# Patient Record
Sex: MALE | Race: WHITE | NOT HISPANIC OR LATINO | Employment: OTHER | URBAN - METROPOLITAN AREA
[De-identification: names, ages, dates, MRNs, and addresses within clinical notes are randomized per-mention and may not be internally consistent; named-entity substitution may affect disease eponyms.]

---

## 2017-01-06 ENCOUNTER — TELEPHONE (OUTPATIENT)
Dept: FAMILY MEDICINE CLINIC | Facility: CLINIC | Age: 57
End: 2017-01-06

## 2017-01-06 RX ORDER — ATORVASTATIN CALCIUM 80 MG/1
TABLET, FILM COATED ORAL
Qty: 90 TABLET | Refills: 0 | Status: SHIPPED | OUTPATIENT
Start: 2017-01-06 | End: 2017-02-03 | Stop reason: SDUPTHER

## 2017-01-16 DIAGNOSIS — I10 BENIGN ESSENTIAL HYPERTENSION: ICD-10-CM

## 2017-01-17 RX ORDER — SERTRALINE HYDROCHLORIDE 100 MG/1
TABLET, FILM COATED ORAL
Qty: 30 TABLET | Refills: 0 | Status: SHIPPED | OUTPATIENT
Start: 2017-01-17 | End: 2017-01-20 | Stop reason: SDUPTHER

## 2017-01-17 RX ORDER — LISINOPRIL 20 MG/1
TABLET ORAL
Qty: 90 TABLET | Refills: 0 | Status: SHIPPED | OUTPATIENT
Start: 2017-01-17 | End: 2017-01-20 | Stop reason: SDUPTHER

## 2017-01-20 ENCOUNTER — OFFICE VISIT (OUTPATIENT)
Dept: FAMILY MEDICINE CLINIC | Facility: CLINIC | Age: 57
End: 2017-01-20

## 2017-01-20 ENCOUNTER — TELEPHONE (OUTPATIENT)
Dept: FAMILY MEDICINE CLINIC | Facility: CLINIC | Age: 57
End: 2017-01-20

## 2017-01-20 VITALS
SYSTOLIC BLOOD PRESSURE: 114 MMHG | DIASTOLIC BLOOD PRESSURE: 64 MMHG | HEART RATE: 79 BPM | RESPIRATION RATE: 18 BRPM | TEMPERATURE: 98.1 F | WEIGHT: 187 LBS | BODY MASS INDEX: 30.05 KG/M2 | HEIGHT: 66 IN | OXYGEN SATURATION: 98 %

## 2017-01-20 DIAGNOSIS — I25.10 MULTIPLE VESSEL CORONARY ARTERY DISEASE: ICD-10-CM

## 2017-01-20 DIAGNOSIS — I10 BENIGN ESSENTIAL HYPERTENSION: Primary | ICD-10-CM

## 2017-01-20 DIAGNOSIS — E78.00 PURE HYPERCHOLESTEROLEMIA: ICD-10-CM

## 2017-01-20 DIAGNOSIS — Z12.5 PROSTATE CANCER SCREENING: ICD-10-CM

## 2017-01-20 PROCEDURE — 99214 OFFICE O/P EST MOD 30 MIN: CPT | Performed by: FAMILY MEDICINE

## 2017-01-20 NOTE — MR AVS SNAPSHOT
Justice Kiser   1/20/2017 3:30 PM   Office Visit    Dept Phone:  [phone]   Encounter #:  [id number]    Provider:  Anshul Martinez MD   Department:  [facility]                Your Full Care Plan              Today's Medication Changes          These changes are accurate as of: 1/20/17  4:18 PM.  If you have any questions, ask your nurse or doctor.               Medication(s)that have changed:     amLODIPine 5 MG tablet   Commonly known as:  NORVASC   TAKE 1 TABLET BY MOUTH DAILY   What changed:  Another medication with the same name was removed. Continue taking this medication, and follow the directions you see here.   Changed by:  Anshul Martinez MD       lisinopril 20 MG tablet   Commonly known as:  PRINIVIL,ZESTRIL   TAKE 1 TABLET EVERY DAY   What changed:  Another medication with the same name was removed. Continue taking this medication, and follow the directions you see here.   Changed by:  Anshul Martinez MD       sertraline 100 MG tablet   Commonly known as:  ZOLOFT   TAKE 1 TABLET BY MOUTH EVERY DAY   What changed:  Another medication with the same name was removed. Continue taking this medication, and follow the directions you see here.   Changed by:  Anshul Martinez MD                  Your Updated Medication List          This list is accurate as of: 1/20/17  4:18 PM.  Always use your most recent med list.                amLODIPine 5 MG tablet   Commonly known as:  NORVASC   TAKE 1 TABLET BY MOUTH DAILY       ASPIRIN LOW DOSE 81 MG tablet   Generic drug:  aspirin       atorvastatin 80 MG tablet   Commonly known as:  LIPITOR   TAKE 1 TABLET BY MOUTH DAILY       CENTRUM SILVER tablet       lisinopril 20 MG tablet   Commonly known as:  PRINIVIL,ZESTRIL   TAKE 1 TABLET EVERY DAY       metoprolol tartrate 50 MG tablet   Commonly known as:  LOPRESSOR       sertraline 100 MG tablet   Commonly known as:  ZOLOFT   TAKE 1 TABLET BY MOUTH EVERY DAY          "        You Were Diagnosed With        Codes Comments    Benign essential hypertension    -  Primary ICD-10-CM: I10  ICD-9-CM: 401.1     Pure hypercholesterolemia     ICD-10-CM: E78.00  ICD-9-CM: 272.0     Multiple vessel coronary artery disease     ICD-10-CM: I25.10  ICD-9-CM: 414.00     Prostate cancer screening     ICD-10-CM: Z12.5  ICD-9-CM: V76.44       Instructions     None    Patient Instructions History      Upcoming Appointments     Visit Type Date Time Department    OFFICE VISIT 1/20/2017  3:30 PM [facility]      Log[employer] Signup     Our records indicate that you have declined Software 2000t signup. If you would like to sign up for [employer], please email [email or web address] or call [phone] to obtain an activation code.             Other Info from Your Visit           Allergies     No Known Allergies      Reason for Visit     FOLLOW-UP 4 month     Med Refill     Hypertension           Vital Signs     Blood Pressure Pulse Temperature Respirations Height Weight    114/64 79 98.1 °F (36.7 °C) (Temporal Artery ) 18 66\" (167.6 cm) 187 lb (84.8 kg)    Oxygen Saturation Body Mass Index Smoking Status             98% 30.18 kg/m2 Former Smoker         Problems and Diagnoses Noted     Benign essential hypertension    High cholesterol or triglycerides    Multiple vessel coronary artery disease    Screening for prostate cancer            "

## 2017-01-20 NOTE — ASSESSMENT & PLAN NOTE
Coronary artery disease is improving with treatment.  Continue current treatment regimen.  Continue current medications.  Cardiac status will be reassessed in 6 months.

## 2017-01-20 NOTE — ASSESSMENT & PLAN NOTE
Hypertension is improving with treatment.  Continue current treatment regimen.  Continue current medications.  Blood pressure will be reassessed at the next regular appointment.

## 2017-01-20 NOTE — PROGRESS NOTES
Chief Complaint   Patient presents with   • FOLLOW-UP 4 month   • Med Refill   • Hypertension       Subjective     Hypertension   This is a chronic problem. The current episode started more than 1 year ago. The problem is unchanged. The problem is controlled. Pertinent negatives include no chest pain, headaches, malaise/fatigue, palpitations, peripheral edema or shortness of breath. (BP at home 130/70-80) There are no associated agents to hypertension. Risk factors for coronary artery disease include dyslipidemia and male gender. Past treatments include ACE inhibitors and beta blockers. Hypertensive end-organ damage includes CAD/MI (had stent). There is no history of angina, kidney disease or CVA. There is no history of chronic renal disease.   Coronary Artery Disease   Presents for follow-up visit. Pertinent negatives include no chest pain, chest pressure, chest tightness, dizziness, palpitations or shortness of breath. Risk factors include hyperlipidemia. The symptoms have been resolved.   Hyperlipidemia   This is a chronic problem. The current episode started more than 1 year ago. The problem is controlled. Recent lipid tests were reviewed and are normal. He has no history of chronic renal disease. Pertinent negatives include no chest pain, myalgias or shortness of breath. Current antihyperlipidemic treatment includes statins. The current treatment provides moderate improvement of lipids. There are no compliance problems.  Risk factors for coronary artery disease include dyslipidemia, hypertension and male sex.       HAd a sinus infection a few weeks ago and getting better now.     Past Medical History,Medications, Allergies, and social history was reviewed.    Review of Systems   Constitutional: Negative for malaise/fatigue.   Respiratory: Negative for chest tightness and shortness of breath.    Cardiovascular: Negative for chest pain and palpitations.   Musculoskeletal: Negative for myalgias.   Neurological:  Negative for dizziness and headaches.       Objective     Physical Exam   Constitutional: He is oriented to person, place, and time. Vital signs are normal. He appears well-developed and well-nourished.   HENT:   Head: Normocephalic and atraumatic.   Right Ear: Hearing, tympanic membrane, external ear and ear canal normal.   Left Ear: Hearing, tympanic membrane, external ear and ear canal normal.   Nose: Nose normal.   Mouth/Throat: Oropharynx is clear and moist.   Eyes: Conjunctivae, EOM and lids are normal. Pupils are equal, round, and reactive to light.   Neck: Normal range of motion. Neck supple. No thyromegaly present.   Cardiovascular: Normal rate, regular rhythm and normal heart sounds.  Exam reveals no gallop and no friction rub.    No murmur heard.  Pulmonary/Chest: Effort normal and breath sounds normal. No respiratory distress. He has no wheezes. He has no rales.   Abdominal: Soft. Normal appearance and bowel sounds are normal. He exhibits no distension and no mass. There is no tenderness. There is no rebound and no guarding.   Musculoskeletal: Normal range of motion.   Neurological: He is alert and oriented to person, place, and time. He has normal strength and normal reflexes. No cranial nerve deficit. Coordination normal.   Skin: Skin is warm and dry.   Psychiatric: He has a normal mood and affect. His speech is normal and behavior is normal. Judgment and thought content normal. Cognition and memory are normal.   Nursing note and vitals reviewed.        Assessment/Plan     Problem List Items Addressed This Visit        Cardiovascular and Mediastinum    Benign essential hypertension - Primary     Hypertension is improving with treatment.  Continue current treatment regimen.  Continue current medications.  Blood pressure will be reassessed at the next regular appointment.         Relevant Orders    Comprehensive Metabolic Panel    Lipid Panel    Multiple vessel coronary artery disease     Coronary artery  disease is improving with treatment.  Continue current treatment regimen.  Continue current medications.  Cardiac status will be reassessed in 6 months.         Relevant Orders    Comprehensive Metabolic Panel    Lipid Panel    Hyperlipidemia     He will follow-up and have blood work done fasting.  Including CMP and lipid panel         Relevant Orders    Comprehensive Metabolic Panel    Lipid Panel      Other Visit Diagnoses     Prostate cancer screening        Relevant Orders    PSA            DISCUSSION  Overall stable.  Continue current medications and check labs as noted.    Return in about 6 months (around 7/20/2017).     MEDICATIONS PRESCRIBED  Requested Prescriptions      No prescriptions requested or ordered in this encounter          Anshul Martinez MD

## 2017-01-21 ENCOUNTER — RESULTS ENCOUNTER (OUTPATIENT)
Dept: FAMILY MEDICINE CLINIC | Facility: CLINIC | Age: 57
End: 2017-01-21

## 2017-01-21 DIAGNOSIS — Z12.5 PROSTATE CANCER SCREENING: ICD-10-CM

## 2017-01-21 DIAGNOSIS — I10 BENIGN ESSENTIAL HYPERTENSION: ICD-10-CM

## 2017-01-21 DIAGNOSIS — I25.10 MULTIPLE VESSEL CORONARY ARTERY DISEASE: ICD-10-CM

## 2017-01-21 DIAGNOSIS — E78.00 PURE HYPERCHOLESTEROLEMIA: ICD-10-CM

## 2017-01-23 RX ORDER — METOPROLOL TARTRATE 50 MG/1
TABLET, FILM COATED ORAL
Qty: 30 TABLET | Refills: 5 | Status: SHIPPED | OUTPATIENT
Start: 2017-01-23 | End: 2017-07-02 | Stop reason: SDUPTHER

## 2017-01-24 LAB
ALBUMIN SERPL-MCNC: 4.6 G/DL (ref 3.2–4.8)
ALBUMIN/GLOB SERPL: 1.8 G/DL (ref 1.5–2.5)
ALP SERPL-CCNC: 138 U/L (ref 25–100)
ALT SERPL-CCNC: 72 U/L (ref 7–40)
AST SERPL-CCNC: 94 U/L (ref 0–33)
BILIRUB SERPL-MCNC: 0.4 MG/DL (ref 0.3–1.2)
BUN SERPL-MCNC: 6 MG/DL (ref 9–23)
BUN/CREAT SERPL: 10 (ref 7–25)
CALCIUM SERPL-MCNC: 10 MG/DL (ref 8.7–10.4)
CHLORIDE SERPL-SCNC: 100 MMOL/L (ref 99–109)
CHOLEST SERPL-MCNC: 154 MG/DL (ref 0–200)
CO2 SERPL-SCNC: 31 MMOL/L (ref 20–31)
CREAT SERPL-MCNC: 0.6 MG/DL (ref 0.6–1.3)
GLOBULIN SER CALC-MCNC: 2.6 GM/DL
GLUCOSE SERPL-MCNC: 119 MG/DL (ref 70–100)
HDLC SERPL-MCNC: 66 MG/DL (ref 40–60)
LDLC SERPL CALC-MCNC: 65 MG/DL (ref 0–100)
POTASSIUM SERPL-SCNC: 4.8 MMOL/L (ref 3.5–5.5)
PROT SERPL-MCNC: 7.2 G/DL (ref 5.7–8.2)
PSA SERPL-MCNC: 0.4 NG/ML (ref 0–4)
SODIUM SERPL-SCNC: 141 MMOL/L (ref 132–146)
TRIGL SERPL-MCNC: 114 MG/DL (ref 0–150)
VLDLC SERPL CALC-MCNC: 22.8 MG/DL

## 2017-01-27 DIAGNOSIS — R79.89 ELEVATED LIVER FUNCTION TESTS: Primary | ICD-10-CM

## 2017-02-03 RX ORDER — ATORVASTATIN CALCIUM 80 MG/1
TABLET, FILM COATED ORAL
Qty: 90 TABLET | Refills: 0 | Status: SHIPPED | OUTPATIENT
Start: 2017-02-03 | End: 2017-04-06 | Stop reason: SDUPTHER

## 2017-02-13 RX ORDER — SERTRALINE HYDROCHLORIDE 100 MG/1
TABLET, FILM COATED ORAL
Qty: 30 TABLET | Refills: 5 | Status: SHIPPED | OUTPATIENT
Start: 2017-02-13 | End: 2017-09-12 | Stop reason: SDUPTHER

## 2017-02-14 DIAGNOSIS — I10 BENIGN ESSENTIAL HYPERTENSION: ICD-10-CM

## 2017-02-14 RX ORDER — LISINOPRIL 20 MG/1
TABLET ORAL
Qty: 90 TABLET | Refills: 0 | Status: SHIPPED | OUTPATIENT
Start: 2017-02-14 | End: 2017-03-13 | Stop reason: SDUPTHER

## 2017-02-19 DIAGNOSIS — I10 BENIGN ESSENTIAL HYPERTENSION: ICD-10-CM

## 2017-02-20 RX ORDER — AMLODIPINE BESYLATE 5 MG/1
TABLET ORAL
Qty: 90 TABLET | Refills: 1 | Status: SHIPPED | OUTPATIENT
Start: 2017-02-20 | End: 2017-04-18 | Stop reason: SDUPTHER

## 2017-03-13 DIAGNOSIS — I10 BENIGN ESSENTIAL HYPERTENSION: ICD-10-CM

## 2017-03-13 RX ORDER — LISINOPRIL 20 MG/1
TABLET ORAL
Qty: 90 TABLET | Refills: 1 | Status: SHIPPED | OUTPATIENT
Start: 2017-03-13 | End: 2017-09-12 | Stop reason: SDUPTHER

## 2017-04-06 RX ORDER — ATORVASTATIN CALCIUM 80 MG/1
TABLET, FILM COATED ORAL
Qty: 90 TABLET | Refills: 1 | Status: SHIPPED | OUTPATIENT
Start: 2017-04-06 | End: 2017-09-12 | Stop reason: SDUPTHER

## 2017-04-18 DIAGNOSIS — I10 BENIGN ESSENTIAL HYPERTENSION: ICD-10-CM

## 2017-04-18 RX ORDER — AMLODIPINE BESYLATE 5 MG/1
TABLET ORAL
Qty: 90 TABLET | Refills: 0 | Status: SHIPPED | OUTPATIENT
Start: 2017-04-18 | End: 2017-06-10 | Stop reason: SDUPTHER

## 2017-06-10 DIAGNOSIS — I10 BENIGN ESSENTIAL HYPERTENSION: ICD-10-CM

## 2017-06-12 RX ORDER — AMLODIPINE BESYLATE 5 MG/1
TABLET ORAL
Qty: 90 TABLET | Refills: 0 | Status: SHIPPED | OUTPATIENT
Start: 2017-06-12 | End: 2017-09-12 | Stop reason: SDUPTHER

## 2017-07-03 RX ORDER — METOPROLOL TARTRATE 50 MG/1
TABLET, FILM COATED ORAL
Qty: 30 TABLET | Refills: 0 | Status: SHIPPED | OUTPATIENT
Start: 2017-07-03 | End: 2017-09-12 | Stop reason: SDUPTHER

## 2017-09-12 ENCOUNTER — OFFICE VISIT (OUTPATIENT)
Dept: FAMILY MEDICINE CLINIC | Facility: CLINIC | Age: 57
End: 2017-09-12

## 2017-09-12 VITALS
TEMPERATURE: 97 F | HEART RATE: 91 BPM | WEIGHT: 174 LBS | BODY MASS INDEX: 27.97 KG/M2 | SYSTOLIC BLOOD PRESSURE: 170 MMHG | HEIGHT: 66 IN | DIASTOLIC BLOOD PRESSURE: 100 MMHG | RESPIRATION RATE: 16 BRPM | OXYGEN SATURATION: 98 %

## 2017-09-12 DIAGNOSIS — E78.00 PURE HYPERCHOLESTEROLEMIA: ICD-10-CM

## 2017-09-12 DIAGNOSIS — R53.83 OTHER FATIGUE: ICD-10-CM

## 2017-09-12 DIAGNOSIS — R05.9 COUGH: ICD-10-CM

## 2017-09-12 DIAGNOSIS — R06.02 SHORTNESS OF BREATH: ICD-10-CM

## 2017-09-12 DIAGNOSIS — K21.9 GASTROESOPHAGEAL REFLUX DISEASE, ESOPHAGITIS PRESENCE NOT SPECIFIED: ICD-10-CM

## 2017-09-12 DIAGNOSIS — R10.13 EPIGASTRIC ABDOMINAL PAIN: ICD-10-CM

## 2017-09-12 DIAGNOSIS — I25.10 MULTIPLE VESSEL CORONARY ARTERY DISEASE: ICD-10-CM

## 2017-09-12 DIAGNOSIS — R63.4 WEIGHT LOSS: ICD-10-CM

## 2017-09-12 DIAGNOSIS — I10 BENIGN ESSENTIAL HYPERTENSION: Primary | ICD-10-CM

## 2017-09-12 DIAGNOSIS — R61 NIGHT SWEATS: ICD-10-CM

## 2017-09-12 DIAGNOSIS — F33.1 MODERATE EPISODE OF RECURRENT MAJOR DEPRESSIVE DISORDER (HCC): ICD-10-CM

## 2017-09-12 DIAGNOSIS — R20.2 PARESTHESIA OF RIGHT FOOT: ICD-10-CM

## 2017-09-12 LAB
ALBUMIN SERPL-MCNC: 4.6 G/DL (ref 3.2–4.8)
ALBUMIN/GLOB SERPL: 1.7 G/DL (ref 1.5–2.5)
ALP SERPL-CCNC: 124 U/L (ref 25–100)
ALT SERPL-CCNC: 91 U/L (ref 7–40)
AST SERPL-CCNC: 126 U/L (ref 0–33)
BASOPHILS # BLD AUTO: 0.07 10*3/MM3 (ref 0–0.2)
BASOPHILS NFR BLD AUTO: 1 % (ref 0–1)
BILIRUB SERPL-MCNC: 0.9 MG/DL (ref 0.3–1.2)
BUN SERPL-MCNC: 5 MG/DL (ref 9–23)
BUN/CREAT SERPL: 7.1 (ref 7–25)
CALCIUM SERPL-MCNC: 9.5 MG/DL (ref 8.7–10.4)
CHLORIDE SERPL-SCNC: 99 MMOL/L (ref 99–109)
CHOLEST SERPL-MCNC: 310 MG/DL (ref 0–200)
CHOLEST/HDLC SERPL: 5.54 {RATIO}
CO2 SERPL-SCNC: 28 MMOL/L (ref 20–31)
CREAT SERPL-MCNC: 0.7 MG/DL (ref 0.6–1.3)
EOSINOPHIL # BLD AUTO: 0.14 10*3/MM3 (ref 0–0.3)
EOSINOPHIL NFR BLD AUTO: 1.9 % (ref 0–3)
ERYTHROCYTE [DISTWIDTH] IN BLOOD BY AUTOMATED COUNT: 13.3 % (ref 11.3–14.5)
GLOBULIN SER CALC-MCNC: 2.7 GM/DL
GLUCOSE SERPL-MCNC: 121 MG/DL (ref 70–100)
HCT VFR BLD AUTO: 48.2 % (ref 38.9–50.9)
HDLC SERPL-MCNC: 56 MG/DL (ref 40–60)
HGB BLD-MCNC: 16.1 G/DL (ref 13.1–17.5)
IMM GRANULOCYTES # BLD: 0.02 10*3/MM3 (ref 0–0.03)
IMM GRANULOCYTES NFR BLD: 0.3 % (ref 0–0.6)
LDLC SERPL CALC-MCNC: 198 MG/DL (ref 0–100)
LIPASE SERPL-CCNC: 34 U/L (ref 6–51)
LYMPHOCYTES # BLD AUTO: 1.84 10*3/MM3 (ref 0.6–4.8)
LYMPHOCYTES NFR BLD AUTO: 25.4 % (ref 24–44)
MCH RBC QN AUTO: 33.3 PG (ref 27–31)
MCHC RBC AUTO-ENTMCNC: 33.4 G/DL (ref 32–36)
MCV RBC AUTO: 99.6 FL (ref 80–99)
MONOCYTES # BLD AUTO: 0.75 10*3/MM3 (ref 0–1)
MONOCYTES NFR BLD AUTO: 10.4 % (ref 0–12)
NEUTROPHILS # BLD AUTO: 4.42 10*3/MM3 (ref 1.5–8.3)
NEUTROPHILS NFR BLD AUTO: 61 % (ref 41–71)
PLATELET # BLD AUTO: 336 10*3/MM3 (ref 150–450)
POTASSIUM SERPL-SCNC: 4.4 MMOL/L (ref 3.5–5.5)
PROT SERPL-MCNC: 7.3 G/DL (ref 5.7–8.2)
RBC # BLD AUTO: 4.84 10*6/MM3 (ref 4.2–5.76)
SODIUM SERPL-SCNC: 140 MMOL/L (ref 132–146)
TRIGL SERPL-MCNC: 278 MG/DL (ref 0–150)
TSH SERPL DL<=0.005 MIU/L-ACNC: 1.88 MIU/ML (ref 0.35–5.35)
VIT B12 SERPL-MCNC: 480 PG/ML (ref 211–911)
VLDLC SERPL CALC-MCNC: 55.6 MG/DL
WBC # BLD AUTO: 7.24 10*3/MM3 (ref 3.5–10.8)

## 2017-09-12 PROCEDURE — 99214 OFFICE O/P EST MOD 30 MIN: CPT | Performed by: FAMILY MEDICINE

## 2017-09-12 RX ORDER — LISINOPRIL 20 MG/1
20 TABLET ORAL DAILY
Qty: 30 TABLET | Refills: 2 | Status: SHIPPED | OUTPATIENT
Start: 2017-09-12 | End: 2017-12-06 | Stop reason: SDUPTHER

## 2017-09-12 RX ORDER — OMEPRAZOLE 40 MG/1
40 CAPSULE, DELAYED RELEASE ORAL DAILY
Qty: 30 CAPSULE | Refills: 2 | Status: SHIPPED | OUTPATIENT
Start: 2017-09-12 | End: 2017-12-06 | Stop reason: SDUPTHER

## 2017-09-12 RX ORDER — SERTRALINE HYDROCHLORIDE 100 MG/1
TABLET, FILM COATED ORAL
Qty: 30 TABLET | Refills: 2 | Status: SHIPPED | OUTPATIENT
Start: 2017-09-12 | End: 2017-11-03 | Stop reason: SDUPTHER

## 2017-09-12 RX ORDER — AMLODIPINE BESYLATE 5 MG/1
5 TABLET ORAL DAILY
Qty: 30 TABLET | Refills: 2 | Status: SHIPPED | OUTPATIENT
Start: 2017-09-12 | End: 2017-12-06 | Stop reason: SDUPTHER

## 2017-09-12 RX ORDER — ATORVASTATIN CALCIUM 80 MG/1
80 TABLET, FILM COATED ORAL NIGHTLY
Qty: 30 TABLET | Refills: 2 | Status: SHIPPED | OUTPATIENT
Start: 2017-09-12 | End: 2017-12-06 | Stop reason: SDUPTHER

## 2017-09-12 RX ORDER — METOPROLOL TARTRATE 50 MG/1
TABLET, FILM COATED ORAL
Qty: 30 TABLET | Refills: 2 | Status: SHIPPED | OUTPATIENT
Start: 2017-09-12 | End: 2018-01-08 | Stop reason: SDUPTHER

## 2017-09-12 NOTE — PROGRESS NOTES
Chief Complaint   Patient presents with   • Hypertension     6 month follow up   • Hyperlipidemia   • Anxiety   • Depression   • Med Refill   • Labs Only     pt is fasting       Subjective     Hypertension   This is a chronic problem. The current episode started more than 1 year ago. The problem is unchanged. The problem is uncontrolled. Associated symptoms include anxiety and shortness of breath. Pertinent negatives include no chest pain or peripheral edema. (Ran out of meds in July) There are no associated agents to hypertension. Risk factors for coronary artery disease include dyslipidemia and male gender. Past treatments include ACE inhibitors and beta blockers. Hypertensive end-organ damage includes CAD/MI (had stent). There is no history of angina, kidney disease or CVA. There is no history of chronic renal disease.   Hyperlipidemia   This is a chronic problem. The current episode started more than 1 year ago. The problem is controlled. Recent lipid tests were reviewed and are normal. He has no history of chronic renal disease. Associated symptoms include shortness of breath. Pertinent negatives include no chest pain or myalgias. He is currently on no antihyperlipidemic treatment. The current treatment provides moderate improvement of lipids. Compliance problems include psychosocial issues and medication cost (lost insurance).  Risk factors for coronary artery disease include dyslipidemia, hypertension and male sex.   Depression   Visit Type: follow-up  Patient presents with the following symptoms: anhedonia, depressed mood, fatigue, insomnia, nervousness/anxiety and shortness of breath.  Patient is not experiencing: suicidal ideas and weight gain.  Frequency of symptoms: constantly   Severity: moderate   Sleep quality: poor      Coronary Artery Disease   Presents for follow-up visit. Symptoms include dizziness (at times) and shortness of breath. Pertinent negatives include no chest pain, chest pressure,  "chest tightness or weight gain. Risk factors include hyperlipidemia. The symptoms have been resolved. Compliance with diet is poor. Compliance with exercise is poor. Compliance with medications is poor.       HAS NO MED SINCE JULY   Lost job and lost insurance. Has insurance now      Past Medical History,Medications, Allergies, and social history was reviewed.    Review of Systems   Constitutional: Positive for fatigue and unexpected weight change (not eating). Negative for weight gain.   HENT: Negative.    Eyes: Negative.  Negative for visual disturbance.   Respiratory: Positive for cough (some discoloration. Spots of blood and yellow, thick) and shortness of breath. Negative for chest tightness and wheezing.    Cardiovascular: Negative for chest pain.   Gastrointestinal: Positive for abdominal pain (upper and lower at times, feels like something stuck. ? if food gets stuck, bloated and gassy), diarrhea and nausea. Negative for blood in stool.        + GERD.    Genitourinary: Negative.         More freq now   Musculoskeletal: Negative.  Negative for myalgias.        Toes: feels like something shoots through it, shock feeling, worse at night. tingling feet. right foot, had back surg in the past but no increased pain    Neurological: Positive for dizziness (at times). Negative for syncope.   Psychiatric/Behavioral: Positive for dysphoric mood and sleep disturbance. Negative for suicidal ideas. The patient is nervous/anxious and has insomnia.        Objective     Vitals:    09/12/17 0921 09/12/17 0947   BP: (!) 174/102 170/100   Pulse: 91    Resp: 16    Temp: 97 °F (36.1 °C)    TempSrc: Temporal Artery     SpO2: 98%    Weight: 174 lb (78.9 kg)    Height: 66\" (167.6 cm)         Physical Exam   Constitutional: He is oriented to person, place, and time. Vital signs are normal. He appears well-developed and well-nourished.   HENT:   Head: Normocephalic and atraumatic.   Right Ear: Hearing, tympanic membrane, external ear " and ear canal normal.   Left Ear: Hearing, tympanic membrane, external ear and ear canal normal.   Nose: Nose normal.   Mouth/Throat: Oropharynx is clear and moist.   Eyes: Conjunctivae, EOM and lids are normal. Pupils are equal, round, and reactive to light.   Neck: Normal range of motion. Neck supple. No thyromegaly present.   Cardiovascular: Normal rate, regular rhythm and normal heart sounds.  Exam reveals no friction rub.    No murmur heard.  Pulmonary/Chest: Effort normal and breath sounds normal. No respiratory distress. He has no wheezes. He has no rales.   Abdominal: Soft. Normal appearance and bowel sounds are normal. He exhibits no distension and no mass. There is tenderness (mild epigastric tenderness). There is no rebound and no guarding.   Large ventral hernia.   Musculoskeletal: He exhibits no edema.   Neurological: He is alert and oriented to person, place, and time. He has normal strength.   Skin: Skin is warm and dry.   Psychiatric: His speech is normal and behavior is normal. Cognition and memory are normal. He exhibits a depressed mood.   Nursing note and vitals reviewed.        Assessment/Plan     Problem List Items Addressed This Visit        Cardiovascular and Mediastinum    Benign essential hypertension - Primary    Relevant Medications    lisinopril (PRINIVIL,ZESTRIL) 20 MG tablet    amLODIPine (NORVASC) 5 MG tablet    metoprolol tartrate (LOPRESSOR) 50 MG tablet    Other Relevant Orders    CBC & Differential    Comprehensive Metabolic Panel    Lipid Panel With / Chol / HDL Ratio    Multiple vessel coronary artery disease    Relevant Medications    amLODIPine (NORVASC) 5 MG tablet    metoprolol tartrate (LOPRESSOR) 50 MG tablet    Other Relevant Orders    CBC & Differential    Comprehensive Metabolic Panel    Lipid Panel With / Chol / HDL Ratio    Pure hypercholesterolemia    Relevant Medications    atorvastatin (LIPITOR) 80 MG tablet    Other Relevant Orders    Comprehensive Metabolic Panel     Lipid Panel With / Chol / HDL Ratio       Digestive    GERD (gastroesophageal reflux disease)    Relevant Medications    omeprazole (priLOSEC) 40 MG capsule    Other Relevant Orders    CBC & Differential       Nervous and Auditory    Paresthesia of right foot    Relevant Orders    Vitamin B12       Other    Depression    Relevant Medications    sertraline (ZOLOFT) 100 MG tablet      Other Visit Diagnoses     Weight loss        Relevant Orders    CBC & Differential    TSH    Vitamin B12    Other fatigue        Relevant Orders    CBC & Differential    TSH    Vitamin B12    Night sweats        Relevant Orders    CBC & Differential    Epigastric abdominal pain        Relevant Medications    omeprazole (priLOSEC) 40 MG capsule    Other Relevant Orders    CBC & Differential    Comprehensive Metabolic Panel    Lipase    Cough        Relevant Orders    XR Chest PA & Lateral    Shortness of breath        Relevant Orders    XR Chest PA & Lateral           Follow up: Return for follow up depends on review of labs and testing.         DISCUSSION  He has multiple medical problems as noted.    Most concerning at this point, is the uncontrolled blood pressure.  Restart his blood pressure medication.  Check labs as noted.    He also complains of shortness of breath with cough.  Recommend check chest x-ray especially given weight loss and night sweats.  He is a former smoker.    Depression.  Recommend restart Zoloft.  One half daily for 6 days then 1 a day.  He agrees to call if worsens.  Otherwise follow-up will depend on results of testing.    Persistent upper abdominal discomfort.  Gastroesophageal reflux disease.  Start omeprazole.  Check lipase.  May need further testing if not improving with medication.    Coronary artery disease with history of hyperlipidemia.  Check labs as noted.  Restart Lipitor.      MEDICATIONS PRESCRIBED  Requested Prescriptions     Signed Prescriptions Disp Refills   • lisinopril (PRINIVIL,ZESTRIL)  20 MG tablet 30 tablet 2     Sig: Take 1 tablet by mouth Daily.   • amLODIPine (NORVASC) 5 MG tablet 30 tablet 2     Sig: Take 1 tablet by mouth Daily.   • atorvastatin (LIPITOR) 80 MG tablet 30 tablet 2     Sig: Take 1 tablet by mouth Every Night.   • metoprolol tartrate (LOPRESSOR) 50 MG tablet 30 tablet 2     Si/2 pp BID   • sertraline (ZOLOFT) 100 MG tablet 30 tablet 2     Si/2 po daily for 4 days then one po daily   • omeprazole (priLOSEC) 40 MG capsule 30 capsule 2     Sig: Take 1 capsule by mouth Daily.          Anshul Martinez MD

## 2017-09-13 ENCOUNTER — HOSPITAL ENCOUNTER (OUTPATIENT)
Dept: GENERAL RADIOLOGY | Facility: HOSPITAL | Age: 57
Discharge: HOME | End: 2017-09-13
Admitting: FAMILY MEDICINE

## 2017-09-13 PROCEDURE — 71020 HC CHEST PA AND LATERAL: CPT

## 2017-09-19 DIAGNOSIS — R79.89 ELEVATED LFTS: Primary | ICD-10-CM

## 2017-11-03 ENCOUNTER — OFFICE VISIT (OUTPATIENT)
Dept: FAMILY MEDICINE CLINIC | Facility: CLINIC | Age: 57
End: 2017-11-03

## 2017-11-03 VITALS
SYSTOLIC BLOOD PRESSURE: 118 MMHG | TEMPERATURE: 98.6 F | OXYGEN SATURATION: 95 % | DIASTOLIC BLOOD PRESSURE: 68 MMHG | HEIGHT: 66 IN | HEART RATE: 70 BPM | WEIGHT: 171.5 LBS | BODY MASS INDEX: 27.56 KG/M2 | RESPIRATION RATE: 16 BRPM

## 2017-11-03 DIAGNOSIS — F33.1 MODERATE EPISODE OF RECURRENT MAJOR DEPRESSIVE DISORDER (HCC): ICD-10-CM

## 2017-11-03 DIAGNOSIS — R73.9 HYPERGLYCEMIA: ICD-10-CM

## 2017-11-03 DIAGNOSIS — E78.00 PURE HYPERCHOLESTEROLEMIA: ICD-10-CM

## 2017-11-03 DIAGNOSIS — I10 BENIGN ESSENTIAL HYPERTENSION: Primary | ICD-10-CM

## 2017-11-03 DIAGNOSIS — R79.89 ELEVATED LFTS: ICD-10-CM

## 2017-11-03 PROCEDURE — 99214 OFFICE O/P EST MOD 30 MIN: CPT | Performed by: FAMILY MEDICINE

## 2017-11-03 RX ORDER — SERTRALINE HYDROCHLORIDE 100 MG/1
150 TABLET, FILM COATED ORAL DAILY
Qty: 45 TABLET | Refills: 2 | Status: SHIPPED | OUTPATIENT
Start: 2017-11-03 | End: 2018-01-31 | Stop reason: SDUPTHER

## 2017-11-03 RX ORDER — INFLUENZA VIRUS VACCINE 15; 15; 15; 15 UG/.5ML; UG/.5ML; UG/.5ML; UG/.5ML
SUSPENSION INTRAMUSCULAR
Refills: 0 | COMMUNITY
Start: 2017-09-12 | End: 2018-04-05

## 2017-11-03 NOTE — PROGRESS NOTES
Assessment/Plan     Problem List Items Addressed This Visit        Cardiovascular and Mediastinum    Benign essential hypertension - Primary    Relevant Orders    Comprehensive Metabolic Panel    Lipid Panel With / Chol / HDL Ratio    Pure hypercholesterolemia    Relevant Orders    Comprehensive Metabolic Panel    Lipid Panel With / Chol / HDL Ratio       Other    Depression    Relevant Medications    sertraline (ZOLOFT) 100 MG tablet    Hyperglycemia    Relevant Orders    Hemoglobin A1c      Other Visit Diagnoses     Elevated LFTs        Relevant Orders    Comprehensive Metabolic Panel    Lipid Panel With / Chol / HDL Ratio           Follow up: Return in about 2 months (around 1/3/2018), or if symptoms worsen or fail to improve.     DISCUSSION  Due to persistent depression, recommend increasing Zoloft to 150 mg daily.  He agrees.  If not helping over the next several weeks, he is to call.  If improving, follow-up in 2 months.    Hypertension is much better with medication.  Continue medication.    Hyperglycemia.  Blood sugar was mildly elevated.  Recheck A1c.  He will do next week.    Hyperlipidemia.  Recheck CMP and lipid panel next week when fasting.    Elevated liver function tests.  Recheck levels next week.    Continue follow-up with gastroenterology and upper endoscopy as scheduled in late November.      MEDICATIONS PRESCRIBED  Requested Prescriptions     Signed Prescriptions Disp Refills   • sertraline (ZOLOFT) 100 MG tablet 45 tablet 2     Sig: Take 1.5 tablets by mouth Daily.              -------------------------------------------    Subjective     Chief Complaint   Patient presents with   • Hypertension     one month follow up   • Med Refill       Hypertension   This is a chronic problem. The current episode started more than 1 year ago. The problem is unchanged. The problem is controlled (Much better since restarting medication). Associated symptoms include anxiety and malaise/fatigue. Pertinent  negatives include no chest pain or peripheral edema. Risk factors for coronary artery disease include dyslipidemia and male gender. The current treatment provides significant improvement. There are no compliance problems.  There is no history of angina, kidney disease or CAD/MI. There is no history of chronic renal disease.   Hyperlipidemia   This is a chronic problem. The current episode started more than 1 year ago. The problem is uncontrolled. Recent lipid tests were reviewed and are high. He has no history of chronic renal disease. Pertinent negatives include no chest pain. Current antihyperlipidemic treatment includes statins. Improvement on treatment: Has not had recheck since restarted medication.   Depression   Visit Type: follow-up  Patient presents with the following symptoms: anhedonia, depressed mood, fatigue and nervousness/anxiety.  Patient is not experiencing: insomnia, suicidal ideas and suicidal planning.  Frequency of symptoms: most days   Severity: moderate (Start on sertraline 100 mg but not much better yet.)   Sleep quality: fair          Started meds again and BP is much better now    Depressed feeling still     Decreased appetite  + nausea in am  EGD scheduled 11/27, Dr Paul      Past Medical History,Medications, Allergies, and social history was reviewed.    Review of Systems   Constitutional: Positive for fatigue and malaise/fatigue.   HENT: Negative.    Respiratory: Negative.    Cardiovascular: Negative.  Negative for chest pain.   Gastrointestinal: Positive for abdominal pain. Negative for blood in stool.   Musculoskeletal: Negative.    Neurological: Negative.    Psychiatric/Behavioral: Positive for dysphoric mood and sleep disturbance. Negative for suicidal ideas. The patient is nervous/anxious. The patient does not have insomnia.        Objective     Vitals:    11/03/17 1544   BP: 118/68   Pulse: 70   Resp: 16   Temp: 98.6 °F (37 °C)   TempSrc: Temporal Artery    SpO2: 95%   Weight: 171  "lb 8 oz (77.8 kg)   Height: 66\" (167.6 cm)        Physical Exam   Constitutional: He is oriented to person, place, and time. Vital signs are normal. He appears well-developed and well-nourished.   HENT:   Head: Normocephalic and atraumatic.   Right Ear: Hearing, tympanic membrane, external ear and ear canal normal.   Left Ear: Hearing, tympanic membrane, external ear and ear canal normal.   Mouth/Throat: Oropharynx is clear and moist.   Eyes: Conjunctivae, EOM and lids are normal. Pupils are equal, round, and reactive to light.   Neck: Normal range of motion. Neck supple. No thyromegaly present.   Cardiovascular: Normal rate, regular rhythm and normal heart sounds.  Exam reveals no friction rub.    No murmur heard.  Pulmonary/Chest: Effort normal and breath sounds normal. No respiratory distress. He has no wheezes. He has no rales.   Abdominal: Soft. Normal appearance and bowel sounds are normal. He exhibits no distension and no mass. There is tenderness (mild diffuse). There is no rebound and no guarding.   Musculoskeletal: He exhibits no edema.   Neurological: He is alert and oriented to person, place, and time. He has normal strength.   Skin: Skin is warm and dry.   Psychiatric: His speech is normal and behavior is normal. Cognition and memory are normal.   Flat affect   Nursing note and vitals reviewed.              Anshul Martinez MD    "

## 2017-11-06 ENCOUNTER — RESULTS ENCOUNTER (OUTPATIENT)
Dept: FAMILY MEDICINE CLINIC | Facility: CLINIC | Age: 57
End: 2017-11-06

## 2017-11-06 DIAGNOSIS — R79.89 ELEVATED LFTS: ICD-10-CM

## 2017-11-06 DIAGNOSIS — R73.9 HYPERGLYCEMIA: ICD-10-CM

## 2017-11-06 DIAGNOSIS — E78.00 PURE HYPERCHOLESTEROLEMIA: ICD-10-CM

## 2017-11-06 DIAGNOSIS — I10 BENIGN ESSENTIAL HYPERTENSION: ICD-10-CM

## 2017-11-06 LAB
ALBUMIN SERPL-MCNC: 4.6 G/DL (ref 3.2–4.8)
ALBUMIN/GLOB SERPL: 1.6 G/DL (ref 1.5–2.5)
ALP SERPL-CCNC: 271 U/L (ref 25–100)
ALT SERPL-CCNC: 87 U/L (ref 7–40)
AST SERPL-CCNC: 178 U/L (ref 0–33)
BILIRUB SERPL-MCNC: 1.2 MG/DL (ref 0.3–1.2)
BUN SERPL-MCNC: 7 MG/DL (ref 9–23)
BUN/CREAT SERPL: 8.8 (ref 7–25)
CALCIUM SERPL-MCNC: 9.4 MG/DL (ref 8.7–10.4)
CHLORIDE SERPL-SCNC: 102 MMOL/L (ref 99–109)
CHOLEST SERPL-MCNC: 146 MG/DL (ref 0–200)
CHOLEST/HDLC SERPL: 2.43 {RATIO}
CO2 SERPL-SCNC: 30 MMOL/L (ref 20–31)
CREAT SERPL-MCNC: 0.8 MG/DL (ref 0.6–1.3)
GFR SERPLBLD CREATININE-BSD FMLA CKD-EPI: 120 ML/MIN/1.73
GFR SERPLBLD CREATININE-BSD FMLA CKD-EPI: 99 ML/MIN/1.73
GLOBULIN SER CALC-MCNC: 2.8 GM/DL
GLUCOSE SERPL-MCNC: 130 MG/DL (ref 70–100)
HBA1C MFR BLD: 6.1 % (ref 4.8–5.6)
HDLC SERPL-MCNC: 60 MG/DL (ref 40–60)
LDLC SERPL CALC-MCNC: 62 MG/DL (ref 0–100)
POTASSIUM SERPL-SCNC: 4.5 MMOL/L (ref 3.5–5.5)
PROT SERPL-MCNC: 7.4 G/DL (ref 5.7–8.2)
SODIUM SERPL-SCNC: 138 MMOL/L (ref 132–146)
TRIGL SERPL-MCNC: 120 MG/DL (ref 0–150)
VLDLC SERPL CALC-MCNC: 24 MG/DL

## 2017-11-07 LAB
GGT SERPL-CCNC: 1942 U/L (ref 0–72)
Lab: NORMAL
WRITTEN AUTHORIZATION: NORMAL

## 2017-11-09 DIAGNOSIS — R79.89 ELEVATED LIVER FUNCTION TESTS: Primary | ICD-10-CM

## 2017-11-09 DIAGNOSIS — R74.8 ELEVATED SERUM GGT LEVEL: ICD-10-CM

## 2017-11-15 ENCOUNTER — HOSPITAL ENCOUNTER (OUTPATIENT)
Dept: ULTRASOUND IMAGING | Facility: HOSPITAL | Age: 57
Discharge: HOME | End: 2017-11-15
Admitting: FAMILY MEDICINE

## 2017-11-15 DIAGNOSIS — R79.89 ELEVATED LIVER FUNCTION TESTS: ICD-10-CM

## 2017-11-15 DIAGNOSIS — R74.8 ELEVATED SERUM GGT LEVEL: ICD-10-CM

## 2017-11-15 PROCEDURE — 76705 ECHO EXAM OF ABDOMEN: CPT

## 2017-11-20 DIAGNOSIS — K82.9 GALL BLADDER DISEASE: Primary | ICD-10-CM

## 2017-12-06 DIAGNOSIS — R10.13 EPIGASTRIC ABDOMINAL PAIN: ICD-10-CM

## 2017-12-06 DIAGNOSIS — K21.9 GASTROESOPHAGEAL REFLUX DISEASE, ESOPHAGITIS PRESENCE NOT SPECIFIED: ICD-10-CM

## 2017-12-06 DIAGNOSIS — E78.00 PURE HYPERCHOLESTEROLEMIA: ICD-10-CM

## 2017-12-06 DIAGNOSIS — I10 BENIGN ESSENTIAL HYPERTENSION: ICD-10-CM

## 2017-12-06 RX ORDER — ATORVASTATIN CALCIUM 80 MG/1
TABLET, FILM COATED ORAL
Qty: 30 TABLET | Refills: 2 | Status: SHIPPED | OUTPATIENT
Start: 2017-12-06 | End: 2018-02-27 | Stop reason: SDUPTHER

## 2017-12-06 RX ORDER — AMLODIPINE BESYLATE 5 MG/1
TABLET ORAL
Qty: 30 TABLET | Refills: 2 | Status: SHIPPED | OUTPATIENT
Start: 2017-12-06 | End: 2018-03-09 | Stop reason: SDUPTHER

## 2017-12-06 RX ORDER — LISINOPRIL 20 MG/1
TABLET ORAL
Qty: 30 TABLET | Refills: 2 | Status: SHIPPED | OUTPATIENT
Start: 2017-12-06 | End: 2018-02-27 | Stop reason: SDUPTHER

## 2017-12-06 RX ORDER — OMEPRAZOLE 40 MG/1
CAPSULE, DELAYED RELEASE ORAL
Qty: 30 CAPSULE | Refills: 2 | Status: SHIPPED | OUTPATIENT
Start: 2017-12-06 | End: 2018-02-27 | Stop reason: SDUPTHER

## 2017-12-08 PROBLEM — K22.70 BARRETT'S ESOPHAGUS WITHOUT DYSPLASIA: Status: ACTIVE | Noted: 2017-12-08

## 2018-01-04 ENCOUNTER — OFFICE VISIT (OUTPATIENT)
Dept: FAMILY MEDICINE CLINIC | Facility: CLINIC | Age: 58
End: 2018-01-04

## 2018-01-04 VITALS
HEART RATE: 74 BPM | HEIGHT: 66 IN | RESPIRATION RATE: 16 BRPM | OXYGEN SATURATION: 97 % | SYSTOLIC BLOOD PRESSURE: 118 MMHG | TEMPERATURE: 97.4 F | WEIGHT: 164 LBS | BODY MASS INDEX: 26.36 KG/M2 | DIASTOLIC BLOOD PRESSURE: 66 MMHG

## 2018-01-04 DIAGNOSIS — F51.01 PRIMARY INSOMNIA: ICD-10-CM

## 2018-01-04 DIAGNOSIS — I10 BENIGN ESSENTIAL HYPERTENSION: Primary | ICD-10-CM

## 2018-01-04 DIAGNOSIS — F33.1 MODERATE EPISODE OF RECURRENT MAJOR DEPRESSIVE DISORDER (HCC): ICD-10-CM

## 2018-01-04 DIAGNOSIS — K76.0 FATTY LIVER: ICD-10-CM

## 2018-01-04 LAB
ALBUMIN SERPL-MCNC: 4.2 G/DL (ref 3.2–4.8)
ALBUMIN/GLOB SERPL: 1.5 G/DL (ref 1.5–2.5)
ALP SERPL-CCNC: 264 U/L (ref 25–100)
ALT SERPL-CCNC: 62 U/L (ref 7–40)
AST SERPL-CCNC: 119 U/L (ref 0–33)
BILIRUB SERPL-MCNC: 0.3 MG/DL (ref 0.3–1.2)
BUN SERPL-MCNC: 6 MG/DL (ref 9–23)
BUN/CREAT SERPL: 10 (ref 7–25)
CALCIUM SERPL-MCNC: 9.1 MG/DL (ref 8.7–10.4)
CHLORIDE SERPL-SCNC: 96 MMOL/L (ref 99–109)
CO2 SERPL-SCNC: 27 MMOL/L (ref 20–31)
CREAT SERPL-MCNC: 0.6 MG/DL (ref 0.6–1.3)
GLOBULIN SER CALC-MCNC: 2.8 GM/DL
GLUCOSE SERPL-MCNC: 114 MG/DL (ref 70–100)
POTASSIUM SERPL-SCNC: 4.5 MMOL/L (ref 3.5–5.5)
PROT SERPL-MCNC: 7 G/DL (ref 5.7–8.2)
SODIUM SERPL-SCNC: 136 MMOL/L (ref 132–146)

## 2018-01-04 PROCEDURE — 99214 OFFICE O/P EST MOD 30 MIN: CPT | Performed by: FAMILY MEDICINE

## 2018-01-04 RX ORDER — ZOLPIDEM TARTRATE 10 MG/1
5-10 TABLET ORAL NIGHTLY PRN
Qty: 30 TABLET | Refills: 2 | Status: SHIPPED | OUTPATIENT
Start: 2018-01-04 | End: 2018-04-05

## 2018-01-04 NOTE — PROGRESS NOTES
Assessment/Plan     Problem List Items Addressed This Visit        Cardiovascular and Mediastinum    Benign essential hypertension - Primary    Relevant Orders    Comprehensive Metabolic Panel       Other    Depression    Relevant Medications    zolpidem (AMBIEN) 10 MG tablet      Other Visit Diagnoses     Primary insomnia        Relevant Medications    zolpidem (AMBIEN) 10 MG tablet    Fatty liver        Relevant Orders    Comprehensive Metabolic Panel           Follow up: Return in about 3 months (around 4/4/2018), or if symptoms worsen or fail to improve.     DISCUSSION  Hypertension.  Doing well.  Continue medication.    Insomnia and depression.  We will try something to help him sleep to see if that would help as needed.  Continue Zoloft 150 mg daily.  Trial of Ambien as noted.  Side effects explained including memory loss.    Fatty liver with recent gallbladder removal.  Check CMP to reassess liver function.  Follow-up with Dr. tom for evaluation of fatty liver.      MEDICATIONS PRESCRIBED  Requested Prescriptions     Signed Prescriptions Disp Refills   • zolpidem (AMBIEN) 10 MG tablet 30 tablet 2     Sig: Take 0.5-1 tablets by mouth At Night As Needed for Sleep.            Sukhjinder dated on 1/4/2018   was reviewed and appropriate.        -------------------------------------------    Subjective     Chief Complaint   Patient presents with   • Hypertension     2 month follow up   • Anxiety   • Depression   • Med Refill       Hypertension   This is a chronic problem. The current episode started more than 1 year ago. The problem is unchanged. The problem is controlled (Much better since restarting medication). Associated symptoms include anxiety. Pertinent negatives include no peripheral edema. Risk factors for coronary artery disease include dyslipidemia and male gender. The current treatment provides significant improvement. There are no compliance problems.  There is no history of angina, kidney disease or  "CAD/MI. There is no history of chronic renal disease.   Depression   Visit Type: follow-up  Patient presents with the following symptoms: anhedonia, depressed mood, insomnia (decreased sleep) and nervousness/anxiety.  Patient is not experiencing: suicidal ideas.  Frequency of symptoms: most days   Severity: moderate   Sleep quality: poor      Hyperlipidemia   This is a chronic problem. The current episode started more than 1 year ago. The problem is uncontrolled. Recent lipid tests were reviewed and are high. He has no history of chronic renal disease. Current antihyperlipidemic treatment includes statins. Improvement on treatment: Has not had recheck since restarted medication.     Elevated LFT's  Had cholecystectomy in Dec. A little diarrhea for a few days.   Had bx liver and + fatty liver  Sees Dr Paul in Feb        Past Medical History,Medications, Allergies, and social history was reviewed.    Review of Systems   Constitutional: Positive for fatigue. Negative for unexpected weight change (losing weight but not unexpected).   HENT: Negative.    Respiratory: Negative.    Cardiovascular: Negative.    Gastrointestinal: Negative.    Genitourinary: Negative.    Musculoskeletal: Negative.    Neurological: Negative.    Psychiatric/Behavioral: Negative for suicidal ideas. The patient is nervous/anxious and has insomnia (decreased sleep).        Objective     Vitals:    01/04/18 0921   BP: 118/66   Pulse: 74   Resp: 16   Temp: 97.4 °F (36.3 °C)   TempSrc: Temporal Artery    SpO2: 97%   Weight: 74.4 kg (164 lb)   Height: 167.6 cm (65.98\")        Physical Exam   Constitutional: He is oriented to person, place, and time. Vital signs are normal. He appears well-developed and well-nourished.   HENT:   Head: Normocephalic and atraumatic.   Right Ear: Hearing, tympanic membrane, external ear and ear canal normal.   Left Ear: Hearing, tympanic membrane, external ear and ear canal normal.   Mouth/Throat: Oropharynx is clear and " moist.   Eyes: Conjunctivae, EOM and lids are normal. Pupils are equal, round, and reactive to light.   Neck: Normal range of motion. Neck supple. No thyromegaly present.   Cardiovascular: Normal rate, regular rhythm and normal heart sounds.  Exam reveals no friction rub.    No murmur heard.  Pulmonary/Chest: Effort normal and breath sounds normal. No respiratory distress. He has no wheezes. He has no rales.   Abdominal: Soft. Normal appearance and bowel sounds are normal. He exhibits no distension and no mass. There is tenderness (very mild diffuse). There is no rebound and no guarding.   Incisions healing well   Musculoskeletal: He exhibits no edema.   Neurological: He is alert and oriented to person, place, and time. He has normal strength.   Skin: Skin is warm and dry.   Psychiatric: He has a normal mood and affect. His speech is normal and behavior is normal. Cognition and memory are normal.   Nursing note and vitals reviewed.              Anshul Martinez MD

## 2018-01-08 DIAGNOSIS — I10 BENIGN ESSENTIAL HYPERTENSION: ICD-10-CM

## 2018-01-08 DIAGNOSIS — I25.10 MULTIPLE VESSEL CORONARY ARTERY DISEASE: ICD-10-CM

## 2018-01-08 RX ORDER — METOPROLOL TARTRATE 50 MG/1
TABLET, FILM COATED ORAL
Qty: 30 TABLET | Refills: 2 | Status: SHIPPED | OUTPATIENT
Start: 2018-01-08 | End: 2018-04-07 | Stop reason: SDUPTHER

## 2018-01-31 DIAGNOSIS — F33.1 MODERATE EPISODE OF RECURRENT MAJOR DEPRESSIVE DISORDER (HCC): ICD-10-CM

## 2018-01-31 RX ORDER — SERTRALINE HYDROCHLORIDE 100 MG/1
TABLET, FILM COATED ORAL
Qty: 45 TABLET | Refills: 2 | Status: SHIPPED | OUTPATIENT
Start: 2018-01-31 | End: 2018-04-05 | Stop reason: SDUPTHER

## 2018-02-27 DIAGNOSIS — K21.9 GASTROESOPHAGEAL REFLUX DISEASE, ESOPHAGITIS PRESENCE NOT SPECIFIED: ICD-10-CM

## 2018-02-27 DIAGNOSIS — R10.13 EPIGASTRIC ABDOMINAL PAIN: ICD-10-CM

## 2018-02-27 DIAGNOSIS — I10 BENIGN ESSENTIAL HYPERTENSION: ICD-10-CM

## 2018-02-27 DIAGNOSIS — E78.00 PURE HYPERCHOLESTEROLEMIA: ICD-10-CM

## 2018-02-27 RX ORDER — OMEPRAZOLE 40 MG/1
CAPSULE, DELAYED RELEASE ORAL
Qty: 30 CAPSULE | Refills: 1 | Status: SHIPPED | OUTPATIENT
Start: 2018-02-27 | End: 2018-04-26 | Stop reason: SDUPTHER

## 2018-02-27 RX ORDER — LISINOPRIL 20 MG/1
TABLET ORAL
Qty: 30 TABLET | Refills: 1 | Status: SHIPPED | OUTPATIENT
Start: 2018-02-27 | End: 2018-04-27 | Stop reason: SDUPTHER

## 2018-02-27 RX ORDER — ATORVASTATIN CALCIUM 80 MG/1
TABLET, FILM COATED ORAL
Qty: 30 TABLET | Refills: 1 | Status: SHIPPED | OUTPATIENT
Start: 2018-02-27 | End: 2018-04-27 | Stop reason: SDUPTHER

## 2018-03-09 DIAGNOSIS — I10 BENIGN ESSENTIAL HYPERTENSION: ICD-10-CM

## 2018-03-09 RX ORDER — AMLODIPINE BESYLATE 5 MG/1
TABLET ORAL
Qty: 30 TABLET | Refills: 0 | Status: SHIPPED | OUTPATIENT
Start: 2018-03-09 | End: 2018-04-07 | Stop reason: SDUPTHER

## 2018-04-05 ENCOUNTER — TELEPHONE (OUTPATIENT)
Dept: FAMILY MEDICINE CLINIC | Facility: CLINIC | Age: 58
End: 2018-04-05

## 2018-04-05 ENCOUNTER — OFFICE VISIT (OUTPATIENT)
Dept: FAMILY MEDICINE CLINIC | Facility: CLINIC | Age: 58
End: 2018-04-05

## 2018-04-05 VITALS
SYSTOLIC BLOOD PRESSURE: 122 MMHG | HEART RATE: 76 BPM | BODY MASS INDEX: 26.92 KG/M2 | WEIGHT: 167.5 LBS | HEIGHT: 66 IN | DIASTOLIC BLOOD PRESSURE: 72 MMHG | RESPIRATION RATE: 18 BRPM | TEMPERATURE: 97.4 F

## 2018-04-05 DIAGNOSIS — I10 BENIGN ESSENTIAL HYPERTENSION: Primary | ICD-10-CM

## 2018-04-05 DIAGNOSIS — F33.1 MODERATE EPISODE OF RECURRENT MAJOR DEPRESSIVE DISORDER (HCC): ICD-10-CM

## 2018-04-05 DIAGNOSIS — K76.0 FATTY LIVER: ICD-10-CM

## 2018-04-05 DIAGNOSIS — R10.11 RIGHT UPPER QUADRANT ABDOMINAL PAIN: ICD-10-CM

## 2018-04-05 DIAGNOSIS — R73.9 HYPERGLYCEMIA: ICD-10-CM

## 2018-04-05 DIAGNOSIS — E78.00 PURE HYPERCHOLESTEROLEMIA: ICD-10-CM

## 2018-04-05 LAB
ALBUMIN SERPL-MCNC: 4.5 G/DL (ref 3.2–4.8)
ALBUMIN/GLOB SERPL: 1.5 G/DL (ref 1.5–2.5)
ALP SERPL-CCNC: 199 U/L (ref 25–100)
ALT SERPL-CCNC: 63 U/L (ref 7–40)
AST SERPL-CCNC: 103 U/L (ref 0–33)
BASOPHILS # BLD AUTO: 0.07 10*3/MM3 (ref 0–0.2)
BASOPHILS NFR BLD AUTO: 0.7 % (ref 0–1)
BILIRUB SERPL-MCNC: 0.8 MG/DL (ref 0.3–1.2)
BUN SERPL-MCNC: 8 MG/DL (ref 9–23)
BUN/CREAT SERPL: 11.4 (ref 7–25)
CALCIUM SERPL-MCNC: 9.2 MG/DL (ref 8.7–10.4)
CHLORIDE SERPL-SCNC: 104 MMOL/L (ref 99–109)
CHOLEST SERPL-MCNC: 142 MG/DL (ref 0–200)
CHOLEST/HDLC SERPL: 1.97 {RATIO}
CO2 SERPL-SCNC: 27 MMOL/L (ref 20–31)
CREAT SERPL-MCNC: 0.7 MG/DL (ref 0.6–1.3)
EOSINOPHIL # BLD AUTO: 0.31 10*3/MM3 (ref 0–0.3)
EOSINOPHIL NFR BLD AUTO: 3 % (ref 0–3)
ERYTHROCYTE [DISTWIDTH] IN BLOOD BY AUTOMATED COUNT: 14.1 % (ref 11.3–14.5)
GFR SERPLBLD CREATININE-BSD FMLA CKD-EPI: 115 ML/MIN/1.73
GFR SERPLBLD CREATININE-BSD FMLA CKD-EPI: 139 ML/MIN/1.73
GLOBULIN SER CALC-MCNC: 3.1 GM/DL
GLUCOSE SERPL-MCNC: 122 MG/DL (ref 70–100)
HBA1C MFR BLD: 6.1 % (ref 4.8–5.6)
HCT VFR BLD AUTO: 45.2 % (ref 38.9–50.9)
HDLC SERPL-MCNC: 72 MG/DL (ref 40–60)
HGB BLD-MCNC: 14.7 G/DL (ref 13.1–17.5)
IMM GRANULOCYTES # BLD: 0.05 10*3/MM3 (ref 0–0.03)
IMM GRANULOCYTES NFR BLD: 0.5 % (ref 0–0.6)
LDLC SERPL CALC-MCNC: 55 MG/DL (ref 0–100)
LYMPHOCYTES # BLD AUTO: 2.62 10*3/MM3 (ref 0.6–4.8)
LYMPHOCYTES NFR BLD AUTO: 25.6 % (ref 24–44)
MCH RBC QN AUTO: 31.3 PG (ref 27–31)
MCHC RBC AUTO-ENTMCNC: 32.5 G/DL (ref 32–36)
MCV RBC AUTO: 96.4 FL (ref 80–99)
MONOCYTES # BLD AUTO: 1.02 10*3/MM3 (ref 0–1)
MONOCYTES NFR BLD AUTO: 10 % (ref 0–12)
NEUTROPHILS # BLD AUTO: 6.15 10*3/MM3 (ref 1.5–8.3)
NEUTROPHILS NFR BLD AUTO: 60.2 % (ref 41–71)
PLATELET # BLD AUTO: 277 10*3/MM3 (ref 150–450)
POTASSIUM SERPL-SCNC: 4.4 MMOL/L (ref 3.5–5.5)
PROT SERPL-MCNC: 7.6 G/DL (ref 5.7–8.2)
RBC # BLD AUTO: 4.69 10*6/MM3 (ref 4.2–5.76)
SODIUM SERPL-SCNC: 141 MMOL/L (ref 132–146)
TRIGL SERPL-MCNC: 76 MG/DL (ref 0–150)
VLDLC SERPL CALC-MCNC: 15.2 MG/DL
WBC # BLD AUTO: 10.22 10*3/MM3 (ref 3.5–10.8)

## 2018-04-05 PROCEDURE — 99214 OFFICE O/P EST MOD 30 MIN: CPT | Performed by: FAMILY MEDICINE

## 2018-04-05 RX ORDER — SERTRALINE HYDROCHLORIDE 100 MG/1
200 TABLET, FILM COATED ORAL DAILY
Qty: 60 TABLET | Refills: 2 | Status: SHIPPED | OUTPATIENT
Start: 2018-04-05 | End: 2018-11-09 | Stop reason: SDUPTHER

## 2018-04-05 RX ORDER — SUCRALFATE 1 G/1
TABLET ORAL
Refills: 5 | COMMUNITY
Start: 2018-03-20 | End: 2018-07-10

## 2018-04-05 NOTE — PROGRESS NOTES
Assessment/Plan       Problems Addressed this Visit        Cardiovascular and Mediastinum    Benign essential hypertension - Primary    Relevant Orders    Comprehensive Metabolic Panel    Lipid Panel With / Chol / HDL Ratio    CBC & Differential    Pure hypercholesterolemia    Relevant Orders    Comprehensive Metabolic Panel    Lipid Panel With / Chol / HDL Ratio       Other    Depression    Relevant Medications    sertraline (ZOLOFT) 100 MG tablet    Hyperglycemia    Relevant Orders    Hemoglobin A1c      Other Visit Diagnoses     Fatty liver        Right upper quadrant abdominal pain        Relevant Orders    CBC & Differential            Follow up: Return for follow up depends on review of labs and testing.     DISCUSSION  Hypertension.  Stable.  Check labs as noted.    Hyperlipidemia.  Recheck CMP and lipid panel.    Hyperglycemia.  Check A1c.    Fatty liver with right upper quadrant abdominal discomfort and vague abnormality on exam.  Reviewed CT scan from [facility] which showed enlarged left lobe of the liver which is palpable.  Continue follow-up with Dr. Nawaf tom.     He has also had some worsening mood and depression.  Okay to increase Zoloft 200 mg daily.      MEDICATIONS PRESCRIBED  Requested Prescriptions     Signed Prescriptions Disp Refills   • sertraline (ZOLOFT) 100 MG tablet 60 tablet 2     Sig: Take 2 tablets by mouth Daily.            -------------------------------------------    Subjective     Chief Complaint   Patient presents with   • Hyperlipidemia     f/u         Hyperlipidemia   This is a chronic problem. The current episode started more than 1 year ago. He has no history of chronic renal disease. Associated symptoms include shortness of breath (occ). Pertinent negatives include no chest pain (sharp pain lower chest and upper abd and quick and then goes away).   Hypertension   This is a chronic problem. The current episode started more than 1 year ago. The problem is  "unchanged. The problem is controlled. Associated symptoms include peripheral edema (occ right swells at times) and shortness of breath (occ). Pertinent negatives include no chest pain (sharp pain lower chest and upper abd and quick and then goes away), headaches or palpitations. There are no associated agents to hypertension. Risk factors for coronary artery disease include dyslipidemia. The current treatment provides moderate improvement. There are no compliance problems.  Hypertensive end-organ damage includes CAD/MI. There is no history of chronic renal disease.       No tobacco    Elevated LFT  Saw Dr Case and sees again in 1-2 weeks  Had gallbladder removed  Dx with fatty liver    Depressed  Still at times  Decreased desire  No SI  On zoloft 100 mg 1.5 tabs per day  No side effects      Past Medical History,Medications, Allergies, and social history was reviewed.      Review of Systems   Constitutional: Negative.    HENT: Negative.    Respiratory: Positive for shortness of breath (occ).    Cardiovascular: Negative for chest pain (sharp pain lower chest and upper abd and quick and then goes away) and palpitations.   Gastrointestinal: Positive for abdominal pain (RUQ).   Musculoskeletal: Negative.    Neurological: Negative.  Negative for headaches.   Psychiatric/Behavioral: Positive for depressed mood. Negative for suicidal ideas.       Objective     Vitals:    04/05/18 0926   BP: 122/72   Pulse: 76   Resp: 18   Temp: 97.4 °F (36.3 °C)   Weight: 76 kg (167 lb 8 oz)   Height: 167.6 cm (66\")          Physical Exam   Constitutional: He is oriented to person, place, and time. Vital signs are normal. He appears well-developed and well-nourished.   HENT:   Head: Normocephalic and atraumatic.   Right Ear: Hearing, tympanic membrane, external ear and ear canal normal.   Left Ear: Hearing, tympanic membrane, external ear and ear canal normal.   Nose: Nose normal.   Mouth/Throat: Oropharynx is clear and moist.   Eyes: " Conjunctivae, EOM and lids are normal. Pupils are equal, round, and reactive to light.   Neck: Normal range of motion. Neck supple. No thyromegaly present.   Cardiovascular: Normal rate, regular rhythm and normal heart sounds.  Exam reveals no friction rub.    No murmur heard.  Pulmonary/Chest: Effort normal and breath sounds normal. No respiratory distress. He has no wheezes. He has no rales.   Abdominal: Soft. Normal appearance and bowel sounds are normal. He exhibits no distension and no mass. There is tenderness (RUQ and epigastric). There is no rebound and no guarding.   Musculoskeletal: He exhibits no edema.   Neurological: He is alert and oriented to person, place, and time. He has normal strength.   Skin: Skin is warm and dry.   Psychiatric: He has a normal mood and affect. His speech is normal and behavior is normal. Cognition and memory are normal.   Nursing note and vitals reviewed.              Anshul Martinez MD

## 2018-04-07 DIAGNOSIS — I10 BENIGN ESSENTIAL HYPERTENSION: ICD-10-CM

## 2018-04-07 DIAGNOSIS — I25.10 MULTIPLE VESSEL CORONARY ARTERY DISEASE: ICD-10-CM

## 2018-04-09 RX ORDER — AMLODIPINE BESYLATE 5 MG/1
TABLET ORAL
Qty: 30 TABLET | Refills: 2 | Status: SHIPPED | OUTPATIENT
Start: 2018-04-09 | End: 2018-06-29 | Stop reason: SDUPTHER

## 2018-04-09 RX ORDER — METOPROLOL TARTRATE 50 MG/1
TABLET, FILM COATED ORAL
Qty: 30 TABLET | Refills: 2 | Status: SHIPPED | OUTPATIENT
Start: 2018-04-09 | End: 2018-07-04 | Stop reason: SDUPTHER

## 2018-04-26 DIAGNOSIS — K21.9 GASTROESOPHAGEAL REFLUX DISEASE, ESOPHAGITIS PRESENCE NOT SPECIFIED: ICD-10-CM

## 2018-04-26 DIAGNOSIS — R10.13 EPIGASTRIC ABDOMINAL PAIN: ICD-10-CM

## 2018-04-27 DIAGNOSIS — E78.00 PURE HYPERCHOLESTEROLEMIA: ICD-10-CM

## 2018-04-27 DIAGNOSIS — F33.1 MODERATE EPISODE OF RECURRENT MAJOR DEPRESSIVE DISORDER (HCC): ICD-10-CM

## 2018-04-27 DIAGNOSIS — I10 BENIGN ESSENTIAL HYPERTENSION: ICD-10-CM

## 2018-04-27 RX ORDER — LISINOPRIL 20 MG/1
TABLET ORAL
Qty: 30 TABLET | Refills: 2 | Status: SHIPPED | OUTPATIENT
Start: 2018-04-27 | End: 2018-07-26 | Stop reason: SDUPTHER

## 2018-04-27 RX ORDER — SERTRALINE HYDROCHLORIDE 100 MG/1
200 TABLET, FILM COATED ORAL DAILY
Qty: 60 TABLET | Refills: 2 | Status: SHIPPED | OUTPATIENT
Start: 2018-04-27 | End: 2018-07-10 | Stop reason: SDUPTHER

## 2018-04-27 RX ORDER — ATORVASTATIN CALCIUM 80 MG/1
TABLET, FILM COATED ORAL
Qty: 30 TABLET | Refills: 2 | Status: SHIPPED | OUTPATIENT
Start: 2018-04-27 | End: 2018-07-26 | Stop reason: SDUPTHER

## 2018-04-27 RX ORDER — OMEPRAZOLE 40 MG/1
CAPSULE, DELAYED RELEASE ORAL
Qty: 30 CAPSULE | Refills: 1 | Status: SHIPPED | OUTPATIENT
Start: 2018-04-27 | End: 2020-03-10 | Stop reason: SDUPTHER

## 2018-04-28 DIAGNOSIS — F33.1 MODERATE EPISODE OF RECURRENT MAJOR DEPRESSIVE DISORDER (HCC): ICD-10-CM

## 2018-04-30 RX ORDER — SERTRALINE HYDROCHLORIDE 100 MG/1
TABLET, FILM COATED ORAL
Qty: 45 TABLET | Refills: 2 | Status: SHIPPED | OUTPATIENT
Start: 2018-04-30 | End: 2018-07-10 | Stop reason: SDUPTHER

## 2018-06-29 DIAGNOSIS — I10 BENIGN ESSENTIAL HYPERTENSION: ICD-10-CM

## 2018-06-29 RX ORDER — AMLODIPINE BESYLATE 5 MG/1
TABLET ORAL
Qty: 30 TABLET | Refills: 2 | Status: SHIPPED | OUTPATIENT
Start: 2018-06-29 | End: 2018-09-23 | Stop reason: SDUPTHER

## 2018-07-04 DIAGNOSIS — I10 BENIGN ESSENTIAL HYPERTENSION: ICD-10-CM

## 2018-07-04 DIAGNOSIS — I25.10 MULTIPLE VESSEL CORONARY ARTERY DISEASE: ICD-10-CM

## 2018-07-05 RX ORDER — METOPROLOL TARTRATE 50 MG/1
TABLET, FILM COATED ORAL
Qty: 30 TABLET | Refills: 2 | Status: SHIPPED | OUTPATIENT
Start: 2018-07-05 | End: 2018-09-29 | Stop reason: SDUPTHER

## 2018-07-10 ENCOUNTER — OFFICE VISIT (OUTPATIENT)
Dept: FAMILY MEDICINE CLINIC | Facility: CLINIC | Age: 58
End: 2018-07-10

## 2018-07-10 VITALS
WEIGHT: 167.5 LBS | SYSTOLIC BLOOD PRESSURE: 120 MMHG | TEMPERATURE: 98.1 F | RESPIRATION RATE: 16 BRPM | HEART RATE: 66 BPM | DIASTOLIC BLOOD PRESSURE: 64 MMHG | BODY MASS INDEX: 26.92 KG/M2 | HEIGHT: 66 IN | OXYGEN SATURATION: 98 %

## 2018-07-10 DIAGNOSIS — R44.1 VISUAL HALLUCINATIONS: ICD-10-CM

## 2018-07-10 DIAGNOSIS — M25.552 LEFT HIP PAIN: ICD-10-CM

## 2018-07-10 DIAGNOSIS — R41.3 MEMORY LOSS: Primary | ICD-10-CM

## 2018-07-10 DIAGNOSIS — R51.9 WORSENING HEADACHES: ICD-10-CM

## 2018-07-10 DIAGNOSIS — R39.11 URINARY HESITANCY: ICD-10-CM

## 2018-07-10 PROCEDURE — 99214 OFFICE O/P EST MOD 30 MIN: CPT | Performed by: FAMILY MEDICINE

## 2018-07-10 RX ORDER — NAPROXEN 500 MG/1
500 TABLET ORAL 2 TIMES DAILY WITH MEALS
Qty: 40 TABLET | Refills: 1 | Status: SHIPPED | OUTPATIENT
Start: 2018-07-10 | End: 2018-11-09 | Stop reason: SDUPTHER

## 2018-07-10 NOTE — PROGRESS NOTES
Assessment/Plan       Problems Addressed this Visit     None      Visit Diagnoses     Memory loss    -  Primary    Relevant Orders    MRI Brain Without Contrast    Visual hallucinations        Relevant Orders    MRI Brain Without Contrast    Worsening headaches        Relevant Orders    MRI Brain Without Contrast    Urinary hesitancy        Left hip pain        Relevant Medications    naproxen (NAPROSYN) 500 MG tablet            Follow up: Return if symptoms worsen or fail to improve, for follow up depends on review of labs and testing.     DISCUSSION  Due to memory loss, worsening headaches, and hallucinations, recommend check MRI of brain.  He agrees.  Further plan once we have that back.    Urinary hesitancy.  He was not able to give urine sample today.  He will follow-up and do that in the office in the next day or so.    Left hip pain.  Good range of motion.  Some mild tenderness laterally.  Try naproxen.  May be a bursitis.    We will need to address other issues in the near future once we get his memory issue worked up.      MEDICATIONS PRESCRIBED  Requested Prescriptions     Signed Prescriptions Disp Refills   • naproxen (NAPROSYN) 500 MG tablet 40 tablet 1     Sig: Take 1 tablet by mouth 2 (Two) Times a Day With Meals.          -------------------------------------------    Subjective     Chief Complaint   Patient presents with   • Pain     BACK AND RIGHT LEG PAIN AND SWELLING AND TINGLING, FEELS LIKE ELECTRICITY IS GOING THROUGH IT. IF PT BENDS DOWN PT ISN'T ABLE TO GET BACK UP DUE TO HIS BACK    • Fall     PT HAS BEEN FALLING LATELY   • MEMORY ISSUES     Memory Issues  For about 1 month and getting worse  + headaches frontal   Forget what he goes to do  Loses balance and falls. Fell this week  Was trying to get back and knee gave out and fell  No head injury  Goes outside and no recall of why he went out    Not driving    Nephew and daughter has noticed the memory issues    Vision gets blurred at  "times    Sees people and lights in room  Had gotten better and now worse again    + depressed and worse  No suicidal plan.     Still with stomach issues    Chest congestion  Yellow and thick   Sx x several months  No tob, quit 2011    Cramps in legs  INcreased urine as well    Urinary hesitancy  No dysuria  No fever.  No chills.  No reported discharge.      Pain   This is a new problem. The current episode started 1 to 4 weeks ago. The problem occurs intermittently. The problem has been unchanged. Associated symptoms include arthralgias, congestion, coughing, fatigue and myalgias. Pertinent negatives include no chest pain. Associated symptoms comments: Left hip pain. The symptoms are aggravated by bending, twisting and walking. He has tried nothing for the symptoms. The treatment provided no relief.               History   Smoking Status   • Former Smoker   • Packs/day: 1.50   • Years: 30.00   • Quit date: 2011   Smokeless Tobacco   • Never Used        Past Medical History,Medications, Allergies, and social history was reviewed.      Review of Systems   Constitutional: Positive for fatigue.   HENT: Positive for congestion.    Respiratory: Positive for cough and shortness of breath.    Cardiovascular: Negative.  Negative for chest pain.   Gastrointestinal: Negative.    Genitourinary: Positive for urgency. Negative for dysuria.   Musculoskeletal: Positive for arthralgias, back pain and myalgias.   Neurological: Positive for headache.   Psychiatric/Behavioral: Positive for sleep disturbance and depressed mood. The patient is nervous/anxious.        Objective     Vitals:    07/10/18 1624 07/10/18 1628   BP:  120/64   Pulse:  66   Resp:  16   Temp:  98.1 °F (36.7 °C)   TempSrc:  Temporal Artery    SpO2:  98%   Weight:  76 kg (167 lb 8 oz)   Height: 167.6 cm (65.98\") 167.6 cm (65.98\")          Physical Exam   Constitutional: He is oriented to person, place, and time. Vital signs are normal. He appears well-developed and " well-nourished.   HENT:   Head: Normocephalic and atraumatic.   Right Ear: Hearing, tympanic membrane, external ear and ear canal normal.   Left Ear: Hearing, tympanic membrane, external ear and ear canal normal.   Nose: Nose normal.   Mouth/Throat: Oropharynx is clear and moist.   Eyes: Conjunctivae, EOM and lids are normal. Pupils are equal, round, and reactive to light.   Neck: Normal range of motion. Neck supple. No thyromegaly present.   Cardiovascular: Normal rate, regular rhythm and normal heart sounds.  Exam reveals no friction rub.    No murmur heard.  Pulmonary/Chest: Effort normal and breath sounds normal. No respiratory distress. He has no wheezes. He has no rales.   Abdominal: Soft. Normal appearance and bowel sounds are normal. He exhibits no distension and no mass. There is no tenderness. There is no rebound and no guarding.   Musculoskeletal: He exhibits no edema.        Left hip: He exhibits tenderness (laterally). He exhibits normal range of motion, no swelling and no crepitus.   Neurological: He is alert and oriented to person, place, and time. He has normal strength.   Skin: Skin is warm and dry.   Psychiatric: His speech is normal and behavior is normal. His mood appears anxious. Cognition and memory are normal.   Alert and oriented to year, month, stated it was Wednesday, July 11 or 10th.  Knows the president name.   Nursing note and vitals reviewed.                Anshul Martinez MD

## 2018-07-23 ENCOUNTER — HOSPITAL ENCOUNTER (OUTPATIENT)
Dept: MRI IMAGING | Facility: HOSPITAL | Age: 58
Discharge: HOME | End: 2018-07-23
Admitting: FAMILY MEDICINE

## 2018-07-23 DIAGNOSIS — R44.1 VISUAL HALLUCINATIONS: ICD-10-CM

## 2018-07-23 DIAGNOSIS — R51.9 WORSENING HEADACHES: ICD-10-CM

## 2018-07-23 DIAGNOSIS — R41.3 MEMORY LOSS: ICD-10-CM

## 2018-07-23 PROCEDURE — 70551 MRI BRAIN STEM W/O DYE: CPT

## 2018-07-26 DIAGNOSIS — R44.1 VISUAL HALLUCINATIONS: ICD-10-CM

## 2018-07-26 DIAGNOSIS — R41.3 MEMORY LOSS: ICD-10-CM

## 2018-07-26 DIAGNOSIS — E78.00 PURE HYPERCHOLESTEROLEMIA: ICD-10-CM

## 2018-07-26 DIAGNOSIS — I10 BENIGN ESSENTIAL HYPERTENSION: ICD-10-CM

## 2018-07-26 DIAGNOSIS — I63.81 MULTIPLE LACUNAR INFARCTS (HCC): Primary | ICD-10-CM

## 2018-07-26 RX ORDER — LISINOPRIL 20 MG/1
TABLET ORAL
Qty: 30 TABLET | Refills: 2 | Status: SHIPPED | OUTPATIENT
Start: 2018-07-26 | End: 2018-10-24 | Stop reason: SDUPTHER

## 2018-07-26 RX ORDER — ATORVASTATIN CALCIUM 80 MG/1
TABLET, FILM COATED ORAL
Qty: 30 TABLET | Refills: 2 | Status: SHIPPED | OUTPATIENT
Start: 2018-07-26 | End: 2018-10-16 | Stop reason: SDUPTHER

## 2018-08-13 ENCOUNTER — OFFICE VISIT (OUTPATIENT)
Dept: NEUROLOGY | Facility: CLINIC | Age: 58
End: 2018-08-13

## 2018-08-13 ENCOUNTER — APPOINTMENT (OUTPATIENT)
Dept: LAB | Facility: HOSPITAL | Age: 58
End: 2018-08-13

## 2018-08-13 VITALS
WEIGHT: 167.5 LBS | SYSTOLIC BLOOD PRESSURE: 118 MMHG | HEIGHT: 66 IN | DIASTOLIC BLOOD PRESSURE: 66 MMHG | BODY MASS INDEX: 26.92 KG/M2

## 2018-08-13 DIAGNOSIS — R26.89 IMPAIRMENT OF BALANCE: ICD-10-CM

## 2018-08-13 DIAGNOSIS — R41.3 MEMORY LOSS: Primary | ICD-10-CM

## 2018-08-13 DIAGNOSIS — Z86.73 HISTORY OF STROKE: ICD-10-CM

## 2018-08-13 LAB
AMMONIA BLD-SCNC: 41 UMOL/L (ref 19–60)
FOLATE SERPL-MCNC: 17.21 NG/ML (ref 3.2–20)
TSH SERPL DL<=0.05 MIU/L-ACNC: 2.09 MIU/ML (ref 0.35–5.35)
VIT B12 BLD-MCNC: 706 PG/ML (ref 211–911)

## 2018-08-13 PROCEDURE — 99204 OFFICE O/P NEW MOD 45 MIN: CPT | Performed by: PHYSICIAN ASSISTANT

## 2018-08-13 PROCEDURE — 36415 COLL VENOUS BLD VENIPUNCTURE: CPT | Performed by: PHYSICIAN ASSISTANT

## 2018-08-13 PROCEDURE — 82607 VITAMIN B-12: CPT | Performed by: PHYSICIAN ASSISTANT

## 2018-08-13 PROCEDURE — 82746 ASSAY OF FOLIC ACID SERUM: CPT | Performed by: PHYSICIAN ASSISTANT

## 2018-08-13 PROCEDURE — 82140 ASSAY OF AMMONIA: CPT | Performed by: PHYSICIAN ASSISTANT

## 2018-08-13 PROCEDURE — 84443 ASSAY THYROID STIM HORMONE: CPT | Performed by: PHYSICIAN ASSISTANT

## 2018-08-13 RX ORDER — MEMANTINE HYDROCHLORIDE 10 MG/1
10 TABLET ORAL 2 TIMES DAILY
Qty: 60 TABLET | Refills: 11 | Status: SHIPPED | OUTPATIENT
Start: 2018-08-13 | End: 2019-08-03 | Stop reason: SDUPTHER

## 2018-08-13 NOTE — PROGRESS NOTES
Subjective     Chief Complaint: memory loss      History of Present Illness   Jutsice Kiser is a 58 y.o. male who comes to clinic today for evaluation of cognitive impairment. He has noted symptoms since at least 2017 marked initially by forgetfulness , repetitiveness  and word-finding difficulties. This has gradually worsened  over time. Additional symptoms have included impairments in orientation  and executive function. There have been associated  symptoms of depression and irritability. He has also noted visual hallucinations of people in his bedroom, primarily at night. He denies any impairments in ADL's. He manages his medications and finances. He is no longer driving . He is currently residing with his nephew in [place].     He has also noted balance impairment. He has also several recent falls, though fortunately without significant injury. He denies any associated shuffling gait or tremor. He also notes that he has several mixed drinks daily.     Prior evaluation has included an MRI of the brain which was notable for  an old infarct in the right parietal lobe as well as old lacunar infarcts in the right frontal and left parietal lobes and white matter changes (I personally reviewed). He and his family deny any history of stroke-like symptoms. Blood work (CBC and CMP) in 4/18 was notable for elevated liver enzymes (alk phos 199, , and AST 63).  He is currently taking ASA 81mg daily and Lipitor 80mg daily.        I have reviewed and confirmed the past family, social and medical history as accurate on 8/13/18.     Review of Systems   Constitutional: Negative.    HENT: Negative.    Eyes: Negative.    Respiratory: Negative.    Cardiovascular: Negative.    Gastrointestinal: Negative.    Endocrine: Negative.    Genitourinary: Negative.    Musculoskeletal: Negative.    Skin: Negative.    Allergic/Immunologic: Negative.    Neurological:        Memory loss    Hematological: Negative.   "  Psychiatric/Behavioral: Positive for dysphoric mood and hallucinations.       Objective     /66   Ht 167.6 cm (65.98\")   Wt 76 kg (167 lb 8 oz)   BMI 27.05 kg/m²     General appearance today is normal.   Peripheral pulses were present and symmetric.  The ophthalmoscopic exam today is unremarkable. The discs and posterior elements are unremarkable.      Physical Exam   Constitutional: He is oriented to person, place, and time.   Neurological: He is oriented to person, place, and time. He has normal strength. He has a normal Finger-Nose-Finger Test.   Reflex Scores:       Tricep reflexes are 1+ on the right side and 1+ on the left side.       Bicep reflexes are 1+ on the right side and 1+ on the left side.       Brachioradialis reflexes are 1+ on the right side and 1+ on the left side.       Patellar reflexes are 1+ on the right side and 1+ on the left side.  Psychiatric: His speech is normal.        Neurologic Exam     Mental Status   Oriented to person, place, and time.   Registration: recalls 3 of 3 objects. Recall of objects at 5 minutes: 0/3 recall. Follows 3 step commands.   Attention: normal.   Speech: speech is normal   Level of consciousness: alert  Able to name object. Able to read. Able to repeat. Able to write. Normal comprehension.     Cranial Nerves   Cranial nerves II through XII intact.     Motor Exam   Muscle bulk: normal  Overall muscle tone: normal    Strength   Strength 5/5 throughout.     Sensory Exam   Light touch normal.     Gait, Coordination, and Reflexes     Gait  Gait: (unsteady)    Coordination   Finger to nose coordination: normal    Tremor   Resting tremor: absent  Intention tremor: absent    Reflexes   Right brachioradialis: 1+  Left brachioradialis: 1+  Right biceps: 1+  Left biceps: 1+  Right triceps: 1+  Left triceps: 1+  Right patellar: 1+  Left patellar: 1+        Results  MMSE= 26      Assessment/Plan   Justice was seen today for memory loss.    Diagnoses and all orders " for this visit:    Memory loss  -     Folate  -     TSH  -     Vitamin B12  -     Ammonia    Impairment of balance  -     PT Consult: Eval & Treat    History of stroke  -     Adult Transthoracic Echo Complete W/ Cont if Necessary Per Protocol  -     Duplex Carotid Ultrasound CAR    Other orders  -     memantine (NAMENDA) 10 MG tablet; Take 1 tablet by mouth 2 (Two) Times a Day.          Discussion/Summary   Justice Kiser comes to clinic today for evaluation of cognitive impairment. While his history and examination is most consistent with Mild Cognitive Impairment (MCI), I am concerned about a potential underlying dementia (possibly related to a vascular dementia given his MRI). However, I am also concerned that his history of depression, liver disease and alcohol use could be negatively affecting his cognition. This was discussed in detail with the patient. Today, it was elected to obtain screening blood work . It was also elected to obtain an echo and carotids given his history of stroke. After discussing potential treatment options, it was elected to add memantine. I also recommended that he follow with behavioral health. Additionally, I made a referral to PT for his balance impairment. Next, we will consider adding thiamine. He will then follow up in 2-3 months, or sooner if needed.   I spent 45 minutes minutes face to face with the patient and family and discussing diagnosis, prognosis, diagnostic testing, evaluation, current status, driving, treatment options and management as discussed above.       As part of this visit I reviewed prior lab results, reviewed radiology results, reviewed radiology images and obtained additional history from the family which is incorporated in the HPI.      Daly Johnson PA-C

## 2018-09-19 ENCOUNTER — HOSPITAL ENCOUNTER (OUTPATIENT)
Dept: CARDIOLOGY | Facility: HOSPITAL | Age: 58
Discharge: HOME | End: 2018-09-19
Admitting: PHYSICIAN ASSISTANT

## 2018-09-19 ENCOUNTER — HOSPITAL ENCOUNTER (OUTPATIENT)
Dept: CARDIOLOGY | Facility: HOSPITAL | Age: 58
Discharge: HOME | End: 2018-09-19

## 2018-09-19 VITALS
BODY MASS INDEX: 27.82 KG/M2 | WEIGHT: 167 LBS | SYSTOLIC BLOOD PRESSURE: 118 MMHG | HEIGHT: 65 IN | DIASTOLIC BLOOD PRESSURE: 66 MMHG

## 2018-09-19 VITALS
HEIGHT: 65 IN | WEIGHT: 167 LBS | DIASTOLIC BLOOD PRESSURE: 66 MMHG | BODY MASS INDEX: 27.82 KG/M2 | SYSTOLIC BLOOD PRESSURE: 118 MMHG

## 2018-09-19 LAB
BH CV ECHO MEAS - AO MAX PG (FULL): 5.3 MMHG
BH CV ECHO MEAS - AO MAX PG: 8 MMHG
BH CV ECHO MEAS - AO MEAN PG (FULL): 2.7 MMHG
BH CV ECHO MEAS - AO MEAN PG: 4.3 MMHG
BH CV ECHO MEAS - AO V2 MAX: 137.2 CM/SEC
BH CV ECHO MEAS - AO V2 MEAN: 98.2 CM/SEC
BH CV ECHO MEAS - AO V2 VTI: 32.5 CM
BH CV ECHO MEAS - AVA(I,A): 2.2 CM^2
BH CV ECHO MEAS - AVA(I,D): 2.2 CM^2
BH CV ECHO MEAS - AVA(V,A): 2 CM^2
BH CV ECHO MEAS - AVA(V,D): 2 CM^2
BH CV ECHO MEAS - BSA(HAYCOCK): 1.9 M^2
BH CV ECHO MEAS - BSA(HAYCOCK): 1.9 M^2
BH CV ECHO MEAS - BSA: 1.8 M^2
BH CV ECHO MEAS - BSA: 1.8 M^2
BH CV ECHO MEAS - BZI_BMI: 27.8 KILOGRAMS/M^2
BH CV ECHO MEAS - BZI_BMI: 27.8 KILOGRAMS/M^2
BH CV ECHO MEAS - BZI_METRIC_HEIGHT: 165.1 CM
BH CV ECHO MEAS - BZI_METRIC_HEIGHT: 165.1 CM
BH CV ECHO MEAS - BZI_METRIC_WEIGHT: 75.8 KG
BH CV ECHO MEAS - BZI_METRIC_WEIGHT: 75.8 KG
BH CV ECHO MEAS - EDV(CUBED): 122.9 ML
BH CV ECHO MEAS - EDV(MOD-SP2): 123 ML
BH CV ECHO MEAS - EDV(MOD-SP4): 129 ML
BH CV ECHO MEAS - EDV(TEICH): 116.7 ML
BH CV ECHO MEAS - EF(CUBED): 65.7 %
BH CV ECHO MEAS - EF(MOD-BP): 57 %
BH CV ECHO MEAS - EF(MOD-SP2): 64.2 %
BH CV ECHO MEAS - EF(MOD-SP4): 58.9 %
BH CV ECHO MEAS - EF(TEICH): 57 %
BH CV ECHO MEAS - ESV(CUBED): 42.1 ML
BH CV ECHO MEAS - ESV(MOD-SP2): 44 ML
BH CV ECHO MEAS - ESV(MOD-SP4): 53 ML
BH CV ECHO MEAS - ESV(TEICH): 50.2 ML
BH CV ECHO MEAS - FS: 30 %
BH CV ECHO MEAS - IVS/LVPW: 1
BH CV ECHO MEAS - IVSD: 1.1 CM
BH CV ECHO MEAS - LA DIMENSION: 4 CM
BH CV ECHO MEAS - LAD MAJOR: 4.9 CM
BH CV ECHO MEAS - LAT PEAK E' VEL: 10 CM/SEC
BH CV ECHO MEAS - LATERAL E/E' RATIO: 10.5
BH CV ECHO MEAS - LV DIASTOLIC VOL/BSA (35-75): 70.4 ML/M^2
BH CV ECHO MEAS - LV MASS(C)D: 211 GRAMS
BH CV ECHO MEAS - LV MASS(C)DI: 115.2 GRAMS/M^2
BH CV ECHO MEAS - LV MAX PG: 2.7 MMHG
BH CV ECHO MEAS - LV MEAN PG: 1.6 MMHG
BH CV ECHO MEAS - LV SYSTOLIC VOL/BSA (12-30): 28.9 ML/M^2
BH CV ECHO MEAS - LV V1 MAX: 82.4 CM/SEC
BH CV ECHO MEAS - LV V1 MEAN: 58.6 CM/SEC
BH CV ECHO MEAS - LV V1 VTI: 21.1 CM
BH CV ECHO MEAS - LVIDD: 5 CM
BH CV ECHO MEAS - LVIDS: 3.5 CM
BH CV ECHO MEAS - LVLD AP2: 8.8 CM
BH CV ECHO MEAS - LVLD AP4: 8.8 CM
BH CV ECHO MEAS - LVLS AP2: 7.3 CM
BH CV ECHO MEAS - LVLS AP4: 7.5 CM
BH CV ECHO MEAS - LVOT AREA (M): 3.5 CM^2
BH CV ECHO MEAS - LVOT AREA: 3.3 CM^2
BH CV ECHO MEAS - LVOT DIAM: 2.1 CM
BH CV ECHO MEAS - LVPWD: 1.1 CM
BH CV ECHO MEAS - MED PEAK E' VEL: 6.3 CM/SEC
BH CV ECHO MEAS - MEDIAL E/E' RATIO: 16.5
BH CV ECHO MEAS - MV A MAX VEL: 34.1 CM/SEC
BH CV ECHO MEAS - MV E MAX VEL: 106.6 CM/SEC
BH CV ECHO MEAS - MV E/A: 3.1
BH CV ECHO MEAS - PA ACC SLOPE: 425.6 CM/SEC^2
BH CV ECHO MEAS - PA ACC TIME: 0.19 SEC
BH CV ECHO MEAS - PA MAX PG: 3.3 MMHG
BH CV ECHO MEAS - PA PR(ACCEL): -8.1 MMHG
BH CV ECHO MEAS - PA V2 MAX: 90.9 CM/SEC
BH CV ECHO MEAS - RAP SYSTOLE: 3 MMHG
BH CV ECHO MEAS - RVSP: 28 MMHG
BH CV ECHO MEAS - SI(CUBED): 44.1 ML/M^2
BH CV ECHO MEAS - SI(LVOT): 38.1 ML/M^2
BH CV ECHO MEAS - SI(MOD-SP2): 43.1 ML/M^2
BH CV ECHO MEAS - SI(MOD-SP4): 41.5 ML/M^2
BH CV ECHO MEAS - SI(TEICH): 36.3 ML/M^2
BH CV ECHO MEAS - SV(CUBED): 80.8 ML
BH CV ECHO MEAS - SV(LVOT): 69.9 ML
BH CV ECHO MEAS - SV(MOD-SP2): 79 ML
BH CV ECHO MEAS - SV(MOD-SP4): 76 ML
BH CV ECHO MEAS - SV(TEICH): 66.6 ML
BH CV ECHO MEAS - TAPSE (>1.6): 2.8 CM2
BH CV ECHO MEAS - TR MAX PG: 25 MMHG
BH CV ECHO MEAS - TR MAX VEL: 248.4 CM/SEC
BH CV ECHO MEASUREMENTS AVERAGE E/E' RATIO: 13.08
BH CV VAS BP RIGHT ARM: NORMAL MMHG
BH CV XLRA - RV BASE: 3.3 CM
BH CV XLRA - RV LENGTH: 7.3 CM
BH CV XLRA - RV MID: 2.8 CM
BH CV XLRA - TDI S': 14.2 CM/SEC
BH CV XLRA MEAS LEFT CCA RATIO VEL: 74.6 CM/SEC
BH CV XLRA MEAS LEFT DIST CCA EDV: 23.6 CM/SEC
BH CV XLRA MEAS LEFT DIST CCA PSV: 75.4 CM/SEC
BH CV XLRA MEAS LEFT DIST ICA EDV: 28.6 CM/SEC
BH CV XLRA MEAS LEFT DIST ICA PSV: 81 CM/SEC
BH CV XLRA MEAS LEFT ICA RATIO VEL: 88 CM/SEC
BH CV XLRA MEAS LEFT ICA/CCA RATIO: 1.2
BH CV XLRA MEAS LEFT MID CCA EDV: 23.6 CM/SEC
BH CV XLRA MEAS LEFT MID CCA PSV: 82.5 CM/SEC
BH CV XLRA MEAS LEFT MID ICA EDV: 28.6 CM/SEC
BH CV XLRA MEAS LEFT MID ICA PSV: 88.7 CM/SEC
BH CV XLRA MEAS LEFT PROX CCA EDV: 24.4 CM/SEC
BH CV XLRA MEAS LEFT PROX CCA PSV: 98.2 CM/SEC
BH CV XLRA MEAS LEFT PROX ECA PSV: 75.4 CM/SEC
BH CV XLRA MEAS LEFT PROX ICA EDV: 21.6 CM/SEC
BH CV XLRA MEAS LEFT PROX ICA PSV: 62.2 CM/SEC
BH CV XLRA MEAS LEFT PROX SCLA PSV: 140.6 CM/SEC
BH CV XLRA MEAS LEFT VERTEBRAL A EDV: 15.4 CM/SEC
BH CV XLRA MEAS LEFT VERTEBRAL A PSV: 57.3 CM/SEC
BH CV XLRA MEAS RIGHT CCA RATIO VEL: 77 CM/SEC
BH CV XLRA MEAS RIGHT DIST CCA EDV: 21.2 CM/SEC
BH CV XLRA MEAS RIGHT DIST CCA PSV: 77.8 CM/SEC
BH CV XLRA MEAS RIGHT DIST ICA EDV: 20.9 CM/SEC
BH CV XLRA MEAS RIGHT DIST ICA PSV: 71.5 CM/SEC
BH CV XLRA MEAS RIGHT ICA RATIO VEL: 80.7 CM/SEC
BH CV XLRA MEAS RIGHT ICA/CCA RATIO: 1
BH CV XLRA MEAS RIGHT MID CCA EDV: 18.1 CM/SEC
BH CV XLRA MEAS RIGHT MID CCA PSV: 77.8 CM/SEC
BH CV XLRA MEAS RIGHT MID ICA EDV: 24.5 CM/SEC
BH CV XLRA MEAS RIGHT MID ICA PSV: 81.2 CM/SEC
BH CV XLRA MEAS RIGHT PROX CCA EDV: 18.1 CM/SEC
BH CV XLRA MEAS RIGHT PROX CCA PSV: 95.9 CM/SEC
BH CV XLRA MEAS RIGHT PROX ECA PSV: 85.6 CM/SEC
BH CV XLRA MEAS RIGHT PROX ICA EDV: 22 CM/SEC
BH CV XLRA MEAS RIGHT PROX ICA PSV: 78.6 CM/SEC
BH CV XLRA MEAS RIGHT PROX SCLA PSV: 106.1 CM/SEC
BH CV XLRA MEAS RIGHT VERTEBRAL A EDV: 11.3 CM/SEC
BH CV XLRA MEAS RIGHT VERTEBRAL A PSV: 47.1 CM/SEC
LEFT ATRIUM VOLUME INDEX: 22.4 ML/M^2
RIGHT ARM BP: NORMAL MMHG

## 2018-09-19 PROCEDURE — 93880 EXTRACRANIAL BILAT STUDY: CPT

## 2018-09-19 PROCEDURE — 93306 TTE W/DOPPLER COMPLETE: CPT

## 2018-09-23 DIAGNOSIS — I10 BENIGN ESSENTIAL HYPERTENSION: ICD-10-CM

## 2018-09-24 RX ORDER — AMLODIPINE BESYLATE 5 MG/1
TABLET ORAL
Qty: 30 TABLET | Refills: 2 | Status: SHIPPED | OUTPATIENT
Start: 2018-09-24 | End: 2018-10-16

## 2018-09-25 ENCOUNTER — TELEPHONE (OUTPATIENT)
Dept: FAMILY MEDICINE CLINIC | Facility: CLINIC | Age: 58
End: 2018-09-25

## 2018-09-25 NOTE — TELEPHONE ENCOUNTER
Please call, the ultrasoujnd of the neck arteries was OK. There was mild blockage but 0-49% and no surgery or treatment needed other than aspirin needed.     The ultrasound of the heart showed that  one chamber of the heart is a little dilated. One heart valve is leaky. This is the mitral valve. Does he have any shortness of breath with activity?     I would rec follow up with me to assess this.

## 2018-09-25 NOTE — TELEPHONE ENCOUNTER
Please call and confirm if that was the tests that neurology ordered?  Carotid ultrasound and echocardiogram?

## 2018-09-25 NOTE — TELEPHONE ENCOUNTER
----- Message from Pamela Velásquez sent at 9/25/2018 11:53 AM EDT -----  Contact: ESQUIVEL  KNOW ONE HAS CALLED PATIENT WITH RESULTS OF HIS TEST HE HAD DONE IN EARLY AUGUST. HE IS ASKING IF YOU COULD PLEASE GIVE HIM A CALL WITH THESE RESULTS PLEASE.   HE IS VERY ANXIOUS TO RECEIVE THESE.   PLEASE CALL [phone]

## 2018-09-26 NOTE — TELEPHONE ENCOUNTER
Informed pt of results and he does have some SOA. Pt was transferred to the front to sched a fu appt.

## 2018-09-29 DIAGNOSIS — I25.10 MULTIPLE VESSEL CORONARY ARTERY DISEASE: ICD-10-CM

## 2018-09-29 DIAGNOSIS — I10 BENIGN ESSENTIAL HYPERTENSION: ICD-10-CM

## 2018-10-01 ENCOUNTER — HOSPITAL ENCOUNTER (OUTPATIENT)
Dept: GENERAL RADIOLOGY | Facility: HOSPITAL | Age: 58
Discharge: HOME | End: 2018-10-01
Admitting: FAMILY MEDICINE

## 2018-10-01 ENCOUNTER — OFFICE VISIT (OUTPATIENT)
Dept: FAMILY MEDICINE CLINIC | Facility: CLINIC | Age: 58
End: 2018-10-01

## 2018-10-01 VITALS
RESPIRATION RATE: 20 BRPM | WEIGHT: 165 LBS | TEMPERATURE: 97.5 F | SYSTOLIC BLOOD PRESSURE: 122 MMHG | OXYGEN SATURATION: 99 % | DIASTOLIC BLOOD PRESSURE: 70 MMHG | BODY MASS INDEX: 27.49 KG/M2 | HEART RATE: 65 BPM | HEIGHT: 65 IN

## 2018-10-01 DIAGNOSIS — G89.29 CHRONIC LEFT SHOULDER PAIN: ICD-10-CM

## 2018-10-01 DIAGNOSIS — I34.0 MITRAL VALVE INSUFFICIENCY, UNSPECIFIED ETIOLOGY: Primary | ICD-10-CM

## 2018-10-01 DIAGNOSIS — G31.84 MCI (MILD COGNITIVE IMPAIRMENT): ICD-10-CM

## 2018-10-01 DIAGNOSIS — I25.10 MULTIPLE VESSEL CORONARY ARTERY DISEASE: ICD-10-CM

## 2018-10-01 DIAGNOSIS — M25.512 CHRONIC LEFT SHOULDER PAIN: ICD-10-CM

## 2018-10-01 DIAGNOSIS — R44.1 VISUAL HALLUCINATIONS: ICD-10-CM

## 2018-10-01 PROCEDURE — 73030 X-RAY EXAM OF SHOULDER: CPT

## 2018-10-01 PROCEDURE — 99214 OFFICE O/P EST MOD 30 MIN: CPT | Performed by: FAMILY MEDICINE

## 2018-10-01 RX ORDER — METOPROLOL TARTRATE 50 MG/1
TABLET, FILM COATED ORAL
Qty: 30 TABLET | Refills: 2 | Status: SHIPPED | OUTPATIENT
Start: 2018-10-01 | End: 2018-12-17 | Stop reason: SDUPTHER

## 2018-10-01 NOTE — PROGRESS NOTES
Assessment/Plan       Problems Addressed this Visit        Cardiovascular and Mediastinum    Multiple vessel coronary artery disease    Relevant Orders    Ambulatory Referral to Cardiology    Mitral valve insufficiency - Primary    Relevant Orders    Ambulatory Referral to Cardiology       Nervous and Auditory    MCI (mild cognitive impairment)      Other Visit Diagnoses     Visual hallucinations        Chronic left shoulder pain        Relevant Orders    XR Shoulder 2+ View Left (Completed)            Follow up: Return if symptoms worsen or fail to improve, for follow up depends on review of labs and testing.     DISCUSSION  Echocardiogram with mitral valve regurgitation.  We will refer to cardiology given fatigue and shortness of breath.  Also has history of coronary artery disease.    Mild cognitive impairment with visual hallucinations.  Continue follow-up with neurology.  He is now on Namenda.    Chronic left shoulder pain.  Check x-ray.  Further plan once we have that back.      MEDICATIONS PRESCRIBED  Requested Prescriptions      No prescriptions requested or ordered in this encounter          -------------------------------------------    Subjective     Chief Complaint   Patient presents with   • Hypertension     follow up   • Med Refill         History of Present Illness    Mitral regurgitation  Saw neurology and had echo and carotid doppler  Echo showed moderate mitral regurgitation.  Left shoulder pain   Legs swell during the day  Tired  Decreased energy to do any exercise    + shortness of breath with steps  Hard to carry anything  Gets tired    Some chest discomfort and no chest Pain , + sore in chest    FH: mother + MI ( of MI), brother and sister + MI    Had prior Stent     MCI  Mild cognitive impairment  Started on namenda and has helped some  No side effects  Some tremor      Still sees lights and people at night      Elevated LFT  Sees dr Paul    Shoulder pain  ? 1 month  Increasing  No  "injury  Left shoulder.  Has not taken any medicine for.        History   Smoking Status   • Former Smoker   • Packs/day: 1.50   • Years: 30.00   • Quit date: 2011   Smokeless Tobacco   • Never Used      No tob    Less alcohol , 2-3 per day now. (Rum).   Was most of the day, 6-8 per day      Past Medical History,Medications, Allergies, and social history was reviewed.      Review of Systems   Constitutional: Positive for fatigue.   HENT: Negative.    Respiratory: Positive for shortness of breath.    Cardiovascular: Negative.    Gastrointestinal: Negative.    Musculoskeletal: Positive for arthralgias (Left shoulder).   Psychiatric/Behavioral: Positive for decreased concentration.       Objective     Vitals:    10/01/18 0958   BP: 122/70   Pulse: 65   Resp: 20   Temp: 97.5 °F (36.4 °C)   TempSrc: Temporal Artery    SpO2: 99%   Weight: 74.8 kg (165 lb)   Height: 165.1 cm (65\")          Physical Exam   Constitutional: Vital signs are normal. He appears well-developed and well-nourished.   HENT:   Head: Normocephalic and atraumatic.   Right Ear: Hearing, tympanic membrane, external ear and ear canal normal.   Left Ear: Hearing, tympanic membrane, external ear and ear canal normal.   Mouth/Throat: Oropharynx is clear and moist.   Eyes: Pupils are equal, round, and reactive to light. Conjunctivae, EOM and lids are normal.   Neck: Normal range of motion. Neck supple. Carotid bruit is not present. No thyromegaly present.   Cardiovascular: Normal rate and regular rhythm.  Exam reveals no friction rub.    Murmur heard.   Systolic ( Best over mitral area) murmur is present with a grade of 1/6   Pulmonary/Chest: Effort normal and breath sounds normal. No respiratory distress. He has no wheezes. He has no rales.   Abdominal: Soft. Normal appearance and bowel sounds are normal. He exhibits no distension. There is no tenderness.   Musculoskeletal: He exhibits no edema.        Left shoulder: He exhibits decreased range of motion and " tenderness (Posterior and top of left shoulder). He exhibits no crepitus.   Neurological: He is alert. He has normal strength.   Skin: Skin is warm and dry.   Psychiatric: He has a normal mood and affect. His speech is normal and behavior is normal. Cognition and memory are normal.   Nursing note and vitals reviewed.                Anshul Martinez MD

## 2018-10-09 NOTE — PROGRESS NOTES
Encounter Date:10/16/2018    Patient ID: Justice Kiser is a 58 y.o. male who used to be a  for [employer] and resides in [place], [place].     CC/Reason for visit:  Shortness of Breath (Consult ); Fatigue; Irregular Heart Beat; and Edema            Justice Kiser is referred to the office today by Dr. Anshul Martinez for shortness of breath, fatigue, and coronary artery disease with a history of stent placement in 2012 at  after MI. The patient states that over the last several months he has developed exertional fatigue symptoms. He states that he cannot walk on level ground for any particular distance before having to stop. He used to mow his yard without difficulties and now has to stop multiple times. He denies any chest pressure. He does have left shoulder discomfort which is positional in nature and not related to exertion. He does not have chest pressure like he had prior to his previous myocardial infarction in 2012. He denies TIA or stroke symptoms but has had some memory loss issues. He underwent an echocardiogram for evaluation of his memory issues and this showed preserved LV function and moderate mitral regurgitation. He complains of swelling of his lower extremities during the daytime as well as some shooting pain and tingling sensation in his feet at night.    Review of Systems   Constitution: Positive for weakness and malaise/fatigue.   Eyes: Negative for vision loss in left eye and vision loss in right eye.   Cardiovascular: Positive for chest pain. Negative for dyspnea on exertion, near-syncope, orthopnea, palpitations, paroxysmal nocturnal dyspnea and syncope.   Respiratory: Positive for shortness of breath.    Musculoskeletal: Positive for joint pain. Negative for myalgias.   Neurological: Negative for brief paralysis, excessive daytime sleepiness, focal weakness, numbness and paresthesias.   All other systems reviewed and are negative.      The patient's past medical, social,  "family history and ROS reviewed in the patient's electronic medical record.    Allergies  Patient has no known allergies.    Outpatient Prescriptions Marked as Taking for the 10/16/18 encounter (Consult) with Tacho Crouch IV, MD   Medication Sig Dispense Refill   • aspirin (ASPIRIN LOW DOSE) 81 MG tablet Take 1 tablet by mouth Daily for 360 days. 90 tablet 3   • atorvastatin (LIPITOR) 80 MG tablet Take 1 tablet by mouth Every Night for 180 days. 90 tablet 1   • lisinopril (PRINIVIL,ZESTRIL) 20 MG tablet TAKE 1 TABLET EVERY DAY 30 tablet 2   • memantine (NAMENDA) 10 MG tablet Take 1 tablet by mouth 2 (Two) Times a Day. 60 tablet 11   • metoprolol tartrate (LOPRESSOR) 50 MG tablet TAKE 1/2 TABLET BY MOUTH TWICE A DAY 30 tablet 2   • Multiple Vitamins-Minerals (CENTRUM SILVER) tablet Take  by mouth.     • naproxen (NAPROSYN) 500 MG tablet Take 1 tablet by mouth 2 (Two) Times a Day With Meals. 40 tablet 1   • omeprazole (priLOSEC) 40 MG capsule TAKE 1 CAPSULE EVERY DAY 30 capsule 1   • sertraline (ZOLOFT) 100 MG tablet Take 2 tablets by mouth Daily. 60 tablet 2   • [DISCONTINUED] amLODIPine (NORVASC) 5 MG tablet TAKE 1 TABLET BY MOUTH EVERY DAY 30 tablet 2   • [DISCONTINUED] aspirin (ASPIRIN LOW DOSE) 81 MG tablet Take  by mouth.     • [DISCONTINUED] atorvastatin (LIPITOR) 80 MG tablet TAKE 1 TABLET BY MOUTH EVERY NIGHT. 30 tablet 2           Blood pressure 122/60, pulse 58, height 165.1 cm (65\"), weight 75 kg (165 lb 6.4 oz).  Body mass index is 27.52 kg/m².  There were no vitals filed for this visit.    Physical Exam   Constitutional: He is oriented to person, place, and time. He appears well-developed and well-nourished.   HENT:   Head: Normocephalic and atraumatic.   Eyes: Pupils are equal, round, and reactive to light. No scleral icterus.   Neck: No JVD present. Carotid bruit is not present. No thyromegaly present.   Cardiovascular: Normal rate and regular rhythm.  Exam reveals no gallop.    No murmur " heard.  Pulmonary/Chest: Effort normal and breath sounds normal.   Abdominal: Soft. He exhibits no distension. There is no hepatosplenomegaly.   Musculoskeletal: He exhibits no edema.   Neurological: He is alert and oriented to person, place, and time.   Skin: Skin is warm and dry.   Psychiatric: He has a normal mood and affect. His behavior is normal.       Data Review:       ECG 12 Lead  Date/Time: 10/16/2018 9:23 AM  Performed by: FREDIS THOMAS IV  Authorized by: FREDIS THOMAS IV   BPM: 55  Q waves: I, II, III, aVF, V3, V4, V5 and V6  Clinical impression: abnormal ECG            Lab Results   Component Value Date    TRIG 76 04/05/2018    HDL 72 (H) 04/05/2018    LDL 55 04/05/2018     (H) 04/05/2018    ALT 63 (H) 04/05/2018     Lab Results   Component Value Date    BUN 8 (L) 04/05/2018    CREATININE 0.70 04/05/2018    EGFRIFNONA 115 04/05/2018    EGFRIFAFRI 139 04/05/2018    BCR 11.4 04/05/2018    K 4.4 04/05/2018    CO2 27.0 04/05/2018    CALCIUM 9.2 04/05/2018    PROTENTOTREF 7.6 04/05/2018    ALBUMIN 4.50 04/05/2018    LABIL2 1.5 04/05/2018     (H) 04/05/2018    ALT 63 (H) 04/05/2018     Lab Results   Component Value Date    WBC 10.22 04/05/2018    HGB 14.7 04/05/2018    HCT 45.2 04/05/2018    MCV 96.4 04/05/2018     04/05/2018     Lab Results   Component Value Date    TSH 2.090 08/13/2018     Lab Results   Component Value Date    HGBA1C 6.10 (H) 04/05/2018            Problem List Items Addressed This Visit        Cardiovascular and Mediastinum    Coronary artery disease involving native coronary artery of native heart with angina pectoris (CMS/HCC) - Primary    Overview     · Cardiac catheterization for NSTEMI (1/2012):  Severe 1-vessel CAD (left circumflex subtotal occlusion). Mild disease of the other coronary arteries. Successful PCI of the left circumflex using a Xience MUNA.     · Worsening fatigue symptoms, fall 2018         Current Assessment & Plan     Left  shoulder pain symptoms are atypical and nonexertional in nature. However, I am concerned by his worsening exercise fatigue and intolerance. His EKG today shows no ischemic changes. I recommend a exercise nuclear stress test for risk stratification.         Relevant Orders    ECG 12 Lead (Completed)    Essential hypertension    Current Assessment & Plan     · Presently well-controlled  · Patient's lower extremity swelling symptoms are exacerbated by amlodipine  · Discontinue amlodipine today and reassess blood pressure at next visit.  May add HCTZ         Hyperlipidemia LDL goal <70    Relevant Medications    atorvastatin (LIPITOR) 80 MG tablet    Mitral valve insufficiency    Overview     · Echo (9/2018): Normal LVEF.  Moderate MR.                · Exercise nuclear stress  · Discontinue amlodipine. Reassess blood pressure in 4 weeks at upcoming visit  · Obtaining catheterization report from [facility] in 2012  Return in about 4 weeks (around 11/13/2018) for Followup with CAROL Powers.    Tacho Crouch IV, MD  10/16/2018    Scribed for Tacho Crouch IV, MD by Pattie Palacios. 10/16/2018  11:06 AM

## 2018-10-16 ENCOUNTER — CONSULT (OUTPATIENT)
Dept: CARDIOLOGY | Facility: CLINIC | Age: 58
End: 2018-10-16

## 2018-10-16 VITALS
WEIGHT: 165.4 LBS | HEIGHT: 65 IN | HEART RATE: 58 BPM | BODY MASS INDEX: 27.56 KG/M2 | DIASTOLIC BLOOD PRESSURE: 60 MMHG | SYSTOLIC BLOOD PRESSURE: 122 MMHG

## 2018-10-16 DIAGNOSIS — I25.119 CORONARY ARTERY DISEASE INVOLVING NATIVE CORONARY ARTERY OF NATIVE HEART WITH ANGINA PECTORIS (HCC): Primary | ICD-10-CM

## 2018-10-16 DIAGNOSIS — I34.0 NON-RHEUMATIC MITRAL REGURGITATION: ICD-10-CM

## 2018-10-16 DIAGNOSIS — I10 ESSENTIAL HYPERTENSION: ICD-10-CM

## 2018-10-16 DIAGNOSIS — E78.5 HYPERLIPIDEMIA LDL GOAL <70: ICD-10-CM

## 2018-10-16 PROCEDURE — 93000 ELECTROCARDIOGRAM COMPLETE: CPT | Performed by: INTERNAL MEDICINE

## 2018-10-16 PROCEDURE — 99244 OFF/OP CNSLTJ NEW/EST MOD 40: CPT | Performed by: INTERNAL MEDICINE

## 2018-10-16 RX ORDER — ATORVASTATIN CALCIUM 80 MG/1
80 TABLET, FILM COATED ORAL NIGHTLY
Qty: 90 TABLET | Refills: 1 | Status: SHIPPED | OUTPATIENT
Start: 2018-10-16 | End: 2019-04-11 | Stop reason: SDUPTHER

## 2018-10-16 NOTE — ASSESSMENT & PLAN NOTE
Left shoulder pain symptoms are atypical and nonexertional in nature. However, I am concerned by his worsening exercise fatigue and intolerance. His EKG today shows no ischemic changes. I recommend a exercise nuclear stress test for risk stratification.

## 2018-10-16 NOTE — ASSESSMENT & PLAN NOTE
· Presently well-controlled  · Patient's lower extremity swelling symptoms are exacerbated by amlodipine  · Discontinue amlodipine today and reassess blood pressure at next visit.  May add HCTZ

## 2018-10-24 DIAGNOSIS — I10 BENIGN ESSENTIAL HYPERTENSION: ICD-10-CM

## 2018-10-24 DIAGNOSIS — F33.1 MODERATE EPISODE OF RECURRENT MAJOR DEPRESSIVE DISORDER (HCC): ICD-10-CM

## 2018-10-24 RX ORDER — SERTRALINE HYDROCHLORIDE 100 MG/1
TABLET, FILM COATED ORAL
Qty: 60 TABLET | Refills: 2 | Status: SHIPPED | OUTPATIENT
Start: 2018-10-24 | End: 2019-01-17 | Stop reason: SDUPTHER

## 2018-10-24 RX ORDER — LISINOPRIL 20 MG/1
TABLET ORAL
Qty: 30 TABLET | Refills: 2 | Status: SHIPPED | OUTPATIENT
Start: 2018-10-24 | End: 2019-01-17 | Stop reason: SDUPTHER

## 2018-10-26 ENCOUNTER — HOSPITAL ENCOUNTER (OUTPATIENT)
Dept: CARDIOLOGY | Facility: HOSPITAL | Age: 58
Discharge: HOME | End: 2018-10-26
Attending: INTERNAL MEDICINE

## 2018-10-26 VITALS
BODY MASS INDEX: 27.55 KG/M2 | WEIGHT: 165.34 LBS | HEIGHT: 65 IN | DIASTOLIC BLOOD PRESSURE: 80 MMHG | SYSTOLIC BLOOD PRESSURE: 160 MMHG | HEART RATE: 86 BPM

## 2018-10-26 PROCEDURE — 93017 CV STRESS TEST TRACING ONLY: CPT

## 2018-10-26 PROCEDURE — 0 TECHNETIUM SESTAMIBI: Performed by: INTERNAL MEDICINE

## 2018-10-26 PROCEDURE — 78452 HT MUSCLE IMAGE SPECT MULT: CPT

## 2018-10-26 PROCEDURE — 78452 HT MUSCLE IMAGE SPECT MULT: CPT | Performed by: INTERNAL MEDICINE

## 2018-10-26 PROCEDURE — 93018 CV STRESS TEST I&R ONLY: CPT | Performed by: INTERNAL MEDICINE

## 2018-10-26 PROCEDURE — A9500 TC99M SESTAMIBI: HCPCS | Performed by: INTERNAL MEDICINE

## 2018-10-26 RX ADMIN — TECHNETIUM TC 99M SESTAMIBI 1 DOSE: 1 INJECTION INTRAVENOUS at 13:25

## 2018-10-26 RX ADMIN — TECHNETIUM TC 99M SESTAMIBI 1 DOSE: 1 INJECTION INTRAVENOUS at 11:00

## 2018-10-29 LAB
BH CV STRESS BP STAGE 1: NORMAL
BH CV STRESS BP STAGE 2: NORMAL
BH CV STRESS DURATION MIN STAGE 1: 3
BH CV STRESS DURATION MIN STAGE 2: 3
BH CV STRESS DURATION SEC STAGE 1: 0
BH CV STRESS DURATION SEC STAGE 2: 15
BH CV STRESS GRADE STAGE 1: 10
BH CV STRESS GRADE STAGE 2: 12
BH CV STRESS HR STAGE 1: 117
BH CV STRESS HR STAGE 2: 139
BH CV STRESS METS STAGE 1: 5
BH CV STRESS METS STAGE 2: 7.5
BH CV STRESS O2 STAGE 1: 97
BH CV STRESS O2 STAGE 2: 96
BH CV STRESS PROTOCOL 1: NORMAL
BH CV STRESS RECOVERY BP: NORMAL MMHG
BH CV STRESS RECOVERY HR: 82 BPM
BH CV STRESS SPEED STAGE 1: 1.7
BH CV STRESS SPEED STAGE 2: 2.5
BH CV STRESS STAGE 1: 1
BH CV STRESS STAGE 2: 2
LV EF NUC BP: 57 %
MAXIMAL PREDICTED HEART RATE: 160 BPM
PERCENT MAX PREDICTED HR: 86.88 %
STRESS BASELINE BP: NORMAL MMHG
STRESS BASELINE HR: 62 BPM
STRESS O2 SAT REST: 97 %
STRESS PERCENT HR: 102 %
STRESS POST ESTIMATED WORKLOAD: 7 METS
STRESS POST EXERCISE DUR MIN: 6 MIN
STRESS POST EXERCISE DUR SEC: 15 SEC
STRESS POST O2 SAT PEAK: 96 %
STRESS POST PEAK BP: NORMAL MMHG
STRESS POST PEAK HR: 139 BPM
STRESS TARGET HR: 136 BPM

## 2018-11-09 ENCOUNTER — OFFICE VISIT (OUTPATIENT)
Dept: FAMILY MEDICINE CLINIC | Facility: CLINIC | Age: 58
End: 2018-11-09

## 2018-11-09 VITALS
BODY MASS INDEX: 27.54 KG/M2 | TEMPERATURE: 97.1 F | SYSTOLIC BLOOD PRESSURE: 152 MMHG | OXYGEN SATURATION: 98 % | HEART RATE: 54 BPM | DIASTOLIC BLOOD PRESSURE: 78 MMHG | WEIGHT: 165.5 LBS | RESPIRATION RATE: 20 BRPM

## 2018-11-09 DIAGNOSIS — F33.1 MODERATE EPISODE OF RECURRENT MAJOR DEPRESSIVE DISORDER (HCC): ICD-10-CM

## 2018-11-09 DIAGNOSIS — I10 BENIGN ESSENTIAL HYPERTENSION: Primary | ICD-10-CM

## 2018-11-09 DIAGNOSIS — M25.512 CHRONIC LEFT SHOULDER PAIN: ICD-10-CM

## 2018-11-09 DIAGNOSIS — R73.9 HYPERGLYCEMIA: ICD-10-CM

## 2018-11-09 DIAGNOSIS — E78.00 PURE HYPERCHOLESTEROLEMIA: ICD-10-CM

## 2018-11-09 DIAGNOSIS — G89.29 CHRONIC LEFT SHOULDER PAIN: ICD-10-CM

## 2018-11-09 LAB
ALBUMIN SERPL-MCNC: 4.71 G/DL (ref 3.2–4.8)
ALBUMIN/GLOB SERPL: 1.9 G/DL (ref 1.5–2.5)
ALP SERPL-CCNC: 221 U/L (ref 25–100)
ALT SERPL-CCNC: 70 U/L (ref 7–40)
AST SERPL-CCNC: 116 U/L (ref 0–33)
BILIRUB SERPL-MCNC: 0.7 MG/DL (ref 0.3–1.2)
BUN SERPL-MCNC: 10 MG/DL (ref 9–23)
BUN/CREAT SERPL: 19.6 (ref 7–25)
CALCIUM SERPL-MCNC: 9.5 MG/DL (ref 8.7–10.4)
CHLORIDE SERPL-SCNC: 101 MMOL/L (ref 99–109)
CHOLEST SERPL-MCNC: 176 MG/DL (ref 0–200)
CHOLEST/HDLC SERPL: 1.8 {RATIO}
CO2 SERPL-SCNC: 31 MMOL/L (ref 20–31)
CREAT SERPL-MCNC: 0.51 MG/DL (ref 0.6–1.3)
GLOBULIN SER CALC-MCNC: 2.5 GM/DL
GLUCOSE SERPL-MCNC: 113 MG/DL (ref 70–100)
HBA1C MFR BLD: 5.9 % (ref 4.8–5.6)
HDLC SERPL-MCNC: 98 MG/DL (ref 40–60)
LDLC SERPL CALC-MCNC: 64 MG/DL (ref 0–100)
POTASSIUM SERPL-SCNC: 4.2 MMOL/L (ref 3.5–5.5)
PROT SERPL-MCNC: 7.2 G/DL (ref 5.7–8.2)
SODIUM SERPL-SCNC: 138 MMOL/L (ref 132–146)
TRIGL SERPL-MCNC: 71 MG/DL (ref 0–150)
VLDLC SERPL CALC-MCNC: 14.2 MG/DL

## 2018-11-09 PROCEDURE — 99214 OFFICE O/P EST MOD 30 MIN: CPT | Performed by: FAMILY MEDICINE

## 2018-11-09 RX ORDER — AMLODIPINE BESYLATE 5 MG/1
5 TABLET ORAL DAILY
Refills: 2 | COMMUNITY
Start: 2018-10-25 | End: 2018-11-13

## 2018-11-09 NOTE — PROGRESS NOTES
Assessment/Plan       Problems Addressed this Visit        Other    Depression    Hyperglycemia    Relevant Orders    Hemoglobin A1c      Other Visit Diagnoses     Benign essential hypertension    -  Primary    Relevant Medications    amLODIPine (NORVASC) 5 MG tablet    Other Relevant Orders    Comprehensive Metabolic Panel    Lipid Panel With / Chol / HDL Ratio    Pure hypercholesterolemia        Relevant Orders    Comprehensive Metabolic Panel    Lipid Panel With / Chol / HDL Ratio    Chronic left shoulder pain        Relevant Orders    MRI Shoulder Left Without Contrast            Follow up: Return for follow up depends on review of labs and testing.     DISCUSSION    Hypertension.  Blood pressures a little bit elevated today.  Continue to monitor.  He states its good at home.    Hyperlipidemia.  Check labs.    Depression.  On Zoloft 200 mg daily.  Still having problems with this.  Continue current dose.  Call if worsening.    Hyperglycemia.  Check A1c.    Chronic left shoulder pain.  X-ray was negative.  Recommend check MRI.  May have rotator cuff issue.    MEDICATIONS PRESCRIBED  Requested Prescriptions      No prescriptions requested or ordered in this encounter            -------------------------------------------    Subjective     Chief Complaint   Patient presents with   • Hypertension     6 month follow up   • Labs Only   • Med Refill         Hypertension   This is a chronic problem. The current episode started more than 1 year ago. The problem is unchanged. The problem is controlled (up at times but ok at times). Associated symptoms include peripheral edema (legs and feet, no change) and shortness of breath. Pertinent negatives include no chest pain or headaches. There are no associated agents to hypertension. Risk factors for coronary artery disease include dyslipidemia. Current antihypertension treatment includes calcium channel blockers, ACE inhibitors and beta blockers. Hypertensive end-organ  damage includes CAD/MI.   Depression   Visit Type: follow-up  Patient presents with the following symptoms: depressed mood (not worse and not better), insomnia (never sleep well,), nervousness/anxiety (in crowds) and shortness of breath.  Patient is not experiencing: suicidal ideas and suicidal planning.  Severity: moderate   Sleep quality: poor          Left shoulder pain   Increased pian  Xray neg for OA  No recall of injury  Pain has been for x 2 months  Has not gone to [facility]       Dr Paul did EGD on Wed and biopsy was done  Saw neurology  Had stress test with cardiologist. Had MI in the past. No new ischemia  To see Dr Crouch again    + shortness of breath at times  No chest pain (occ discomfort)    Hyperlipidemia.  Due for blood work.  History of myocardial infarction.  On atorvastatin 80 mg daily.  No side effects reported from this medication.      History   Smoking Status   • Former Smoker   • Packs/day: 1.50   • Years: 35.00   • Types: Cigarettes   • Quit date: 2011   Smokeless Tobacco   • Never Used        Past Medical History,Medications, Allergies, and social history was reviewed.      Review of Systems   Constitutional: Negative.    HENT: Negative.    Respiratory: Positive for shortness of breath.    Cardiovascular: Negative.  Negative for chest pain.   Gastrointestinal: Negative.    Musculoskeletal: Negative.    Neurological: Negative.    Psychiatric/Behavioral: Positive for depressed mood (not worse and not better). Negative for suicidal ideas. The patient is nervous/anxious (in crowds) and has insomnia (never sleep well,).        Objective     Vitals:    11/09/18 0931   BP: 152/78   Pulse: 54   Resp: 20   Temp: 97.1 °F (36.2 °C)   TempSrc: Temporal Artery    SpO2: 98%   Weight: 75.1 kg (165 lb 8 oz)          Physical Exam   Constitutional: Vital signs are normal. He appears well-developed and well-nourished.   HENT:   Head: Normocephalic and atraumatic.   Right Ear: Hearing, tympanic membrane,  external ear and ear canal normal.   Left Ear: Hearing, tympanic membrane, external ear and ear canal normal.   Mouth/Throat: Oropharynx is clear and moist.   Eyes: Pupils are equal, round, and reactive to light. Conjunctivae, EOM and lids are normal.   Neck: Normal range of motion. Neck supple. No thyromegaly present.   Cardiovascular: Normal rate, regular rhythm and normal heart sounds.  Exam reveals no friction rub.    No murmur heard.  Pulmonary/Chest: Effort normal and breath sounds normal. No respiratory distress. He has no wheezes. He has no rales.   Abdominal: Soft. Normal appearance and bowel sounds are normal. He exhibits no distension and no mass. There is tenderness (mild diffuse). There is no rebound and no guarding.   Musculoskeletal: He exhibits no edema.        Left shoulder: He exhibits decreased range of motion ( impingement with abduction and external rotation) and tenderness (posterior and lateral).   Neurological: He is alert. He has normal strength.   Skin: Skin is warm and dry.   Psychiatric: He has a normal mood and affect. His speech is normal. Cognition and memory are normal.   Nursing note and vitals reviewed.                Anshul Martinez MD

## 2018-11-13 ENCOUNTER — APPOINTMENT (OUTPATIENT)
Dept: LAB | Facility: HOSPITAL | Age: 58
End: 2018-11-13

## 2018-11-13 ENCOUNTER — OFFICE VISIT (OUTPATIENT)
Dept: CARDIOLOGY | Facility: CLINIC | Age: 58
End: 2018-11-13

## 2018-11-13 ENCOUNTER — LAB (OUTPATIENT)
Dept: LAB | Facility: HOSPITAL | Age: 58
End: 2018-11-13

## 2018-11-13 ENCOUNTER — HOSPITAL ENCOUNTER (OUTPATIENT)
Dept: GENERAL RADIOLOGY | Facility: HOSPITAL | Age: 58
Discharge: HOME | End: 2018-11-13
Admitting: NURSE PRACTITIONER

## 2018-11-13 VITALS
OXYGEN SATURATION: 96 % | DIASTOLIC BLOOD PRESSURE: 60 MMHG | BODY MASS INDEX: 27.92 KG/M2 | WEIGHT: 167.6 LBS | SYSTOLIC BLOOD PRESSURE: 122 MMHG | HEIGHT: 65 IN | HEART RATE: 60 BPM

## 2018-11-13 DIAGNOSIS — I25.119 CORONARY ARTERY DISEASE INVOLVING NATIVE CORONARY ARTERY OF NATIVE HEART WITH ANGINA PECTORIS (HCC): Primary | ICD-10-CM

## 2018-11-13 DIAGNOSIS — I10 ESSENTIAL HYPERTENSION: ICD-10-CM

## 2018-11-13 DIAGNOSIS — R06.00 DYSPNEA, UNSPECIFIED TYPE: ICD-10-CM

## 2018-11-13 DIAGNOSIS — I34.0 NON-RHEUMATIC MITRAL REGURGITATION: ICD-10-CM

## 2018-11-13 DIAGNOSIS — E78.5 HYPERLIPIDEMIA LDL GOAL <70: ICD-10-CM

## 2018-11-13 DIAGNOSIS — R06.09 DYSPNEA ON EXERTION: ICD-10-CM

## 2018-11-13 DIAGNOSIS — R60.0 LOWER EXTREMITY EDEMA: ICD-10-CM

## 2018-11-13 LAB
ANION GAP SERPL CALCULATED.3IONS-SCNC: 10 MMOL/L (ref 3–11)
BNP SERPL-MCNC: 173 PG/ML (ref 0–100)
BUN BLD-MCNC: 7 MG/DL (ref 9–23)
BUN/CREAT SERPL: 11.5 (ref 7–25)
CALCIUM SPEC-SCNC: 9.4 MG/DL (ref 8.7–10.4)
CHLORIDE SERPL-SCNC: 98 MMOL/L (ref 99–109)
CO2 SERPL-SCNC: 25 MMOL/L (ref 20–31)
CREAT BLD-MCNC: 0.61 MG/DL (ref 0.6–1.3)
GFR SERPL CREATININE-BSD FRML MDRD: 135 ML/MIN/1.73
GLUCOSE BLD-MCNC: 76 MG/DL (ref 70–100)
POTASSIUM BLD-SCNC: 4 MMOL/L (ref 3.5–5.5)
SODIUM BLD-SCNC: 133 MMOL/L (ref 132–146)

## 2018-11-13 PROCEDURE — 71046 X-RAY EXAM CHEST 2 VIEWS: CPT

## 2018-11-13 PROCEDURE — 99214 OFFICE O/P EST MOD 30 MIN: CPT | Performed by: NURSE PRACTITIONER

## 2018-11-13 PROCEDURE — 36415 COLL VENOUS BLD VENIPUNCTURE: CPT

## 2018-11-13 PROCEDURE — 80048 BASIC METABOLIC PNL TOTAL CA: CPT

## 2018-11-13 PROCEDURE — 83880 ASSAY OF NATRIURETIC PEPTIDE: CPT

## 2018-11-13 RX ORDER — HYDROCHLOROTHIAZIDE 12.5 MG/1
12.5 CAPSULE, GELATIN COATED ORAL DAILY
Qty: 90 CAPSULE | Refills: 3 | Status: SHIPPED | OUTPATIENT
Start: 2018-11-13 | End: 2019-11-04 | Stop reason: SDUPTHER

## 2018-11-13 NOTE — PROGRESS NOTES
Encounter Date:11/13/2018    Patient ID: Justice Kiser is a 58 y.o. male who he used to be a  for [email or web address] and resides in [place], [place]    CC/Reason for visit:  Fatigue and Coronary Artery Disease           Problem List Items Addressed This Visit        Cardiovascular and Mediastinum    Essential hypertension    Current Assessment & Plan     · Hypertension is controlled  · Continue amlodipine 5 mg daily  · Continue lisinopril 20 mg daily         Relevant Medications    hydrochlorothiazide (MICROZIDE) 12.5 MG capsule    Other Relevant Orders    Basic Metabolic Panel    Coronary artery disease involving native coronary artery of native heart with angina pectoris (CMS/HCC) - Primary    Overview     · Cardiac catheterization for NSTEMI (1/2012):  Severe 1-vessel CAD (left circumflex subtotal occlusion). Mild disease of the other coronary arteries. Successful PCI of the left circumflex using a Xience MUNA.     · Exercise nuclear stress (10/26/18): Exercise for 6.5 minutes with replication chest pain. Moderate sized inferolateral infarct. LVEF 53%         Current Assessment & Plan     · Continue aspirin 81 mg daily  · Continue metoprolol tartrate 50 mg twice a day         Hyperlipidemia LDL goal <70    Current Assessment & Plan     · Continue Lipitor 80 mg daily         Mitral valve insufficiency    Overview     · Echo (9/2018): Normal LVEF.  Moderate MR.         Current Assessment & Plan     · NYHA class III symptoms            Respiratory    Dyspnea on exertion    Current Assessment & Plan     · NYHA class III symptoms.  Unclear whether this is an anginal equivalent or exacerbation of acute heart failure  · Obtain chest x-ray and BNP today  · Follow-up 4 weeks for reassessment of symptoms will contact patient once results of above studies are obtained            Other    Lower extremity edema    Current Assessment & Plan     · Discontinue amlodipine  · Start 12.5 mg hydrochlorothiazide daily  Lasix  · BMP in one week           Other Visit Diagnoses     Dyspnea, unspecified type        Relevant Orders    XR Chest PA & Lateral    BNP         Patient is lower extremity edema and NYHA class III symptoms despite a normal LVEF noted on echo and no significant valvular abnormality to account for his symptoms.  His stress test did not suggest any new ischemia.  It is unclear whether his symptoms are related to a pulmonary issue, coronary artery disease or an acute exacerbation of heart failure.  I will obtain a BMP and chest x-ray today.  I have instructed him to stop his amlodipine and start 12.5 mg hydrochlorothiazide daily.  He will need a BMP and one week for assessment of renal function.  If the results of his chest x-ray and BMP indicate acute heart failure I will contact him and we will place him on daily Lasix.  He should follow-up in 4 weeks for reassessment of his symptoms.  The patient may require repeat left cardiac catheterization and or possible pulmonary function testing       · Chest x-ray and B NP today  · Discontinue amlodipine and start hydrochlorothiazide 12.5 mg daily  · BMP in one week  · If chest x-ray and BNP indicates acute heart failure will contact in place on daily Lasix  · Return in 4 weeks for reassessment may need cardiac catheterization and/or pulmonary function testing depending on the results of the above studies        Justice Kiser returns today for follow-up of his coronary artery disease and cardiac risk factors.  Patient was seen in consultation by Dr. Crouch last month being referred by his PCP to establish cardiovascular care and for assessment of increased shortness of breath.  Since then he has underwent a myocardial perfusion study which showed moderate size inferior lateral infarct but no ischemia noted.  The patient had complaints of lower extremity edema and he was instructed to discontinue his amlodipine have her for reasons that remain unclear he did not do  this.  He still experiences shortness of breath with very mild activities.  He feels like his symptoms are getting worse.  He had a echocardiogram recently that showed a normal LVEF and only mild to moderate mitral regurgitation.  He takes daily aspirin as well as statin therapy.  He denies any chest pain but he is worried about his breathing.  He was a long-time smoker but quit after his heart attack in 2012.    Review of Systems   Constitution: Negative for weakness and malaise/fatigue.   Eyes: Negative for vision loss in left eye and vision loss in right eye.   Cardiovascular: Negative for chest pain, dyspnea on exertion, near-syncope, orthopnea, palpitations, paroxysmal nocturnal dyspnea and syncope.   Musculoskeletal: Negative for myalgias.   Neurological: Negative for brief paralysis, excessive daytime sleepiness, focal weakness, numbness and paresthesias.   All other systems reviewed and are negative.      The patient's past medical, social, family history and ROS reviewed in the patient's electronic medical record.    Allergies  Patient has no known allergies.    Outpatient Medications Marked as Taking for the 11/13/18 encounter (Office Visit) with Katelyn Domínguez APRN   Medication Sig Dispense Refill   • aspirin (ASPIRIN LOW DOSE) 81 MG tablet Take 1 tablet by mouth Daily for 360 days. 90 tablet 3   • atorvastatin (LIPITOR) 80 MG tablet Take 1 tablet by mouth Every Night for 180 days. 90 tablet 1   • lisinopril (PRINIVIL,ZESTRIL) 20 MG tablet TAKE 1 TABLET EVERY DAY 30 tablet 2   • memantine (NAMENDA) 10 MG tablet Take 1 tablet by mouth 2 (Two) Times a Day. 60 tablet 11   • metoprolol tartrate (LOPRESSOR) 50 MG tablet TAKE 1/2 TABLET BY MOUTH TWICE A DAY 30 tablet 2   • Multiple Vitamins-Minerals (CENTRUM SILVER) tablet Take 1 tablet by mouth Daily.     • omeprazole (priLOSEC) 40 MG capsule TAKE 1 CAPSULE EVERY DAY 30 capsule 1   • sertraline (ZOLOFT) 100 MG tablet TAKE 2 TABLETS EVERY DAY 60 tablet 2  "  • [DISCONTINUED] amLODIPine (NORVASC) 5 MG tablet Take 5 mg by mouth Daily.  2           Blood pressure 122/60, pulse 60, height 165.1 cm (65\"), weight 76 kg (167 lb 9.6 oz), SpO2 96 %.  Body mass index is 27.89 kg/m².  There were no vitals filed for this visit.    Physical Exam   Constitutional: He is oriented to person, place, and time. He appears well-developed and well-nourished.   HENT:   Head: Normocephalic and atraumatic.   Eyes: Pupils are equal, round, and reactive to light. No scleral icterus.   Neck: No JVD present. Carotid bruit is not present. No thyromegaly present.   Cardiovascular: Normal rate and regular rhythm. Exam reveals no gallop.   No murmur heard.  Pulmonary/Chest: Effort normal and breath sounds normal.   Abdominal: Soft. He exhibits no distension. There is no hepatosplenomegaly.   Musculoskeletal: He exhibits no edema.   Neurological: He is alert and oriented to person, place, and time.   Skin: Skin is warm and dry.   Psychiatric: He has a normal mood and affect. His behavior is normal.       Data Review:     Procedures    Lab Results   Component Value Date    TRIG 71 11/09/2018    HDL 98 (H) 11/09/2018    LDL 64 11/09/2018     (H) 11/09/2018    ALT 70 (H) 11/09/2018       Lab Results   Component Value Date    HGBA1C 5.90 (H) 11/09/2018       ·     CAROL Powers  11/13/2018  "

## 2018-11-13 NOTE — ASSESSMENT & PLAN NOTE
· NYHA class III symptoms.  Unclear whether this is an anginal equivalent or exacerbation of acute heart failure  · Obtain chest x-ray and BNP today  · Follow-up 4 weeks for reassessment of symptoms will contact patient once results of above studies are obtained

## 2018-11-14 ENCOUNTER — HOSPITAL ENCOUNTER (OUTPATIENT)
Dept: MRI IMAGING | Facility: HOSPITAL | Age: 58
Discharge: HOME | End: 2018-11-14
Admitting: FAMILY MEDICINE

## 2018-11-14 ENCOUNTER — TELEPHONE (OUTPATIENT)
Dept: CARDIOLOGY | Facility: CLINIC | Age: 58
End: 2018-11-14

## 2018-11-14 ENCOUNTER — OFFICE VISIT (OUTPATIENT)
Dept: NEUROLOGY | Facility: CLINIC | Age: 58
End: 2018-11-14

## 2018-11-14 VITALS
BODY MASS INDEX: 27.82 KG/M2 | WEIGHT: 167 LBS | SYSTOLIC BLOOD PRESSURE: 136 MMHG | HEIGHT: 65 IN | DIASTOLIC BLOOD PRESSURE: 70 MMHG

## 2018-11-14 DIAGNOSIS — M25.512 CHRONIC LEFT SHOULDER PAIN: ICD-10-CM

## 2018-11-14 DIAGNOSIS — G89.29 CHRONIC LEFT SHOULDER PAIN: ICD-10-CM

## 2018-11-14 DIAGNOSIS — G31.84 MCI (MILD COGNITIVE IMPAIRMENT): Primary | ICD-10-CM

## 2018-11-14 PROCEDURE — 73221 MRI JOINT UPR EXTREM W/O DYE: CPT

## 2018-11-14 PROCEDURE — 99214 OFFICE O/P EST MOD 30 MIN: CPT | Performed by: PSYCHIATRY & NEUROLOGY

## 2018-11-14 RX ORDER — DONEPEZIL HYDROCHLORIDE 5 MG/1
5 TABLET, FILM COATED ORAL DAILY
Qty: 30 TABLET | Refills: 11 | Status: SHIPPED | OUTPATIENT
Start: 2018-11-14 | End: 2019-02-22 | Stop reason: SDUPTHER

## 2018-11-14 RX ORDER — FUROSEMIDE 20 MG/1
20 TABLET ORAL DAILY
Qty: 30 TABLET | Refills: 1 | Status: SHIPPED | OUTPATIENT
Start: 2018-11-14 | End: 2019-01-08 | Stop reason: SDUPTHER

## 2018-11-14 NOTE — TELEPHONE ENCOUNTER
Called to review lab and CXR results with the patient. LM to start furosemide 20 mg daily and to stop amlodipine. Labs next week. Told to call with questions.

## 2018-11-14 NOTE — PROGRESS NOTES
"Justice Kiser    Subjective     CC: memory loss    History of Present Illness   Justice Kiser returns to clinic today with a history of suspected MCI. He has noted symptoms since at least 2017 marked initially by forgetfulness , repetitiveness  and word-finding difficulties. This has gradually worsened  over time. Additional symptoms have included impairments in orientation  and executive function. There have been associated  symptoms of depression and irritability. He has also noted visual hallucinations of people in his bedroom, primarily at night. He denies any impairments in ADL's. He manages his medications and finances. He is no longer driving . He is currently residing with his nephew in [place].     He has also noted balance impairment. He has also several recent falls, though fortunately without significant injury. He denies any associated shuffling gait or tremor. He also notes that he has several mixed drinks daily.     Prior evaluation has included an MRI of the brain showing chronic white matter changes, an unremarkable ECHO/Carotid Dopplers and screening bloodwork.    Since his last visit on 8/13/18, he has been referred to PT. His family notes ongoing widespread cognitive impairment with marked fluctuations. At times he is appears without deficit. He continues to have frequent visual hallucinations. He does endorse depression which is being addressed by his PCP. He drinks at least 1/2 pint of rum a day. He also endorses intermittent diplopia.    I have reviewed and confirmed the past family, social and medical history as accurate on 11/14/18.     Review of Systems   Constitutional: Negative.        Objective     /70   Ht 165.1 cm (65\")   Wt 75.8 kg (167 lb)   BMI 27.79 kg/m²      Neurologic Exam     Mental Status   Oriented to person, place, and time.   Registration: recalls 3 of 3 objects. Recall at 5 minutes: recalls 1 of 3 objects. Follows 3 step commands.   Attention: normal. "   Speech: speech is normal   Level of consciousness: alert  Able to name object. Able to read. Able to repeat. Able to write. Normal comprehension.     Cranial Nerves   Cranial nerves II through XII intact.     Motor Exam   Muscle bulk: normal  Overall muscle tone: normal    Strength   Strength 5/5 throughout.     Gait, Coordination, and Reflexes     Gait  Gait: wide-based    Coordination   Finger to nose coordination: abnormalMildly wide-based gait and slight dysmetria on finger-nose testing       MMSE=28      Assessment/Plan   Justice was seen today for memory loss.    Diagnoses and all orders for this visit:    MCI (mild cognitive impairment)          Justice Kiser returns to clinic today with a history of cognitive impairment. I suspect this is related to EtOH, and even possible Wernicke's encephalopathy. I have strongly recommended pursuing EtOH abstinence and starting thiamine. It was also elected to add donepezil for now.    I spent 25 minutes face to face with the patient and family. I   spent 15 minutes of this time counseling and discussing diagnosis, prognosis, diagnostic testing, current status, treatment options and management.    As part of this visit I obtained additional history from the family which is incorporated in the HPI.    Jacky Monson MD

## 2018-11-15 ENCOUNTER — TELEPHONE (OUTPATIENT)
Dept: CARDIOLOGY | Facility: CLINIC | Age: 58
End: 2018-11-15

## 2018-11-15 DIAGNOSIS — R93.89 ABNORMAL CHEST X-RAY: Primary | ICD-10-CM

## 2018-11-15 DIAGNOSIS — IMO0001 TEAR OF LEFT SUPRASPINATUS TENDON, SUBSEQUENT ENCOUNTER: Primary | ICD-10-CM

## 2018-11-15 DIAGNOSIS — S43.432S LABRAL TEAR OF SHOULDER, LEFT, SEQUELA: ICD-10-CM

## 2018-11-15 NOTE — TELEPHONE ENCOUNTER
Per Shaniqua Domínguez APRN, needs CT chest d/t abnormal chest xray. Patient verbalized understanding.

## 2018-11-19 ENCOUNTER — OFFICE VISIT (OUTPATIENT)
Dept: ORTHOPEDIC SURGERY | Facility: CLINIC | Age: 58
End: 2018-11-19

## 2018-11-19 VITALS — BODY MASS INDEX: 26.81 KG/M2 | OXYGEN SATURATION: 98 % | HEART RATE: 61 BPM | HEIGHT: 65 IN | WEIGHT: 160.94 LBS

## 2018-11-19 DIAGNOSIS — S46.012A RUPTURE OF LEFT SUPRASPINATUS TENDON, INITIAL ENCOUNTER: Primary | ICD-10-CM

## 2018-11-19 PROCEDURE — 99204 OFFICE O/P NEW MOD 45 MIN: CPT | Performed by: ORTHOPAEDIC SURGERY

## 2018-11-19 NOTE — PROGRESS NOTES
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 male.  He complains of a 3 month history of left shoulder pain.  Insidious onset.  He is currently not working.  This pain is 8 out of 10 aching stabbing shooting.  He like to try to get better without surgery.  He comes today after having an MRI.        Past Medical History:   Diagnosis Date   • Acute maxillary sinusitis    • Elevated LFTs    • History of colonic polyps    • History of methicillin resistant Staphylococcus aureus    • History of myocardial infarction    • Hyperlipidemia    • Hypertension    • Left hand pain    • Left shoulder pain    • Personal history of congestive heart failure    • Right hip pain       Past Surgical History:   Procedure Laterality Date   • CORONARY ANGIOPLASTY WITH STENT PLACEMENT  01/04/2012    Circumflex Xscience V 3.5 MM x 23 mm   • GALLBLADDER SURGERY  2017      Family History   Problem Relation Age of Onset   • Hypertension Mother    • Heart attack Mother    • Hyperlipidemia Mother    • Alcohol abuse Mother    • Alcohol abuse Father    • Dementia Sister    • Heart disease Sister    • Alcohol abuse Maternal Aunt    • Alcohol abuse Paternal Uncle    • Diabetes Maternal Grandmother    • Heart disease Brother    • Stroke Sister      Social History     Socioeconomic History   • Marital status: Single     Spouse name: Not on file   • Number of children: Not on file   • Years of education: Not on file   • Highest education level: Not on file   Social Needs   • Financial resource strain: Not on file   • Food insecurity - worry: Not on file   • Food insecurity - inability: Not on file   • Transportation needs - medical: Not on file   • Transportation needs - non-medical: Not on file   Occupational History   • Not on file   Tobacco Use   • Smoking status: Former Smoker     Packs/day: 1.50     Years: 35.00     Pack  years: 52.50     Types: Cigarettes     Last attempt to quit:      Years since quittin.8   • Smokeless tobacco: Never Used   Substance and Sexual Activity   • Alcohol use: Yes     Comment: 5 mixed drinks per day.   • Drug use: No   • Sexual activity: Defer   Other Topics Concern   • Not on file   Social History Narrative   • Not on file      Current Outpatient Medications on File Prior to Visit   Medication Sig Dispense Refill   • aspirin (ASPIRIN LOW DOSE) 81 MG tablet Take 1 tablet by mouth Daily for 360 days. 90 tablet 3   • atorvastatin (LIPITOR) 80 MG tablet Take 1 tablet by mouth Every Night for 180 days. 90 tablet 1   • donepezil (ARICEPT) 5 MG tablet Take 1 tablet by mouth Daily. 30 tablet 11   • furosemide (LASIX) 20 MG tablet Take 1 tablet by mouth Daily. 30 tablet 1   • hydrochlorothiazide (MICROZIDE) 12.5 MG capsule Take 1 capsule by mouth Daily. 90 capsule 3   • lisinopril (PRINIVIL,ZESTRIL) 20 MG tablet TAKE 1 TABLET EVERY DAY 30 tablet 2   • memantine (NAMENDA) 10 MG tablet Take 1 tablet by mouth 2 (Two) Times a Day. 60 tablet 11   • metoprolol tartrate (LOPRESSOR) 50 MG tablet TAKE 1/2 TABLET BY MOUTH TWICE A DAY 30 tablet 2   • Multiple Vitamins-Minerals (CENTRUM SILVER) tablet Take 1 tablet by mouth Daily.     • omeprazole (priLOSEC) 40 MG capsule TAKE 1 CAPSULE EVERY DAY 30 capsule 1   • sertraline (ZOLOFT) 100 MG tablet TAKE 2 TABLETS EVERY DAY 60 tablet 2     No current facility-administered medications on file prior to visit.       No Known Allergies     The following portions of the patient's history were reviewed and updated as appropriate: allergies, current medications, past family history, past medical history, past social history, past surgical history and problem list.    Review of Systems   Constitutional: Positive for activity change and fatigue.   HENT: Positive for dental problem, nosebleeds and sinus pressure.    Eyes: Negative.    Respiratory: Positive for chest tightness.   "  Cardiovascular: Positive for leg swelling.   Gastrointestinal: Negative.    Endocrine: Negative.    Genitourinary: Positive for frequency and urgency.   Musculoskeletal: Positive for arthralgias, back pain and joint swelling.   Skin: Positive for rash.   Allergic/Immunologic: Negative.    Neurological: Positive for dizziness, tremors, weakness, light-headedness, numbness and headaches.   Hematological: Bruises/bleeds easily.   Psychiatric/Behavioral: Positive for confusion, decreased concentration and hallucinations. The patient is nervous/anxious.         Objective      Physical Exam  Pulse 61   Ht 165.1 cm (65\")   Wt 73 kg (160 lb 15 oz)   SpO2 98%   BMI 26.78 kg/m²     Body mass index is 26.78 kg/m².        GENERAL APPEARANCE: awake, alert & oriented x 3, in no acute distress and well developed, well nourished  PSYCH: normal mood and affect  LUNGS:  breathing nonlabored, no wheezing  EYES: sclera anicteric, pupils equal  CARDIOVASCULAR: palpable pulses dorsalis pedis, palpable posterior tibial bilaterally. Capillary refill less than 2 seconds  INTEGUMENTARY: skin intact, no clubbing, cyanosis  NEUROLOGIC:  Normal Sensation and reflexes             Ortho Exam  Musculoskeletal   Upper Extremity   Left Shoulder     Inspection and Palpation:     Tenderness - none     Crepitus - none    Sensation is normal    Examination reveals no ecchymosis.      Strength and Tone:    Supraspinatus, Infraspinatus - 4/5    Subscapularis - 5/5    Deltoid - 5/5   Range of Motion   Right shoulder:    Internal Rotation: ROM - T7    External Rotation: AROM - 80 degrees    Elevation through flexion: AROM - 180 degrees     Abduction - 180   Left Shoulder:    Internal Rotation: ROM - T7    External Rotation: AROM - 80 degrees    Elevation through flexion: AROM - 180 degrees     Abduction - 180 degrees   Instability   Left shoulder    Sulcus sign negative    Apprehension test negative    Barak relocation test negative    Jerk test " negative   Impingement   Left shoulder    Pearson impingement test positive    Neer impingement test positive   Functional Testing   Left shoulder    AC crossover adduction test negative    Abdominal compression test negative    Lift-off sign negative    Speed's test negative    Terrell's test negative    Horriblower's sign negative     Imaging/Studies  Imaging Results (last 7 days)     ** No results found for the last 168 hours. **      I viewed his x-rays from October 1 which are negative.  I reviewed his MRI from November 9 which shows a supraspinatus full thickness tear with a few millimeters of retraction and an incidental finding of a labral tear    Assessment/Plan        ICD-10-CM ICD-9-CM   1. Rupture of left supraspinatus tendon, initial encounter S46.012A 840.6       Orders Placed This Encounter   Procedures   • Ambulatory Referral to Physical Therapy    He would like to try nonoperative options even though I educated him on the fact that full-thickness tears rarely heal or get better without surgical intervention.  He understands and verbalizes this may get worse.  That he would like to try physical therapy first.  He will follow-up in 3 weeks.    Medical Decision Making  Management Options : over-the-counter medicine and physical/occupational therapy  Data/Risk: radiology tests and independent visualization of imaging, lab tests, or EMG/NCV    Erick Hoyt MD  11/19/18  10:40 AM         EMR Dragon/Transcription disclaimer:  Much of this encounter note is an electronic transcription of spoken language to printed text. Electronic transcription of spoken language may permit erroneous, or at times, nonsensical words or phrases to be inadvertently transcribed. Although I have reviewed the note for such errors, some may still exist.

## 2018-11-26 DIAGNOSIS — Z79.899 HIGH RISK MEDICATION USE: Primary | ICD-10-CM

## 2018-11-27 ENCOUNTER — LAB (OUTPATIENT)
Dept: LAB | Facility: HOSPITAL | Age: 58
End: 2018-11-27

## 2018-11-27 DIAGNOSIS — Z79.899 HIGH RISK MEDICATION USE: ICD-10-CM

## 2018-11-27 LAB
ANION GAP SERPL CALCULATED.3IONS-SCNC: 7 MMOL/L (ref 3–11)
BUN BLD-MCNC: 11 MG/DL (ref 9–23)
BUN/CREAT SERPL: 15.3 (ref 7–25)
CALCIUM SPEC-SCNC: 9.7 MG/DL (ref 8.7–10.4)
CHLORIDE SERPL-SCNC: 97 MMOL/L (ref 99–109)
CO2 SERPL-SCNC: 31 MMOL/L (ref 20–31)
CREAT BLD-MCNC: 0.72 MG/DL (ref 0.6–1.3)
GFR SERPL CREATININE-BSD FRML MDRD: 111 ML/MIN/1.73
GLUCOSE BLD-MCNC: 110 MG/DL (ref 70–100)
POTASSIUM BLD-SCNC: 4.2 MMOL/L (ref 3.5–5.5)
SODIUM BLD-SCNC: 135 MMOL/L (ref 132–146)

## 2018-11-27 PROCEDURE — 80048 BASIC METABOLIC PNL TOTAL CA: CPT

## 2018-11-30 PROBLEM — K70.0 ALCOHOL INDUCED FATTY LIVER: Status: ACTIVE | Noted: 2018-11-30

## 2018-12-06 ENCOUNTER — HOSPITAL ENCOUNTER (OUTPATIENT)
Dept: CT IMAGING | Facility: HOSPITAL | Age: 58
Discharge: HOME | End: 2018-12-06
Admitting: NURSE PRACTITIONER

## 2018-12-06 DIAGNOSIS — R93.89 ABNORMAL CHEST X-RAY: ICD-10-CM

## 2018-12-06 PROCEDURE — 71270 CT THORAX DX C-/C+: CPT

## 2018-12-06 PROCEDURE — 25010000002 IOPAMIDOL 61 % SOLUTION: Performed by: NURSE PRACTITIONER

## 2018-12-06 RX ADMIN — IOPAMIDOL 95 ML: 612 INJECTION, SOLUTION INTRAVENOUS at 12:20

## 2018-12-07 ENCOUNTER — TELEPHONE (OUTPATIENT)
Dept: CARDIOLOGY | Facility: CLINIC | Age: 58
End: 2018-12-07

## 2018-12-10 ENCOUNTER — OFFICE VISIT (OUTPATIENT)
Dept: ORTHOPEDIC SURGERY | Facility: CLINIC | Age: 58
End: 2018-12-10

## 2018-12-10 VITALS — BODY MASS INDEX: 26.63 KG/M2 | HEART RATE: 62 BPM | WEIGHT: 159.83 LBS | HEIGHT: 65 IN | OXYGEN SATURATION: 98 %

## 2018-12-10 DIAGNOSIS — S46.012D RUPTURE OF LEFT SUPRASPINATUS TENDON, SUBSEQUENT ENCOUNTER: Primary | ICD-10-CM

## 2018-12-10 PROCEDURE — 99213 OFFICE O/P EST LOW 20 MIN: CPT | Performed by: ORTHOPAEDIC SURGERY

## 2018-12-10 NOTE — PROGRESS NOTES
[facility] Note    Subjective     Chief Complaint   Patient presents with   • Left Shoulder - Follow-up     Rupture of Left Supraspinatus Tendon  3 week f/u        HPI      Justice Kiser is a 58 y.o. male.  He is follow-up left shoulder rotator cuff tear.  He has changed his mind and would like to discuss surgery.  He has questions.  His pain is 8 out of 10.  It is dull aching stabbing.  He recently saw his cardiologist couple months ago.  He says he is not on any blood thinners.        Past Medical History:   Diagnosis Date   • Acute maxillary sinusitis    • Elevated LFTs    • History of colonic polyps    • History of methicillin resistant Staphylococcus aureus    • History of myocardial infarction    • Hyperlipidemia    • Hypertension    • Left hand pain    • Left shoulder pain    • Personal history of congestive heart failure    • Right hip pain       Past Surgical History:   Procedure Laterality Date   • CORONARY ANGIOPLASTY WITH STENT PLACEMENT  01/04/2012    Circumflex Xscience V 3.5 MM x 23 mm   • GALLBLADDER SURGERY  2017      Family History   Problem Relation Age of Onset   • Hypertension Mother    • Heart attack Mother    • Hyperlipidemia Mother    • Alcohol abuse Mother    • Alcohol abuse Father    • Dementia Sister    • Heart disease Sister    • Alcohol abuse Maternal Aunt    • Alcohol abuse Paternal Uncle    • Diabetes Maternal Grandmother    • Heart disease Brother    • Stroke Sister      Social History     Socioeconomic History   • Marital status: Single     Spouse name: Not on file   • Number of children: Not on file   • Years of education: Not on file   • Highest education level: Not on file   Social Needs   • Financial resource strain: Not on file   • Food insecurity - worry: Not on file   • Food insecurity - inability: Not on file   • Transportation needs - medical: Not on file   • Transportation needs - non-medical: Not on file   Occupational History   • Not on file    Tobacco Use   • Smoking status: Former Smoker     Packs/day: 1.50     Years: 35.00     Pack years: 52.50     Types: Cigarettes     Last attempt to quit: 2011     Years since quittin.9   • Smokeless tobacco: Never Used   Substance and Sexual Activity   • Alcohol use: Yes     Comment: 5 mixed drinks per day.   • Drug use: No   • Sexual activity: Defer   Other Topics Concern   • Not on file   Social History Narrative   • Not on file      Current Outpatient Medications on File Prior to Visit   Medication Sig Dispense Refill   • aspirin (ASPIRIN LOW DOSE) 81 MG tablet Take 1 tablet by mouth Daily for 360 days. 90 tablet 3   • atorvastatin (LIPITOR) 80 MG tablet Take 1 tablet by mouth Every Night for 180 days. 90 tablet 1   • donepezil (ARICEPT) 5 MG tablet Take 1 tablet by mouth Daily. 30 tablet 11   • furosemide (LASIX) 20 MG tablet Take 1 tablet by mouth Daily. 30 tablet 1   • hydrochlorothiazide (MICROZIDE) 12.5 MG capsule Take 1 capsule by mouth Daily. 90 capsule 3   • lisinopril (PRINIVIL,ZESTRIL) 20 MG tablet TAKE 1 TABLET EVERY DAY 30 tablet 2   • memantine (NAMENDA) 10 MG tablet Take 1 tablet by mouth 2 (Two) Times a Day. 60 tablet 11   • metoprolol tartrate (LOPRESSOR) 50 MG tablet TAKE 1/2 TABLET BY MOUTH TWICE A DAY 30 tablet 2   • Multiple Vitamins-Minerals (CENTRUM SILVER) tablet Take 1 tablet by mouth Daily.     • omeprazole (priLOSEC) 40 MG capsule TAKE 1 CAPSULE EVERY DAY 30 capsule 1   • sertraline (ZOLOFT) 100 MG tablet TAKE 2 TABLETS EVERY DAY 60 tablet 2     No current facility-administered medications on file prior to visit.       No Known Allergies     The following portions of the patient's history were reviewed and updated as appropriate: allergies, current medications, past family history, past medical history, past social history, past surgical history and problem list.    Review of Systems   Constitutional: Negative.    HENT: Negative.    Eyes: Negative.    Respiratory: Negative.   "  Cardiovascular: Negative.    Gastrointestinal: Negative.    Endocrine: Negative.    Genitourinary: Negative.    Musculoskeletal: Positive for arthralgias (Shoulder ) and joint swelling.   Skin: Negative.    Allergic/Immunologic: Negative.    Neurological: Negative.    Hematological: Negative.    Psychiatric/Behavioral: Negative.         Objective      Physical Exam  Pulse 62   Ht 165.1 cm (65\")   Wt 72.5 kg (159 lb 13.3 oz)   SpO2 98%   BMI 26.60 kg/m²     Body mass index is 26.6 kg/m².        GENERAL APPEARANCE: awake, alert & oriented x 3, in no acute distress and well developed, well nourished  PSYCH: normal mood and affect  LUNGS:  breathing nonlabored, no wheezing  EYES: sclera anicteric, pupils equal  CARDIOVASCULAR: palpable pulses dorsalis pedis, palpable posterior tibial bilaterally. Capillary refill less than 2 seconds  INTEGUMENTARY: skin intact, no clubbing, cyanosis  NEUROLOGIC:  Normal Sensation and reflexes             Ortho Exam  Musculoskeletal   Upper Extremity   Left Shoulder     Inspection and Palpation:     Tenderness - none     Crepitus - none    Sensation is normal    Examination reveals no ecchymosis.      Strength and Tone:    Supraspinatus, Infraspinatus - 4/5    Subscapularis - 5/5    Deltoid - 5/5   Range of Motion   Right shoulder:    Internal Rotation: ROM - T7    External Rotation: AROM - 80 degrees    Elevation through flexion: AROM - 180 degrees     Abduction - 180   Left Shoulder:    Internal Rotation: ROM - T7    External Rotation: AROM - 80 degrees    Elevation through flexion: AROM - 90 degrees     Abduction - 90 degrees   Instability   Left shoulder    Sulcus sign negative    Apprehension test negative    Barak relocation test negative    Jerk test negative   Impingement   Left shoulder    Pearson impingement test positive    Neer impingement test positive   Functional Testing   Left shoulder    AC crossover adduction test negative    Abdominal compression test " negative    Lift-off sign negative    Speed's test negative    Terrell's test negative    Horriblower's sign negative     Imaging/Studies  Imaging Results (last 7 days)     ** No results found for the last 168 hours. **      I viewed his MRI which shows a full thickness rotator cuff tear    Assessment/Plan        ICD-10-CM ICD-9-CM   1. Rupture of left supraspinatus tendon, subsequent encounter S46.012D V58.89     840.6     We discussed different treatment options.Treatment options and alternatives were discussed with patient.  Surgical versus non surgical treatment options were discussed as well.    We reviewed the nature of the planned procedure including the risks, benefits and potential complications.  Understanding was expressed and the decision was made to proceed with surgery.    The plan is for left shoulder arthroscopy with rotator cuff repair.  He will need cardiac clearance prior to surgery.  Medical Decision Making  Management Options : over-the-counter medicine and major surgery with risk factors  Data/Risk: independent visualization of imaging, lab tests, or EMG/NCV    Erick Hoyt MD  12/10/18  11:23 AM         EMR Dragon/Transcription disclaimer:  Much of this encounter note is an electronic transcription of spoken language to printed text. Electronic transcription of spoken language may permit erroneous, or at times, nonsensical words or phrases to be inadvertently transcribed. Although I have reviewed the note for such errors, some may still exist.

## 2018-12-17 DIAGNOSIS — I10 BENIGN ESSENTIAL HYPERTENSION: ICD-10-CM

## 2018-12-17 DIAGNOSIS — I25.10 MULTIPLE VESSEL CORONARY ARTERY DISEASE: ICD-10-CM

## 2018-12-17 RX ORDER — METOPROLOL TARTRATE 50 MG/1
TABLET, FILM COATED ORAL
Qty: 30 TABLET | Refills: 2 | Status: SHIPPED | OUTPATIENT
Start: 2018-12-17 | End: 2019-04-01 | Stop reason: SDUPTHER

## 2018-12-18 ENCOUNTER — OFFICE VISIT (OUTPATIENT)
Dept: CARDIOLOGY | Facility: CLINIC | Age: 58
End: 2018-12-18

## 2018-12-18 VITALS
HEIGHT: 65 IN | DIASTOLIC BLOOD PRESSURE: 70 MMHG | SYSTOLIC BLOOD PRESSURE: 130 MMHG | BODY MASS INDEX: 27.09 KG/M2 | HEART RATE: 60 BPM | WEIGHT: 162.6 LBS

## 2018-12-18 DIAGNOSIS — I10 ESSENTIAL HYPERTENSION: ICD-10-CM

## 2018-12-18 DIAGNOSIS — I34.0 NON-RHEUMATIC MITRAL REGURGITATION: ICD-10-CM

## 2018-12-18 DIAGNOSIS — E78.5 HYPERLIPIDEMIA LDL GOAL <70: ICD-10-CM

## 2018-12-18 DIAGNOSIS — I25.119 CORONARY ARTERY DISEASE INVOLVING NATIVE CORONARY ARTERY OF NATIVE HEART WITH ANGINA PECTORIS (HCC): Primary | ICD-10-CM

## 2018-12-18 PROCEDURE — 99214 OFFICE O/P EST MOD 30 MIN: CPT | Performed by: INTERNAL MEDICINE

## 2018-12-18 NOTE — PROGRESS NOTES
Encounter Date:12/18/2018    Patient ID: Justice Kiser is a 58 y.o. male who resides in [place], [place]. He is a retired  for [employer].     CC/Reason for visit:  Coronary Artery Disease and Surgical Clearance (For Shoulder surgery )             Problem List Items Addressed This Visit        Cardiology Problems    Coronary artery disease involving native coronary artery of native heart with angina pectoris (CMS/HCC) - Primary    Overview     · Cardiac catheterization for NSTEMI (1/2012):  Severe 1-vessel CAD (left circumflex subtotal occlusion). Mild disease of the other coronary arteries. Successful PCI of the left circumflex using a Xience MUNA.     · Exercise nuclear stress (10/26/18): Exercise for 6.5 minutes with replication chest pain. Moderate sized inferolateral infarct. LVEF 53%         Current Assessment & Plan     · The patient has stable anginal symptoms and recent nuclear stress testing consistent with known coronary anatomy  · Continue low-dose aspirin, beta blocker, and high intensity statin  · Patient is at acceptable cardiac risk proceed with left shoulder arthroscopy         Essential hypertension    Current Assessment & Plan     · Controlled  · Continue present medical therapy         Hyperlipidemia LDL goal <70    Overview     · High intensity statin therapy indicated given the presence of coronary disease         Current Assessment & Plan     · Continue atorvastatin         Mitral valve insufficiency    Overview     · Echo (9/2018): Normal LVEF.  Moderate MR.                    · Continue present medical therapy  · Proceed with left shoulder arthroscopy with acceptable cardiac risk  Return in about 6 months (around 6/18/2019).        Justice Kiser returns to the clinic for short-term follow-up of his recent shortness of breath. The patient underwent a nuclear stress test in October which was consistent with his known coronary artery disease and previous MI. The patient was  "switched from amlodipine to hydrochlorothiazide. The patient states his breathing has improved \"some\". He has occasional chest pain symptoms but they have not become frequent or required nitroglycerin. He is playing undergo left shoulder surgery with Dr. Hoyt this Thursday.      Review of Systems   Constitution: Positive for decreased appetite, weakness, malaise/fatigue and night sweats.   HENT: Positive for hearing loss, nosebleeds and tinnitus.    Eyes: Negative for vision loss in left eye and vision loss in right eye.   Cardiovascular: Positive for leg swelling. Negative for chest pain, dyspnea on exertion, near-syncope, orthopnea, palpitations, paroxysmal nocturnal dyspnea and syncope.   Respiratory: Positive for shortness of breath, sputum production and wheezing.    Endocrine: Positive for cold intolerance, heat intolerance and polyuria.   Hematologic/Lymphatic: Bruises/bleeds easily.   Skin: Positive for dry skin and poor wound healing.   Musculoskeletal: Positive for arthritis, back pain, falls, joint pain, joint swelling, muscle cramps, neck pain and stiffness. Negative for myalgias.   Gastrointestinal: Positive for bloating, abdominal pain and flatus.   Genitourinary: Positive for frequency.   Neurological: Positive for difficulty with concentration, dizziness, focal weakness, loss of balance and tremors. Negative for brief paralysis, excessive daytime sleepiness, numbness and paresthesias.   Psychiatric/Behavioral: Positive for altered mental status and hallucinations. The patient is nervous/anxious.    All other systems reviewed and are negative.      The patient's past medical, social, family history and ROS reviewed in the patient's electronic medical record.    Allergies  Patient has no known allergies.    Outpatient Medications Marked as Taking for the 12/18/18 encounter (Office Visit) with Tacho Crouch IV, MD   Medication Sig Dispense Refill   • aspirin (ASPIRIN LOW DOSE) 81 MG tablet " Take 1 tablet by mouth Daily for 360 days. 90 tablet 3   • atorvastatin (LIPITOR) 80 MG tablet Take 1 tablet by mouth Every Night for 180 days. 90 tablet 1   • donepezil (ARICEPT) 5 MG tablet Take 1 tablet by mouth Daily. 30 tablet 11   • furosemide (LASIX) 20 MG tablet Take 1 tablet by mouth Daily. 30 tablet 1   • hydrochlorothiazide (MICROZIDE) 12.5 MG capsule Take 1 capsule by mouth Daily. 90 capsule 3   • lisinopril (PRINIVIL,ZESTRIL) 20 MG tablet TAKE 1 TABLET EVERY DAY 30 tablet 2   • memantine (NAMENDA) 10 MG tablet Take 1 tablet by mouth 2 (Two) Times a Day. 60 tablet 11   • metoprolol tartrate (LOPRESSOR) 50 MG tablet TAKE 1/2 TABLET BY MOUTH TWICE A DAY 30 tablet 2   • Multiple Vitamins-Minerals (CENTRUM SILVER) tablet Take 1 tablet by mouth Daily.     • omeprazole (priLOSEC) 40 MG capsule TAKE 1 CAPSULE EVERY DAY 30 capsule 1   • sertraline (ZOLOFT) 100 MG tablet TAKE 2 TABLETS EVERY DAY 60 tablet 2           Vitals:    12/18/18 1357   BP: 130/70   Pulse: 60       Body mass index is 27.06 kg/m².      Physical Exam   Constitutional: He is oriented to person, place, and time. He appears well-developed and well-nourished.   HENT:   Head: Normocephalic and atraumatic.   Eyes: Pupils are equal, round, and reactive to light. No scleral icterus.   Neck: No JVD present. Carotid bruit is not present. No thyromegaly present.   Cardiovascular: Normal rate and regular rhythm. Exam reveals no gallop.   Murmur heard.  High-pitched blowing holosystolic murmur is present with a grade of 2/6 at the apex.  Pulmonary/Chest: Effort normal. He has decreased breath sounds.   Abdominal: Soft. He exhibits no distension. There is no hepatosplenomegaly.   Musculoskeletal: He exhibits no edema.   Neurological: He is alert and oriented to person, place, and time.   Skin: Skin is warm and dry.   Psychiatric: He has a normal mood and affect. His behavior is normal.       Data Review:     Procedures    Lab Results   Component Value  Date    CHLPL 176 11/09/2018    TRIG 71 11/09/2018    HDL 98 (H) 11/09/2018    LDL 64 11/09/2018     Lab Results   Component Value Date    GLUCOSE 110 (H) 11/27/2018    BUN 11 11/27/2018    CREATININE 0.72 11/27/2018    EGFRIFNONA 111 11/27/2018    EGFRIFAFRI >150 11/09/2018    BCR 15.3 11/27/2018    K 4.2 11/27/2018    CO2 31.0 11/27/2018    CALCIUM 9.7 11/27/2018    PROTENTOTREF 7.2 11/09/2018    ALBUMIN 4.71 11/09/2018    LABIL2 1.9 11/09/2018     (H) 11/09/2018    ALT 70 (H) 11/09/2018     Lab Results   Component Value Date    WBC 10.22 04/05/2018    HGB 14.7 04/05/2018    HCT 45.2 04/05/2018    MCV 96.4 04/05/2018     04/05/2018     Lab Results   Component Value Date    TSH 2.090 08/13/2018     Lab Results   Component Value Date    HGBA1C 5.90 (H) 11/09/2018         Scribed for Tacho Crouch IV, MD by Kendra Arciniega. 12/18/2018  2:44 PM    Tacho Crouch IV, MD  12/18/2018

## 2018-12-18 NOTE — ASSESSMENT & PLAN NOTE
· The patient has stable anginal symptoms and recent nuclear stress testing consistent with known coronary anatomy  · Continue low-dose aspirin, beta blocker, and high intensity statin  · Patient is at acceptable cardiac risk proceed with left shoulder arthroscopy

## 2018-12-20 ENCOUNTER — OUTSIDE FACILITY SERVICE (OUTPATIENT)
Dept: ORTHOPEDIC SURGERY | Facility: CLINIC | Age: 58
End: 2018-12-20

## 2018-12-20 ENCOUNTER — LAB REQUISITION (OUTPATIENT)
Dept: LAB | Facility: HOSPITAL | Age: 58
End: 2018-12-20

## 2018-12-20 DIAGNOSIS — S46.012D STRAIN OF MUSCLE(S) AND TENDON(S) OF THE ROTATOR CUFF OF LEFT SHOULDER, SUBSEQUENT ENCOUNTER: ICD-10-CM

## 2018-12-20 LAB — POTASSIUM BLDA-SCNC: 3.43 MMOL/L (ref 3.5–5.3)

## 2018-12-20 PROCEDURE — 29827 SHO ARTHRS SRG RT8TR CUF RPR: CPT | Performed by: ORTHOPAEDIC SURGERY

## 2018-12-20 PROCEDURE — 84132 ASSAY OF SERUM POTASSIUM: CPT | Performed by: ORTHOPAEDIC SURGERY

## 2018-12-21 DIAGNOSIS — I10 BENIGN ESSENTIAL HYPERTENSION: ICD-10-CM

## 2018-12-21 RX ORDER — AMLODIPINE BESYLATE 5 MG/1
TABLET ORAL
Qty: 30 TABLET | Refills: 2 | Status: SHIPPED | OUTPATIENT
Start: 2018-12-21 | End: 2019-03-14 | Stop reason: SDUPTHER

## 2018-12-31 ENCOUNTER — OFFICE VISIT (OUTPATIENT)
Dept: ORTHOPEDIC SURGERY | Facility: CLINIC | Age: 58
End: 2018-12-31

## 2018-12-31 DIAGNOSIS — Z98.890 STATUS POST LEFT ROTATOR CUFF REPAIR: Primary | ICD-10-CM

## 2018-12-31 PROCEDURE — 99024 POSTOP FOLLOW-UP VISIT: CPT | Performed by: ORTHOPAEDIC SURGERY

## 2018-12-31 NOTE — PROGRESS NOTES
Chief Complaint   Patient presents with   • Post-op     12/20/2018 Left shoulder athroscopy with rotator cuff repair            HPI  He is follow-up left shoulder cuff repair from December 20.  He is doing well without complaint.      There were no vitals filed for this visit.      Physical Exam:  Portals look good.  He is neurovascular intact.          ICD-10-CM ICD-9-CM   1. Status post left rotator cuff repair Z98.890 V45.89     He will continue the sling and follow-up in 3 weeks to start physical therapy.

## 2019-01-08 RX ORDER — FUROSEMIDE 20 MG/1
20 TABLET ORAL DAILY
Qty: 90 TABLET | Refills: 2 | Status: SHIPPED | OUTPATIENT
Start: 2019-01-08 | End: 2019-10-11 | Stop reason: SDUPTHER

## 2019-01-17 DIAGNOSIS — I10 BENIGN ESSENTIAL HYPERTENSION: ICD-10-CM

## 2019-01-17 DIAGNOSIS — F33.1 MODERATE EPISODE OF RECURRENT MAJOR DEPRESSIVE DISORDER (HCC): ICD-10-CM

## 2019-01-17 RX ORDER — SERTRALINE HYDROCHLORIDE 100 MG/1
TABLET, FILM COATED ORAL
Qty: 60 TABLET | Refills: 0 | Status: SHIPPED | OUTPATIENT
Start: 2019-01-17 | End: 2019-02-14 | Stop reason: SDUPTHER

## 2019-01-17 RX ORDER — LISINOPRIL 20 MG/1
TABLET ORAL
Qty: 30 TABLET | Refills: 0 | Status: SHIPPED | OUTPATIENT
Start: 2019-01-17 | End: 2019-02-14 | Stop reason: SDUPTHER

## 2019-01-21 ENCOUNTER — OFFICE VISIT (OUTPATIENT)
Dept: ORTHOPEDIC SURGERY | Facility: CLINIC | Age: 59
End: 2019-01-21

## 2019-01-21 DIAGNOSIS — Z98.890 STATUS POST LEFT ROTATOR CUFF REPAIR: Primary | ICD-10-CM

## 2019-01-21 PROCEDURE — 99024 POSTOP FOLLOW-UP VISIT: CPT | Performed by: ORTHOPAEDIC SURGERY

## 2019-01-21 NOTE — PROGRESS NOTES
Chief Complaint   Patient presents with   • Left Shoulder - Follow-up     4 week status post 12/20/2018 Left shoulder athroscopy with rotator cuff repair )           HPI  He is follow-up left shoulder cuff repair from December 20.  He is doing well.  Pain is 5 out of 10.      There were no vitals filed for this visit.      Physical Exam:  He has minimal motion but has been in a sling.  He is neurovascular intact.        ICD-10-CM ICD-9-CM   1. Status post left rotator cuff repair Z98.890 V45.89       Orders Placed This Encounter   Procedures   • Ambulatory Referral to Physical Therapy   He will start physical therapy and follow-up in one month.

## 2019-02-14 DIAGNOSIS — F33.1 MODERATE EPISODE OF RECURRENT MAJOR DEPRESSIVE DISORDER (HCC): ICD-10-CM

## 2019-02-14 DIAGNOSIS — I10 BENIGN ESSENTIAL HYPERTENSION: ICD-10-CM

## 2019-02-14 RX ORDER — LISINOPRIL 20 MG/1
TABLET ORAL
Qty: 30 TABLET | Refills: 0 | Status: SHIPPED | OUTPATIENT
Start: 2019-02-14 | End: 2019-03-15 | Stop reason: SDUPTHER

## 2019-02-14 RX ORDER — SERTRALINE HYDROCHLORIDE 100 MG/1
TABLET, FILM COATED ORAL
Qty: 60 TABLET | Refills: 0 | Status: SHIPPED | OUTPATIENT
Start: 2019-02-14 | End: 2019-03-15 | Stop reason: SDUPTHER

## 2019-02-18 ENCOUNTER — OFFICE VISIT (OUTPATIENT)
Dept: ORTHOPEDIC SURGERY | Facility: CLINIC | Age: 59
End: 2019-02-18

## 2019-02-18 DIAGNOSIS — Z98.890 STATUS POST LEFT ROTATOR CUFF REPAIR: Primary | ICD-10-CM

## 2019-02-18 PROCEDURE — 99024 POSTOP FOLLOW-UP VISIT: CPT | Performed by: ORTHOPAEDIC SURGERY

## 2019-02-18 NOTE — PROGRESS NOTES
Chief Complaint   Patient presents with   • Left Shoulder - Follow-up     1 month follow up. - 12/20/2018 Left shoulder athroscopy with rotator cuff repair           HPI  He is follow-up left shoulder rotator cuff repair from December 20.  He is doing well.  He goes to physical therapy in [place].      There were no vitals filed for this visit.      Physical Exam:  He has near full range of motion.  He is still weakness to be expected.          ICD-10-CM ICD-9-CM   1. Status post left rotator cuff repair Z98.890 V45.89       Orders Placed This Encounter   Procedures   • Ambulatory Referral to Physical Therapy   • Ambulatory Referral to Physical Therapy   He will continue physical therapy and follow-up in 1 month.

## 2019-02-22 ENCOUNTER — OFFICE VISIT (OUTPATIENT)
Dept: NEUROLOGY | Facility: CLINIC | Age: 59
End: 2019-02-22

## 2019-02-22 VITALS
BODY MASS INDEX: 26.99 KG/M2 | DIASTOLIC BLOOD PRESSURE: 74 MMHG | SYSTOLIC BLOOD PRESSURE: 126 MMHG | HEIGHT: 65 IN | WEIGHT: 162 LBS

## 2019-02-22 DIAGNOSIS — G31.84 MCI (MILD COGNITIVE IMPAIRMENT): Primary | ICD-10-CM

## 2019-02-22 PROCEDURE — 99214 OFFICE O/P EST MOD 30 MIN: CPT | Performed by: PSYCHIATRY & NEUROLOGY

## 2019-02-22 RX ORDER — LANOLIN ALCOHOL/MO/W.PET/CERES
100 CREAM (GRAM) TOPICAL DAILY
Qty: 30 TABLET | Refills: 11 | Status: SHIPPED | OUTPATIENT
Start: 2019-02-22 | End: 2021-02-03

## 2019-02-22 RX ORDER — POLYETHYLENE GLYCOL 3350 17 G/DOSE
POWDER (GRAM) ORAL
COMMUNITY
Start: 2019-02-21 | End: 2020-07-21

## 2019-02-22 RX ORDER — DONEPEZIL HYDROCHLORIDE 10 MG/1
10 TABLET, FILM COATED ORAL DAILY
Qty: 30 TABLET | Refills: 11 | Status: SHIPPED | OUTPATIENT
Start: 2019-02-22 | End: 2019-05-13 | Stop reason: DRUGHIGH

## 2019-02-22 NOTE — PROGRESS NOTES
"Justice Kiser    Subjective     CC: memory loss    History of Present Illness   Justice Kiser returns to clinic today with a history of suspected MCI. He has noted symptoms since at least 2017 marked initially by forgetfulness , repetitiveness  and word-finding difficulties. This has gradually worsened  over time. Additional symptoms have included impairments in orientation  and executive function. There have been associated  symptoms of depression and irritability. He has also noted visual hallucinations of people in his bedroom, primarily at night. He denies any impairments in ADL's. He manages his medications and finances. He is no longer driving . He is currently residing with his nephew in [place].     He has also noted balance impairment. He has also several recent falls, though fortunately without significant injury. He denies any associated shuffling gait or tremor. He also notes that he has several mixed drinks daily.     Prior evaluation has included an MRI of the brain showing chronic white matter changes, an unremarkable ECHO/Carotid Dopplers and screening bloodwork.    Since his last visit on 11/14/18, he has undergone shoulder surgery with Dr. Hoyt in 12/18 and is working with PT. He has cut his EtOH intake, but still drinks up to 4 drinks per day. He and his family feel that his cognition has worsened. There has not been any clear response to the addition of donepezil. He continues to hallucinate. He continues to have intermittent blurred vision and diplopia.      I have reviewed and confirmed the past family, social and medical history as accurate on 2/22/19.     Review of Systems   Constitutional: Negative.        Objective     /74   Ht 165.1 cm (65\")   Wt 73.5 kg (162 lb)   BMI 26.96 kg/m²      Neurologic Exam     Mental Status   Oriented to person, place, and time.   Registration: recalls 3 of 3 objects. Recall at 5 minutes: recalls 1 of 3 objects. Follows 3 step commands. "   Attention: normal.   Speech: speech is normal   Level of consciousness: alert  Able to name object. Able to read. Able to repeat. Able to write. Normal comprehension.     Cranial Nerves   Cranial nerves II through XII intact.     Motor Exam   Muscle bulk: normal  Overall muscle tone: normal    Strength   Strength 5/5 throughout.     Gait, Coordination, and Reflexes     Gait  Gait: wide-based    Coordination   Finger to nose coordination: normalMildly wide-based gait but steady today       MMSE=27      Assessment/Plan   Justice was seen today for memory loss.    Diagnoses and all orders for this visit:    MCI (mild cognitive impairment)    Other orders  -     donepezil (ARICEPT) 10 MG tablet; Take 1 tablet by mouth Daily.  -     thiamine (VITAMIN B1) 100 MG tablet; Take 1 tablet by mouth Daily.          Justice Kiser returns to clinic today with a history of cognitive impairment. I continue to suspect this is related to EtOH, and even possible Wernicke's encephalopathy. I have again strongly recommended pursuing EtOH abstinence and starting thiamine. It was also elected to increase his donepezil to 10 mg daily.    I spent 25 minutes face to face with the patient and family. I spent 15 minutes counseling and discussing diagnosis, current status, treatment options and management.    As part of this visit I obtained additional history from the family which is incorporated in the HPI.    Jacky Monson MD

## 2019-02-25 ENCOUNTER — OFFICE VISIT (OUTPATIENT)
Dept: FAMILY MEDICINE CLINIC | Facility: CLINIC | Age: 59
End: 2019-02-25

## 2019-02-25 VITALS
TEMPERATURE: 98.6 F | HEIGHT: 65 IN | HEART RATE: 64 BPM | WEIGHT: 161.5 LBS | BODY MASS INDEX: 26.91 KG/M2 | RESPIRATION RATE: 18 BRPM | SYSTOLIC BLOOD PRESSURE: 116 MMHG | DIASTOLIC BLOOD PRESSURE: 82 MMHG

## 2019-02-25 DIAGNOSIS — Z12.5 PROSTATE CANCER SCREENING: ICD-10-CM

## 2019-02-25 DIAGNOSIS — R73.9 HYPERGLYCEMIA: ICD-10-CM

## 2019-02-25 DIAGNOSIS — E78.00 PURE HYPERCHOLESTEROLEMIA: ICD-10-CM

## 2019-02-25 DIAGNOSIS — R79.89 ELEVATED LIVER FUNCTION TESTS: ICD-10-CM

## 2019-02-25 DIAGNOSIS — I10 BENIGN ESSENTIAL HYPERTENSION: Primary | ICD-10-CM

## 2019-02-25 DIAGNOSIS — F33.1 MODERATE EPISODE OF RECURRENT MAJOR DEPRESSIVE DISORDER (HCC): ICD-10-CM

## 2019-02-25 PROCEDURE — 99214 OFFICE O/P EST MOD 30 MIN: CPT | Performed by: FAMILY MEDICINE

## 2019-02-25 NOTE — PROGRESS NOTES
Assessment/Plan       Problems Addressed this Visit        Other    Depression    Hyperglycemia    Relevant Orders    Hemoglobin A1c      Other Visit Diagnoses     Benign essential hypertension    -  Primary    Relevant Orders    CBC & Differential    Pure hypercholesterolemia        Relevant Orders    Comprehensive Metabolic Panel    Lipid Panel With / Chol / HDL Ratio    Elevated liver function tests        Relevant Orders    Comprehensive Metabolic Panel    Prostate cancer screening        Relevant Orders    PSA Screen            Follow up: Return in about 3 months (around 5/25/2019).     DISCUSSION  Hypertension.  Blood pressure is stable.  Continue medication.    History of hyperglycemia.  Check A1c.    Hyperlipidemia.  Check CMP and lipid panel.    Elevated liver function test.  Seeing gastroenterology.  Check CMP.    Check PSA for prostate cancer screening.    Continue follow-up with neurology for cognitive decline.    Depression.  Continue medication.      MEDICATIONS PRESCRIBED  Requested Prescriptions      No prescriptions requested or ordered in this encounter          -------------------------------------------    Subjective     Chief Complaint   Patient presents with   • Hypertension     f/u, labs and fasting          Hypertension   This is a chronic problem. The current episode started more than 1 year ago. The problem is unchanged. The problem is controlled (up at times but ok at times). Associated symptoms include peripheral edema (legs and feet, no change). Pertinent negatives include no chest pain, headaches or shortness of breath. (Cramps in legs) There are no associated agents to hypertension. Risk factors for coronary artery disease include dyslipidemia. Current antihypertension treatment includes calcium channel blockers, ACE inhibitors and beta blockers. Hypertensive end-organ damage includes CAD/MI.   Depression   Visit Type: follow-up  Patient presents with the following symptoms:  "decreased concentration (decreased memory), depressed mood (not any better. hard to get motivated. ), insomnia (never sleep well,) and nervousness/anxiety (with groups).  Patient is not experiencing: shortness of breath, suicidal ideas and suicidal planning.  Severity: moderate   Sleep quality: fair          Had shoulder surg since last seen. Left shoulder    Hyperglycemia  No Meds  No formal dx of diabetes  FH + DM  No wt loss      Saw Dr Case  Increased LFT  Has issues swallowing  To have EGD and needs labs done        Social History     Tobacco Use   Smoking Status Former Smoker   • Packs/day: 1.50   • Years: 35.00   • Pack years: 52.50   • Types: Cigarettes   • Last attempt to quit:    • Years since quittin.1   Smokeless Tobacco Never Used    alcohol: cut back. 3 shots per day in mixed drink  Had been drinking 1/2 pint per day or more      Past Medical History,Medications, Allergies, and social history was reviewed.      Review of Systems   Constitutional: Negative.    HENT: Negative.    Respiratory: Negative for shortness of breath.    Cardiovascular: Negative for chest pain.   Gastrointestinal: Positive for abdominal pain (occ discomfort. Sees Dr Case).   Psychiatric/Behavioral: Positive for decreased concentration (decreased memory) and depressed mood (not any better. hard to get motivated. ). Negative for suicidal ideas. The patient is nervous/anxious (with groups) and has insomnia (never sleep well,).        Objective     Vitals:    19 1206   BP: 116/82   Pulse: 64   Resp: 18   Temp: 98.6 °F (37 °C)   Weight: 73.3 kg (161 lb 8 oz)   Height: 165.1 cm (65\")          Physical Exam   Constitutional: Vital signs are normal. He appears well-developed and well-nourished.   HENT:   Head: Normocephalic and atraumatic.   Right Ear: Hearing, tympanic membrane, external ear and ear canal normal.   Left Ear: Hearing, tympanic membrane, external ear and ear canal normal.   Mouth/Throat: Oropharynx is clear " and moist.   Eyes: Conjunctivae, EOM and lids are normal. Pupils are equal, round, and reactive to light.   Neck: Normal range of motion. Neck supple. No thyromegaly present.   Cardiovascular: Normal rate, regular rhythm and normal heart sounds. Exam reveals no friction rub.   No murmur heard.  Pulmonary/Chest: Effort normal and breath sounds normal. No respiratory distress. He has no wheezes. He has no rales.   Abdominal: Soft. Normal appearance and bowel sounds are normal. He exhibits no distension and no mass. There is no tenderness. There is no rebound and no guarding.   Musculoskeletal: He exhibits no edema.   Neurological: He is alert. He has normal strength.   Skin: Skin is warm and dry.   Psychiatric: His speech is normal. Cognition and memory are normal. He exhibits a depressed mood.   Nursing note and vitals reviewed.                Anshul Martinez MD

## 2019-02-26 LAB
ALBUMIN SERPL-MCNC: 4.9 G/DL (ref 3.2–4.8)
ALBUMIN/GLOB SERPL: 2.2 G/DL (ref 1.5–2.5)
ALP SERPL-CCNC: 144 U/L (ref 25–100)
ALT SERPL-CCNC: 62 U/L (ref 7–40)
AST SERPL-CCNC: 73 U/L (ref 0–33)
BASOPHILS # BLD AUTO: 0.05 10*3/MM3 (ref 0–0.2)
BASOPHILS NFR BLD AUTO: 0.5 % (ref 0–1)
BILIRUB SERPL-MCNC: 0.8 MG/DL (ref 0.3–1.2)
BUN SERPL-MCNC: 12 MG/DL (ref 9–23)
BUN/CREAT SERPL: 16.7 (ref 7–25)
CALCIUM SERPL-MCNC: 9.5 MG/DL (ref 8.7–10.4)
CHLORIDE SERPL-SCNC: 94 MMOL/L (ref 99–109)
CHOLEST SERPL-MCNC: 154 MG/DL (ref 0–200)
CHOLEST/HDLC SERPL: 2 {RATIO}
CO2 SERPL-SCNC: 24 MMOL/L (ref 20–31)
CREAT SERPL-MCNC: 0.72 MG/DL (ref 0.6–1.3)
EOSINOPHIL # BLD AUTO: 0.2 10*3/MM3 (ref 0–0.3)
EOSINOPHIL NFR BLD AUTO: 2.1 % (ref 0–3)
ERYTHROCYTE [DISTWIDTH] IN BLOOD BY AUTOMATED COUNT: 13.1 % (ref 11.3–14.5)
GLOBULIN SER CALC-MCNC: 2.2 GM/DL
GLUCOSE SERPL-MCNC: 105 MG/DL (ref 70–100)
HBA1C MFR BLD: 5.7 % (ref 4.8–5.6)
HCT VFR BLD AUTO: 41.2 % (ref 38.9–50.9)
HDLC SERPL-MCNC: 77 MG/DL (ref 40–60)
HGB BLD-MCNC: 14.1 G/DL (ref 13.1–17.5)
IMM GRANULOCYTES # BLD AUTO: 0.05 10*3/MM3 (ref 0–0.05)
IMM GRANULOCYTES NFR BLD AUTO: 0.5 % (ref 0–0.6)
LDLC SERPL CALC-MCNC: 57 MG/DL (ref 0–100)
LYMPHOCYTES # BLD AUTO: 2.27 10*3/MM3 (ref 0.6–4.8)
LYMPHOCYTES NFR BLD AUTO: 23.8 % (ref 24–44)
MCH RBC QN AUTO: 31.6 PG (ref 27–31)
MCHC RBC AUTO-ENTMCNC: 34.2 G/DL (ref 32–36)
MCV RBC AUTO: 92.4 FL (ref 80–99)
MONOCYTES # BLD AUTO: 0.99 10*3/MM3 (ref 0–1)
MONOCYTES NFR BLD AUTO: 10.4 % (ref 0–12)
NEUTROPHILS # BLD AUTO: 5.96 10*3/MM3 (ref 1.5–8.3)
NEUTROPHILS NFR BLD AUTO: 62.7 % (ref 41–71)
PLATELET # BLD AUTO: 268 10*3/MM3 (ref 150–450)
POTASSIUM SERPL-SCNC: 3.5 MMOL/L (ref 3.5–5.5)
PROT SERPL-MCNC: 7.1 G/DL (ref 5.7–8.2)
PSA SERPL-MCNC: 0.26 NG/ML (ref 0–4)
RBC # BLD AUTO: 4.46 10*6/MM3 (ref 4.2–5.76)
SODIUM SERPL-SCNC: 130 MMOL/L (ref 132–146)
TRIGL SERPL-MCNC: 101 MG/DL (ref 0–150)
VLDLC SERPL CALC-MCNC: 20.2 MG/DL
WBC # BLD AUTO: 9.52 10*3/MM3 (ref 3.5–10.8)

## 2019-02-27 DIAGNOSIS — E87.1 HYPONATREMIA: Primary | ICD-10-CM

## 2019-03-06 ENCOUNTER — RESULTS ENCOUNTER (OUTPATIENT)
Dept: FAMILY MEDICINE CLINIC | Facility: CLINIC | Age: 59
End: 2019-03-06

## 2019-03-06 DIAGNOSIS — E87.1 HYPONATREMIA: ICD-10-CM

## 2019-03-11 LAB
ALBUMIN SERPL-MCNC: 4.54 G/DL (ref 3.2–4.8)
ALBUMIN/GLOB SERPL: 2 G/DL (ref 1.5–2.5)
ALP SERPL-CCNC: 205 U/L (ref 25–100)
ALT SERPL-CCNC: 48 U/L (ref 7–40)
AST SERPL-CCNC: 54 U/L (ref 0–33)
BILIRUB SERPL-MCNC: 0.4 MG/DL (ref 0.3–1.2)
BUN SERPL-MCNC: 15 MG/DL (ref 9–23)
BUN/CREAT SERPL: 20 (ref 7–25)
CALCIUM SERPL-MCNC: 9.3 MG/DL (ref 8.7–10.4)
CHLORIDE SERPL-SCNC: 101 MMOL/L (ref 99–109)
CO2 SERPL-SCNC: 26 MMOL/L (ref 20–31)
CREAT SERPL-MCNC: 0.75 MG/DL (ref 0.6–1.3)
GLOBULIN SER CALC-MCNC: 2.3 GM/DL
GLUCOSE SERPL-MCNC: 134 MG/DL (ref 70–100)
POTASSIUM SERPL-SCNC: 4.3 MMOL/L (ref 3.5–5.5)
PROT SERPL-MCNC: 6.8 G/DL (ref 5.7–8.2)
SODIUM SERPL-SCNC: 137 MMOL/L (ref 132–146)

## 2019-03-12 ENCOUNTER — OFFICE VISIT (OUTPATIENT)
Dept: FAMILY MEDICINE CLINIC | Facility: CLINIC | Age: 59
End: 2019-03-12

## 2019-03-12 VITALS
RESPIRATION RATE: 18 BRPM | WEIGHT: 164 LBS | HEART RATE: 66 BPM | TEMPERATURE: 97.8 F | SYSTOLIC BLOOD PRESSURE: 118 MMHG | DIASTOLIC BLOOD PRESSURE: 58 MMHG | BODY MASS INDEX: 27.32 KG/M2 | HEIGHT: 65 IN

## 2019-03-12 DIAGNOSIS — J01.00 ACUTE MAXILLARY SINUSITIS, RECURRENCE NOT SPECIFIED: Primary | ICD-10-CM

## 2019-03-12 PROCEDURE — 99213 OFFICE O/P EST LOW 20 MIN: CPT | Performed by: FAMILY MEDICINE

## 2019-03-12 RX ORDER — AMOXICILLIN AND CLAVULANATE POTASSIUM 875; 125 MG/1; MG/1
1 TABLET, FILM COATED ORAL 2 TIMES DAILY
Qty: 20 TABLET | Refills: 0 | Status: SHIPPED | OUTPATIENT
Start: 2019-03-12 | End: 2019-04-24

## 2019-03-12 NOTE — PROGRESS NOTES
"  Assessment/Plan       Problems Addressed this Visit     None      Visit Diagnoses     Acute maxillary sinusitis, recurrence not specified    -  Primary    Relevant Medications    amoxicillin-clavulanate (AUGMENTIN) 875-125 MG per tablet            Follow up: Return if symptoms worsen or fail to improve.     DISCUSSION  Start antibiotic as noted, Augmentin, for maxillary sinusitis.  Increase fluids and rest.  Call or follow-up if not improving.      MEDICATIONS PRESCRIBED  Requested Prescriptions     Signed Prescriptions Disp Refills   • amoxicillin-clavulanate (AUGMENTIN) 875-125 MG per tablet 20 tablet 0     Sig: Take 1 tablet by mouth 2 (Two) Times a Day.            -------------------------------------------    Subjective     Chief Complaint   Patient presents with   • Sinus Problem     pressure, stuffy          Sinusitis   This is a new problem. The current episode started in the past 7 days. The problem has been gradually worsening since onset. There has been no fever. The pain is mild. Associated symptoms include congestion (yellow and green), coughing and sinus pressure (pain around the eyes). Past treatments include oral decongestants (sudafed). The treatment provided mild relief.             Social History     Tobacco Use   Smoking Status Former Smoker   • Packs/day: 1.50   • Years: 35.00   • Pack years: 52.50   • Types: Cigarettes   • Last attempt to quit:    • Years since quittin.1   Smokeless Tobacco Never Used        Past Medical History,Medications, Allergies, and social history was reviewed.      Review of Systems   Constitutional: Negative.    HENT: Positive for congestion (yellow and green) and sinus pressure (pain around the eyes).    Respiratory: Positive for cough.    Cardiovascular: Negative.        Objective     Vitals:    19 1047   BP: 118/58   Pulse: 66   Resp: 18   Temp: 97.8 °F (36.6 °C)   Weight: 74.4 kg (164 lb)   Height: 165.1 cm (65\")          Physical Exam "   Constitutional: Vital signs are normal. He appears well-developed and well-nourished.   HENT:   Head: Normocephalic and atraumatic.   Right Ear: Hearing, external ear and ear canal normal. Tympanic membrane is retracted. Tympanic membrane is not erythematous.   Left Ear: Hearing, external ear and ear canal normal. Tympanic membrane is retracted. Tympanic membrane is not erythematous.   Nose: Right sinus exhibits maxillary sinus tenderness. Left sinus exhibits maxillary sinus tenderness.   Mouth/Throat: Oropharynx is clear and moist.   Eyes: Conjunctivae, EOM and lids are normal. Pupils are equal, round, and reactive to light.   Neck: Normal range of motion. Neck supple. No thyromegaly present.   Cardiovascular: Normal rate, regular rhythm and normal heart sounds. Exam reveals no friction rub.   No murmur heard.  Pulmonary/Chest: Effort normal and breath sounds normal. No respiratory distress. He has no wheezes. He has no rales.   Abdominal: Normal appearance.   Musculoskeletal: He exhibits no edema.   Neurological: He is alert. He has normal strength.   Skin: Skin is warm and dry.   Psychiatric: He has a normal mood and affect. His speech is normal. Cognition and memory are normal.   Nursing note and vitals reviewed.                Anshul Martinez MD

## 2019-03-14 DIAGNOSIS — I10 BENIGN ESSENTIAL HYPERTENSION: ICD-10-CM

## 2019-03-14 RX ORDER — AMLODIPINE BESYLATE 5 MG/1
TABLET ORAL
Qty: 30 TABLET | Refills: 2 | Status: SHIPPED | OUTPATIENT
Start: 2019-03-14 | End: 2019-06-14 | Stop reason: SDUPTHER

## 2019-03-15 DIAGNOSIS — I10 BENIGN ESSENTIAL HYPERTENSION: ICD-10-CM

## 2019-03-15 DIAGNOSIS — F33.1 MODERATE EPISODE OF RECURRENT MAJOR DEPRESSIVE DISORDER (HCC): ICD-10-CM

## 2019-03-15 RX ORDER — SERTRALINE HYDROCHLORIDE 100 MG/1
TABLET, FILM COATED ORAL
Qty: 60 TABLET | Refills: 0 | Status: SHIPPED | OUTPATIENT
Start: 2019-03-15 | End: 2019-04-12 | Stop reason: SDUPTHER

## 2019-03-15 RX ORDER — LISINOPRIL 20 MG/1
TABLET ORAL
Qty: 30 TABLET | Refills: 0 | Status: SHIPPED | OUTPATIENT
Start: 2019-03-15 | End: 2019-04-12 | Stop reason: SDUPTHER

## 2019-03-18 ENCOUNTER — OFFICE VISIT (OUTPATIENT)
Dept: ORTHOPEDIC SURGERY | Facility: CLINIC | Age: 59
End: 2019-03-18

## 2019-03-18 DIAGNOSIS — Z98.890 STATUS POST LEFT ROTATOR CUFF REPAIR: Primary | ICD-10-CM

## 2019-03-18 PROCEDURE — 99024 POSTOP FOLLOW-UP VISIT: CPT | Performed by: ORTHOPAEDIC SURGERY

## 2019-03-18 NOTE — PROGRESS NOTES
[facility] [place] [facility] Clinic Note    Subjective     Chief Complaint   Patient presents with   • Post-op     4 weeks - 12/20/2018 Left shoulder athroscopy with rotator cuff repair        HPI      Justice Kiser is a 58 y.o. male.  He is 3 months follow-up left shoulder rotator cuff repair.  He is doing better.        Past Medical History:   Diagnosis Date   • Acute maxillary sinusitis    • Elevated LFTs    • History of colonic polyps    • History of methicillin resistant Staphylococcus aureus    • History of myocardial infarction    • Hyperlipidemia    • Hypertension    • Left hand pain    • Left shoulder pain    • Personal history of congestive heart failure    • Right hip pain    • Rotator cuff syndrome       Past Surgical History:   Procedure Laterality Date   • CORONARY ANGIOPLASTY WITH STENT PLACEMENT  01/04/2012    Circumflex Xscience V 3.5 MM x 23 mm   • GALLBLADDER SURGERY  2017   • SHOULDER SURGERY  12/20/2018    Dr. Hoyt Rotator cuff repair      Family History   Problem Relation Age of Onset   • Hypertension Mother    • Heart attack Mother    • Hyperlipidemia Mother    • Alcohol abuse Mother    • Alcohol abuse Father    • Dementia Sister    • Heart disease Sister    • Alcohol abuse Maternal Aunt    • Alcohol abuse Paternal Uncle    • Diabetes Maternal Grandmother    • Heart disease Brother    • Stroke Sister      Social History     Socioeconomic History   • Marital status: Single     Spouse name: Not on file   • Number of children: Not on file   • Years of education: Not on file   • Highest education level: Not on file   Social Needs   • Financial resource strain: Not on file   • Food insecurity - worry: Not on file   • Food insecurity - inability: Not on file   • Transportation needs - medical: Not on file   • Transportation needs - non-medical: Not on file   Occupational History   • Not on file   Tobacco Use   • Smoking status: Former Smoker     Packs/day: 1.50     Years: 35.00     Pack years:  52.50     Types: Cigarettes     Last attempt to quit:      Years since quittin.2   • Smokeless tobacco: Never Used   Substance and Sexual Activity   • Alcohol use: Yes     Comment: 5 mixed drinks per day.   • Drug use: No   • Sexual activity: Defer   Other Topics Concern   • Not on file   Social History Narrative   • Not on file      Current Outpatient Medications on File Prior to Visit   Medication Sig Dispense Refill   • amLODIPine (NORVASC) 5 MG tablet TAKE 1 TABLET BY MOUTH EVERY DAY 30 tablet 2   • amoxicillin-clavulanate (AUGMENTIN) 875-125 MG per tablet Take 1 tablet by mouth 2 (Two) Times a Day. 20 tablet 0   • aspirin (ASPIRIN LOW DOSE) 81 MG tablet Take 1 tablet by mouth Daily for 360 days. 90 tablet 3   • atorvastatin (LIPITOR) 80 MG tablet Take 1 tablet by mouth Every Night for 180 days. 90 tablet 1   • [facility] PURELAX powder      • donepezil (ARICEPT) 10 MG tablet Take 1 tablet by mouth Daily. 30 tablet 11   • furosemide (LASIX) 20 MG tablet Take 1 tablet by mouth Daily. 90 tablet 2   • hydrochlorothiazide (MICROZIDE) 12.5 MG capsule Take 1 capsule by mouth Daily. 90 capsule 3   • lisinopril (PRINIVIL,ZESTRIL) 20 MG tablet TAKE 1 TABLET BY MOUTH EVERY DAY 30 tablet 0   • memantine (NAMENDA) 10 MG tablet Take 1 tablet by mouth 2 (Two) Times a Day. 60 tablet 11   • metoprolol tartrate (LOPRESSOR) 50 MG tablet TAKE 1/2 TABLET BY MOUTH TWICE A DAY 30 tablet 2   • Multiple Vitamins-Minerals (CENTRUM SILVER) tablet Take 1 tablet by mouth Daily.     • omeprazole (priLOSEC) 40 MG capsule TAKE 1 CAPSULE EVERY DAY 30 capsule 1   • sertraline (ZOLOFT) 100 MG tablet TAKE 2 TABLETS EVERY DAY 60 tablet 0   • thiamine (VITAMIN B1) 100 MG tablet Take 1 tablet by mouth Daily. 30 tablet 11     No current facility-administered medications on file prior to visit.       No Known Allergies     The following portions of the patient's history were reviewed and updated as appropriate: allergies, current medications, past  family history, past medical history, past social history, past surgical history and problem list.    Review of Systems   Constitutional: Negative.    HENT: Negative.    Eyes: Negative.    Respiratory: Negative.    Cardiovascular: Negative.    Gastrointestinal: Negative.    Endocrine: Negative.    Genitourinary: Negative.    Musculoskeletal: Positive for arthralgias.   Skin: Negative.    Allergic/Immunologic: Negative.    Neurological: Negative.    Hematological: Negative.    Psychiatric/Behavioral: Negative.         Objective      Physical Exam  There were no vitals taken for this visit.    There is no height or weight on file to calculate BMI.        GENERAL APPEARANCE: awake, alert & oriented x 3, in no acute distress and well developed, well nourished  PSYCH: normal mood and affect    Ortho Exam  Musculoskeletal   Upper Extremity   Left Shoulder     Inspection and Palpation:     Tenderness - none     Crepitus - none    Sensation is normal    Examination reveals no ecchymosis.      Strength and Tone:    Supraspinatus, Infraspinatus - 4/5    Subscapularis - 5/5    Deltoid - 5/5   Range of Motion   Right shoulder:    Internal Rotation: ROM - T7    External Rotation: AROM - 80 degrees    Elevation through flexion: AROM - 180 degrees     Abduction - 180   Left Shoulder:    Internal Rotation: ROM - T7    External Rotation: AROM - 80 degrees    Elevation through flexion: AROM - 180 degrees     Abduction - 180 degrees   Instability   Left shoulder    Sulcus sign negative    Apprehension test negative    Barak relocation test negative    Jerk test negative   Impingement   Left shoulder    Pearson impingement test positive    Neer impingement test positive   Functional Testing   Left shoulder    AC crossover adduction test negative    Abdominal compression test negative    Lift-off sign negative    Speed's test negative    Terrell's test negative    Horriblower's sign negative     Imaging/Studies  Imaging Results (last 7  days)     ** No results found for the last 168 hours. **          Assessment/Plan        ICD-10-CM ICD-9-CM   1. Status post left rotator cuff repair Z98.890 V45.89       Orders Placed This Encounter   Procedures   • Ambulatory Referral to Physical Therapy      He will start strengthening with physical therapy.  He will follow-up in 1 month.  Medical Decision Making  Management Options : over-the-counter medicine and physical/occupational therapy    Erick Hoyt MD  03/18/19  9:31 AM         EMR Dragon/Transcription disclaimer:  Much of this encounter note is an electronic transcription of spoken language to printed text. Electronic transcription of spoken language may permit erroneous, or at times, nonsensical words or phrases to be inadvertently transcribed. Although I have reviewed the note for such errors, some may still exist.

## 2019-04-01 DIAGNOSIS — I25.10 MULTIPLE VESSEL CORONARY ARTERY DISEASE: ICD-10-CM

## 2019-04-01 DIAGNOSIS — I10 BENIGN ESSENTIAL HYPERTENSION: ICD-10-CM

## 2019-04-01 RX ORDER — METOPROLOL TARTRATE 50 MG/1
TABLET, FILM COATED ORAL
Qty: 30 TABLET | Refills: 2 | Status: SHIPPED | OUTPATIENT
Start: 2019-04-01 | End: 2019-07-05 | Stop reason: SDUPTHER

## 2019-04-11 DIAGNOSIS — E78.5 HYPERLIPIDEMIA LDL GOAL <70: ICD-10-CM

## 2019-04-11 RX ORDER — ATORVASTATIN CALCIUM 80 MG/1
TABLET, FILM COATED ORAL
Qty: 90 TABLET | Refills: 1 | Status: SHIPPED | OUTPATIENT
Start: 2019-04-11 | End: 2019-11-02 | Stop reason: SDUPTHER

## 2019-04-12 DIAGNOSIS — F33.1 MODERATE EPISODE OF RECURRENT MAJOR DEPRESSIVE DISORDER (HCC): ICD-10-CM

## 2019-04-12 DIAGNOSIS — I10 BENIGN ESSENTIAL HYPERTENSION: ICD-10-CM

## 2019-04-12 RX ORDER — SERTRALINE HYDROCHLORIDE 100 MG/1
TABLET, FILM COATED ORAL
Qty: 60 TABLET | Refills: 0 | Status: SHIPPED | OUTPATIENT
Start: 2019-04-12 | End: 2019-05-10 | Stop reason: SDUPTHER

## 2019-04-12 RX ORDER — LISINOPRIL 20 MG/1
TABLET ORAL
Qty: 30 TABLET | Refills: 0 | Status: SHIPPED | OUTPATIENT
Start: 2019-04-12 | End: 2019-05-10 | Stop reason: SDUPTHER

## 2019-04-24 ENCOUNTER — OFFICE VISIT (OUTPATIENT)
Dept: ORTHOPEDIC SURGERY | Facility: CLINIC | Age: 59
End: 2019-04-24

## 2019-04-24 VITALS — WEIGHT: 164.02 LBS | HEIGHT: 65 IN | BODY MASS INDEX: 27.33 KG/M2 | OXYGEN SATURATION: 98 % | HEART RATE: 61 BPM

## 2019-04-24 DIAGNOSIS — Z98.890 STATUS POST LEFT ROTATOR CUFF REPAIR: Primary | ICD-10-CM

## 2019-04-24 DIAGNOSIS — M75.91 SUPRASPINATUS TENDINITIS, RIGHT: ICD-10-CM

## 2019-04-24 DIAGNOSIS — M54.2 NECK PAIN: ICD-10-CM

## 2019-04-24 PROCEDURE — 99213 OFFICE O/P EST LOW 20 MIN: CPT | Performed by: ORTHOPAEDIC SURGERY

## 2019-04-24 NOTE — PROGRESS NOTES
[facility] [place] [place]    Subjective     Chief Complaint   Patient presents with   • Left Shoulder - Follow-up     5 week f/u  4 months post  Left shoulder athroscopy with rotator cuff repair 2018        HPI      Justice Kiser is a 59 y.o. male.  He is a follow-up left shoulder rotator cuff repair from .  He says is getting better.  But is starting to have right shoulder and neck pain.  Pain is 3 out of 10 dull and aching.        Past Medical History:   Diagnosis Date   • Acute maxillary sinusitis    • Elevated LFTs    • History of colonic polyps    • History of methicillin resistant Staphylococcus aureus    • History of myocardial infarction    • Hyperlipidemia    • Hypertension    • Left hand pain    • Left shoulder pain    • Personal history of congestive heart failure    • Right hip pain    • Rotator cuff syndrome       Past Surgical History:   Procedure Laterality Date   • CORONARY ANGIOPLASTY WITH STENT PLACEMENT  2012    Circumflex Xscience V 3.5 MM x 23 mm   • GALLBLADDER SURGERY     • SHOULDER SURGERY  2018    Dr. Hoyt Rotator cuff repair      Family History   Problem Relation Age of Onset   • Hypertension Mother    • Heart attack Mother    • Hyperlipidemia Mother    • Alcohol abuse Mother    • Alcohol abuse Father    • Dementia Sister    • Heart disease Sister    • Alcohol abuse Maternal Aunt    • Alcohol abuse Paternal Uncle    • Diabetes Maternal Grandmother    • Heart disease Brother    • Stroke Sister      Social History     Socioeconomic History   • Marital status: Single     Spouse name: Not on file   • Number of children: Not on file   • Years of education: Not on file   • Highest education level: Not on file   Tobacco Use   • Smoking status: Former Smoker     Packs/day: 1.50     Years: 35.00     Pack years: 52.50     Types: Cigarettes     Last attempt to quit:      Years since quittin.3   • Smokeless tobacco: Never Used   Substance and  Sexual Activity   • Alcohol use: Yes     Comment: 5 mixed drinks per day.   • Drug use: No   • Sexual activity: Defer      Current Outpatient Medications on File Prior to Visit   Medication Sig Dispense Refill   • amLODIPine (NORVASC) 5 MG tablet TAKE 1 TABLET BY MOUTH EVERY DAY 30 tablet 2   • aspirin (ASPIRIN LOW DOSE) 81 MG tablet Take 1 tablet by mouth Daily for 360 days. 90 tablet 3   • atorvastatin (LIPITOR) 80 MG tablet TAKE 1 TABLET BY MOUTH AT BEDTIME 90 tablet 1   • [facility] PURELAX powder      • donepezil (ARICEPT) 10 MG tablet Take 1 tablet by mouth Daily. 30 tablet 11   • furosemide (LASIX) 20 MG tablet Take 1 tablet by mouth Daily. 90 tablet 2   • hydrochlorothiazide (MICROZIDE) 12.5 MG capsule Take 1 capsule by mouth Daily. 90 capsule 3   • lisinopril (PRINIVIL,ZESTRIL) 20 MG tablet TAKE 1 TABLET BY MOUTH EVERY DAY 30 tablet 0   • memantine (NAMENDA) 10 MG tablet Take 1 tablet by mouth 2 (Two) Times a Day. 60 tablet 11   • metoprolol tartrate (LOPRESSOR) 50 MG tablet TAKE 1/2 TABLET BY MOUTH TWICE A DAY 30 tablet 2   • Multiple Vitamins-Minerals (CENTRUM SILVER) tablet Take 1 tablet by mouth Daily.     • omeprazole (priLOSEC) 40 MG capsule TAKE 1 CAPSULE EVERY DAY 30 capsule 1   • sertraline (ZOLOFT) 100 MG tablet TAKE 2 TABLETS EVERY DAY 60 tablet 0   • thiamine (VITAMIN B1) 100 MG tablet Take 1 tablet by mouth Daily. 30 tablet 11   • [DISCONTINUED] amoxicillin-clavulanate (AUGMENTIN) 875-125 MG per tablet Take 1 tablet by mouth 2 (Two) Times a Day. 20 tablet 0     No current facility-administered medications on file prior to visit.       No Known Allergies     The following portions of the patient's history were reviewed and updated as appropriate: allergies, current medications, past family history, past medical history, past social history, past surgical history and problem list.    Review of Systems   Constitutional: Positive for activity change, appetite change and fatigue.   HENT: Positive for  "dental problem, drooling, nosebleeds, sinus pressure, sneezing and sore throat.    Eyes: Positive for redness, itching and visual disturbance.   Respiratory: Positive for choking, shortness of breath and wheezing.    Cardiovascular: Positive for leg swelling.   Gastrointestinal: Positive for abdominal pain and diarrhea.   Endocrine: Negative.    Genitourinary: Positive for frequency and urgency.   Musculoskeletal: Positive for back pain, joint swelling, neck pain and neck stiffness.   Skin: Negative.    Allergic/Immunologic: Negative.    Neurological: Positive for dizziness, weakness, light-headedness and headaches.   Hematological: Bruises/bleeds easily.   Psychiatric/Behavioral: Positive for agitation, confusion, decreased concentration and hallucinations. The patient is nervous/anxious.         Objective      Physical Exam  Pulse 61   Ht 165.1 cm (65\")   Wt 74.4 kg (164 lb 0.4 oz)   SpO2 98%   BMI 27.29 kg/m²     Body mass index is 27.29 kg/m².        GENERAL APPEARANCE: awake, alert & oriented x 3, in no acute distress and well developed, well nourished  PSYCH: normal mood and affect      Ortho Exam  Musculoskeletal   Upper Extremity   Right Shoulder     Inspection and Palpation:     Tenderness - none    Crepitus - none    Sensation is normal    Examination reveals no ecchymosis.      Strength and Tone:    Supraspinatus,  Infraspinatus - 5/5    Subscapularis - 5/5    Deltoid - 5/5     Range of Motion   Left shoulder:    Internal Rotation: ROM - T7    External Rotation: AROM - 80 degrees    Elevation through flexion: AROM - 180 degrees    Right Shoulder:    Internal Rotation: ROM - T7    External Rotation: AROM - 80 degrees    Elevation through flexion: AROM - 180 degrees     Abduction -180 degrees     Instability   Right shoulder    Sulcus sign negative    Apprehension test negative    Barak relocation test negative    Jerk test negative   Impingement   Right shoulder    Pearson impingement test " positive    Neer impingement test positive   Functional Testing   Right shoulder    AC crossover adduction test negative    Abdominal compression test negative    Lift-off sign negative    Speed's test negative    Terrell's test negative    Horriblower's sign negative   Musculoskeletal   Upper Extremity   Left Shoulder     Inspection and Palpation:     Tenderness - none     Crepitus - none    Sensation is normal    Examination reveals no ecchymosis.      Strength and Tone:    Supraspinatus, Infraspinatus - 5/5    Subscapularis - 5/5    Deltoid - 5/5   Range of Motion   Right shoulder:    Internal Rotation: ROM - T7    External Rotation: AROM - 80 degrees    Elevation through flexion: AROM - 180 degrees     Abduction - 180   Left Shoulder:    Internal Rotation: ROM - T7    External Rotation: AROM - 80 degrees    Elevation through flexion: AROM - 180 degrees     Abduction - 180 degrees   Instability   Left shoulder    Sulcus sign negative    Apprehension test negative    Barak relocation test negative    Jerk test negative   Impingement   Left shoulder    Pearson impingement test positive    Neer impingement test positive   Functional Testing   Left shoulder    AC crossover adduction test negative    Abdominal compression test negative    Lift-off sign negative    Speed's test negative    Terrell's test negative    Horriblower's sign negative Peripheral Vascular   Upper Extremities    No cyanotic nail beds    Pink nail beds and rapid capillary refill   Palpation    Radial Pulse - Bilaterally normal    Neurologic   Sensory - normal   Muscle Strength and Tone    Right Deltoid - 5/5    Left Deltoid - 5/5    Right Bicep - 5/5    Left Bicep - 5/5    Right Tricep - 5/5    Left Tricep - 5/5    Right Wrist Extensors - 5/5    Left Wrist Extensors - 5/5    Right Wrist Flexors - 5/5    Left Wrist Flexors - 5/5    Right Intrinsics - 5/5    Left Intrinsics - 5/5   Reflexes    Right Wrist - 2+ brachioradialis     Left Wrist - 2+  brachioradialis     Right Elbow - 2+ biceps and triceps    Left Elbow - 2+ biceps and triceps    Musculoskeletal   Cervical Spine    Inspection and Palpation - no swelling   ROJM    Flexion - 50 degrees    Extension - 60 degrees    Right Rotation - 70 degrees    Left Rotation - 70 degrees   Deformities/Malalignments/Discrepancies  - Lordosis   Functional Testing    Foraminal Compression/Spurling's Test negative                Imaging/Studies  Imaging Results (last 7 days)     ** No results found for the last 168 hours. **          Assessment/Plan        ICD-10-CM ICD-9-CM   1. Status post left rotator cuff repair Z98.890 V45.89   2. Supraspinatus tendinitis, right M75.91 726.10   3. Neck pain M54.2 723.1       Orders Placed This Encounter   Procedures   • Ambulatory Referral to Physical Therapy      He will do physical therapy for his shoulders and neck.  I will see him back in 1 month.  Medical Decision Making  Management Options : physical/occupational therapy      Erick Hoty MD  04/24/19  9:59 AM         EMR Dragon/Transcription disclaimer:  Much of this encounter note is an electronic transcription of spoken language to printed text. Electronic transcription of spoken language may permit erroneous, or at times, nonsensical words or phrases to be inadvertently transcribed. Although I have reviewed the note for such errors, some may still exist.

## 2019-05-10 DIAGNOSIS — F33.1 MODERATE EPISODE OF RECURRENT MAJOR DEPRESSIVE DISORDER (HCC): ICD-10-CM

## 2019-05-10 DIAGNOSIS — I10 BENIGN ESSENTIAL HYPERTENSION: ICD-10-CM

## 2019-05-10 RX ORDER — LISINOPRIL 20 MG/1
TABLET ORAL
Qty: 30 TABLET | Refills: 0 | Status: SHIPPED | OUTPATIENT
Start: 2019-05-10 | End: 2019-06-15 | Stop reason: SDUPTHER

## 2019-05-10 RX ORDER — SERTRALINE HYDROCHLORIDE 100 MG/1
TABLET, FILM COATED ORAL
Qty: 60 TABLET | Refills: 0 | Status: SHIPPED | OUTPATIENT
Start: 2019-05-10 | End: 2019-06-15 | Stop reason: SDUPTHER

## 2019-05-13 ENCOUNTER — OFFICE VISIT (OUTPATIENT)
Dept: ORTHOPEDIC SURGERY | Facility: CLINIC | Age: 59
End: 2019-05-13

## 2019-05-13 VITALS — BODY MASS INDEX: 26.74 KG/M2 | HEIGHT: 65 IN | HEART RATE: 62 BPM | OXYGEN SATURATION: 98 % | WEIGHT: 160.5 LBS

## 2019-05-13 DIAGNOSIS — Z98.890 STATUS POST LEFT ROTATOR CUFF REPAIR: Primary | ICD-10-CM

## 2019-05-13 DIAGNOSIS — M54.2 NECK PAIN: ICD-10-CM

## 2019-05-13 PROCEDURE — 99213 OFFICE O/P EST LOW 20 MIN: CPT | Performed by: ORTHOPAEDIC SURGERY

## 2019-05-13 RX ORDER — DONEPEZIL HYDROCHLORIDE 5 MG/1
5 TABLET, FILM COATED ORAL DAILY
Refills: 11 | COMMUNITY
Start: 2019-04-28 | End: 2019-05-22 | Stop reason: DRUGHIGH

## 2019-05-13 NOTE — PROGRESS NOTES
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  He is doing better with his left shoulder rotator cuff repair from surgery back in December and May 10 his grand daughter jerked on his arm.  Since then he has had worsening shoulder pain and neck pain.  He wanted to get it checked out today.  His pain is 7 out of 10.        Past Medical History:   Diagnosis Date   • Acute maxillary sinusitis    • Elevated LFTs    • History of colonic polyps    • History of methicillin resistant Staphylococcus aureus    • History of myocardial infarction    • Hyperlipidemia    • Hypertension    • Left hand pain    • Left shoulder pain    • Personal history of congestive heart failure    • Right hip pain    • Rotator cuff syndrome       Past Surgical History:   Procedure Laterality Date   • CORONARY ANGIOPLASTY WITH STENT PLACEMENT  2012    Circumflex Xscience V 3.5 MM x 23 mm   • GALLBLADDER SURGERY     • SHOULDER SURGERY  2018    Dr. Hoyt Rotator cuff repair      Family History   Problem Relation Age of Onset   • Hypertension Mother    • Heart attack Mother    • Hyperlipidemia Mother    • Alcohol abuse Mother    • Alcohol abuse Father    • Dementia Sister    • Heart disease Sister    • Alcohol abuse Maternal Aunt    • Alcohol abuse Paternal Uncle    • Diabetes Maternal Grandmother    • Heart disease Brother    • Stroke Sister      Social History     Socioeconomic History   • Marital status: Single     Spouse name: Not on file   • Number of children: Not on file   • Years of education: Not on file   • Highest education level: Not on file   Tobacco Use   • Smoking status: Former Smoker     Packs/day: 1.50     Years: 35.00     Pack years: 52.50     Types: Cigarettes     Last attempt to quit:      Years since quittin.3   • Smokeless tobacco: Never Used   Substance and Sexual  Activity   • Alcohol use: Yes     Comment: 5 mixed drinks per day.   • Drug use: No   • Sexual activity: Defer      Current Outpatient Medications on File Prior to Visit   Medication Sig Dispense Refill   • amLODIPine (NORVASC) 5 MG tablet TAKE 1 TABLET BY MOUTH EVERY DAY 30 tablet 2   • aspirin (ASPIRIN LOW DOSE) 81 MG tablet Take 1 tablet by mouth Daily for 360 days. 90 tablet 3   • atorvastatin (LIPITOR) 80 MG tablet TAKE 1 TABLET BY MOUTH AT BEDTIME 90 tablet 1   • [facility] PURELAX powder      • donepezil (ARICEPT) 5 MG tablet Take 5 mg by mouth Daily.  11   • furosemide (LASIX) 20 MG tablet Take 1 tablet by mouth Daily. 90 tablet 2   • hydrochlorothiazide (MICROZIDE) 12.5 MG capsule Take 1 capsule by mouth Daily. 90 capsule 3   • lisinopril (PRINIVIL,ZESTRIL) 20 MG tablet TAKE 1 TABLET BY MOUTH EVERY DAY 30 tablet 0   • memantine (NAMENDA) 10 MG tablet Take 1 tablet by mouth 2 (Two) Times a Day. 60 tablet 11   • metoprolol tartrate (LOPRESSOR) 50 MG tablet TAKE 1/2 TABLET BY MOUTH TWICE A DAY 30 tablet 2   • Multiple Vitamins-Minerals (CENTRUM SILVER) tablet Take 1 tablet by mouth Daily.     • omeprazole (priLOSEC) 40 MG capsule TAKE 1 CAPSULE EVERY DAY 30 capsule 1   • sertraline (ZOLOFT) 100 MG tablet TAKE 2 TABLETS EVERY DAY 60 tablet 0   • thiamine (VITAMIN B1) 100 MG tablet Take 1 tablet by mouth Daily. 30 tablet 11   • [DISCONTINUED] donepezil (ARICEPT) 10 MG tablet Take 1 tablet by mouth Daily. 30 tablet 11     No current facility-administered medications on file prior to visit.       No Known Allergies     The following portions of the patient's history were reviewed and updated as appropriate: allergies, current medications, past family history, past medical history, past social history, past surgical history and problem list.    Review of Systems   Constitutional: Positive for appetite change and fatigue.        Night Sweats    HENT: Positive for congestion, drooling, mouth sores, sinus pressure and  "sneezing.    Eyes: Positive for redness, itching and visual disturbance.   Respiratory: Positive for choking, shortness of breath and wheezing.    Cardiovascular: Negative.    Gastrointestinal: Negative.    Endocrine: Negative.    Genitourinary: Positive for frequency and urgency.   Musculoskeletal: Positive for arthralgias, back pain, joint swelling, neck pain and neck stiffness.   Skin: Negative.    Allergic/Immunologic: Negative.    Neurological: Positive for dizziness, weakness, light-headedness, numbness and headaches.   Hematological: Negative.    Psychiatric/Behavioral: Positive for agitation, confusion, decreased concentration and hallucinations. The patient is nervous/anxious and is hyperactive.         Objective      Physical Exam  Pulse 62   Ht 165.1 cm (65\")   Wt 72.8 kg (160 lb 7.9 oz)   SpO2 98%   BMI 26.71 kg/m²     Body mass index is 26.71 kg/m².        GENERAL APPEARANCE: awake, alert & oriented x 3, in no acute distress and well developed, well nourished  PSYCH: normal mood and affect    Ortho Exam  Musculoskeletal   Upper Extremity   Left Shoulder     Inspection and Palpation:     Tenderness - none     Crepitus - none    Sensation is normal    Examination reveals no ecchymosis.      Strength and Tone:    Supraspinatus, Infraspinatus - 4/5    Subscapularis - 5/5    Deltoid - 5/5   Range of Motion   Right shoulder:    Internal Rotation: ROM - T7    External Rotation: AROM - 80 degrees    Elevation through flexion: AROM - 180 degrees     Abduction - 180   Left Shoulder:    Internal Rotation: ROM - T7    External Rotation: AROM - 80 degrees    Elevation through flexion: AROM - 180 degrees     Abduction - 180 degrees   Instability   Left shoulder    Sulcus sign negative    Apprehension test negative    Barak relocation test negative    Jerk test negative   Impingement   Left shoulder    Pearson impingement test positive    Neer impingement test positive   Functional Testing   Left shoulder    AC " crossover adduction test negative    Abdominal compression test negative    Lift-off sign negative    Speed's test negative    Terrell's test negative    Horriblower's sign negative     Imaging/Studies  Imaging Results (last 7 days)     ** No results found for the last 168 hours. **          Assessment/Plan        ICD-10-CM ICD-9-CM   1. Status post left rotator cuff repair Z98.890 V45.89   2. Neck pain M54.2 723.1       Orders Placed This Encounter   Procedures   • Ambulatory Referral to Physical Therapy      He has a new injury strain to the left shoulder with increasing neck pain.  Plan to be physical therapy and follow-up in 3 weeks.  If is not better we will get an MRI.    Medical Decision Making  Management Options : over-the-counter medicine and physical/occupational therapy      Erick Hoyt MD  05/13/19  3:05 PM         EMR Dragon/Transcription disclaimer:  Much of this encounter note is an electronic transcription of spoken language to printed text. Electronic transcription of spoken language may permit erroneous, or at times, nonsensical words or phrases to be inadvertently transcribed. Although I have reviewed the note for such errors, some may still exist.

## 2019-05-22 ENCOUNTER — OFFICE VISIT (OUTPATIENT)
Dept: NEUROLOGY | Facility: CLINIC | Age: 59
End: 2019-05-22

## 2019-05-22 VITALS
HEART RATE: 57 BPM | BODY MASS INDEX: 26.66 KG/M2 | WEIGHT: 160 LBS | DIASTOLIC BLOOD PRESSURE: 84 MMHG | SYSTOLIC BLOOD PRESSURE: 117 MMHG | HEIGHT: 65 IN

## 2019-05-22 DIAGNOSIS — G31.84 MCI (MILD COGNITIVE IMPAIRMENT): Primary | ICD-10-CM

## 2019-05-22 PROCEDURE — 99214 OFFICE O/P EST MOD 30 MIN: CPT | Performed by: PHYSICIAN ASSISTANT

## 2019-05-22 RX ORDER — DONEPEZIL HYDROCHLORIDE 10 MG/1
10 TABLET, FILM COATED ORAL DAILY
Refills: 11 | COMMUNITY
Start: 2019-05-19 | End: 2020-03-30

## 2019-05-22 NOTE — PROGRESS NOTES
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 He has noted symptoms since at least 2017 marked initially by forgetfulness , repetitiveness  and word-finding difficulties. This has gradually worsened  over time. Additional symptoms have included impairments in orientation  and executive function. There have been associated  symptoms of depression and irritability. He has also noted visual hallucinations of people in his bedroom, primarily at night. He denies any impairments in ADL's. He manages his medications and finances. He is no longer driving . He is currently residing with his nephew in [place].      He has also noted balance impairment. He has also several recent falls, though fortunately without significant injury. He denies any associated shuffling gait or tremor. He also notes that he has several mixed drinks daily.      Prior evaluation has included an MRI of the brain showing chronic white matter changes, an unremarkable ECHO/Carotid Dopplers and screening bloodwork. He is currently taking donepezil, memantine and thiamine.     Today: Since his last visit in 2/19, he notes that his memory has somewhat improved. His hallucinations have essentially resolved. He has continued to cute his EtOH intake, but still drinks up to 2 drinks a day.       I have reviewed and confirmed the past family, social and medical history as accurate on 5/22/19.     Review of Systems   Constitutional: Negative.    HENT: Negative.    Eyes: Negative.    Respiratory: Negative.    Cardiovascular: Negative.    Gastrointestinal: Negative.    Endocrine: Negative.    Genitourinary: Negative.    Musculoskeletal: Negative.    Skin: Negative.    Allergic/Immunologic: Negative.    Neurological:        Memory loss    Hematological: Negative.    Psychiatric/Behavioral: Negative.        Objective     /84    "Pulse 57   Ht 165.1 cm (65\")   Wt 72.6 kg (160 lb)   BMI 26.63 kg/m²     General appearance today is normal.       Physical Exam   Constitutional: He is oriented to person, place, and time.   Neurological: He is oriented to person, place, and time. He has normal strength. He has a normal Finger-Nose-Finger Test.   Psychiatric: His speech is normal.        Neurologic Exam     Mental Status   Oriented to person, place, and time.   Registration: recalls 3 of 3 objects. Recall at 5 minutes: recalls 2 of 3 objects. Follows 3 step commands.   Attention: normal.   Speech: speech is normal   Level of consciousness: alert  Able to name object. Able to read. Able to repeat. Able to write. Normal comprehension.     Cranial Nerves   Cranial nerves II through XII intact.     Motor Exam   Overall muscle tone: normal    Strength   Strength 5/5 throughout.     Sensory Exam   Light touch normal.     Gait, Coordination, and Reflexes     Coordination   Finger to nose coordination: normal    Tremor   Resting tremor: absent        Results  MMSE=29      Assessment/Plan   Justice was seen today for memory loss.    Diagnoses and all orders for this visit:    MCI (mild cognitive impairment)          Discussion/Summary   Justice Kiser returns to clinic today for evaluation of cognitive impairment. I continue to suspect this is related to EtOH, and even possible Wernicke's encephalopathy. This was discussed. I have again strongly recommended pursuing EtOH abstinence. He will also continue on donepezil, memantine and thiamine unchanged. He will then follow up in 6 months , or sooner if needed.   I spent 25 minutes face to face with the patient with 15 minutes spent on discussing diagnosis, prognosis, evaluation, current status, treatment options and management as discussed above.       As part of this visit I discussed the history with the patient .      Daly Johnson PA-C  "

## 2019-05-28 ENCOUNTER — OFFICE VISIT (OUTPATIENT)
Dept: FAMILY MEDICINE CLINIC | Facility: CLINIC | Age: 59
End: 2019-05-28

## 2019-05-28 VITALS
HEIGHT: 65 IN | BODY MASS INDEX: 27.16 KG/M2 | SYSTOLIC BLOOD PRESSURE: 118 MMHG | HEART RATE: 64 BPM | WEIGHT: 163 LBS | RESPIRATION RATE: 18 BRPM | DIASTOLIC BLOOD PRESSURE: 60 MMHG | TEMPERATURE: 97.2 F

## 2019-05-28 DIAGNOSIS — F33.1 MODERATE EPISODE OF RECURRENT MAJOR DEPRESSIVE DISORDER (HCC): ICD-10-CM

## 2019-05-28 DIAGNOSIS — R73.9 HYPERGLYCEMIA: ICD-10-CM

## 2019-05-28 DIAGNOSIS — I10 BENIGN ESSENTIAL HYPERTENSION: ICD-10-CM

## 2019-05-28 DIAGNOSIS — R20.0 NUMBNESS IN FEET: ICD-10-CM

## 2019-05-28 DIAGNOSIS — R53.83 OTHER FATIGUE: ICD-10-CM

## 2019-05-28 DIAGNOSIS — R20.2 NUMBNESS AND TINGLING OF BOTH UPPER EXTREMITIES: Primary | ICD-10-CM

## 2019-05-28 DIAGNOSIS — R20.0 NUMBNESS AND TINGLING OF BOTH UPPER EXTREMITIES: Primary | ICD-10-CM

## 2019-05-28 PROCEDURE — 99214 OFFICE O/P EST MOD 30 MIN: CPT | Performed by: FAMILY MEDICINE

## 2019-05-28 NOTE — PROGRESS NOTES
Assessment/Plan       Problems Addressed this Visit        Other    Depression    Hyperglycemia    Relevant Orders    Comprehensive Metabolic Panel    Hemoglobin A1c      Other Visit Diagnoses     Numbness and tingling of both upper extremities    -  Primary    Relevant Orders    EMG & Nerve Conduction Test    Numbness in feet        Relevant Orders    EMG & Nerve Conduction Test    Other fatigue        Relevant Orders    Vitamin B12    TSH    Benign essential hypertension        Relevant Orders    Comprehensive Metabolic Panel            Follow up: Return in about 3 months (around 8/28/2019).     DISCUSSION  Persistent numbness and tingling of feet and now in upper extremities.  Check blood work to evaluate for metabolic cause.  Check nerve conduction study as well.    He also complains of fatigue so we will check B12 and TSH.    Hypertension.  Blood pressure stable.  Check CMP.    Hyperglycemia.  Check A1c.    Further plan once labs back.    Mood at this time is stable on current medications.  No changes.      MEDICATIONS PRESCRIBED  Requested Prescriptions      No prescriptions requested or ordered in this encounter            -------------------------------------------    Subjective     Chief Complaint   Patient presents with   • Hypertension     3 month f/u          Hypertension   This is a chronic problem. The current episode started more than 1 year ago. The problem is unchanged. The problem is controlled (up at times but ok at times). Associated symptoms include peripheral edema (legs and feet, no change). Pertinent negatives include no chest pain, headaches or shortness of breath. (Cramps in legs) There are no associated agents to hypertension. Risk factors for coronary artery disease include dyslipidemia. Current antihypertension treatment includes calcium channel blockers, ACE inhibitors and beta blockers. Hypertensive end-organ damage includes CAD/MI.   Depression   Visit Type: follow-up  Patient  "presents with the following symptoms: decreased concentration (decreased memory ), depressed mood (hard to get motivated  ), insomnia (never sleep well,), irritability ( anger at times) and nervousness/anxiety (with groups , crowds worse).  Patient is not experiencing: shortness of breath, suicidal ideas and suicidal planning.  Severity: moderate   Sleep quality: fair              Tingling  In both arms and both feet from mid foot to the toes.   Feet stay cold.   Increased fatigue as well  Feet sx for several months and getting worse  Arm sx for few weeks      Hyperglycemia  Increased blood sugars at times in the past  Ate today        Social History     Tobacco Use   Smoking Status Former Smoker   • Packs/day: 1.50   • Years: 35.00   • Pack years: 52.50   • Types: Cigarettes   • Last attempt to quit:    • Years since quittin.4   Smokeless Tobacco Never Used        Past Medical History,Medications, Allergies, and social history was reviewed.      Review of Systems   Constitutional: Positive for irritability ( anger at times).   Respiratory: Negative for shortness of breath.    Cardiovascular: Negative for chest pain.   Psychiatric/Behavioral: Positive for decreased concentration (decreased memory ) and depressed mood (hard to get motivated  ). Negative for suicidal ideas. The patient is nervous/anxious (with groups , crowds worse) and has insomnia (never sleep well,).        Objective     Vitals:    19 1029   BP: 118/60   Pulse: 64   Resp: 18   Temp: 97.2 °F (36.2 °C)   Weight: 73.9 kg (163 lb)   Height: 165.1 cm (65\")          Physical Exam   Constitutional: Vital signs are normal. He appears well-developed and well-nourished.   HENT:   Head: Normocephalic and atraumatic.   Right Ear: Hearing, tympanic membrane, external ear and ear canal normal.   Left Ear: Hearing, tympanic membrane, external ear and ear canal normal.   Mouth/Throat: Oropharynx is clear and moist.   Eyes: Conjunctivae, EOM and lids " are normal. Pupils are equal, round, and reactive to light.   Neck: Normal range of motion. Neck supple. No thyromegaly present.   Cardiovascular: Normal rate, regular rhythm and normal heart sounds. Exam reveals no friction rub.   No murmur heard.  Pulmonary/Chest: Effort normal and breath sounds normal. No respiratory distress. He has no wheezes. He has no rales.   Abdominal: Soft. Normal appearance and bowel sounds are normal. He exhibits no distension and no mass. There is no tenderness. There is no rebound and no guarding.   Musculoskeletal: He exhibits no edema.   Neurological: He is alert. He has normal strength.   Skin: Skin is warm and dry.   Psychiatric: He has a normal mood and affect. His speech is normal. Cognition and memory are normal.   Nursing note and vitals reviewed.    Dorsalis pedis pulses present.  Foot is warm.    Normal upper extremity strength.  Ambulates normally.            Anshul Martinez MD

## 2019-05-29 LAB
ALBUMIN SERPL-MCNC: 4.7 G/DL (ref 3.5–5.2)
ALBUMIN/GLOB SERPL: 2.4 G/DL
ALP SERPL-CCNC: 194 U/L (ref 39–117)
ALT SERPL-CCNC: 35 U/L (ref 1–41)
AST SERPL-CCNC: 37 U/L (ref 1–40)
BILIRUB SERPL-MCNC: 0.3 MG/DL (ref 0.2–1.2)
BUN SERPL-MCNC: 20 MG/DL (ref 8–23)
BUN/CREAT SERPL: 19.8 (ref 7–25)
CALCIUM SERPL-MCNC: 9.7 MG/DL (ref 8.6–10.5)
CHLORIDE SERPL-SCNC: 104 MMOL/L (ref 98–107)
CO2 SERPL-SCNC: 26.8 MMOL/L (ref 22–29)
CREAT SERPL-MCNC: 1.01 MG/DL (ref 0.76–1.27)
GLOBULIN SER CALC-MCNC: 2 GM/DL
GLUCOSE SERPL-MCNC: 104 MG/DL (ref 65–99)
HBA1C MFR BLD: 5.5 % (ref 4.8–5.6)
POTASSIUM SERPL-SCNC: 4.1 MMOL/L (ref 3.5–5.2)
PROT SERPL-MCNC: 6.7 G/DL (ref 6–8.5)
SODIUM SERPL-SCNC: 142 MMOL/L (ref 136–145)
TSH SERPL DL<=0.005 MIU/L-ACNC: 1.44 UIU/ML (ref 0.45–4.5)
VIT B12 SERPL-MCNC: 555 PG/ML (ref 211–946)

## 2019-06-03 ENCOUNTER — TELEPHONE (OUTPATIENT)
Dept: ORTHOPEDIC SURGERY | Facility: CLINIC | Age: 59
End: 2019-06-03

## 2019-06-03 NOTE — TELEPHONE ENCOUNTER
CALLED PATIENT TO REMIND HIM OF MISSED APPOINTMENT TODAY. LEFT VOICEMAIL TO CALL BACK AND RESCHEDULE. SENT NO SHOW LETTER.

## 2019-06-14 DIAGNOSIS — I10 BENIGN ESSENTIAL HYPERTENSION: ICD-10-CM

## 2019-06-14 RX ORDER — AMLODIPINE BESYLATE 5 MG/1
TABLET ORAL
Qty: 30 TABLET | Refills: 0 | Status: SHIPPED | OUTPATIENT
Start: 2019-06-14 | End: 2019-07-23 | Stop reason: SDUPTHER

## 2019-06-15 DIAGNOSIS — F33.1 MODERATE EPISODE OF RECURRENT MAJOR DEPRESSIVE DISORDER (HCC): ICD-10-CM

## 2019-06-15 DIAGNOSIS — I10 BENIGN ESSENTIAL HYPERTENSION: ICD-10-CM

## 2019-06-17 RX ORDER — LISINOPRIL 20 MG/1
TABLET ORAL
Qty: 30 TABLET | Refills: 4 | Status: SHIPPED | OUTPATIENT
Start: 2019-06-17 | End: 2019-12-09 | Stop reason: SDUPTHER

## 2019-06-17 RX ORDER — SERTRALINE HYDROCHLORIDE 100 MG/1
TABLET, FILM COATED ORAL
Qty: 60 TABLET | Refills: 4 | Status: SHIPPED | OUTPATIENT
Start: 2019-06-17 | End: 2019-11-16 | Stop reason: SDUPTHER

## 2019-06-19 ENCOUNTER — OFFICE VISIT (OUTPATIENT)
Dept: ORTHOPEDIC SURGERY | Facility: CLINIC | Age: 59
End: 2019-06-19

## 2019-06-19 VITALS — WEIGHT: 162.92 LBS | OXYGEN SATURATION: 98 % | HEART RATE: 63 BPM | HEIGHT: 65 IN | BODY MASS INDEX: 27.14 KG/M2

## 2019-06-19 DIAGNOSIS — M54.2 NECK PAIN: ICD-10-CM

## 2019-06-19 DIAGNOSIS — M50.30 DDD (DEGENERATIVE DISC DISEASE), CERVICAL: ICD-10-CM

## 2019-06-19 DIAGNOSIS — I25.119 CORONARY ARTERY DISEASE INVOLVING NATIVE CORONARY ARTERY OF NATIVE HEART WITH ANGINA PECTORIS (HCC): ICD-10-CM

## 2019-06-19 DIAGNOSIS — R52 PAIN: Primary | ICD-10-CM

## 2019-06-19 DIAGNOSIS — M75.91 SUPRASPINATUS TENDINITIS, RIGHT: ICD-10-CM

## 2019-06-19 PROCEDURE — 99213 OFFICE O/P EST LOW 20 MIN: CPT | Performed by: ORTHOPAEDIC SURGERY

## 2019-06-19 NOTE — PROGRESS NOTES
[facility] [place] [facility] Note    Subjective     Chief Complaint   Patient presents with   • Right Shoulder - Follow-up     5 weeks        HPI  Justice Kiser is a 59 y.o. male.  He is follow-up bilateral shoulder pain.  He has increasing neck pain.  Pain is 3 out of 10.  He has intermittent numbness and tingling radiating down both arms.  He has been to physical therapy at the [facility].  He is not getting better.    Past Medical History:   Diagnosis Date   • Acute maxillary sinusitis    • Elevated LFTs    • History of colonic polyps    • History of methicillin resistant Staphylococcus aureus    • History of myocardial infarction    • Hyperlipidemia    • Hypertension    • Left hand pain    • Left shoulder pain    • Personal history of congestive heart failure    • Right hip pain    • Rotator cuff syndrome       Past Surgical History:   Procedure Laterality Date   • CORONARY ANGIOPLASTY WITH STENT PLACEMENT  2012    Circumflex Xscience V 3.5 MM x 23 mm   • GALLBLADDER SURGERY     • SHOULDER SURGERY  2018    Dr. Hoyt Rotator cuff repair      Family History   Problem Relation Age of Onset   • Hypertension Mother    • Heart attack Mother    • Hyperlipidemia Mother    • Alcohol abuse Mother    • Alcohol abuse Father    • Dementia Sister    • Heart disease Sister    • Alcohol abuse Maternal Aunt    • Alcohol abuse Paternal Uncle    • Diabetes Maternal Grandmother    • Heart disease Brother    • Stroke Sister      Social History     Socioeconomic History   • Marital status: Single     Spouse name: Not on file   • Number of children: Not on file   • Years of education: Not on file   • Highest education level: Not on file   Tobacco Use   • Smoking status: Former Smoker     Packs/day: 1.50     Years: 35.00     Pack years: 52.50     Types: Cigarettes     Last attempt to quit:      Years since quittin.4   • Smokeless tobacco: Never Used   Substance and Sexual Activity   •  Alcohol use: Yes     Comment: 5 mixed drinks per day.   • Drug use: No   • Sexual activity: Defer      Current Outpatient Medications on File Prior to Visit   Medication Sig Dispense Refill   • amLODIPine (NORVASC) 5 MG tablet TAKE 1 TABLET BY MOUTH EVERY DAY 30 tablet 0   • aspirin (ASPIRIN LOW DOSE) 81 MG tablet Take 1 tablet by mouth Daily for 360 days. 90 tablet 3   • atorvastatin (LIPITOR) 80 MG tablet TAKE 1 TABLET BY MOUTH AT BEDTIME 90 tablet 1   • [facility] PURELAX powder      • donepezil (ARICEPT) 10 MG tablet Take 10 mg by mouth Daily.  11   • furosemide (LASIX) 20 MG tablet Take 1 tablet by mouth Daily. 90 tablet 2   • hydrochlorothiazide (MICROZIDE) 12.5 MG capsule Take 1 capsule by mouth Daily. 90 capsule 3   • lisinopril (PRINIVIL,ZESTRIL) 20 MG tablet TAKE 1 TABLET BY MOUTH EVERY DAY 30 tablet 4   • memantine (NAMENDA) 10 MG tablet Take 1 tablet by mouth 2 (Two) Times a Day. 60 tablet 11   • metoprolol tartrate (LOPRESSOR) 50 MG tablet TAKE 1/2 TABLET BY MOUTH TWICE A DAY 30 tablet 2   • Multiple Vitamins-Minerals (CENTRUM SILVER) tablet Take 1 tablet by mouth Daily.     • omeprazole (priLOSEC) 40 MG capsule TAKE 1 CAPSULE EVERY DAY 30 capsule 1   • sertraline (ZOLOFT) 100 MG tablet TAKE 2 TABLETS EVERY DAY 60 tablet 4   • thiamine (VITAMIN B1) 100 MG tablet Take 1 tablet by mouth Daily. 30 tablet 11     No current facility-administered medications on file prior to visit.       No Known Allergies     The following portions of the patient's history were reviewed and updated as appropriate: allergies, current medications, past family history, past medical history, past social history, past surgical history and problem list.    Review of Systems   Constitutional: Negative.    HENT: Negative.    Eyes: Negative.    Respiratory: Negative.    Cardiovascular: Negative.    Gastrointestinal: Negative.    Endocrine: Negative.    Genitourinary: Negative.    Musculoskeletal: Positive for arthralgias.   Skin: Negative.   "  Allergic/Immunologic: Negative.    Neurological: Negative.    Hematological: Negative.    Psychiatric/Behavioral: Negative.         Objective      Physical Exam  Pulse 63   Ht 165.1 cm (65\")   Wt 73.9 kg (162 lb 14.7 oz)   SpO2 98%   BMI 27.11 kg/m²     Body mass index is 27.11 kg/m².    GENERAL APPEARANCE: awake, alert & oriented x 3, in no acute distress and well developed, well nourished  PSYCH: normal mood and affect    Ortho Exam  Musculoskeletal   Upper Extremity   Right Shoulder     Inspection and Palpation:     Tenderness - none    Crepitus - none    Sensation is normal    Examination reveals no ecchymosis.      Strength and Tone:    Supraspinatus,  Infraspinatus - 5/5    Subscapularis - 5/5    Deltoid - 5/5     Range of Motion   Left shoulder:    Internal Rotation: ROM - T7    External Rotation: AROM - 80 degrees    Elevation through flexion: AROM - 180 degrees    Right Shoulder:    Internal Rotation: ROM - T7    External Rotation: AROM - 80 degrees    Elevation through flexion: AROM - 180 degrees     Abduction -180 degrees     Instability   Right shoulder    Sulcus sign negative    Apprehension test negative    Barak relocation test negative    Jerk test negative   Impingement   Right shoulder    Pearson impingement test positive    Neer impingement test positive   Functional Testing   Right shoulder    AC crossover adduction test negative    Abdominal compression test negative    Lift-off sign negative    Speed's test negative    Terrell's test negative    Horriblower's sign negative   Musculoskeletal   Upper Extremity   Left Shoulder     Inspection and Palpation:     Tenderness - none     Crepitus - none    Sensation is normal    Examination reveals no ecchymosis.      Strength and Tone:    Supraspinatus, Infraspinatus - 5/5    Subscapularis - 5/5    Deltoid - 5/5   Range of Motion   Right shoulder:    Internal Rotation: ROM - T7    External Rotation: AROM - 80 degrees    Elevation through flexion: " AROM - 180 degrees     Abduction - 180   Left Shoulder:    Internal Rotation: ROM - T7    External Rotation: AROM - 80 degrees    Elevation through flexion: AROM - 180 degrees     Abduction - 180 degrees   Instability   Left shoulder    Sulcus sign negative    Apprehension test negative    Barak relocation test negative    Jerk test negative   Impingement   Left shoulder    Pearson impingement test positive    Neer impingement test positive   Functional Testing   Left shoulder    AC crossover adduction test negative    Abdominal compression test negative    Lift-off sign negative    Speed's test negative    Terrell's test negative    Horriblower's sign negative Peripheral Vascular   Upper Extremities    No cyanotic nail beds    Pink nail beds and rapid capillary refill   Palpation    Radial Pulse - Bilaterally normal    Neurologic   Sensory - normal   Muscle Strength and Tone    Right Deltoid - 5/5    Left Deltoid - 5/5    Right Bicep - 5/5    Left Bicep - 5/5    Right Tricep - 5/5    Left Tricep - 5/5    Right Wrist Extensors - 5/5    Left Wrist Extensors - 5/5    Right Wrist Flexors - 5/5    Left Wrist Flexors - 5/5    Right Intrinsics - 5/5    Left Intrinsics - 5/5   Reflexes    Right Wrist - 2+ brachioradialis     Left Wrist - 2+ brachioradialis     Right Elbow - 2+ biceps and triceps    Left Elbow - 2+ biceps and triceps    Musculoskeletal   Cervical Spine    Inspection and Palpation - no swelling   ROJM    Flexion - 50 degrees    Extension - 60 degrees    Right Rotation - 70 degrees    Left Rotation - 70 degrees   Deformities/Malalignments/Discrepancies  - Lordosis   Functional Testing    Foraminal Compression/Spurling's Test negative                Imaging/Studies  Imaging Results (last 7 days)     Procedure Component Value Units Date/Time    XR Spine Cervical 2 View [[id number]] Resulted:  06/19/19 1455     Updated:  06/19/19 1456    Narrative:       Cervical Spine X-Ray    Indication: Pain  Views: AP and  Lateral    Findings: Small osteophytes and degenerative changes of disc spaces  No fracture  No bony lesions  Soft tissues normal    No prior studies are available for comparison.              Assessment/Plan        ICD-10-CM ICD-9-CM   1. Pain R52 780.96   2. DDD (degenerative disc disease), cervical M50.30 722.4   3. Neck pain M54.2 723.1   4. Supraspinatus tendinitis, right M75.91 726.10       Orders Placed This Encounter   Procedures   • XR Spine Cervical 2 View   • MRI Cervical Spine Without Contrast      He has worsening shoulder radicular pain with increasing neck pain.  Radiographs show degenerative disc disease.  Plan will be an MRI of the C-spine.  I will see him back after that.    Medical Decision Making  Management Options : over-the-counter medicine  Data/Risk: radiology tests and independent visualization of imaging, lab tests, or EMG/NCV    Erick Hoyt MD  06/19/19  2:56 PM         EMR Dragon/Transcription disclaimer:  Much of this encounter note is an electronic transcription of spoken language to printed text. Electronic transcription of spoken language may permit erroneous, or at times, nonsensical words or phrases to be inadvertently transcribed. Although I have reviewed the note for such errors, some may still exist.

## 2019-07-05 DIAGNOSIS — I25.10 MULTIPLE VESSEL CORONARY ARTERY DISEASE: ICD-10-CM

## 2019-07-05 DIAGNOSIS — I10 BENIGN ESSENTIAL HYPERTENSION: ICD-10-CM

## 2019-07-05 RX ORDER — METOPROLOL TARTRATE 50 MG/1
TABLET, FILM COATED ORAL
Qty: 30 TABLET | Refills: 2 | Status: SHIPPED | OUTPATIENT
Start: 2019-07-05 | End: 2019-10-03 | Stop reason: SDUPTHER

## 2019-07-23 DIAGNOSIS — I10 BENIGN ESSENTIAL HYPERTENSION: ICD-10-CM

## 2019-07-23 RX ORDER — AMLODIPINE BESYLATE 5 MG/1
TABLET ORAL
Qty: 30 TABLET | Refills: 0 | Status: SHIPPED | OUTPATIENT
Start: 2019-07-23 | End: 2020-07-21 | Stop reason: SDUPTHER

## 2019-08-05 RX ORDER — MEMANTINE HYDROCHLORIDE 10 MG/1
TABLET ORAL
Qty: 60 TABLET | Refills: 11 | Status: SHIPPED | OUTPATIENT
Start: 2019-08-05 | End: 2020-08-10

## 2019-10-03 DIAGNOSIS — I10 BENIGN ESSENTIAL HYPERTENSION: ICD-10-CM

## 2019-10-03 DIAGNOSIS — I25.10 MULTIPLE VESSEL CORONARY ARTERY DISEASE: ICD-10-CM

## 2019-10-04 RX ORDER — METOPROLOL TARTRATE 50 MG/1
TABLET, FILM COATED ORAL
Qty: 30 TABLET | Refills: 1 | Status: SHIPPED | OUTPATIENT
Start: 2019-10-04 | End: 2019-12-08 | Stop reason: SDUPTHER

## 2019-10-14 RX ORDER — FUROSEMIDE 20 MG/1
TABLET ORAL
Qty: 90 TABLET | Refills: 0 | Status: SHIPPED | OUTPATIENT
Start: 2019-10-14 | End: 2020-01-06

## 2019-11-02 DIAGNOSIS — E78.5 HYPERLIPIDEMIA LDL GOAL <70: ICD-10-CM

## 2019-11-04 RX ORDER — ATORVASTATIN CALCIUM 80 MG/1
TABLET, FILM COATED ORAL
Qty: 90 TABLET | Refills: 2 | Status: SHIPPED | OUTPATIENT
Start: 2019-11-04 | End: 2020-07-27

## 2019-11-04 RX ORDER — HYDROCHLOROTHIAZIDE 12.5 MG/1
12.5 CAPSULE, GELATIN COATED ORAL DAILY
Qty: 90 CAPSULE | Refills: 3 | Status: SHIPPED | OUTPATIENT
Start: 2019-11-04 | End: 2020-07-21 | Stop reason: SDUPTHER

## 2019-11-16 DIAGNOSIS — F33.1 MODERATE EPISODE OF RECURRENT MAJOR DEPRESSIVE DISORDER (HCC): ICD-10-CM

## 2019-11-18 DIAGNOSIS — F33.1 MODERATE EPISODE OF RECURRENT MAJOR DEPRESSIVE DISORDER (HCC): ICD-10-CM

## 2019-11-18 RX ORDER — SERTRALINE HYDROCHLORIDE 100 MG/1
TABLET, FILM COATED ORAL
Qty: 60 TABLET | Refills: 0 | Status: SHIPPED | OUTPATIENT
Start: 2019-11-18 | End: 2019-11-18 | Stop reason: SDUPTHER

## 2019-11-18 RX ORDER — SERTRALINE HYDROCHLORIDE 100 MG/1
TABLET, FILM COATED ORAL
Qty: 60 TABLET | Refills: 1 | Status: SHIPPED | OUTPATIENT
Start: 2019-11-18 | End: 2020-02-17

## 2019-12-08 DIAGNOSIS — I10 BENIGN ESSENTIAL HYPERTENSION: ICD-10-CM

## 2019-12-08 DIAGNOSIS — I25.10 MULTIPLE VESSEL CORONARY ARTERY DISEASE: ICD-10-CM

## 2019-12-09 ENCOUNTER — TELEPHONE (OUTPATIENT)
Dept: FAMILY MEDICINE CLINIC | Facility: CLINIC | Age: 59
End: 2019-12-09

## 2019-12-09 DIAGNOSIS — I10 BENIGN ESSENTIAL HYPERTENSION: ICD-10-CM

## 2019-12-09 RX ORDER — METOPROLOL TARTRATE 50 MG/1
TABLET, FILM COATED ORAL
Qty: 30 TABLET | Refills: 0 | Status: SHIPPED | OUTPATIENT
Start: 2019-12-09 | End: 2020-01-06

## 2019-12-09 RX ORDER — LISINOPRIL 20 MG/1
TABLET ORAL
Qty: 30 TABLET | Refills: 1 | Status: SHIPPED | OUTPATIENT
Start: 2019-12-09 | End: 2020-02-03

## 2020-01-02 ENCOUNTER — OFFICE VISIT (OUTPATIENT)
Dept: FAMILY MEDICINE CLINIC | Facility: CLINIC | Age: 60
End: 2020-01-02

## 2020-01-02 VITALS
HEART RATE: 68 BPM | TEMPERATURE: 97.8 F | BODY MASS INDEX: 26.49 KG/M2 | HEIGHT: 65 IN | SYSTOLIC BLOOD PRESSURE: 118 MMHG | WEIGHT: 159 LBS | DIASTOLIC BLOOD PRESSURE: 58 MMHG | RESPIRATION RATE: 18 BRPM

## 2020-01-02 DIAGNOSIS — R20.0 NUMBNESS IN FEET: ICD-10-CM

## 2020-01-02 DIAGNOSIS — R73.9 HYPERGLYCEMIA: ICD-10-CM

## 2020-01-02 DIAGNOSIS — E78.00 PURE HYPERCHOLESTEROLEMIA: ICD-10-CM

## 2020-01-02 DIAGNOSIS — I25.10 MULTIPLE VESSEL CORONARY ARTERY DISEASE: ICD-10-CM

## 2020-01-02 DIAGNOSIS — F33.1 MODERATE EPISODE OF RECURRENT MAJOR DEPRESSIVE DISORDER (HCC): ICD-10-CM

## 2020-01-02 DIAGNOSIS — R53.83 OTHER FATIGUE: ICD-10-CM

## 2020-01-02 DIAGNOSIS — L98.9 SKIN LESION: ICD-10-CM

## 2020-01-02 DIAGNOSIS — R06.09 DYSPNEA ON EXERTION: ICD-10-CM

## 2020-01-02 DIAGNOSIS — I10 BENIGN ESSENTIAL HYPERTENSION: Primary | ICD-10-CM

## 2020-01-02 DIAGNOSIS — M62.838 MUSCLE SPASM: ICD-10-CM

## 2020-01-02 PROCEDURE — 99214 OFFICE O/P EST MOD 30 MIN: CPT | Performed by: FAMILY MEDICINE

## 2020-01-02 NOTE — PROGRESS NOTES
Assessment/Plan       Problems Addressed this Visit        Respiratory    Dyspnea on exertion    Relevant Orders    Ambulatory Referral to Cardiology       Other    Depression    Hyperglycemia    Relevant Orders    Hemoglobin A1c      Other Visit Diagnoses     Benign essential hypertension    -  Primary    Relevant Orders    CBC & Differential    Comprehensive Metabolic Panel    Multiple vessel coronary artery disease        Relevant Orders    Comprehensive Metabolic Panel    Lipid Panel With / Chol / HDL Ratio    Ambulatory Referral to Cardiology    Pure hypercholesterolemia        Relevant Orders    Comprehensive Metabolic Panel    Lipid Panel With / Chol / HDL Ratio    Numbness in feet        Relevant Orders    Vitamin B12    Skin lesion        Relevant Orders    Ambulatory Referral to Dermatology    Other fatigue        Relevant Orders    CBC & Differential    Vitamin B12    TSH    Muscle spasm        Relevant Orders    Magnesium            Follow up: Return for follow up depends on review of labs and testing.     DISCUSSION  Multiple medical problems as noted.    Hypertension.  Blood pressure stable.  Continue current medication.    Coronary disease.  Recommend follow-up with cardiology.  Especially since having some dyspnea on exertion.  No chest pain.  Recommend emergency room evaluation if chest pain develops or worsens with breathing.  He agrees.    Depression.  Will check labs as noted.  May need to increase the sertraline.    Numbness in feet.  Positive alcohol use continues.  Check labs including B12.    Skin lesions of right forearm and right thigh.  Recommend refer to dermatology for evaluation.  Can also check finger.    Hyperglycemia.  Check A1c.    Fatigue.  Check labs as noted.    Complains of muscle spasms.  Will check magnesium level.    Shortness of breath.  Refer to cardiology.    Check labs as noted           MEDICATIONS PRESCRIBED  Requested Prescriptions      No prescriptions requested or  ordered in this encounter                -------------------------------------------    Subjective     Chief Complaint   Patient presents with   • Hypertension   • Med Refill         Hypertension   This is a chronic problem. The current episode started more than 1 year ago. The problem is unchanged. The problem is controlled. Associated symptoms include shortness of breath (at times , with activity). Pertinent negatives include no chest pain, headaches or peripheral edema. (Legs get tired. ) There are no associated agents to hypertension. Current antihypertension treatment includes ACE inhibitors, diuretics and calcium channel blockers. The current treatment provides moderate improvement. There are no compliance problems.  Hypertensive end-organ damage includes CAD/MI (MI and stent placed. ). There is no history of angina or kidney disease. There is no history of chronic renal disease.       Feet   Numb from mid foot down  Off balance   Hard to walk      Skin lesions  On right arm and right leg  Won't heal  On back of the left elbow but getting better  Right index finger irritated and cracks open     Spot on the left eye lid (upper)    CAD  Stent and MI in the past  Had been seeing Dr Crouch with [facility]  Was not gómez to see due to no insurance      Alcohol: has cut back  Few drinks every day  2 per day mixed    Decreased appetite  lost 2-3 pounds  Sleeping fair  sertraline 100 mg daily  Has been depressed more  No SI  Feels down most days            Social History     Tobacco Use   Smoking Status Former Smoker   • Packs/day: 1.50   • Years: 35.00   • Pack years: 52.50   • Types: Cigarettes   • Last attempt to quit:    • Years since quittin.0   Smokeless Tobacco Never Used          Past Medical History,Medications, Allergies, and social history was reviewed.          Review of Systems   Constitutional: Negative.    HENT: Negative.    Respiratory: Positive for shortness of breath (at times , with activity).   "  Cardiovascular: Negative.  Negative for chest pain.   Gastrointestinal: Negative.    Musculoskeletal: Negative.    Skin: Positive for skin lesions.   Psychiatric/Behavioral: Negative.  Positive for depressed mood.       Objective     Vitals:    01/02/20 1556   BP: 118/58   Pulse: 68   Resp: 18   Temp: 97.8 °F (36.6 °C)   Weight: 72.1 kg (159 lb)   Height: 165.1 cm (65\")          Physical Exam   Constitutional: Vital signs are normal. He appears well-developed and well-nourished.   HENT:   Head: Normocephalic and atraumatic.   Right Ear: Hearing, tympanic membrane, external ear and ear canal normal.   Left Ear: Hearing, tympanic membrane, external ear and ear canal normal.   Mouth/Throat: Oropharynx is clear and moist.   Eyes: Pupils are equal, round, and reactive to light. Conjunctivae, EOM and lids are normal.   Neck: Normal range of motion. Neck supple. No thyromegaly present.   Cardiovascular: Normal rate, regular rhythm and normal heart sounds. Exam reveals no friction rub.   No murmur heard.  Pulmonary/Chest: Effort normal and breath sounds normal. No respiratory distress. He has no wheezes. He has no rales.   Abdominal: Soft. Normal appearance and bowel sounds are normal. He exhibits no distension and no mass. There is tenderness (mild ruq). There is no rebound and no guarding.   Musculoskeletal: He exhibits no edema.   Neurological: He is alert. He has normal strength.   Skin: Skin is warm and dry.   Psychiatric: He has a normal mood and affect. His speech is normal. Cognition and memory are normal.   Nursing note and vitals reviewed.    On right forearm and right medial thigh: 1 cm raised nodule with irritation. Healing on left elbow  Index finger on right is red and irritated.                 Anshul Martinez MD    "

## 2020-01-03 DIAGNOSIS — E87.6 HYPOKALEMIA: Primary | ICD-10-CM

## 2020-01-03 LAB
ALBUMIN SERPL-MCNC: 4.7 G/DL (ref 3.6–4.8)
ALBUMIN/GLOB SERPL: 2 {RATIO} (ref 1.2–2.2)
ALP SERPL-CCNC: 142 IU/L (ref 39–117)
ALT SERPL-CCNC: 83 IU/L (ref 0–44)
AST SERPL-CCNC: 82 IU/L (ref 0–40)
BASOPHILS # BLD AUTO: 0 X10E3/UL (ref 0–0.2)
BASOPHILS NFR BLD AUTO: 0 %
BILIRUB SERPL-MCNC: 0.5 MG/DL (ref 0–1.2)
BUN SERPL-MCNC: 20 MG/DL (ref 8–27)
BUN/CREAT SERPL: 26 (ref 10–24)
CALCIUM SERPL-MCNC: 9.2 MG/DL (ref 8.6–10.2)
CHLORIDE SERPL-SCNC: 104 MMOL/L (ref 96–106)
CHOLEST SERPL-MCNC: 146 MG/DL (ref 100–199)
CHOLEST/HDLC SERPL: 2 RATIO (ref 0–5)
CO2 SERPL-SCNC: 21 MMOL/L (ref 20–29)
CREAT SERPL-MCNC: 0.76 MG/DL (ref 0.76–1.27)
EOSINOPHIL # BLD AUTO: 0.3 X10E3/UL (ref 0–0.4)
EOSINOPHIL NFR BLD AUTO: 3 %
ERYTHROCYTE [DISTWIDTH] IN BLOOD BY AUTOMATED COUNT: 13.1 % (ref 12.3–15.4)
GLOBULIN SER CALC-MCNC: 2.4 G/DL (ref 1.5–4.5)
GLUCOSE SERPL-MCNC: 104 MG/DL (ref 65–99)
HBA1C MFR BLD: 5.9 % (ref 4.8–5.6)
HCT VFR BLD AUTO: 39.1 % (ref 37.5–51)
HDLC SERPL-MCNC: 74 MG/DL
HGB BLD-MCNC: 13.2 G/DL (ref 13–17.7)
IMM GRANULOCYTES # BLD AUTO: 0 X10E3/UL (ref 0–0.1)
IMM GRANULOCYTES NFR BLD AUTO: 0 %
LDLC SERPL CALC-MCNC: 53 MG/DL (ref 0–99)
LYMPHOCYTES # BLD AUTO: 2.8 X10E3/UL (ref 0.7–3.1)
LYMPHOCYTES NFR BLD AUTO: 30 %
MAGNESIUM SERPL-MCNC: 1.7 MG/DL (ref 1.6–2.3)
MCH RBC QN AUTO: 30.6 PG (ref 26.6–33)
MCHC RBC AUTO-ENTMCNC: 33.8 G/DL (ref 31.5–35.7)
MCV RBC AUTO: 91 FL (ref 79–97)
MONOCYTES # BLD AUTO: 0.6 X10E3/UL (ref 0.1–0.9)
MONOCYTES NFR BLD AUTO: 7 %
NEUTROPHILS # BLD AUTO: 5.7 X10E3/UL (ref 1.4–7)
NEUTROPHILS NFR BLD AUTO: 60 %
PLATELET # BLD AUTO: 246 X10E3/UL (ref 150–450)
POTASSIUM SERPL-SCNC: 3.3 MMOL/L (ref 3.5–5.2)
PROT SERPL-MCNC: 7.1 G/DL (ref 6–8.5)
RBC # BLD AUTO: 4.32 X10E6/UL (ref 4.14–5.8)
SODIUM SERPL-SCNC: 142 MMOL/L (ref 134–144)
TRIGL SERPL-MCNC: 93 MG/DL (ref 0–149)
TSH SERPL DL<=0.005 MIU/L-ACNC: 1.37 UIU/ML (ref 0.45–4.5)
VIT B12 SERPL-MCNC: 767 PG/ML (ref 232–1245)
VLDLC SERPL CALC-MCNC: 19 MG/DL (ref 5–40)
WBC # BLD AUTO: 9.5 X10E3/UL (ref 3.4–10.8)

## 2020-01-03 RX ORDER — POTASSIUM CHLORIDE 20 MEQ/1
20 TABLET, EXTENDED RELEASE ORAL DAILY
Qty: 3 TABLET | Refills: 0 | Status: SHIPPED | OUTPATIENT
Start: 2020-01-03 | End: 2020-01-06

## 2020-01-04 DIAGNOSIS — I25.10 MULTIPLE VESSEL CORONARY ARTERY DISEASE: ICD-10-CM

## 2020-01-04 DIAGNOSIS — I10 BENIGN ESSENTIAL HYPERTENSION: ICD-10-CM

## 2020-01-06 ENCOUNTER — RESULTS ENCOUNTER (OUTPATIENT)
Dept: FAMILY MEDICINE CLINIC | Facility: CLINIC | Age: 60
End: 2020-01-06

## 2020-01-06 DIAGNOSIS — E87.6 HYPOKALEMIA: ICD-10-CM

## 2020-01-06 RX ORDER — METOPROLOL TARTRATE 50 MG/1
TABLET, FILM COATED ORAL
Qty: 30 TABLET | Refills: 5 | Status: SHIPPED | OUTPATIENT
Start: 2020-01-06 | End: 2020-07-06

## 2020-01-06 RX ORDER — FUROSEMIDE 20 MG/1
TABLET ORAL
Qty: 90 TABLET | Refills: 0 | Status: SHIPPED | OUTPATIENT
Start: 2020-01-06 | End: 2020-04-06

## 2020-01-07 LAB
ALBUMIN SERPL-MCNC: 4.5 G/DL (ref 3.5–5.2)
ALBUMIN/GLOB SERPL: 1.6 G/DL
ALP SERPL-CCNC: 170 U/L (ref 39–117)
ALT SERPL-CCNC: 70 U/L (ref 1–41)
AST SERPL-CCNC: 77 U/L (ref 1–40)
BILIRUB SERPL-MCNC: 0.2 MG/DL (ref 0.2–1.2)
BUN SERPL-MCNC: 10 MG/DL (ref 8–23)
BUN/CREAT SERPL: 12.2 (ref 7–25)
CALCIUM SERPL-MCNC: 9.4 MG/DL (ref 8.6–10.5)
CHLORIDE SERPL-SCNC: 100 MMOL/L (ref 98–107)
CO2 SERPL-SCNC: 26.6 MMOL/L (ref 22–29)
CREAT SERPL-MCNC: 0.82 MG/DL (ref 0.76–1.27)
GLOBULIN SER CALC-MCNC: 2.8 GM/DL
GLUCOSE SERPL-MCNC: 125 MG/DL (ref 65–99)
POTASSIUM SERPL-SCNC: 4.5 MMOL/L (ref 3.5–5.2)
PROT SERPL-MCNC: 7.3 G/DL (ref 6–8.5)
SODIUM SERPL-SCNC: 139 MMOL/L (ref 136–145)

## 2020-01-13 DIAGNOSIS — R79.89 ELEVATED LIVER FUNCTION TESTS: ICD-10-CM

## 2020-01-13 DIAGNOSIS — E87.6 HYPOKALEMIA: ICD-10-CM

## 2020-01-13 DIAGNOSIS — R20.0 NUMBNESS IN FEET: Primary | ICD-10-CM

## 2020-01-13 NOTE — PROGRESS NOTES
[facility]  Office Visit Note    Encounter Date:01/14/2020    Patient ID: Justice Kiser is a 59 y.o. male who resides in [place], [place].  He is a retired  for [employer].    CC/Reason for visit:    • Coronary artery disease, chest pain         Problem List Items Addressed This Visit        Cardiovascular and Mediastinum    Coronary artery disease involving native coronary artery of native heart with angina pectoris (CMS/HCC) - Primary    Overview     · Cardiac catheterization for NSTEMI (1/2012):  Severe 1-vessel CAD (left circumflex subtotal occlusion). Mild disease of the other coronary arteries. Successful PCI of the left circumflex using a Xience MUNA.     · Exercise nuclear stress (10/26/18): Exercise for 6.5 minutes with replication chest pain. Moderate sized inferolateral infarct. LVEF 53%         Current Assessment & Plan     · CCS/NYHA class II-III symptoms  · Schedule myocardial perfusion study  · Continue aspirin 81 mg daily  · Continue metoprolol tartrate 50 mg twice daily         Relevant Orders    Stress Test With Myocardial Perfusion (1 Day)    PVD (peripheral vascular disease) with claudication (CMS/[facility])    Current Assessment & Plan     · Patient has decreased pedal pulses and reports Cammie's class II-III symptoms  · Schedule SHANTEL studies  · Aspirin and statin therapy         Relevant Orders    Doppler Arterial Multi Level Lower Extremity - Bilateral CAR    Essential hypertension    Current Assessment & Plan     · Hypertension is controlled  · Continue amlodipine 5 mg daily  · Continue lisinopril 20 mg daily  · Continue metoprolol tartrate 50 mg twice daily  · Continue hydrochlorothiazide 12.5 mg daily         Hyperlipidemia LDL goal <70    Overview     · High intensity statin therapy indicated given the presence of coronary disease         Current Assessment & Plan     · LDL currently at goal with a result of 53  · Continue Lipitor 80 mg  daily         Mitral valve insufficiency    Overview     · Echo (9/2018): Normal LVEF.  Moderate MR.         Current Assessment & Plan     · NYHA class II symptoms  · Continue Lasix 20 mg daily              Respiratory    Dyspnea on exertion    Current Assessment & Plan     · Stable NYHA class II symptoms             The patient reports claudication symptoms and progressive NYHA class II-III symptoms which could be anginal equivalent.  He reports the symptoms are similar to what he experienced in the past.  We will obtain a myocardial perfusion study.  On physical exam his pedal pulses were weak and decreased.  We will schedule him for a ankle brachial index study to rule out peripheral arterial disease as a cause of his symptoms.  We will continue current medications.       · Obtain myocardial perfusion study  · Schedule ankle-brachial index study to rule out peripheral arterial disease  · Continue current medications.  · Other recommendations to follow  Return in about 6 months (around 7/14/2020), or if symptoms worsen or fail to improve, for Follow-up with Dr. Crouch next visit.              Justice Kiser returns today for follow-up of his coronary artery disease and cardiac risk factors.  At his last visit he underwent left shoulder surgery.  The patient reports since his last visit he has been having increased shortness of breath with mild to moderate activities.  He is also been experiencing increased fatigue.  When he walks his legs burn and his feet often become numb.  His symptoms are reminiscent of how he felt prior to PCI.  He did have a stress test in 2018 that was negative for ischemia and consistent with known prior MI.  The patient reports that he was told after his cardiac cath he had mild disease in other vessels and he is worried this has advanced.  He has known moderate mitral regurgitation noted on echocardiogram in 2018.  He denies chest pain/pressure or tightness, or orthopnea.  He does  experience lightheadedness and palpitations at times.    Review of Systems   Constitution: Negative for malaise/fatigue.   Eyes: Negative for vision loss in left eye and vision loss in right eye.   Cardiovascular: Positive for dyspnea on exertion. Negative for chest pain, near-syncope, orthopnea, palpitations, paroxysmal nocturnal dyspnea and syncope.   Respiratory: Positive for cough, shortness of breath and wheezing.    Musculoskeletal: Negative for myalgias.   Neurological: Positive for light-headedness. Negative for brief paralysis, excessive daytime sleepiness, focal weakness, numbness, paresthesias and weakness.   All other systems reviewed and are negative.      The patient's past medical, social, family history and ROS reviewed in the patient's electronic medical record.    No Known Allergies      Current Outpatient Medications:   •  amLODIPine (NORVASC) 5 MG tablet, TAKE 1 TABLET BY MOUTH EVERY DAY, Disp: 30 tablet, Rfl: 0  •  aspirin 81 MG EC tablet, Take 81 mg by mouth Daily., Disp: , Rfl:   •  atorvastatin (LIPITOR) 80 MG tablet, TAKE 1 TABLET BY MOUTH ONCE DAILY AT BEDTIME, Disp: 90 tablet, Rfl: 2  •  [employer] PURELAX powder, , Disp: , Rfl:   •  donepezil (ARICEPT) 10 MG tablet, Take 10 mg by mouth Daily., Disp: , Rfl: 11  •  furosemide (LASIX) 20 MG tablet, TAKE 1 TABLET BY MOUTH ONCE DAILY **NEEDS APPOINTMENT FOR REFILLS**, Disp: 90 tablet, Rfl: 0  •  hydroCHLOROthiazide (MICROZIDE) 12.5 MG capsule, Take 1 capsule by mouth Daily., Disp: 90 capsule, Rfl: 3  •  lisinopril (PRINIVIL,ZESTRIL) 20 MG tablet, TAKE 1 TABLET BY MOUTH ONCE DAILY, Disp: 30 tablet, Rfl: 1  •  memantine (NAMENDA) 10 MG tablet, TAKE 1 TABLET BY MOUTH TWICE A DAY, Disp: 60 tablet, Rfl: 11  •  metoprolol tartrate (LOPRESSOR) 50 MG tablet, TAKE 1/2 (ONE-HALF) TABLET BY MOUTH TWICE DAILY, Disp: 30 tablet, Rfl: 5  •  Multiple Vitamins-Minerals (CENTRUM SILVER) tablet, Take 1 tablet by mouth Daily., Disp: , Rfl:   •  omeprazole (priLOSEC) 40  "MG capsule, TAKE 1 CAPSULE EVERY DAY, Disp: 30 capsule, Rfl: 1  •  sertraline (ZOLOFT) 100 MG tablet, TAKE 2 TABLETS BY MOUTH ONCE DAILY, Disp: 60 tablet, Rfl: 1  •  thiamine (VITAMIN B1) 100 MG tablet, Take 1 tablet by mouth Daily., Disp: 30 tablet, Rfl: 11    Past Medical History:   Diagnosis Date   • Acute maxillary sinusitis    • Elevated LFTs    • History of colonic polyps    • History of methicillin resistant Staphylococcus aureus    • History of myocardial infarction    • Hyperlipidemia    • Hypertension    • Left hand pain    • Left shoulder pain    • Personal history of congestive heart failure    • Right hip pain    • Rotator cuff syndrome        Past Surgical History:   Procedure Laterality Date   • CORONARY ANGIOPLASTY WITH STENT PLACEMENT  2012    Circumflex Xscience V 3.5 MM x 23 mm   • GALLBLADDER SURGERY     • SHOULDER SURGERY  2018    Dr. Hoyt Rotator cuff repair       Family History   Problem Relation Age of Onset   • Hypertension Mother    • Heart attack Mother    • Hyperlipidemia Mother    • Alcohol abuse Mother    • Alcohol abuse Father    • Dementia Sister    • Heart disease Sister    • Alcohol abuse Maternal Aunt    • Alcohol abuse Paternal Uncle    • Diabetes Maternal Grandmother    • Heart disease Brother    • Stroke Sister        Social History     Tobacco Use   • Smoking status: Former Smoker     Packs/day: 1.50     Years: 35.00     Pack years: 52.50     Types: Cigarettes     Last attempt to quit:      Years since quittin.0   • Smokeless tobacco: Never Used   Substance Use Topics   • Alcohol use: Yes     Comment: 5 mixed drinks per day.           Blood pressure 124/60, pulse 58, height 165.1 cm (65\"), weight 73.4 kg (161 lb 12.8 oz), SpO2 96 %.  Body mass index is 26.92 kg/m².  Vitals:    20 1407   Patient Position: Sitting       Physical Exam   Constitutional: He is oriented to person, place, and time. He appears well-developed and well-nourished.   HENT: "   Head: Normocephalic and atraumatic.   Eyes: Pupils are equal, round, and reactive to light. No scleral icterus.   Neck: No JVD present. Carotid bruit is not present. No thyromegaly present.   Cardiovascular: Normal rate, regular rhythm, S1 normal and S2 normal. Exam reveals decreased pulses. Exam reveals no gallop.   No murmur heard.  Decreased pedal pulses right >left   Pulmonary/Chest: Effort normal and breath sounds normal.   Abdominal: Soft. He exhibits no mass. There is no hepatosplenomegaly. There is no tenderness.   Neurological: He is alert and oriented to person, place, and time.   Skin: Skin is warm and dry. No cyanosis. Nails show no clubbing.   Psychiatric: He has a normal mood and affect. His behavior is normal.       Data Review (reviewed with patient):     Procedures    Lab Results   Component Value Date    TRIG 93 01/02/2020    HDL 74 01/02/2020    LDL 53 01/02/2020    AST 77 (H) 01/07/2020    ALT 70 (H) 01/07/2020       Lab Results   Component Value Date    HGBA1C 5.9 (H) 01/02/2020         CAROL Marina    1/14/2020

## 2020-01-14 ENCOUNTER — OFFICE VISIT (OUTPATIENT)
Dept: CARDIOLOGY | Facility: CLINIC | Age: 60
End: 2020-01-14

## 2020-01-14 VITALS
HEART RATE: 58 BPM | BODY MASS INDEX: 26.96 KG/M2 | DIASTOLIC BLOOD PRESSURE: 60 MMHG | WEIGHT: 161.8 LBS | HEIGHT: 65 IN | OXYGEN SATURATION: 96 % | SYSTOLIC BLOOD PRESSURE: 124 MMHG

## 2020-01-14 DIAGNOSIS — I34.0 NONRHEUMATIC MITRAL VALVE REGURGITATION: ICD-10-CM

## 2020-01-14 DIAGNOSIS — I25.119 CORONARY ARTERY DISEASE INVOLVING NATIVE CORONARY ARTERY OF NATIVE HEART WITH ANGINA PECTORIS (HCC): Primary | ICD-10-CM

## 2020-01-14 DIAGNOSIS — E78.5 HYPERLIPIDEMIA LDL GOAL <70: ICD-10-CM

## 2020-01-14 DIAGNOSIS — R06.09 DYSPNEA ON EXERTION: ICD-10-CM

## 2020-01-14 DIAGNOSIS — I73.9 PVD (PERIPHERAL VASCULAR DISEASE) WITH CLAUDICATION (HCC): ICD-10-CM

## 2020-01-14 DIAGNOSIS — I10 ESSENTIAL HYPERTENSION: ICD-10-CM

## 2020-01-14 PROCEDURE — 99214 OFFICE O/P EST MOD 30 MIN: CPT | Performed by: NURSE PRACTITIONER

## 2020-01-14 RX ORDER — ASPIRIN 81 MG/1
81 TABLET ORAL DAILY
COMMUNITY
End: 2020-07-21 | Stop reason: SDUPTHER

## 2020-01-14 NOTE — ASSESSMENT & PLAN NOTE
· CCS/NYHA class II-III symptoms  · Schedule myocardial perfusion study  · Continue aspirin 81 mg daily  · Continue metoprolol tartrate 50 mg twice daily

## 2020-01-14 NOTE — ASSESSMENT & PLAN NOTE
· Patient has decreased pedal pulses and reports [place]'s class II-III symptoms  · Schedule SHANTEL studies  · Aspirin and statin therapy

## 2020-01-14 NOTE — ASSESSMENT & PLAN NOTE
· Hypertension is controlled  · Continue amlodipine 5 mg daily  · Continue lisinopril 20 mg daily  · Continue metoprolol tartrate 50 mg twice daily  · Continue hydrochlorothiazide 12.5 mg daily

## 2020-02-01 DIAGNOSIS — I10 BENIGN ESSENTIAL HYPERTENSION: ICD-10-CM

## 2020-02-03 ENCOUNTER — HOSPITAL ENCOUNTER (OUTPATIENT)
Dept: NEUROLOGY | Facility: HOSPITAL | Age: 60
Discharge: HOME | End: 2020-02-03
Admitting: FAMILY MEDICINE

## 2020-02-03 DIAGNOSIS — R20.0 NUMBNESS IN FEET: ICD-10-CM

## 2020-02-03 PROCEDURE — 95912 NRV CNDJ TEST 11-12 STUDIES: CPT

## 2020-02-03 PROCEDURE — 95886 MUSC TEST DONE W/N TEST COMP: CPT

## 2020-02-03 RX ORDER — LISINOPRIL 20 MG/1
TABLET ORAL
Qty: 30 TABLET | Refills: 1 | Status: SHIPPED | OUTPATIENT
Start: 2020-02-03 | End: 2020-03-10 | Stop reason: SDUPTHER

## 2020-02-12 ENCOUNTER — HOSPITAL ENCOUNTER (OUTPATIENT)
Dept: CARDIOLOGY | Facility: HOSPITAL | Age: 60
Discharge: HOME | End: 2020-02-12

## 2020-02-12 ENCOUNTER — HOSPITAL ENCOUNTER (OUTPATIENT)
Dept: CARDIOLOGY | Facility: HOSPITAL | Age: 60
Discharge: HOME | End: 2020-02-12
Admitting: NURSE PRACTITIONER

## 2020-02-12 VITALS — HEIGHT: 65 IN | WEIGHT: 161.82 LBS | BODY MASS INDEX: 26.96 KG/M2 | HEART RATE: 74 BPM

## 2020-02-12 VITALS — WEIGHT: 161 LBS | HEIGHT: 65 IN | BODY MASS INDEX: 26.82 KG/M2

## 2020-02-12 DIAGNOSIS — I25.119 CORONARY ARTERY DISEASE INVOLVING NATIVE CORONARY ARTERY OF NATIVE HEART WITH ANGINA PECTORIS (HCC): ICD-10-CM

## 2020-02-12 DIAGNOSIS — I73.9 PVD (PERIPHERAL VASCULAR DISEASE) WITH CLAUDICATION (HCC): ICD-10-CM

## 2020-02-12 LAB
BH CV LOWER ARTERIAL LEFT ABI RATIO: 1.2
BH CV LOWER ARTERIAL LEFT DORSALIS PEDIS SYS MAX: 126 MMHG
BH CV LOWER ARTERIAL LEFT GREAT TOE SYS MAX: 55 MMHG
BH CV LOWER ARTERIAL LEFT HIGH THIGH SYS MAX: 134 MMHG
BH CV LOWER ARTERIAL LEFT LOW THIGH SYS MAX: 133 MMHG
BH CV LOWER ARTERIAL LEFT POPLITEAL SYS MAX: 138 MMHG
BH CV LOWER ARTERIAL LEFT POST TIBIAL SYS MAX: 148 MMHG
BH CV LOWER ARTERIAL LEFT TBI RATIO: 0.43
BH CV LOWER ARTERIAL RIGHT ABI RATIO: 1.1
BH CV LOWER ARTERIAL RIGHT DORSALIS PEDIS SYS MAX: 140 MMHG
BH CV LOWER ARTERIAL RIGHT GREAT TOE SYS MAX: 93 MMHG
BH CV LOWER ARTERIAL RIGHT HIGH THIGH SYS MAX: 140 MMHG
BH CV LOWER ARTERIAL RIGHT LOW THIGH SYS MAX: 145 MMHG
BH CV LOWER ARTERIAL RIGHT POPLITEAL SYS MAX: 140 MMHG
BH CV LOWER ARTERIAL RIGHT POST TIBIAL SYS MAX: 127 MMHG
BH CV LOWER ARTERIAL RIGHT TBI RATIO: 0.72
BH CV STRESS BP STAGE 1: NORMAL
BH CV STRESS BP STAGE 2: NORMAL
BH CV STRESS DURATION MIN STAGE 1: 3
BH CV STRESS DURATION MIN STAGE 2: 3
BH CV STRESS DURATION MIN STAGE 3: 1
BH CV STRESS DURATION SEC STAGE 1: 0
BH CV STRESS DURATION SEC STAGE 2: 0
BH CV STRESS DURATION SEC STAGE 3: 50
BH CV STRESS GRADE STAGE 1: 10
BH CV STRESS GRADE STAGE 2: 12
BH CV STRESS GRADE STAGE 3: 14
BH CV STRESS HR STAGE 1: 99
BH CV STRESS HR STAGE 2: 121
BH CV STRESS HR STAGE 3: 144
BH CV STRESS METS STAGE 1: 5
BH CV STRESS METS STAGE 2: 7.5
BH CV STRESS METS STAGE 3: 10
BH CV STRESS O2 STAGE 3: 99
BH CV STRESS PROTOCOL 1: NORMAL
BH CV STRESS RECOVERY BP: NORMAL MMHG
BH CV STRESS RECOVERY HR: 81 BPM
BH CV STRESS RECOVERY O2: 99 %
BH CV STRESS SPEED STAGE 1: 1.7
BH CV STRESS SPEED STAGE 2: 2.5
BH CV STRESS SPEED STAGE 3: 3.4
BH CV STRESS STAGE 1: 1
BH CV STRESS STAGE 2: 2
BH CV STRESS STAGE 3: 3
LV EF NUC BP: 70 %
MAXIMAL PREDICTED HEART RATE: 159 BPM
PERCENT MAX PREDICTED HR: 90.57 %
STRESS BASELINE BP: NORMAL MMHG
STRESS BASELINE HR: 68 BPM
STRESS PERCENT HR: 107 %
STRESS POST ESTIMATED WORKLOAD: 8.6 METS
STRESS POST EXERCISE DUR MIN: 7 MIN
STRESS POST EXERCISE DUR SEC: 50 SEC
STRESS POST O2 SAT PEAK: 99 %
STRESS POST PEAK BP: NORMAL MMHG
STRESS POST PEAK HR: 144 BPM
STRESS TARGET HR: 135 BPM
UPPER ARTERIAL LEFT ARM BRACHIAL SYS MAX: 129 MMHG
UPPER ARTERIAL RIGHT ARM BRACHIAL SYS MAX: 119 MMHG

## 2020-02-12 PROCEDURE — 0 TECHNETIUM SESTAMIBI: Performed by: NURSE PRACTITIONER

## 2020-02-12 PROCEDURE — 78452 HT MUSCLE IMAGE SPECT MULT: CPT | Performed by: INTERNAL MEDICINE

## 2020-02-12 PROCEDURE — 93017 CV STRESS TEST TRACING ONLY: CPT

## 2020-02-12 PROCEDURE — 93923 UPR/LXTR ART STDY 3+ LVLS: CPT | Performed by: INTERNAL MEDICINE

## 2020-02-12 PROCEDURE — A9500 TC99M SESTAMIBI: HCPCS | Performed by: NURSE PRACTITIONER

## 2020-02-12 PROCEDURE — 93923 UPR/LXTR ART STDY 3+ LVLS: CPT

## 2020-02-12 PROCEDURE — 78452 HT MUSCLE IMAGE SPECT MULT: CPT

## 2020-02-12 PROCEDURE — 93018 CV STRESS TEST I&R ONLY: CPT | Performed by: INTERNAL MEDICINE

## 2020-02-12 RX ADMIN — TECHNETIUM TC 99M SESTAMIBI 1 DOSE: 1 INJECTION INTRAVENOUS at 11:00

## 2020-02-12 RX ADMIN — TECHNETIUM TC 99M SESTAMIBI 1 DOSE: 1 INJECTION INTRAVENOUS at 12:50

## 2020-02-13 ENCOUNTER — RESULTS ENCOUNTER (OUTPATIENT)
Dept: FAMILY MEDICINE CLINIC | Facility: CLINIC | Age: 60
End: 2020-02-13

## 2020-02-13 DIAGNOSIS — R79.89 ELEVATED LIVER FUNCTION TESTS: ICD-10-CM

## 2020-02-13 DIAGNOSIS — E87.6 HYPOKALEMIA: ICD-10-CM

## 2020-02-15 DIAGNOSIS — F33.1 MODERATE EPISODE OF RECURRENT MAJOR DEPRESSIVE DISORDER (HCC): ICD-10-CM

## 2020-02-17 RX ORDER — SERTRALINE HYDROCHLORIDE 100 MG/1
TABLET, FILM COATED ORAL
Qty: 60 TABLET | Refills: 2 | Status: SHIPPED | OUTPATIENT
Start: 2020-02-17 | End: 2020-05-18

## 2020-02-18 ENCOUNTER — APPOINTMENT (OUTPATIENT)
Dept: NEUROLOGY | Facility: HOSPITAL | Age: 60
End: 2020-02-18

## 2020-03-03 DIAGNOSIS — R10.13 EPIGASTRIC ABDOMINAL PAIN: ICD-10-CM

## 2020-03-03 DIAGNOSIS — K21.9 GASTROESOPHAGEAL REFLUX DISEASE, ESOPHAGITIS PRESENCE NOT SPECIFIED: ICD-10-CM

## 2020-03-10 DIAGNOSIS — I10 BENIGN ESSENTIAL HYPERTENSION: ICD-10-CM

## 2020-03-10 DIAGNOSIS — K21.9 GASTROESOPHAGEAL REFLUX DISEASE, ESOPHAGITIS PRESENCE NOT SPECIFIED: ICD-10-CM

## 2020-03-10 DIAGNOSIS — R10.13 EPIGASTRIC ABDOMINAL PAIN: ICD-10-CM

## 2020-03-10 RX ORDER — OMEPRAZOLE 40 MG/1
40 CAPSULE, DELAYED RELEASE ORAL DAILY
Qty: 90 CAPSULE | Refills: 1 | Status: SHIPPED | OUTPATIENT
Start: 2020-03-10 | End: 2020-08-26 | Stop reason: SDUPTHER

## 2020-03-10 RX ORDER — LISINOPRIL 20 MG/1
20 TABLET ORAL DAILY
Qty: 90 TABLET | Refills: 1 | Status: SHIPPED | OUTPATIENT
Start: 2020-03-10 | End: 2020-07-21 | Stop reason: SDUPTHER

## 2020-03-30 RX ORDER — DONEPEZIL HYDROCHLORIDE 10 MG/1
TABLET, FILM COATED ORAL
Qty: 60 TABLET | Refills: 0 | Status: SHIPPED | OUTPATIENT
Start: 2020-03-30 | End: 2020-03-30

## 2020-03-30 RX ORDER — DONEPEZIL HYDROCHLORIDE 10 MG/1
TABLET, FILM COATED ORAL
Qty: 60 TABLET | Refills: 0 | Status: SHIPPED | OUTPATIENT
Start: 2020-03-30 | End: 2020-06-01

## 2020-04-06 RX ORDER — FUROSEMIDE 20 MG/1
TABLET ORAL
Qty: 90 TABLET | Refills: 0 | Status: SHIPPED | OUTPATIENT
Start: 2020-04-06 | End: 2020-07-06

## 2020-05-16 DIAGNOSIS — F33.1 MODERATE EPISODE OF RECURRENT MAJOR DEPRESSIVE DISORDER (HCC): ICD-10-CM

## 2020-05-18 RX ORDER — SERTRALINE HYDROCHLORIDE 100 MG/1
TABLET, FILM COATED ORAL
Qty: 60 TABLET | Refills: 0 | Status: SHIPPED | OUTPATIENT
Start: 2020-05-18 | End: 2020-06-08

## 2020-06-01 RX ORDER — DONEPEZIL HYDROCHLORIDE 10 MG/1
TABLET, FILM COATED ORAL
Qty: 60 TABLET | Refills: 0 | Status: SHIPPED | OUTPATIENT
Start: 2020-06-01 | End: 2020-07-27

## 2020-06-02 ENCOUNTER — OFFICE VISIT (OUTPATIENT)
Dept: FAMILY MEDICINE CLINIC | Facility: CLINIC | Age: 60
End: 2020-06-02

## 2020-06-02 VITALS
HEIGHT: 65 IN | TEMPERATURE: 98.6 F | DIASTOLIC BLOOD PRESSURE: 58 MMHG | SYSTOLIC BLOOD PRESSURE: 114 MMHG | RESPIRATION RATE: 18 BRPM | BODY MASS INDEX: 28.89 KG/M2 | WEIGHT: 173.4 LBS | HEART RATE: 64 BPM

## 2020-06-02 DIAGNOSIS — M62.838 MUSCLE SPASM: ICD-10-CM

## 2020-06-02 DIAGNOSIS — E78.00 PURE HYPERCHOLESTEROLEMIA: ICD-10-CM

## 2020-06-02 DIAGNOSIS — I10 BENIGN ESSENTIAL HYPERTENSION: Primary | ICD-10-CM

## 2020-06-02 DIAGNOSIS — G60.9 IDIOPATHIC PERIPHERAL NEUROPATHY: ICD-10-CM

## 2020-06-02 DIAGNOSIS — R79.89 ELEVATED LIVER FUNCTION TESTS: ICD-10-CM

## 2020-06-02 DIAGNOSIS — R73.9 HYPERGLYCEMIA: ICD-10-CM

## 2020-06-02 DIAGNOSIS — F33.1 MODERATE EPISODE OF RECURRENT MAJOR DEPRESSIVE DISORDER (HCC): ICD-10-CM

## 2020-06-02 PROCEDURE — 99214 OFFICE O/P EST MOD 30 MIN: CPT | Performed by: FAMILY MEDICINE

## 2020-06-02 NOTE — PROGRESS NOTES
Assessment/Plan       Problems Addressed this Visit        Other    Depression    Hyperglycemia    Relevant Orders    Hemoglobin A1c      Other Visit Diagnoses     Benign essential hypertension    -  Primary    Relevant Orders    CBC & Differential    Comprehensive Metabolic Panel    Pure hypercholesterolemia        Relevant Orders    Comprehensive Metabolic Panel    Lipid Panel With / Chol / HDL Ratio    Elevated liver function tests        Muscle spasm        Idiopathic peripheral neuropathy                Follow up: Return for follow up depends on review of labs and testing.     DISCUSSION  Hypertension.  Stable.  Continue current medications.    Hyperlipidemia.  Check CMP and lipid panel.    Elevated liver function tests.  Recheck CMP.    Continue decrease alcohol usage.    Depression.  Stable on current medication.    Hyperglycemia.  Check A1c.    Muscle spasm.  Check labs.    Idiopathic peripheral neuropathy.  Had nerve conduction study.  Showed peripheral neuropathy.  Also had some decreased vascular flow of the left toes but otherwise blood flow good.          MEDICATIONS PRESCRIBED  Requested Prescriptions      No prescriptions requested or ordered in this encounter          -------------------------------------------    Subjective     Chief Complaint   Patient presents with   • Hypertension     6 month f/u         Hypertension   This is a chronic problem. The current episode started more than 1 year ago. The problem is unchanged. The problem is controlled. Associated symptoms include shortness of breath (occ, not bad). Pertinent negatives include no chest pain, headaches or peripheral edema. (Legs get tired. ) There are no associated agents to hypertension. Current antihypertension treatment includes ACE inhibitors, diuretics and calcium channel blockers. The current treatment provides moderate improvement. There are no compliance problems.  Hypertensive end-organ damage includes CAD/MI (MI and stent  placed. ). There is no history of angina or kidney disease. There is no history of chronic renal disease.   Depression   Visit Type: follow-up  Patient presents with the following symptoms: depressed mood (stable), nervousness/anxiety (little) and shortness of breath (occ, not bad).  Patient is not experiencing: insomnia.  Frequency of symptoms: most days   Severity: mild   Sleep quality: fair      Hyperlipidemia   This is a chronic problem. The current episode started more than 1 year ago. He has no history of chronic renal disease. Associated symptoms include shortness of breath (occ, not bad). Pertinent negatives include no chest pain or myalgias. Current antihyperlipidemic treatment includes statins.       Left foot pain > right  Numb feeling  Had SHANTEL and left digital ischemia  Ncv + neuropathy      Occ beer now  Stopped liquor  2-3 cans of beer per day if that  Feels better now  Bowels better    Cramps  Drinks fluid  Usually at night    Memory loss  Saw Dr Monson  Comes and goes  On med        Social History     Tobacco Use   Smoking Status Former Smoker   • Packs/day: 1.50   • Years: 35.00   • Pack years: 52.50   • Types: Cigarettes   • Last attempt to quit:    • Years since quittin.4   Smokeless Tobacco Never Used          Past Medical History,Medications, Allergies, and social history was reviewed.          Review of Systems   Constitutional: Negative.    HENT: Negative.    Respiratory: Positive for shortness of breath (occ, not bad).    Cardiovascular: Negative.  Negative for chest pain.   Gastrointestinal: Negative.    Musculoskeletal: Negative.  Negative for myalgias.   Neurological: Positive for numbness.   Psychiatric/Behavioral: Positive for depressed mood (stable). The patient is nervous/anxious (little). The patient does not have insomnia.        Objective     Vitals:    20 1046   BP: 114/58   Pulse: 64   Resp: 18   Temp: 98.6 °F (37 °C)   Weight: 78.7 kg (173 lb 6.4 oz)   Height: 165.1  "cm (65\")          Physical Exam   Constitutional: Vital signs are normal. He appears well-developed and well-nourished.   HENT:   Head: Normocephalic and atraumatic.   Right Ear: Hearing, tympanic membrane, external ear and ear canal normal.   Left Ear: Hearing, tympanic membrane, external ear and ear canal normal.   Mouth/Throat: Oropharynx is clear and moist.   Eyes: Pupils are equal, round, and reactive to light. Conjunctivae, EOM and lids are normal.   Neck: Normal range of motion. Neck supple. No thyromegaly present.   Cardiovascular: Normal rate, regular rhythm and normal heart sounds. Exam reveals no friction rub.   No murmur heard.  Pulmonary/Chest: Effort normal and breath sounds normal. No respiratory distress. He has no wheezes. He has no rales.   Abdominal: Soft. Normal appearance and bowel sounds are normal. He exhibits no distension and no mass. There is no tenderness. There is no rebound and no guarding.   Musculoskeletal: He exhibits no edema.   Toes are warm to touch.  Able to feel a dorsalis pedis pulse bilaterally.  Decreased sensation of the feet bilaterally.   Neurological: He is alert. He has normal strength.   Skin: Skin is warm and dry.   Psychiatric: He has a normal mood and affect. His speech is normal. Cognition and memory are normal.   Nursing note and vitals reviewed.                Anshul Martinez MD    "

## 2020-06-03 LAB
ALBUMIN SERPL-MCNC: 4.8 G/DL (ref 3.5–5.2)
ALBUMIN/GLOB SERPL: 1.7 G/DL
ALP SERPL-CCNC: 115 U/L (ref 39–117)
ALT SERPL-CCNC: 42 U/L (ref 1–41)
AST SERPL-CCNC: 37 U/L (ref 1–40)
BASOPHILS # BLD AUTO: 0.04 10*3/MM3 (ref 0–0.2)
BASOPHILS NFR BLD AUTO: 0.6 % (ref 0–1.5)
BILIRUB SERPL-MCNC: 0.4 MG/DL (ref 0.2–1.2)
BUN SERPL-MCNC: 22 MG/DL (ref 8–23)
BUN/CREAT SERPL: 23.4 (ref 7–25)
CALCIUM SERPL-MCNC: 9.5 MG/DL (ref 8.6–10.5)
CHLORIDE SERPL-SCNC: 104 MMOL/L (ref 98–107)
CHOLEST SERPL-MCNC: 155 MG/DL (ref 0–200)
CHOLEST/HDLC SERPL: 2.01 {RATIO}
CO2 SERPL-SCNC: 22.8 MMOL/L (ref 22–29)
CREAT SERPL-MCNC: 0.94 MG/DL (ref 0.76–1.27)
EOSINOPHIL # BLD AUTO: 0.21 10*3/MM3 (ref 0–0.4)
EOSINOPHIL NFR BLD AUTO: 2.9 % (ref 0.3–6.2)
ERYTHROCYTE [DISTWIDTH] IN BLOOD BY AUTOMATED COUNT: 12.2 % (ref 12.3–15.4)
GLOBULIN SER CALC-MCNC: 2.8 GM/DL
GLUCOSE SERPL-MCNC: 129 MG/DL (ref 65–99)
HBA1C MFR BLD: 5.1 % (ref 4.8–5.6)
HCT VFR BLD AUTO: 35.1 % (ref 37.5–51)
HDLC SERPL-MCNC: 77 MG/DL (ref 40–60)
HGB BLD-MCNC: 11.7 G/DL (ref 13–17.7)
IMM GRANULOCYTES # BLD AUTO: 0.02 10*3/MM3 (ref 0–0.05)
IMM GRANULOCYTES NFR BLD AUTO: 0.3 % (ref 0–0.5)
LDLC SERPL CALC-MCNC: 64 MG/DL (ref 0–100)
LYMPHOCYTES # BLD AUTO: 2.06 10*3/MM3 (ref 0.7–3.1)
LYMPHOCYTES NFR BLD AUTO: 28.5 % (ref 19.6–45.3)
MCH RBC QN AUTO: 29.4 PG (ref 26.6–33)
MCHC RBC AUTO-ENTMCNC: 33.3 G/DL (ref 31.5–35.7)
MCV RBC AUTO: 88.2 FL (ref 79–97)
MONOCYTES # BLD AUTO: 0.95 10*3/MM3 (ref 0.1–0.9)
MONOCYTES NFR BLD AUTO: 13.1 % (ref 5–12)
NEUTROPHILS # BLD AUTO: 3.96 10*3/MM3 (ref 1.7–7)
NEUTROPHILS NFR BLD AUTO: 54.6 % (ref 42.7–76)
NRBC BLD AUTO-RTO: 0 /100 WBC (ref 0–0.2)
PLATELET # BLD AUTO: 220 10*3/MM3 (ref 140–450)
POTASSIUM SERPL-SCNC: 4.3 MMOL/L (ref 3.5–5.2)
PROT SERPL-MCNC: 7.6 G/DL (ref 6–8.5)
RBC # BLD AUTO: 3.98 10*6/MM3 (ref 4.14–5.8)
SODIUM SERPL-SCNC: 138 MMOL/L (ref 136–145)
TRIGL SERPL-MCNC: 71 MG/DL (ref 0–150)
VLDLC SERPL CALC-MCNC: 14.2 MG/DL
WBC # BLD AUTO: 7.24 10*3/MM3 (ref 3.4–10.8)

## 2020-06-04 LAB
FERRITIN SERPL-MCNC: 22.8 NG/ML (ref 30–400)
FOLATE SERPL-MCNC: >20 NG/ML (ref 4.78–24.2)
IRON SATN MFR SERPL: 15 % (ref 20–50)
IRON SERPL-MCNC: 85 MCG/DL (ref 59–158)
Lab: NORMAL
TIBC SERPL-MCNC: 576 MCG/DL
UIBC SERPL-MCNC: 491 MCG/DL (ref 112–346)
VIT B12 SERPL-MCNC: 670 PG/ML (ref 211–946)
WRITTEN AUTHORIZATION: NORMAL

## 2020-06-08 DIAGNOSIS — F33.1 MODERATE EPISODE OF RECURRENT MAJOR DEPRESSIVE DISORDER (HCC): ICD-10-CM

## 2020-06-08 RX ORDER — SERTRALINE HYDROCHLORIDE 100 MG/1
TABLET, FILM COATED ORAL
Qty: 60 TABLET | Refills: 0 | Status: SHIPPED | OUTPATIENT
Start: 2020-06-08 | End: 2020-07-20

## 2020-06-10 ENCOUNTER — TELEPHONE (OUTPATIENT)
Dept: FAMILY MEDICINE CLINIC | Facility: CLINIC | Age: 60
End: 2020-06-10

## 2020-06-10 NOTE — TELEPHONE ENCOUNTER
Called and spoke to patient, informed of lab results. See result note if additional information is needed.

## 2020-07-04 DIAGNOSIS — I25.10 MULTIPLE VESSEL CORONARY ARTERY DISEASE: ICD-10-CM

## 2020-07-04 DIAGNOSIS — I10 BENIGN ESSENTIAL HYPERTENSION: ICD-10-CM

## 2020-07-06 RX ORDER — METOPROLOL TARTRATE 50 MG/1
TABLET, FILM COATED ORAL
Qty: 90 TABLET | Refills: 0 | Status: SHIPPED | OUTPATIENT
Start: 2020-07-06 | End: 2020-07-21 | Stop reason: SDUPTHER

## 2020-07-06 RX ORDER — FUROSEMIDE 20 MG/1
TABLET ORAL
Qty: 90 TABLET | Refills: 0 | Status: SHIPPED | OUTPATIENT
Start: 2020-07-06 | End: 2020-08-26 | Stop reason: SDUPTHER

## 2020-07-18 DIAGNOSIS — F33.1 MODERATE EPISODE OF RECURRENT MAJOR DEPRESSIVE DISORDER (HCC): ICD-10-CM

## 2020-07-20 RX ORDER — SERTRALINE HYDROCHLORIDE 100 MG/1
200 TABLET, FILM COATED ORAL DAILY
Qty: 60 TABLET | Refills: 2 | Status: SHIPPED | OUTPATIENT
Start: 2020-07-20 | End: 2020-08-26 | Stop reason: SDUPTHER

## 2020-07-21 ENCOUNTER — OFFICE VISIT (OUTPATIENT)
Dept: CARDIOLOGY | Facility: CLINIC | Age: 60
End: 2020-07-21

## 2020-07-21 VITALS
HEIGHT: 66 IN | OXYGEN SATURATION: 96 % | DIASTOLIC BLOOD PRESSURE: 74 MMHG | SYSTOLIC BLOOD PRESSURE: 150 MMHG | WEIGHT: 174.4 LBS | BODY MASS INDEX: 28.03 KG/M2 | HEART RATE: 76 BPM | TEMPERATURE: 98.9 F

## 2020-07-21 DIAGNOSIS — I10 ESSENTIAL HYPERTENSION: ICD-10-CM

## 2020-07-21 DIAGNOSIS — E78.5 HYPERLIPIDEMIA LDL GOAL <70: ICD-10-CM

## 2020-07-21 DIAGNOSIS — I34.0 NONRHEUMATIC MITRAL VALVE REGURGITATION: ICD-10-CM

## 2020-07-21 DIAGNOSIS — I25.119 CORONARY ARTERY DISEASE INVOLVING NATIVE CORONARY ARTERY OF NATIVE HEART WITH ANGINA PECTORIS (HCC): Primary | ICD-10-CM

## 2020-07-21 PROBLEM — R06.09 DYSPNEA ON EXERTION: Status: RESOLVED | Noted: 2018-11-13 | Resolved: 2020-07-21

## 2020-07-21 PROBLEM — R60.0 LOWER EXTREMITY EDEMA: Status: RESOLVED | Noted: 2018-11-13 | Resolved: 2020-07-21

## 2020-07-21 PROCEDURE — 99214 OFFICE O/P EST MOD 30 MIN: CPT | Performed by: INTERNAL MEDICINE

## 2020-07-21 RX ORDER — AMLODIPINE BESYLATE 5 MG/1
5 TABLET ORAL DAILY
Qty: 90 TABLET | Refills: 3
Start: 2020-07-21 | End: 2021-06-30

## 2020-07-21 RX ORDER — HYDROCHLOROTHIAZIDE 12.5 MG/1
12.5 CAPSULE, GELATIN COATED ORAL DAILY
Qty: 90 CAPSULE | Refills: 3
Start: 2020-07-21 | End: 2020-08-26 | Stop reason: SDUPTHER

## 2020-07-21 RX ORDER — ASPIRIN 81 MG/1
81 TABLET ORAL DAILY
Start: 2020-07-21

## 2020-07-21 RX ORDER — METOPROLOL TARTRATE 50 MG/1
25 TABLET, FILM COATED ORAL 2 TIMES DAILY
Qty: 180 TABLET | Refills: 3
Start: 2020-07-21 | End: 2020-08-26 | Stop reason: SDUPTHER

## 2020-07-21 RX ORDER — LISINOPRIL 20 MG/1
20 TABLET ORAL DAILY
Qty: 90 TABLET | Refills: 3
Start: 2020-07-21 | End: 2020-08-26 | Stop reason: SDUPTHER

## 2020-07-21 NOTE — PROGRESS NOTES
[facility]  Office Visit Note    DATE: 07/21/2020    IDENTIFICATION: Justice Kiser is a 60 y.o. male who resides in [place], [place].    REASON FOR VISIT:  • Coronary artery disease  • Mitral valve insufficiency  • Cardiac risk factors            Justice Kiser presents to my office for follow-up of his coronary artery disease, mitral insufficiency, and cardiac risk factors.  The patient is doing well from a cardiovascular standpoint.  He denies any worsening chest discomfort symptoms.  He continues to struggle with pain in his feet from the ball of his heel to his toes.  He underwent SHANTEL testing several months ago which showed no significant arterial insufficiency.  He also underwent EMG studies which showed mild neuropathy.    Patient denies orthopnea, PND, or worsening shortness of breath.    Review of Systems   Constitution: Negative for malaise/fatigue.   Eyes: Negative for vision loss in left eye and vision loss in right eye.   Cardiovascular: Negative for chest pain, dyspnea on exertion, near-syncope, orthopnea, palpitations, paroxysmal nocturnal dyspnea and syncope.   Musculoskeletal: Negative for myalgias.   Neurological: Negative for brief paralysis, excessive daytime sleepiness, focal weakness, numbness, paresthesias and weakness.        Neuropathic foot pain   All other systems reviewed and are negative.      The patient's past medical, social, family history and ROS reviewed in the patient's electronic medical record.    No Known Allergies      Current Outpatient Medications:   •  amLODIPine (NORVASC) 5 MG tablet, Take 1 tablet by mouth Daily., Disp: 90 tablet, Rfl: 3  •  aspirin 81 MG EC tablet, Take 1 tablet by mouth Daily., Disp: , Rfl:   •  atorvastatin (LIPITOR) 80 MG tablet, TAKE 1 TABLET BY MOUTH ONCE DAILY AT BEDTIME, Disp: 90 tablet, Rfl: 2  •  donepezil (ARICEPT) 10 MG tablet, Take 1 tablet by mouth once daily, Disp: 60 tablet, Rfl: 0  •  furosemide  (LASIX) 20 MG tablet, TAKE 1 TABLET BY MOUTH ONCE DAILY. APPOINTMENT REQUIRED FOR FUTURE REFILLS, Disp: 90 tablet, Rfl: 0  •  hydroCHLOROthiazide (MICROZIDE) 12.5 MG capsule, Take 1 capsule by mouth Daily., Disp: 90 capsule, Rfl: 3  •  lisinopril (PRINIVIL,ZESTRIL) 20 MG tablet, Take 1 tablet by mouth Daily., Disp: 90 tablet, Rfl: 3  •  memantine (NAMENDA) 10 MG tablet, TAKE 1 TABLET BY MOUTH TWICE A DAY, Disp: 60 tablet, Rfl: 11  •  metoprolol tartrate (LOPRESSOR) 50 MG tablet, Take 0.5 tablets by mouth 2 (Two) Times a Day., Disp: 180 tablet, Rfl: 3  •  Multiple Vitamins-Minerals (CENTRUM SILVER) tablet, Take 1 tablet by mouth Daily., Disp: , Rfl:   •  omeprazole (priLOSEC) 40 MG capsule, Take 1 capsule by mouth Daily., Disp: 90 capsule, Rfl: 1  •  sertraline (ZOLOFT) 100 MG tablet, Take 2 tablets by mouth Daily., Disp: 60 tablet, Rfl: 2  •  thiamine (VITAMIN B1) 100 MG tablet, Take 1 tablet by mouth Daily., Disp: 30 tablet, Rfl: 11    Past Medical History:   Diagnosis Date   • Acute maxillary sinusitis    • Elevated LFTs    • History of colonic polyps    • History of methicillin resistant Staphylococcus aureus    • History of myocardial infarction    • Hyperlipidemia    • Hypertension    • Left hand pain    • Left shoulder pain    • Personal history of congestive heart failure    • Right hip pain    • Rotator cuff syndrome        Past Surgical History:   Procedure Laterality Date   • CORONARY ANGIOPLASTY WITH STENT PLACEMENT  01/04/2012    Circumflex Xscience V 3.5 MM x 23 mm   • GALLBLADDER SURGERY  2017   • SHOULDER SURGERY  12/20/2018    Dr. Hoyt Rotator cuff repair       Family History   Problem Relation Age of Onset   • Hypertension Mother    • Heart attack Mother    • Hyperlipidemia Mother    • Alcohol abuse Mother    • Alcohol abuse Father    • Dementia Sister    • Heart disease Sister    • Alcohol abuse Maternal Aunt    • Alcohol abuse Paternal Uncle    • Diabetes Maternal Grandmother    • Heart disease  "Brother    • Stroke Sister        Social History     Tobacco Use   • Smoking status: Former Smoker     Packs/day: 1.50     Years: 35.00     Pack years: 52.50     Types: Cigarettes     Last attempt to quit: 2011     Years since quittin.5   • Smokeless tobacco: Never Used   Substance Use Topics   • Alcohol use: Yes     Comment: 5 mixed drinks per day.           Blood pressure 150/74, pulse 76, temperature 98.9 °F (37.2 °C), height 167.6 cm (66\"), weight 79.1 kg (174 lb 6.4 oz), SpO2 96 %.  Body mass index is 28.15 kg/m².  Vitals:    20 1546   Patient Position: Sitting       Physical Exam   Constitutional: He is oriented to person, place, and time. He appears well-developed and well-nourished.   HENT:   Head: Normocephalic and atraumatic.   Eyes: Pupils are equal, round, and reactive to light. No scleral icterus.   Neck: No JVD present. Carotid bruit is not present. No thyromegaly present.   Cardiovascular: Normal rate, regular rhythm, S1 normal and S2 normal. Exam reveals no gallop.   No murmur heard.  Pulmonary/Chest: Effort normal and breath sounds normal.   Abdominal: Soft. He exhibits no mass. There is no hepatosplenomegaly. There is no tenderness.   Neurological: He is alert and oriented to person, place, and time.   Skin: Skin is warm and dry. No cyanosis. Nails show no clubbing.   Psychiatric: He has a normal mood and affect. His behavior is normal.       Data Review (reviewed with patient):     Procedures    Lab Results   Component Value Date     (H) 2020    BUN 22 2020    CREATININE 0.94 2020    EGFRIFNONA 81 2020    EGFRIFAFRI 99 2020    BCR 23.4 2020    K 4.3 2020    CO2 22.8 2020    CALCIUM 9.5 2020    ALBUMIN 4.80 2020    ALKPHOS 115 2020    AST 37 2020    ALT 42 (H) 2020       Lab Results   Component Value Date    CHLPL 155 2020    TRIG 71 2020    HDL 77 (H) 2020    LDL 64 2020      Lab " Results   Component Value Date    HGBA1C 5.10 06/02/2020     Lab Results   Component Value Date    WBC 7.24 06/02/2020    HGB 11.7 (L) 06/02/2020    HCT 35.1 (L) 06/02/2020    MCV 88.2 06/02/2020     06/02/2020     Lab Results   Component Value Date    TSH 1.370 01/02/2020             Problem List Items Addressed This Visit        Cardiology Problems    Coronary artery disease involving native coronary artery of native heart with angina pectoris (CMS/[facility]) - Primary    Overview     · Cardiac catheterization for NSTEMI (1/2012):  Severe 1-vessel CAD (left circumflex subtotal occlusion). Mild disease of the other coronary arteries. Successful PCI of the left circumflex using a Xience MUNA.     · Exercise nuclear stress (10/26/18): Exercise for 6.5 minutes with replication chest pain. Moderate sized inferolateral infarct. LVEF 53%  · Nuclear stress test (2/12/2020): Medium sized infarct lateral wall with no significant ischemia. LVEF 70%. Low risk study.         Current Assessment & Plan     · Stable CCS class II angina  · Continue aspirin, beta-blocker, amlodipine, and statin therapy         Relevant Medications    metoprolol tartrate (LOPRESSOR) 50 MG tablet    amLODIPine (NORVASC) 5 MG tablet    aspirin 81 MG EC tablet    Essential hypertension    Overview     • Target blood pressure <130/80 mmHg         Current Assessment & Plan     • Mildly elevated today's visit  • Continue present medical therapy, but consider increasing amlodipine if elevated at next visits         Relevant Medications    hydroCHLOROthiazide (MICROZIDE) 12.5 MG capsule    metoprolol tartrate (LOPRESSOR) 50 MG tablet    lisinopril (PRINIVIL,ZESTRIL) 20 MG tablet    amLODIPine (NORVASC) 5 MG tablet    Hyperlipidemia LDL goal <70    Overview     · High intensity statin therapy indicated given the presence of coronary disease         Current Assessment & Plan     · Well-controlled  · Continue atorvastatin         Mitral valve insufficiency     Overview     · Echo (9/2018): Normal LVEF.  Moderate MR.         Relevant Medications    metoprolol tartrate (LOPRESSOR) 50 MG tablet    amLODIPine (NORVASC) 5 MG tablet               · Continue present medical therapy  Return in about 6 months (around 1/21/2021).      MARCO Crouch MD [facility], [facility]  Interventional and General Cardiology      7/21/2020

## 2020-07-21 NOTE — ASSESSMENT & PLAN NOTE
• Mildly elevated today's visit  • Continue present medical therapy, but consider increasing amlodipine if elevated at next visits

## 2020-07-25 DIAGNOSIS — E78.5 HYPERLIPIDEMIA LDL GOAL <70: ICD-10-CM

## 2020-07-27 RX ORDER — ATORVASTATIN CALCIUM 80 MG/1
TABLET, FILM COATED ORAL
Qty: 90 TABLET | Refills: 3 | Status: SHIPPED | OUTPATIENT
Start: 2020-07-27 | End: 2020-08-26 | Stop reason: SDUPTHER

## 2020-07-27 RX ORDER — DONEPEZIL HYDROCHLORIDE 10 MG/1
TABLET, FILM COATED ORAL
Qty: 60 TABLET | Refills: 0 | Status: SHIPPED | OUTPATIENT
Start: 2020-07-27 | End: 2020-09-28

## 2020-08-10 RX ORDER — MEMANTINE HYDROCHLORIDE 10 MG/1
TABLET ORAL
Qty: 60 TABLET | Refills: 0 | Status: SHIPPED | OUTPATIENT
Start: 2020-08-10 | End: 2020-09-10

## 2020-08-26 DIAGNOSIS — R10.13 EPIGASTRIC ABDOMINAL PAIN: ICD-10-CM

## 2020-08-26 DIAGNOSIS — K21.9 GASTROESOPHAGEAL REFLUX DISEASE, ESOPHAGITIS PRESENCE NOT SPECIFIED: ICD-10-CM

## 2020-08-26 DIAGNOSIS — F33.1 MODERATE EPISODE OF RECURRENT MAJOR DEPRESSIVE DISORDER (HCC): ICD-10-CM

## 2020-08-26 DIAGNOSIS — E78.5 HYPERLIPIDEMIA LDL GOAL <70: ICD-10-CM

## 2020-08-26 DIAGNOSIS — I10 ESSENTIAL HYPERTENSION: ICD-10-CM

## 2020-08-26 DIAGNOSIS — I25.119 CORONARY ARTERY DISEASE INVOLVING NATIVE CORONARY ARTERY OF NATIVE HEART WITH ANGINA PECTORIS (HCC): ICD-10-CM

## 2020-08-26 RX ORDER — METOPROLOL TARTRATE 50 MG/1
25 TABLET, FILM COATED ORAL 2 TIMES DAILY
Qty: 180 TABLET | Refills: 1 | Status: SHIPPED | OUTPATIENT
Start: 2020-08-26 | End: 2020-09-03 | Stop reason: SDUPTHER

## 2020-08-26 RX ORDER — OMEPRAZOLE 40 MG/1
40 CAPSULE, DELAYED RELEASE ORAL DAILY
Qty: 90 CAPSULE | Refills: 1 | Status: SHIPPED | OUTPATIENT
Start: 2020-08-26 | End: 2020-09-03 | Stop reason: SDUPTHER

## 2020-08-26 RX ORDER — ATORVASTATIN CALCIUM 80 MG/1
80 TABLET, FILM COATED ORAL
Qty: 90 TABLET | Refills: 3 | Status: SHIPPED | OUTPATIENT
Start: 2020-08-26 | End: 2020-09-03 | Stop reason: SDUPTHER

## 2020-08-26 RX ORDER — SERTRALINE HYDROCHLORIDE 100 MG/1
200 TABLET, FILM COATED ORAL DAILY
Qty: 180 TABLET | Refills: 1 | Status: SHIPPED | OUTPATIENT
Start: 2020-08-26 | End: 2020-09-03 | Stop reason: SDUPTHER

## 2020-08-26 RX ORDER — FUROSEMIDE 20 MG/1
20 TABLET ORAL DAILY
Qty: 90 TABLET | Refills: 1 | Status: SHIPPED | OUTPATIENT
Start: 2020-08-26 | End: 2020-09-03 | Stop reason: SDUPTHER

## 2020-08-26 RX ORDER — HYDROCHLOROTHIAZIDE 12.5 MG/1
12.5 CAPSULE, GELATIN COATED ORAL DAILY
Qty: 90 CAPSULE | Refills: 3 | Status: SHIPPED | OUTPATIENT
Start: 2020-08-26 | End: 2020-09-03 | Stop reason: SDUPTHER

## 2020-08-26 RX ORDER — LISINOPRIL 20 MG/1
20 TABLET ORAL DAILY
Qty: 90 TABLET | Refills: 1 | Status: SHIPPED | OUTPATIENT
Start: 2020-08-26 | End: 2020-09-03 | Stop reason: SDUPTHER

## 2020-09-01 ENCOUNTER — TELEPHONE (OUTPATIENT)
Dept: NEUROLOGY | Facility: CLINIC | Age: 60
End: 2020-09-01

## 2020-09-01 NOTE — TELEPHONE ENCOUNTER
Called and LVM concerning needing fu appt to be re-evaluated so that we may continue refilling medications. Thanks.

## 2020-09-03 DIAGNOSIS — I10 ESSENTIAL HYPERTENSION: ICD-10-CM

## 2020-09-03 DIAGNOSIS — R10.13 EPIGASTRIC ABDOMINAL PAIN: ICD-10-CM

## 2020-09-03 DIAGNOSIS — K21.9 GASTROESOPHAGEAL REFLUX DISEASE, ESOPHAGITIS PRESENCE NOT SPECIFIED: ICD-10-CM

## 2020-09-03 DIAGNOSIS — F33.1 MODERATE EPISODE OF RECURRENT MAJOR DEPRESSIVE DISORDER (HCC): ICD-10-CM

## 2020-09-03 DIAGNOSIS — E78.5 HYPERLIPIDEMIA LDL GOAL <70: ICD-10-CM

## 2020-09-03 DIAGNOSIS — I25.119 CORONARY ARTERY DISEASE INVOLVING NATIVE CORONARY ARTERY OF NATIVE HEART WITH ANGINA PECTORIS (HCC): ICD-10-CM

## 2020-09-03 RX ORDER — FUROSEMIDE 20 MG/1
20 TABLET ORAL DAILY
Qty: 90 TABLET | Refills: 1 | Status: SHIPPED | OUTPATIENT
Start: 2020-09-03 | End: 2021-03-22

## 2020-09-03 RX ORDER — METOPROLOL TARTRATE 50 MG/1
25 TABLET, FILM COATED ORAL 2 TIMES DAILY
Qty: 180 TABLET | Refills: 1 | Status: SHIPPED | OUTPATIENT
Start: 2020-09-03 | End: 2021-09-16

## 2020-09-03 RX ORDER — ATORVASTATIN CALCIUM 80 MG/1
80 TABLET, FILM COATED ORAL
Qty: 90 TABLET | Refills: 3 | Status: SHIPPED | OUTPATIENT
Start: 2020-09-03 | End: 2021-08-20

## 2020-09-03 RX ORDER — OMEPRAZOLE 40 MG/1
40 CAPSULE, DELAYED RELEASE ORAL DAILY
Qty: 90 CAPSULE | Refills: 1 | Status: SHIPPED | OUTPATIENT
Start: 2020-09-03 | End: 2021-06-07

## 2020-09-03 RX ORDER — SERTRALINE HYDROCHLORIDE 100 MG/1
200 TABLET, FILM COATED ORAL DAILY
Qty: 180 TABLET | Refills: 1 | Status: SHIPPED | OUTPATIENT
Start: 2020-09-03 | End: 2021-05-04

## 2020-09-03 RX ORDER — LISINOPRIL 20 MG/1
20 TABLET ORAL DAILY
Qty: 90 TABLET | Refills: 1 | Status: SHIPPED | OUTPATIENT
Start: 2020-09-03 | End: 2020-12-17 | Stop reason: SDUPTHER

## 2020-09-03 RX ORDER — HYDROCHLOROTHIAZIDE 12.5 MG/1
12.5 CAPSULE, GELATIN COATED ORAL DAILY
Qty: 90 CAPSULE | Refills: 3 | Status: SHIPPED | OUTPATIENT
Start: 2020-09-03 | End: 2021-10-11 | Stop reason: SDUPTHER

## 2020-09-10 RX ORDER — MEMANTINE HYDROCHLORIDE 10 MG/1
TABLET ORAL
Qty: 60 TABLET | Refills: 0 | Status: SHIPPED | OUTPATIENT
Start: 2020-09-10 | End: 2021-02-03

## 2020-09-28 RX ORDER — DONEPEZIL HYDROCHLORIDE 10 MG/1
TABLET, FILM COATED ORAL
Qty: 60 TABLET | Refills: 0 | Status: SHIPPED | OUTPATIENT
Start: 2020-09-28 | End: 2020-11-24

## 2020-11-24 RX ORDER — DONEPEZIL HYDROCHLORIDE 10 MG/1
TABLET, FILM COATED ORAL
Qty: 60 TABLET | Refills: 0 | Status: SHIPPED | OUTPATIENT
Start: 2020-11-24 | End: 2020-12-14 | Stop reason: SDUPTHER

## 2020-12-10 ENCOUNTER — OFFICE VISIT (OUTPATIENT)
Dept: FAMILY MEDICINE CLINIC | Facility: CLINIC | Age: 60
End: 2020-12-10

## 2020-12-10 ENCOUNTER — TELEPHONE (OUTPATIENT)
Dept: FAMILY MEDICINE CLINIC | Facility: CLINIC | Age: 60
End: 2020-12-10

## 2020-12-10 VITALS
HEART RATE: 76 BPM | TEMPERATURE: 97.7 F | HEIGHT: 66 IN | WEIGHT: 183 LBS | SYSTOLIC BLOOD PRESSURE: 140 MMHG | BODY MASS INDEX: 29.41 KG/M2 | DIASTOLIC BLOOD PRESSURE: 86 MMHG | RESPIRATION RATE: 18 BRPM

## 2020-12-10 DIAGNOSIS — R25.2 CRAMPS, EXTREMITY: ICD-10-CM

## 2020-12-10 DIAGNOSIS — I10 ESSENTIAL HYPERTENSION: ICD-10-CM

## 2020-12-10 DIAGNOSIS — R79.89 ELEVATED LIVER FUNCTION TESTS: ICD-10-CM

## 2020-12-10 DIAGNOSIS — R21 RASH: ICD-10-CM

## 2020-12-10 DIAGNOSIS — R53.83 OTHER FATIGUE: ICD-10-CM

## 2020-12-10 DIAGNOSIS — Z00.00 WELL ADULT EXAM: ICD-10-CM

## 2020-12-10 DIAGNOSIS — R14.0 ABDOMINAL DISTENSION: ICD-10-CM

## 2020-12-10 DIAGNOSIS — R73.9 HYPERGLYCEMIA: ICD-10-CM

## 2020-12-10 DIAGNOSIS — Z00.00 MEDICARE ANNUAL WELLNESS VISIT, INITIAL: Primary | ICD-10-CM

## 2020-12-10 DIAGNOSIS — E78.00 PURE HYPERCHOLESTEROLEMIA: ICD-10-CM

## 2020-12-10 DIAGNOSIS — I25.10 MULTIPLE VESSEL CORONARY ARTERY DISEASE: ICD-10-CM

## 2020-12-10 PROCEDURE — G0439 PPPS, SUBSEQ VISIT: HCPCS | Performed by: FAMILY MEDICINE

## 2020-12-10 PROCEDURE — 1125F AMNT PAIN NOTED PAIN PRSNT: CPT | Performed by: FAMILY MEDICINE

## 2020-12-10 PROCEDURE — 99396 PREV VISIT EST AGE 40-64: CPT | Performed by: FAMILY MEDICINE

## 2020-12-10 PROCEDURE — 96160 PT-FOCUSED HLTH RISK ASSMT: CPT | Performed by: FAMILY MEDICINE

## 2020-12-10 PROCEDURE — 1159F MED LIST DOCD IN RCRD: CPT | Performed by: FAMILY MEDICINE

## 2020-12-10 PROCEDURE — 1170F FXNL STATUS ASSESSED: CPT | Performed by: FAMILY MEDICINE

## 2020-12-10 RX ORDER — TRIAMCINOLONE ACETONIDE 1 MG/G
CREAM TOPICAL 2 TIMES DAILY
Qty: 15 G | Refills: 1 | Status: SHIPPED | OUTPATIENT
Start: 2020-12-10 | End: 2021-02-03

## 2020-12-10 RX ORDER — BUDESONIDE 3 MG/1
9 CAPSULE, COATED PELLETS ORAL DAILY
COMMUNITY
End: 2021-05-04

## 2020-12-10 NOTE — PROGRESS NOTES
The ABCs of the Annual Wellness Visit  Initial Medicare Wellness Visit    Chief Complaint   Patient presents with   • Medicare Wellness-Initial Visit       Subjective   History of Present Illness:  Justice Kiser is a 60 y.o. male who presents for an Initial Medicare Wellness Visit.    Abd distention  Sees Dr Paul  colitis  CT scan was done.   Was told that he was backed up with stool.      Rash  On abd as well  + icth and burns  Since Dec 1  No change      Right hand draws up at times    Increased weight    HTN  No chest pain.  No shortness of breath.  No swelling.    Alcohol: few every day. 4-5 per day mixed drinks  No tobacco  Dentist: need one  Eye exam 2019   + seat belt            HEALTH RISK ASSESSMENT    Recent Hospitalizations:  No hospitalization(s) within the last year.    Current Medical Providers:  Patient Care Team:  Anshul Martinez MD as PCP - General  Anshul Martinez MD as PCP - Family Medicine  Tacho Crouch IV, MD as Cardiologist (Interventional Cardiology)  Erick Hoyt MD as Surgeon (Orthopedic Surgery)    Smoking Status:  Social History     Tobacco Use   Smoking Status Former Smoker   • Packs/day: 1.50   • Years: 35.00   • Pack years: 52.50   • Types: Cigarettes   • Quit date:    • Years since quittin.9   Smokeless Tobacco Never Used       Alcohol Consumption:  Social History     Substance and Sexual Activity   Alcohol Use Yes    Comment: 5 mixed drinks per day.       Depression Screen:   PHQ-2/PHQ-9 Depression Screening 12/10/2020   Little interest or pleasure in doing things 1   Feeling down, depressed, or hopeless 1   Trouble falling or staying asleep, or sleeping too much 3   Feeling tired or having little energy 3   Poor appetite or overeating 3   Feeling bad about yourself - or that you are a failure or have let yourself or your family down 0   Trouble concentrating on things, such as reading the newspaper or watching television 3   Moving or speaking so slowly  that other people could have noticed. Or the opposite - being so fidgety or restless that you have been moving around a lot more than usual 0   Thoughts that you would be better off dead, or of hurting yourself in some way 0   Total Score 14   If you checked off any problems, how difficult have these problems made it for you to do your work, take care of things at home, or get along with other people? Somewhat difficult       Fall Risk Screen:  AYUSHADI Fall Risk Assessment has not been completed. 2 times this year    Health Habits and Functional and Cognitive Screening:  Functional & Cognitive Status 12/10/2020   Do you have difficulty preparing food and eating? No   Do you have difficulty bathing yourself, getting dressed or grooming yourself? No   Do you have difficulty using the toilet? No   Do you have difficulty moving around from place to place? No   Do you have trouble with steps or getting out of a bed or a chair? No   Current Diet Well Balanced Diet   Dental Exam Up to date   Eye Exam Not up to date   Exercise (times per week) 0 times per week   Current Exercise Activities Include None   Do you need help using the phone?  No   Are you deaf or do you have serious difficulty hearing?  Yes   Do you need help with transportation? No   Do you need help shopping? No   Do you need help preparing meals?  No   Do you need help with housework?  No   Do you need help with laundry? No   Do you need help taking your medications? No   Do you need help managing money? No   Do you ever drive or ride in a car without wearing a seat belt? No         Does the patient have evidence of cognitive impairment? Yes, Saw Dr Ermias Forrester use counseling:Taking ASA appropriately as indicated    Age-appropriate Screening Schedule:  Refer to the list below for future screening recommendations based on patient's age, sex and/or medical conditions. Orders for these recommended tests are listed in the plan section. The patient has been  provided with a written plan.    Health Maintenance   Topic Date Due   • TDAP/TD VACCINES (1 - Tdap) 03/24/1977   • ZOSTER VACCINE (1 of 2) 03/24/2008   • LIPID PANEL  06/02/2021   • COLONOSCOPY  08/01/2025   • INFLUENZA VACCINE  Completed          The following portions of the patient's history were reviewed and updated as appropriate: allergies, current medications, past family history, past medical history, past social history, past surgical history and problem list.    Outpatient Medications Prior to Visit   Medication Sig Dispense Refill   • amLODIPine (NORVASC) 5 MG tablet Take 1 tablet by mouth Daily. 90 tablet 3   • aspirin 81 MG EC tablet Take 1 tablet by mouth Daily.     • atorvastatin (LIPITOR) 80 MG tablet Take 1 tablet by mouth every night at bedtime. 90 tablet 3   • Budesonide (ENTOCORT EC) 3 MG 24 hr capsule Take 9 mg by mouth Daily. Takes 3 pills at noon. Prescribed by Dr. Paul.     • donepezil (ARICEPT) 10 MG tablet Take 1 tablet by mouth once daily 60 tablet 0   • furosemide (LASIX) 20 MG tablet Take 1 tablet by mouth Daily. 90 tablet 1   • hydroCHLOROthiazide (MICROZIDE) 12.5 MG capsule Take 1 capsule by mouth Daily. 90 capsule 3   • lisinopril (PRINIVIL,ZESTRIL) 20 MG tablet Take 1 tablet by mouth Daily. 90 tablet 1   • memantine (NAMENDA) 10 MG tablet Take 1 tablet by mouth twice daily 60 tablet 0   • metoprolol tartrate (LOPRESSOR) 50 MG tablet Take 0.5 tablets by mouth 2 (Two) Times a Day. 180 tablet 1   • Multiple Vitamins-Minerals (CENTRUM SILVER) tablet Take 1 tablet by mouth Daily.     • omeprazole (priLOSEC) 40 MG capsule Take 1 capsule by mouth Daily. 90 capsule 1   • sertraline (ZOLOFT) 100 MG tablet Take 2 tablets by mouth Daily. 180 tablet 1   • thiamine (VITAMIN B1) 100 MG tablet Take 1 tablet by mouth Daily. 30 tablet 11     No facility-administered medications prior to visit.        Patient Active Problem List   Diagnosis   • Essential hypertension   • Coronary artery disease  "involving native coronary artery of native heart with angina pectoris (CMS/HCC)   • Hyperlipidemia LDL goal <70   • Depression   • Anxiety   • Hyperglycemia   • Paresthesia of right foot   • Tremor   • GERD (gastroesophageal reflux disease)   • Arthritis   • Pierson's esophagus without dysplasia   • MCI (mild cognitive impairment)   • Mitral valve insufficiency   • Alcohol induced fatty liver   • PVD (peripheral vascular disease) with claudication (CMS/HCC)       Advanced Care Planning:  ACP discussion was held with the patient during this visit. Patient does not have an advance directive, information provided.    Review of Systems   Constitutional: Negative.    HENT: Positive for hearing loss.    Respiratory: Positive for shortness of breath (due to abd distention).    Cardiovascular: Negative.  Negative for chest pain.   Gastrointestinal: Positive for abdominal distention and constipation. Negative for nausea and vomiting.   Genitourinary: Negative.         Slow stream and decreased amount. Sometimes it is ok   Musculoskeletal: Positive for back pain (chronic).   Skin: Positive for rash.   Neurological: Positive for dizziness (at times). Negative for syncope and headaches.   Psychiatric/Behavioral: Positive for decreased concentration, dysphoric mood and sleep disturbance.        Memory issues       Compared to one year ago, the patient feels his physical health is worse. Due to GI issues  Compared to one year ago, the patient feels his mental health is worse.    Reviewed chart for potential of high risk medication in the elderly: yes  Reviewed chart for potential of harmful drug interactions in the elderly:yes    Objective         Vitals:    12/10/20 0957   BP: 140/86   Pulse: 76   Resp: 18   Temp: 97.7 °F (36.5 °C)   Weight: 83 kg (183 lb)   Height: 167.6 cm (66\")   PainSc:   2   PainLoc: Abdomen  Comment: Seeing Dr. Paul for some discomfort.       Body mass index is 29.54 kg/m².  Discussed the patient's BMI " with him. The BMI is above average; BMI management plan is completed.  Continue to decrease carbohydrates and work on losing weight.  Decrease alcohol usage.    Physical Exam  Vitals signs and nursing note reviewed.   Constitutional:       General: He is not in acute distress.     Appearance: Normal appearance. He is well-developed. He is not ill-appearing.   HENT:      Head: Normocephalic and atraumatic.      Right Ear: Hearing, tympanic membrane, ear canal and external ear normal.      Left Ear: Hearing, tympanic membrane, ear canal and external ear normal.      Nose: Nose normal. No congestion or rhinorrhea.      Mouth/Throat:      Mouth: Mucous membranes are moist.      Pharynx: No oropharyngeal exudate or posterior oropharyngeal erythema.   Eyes:      General: Lids are normal.      Conjunctiva/sclera: Conjunctivae normal.      Pupils: Pupils are equal, round, and reactive to light.   Neck:      Musculoskeletal: Normal range of motion and neck supple.      Thyroid: No thyromegaly.   Cardiovascular:      Rate and Rhythm: Normal rate and regular rhythm.      Heart sounds: Normal heart sounds. No murmur. No friction rub.   Pulmonary:      Effort: Pulmonary effort is normal. No respiratory distress.      Breath sounds: Normal breath sounds. No wheezing or rales.   Abdominal:      General: Bowel sounds are normal. There is no distension.      Palpations: Abdomen is soft. There is no mass.      Tenderness: There is no abdominal tenderness. There is no guarding or rebound.   Musculoskeletal:      Right lower leg: No edema.      Left lower leg: No edema.   Skin:     General: Skin is warm and dry.   Neurological:      General: No focal deficit present.      Mental Status: He is alert.   Psychiatric:         Mood and Affect: Mood normal.         Speech: Speech normal.         Behavior: Behavior normal.       Right mid abdomen is a oval irritated lesion with excoriation.  No evidence of secondary infection.         Assessment/Plan   Medicare Risks and Personalized Health Plan  CMS Preventative Services Quick Reference  Advance Directive Discussion  Fall Risk  Hearing Problem  Inactivity/Sedentary  Obesity/Overweight     The above risks/problems have been discussed with the patient.  Pertinent information has been shared with the patient in the After Visit Summary.  Follow up plans and orders are seen below in the Assessment/Plan Section.    Diagnoses and all orders for this visit:    1. Medicare annual wellness visit, initial (Primary)    2. Well adult exam    3. Cramps, extremity  -     CBC & Differential  -     Comprehensive Metabolic Panel  -     Magnesium    4. Essential hypertension  -     CBC & Differential  -     Comprehensive Metabolic Panel    5. Multiple vessel coronary artery disease  -     Comprehensive Metabolic Panel  -     Lipid Panel With / Chol / HDL Ratio    6. Pure hypercholesterolemia  -     Comprehensive Metabolic Panel  -     Lipid Panel With / Chol / HDL Ratio    7. Elevated liver function tests  -     Comprehensive Metabolic Panel    8. Hyperglycemia  -     Comprehensive Metabolic Panel    9. Other fatigue  -     CBC & Differential  -     Comprehensive Metabolic Panel  -     TSH    10. Rash  -     triamcinolone (KENALOG) 0.1 % cream; Apply  topically to the appropriate area as directed 2 (Two) Times a Day.  Dispense: 15 g; Refill: 1    11. Abdominal distension      Follow Up:  Return for follow up depends on review of labs and testing.     Here for well examination.  Continue routine health maintenance including routine dentistry, eye exam, safety, seatbelt use, exercise and proper nutrition.    Cramps.  Check blood work as noted.    Hypertension.  Stable on current medications.    Coronary disease.  Continue follow-up with cardiology.    Hyperlipidemia.  Check CMP and lipid panel.    Elevated liver function test.  Sees GI.  Recheck CMP.    Hyperglycemia.  Check CMP.    Fatigue.  Check CBC and  TSH.    Rash.  Trial of triamcinolone.    Abdominal distention.  Call for CT results.  If constipated, okay to try MiraLAX 1-2 times a day.  If no help, he is to call.  Increase fluids and decrease alcohol usage    An After Visit Summary and PPPS were given to the patient.       Anshul Martinez MD

## 2020-12-11 LAB
ALBUMIN SERPL-MCNC: 4.7 G/DL (ref 3.5–5.2)
ALBUMIN/GLOB SERPL: 1.6 G/DL
ALP SERPL-CCNC: 148 U/L (ref 39–117)
ALT SERPL-CCNC: 32 U/L (ref 1–41)
AST SERPL-CCNC: 31 U/L (ref 1–40)
BASOPHILS # BLD AUTO: 0.07 10*3/MM3 (ref 0–0.2)
BASOPHILS NFR BLD AUTO: 0.6 % (ref 0–1.5)
BILIRUB SERPL-MCNC: 0.4 MG/DL (ref 0–1.2)
BUN SERPL-MCNC: 16 MG/DL (ref 8–23)
BUN/CREAT SERPL: 16.3 (ref 7–25)
CALCIUM SERPL-MCNC: 9.3 MG/DL (ref 8.6–10.5)
CHLORIDE SERPL-SCNC: 99 MMOL/L (ref 98–107)
CHOLEST SERPL-MCNC: 203 MG/DL (ref 0–200)
CHOLEST/HDLC SERPL: 1.8 {RATIO}
CO2 SERPL-SCNC: 30.2 MMOL/L (ref 22–29)
CREAT SERPL-MCNC: 0.98 MG/DL (ref 0.76–1.27)
EOSINOPHIL # BLD AUTO: 0.13 10*3/MM3 (ref 0–0.4)
EOSINOPHIL NFR BLD AUTO: 1.1 % (ref 0.3–6.2)
ERYTHROCYTE [DISTWIDTH] IN BLOOD BY AUTOMATED COUNT: 14.3 % (ref 12.3–15.4)
GLOBULIN SER CALC-MCNC: 2.9 GM/DL
GLUCOSE SERPL-MCNC: 98 MG/DL (ref 65–99)
HCT VFR BLD AUTO: 35.1 % (ref 37.5–51)
HDLC SERPL-MCNC: 113 MG/DL (ref 40–60)
HGB BLD-MCNC: 11.4 G/DL (ref 13–17.7)
IMM GRANULOCYTES # BLD AUTO: 0.11 10*3/MM3 (ref 0–0.05)
IMM GRANULOCYTES NFR BLD AUTO: 0.9 % (ref 0–0.5)
LDLC SERPL CALC-MCNC: 74 MG/DL (ref 0–100)
LYMPHOCYTES # BLD AUTO: 3.4 10*3/MM3 (ref 0.7–3.1)
LYMPHOCYTES NFR BLD AUTO: 29.3 % (ref 19.6–45.3)
MAGNESIUM SERPL-MCNC: 2 MG/DL (ref 1.6–2.4)
MCH RBC QN AUTO: 26.3 PG (ref 26.6–33)
MCHC RBC AUTO-ENTMCNC: 32.5 G/DL (ref 31.5–35.7)
MCV RBC AUTO: 80.9 FL (ref 79–97)
MONOCYTES # BLD AUTO: 1.01 10*3/MM3 (ref 0.1–0.9)
MONOCYTES NFR BLD AUTO: 8.7 % (ref 5–12)
NEUTROPHILS # BLD AUTO: 6.9 10*3/MM3 (ref 1.7–7)
NEUTROPHILS NFR BLD AUTO: 59.4 % (ref 42.7–76)
NRBC BLD AUTO-RTO: 0.1 /100 WBC (ref 0–0.2)
PLATELET # BLD AUTO: 263 10*3/MM3 (ref 140–450)
POTASSIUM SERPL-SCNC: 4 MMOL/L (ref 3.5–5.2)
PROT SERPL-MCNC: 7.6 G/DL (ref 6–8.5)
RBC # BLD AUTO: 4.34 10*6/MM3 (ref 4.14–5.8)
SODIUM SERPL-SCNC: 137 MMOL/L (ref 136–145)
TRIGL SERPL-MCNC: 93 MG/DL (ref 0–150)
TSH SERPL DL<=0.005 MIU/L-ACNC: 3.43 UIU/ML (ref 0.27–4.2)
VLDLC SERPL CALC-MCNC: 16 MG/DL (ref 5–40)
WBC # BLD AUTO: 11.62 10*3/MM3 (ref 3.4–10.8)

## 2020-12-14 RX ORDER — DONEPEZIL HYDROCHLORIDE 10 MG/1
10 TABLET, FILM COATED ORAL DAILY
Qty: 60 TABLET | Refills: 0 | Status: SHIPPED | OUTPATIENT
Start: 2020-12-14 | End: 2021-02-03

## 2020-12-14 NOTE — TELEPHONE ENCOUNTER
PT IS ABOUT TO BE OUT OF MEDICATION AND THE PRESCRIPTION NEEDS TO GO TO THE [facility] MAIL DELIVERY

## 2020-12-17 DIAGNOSIS — I10 ESSENTIAL HYPERTENSION: ICD-10-CM

## 2020-12-17 RX ORDER — LISINOPRIL 20 MG/1
20 TABLET ORAL DAILY
Qty: 90 TABLET | Refills: 1 | Status: SHIPPED | OUTPATIENT
Start: 2020-12-17 | End: 2021-03-29 | Stop reason: SDUPTHER

## 2020-12-18 ENCOUNTER — OFFICE VISIT (OUTPATIENT)
Dept: FAMILY MEDICINE CLINIC | Facility: CLINIC | Age: 60
End: 2020-12-18

## 2020-12-18 ENCOUNTER — TELEPHONE (OUTPATIENT)
Dept: FAMILY MEDICINE CLINIC | Facility: CLINIC | Age: 60
End: 2020-12-18

## 2020-12-18 VITALS
SYSTOLIC BLOOD PRESSURE: 132 MMHG | TEMPERATURE: 97.3 F | HEART RATE: 72 BPM | WEIGHT: 181 LBS | DIASTOLIC BLOOD PRESSURE: 76 MMHG | HEIGHT: 66 IN | RESPIRATION RATE: 18 BRPM | BODY MASS INDEX: 29.09 KG/M2

## 2020-12-18 DIAGNOSIS — S82.831A CLOSED FRACTURE OF DISTAL END OF RIGHT FIBULA, UNSPECIFIED FRACTURE MORPHOLOGY, INITIAL ENCOUNTER: Primary | ICD-10-CM

## 2020-12-18 DIAGNOSIS — M25.571 ACUTE RIGHT ANKLE PAIN: Primary | ICD-10-CM

## 2020-12-18 DIAGNOSIS — S99.911A INJURY OF RIGHT ANKLE, INITIAL ENCOUNTER: ICD-10-CM

## 2020-12-18 PROCEDURE — 99213 OFFICE O/P EST LOW 20 MIN: CPT | Performed by: FAMILY MEDICINE

## 2020-12-18 NOTE — TELEPHONE ENCOUNTER
Please call, there is a fracture of the outer bone on the right leg above the ankle ( the fibula) . It is not displaced. We need to refer to ortho but would not be able to see til next week.   Would limit weight bearing for now. We can get him an Rx for crutches if he wants. Continue the ice, rest, elevation and wraps.

## 2020-12-18 NOTE — PROGRESS NOTES
"  Assessment/Plan       Diagnoses and all orders for this visit:    1. Acute right ankle pain (Primary)  -     XR Ankle 3+ View Right; Future    2. Injury of right ankle, initial encounter  -     XR Ankle 3+ View Right; Future           Follow up: Return if symptoms worsen or fail to improve.     DISCUSSION  Right ankle pain / injury. Pain and swelling lateral ankle after injury 6 days ago. Needs xray. Further plan once xray back. To get xray at [facility]. [facility] xray is down.     In meantime, continue ice, aleve, rest. And compression.        MEDICATIONS PRESCRIBED  Requested Prescriptions      No prescriptions requested or ordered in this encounter            -------------------------------------------    Subjective     Chief Complaint   Patient presents with   • Ankle Injury     right ankle swollen and bruised.  Got caught on stove and sort of \"rolled it\" Happened last Saturday. Iced it but no help.         Ankle Injury   The incident occurred 5 to 7 days ago. The incident occurred at home. The injury mechanism was a twisting injury. The pain is present in the right ankle. The quality of the pain is described as aching and stabbing. The pain is moderate. The pain has been intermittent (can be constant) since onset. Associated symptoms include a loss of motion. Pertinent negatives include no inability to bear weight (able to walk ), numbness or tingling. The symptoms are aggravated by weight bearing, palpation and movement. He has tried NSAIDs and ice for the symptoms. The treatment provided mild relief.               Social History     Tobacco Use   Smoking Status Former Smoker   • Packs/day: 1.50   • Years: 35.00   • Pack years: 52.50   • Types: Cigarettes   • Quit date:    • Years since quittin.9   Smokeless Tobacco Never Used          Past Medical History,Medications, Allergies, and social history was reviewed.          Review of Systems   Constitutional: Negative.    HENT: Negative.    Respiratory: " "Negative.    Cardiovascular: Negative.    Musculoskeletal: Positive for arthralgias (right ankle. ).   Neurological: Negative for tingling and numbness.       Objective     Vitals:    12/18/20 1523   BP: 132/76   Pulse: 72   Resp: 18   Temp: 97.3 °F (36.3 °C)   Weight: 82.1 kg (181 lb)   Height: 167.6 cm (66\")          Physical Exam  Vitals signs and nursing note reviewed.   Constitutional:       Appearance: He is well-developed.   HENT:      Head: Normocephalic and atraumatic.      Right Ear: External ear normal.      Left Ear: External ear normal.   Eyes:      Pupils: Pupils are equal, round, and reactive to light.   Pulmonary:      Effort: Pulmonary effort is normal.   Musculoskeletal:      Right ankle: He exhibits decreased range of motion, swelling and ecchymosis. Tenderness. Lateral malleolus tenderness found. Achilles tendon normal. Achilles tendon exhibits no pain, no defect and normal Shell's test results.   Skin:     General: Skin is warm and dry.   Neurological:      Mental Status: He is alert and oriented to person, place, and time.   Psychiatric:         Behavior: Behavior normal.                     Anshul Martinez MD    "

## 2020-12-21 ENCOUNTER — OFFICE VISIT (OUTPATIENT)
Dept: ORTHOPEDIC SURGERY | Facility: CLINIC | Age: 60
End: 2020-12-21

## 2020-12-21 VITALS — BODY MASS INDEX: 29.09 KG/M2 | HEIGHT: 66 IN | HEART RATE: 70 BPM | WEIGHT: 181 LBS | OXYGEN SATURATION: 100 %

## 2020-12-21 DIAGNOSIS — S82.891A CLOSED FRACTURE OF RIGHT ANKLE, INITIAL ENCOUNTER: Primary | ICD-10-CM

## 2020-12-21 PROCEDURE — 99214 OFFICE O/P EST MOD 30 MIN: CPT | Performed by: ORTHOPAEDIC SURGERY

## 2020-12-21 PROCEDURE — 27786 TREATMENT OF ANKLE FRACTURE: CPT | Performed by: ORTHOPAEDIC SURGERY

## 2020-12-21 NOTE — PROGRESS NOTES
[facility]    Subjective     Chief Complaint   Patient presents with   • Right Ankle - Pain        HPI    Justice Kiser is a 60 y.o. male who presents with new problem of: right tibia fracture.  Onset: mechanical fall. The issue has been ongoing for 1 week(s). Pain is a 7/10 on the pain scale. Pain is described as burning, throbbing, stabbing and shooting. Associated symptoms include pain, swelling, popping, grinding and stiffness. The pain is worse with walking, standing and climbing stairs; resting, ice, assistive device (cane/walker), pain medication and/or NSAID and elevating the extremity improve the pain. Previous treatments have included: bracing and NSAIDS.    I have reviewed the following portions of the patient's history:History of Present Illness and review of systems.      He had an accident.  Should not fill on December 11.  He had x-rays December 18 which showed nondisplaced distal fibula fracture.  He has been walking on it.  He was in an Ace wrap.  No splint or brace.      Past Medical History:   Diagnosis Date   • Acute maxillary sinusitis    • Elevated LFTs    • History of colonic polyps    • History of methicillin resistant Staphylococcus aureus    • History of myocardial infarction    • Hyperlipidemia    • Hypertension    • Left hand pain    • Left shoulder pain    • Personal history of congestive heart failure    • Right hip pain    • Rotator cuff syndrome       Past Surgical History:   Procedure Laterality Date   • CORONARY ANGIOPLASTY WITH STENT PLACEMENT  01/04/2012    Circumflex Xscience V 3.5 MM x 23 mm   • GALLBLADDER SURGERY  2017   • SHOULDER SURGERY  12/20/2018    Dr. Hoyt Rotator cuff repair      Family History   Problem Relation Age of Onset   • Hypertension Mother    • Heart attack Mother    • Hyperlipidemia Mother    • Alcohol abuse Mother    • Alcohol abuse Father    • Dementia Sister    • Heart disease Sister    • Alcohol abuse Maternal Aunt    •  Alcohol abuse Paternal Uncle    • Diabetes Maternal Grandmother    • Heart disease Brother    • Stroke Sister      Social History     Socioeconomic History   • Marital status: Single     Spouse name: Not on file   • Number of children: Not on file   • Years of education: Not on file   • Highest education level: Not on file   Tobacco Use   • Smoking status: Former Smoker     Packs/day: 1.50     Years: 35.00     Pack years: 52.50     Types: Cigarettes     Quit date:      Years since quittin.9   • Smokeless tobacco: Never Used   Substance and Sexual Activity   • Alcohol use: Yes     Comment: 5 mixed drinks per day.   • Drug use: No   • Sexual activity: Defer      Current Outpatient Medications on File Prior to Visit   Medication Sig Dispense Refill   • amLODIPine (NORVASC) 5 MG tablet Take 1 tablet by mouth Daily. 90 tablet 3   • aspirin 81 MG EC tablet Take 1 tablet by mouth Daily.     • atorvastatin (LIPITOR) 80 MG tablet Take 1 tablet by mouth every night at bedtime. 90 tablet 3   • Budesonide (ENTOCORT EC) 3 MG 24 hr capsule Take 9 mg by mouth Daily. Takes 3 pills at noon. Prescribed by Dr. Paul.     • donepezil (ARICEPT) 10 MG tablet Take 1 tablet by mouth Daily. 60 tablet 0   • furosemide (LASIX) 20 MG tablet Take 1 tablet by mouth Daily. 90 tablet 1   • hydroCHLOROthiazide (MICROZIDE) 12.5 MG capsule Take 1 capsule by mouth Daily. 90 capsule 3   • lisinopril (PRINIVIL,ZESTRIL) 20 MG tablet Take 1 tablet by mouth Daily. 90 tablet 1   • memantine (NAMENDA) 10 MG tablet Take 1 tablet by mouth twice daily 60 tablet 0   • metoprolol tartrate (LOPRESSOR) 50 MG tablet Take 0.5 tablets by mouth 2 (Two) Times a Day. 180 tablet 1   • Multiple Vitamins-Minerals (CENTRUM SILVER) tablet Take 1 tablet by mouth Daily.     • omeprazole (priLOSEC) 40 MG capsule Take 1 capsule by mouth Daily. 90 capsule 1   • sertraline (ZOLOFT) 100 MG tablet Take 2 tablets by mouth Daily. 180 tablet 1   • thiamine (VITAMIN B1) 100 MG  "tablet Take 1 tablet by mouth Daily. 30 tablet 11   • triamcinolone (KENALOG) 0.1 % cream Apply  topically to the appropriate area as directed 2 (Two) Times a Day. 15 g 1     No current facility-administered medications on file prior to visit.       No Known Allergies     The following portions of the patient's history were reviewed and updated as appropriate: allergies, current medications, past family history, past medical history, past social history, past surgical history and problem list.    Review of Systems   Constitutional: Negative.    HENT: Positive for hearing loss, nosebleeds and sinus pressure.    Eyes: Negative.    Respiratory: Negative.    Cardiovascular: Positive for leg swelling.   Gastrointestinal: Negative.    Endocrine: Negative.    Genitourinary: Positive for decreased urine volume and difficulty urinating.   Musculoskeletal: Positive for arthralgias and back pain.   Skin: Negative.    Allergic/Immunologic: Negative.    Neurological: Positive for light-headedness and numbness.   Hematological: Bruises/bleeds easily.   Psychiatric/Behavioral: Positive for agitation and decreased concentration. The patient is nervous/anxious.         Objective      Physical Exam  Pulse 70   Ht 167.6 cm (66\")   Wt 82.1 kg (181 lb)   SpO2 100%   BMI 29.21 kg/m²     Body mass index is 29.21 kg/m².    GENERAL APPEARANCE: awake, alert & oriented x 3, in no acute distress and well developed, well nourished  PSYCH: normal mood and affect  LUNGS:  breathing nonlabored, no wheezing  Right ankle is tender over the fibula.  Minimal swelling.  Minimal motion secondary to pain.  He walked on it  Imaging/Studies  Imaging Results (Last 7 Days)     ** No results found for the last 168 hours. **        I viewed his x-rays from December 18 which show nondisplaced distal fibula fracture.  Assessment/Plan        ICD-10-CM ICD-9-CM   1. Closed fracture of right ankle, initial encounter  S82.891A 824.8     He was placed in a new " cast today.  This was a fiberglass nonweightbearing short leg cast.  X-rays after the cast.  He will follow-up in 3 weeks for steph-rays in the cast.    Medical Decision Making  Management Options : over-the-counter medicine and close treatment of fracture or dislocation  Data/Risk: radiology tests and independent visualization of imaging, lab tests, or EMG/NCV    Erick Hoyt MD  12/21/20  13:03 EST         EMR Dragon/Transcription disclaimer:  Much of this encounter note is an electronic transcription of spoken language to printed text. Electronic transcription of spoken language may permit erroneous, or at times, nonsensical words or phrases to be inadvertently transcribed. Although I have reviewed the note for such errors, some may still exist.

## 2021-01-11 ENCOUNTER — OFFICE VISIT (OUTPATIENT)
Dept: ORTHOPEDIC SURGERY | Facility: CLINIC | Age: 61
End: 2021-01-11

## 2021-01-11 VITALS — HEART RATE: 86 BPM | BODY MASS INDEX: 29.09 KG/M2 | HEIGHT: 66 IN | OXYGEN SATURATION: 98 % | WEIGHT: 181 LBS

## 2021-01-11 DIAGNOSIS — S82.891D CLOSED FRACTURE OF RIGHT ANKLE WITH ROUTINE HEALING, SUBSEQUENT ENCOUNTER: Primary | ICD-10-CM

## 2021-01-11 PROCEDURE — 99024 POSTOP FOLLOW-UP VISIT: CPT | Performed by: ORTHOPAEDIC SURGERY

## 2021-01-11 NOTE — PROGRESS NOTES
[facility] [facility] Note    Subjective     Chief Complaint   Patient presents with   • Follow-up     3 weeks follow up for Closed fracture of right ankle        HPI  Justice Kiser is a 60 y.o. male.  He is doing well with no new complaints.  His right ankle is in a cast.  Past Medical History:   Diagnosis Date   • Acute maxillary sinusitis    • Elevated LFTs    • History of colonic polyps    • History of methicillin resistant Staphylococcus aureus    • History of myocardial infarction    • Hyperlipidemia    • Hypertension    • Left hand pain    • Left shoulder pain    • Personal history of congestive heart failure    • Right hip pain    • Rotator cuff syndrome       Past Surgical History:   Procedure Laterality Date   • CORONARY ANGIOPLASTY WITH STENT PLACEMENT  01/04/2012    Circumflex Xscience V 3.5 MM x 23 mm   • GALLBLADDER SURGERY  2017   • SHOULDER SURGERY  12/20/2018    Dr. Hoyt Rotator cuff repair      Family History   Problem Relation Age of Onset   • Hypertension Mother    • Heart attack Mother    • Hyperlipidemia Mother    • Alcohol abuse Mother    • Alcohol abuse Father    • Dementia Sister    • Heart disease Sister    • Alcohol abuse Maternal Aunt    • Alcohol abuse Paternal Uncle    • Diabetes Maternal Grandmother    • Heart disease Brother    • Stroke Sister      Social History     Socioeconomic History   • Marital status: Single     Spouse name: Not on file   • Number of children: Not on file   • Years of education: Not on file   • Highest education level: Not on file   Tobacco Use   • Smoking status: Former Smoker     Packs/day: 1.50     Years: 35.00     Pack years: 52.50     Types: Cigarettes     Quit date: 2011     Years since quitting: 10.0   • Smokeless tobacco: Never Used   Substance and Sexual Activity   • Alcohol use: Yes     Comment: 5 mixed drinks per day.   • Drug use: No   • Sexual activity: Defer      Current Outpatient Medications on File Prior to Visit   Medication  Sig Dispense Refill   • amLODIPine (NORVASC) 5 MG tablet Take 1 tablet by mouth Daily. 90 tablet 3   • aspirin 81 MG EC tablet Take 1 tablet by mouth Daily.     • atorvastatin (LIPITOR) 80 MG tablet Take 1 tablet by mouth every night at bedtime. 90 tablet 3   • Budesonide (ENTOCORT EC) 3 MG 24 hr capsule Take 9 mg by mouth Daily. Takes 3 pills at noon. Prescribed by Dr. Paul.     • donepezil (ARICEPT) 10 MG tablet Take 1 tablet by mouth Daily. 60 tablet 0   • furosemide (LASIX) 20 MG tablet Take 1 tablet by mouth Daily. 90 tablet 1   • hydroCHLOROthiazide (MICROZIDE) 12.5 MG capsule Take 1 capsule by mouth Daily. 90 capsule 3   • lisinopril (PRINIVIL,ZESTRIL) 20 MG tablet Take 1 tablet by mouth Daily. 90 tablet 1   • memantine (NAMENDA) 10 MG tablet Take 1 tablet by mouth twice daily 60 tablet 0   • metoprolol tartrate (LOPRESSOR) 50 MG tablet Take 0.5 tablets by mouth 2 (Two) Times a Day. 180 tablet 1   • Multiple Vitamins-Minerals (CENTRUM SILVER) tablet Take 1 tablet by mouth Daily.     • omeprazole (priLOSEC) 40 MG capsule Take 1 capsule by mouth Daily. 90 capsule 1   • sertraline (ZOLOFT) 100 MG tablet Take 2 tablets by mouth Daily. 180 tablet 1   • thiamine (VITAMIN B1) 100 MG tablet Take 1 tablet by mouth Daily. 30 tablet 11   • triamcinolone (KENALOG) 0.1 % cream Apply  topically to the appropriate area as directed 2 (Two) Times a Day. 15 g 1     No current facility-administered medications on file prior to visit.       No Known Allergies     The following portions of the patient's history were reviewed and updated as appropriate: allergies, current medications, past family history, past medical history, past social history, past surgical history and problem list.    Review of Systems   Constitutional: Negative.    HENT: Negative.    Eyes: Negative.    Respiratory: Negative.    Cardiovascular: Negative.    Gastrointestinal: Negative.    Endocrine: Negative.    Genitourinary: Negative.    Musculoskeletal:  "Positive for arthralgias.   Skin: Negative.    Allergic/Immunologic: Negative.    Neurological: Negative.    Hematological: Negative.    Psychiatric/Behavioral: Negative.         Objective      Physical Exam  Pulse 86   Ht 167.6 cm (65.98\")   Wt 82.1 kg (181 lb)   SpO2 98%   BMI 29.23 kg/m²     Body mass index is 29.23 kg/m².    GENERAL APPEARANCE: awake, alert & oriented x 3, in no acute distress  His right ankle is in a cast.  Cast intact.  No other complaints  Imaging/Studies  Imaging Results (Last 7 Days)     Procedure Component Value Units Date/Time    XR Ankle 3+ View Right [[id number]] Resulted: 01/11/21 1400     Updated: 01/11/21 1401    Narrative:      Right Ankle X-Ray  Indication: Pain  Views: AP, Lateral, Mortise    Findings:   Healing right ankle fracture in cast  No bony lesion  Soft tissues normal  Normal joint spaces with mortise well-aligned, no evidence of syndesmosis   widening    prior studies available for comparison.            Assessment/Plan        ICD-10-CM ICD-9-CM   1. Closed fracture of right ankle with routine healing, subsequent encounter  S82.891D V54.19       Orders Placed This Encounter   Procedures   • XR Ankle 3+ View Right      He is doing well.  He will follow-up in 3 weeks for cast removal and x-rays.  Continue crutches and nonweightbearing.  Medical Decision Making  Management Options : over-the-counter medicine and close treatment of fracture or dislocation  Data/Risk: radiology tests and independent visualization of imaging, lab tests, or EMG/NCV    Erick Hoyt MD  01/11/21  14:03 EST         EMR Dragon/Transcription disclaimer:  Much of this encounter note is an electronic transcription of spoken language to printed text. Electronic transcription of spoken language may permit erroneous, or at times, nonsensical words or phrases to be inadvertently transcribed. Although I have reviewed the note for such errors, some may still exist.      "

## 2021-02-01 ENCOUNTER — OFFICE VISIT (OUTPATIENT)
Dept: ORTHOPEDIC SURGERY | Facility: CLINIC | Age: 61
End: 2021-02-01

## 2021-02-01 VITALS — OXYGEN SATURATION: 96 % | WEIGHT: 181 LBS | HEIGHT: 66 IN | HEART RATE: 73 BPM | BODY MASS INDEX: 29.09 KG/M2

## 2021-02-01 DIAGNOSIS — S82.891D CLOSED FRACTURE OF RIGHT ANKLE WITH ROUTINE HEALING, SUBSEQUENT ENCOUNTER: Primary | ICD-10-CM

## 2021-02-01 PROCEDURE — 99024 POSTOP FOLLOW-UP VISIT: CPT | Performed by: ORTHOPAEDIC SURGERY

## 2021-02-01 NOTE — PROGRESS NOTES
"      [facility] [place] [facility] Note    Subjective     CC: Follow-up (3 week follow up for Closed fracture of right ankle)      FIDELIA Kiser is a 60 y.o. male.  He is follow-up right ankle fracture from several weeks ago.  He is doing well.  His cast came off today.    Review of Systems   Constitutional: Negative.  Negative for chills, fatigue and fever.   HENT: Negative.  Negative for congestion and dental problem.    Eyes: Negative.  Negative for blurred vision.   Respiratory: Negative.  Negative for shortness of breath.    Cardiovascular: Negative.  Negative for leg swelling.   Gastrointestinal: Negative.  Negative for abdominal pain.   Endocrine: Negative.  Negative for polyuria.   Genitourinary: Negative.  Negative for difficulty urinating.   Musculoskeletal: Positive for arthralgias.   Skin: Negative.    Allergic/Immunologic: Negative.    Neurological: Negative.    Hematological: Negative.  Negative for adenopathy.   Psychiatric/Behavioral: Negative.  Negative for behavioral problems.       ROS:    Constiutional:Pt denies fever, chills, nausea, or vomiting.  MSK:as above      Objective      Past Medical History  Past Medical History:   Diagnosis Date   • Acute maxillary sinusitis    • Elevated LFTs    • History of colonic polyps    • History of methicillin resistant Staphylococcus aureus    • History of myocardial infarction    • Hyperlipidemia    • Hypertension    • Left hand pain    • Left shoulder pain    • Personal history of congestive heart failure    • Right hip pain    • Rotator cuff syndrome          Physical Exam  Pulse 73   Ht 167.6 cm (65.98\")   Wt 82.1 kg (181 lb)   SpO2 96%   BMI 29.23 kg/m²     Body mass index is 29.23 kg/m².    Patient is well nourished and well developed.        Ortho Exam  Minimal tenderness at fracture site.  Motion intact.    Imaging/Labs/EMG Reviewed:  Imaging Results (Last 24 Hours)     Procedure Component Value Units Date/Time    XR Ankle 3+ View Right " [[id number]] Resulted: 02/01/21 1440     Updated: 02/01/21 1441    Narrative:      Right Ankle X-Ray  Indication: Pain  Views: AP, Lateral, Mortise    Findings:   Healing right distal fibular fracture  No bony lesion  Soft tissues normal  Normal joint spaces with mortise well-aligned, no evidence of syndesmosis   widening    prior studies available for comparison.            Assessment:  1. Closed fracture of right ankle with routine healing, subsequent encounter        Plan:  1. Recommend over the counter anti-inflammatories for pain and/or swelling  2. Transition to weight-bear as tolerated in the boot.  Follow-up in 1 month with x-rays.    Follow Up:   Return in about 1 month (around 3/1/2021) for x-ray.      Medical Decision Making  Management Options : Fracture care follow-up        Erick Hoyt M.D., FAAOS  Orthopedic Surgeon  Fellowship Trained Sports Medicine  [facility]  Orthopedics and Sports Medicine  [address] [place] [address], [address]  [place] [address]

## 2021-02-03 ENCOUNTER — OFFICE VISIT (OUTPATIENT)
Dept: NEUROLOGY | Facility: CLINIC | Age: 61
End: 2021-02-03

## 2021-02-03 VITALS — WEIGHT: 181 LBS | TEMPERATURE: 97.2 F | BODY MASS INDEX: 29.09 KG/M2 | OXYGEN SATURATION: 98 % | HEIGHT: 66 IN

## 2021-02-03 DIAGNOSIS — R25.1 TREMOR: ICD-10-CM

## 2021-02-03 DIAGNOSIS — I67.89 CEREBRAL MICROVASCULAR DISEASE: ICD-10-CM

## 2021-02-03 DIAGNOSIS — F10.11 ALCOHOL ABUSE, IN REMISSION: ICD-10-CM

## 2021-02-03 DIAGNOSIS — G31.84 MCI (MILD COGNITIVE IMPAIRMENT): Primary | ICD-10-CM

## 2021-02-03 PROCEDURE — 99213 OFFICE O/P EST LOW 20 MIN: CPT | Performed by: NURSE PRACTITIONER

## 2021-02-03 RX ORDER — DONEPEZIL HYDROCHLORIDE 10 MG/1
10 TABLET, FILM COATED ORAL NIGHTLY
Qty: 90 TABLET | Refills: 3 | Status: SHIPPED | OUTPATIENT
Start: 2021-02-03 | End: 2021-03-23 | Stop reason: SDUPTHER

## 2021-02-03 RX ORDER — MEMANTINE HYDROCHLORIDE 10 MG/1
10 TABLET ORAL 2 TIMES DAILY
Qty: 180 TABLET | Refills: 3 | Status: SHIPPED | OUTPATIENT
Start: 2021-02-03 | End: 2021-10-08 | Stop reason: SDUPTHER

## 2021-02-03 NOTE — PROGRESS NOTES
Subjective:     Patient ID: Justice Kiser is a 60 y.o. male.    CC:   Chief Complaint   Patient presents with   • Mild Cognitive Impairment     Follow up        HPI:   History of Present Illness   Justice Kiser is a 60 y.o. male who returns to clinic today for 18 month follow up on suspected MCI. He has noted symptoms since at least 2017 marked initially by forgetfulness , repetitiveness  and word-finding difficulties. This has gradually worsened over time, but over past 18 months he feels it is stable. Additional symptoms have included impairments in orientation and executive function. There have been associated symptoms of depression and irritability. He has also noted visual hallucinations of people in his bedroom, primarily at night, but he tells me he has had no ETOH in 2 months and he is no longer having hallucinations. He denies any impairments in ADL's. He manages his medications and finances. He drives occasionally a short distance, but normally his nephew drives him. He is currently residing with his nephew in [place]. Has occasional tremors in hands but this has improved. He would like to try SLP services for memory.     He was drinking 5 mixed drinks a day but recently decided to quit completely and he feels better overall. Had recent fall about 7 weeks ago with right foot fracture, following with Orthopedics and wearing a boot.     Prior evaluation has included an MRI of the brain showing chronic white matter changes, an unremarkable ECHO/Carotid Dopplers and screening bloodwork. He is currently taking donepezil, memantine and was taking thiamine, but recently quit since no longer drinking.   He also had an updated MRI of the brain on 7/23/2018 which showed chronic small vessel ischemic changes with old lacunar infarcts.  He is on aspirin and statin therapy.     The following portions of the patient's history were reviewed and updated as appropriate: allergies, current medications, past family  "history, past medical history, past social history, past surgical history and problem list.    Past Medical History:   Diagnosis Date   • Acute maxillary sinusitis    • Elevated LFTs    • History of colonic polyps    • History of methicillin resistant Staphylococcus aureus    • History of myocardial infarction    • Hyperlipidemia    • Hypertension    • Left hand pain    • Left shoulder pain    • Personal history of congestive heart failure    • Right hip pain    • Rotator cuff syndrome        Past Surgical History:   Procedure Laterality Date   • CORONARY ANGIOPLASTY WITH STENT PLACEMENT  01/04/2012    Circumflex Xscience V 3.5 MM x 23 mm   • GALLBLADDER SURGERY  2017   • SHOULDER SURGERY  12/20/2018    Dr. Hoyt Rotator cuff repair       Social History     Socioeconomic History   • Marital status: Single     Spouse name: Not on file   • Number of children: Not on file   • Years of education: Not on file   • Highest education level: Not on file   Tobacco Use   • Smoking status: Former Smoker     Packs/day: 1.50     Years: 35.00     Pack years: 52.50     Types: Cigarettes     Quit date: 2011     Years since quitting: 10.0   • Smokeless tobacco: Never Used   Substance and Sexual Activity   • Alcohol use: Yes     Comment: 5 mixed drinks per day. recently quit all alcohol 12/2020   • Drug use: No   • Sexual activity: Defer       Family History   Problem Relation Age of Onset   • Hypertension Mother    • Heart attack Mother    • Hyperlipidemia Mother    • Alcohol abuse Mother    • Alcohol abuse Father    • Dementia Sister    • Heart disease Sister    • Alcohol abuse Maternal Aunt    • Alcohol abuse Paternal Uncle    • Diabetes Maternal Grandmother    • Heart disease Brother    • Stroke Sister         Review of Systems   Psychiatric/Behavioral: Positive for decreased concentration.   All other systems reviewed and are negative.       Objective:  Temp 97.2 °F (36.2 °C)   Ht 167.6 cm (66\")   Wt 82.1 kg (181 lb)   SpO2 " 98%   BMI 29.21 kg/m²     Neurologic Exam     Mental Status   Oriented to person, place, and time.   Speech: speech is normal   Level of consciousness: alert    Cranial Nerves   Cranial nerves II through XII intact.     Motor Exam   Muscle bulk: normal  Overall muscle tone: normal    Strength   Strength 5/5 throughout.     Gait, Coordination, and Reflexes     Gait  Gait: (antalgic d/t right foot in boot for recent foot fx)    Coordination   Finger to nose coordination: normal    Tremor   Resting tremor: absent  Intention tremor: present (minimal BUE kinetic hand tremor)  Action tremor: absent    Reflexes   Right brachioradialis: 2+  Left brachioradialis: 2+  Right biceps: 2+  Left biceps: 2+  Right patellar: 2+  Left patellar: 2+  Right achilles: 2+  Left achilles: 2+  Right : 2+  Left : 2+      Physical Exam  Neurological:      Mental Status: He is oriented to person, place, and time.      Coordination: Finger-Nose-Finger Test normal.      Deep Tendon Reflexes: Strength normal.      Reflex Scores:       Bicep reflexes are 2+ on the right side and 2+ on the left side.       Brachioradialis reflexes are 2+ on the right side and 2+ on the left side.       Patellar reflexes are 2+ on the right side and 2+ on the left side.       Achilles reflexes are 2+ on the right side and 2+ on the left side.  Psychiatric:         Mood and Affect: Mood and affect normal.         Speech: Speech normal.         Behavior: Behavior normal.         Thought Content: Thought content normal.         Cognition and Memory: He exhibits impaired recent memory.         Judgment: Judgment normal.          MMSE 29/30 stable since May 2019    Assessment/Plan:       Diagnoses and all orders for this visit:    1. MCI (mild cognitive impairment) (Primary)  -     memantine (NAMENDA) 10 MG tablet; Take 1 tablet by mouth 2 (Two) Times a Day.  Dispense: 180 tablet; Refill: 3  -     donepezil (ARICEPT) 10 MG tablet; Take 1 tablet by mouth Every  Night.  Dispense: 90 tablet; Refill: 3  -     Ambulatory Referral to Speech Therapy    2. Cerebral microvascular disease  Comments:  cont. aspirin/statin, continue tobacco cessation, manage risk factors with PCP    3. Alcohol abuse, in remission  Comments:  quit ETOH 2 months ago    4. Tremor  Comments:  very mild, improved off ETOH per patient, continue to monitor for now           I have recommended he continue his current medications.  Continue abstinence from alcohol.  There was a suspicion of Wernicke's encephalopathy is well.  He will continue off alcohol and we will reevaluate in 6 months to see how his memory and cognition are doing.  Continue other current medications.  We will continue to monitor tremor.  Referral to cognitive therapy services in our [place] location to help with short-term memory.  Follow-up in 6 months or sooner if needed.    Reviewed medications, potential side effects and signs and symptoms to report. Discussed risk versus benefits of treatment plan with patient and/or family-including medications, labs and radiology that may be ordered. Addressed questions and concerns during visit. Patient and/or family verbalized understanding and agree with plan.    AS THE PROVIDER, I PERSONALLY WORE PPE DURING ENTIRE FACE TO FACE ENCOUNTER IN CLINIC WITH THE PATIENT. PATIENT ALSO WORE PPE DURING ENTIRE FACE TO FACE ENCOUNTER EXCEPT FOR A MAX OF 30 SECONDS DURING NEUROLOGICAL EVALUATION OF CRANIAL NERVES AND THEN MASK WAS PLACED BACK OVER PATIENT FACE FOR REMAINDER OF VISIT. I WASHED MY HANDS BEFORE AND AFTER VISIT.    During this visit the following were done:  Labs Reviewed [x]    Labs Ordered []    Radiology Reports Reviewed [x]    Radiology Ordered []    PCP Records Reviewed []    Referring Provider Records Reviewed []    ER Records Reviewed []    Hospital Records Reviewed []    History Obtained From Family []    Radiology Images Reviewed [x]    Other Reviewed [x]    Records Requested []       Alberta Singletary, APRN  2/3/2021

## 2021-03-01 ENCOUNTER — OFFICE VISIT (OUTPATIENT)
Dept: ORTHOPEDIC SURGERY | Facility: CLINIC | Age: 61
End: 2021-03-01

## 2021-03-01 VITALS — BODY MASS INDEX: 29.09 KG/M2 | HEART RATE: 57 BPM | WEIGHT: 181 LBS | OXYGEN SATURATION: 99 % | HEIGHT: 66 IN

## 2021-03-01 DIAGNOSIS — S82.891D CLOSED FRACTURE OF RIGHT ANKLE WITH ROUTINE HEALING, SUBSEQUENT ENCOUNTER: ICD-10-CM

## 2021-03-01 DIAGNOSIS — Z09 FRACTURE FOLLOW-UP: Primary | ICD-10-CM

## 2021-03-01 PROCEDURE — 99024 POSTOP FOLLOW-UP VISIT: CPT | Performed by: ORTHOPAEDIC SURGERY

## 2021-03-01 NOTE — PROGRESS NOTES
"      [facility] [place] [facility] Note    Subjective     CC: Follow-up of the Right Ankle (1 month f/u, Closed fracture of right ankle, DOI 12/11/20)      FIDELIA Kiser is a 60 y.o. male.  He is doing well no complaints  Review of Systems   Constitutional: Negative.  Negative for chills, fatigue and fever.   HENT: Negative.  Negative for congestion and dental problem.    Eyes: Negative.  Negative for blurred vision.   Respiratory: Negative.  Negative for shortness of breath.    Cardiovascular: Negative.  Negative for leg swelling.   Gastrointestinal: Negative.  Negative for abdominal pain.   Endocrine: Negative.  Negative for polyuria.   Genitourinary: Negative.  Negative for difficulty urinating.   Musculoskeletal: Positive for arthralgias.   Skin: Negative.    Allergic/Immunologic: Negative.    Neurological: Negative.    Hematological: Negative.  Negative for adenopathy.   Psychiatric/Behavioral: Negative.  Negative for behavioral problems.       ROS:    Constiutional:Pt denies fever, chills, nausea, or vomiting.  MSK:as above      Objective      Past Medical History  Past Medical History:   Diagnosis Date   • Acute maxillary sinusitis    • Elevated LFTs    • History of colonic polyps    • History of methicillin resistant Staphylococcus aureus    • History of myocardial infarction    • Hyperlipidemia    • Hypertension    • Left hand pain    • Left shoulder pain    • Personal history of congestive heart failure    • Right hip pain    • Rotator cuff syndrome          Physical Exam  Pulse 57   Ht 167.6 cm (65.98\")   Wt 82.1 kg (181 lb)   SpO2 99%   BMI 29.23 kg/m²     Body mass index is 29.23 kg/m².    Patient is well nourished and well developed.        Ortho Exam  Ankle minimal swelling.  Negative Homans' sign.  Full motion.    Imaging/Labs/EMG Reviewed:  Imaging Results (Last 24 Hours)     Procedure Component Value Units Date/Time    XR Ankle 3+ View Right [[id number]] Resulted: 03/01/21 1406     " Updated: 03/01/21 2882    Narrative:      Right Ankle X-Ray  Indication: Pain  Views: AP, Lateral, Mortise    Findings:   Healing of distal fibula fracture  No bony lesion  Soft tissues normal  Normal joint spaces with mortise well-aligned, no evidence of syndesmosis   widening    prior studies available for comparison.            Assessment:  1. Fracture follow-up    2. Closed fracture of right ankle with routine healing, subsequent encounter        Plan:  1. Recommend over the counter anti-inflammatories for pain and/or swelling  2. He is doing well.  I will transition out of the boot and have him wear an Aircast.  Follow-up in 1 month with x-rays.    Follow Up:   Return in about 1 month (around 4/1/2021) for x-ray.      Medical Decision Making  Management Options : Low - 1 of 2 Categories = Category 1 - Review of Tests and Documents Category 2 - Assessment requiring an independent interpretation of tests         Erick Hoyt M.D., FAAOS  Orthopedic Surgeon  Fellowship Trained Sports Medicine  [facility]  Orthopedics and Sports Medicine  [address] [place] [address], [address]  [place] [address]

## 2021-03-04 ENCOUNTER — OFFICE VISIT (OUTPATIENT)
Dept: PHYSICAL THERAPY | Facility: CLINIC | Age: 61
End: 2021-03-04

## 2021-03-04 DIAGNOSIS — R41.841 COGNITIVE COMMUNICATION DEFICIT: Primary | ICD-10-CM

## 2021-03-04 PROCEDURE — 96125 COGNITIVE TEST BY HC PRO: CPT | Performed by: SPEECH-LANGUAGE PATHOLOGIST

## 2021-03-04 NOTE — PROGRESS NOTES
Outpatient Speech Language Pathology   Adult Speech Language Cognitive Initial Evaluation       Patient Name: Justice Kiser  : 1960  MRN: [id number]  Today's Date: 3/4/2021        Visit Date: 2021   Patient Active Problem List   Diagnosis   • Essential hypertension   • Coronary artery disease involving native coronary artery of native heart with angina pectoris (CMS/HCC)   • Hyperlipidemia LDL goal <70   • Depression   • Anxiety   • Hyperglycemia   • Paresthesia of right foot   • Tremor   • GERD (gastroesophageal reflux disease)   • Arthritis   • Pierson's esophagus without dysplasia   • MCI (mild cognitive impairment)   • Mitral valve insufficiency   • Alcohol induced fatty liver   • PVD (peripheral vascular disease) with claudication (CMS/HCC)   • Alcohol abuse, in remission   • Cerebral microvascular disease        Past Medical History:   Diagnosis Date   • Acute maxillary sinusitis    • Elevated LFTs    • History of colonic polyps    • History of methicillin resistant Staphylococcus aureus    • History of myocardial infarction    • Hyperlipidemia    • Hypertension    • Left hand pain    • Left shoulder pain    • Personal history of congestive heart failure    • Right hip pain    • Rotator cuff syndrome         Past Surgical History:   Procedure Laterality Date   • CORONARY ANGIOPLASTY WITH STENT PLACEMENT  2012    Circumflex Xscience V 3.5 MM x 23 mm   • GALLBLADDER SURGERY  2017   • SHOULDER SURGERY  2018    Dr. Hoyt Rotator cuff repair         Visit Dx:    ICD-10-CM ICD-9-CM   1. Cognitive communication deficit  R41.841 799.52           SLP SLC Evaluation - 21 1000        Communication Assessment/Intervention    Document Type  evaluation   -RB    Total Evaluation Minutes, SLP  45   -RB    Subjective Information  no complaints   -RB    Patient Observations  alert;cooperative;agree to therapy   -RB    Care Plan Review  evaluation/treatment results reviewed   -RB    Patient  Effort  good   -RB    Symptoms Noted During/After Treatment  none   -RB       General Information    Patient Profile Reviewed  yes   -RB    Pertinent History Of Current Problem  PT reports decline in STM, word finding and attention starting around 2017. HE notes genral forgetfulness. He feels like his symtpoms have gotten a little worse over the past few years. He is on disability. He lives with his nephew. He reports independence with medicines, finances, and appointments. However noted that he occasionally will forget if he took a medicine. No swallow concerns. PMH: cerbreal microvascular disease., ETOH abuse, depression, anxiety.    -RB    Precautions/Limitations, Vision  WFL;for purposes of eval   -RB    Precautions/Limitations, Hearing  WFL;for purposes of eval   -RB    Patient Level of Education  high school   -RB    Prior Level of Function-Communication  WFL   -RB    Plans/Goals Discussed with  patient;agreed upon   -RB    Barriers to Rehab  none identified   -RB    Patient's Goals for Discharge  functional cognition   -RB    Standardized Assessment Used  RBANS   -RB       Pain    Additional Documentation  Pain Scale: Numbers Pre/Post-Treatment (Group)   -RB       Pain Scale: Numbers Pre/Post-Treatment    Pretreatment Pain Rating  0/10 - no pain   -RB    Posttreatment Pain Rating  0/10 - no pain   -RB       Comprehension Assessment/Intervention    Comprehension Assessment/Intervention  Auditory Comprehension   -RB       Auditory Comprehension Assessment/Intervention    Auditory Comprehension (Communication)  WFL   -RB       Expression Assessment/Intervention    Expression Assessment/Intervention  verbal expression   -RB       Verbal Expression Assessment/Intervention    Verbal Expression  WFL   -RB       Oral Motor Structure and Function    Oral Motor Structure and Function  WFL   -RB    Dentition Assessment  upper dentures/partial in place   -RB    Mucosal Quality  moist, healthy   -RB       Oral Musculature  and Cranial Nerve Assessment    Oral Motor General Assessment  WFL   -RB       Motor Speech Assessment/Intervention    Motor Speech Function  WFL   -RB       Cursory Voice Assessment/Intervention    Quality and Resonance (Voice)  WFL   -RB       Cognitive Assessment Intervention- SLP    Cognitive Function (Cognition)  mild impairment;moderate impairment   -RB    Orientation Status (Cognition)  WFL   -RB    Memory (Cognitive)  mild impairment;moderate impairment;immediate;short-term;delayed;auditory;visual;new learning   -RB    Attention (Cognitive)  mild impairment;distracting environment;attention to detail   -RB    Thought Organization (Cognitive)  WFL   -RB    Reasoning (Cognitive)  WFL   -RB    Problem Solving (Cognitive)  WFL   -RB    Executive Function (Cognition)  WFL   -RB    Pragmatics (Communication)  WFL   -RB    Cognition, Comment  PT demosntrates mild-moderate deficits with immediate, fucntional, short term, and delayed memory. HE reports trouble focusing with distractions and attention to detail deficits. PT reports word finding deficits. Would benefit from skilled ST    -RB       Standardized Tests    Cognitive/Memory Tests  RBANS: Repeatable Battery for the Assessment of Neuropsychological Status   -RB       RBANS- Repeatable Battery for the Assessment of Neuropsychological Status    Immediate Memory Index Score  76   -RB    Immediate Memory Qualitative Description  extremely low   -RB    Visuospatial Index Score  92   -RB    Visuospatial Qualitative Description  average   -RB    Language Index Score  101   -RB    Language Qualitative Description  average   -RB    Attention Index Score  106   -RB    Attention Qualitative Description  average   -RB    Delayed Memory Index Score  71   -RB    Delayed Memory Qualitative Description  extremely low   -RB    Total Index Score  446   -RB    RBANS Comments  Pt demonstrated difficulty with the immediate and delayed memory subtests. WFL for attention,  visuospatial, and langauge scores.    -RB       Recommendations    Therapy Frequency (SLP SLC)  1 day per week   -RB    Predicted Duration Therapy Intervention (Days)  until discharge   -RB    Anticipated Discharge Disposition (SLP)  home   -RB      User Key  (r) = Recorded By, (t) = Taken By, (c) = Cosigned By    Initials Name Provider Type    RB Nette Plummer SLP Speech and Language Pathologist         LTGs  Pt and family will implement compensatory strategies to maximize patient’s memory function so patient can continue to participate in daily activities at 90% with no cues  Pt will be able to remember information needed to participate in avocational activitiesat 90% with no cues  STGs  Pt’s memory skills will be enhanced as reported by patient by utilizing internal memory strategies to recall up to 3 pieces of information after a 5 minute delay  Pt’s memory skills will be enhanced as reported by patient using external memory aides at 90% with no cues.  Pt will demonstrate improved ability to recall information by listening to paragraph and answering  questions at 90% with no cues   Pt will improve attention skills by sustaining focus to selective target/task when presented with competing stimuli or in a distracting environment in order to complete a task at 90% with no cues  PT will utilize word finding strategies at 90% with no cues    recertification: 6/3/21          OP SLP Education     Row Name 03/04/21 1200       Education    Barriers to Learning  No barriers identified  -RB    Education Provided  Described results of evaluation;Patient expressed understanding of evaluation  -RB    Assessed  Learning needs;Learning motivation;Learning preferences;Learning readiness  -RB    Learning Motivation  Strong  -RB    Learning Method  Demonstration;Explanation;Teach back;Written materials  -RB    Teaching Response  Verbalized understanding;Demonstrated understanding;Reinforcement needed  -RB    Education Comments   HEP: communication adn memory strategies   -RB      User Key  (r) = Recorded By, (t) = Taken By, (c) = Cosigned By    Initials Name Effective Dates    Nette Carrizales SLP 02/28/20 -               OP SLP Assessment/Plan - 03/04/21 1200        SLP Assessment    Functional Problems  Speech Language- Adult/Cognition   -RB    Impact on Function: Adult Speech Language/Cognition  Difficulty communicating wants, needs, and ideas;Difficulty communicating in a medical emergency;Restrictions in personal and social life;Trouble learning or remembering new information;Poor attention to task   -RB    Clinical Impression: Speech Language-Adult/Congnition  Mild-Moderate:;Cognitive Communication Impairment   -RB    Functional Problems Comment  Pt's STM deficits impact his daily communciation and fucntioning in his environments    -RB    Clinical Impression Comments  receptive to strategies, would benefit from skilled ST    -RB    Please refer to paper survey for additional self-reported information  Yes   -RB    Please refer to items scanned into chart for additional diagnostic informaiton and handouts as provided by clinician  Yes   -RB    SLP Diagnosis  cogntiive communication deficit    -RB    Prognosis  Good (comment)   -RB    Patient/caregiver participated in establishment of treatment plan and goals  Yes   -RB    Patient would benefit from skilled therapy intervention  Yes   -RB       SLP Plan    Frequency  1x/weekly   -RB    Duration  8 weeks   -RB    Planned CPT's?  SLP INDIVIDUAL SPEECH THERAPY: 61229   -RB    Expected Duration of Therapy Session (SLP Eval)  45   -RB    Plan Comments  initiate POC    -RB      User Key  (r) = Recorded By, (t) = Taken By, (c) = Cosigned By    Initials Name Provider Type    Nette Carrizales SLP Speech and Language Pathologist             SLP Outcome Measures (last 72 hours)      SLP Outcome Measures     Row Name 03/04/21 1200             SLP Outcome Measures    Outcome Measure Used?   Adult NOMS  -RB         Adult FCM Scores    FCM Chosen  Memory  -RB      Memory FCM Score  5  -RB        User Key  (r) = Recorded By, (t) = Taken By, (c) = Cosigned By    Initials Name Effective Dates    Nette Carrizales SLP 02/28/20 -              Time Calculation:                     CAROLYN Melchor  3/4/2021

## 2021-03-11 ENCOUNTER — TREATMENT (OUTPATIENT)
Dept: PHYSICAL THERAPY | Facility: CLINIC | Age: 61
End: 2021-03-11

## 2021-03-11 DIAGNOSIS — R41.841 COGNITIVE COMMUNICATION DEFICIT: Primary | ICD-10-CM

## 2021-03-11 PROCEDURE — 92507 TX SP LANG VOICE COMM INDIV: CPT | Performed by: SPEECH-LANGUAGE PATHOLOGIST

## 2021-03-11 NOTE — PROGRESS NOTES
Outpatient Speech Language Pathology   Adult Speech Language Cognitive Treatment Note       Patient Name: Justice Kiser  : 1960  MRN: [id number]  Today's Date: 3/11/2021         Visit Date: 2021   Patient Active Problem List   Diagnosis   • Essential hypertension   • Coronary artery disease involving native coronary artery of native heart with angina pectoris (CMS/HCC)   • Hyperlipidemia LDL goal <70   • Depression   • Anxiety   • Hyperglycemia   • Paresthesia of right foot   • Tremor   • GERD (gastroesophageal reflux disease)   • Arthritis   • Pierson's esophagus without dysplasia   • MCI (mild cognitive impairment)   • Mitral valve insufficiency   • Alcohol induced fatty liver   • PVD (peripheral vascular disease) with claudication (CMS/HCC)   • Alcohol abuse, in remission   • Cerebral microvascular disease          Visit Dx:    ICD-10-CM ICD-9-CM   1. Cognitive communication deficit  R41.841 799.52     LTGs  Pt and family will implement compensatory strategies to maximize patient’s memory function so patient can continue to participate in daily activities at 90% with no cues  -modeled and targeted compensatory strategies (visual aids, labels, alarms, etc) and internal memory strategies. Able to utilize at 65-70%.  Pt will be able to remember information needed to participate in avocational activities at 90% with no cues  -assessed medicine management, calendar management, budgeting, health insurance card comprehension, prescription label comprehension: 100% with no cues  STGs  Pt’s memory skills will be enhanced as reported by patient by utilizing internal memory strategies to recall up to 3 pieces of information after a 5 minute delay  -name recall: 3/4 after 5 minute delay and no cues  -grocery list recall: 3/5 after 5 minute delay  Pt’s memory skills will be enhanced as reported by patient using external memory aides at 90% with no cues.  -calendar management: 100% with no cues  Pt will  demonstrate improved ability to recall information by listening to paragraph and answering  questions at 90% with no cues  -article recall: 70% with no cues  -6 minute podcast recall with note taking aid: 75%   Pt will improve attention skills by sustaining focus to selective target/task when presented with competing stimuli or in a distracting environment in order to complete a task at 90% with no cues  -70% with no cues  PT will utilize word finding strategies at 90% with no cues  -modeled semantic feature analysis. Able to utilize at 70%  recertification: 6/3/21    Pt reports difficulties over planting in his garden, trouble with immediate recall of farmer's almanac and recipes. Plan to assess and target next session       OP SLP Education     Row Name 03/11/21 0800       Education    Barriers to Learning  No barriers identified  -RB    Education Provided  Patient demonstrated recommended strategies;Patient requires further education on strategies, risks  -RB    Assessed  Learning needs;Learning motivation;Learning preferences;Learning readiness  -RB    Learning Motivation  Strong  -RB    Learning Method  Explanation;Demonstration;Teach back;Written materials  -RB    Teaching Response  Verbalized understanding;Demonstrated understanding;Reinforcement needed  -RB    Education Comments  HEP: internal memory strategies  -RB      User Key  (r) = Recorded By, (t) = Taken By, (c) = Cosigned By    Initials Name Effective Dates    RB Nette Plummer SLP 02/28/20 -           OP SLP Assessment/Plan - 03/11/21 0800        SLP Assessment    Functional Problems  Speech Language- Adult/Cognition   -RB    Impact on Function: Adult Speech Language/Cognition  Difficulty communicating wants, needs, and ideas;Difficulty communicating in a medical emergency;Restrictions in personal and social life;Trouble learning or remembering new information;Poor attention to task   -RB    Clinical Impression: Speech Language-Adult/Congnition   Mild-Moderate:;Cognitive Communication Impairment   -RB    Functional Problems Comment  STM deficits impact daily communication with others and participatin in ADLs   -RB    Clinical Impression Comments  following HEP, receptive to tx and strategies   -RB    Please refer to paper survey for additional self-reported information  Yes   -RB    Please refer to items scanned into chart for additional diagnostic informaiton and handouts as provided by clinician  Yes   -RB    SLP Diagnosis  cognitive communication deficit   -RB    Prognosis  Good (comment)   -RB    Patient/caregiver participated in establishment of treatment plan and goals  Yes   -RB    Patient would benefit from skilled therapy intervention  Yes   -RB       SLP Plan    Frequency  1x/weekly   -RB    Duration  7 weeks   -RB    Planned CPT's?  SLP INDIVIDUAL SPEECH THERAPY: 11620   -RB    Expected Duration of Therapy Session (SLP Eval)  45   -RB    Plan Comments  continue POC   -RB      User Key  (r) = Recorded By, (t) = Taken By, (c) = Cosigned By    Initials Name Provider Type    Nette Carrizales SLP Speech and Language Pathologist                 Time Calculation:                     CAROLYN Melchor  3/11/2021

## 2021-03-18 ENCOUNTER — TREATMENT (OUTPATIENT)
Dept: PHYSICAL THERAPY | Facility: CLINIC | Age: 61
End: 2021-03-18

## 2021-03-18 DIAGNOSIS — R41.841 COGNITIVE COMMUNICATION DEFICIT: Primary | ICD-10-CM

## 2021-03-18 PROCEDURE — 92507 TX SP LANG VOICE COMM INDIV: CPT | Performed by: SPEECH-LANGUAGE PATHOLOGIST

## 2021-03-18 NOTE — PROGRESS NOTES
Outpatient Speech Language Pathology   Adult Speech Language Cognitive Treatment Note       Patient Name: Justice Kiser  : 1960  MRN: [id number]  Today's Date: 3/18/2021         Visit Date: 2021   Patient Active Problem List   Diagnosis   • Essential hypertension   • Coronary artery disease involving native coronary artery of native heart with angina pectoris (CMS/HCC)   • Hyperlipidemia LDL goal <70   • Depression   • Anxiety   • Hyperglycemia   • Paresthesia of right foot   • Tremor   • GERD (gastroesophageal reflux disease)   • Arthritis   • Pierson's esophagus without dysplasia   • MCI (mild cognitive impairment)   • Mitral valve insufficiency   • Alcohol induced fatty liver   • PVD (peripheral vascular disease) with claudication (CMS/HCC)   • Alcohol abuse, in remission   • Cerebral microvascular disease          Visit Dx:    ICD-10-CM ICD-9-CM   1. Cognitive communication deficit  R41.841 799.52     Pain:0  Subjective: No new concerns    LTGs  Pt and family will implement compensatory strategies to maximize patient’s memory function so patient can continue to participate in daily activities at 90% with no cues  -modeled and targeted compensatory strategies (visual aids, labels, alarms, etc) and internal memory strategies. Able to utilize at 65-70%.  Pt will be able to remember information needed to participate in avocational activities at 90% with no cues  -able to remember information for ADLs (gardening, cooking, etc) at 80% with no cues  STGs  Pt’s memory skills will be enhanced as reported by patient by utilizing internal memory strategies to recall up to 3 pieces of information after a 5 minute delay  -name recall: 3/4 after 5 minute delay and no cues, 4/4 after 10 minute delay  Pt’s memory skills will be enhanced as reported by patient using external memory aides at 90% with no cues.  -visual aids: 90% with no cues  Pt will demonstrate improved ability to recall information by listening to  paragraph and answering  questions at 90% with no cues  -article recall:80% with no cues  -gardening instructions recall: 90%  -gardening table recall: 90%  -recipe recall: 85%  -menu recall:75%   Pt will improve attention skills by sustaining focus to selective target/task when presented with competing stimuli or in a distracting environment in order to complete a task at 90% with no cues  -75% with no cues  PT will utilize word finding strategies at 90% with no cues  -targeted semantic feature analysis. Able to utilize at 75% in conversation  recertification: 6/3/21          OP SLP Education     Row Name 03/18/21 0800       Education    Barriers to Learning  No barriers identified  -RB    Education Provided  Patient demonstrated recommended strategies;Patient requires further education on strategies, risks  -RB    Assessed  Learning needs;Learning motivation;Learning preferences;Learning readiness  -RB    Learning Motivation  Strong  -RB    Learning Method  Explanation;Demonstration;Teach back;Written materials  -RB    Teaching Response  Verbalized understanding;Demonstrated understanding;Reinforcement needed  -RB    Education Comments  HEP: gardening recall tasks  -RB      User Key  (r) = Recorded By, (t) = Taken By, (c) = Cosigned By    Initials Name Effective Dates    RB Nette Plummer SLP 02/28/20 -           OP SLP Assessment/Plan - 03/18/21 0800        SLP Assessment    Functional Problems  Speech Language- Adult/Cognition   -RB    Impact on Function: Adult Speech Language/Cognition  Difficulty communicating wants, needs, and ideas;Difficulty communicating in a medical emergency;Restrictions in personal and social life;Trouble learning or remembering new information;Poor attention to task   -RB    Clinical Impression: Speech Language-Adult/Congnition  Mild-Moderate:;Cognitive Communication Impairment   -RB    Functional Problems Comment  STM and cognitive deficits impact communication and functional daily  tasks    -RB    Clinical Impression Comments  following HEP, makign adjustments at home to aid memory, less cues needed for tasks   -RB    Please refer to paper survey for additional self-reported information  Yes   -RB    Please refer to items scanned into chart for additional diagnostic informaiton and handouts as provided by clinician  Yes   -RB    SLP Diagnosis  cognitive communication deficit   -RB    Prognosis  Good (comment)   -RB    Patient/caregiver participated in establishment of treatment plan and goals  Yes   -RB    Patient would benefit from skilled therapy intervention  Yes   -RB       SLP Plan    Frequency  1x/weekly   -RB    Duration  6 weeks   -RB    Planned CPT's?  SLP INDIVIDUAL SPEECH THERAPY: 59347   -RB    Expected Duration of Therapy Session (SLP Eval)  45   -RB    Plan Comments  continue POC   -RB      User Key  (r) = Recorded By, (t) = Taken By, (c) = Cosigned By    Initials Name Provider Type    RB Nette Plummer, SLP Speech and Language Pathologist                Nette Plummer MA CCC-SLP  3/18/2021

## 2021-03-20 DIAGNOSIS — I10 ESSENTIAL HYPERTENSION: ICD-10-CM

## 2021-03-21 RX ORDER — LISINOPRIL 20 MG/1
TABLET ORAL
Qty: 90 TABLET | Refills: 1 | OUTPATIENT
Start: 2021-03-21

## 2021-03-22 RX ORDER — FUROSEMIDE 20 MG/1
TABLET ORAL
Qty: 90 TABLET | Refills: 1 | Status: SHIPPED | OUTPATIENT
Start: 2021-03-22 | End: 2022-01-27 | Stop reason: SDUPTHER

## 2021-03-23 DIAGNOSIS — G31.84 MCI (MILD COGNITIVE IMPAIRMENT): ICD-10-CM

## 2021-03-23 RX ORDER — DONEPEZIL HYDROCHLORIDE 10 MG/1
10 TABLET, FILM COATED ORAL NIGHTLY
Qty: 90 TABLET | Refills: 3 | Status: SHIPPED | OUTPATIENT
Start: 2021-03-23 | End: 2021-03-30 | Stop reason: SDUPTHER

## 2021-03-25 ENCOUNTER — TREATMENT (OUTPATIENT)
Dept: PHYSICAL THERAPY | Facility: CLINIC | Age: 61
End: 2021-03-25

## 2021-03-25 DIAGNOSIS — R41.841 COGNITIVE COMMUNICATION DEFICIT: Primary | ICD-10-CM

## 2021-03-25 PROCEDURE — 92507 TX SP LANG VOICE COMM INDIV: CPT | Performed by: SPEECH-LANGUAGE PATHOLOGIST

## 2021-03-25 NOTE — PROGRESS NOTES
Outpatient Speech Language Pathology   Adult Speech Language Cognitive Treatment Note       Patient Name: Justice Kiser  : 1960  MRN: [id number]  Today's Date: 3/25/2021         Visit Date: 2021   Patient Active Problem List   Diagnosis   • Essential hypertension   • Coronary artery disease involving native coronary artery of native heart with angina pectoris (CMS/HCC)   • Hyperlipidemia LDL goal <70   • Depression   • Anxiety   • Hyperglycemia   • Paresthesia of right foot   • Tremor   • GERD (gastroesophageal reflux disease)   • Arthritis   • Pierson's esophagus without dysplasia   • MCI (mild cognitive impairment)   • Mitral valve insufficiency   • Alcohol induced fatty liver   • PVD (peripheral vascular disease) with claudication (CMS/HCC)   • Alcohol abuse, in remission   • Cerebral microvascular disease          Visit Dx:    ICD-10-CM ICD-9-CM   1. Cognitive communication deficit  R41.841 799.52        Pain:0  Subjective: No new concerns    LTGs  Pt and family will implement compensatory strategies to maximize patient’s memory function so patient can continue to participate in daily activities at 90% with no cues  -modeled and targeted compensatory strategies (visual aids, labels, alarms, etc) and internal memory strategies. Able to utilize at 75-80%.with no cues  Pt will be able to remember information needed to participate in avocational activities at 90% with no cues  -able to remember information for ADLs (gardening, cooking, etc) at 85% with no cues  STGs  Pt’s memory skills will be enhanced as reported by patient by utilizing internal memory strategies to recall up to 3 pieces of information after a 5 minute delay  -name recall: 4/4 after 5 minute delay   Pt’s memory skills will be enhanced as reported by patient using external memory aides at 90% with no cues.  -visual aids: 90% with no cues  Pt will demonstrate improved ability to recall information by listening to paragraph and  answering  questions at 90% with no cues  -article recall:85% with no cues  -gardening instructions recall: 90%  -gardening planting table recall: 90%  -recipe recall: 90%  -menu recall:80%  -4 minute podcast recall: with note taking aid: 85%  No cues needed   Pt will improve attention skills by sustaining focus to selective target/task when presented with competing stimuli or in a distracting environment in order to complete a task at 90% with no cues  -80% with no cues  PT will utilize word finding strategies at 90% with no cues  -targeted semantic feature analysis. Able to utilize at 80% in conversation  recertification: 6/3/21            OP SLP Education     Row Name 03/25/21 1000       Education    Barriers to Learning  No barriers identified  -RB    Education Provided  Patient demonstrated recommended strategies;Patient requires further education on strategies, risks  -RB    Assessed  Learning needs;Learning motivation;Learning preferences;Learning readiness  -RB    Learning Motivation  Strong  -RB    Learning Method  Explanation;Demonstration;Teach back;Written materials  -RB    Teaching Response  Verbalized understanding;Demonstrated understanding;Reinforcement needed  -RB    Education Comments  HEP: gardening recall tasks  -RB      User Key  (r) = Recorded By, (t) = Taken By, (c) = Cosigned By    Initials Name Effective Dates    RB Nette Plummer SLP 02/28/20 -           OP SLP Assessment/Plan - 03/25/21 1000        SLP Assessment    Functional Problems  Speech Language- Adult/Cognition   -RB    Impact on Function: Adult Speech Language/Cognition  Difficulty communicating wants, needs, and ideas;Difficulty communicating in a medical emergency;Restrictions in personal and social life;Trouble learning or remembering new information;Poor attention to task   -RB    Clinical Impression: Speech Language-Adult/Congnition  Mild-Moderate:;Cognitive Communication Impairment   -RB    Functional Problems Comment   Cognitive deficits impact communication with others and daily task functioning   -RB    Clinical Impression Comments  following HEP, states he is trying to make strategies a habit, making gains towards all goals   -RB    Please refer to paper survey for additional self-reported information  Yes   -RB    Please refer to items scanned into chart for additional diagnostic informaiton and handouts as provided by clinician  Yes   -RB    SLP Diagnosis  cog/comm deficit   -RB    Prognosis  Good (comment)   -RB    Patient/caregiver participated in establishment of treatment plan and goals  Yes   -RB    Patient would benefit from skilled therapy intervention  Yes   -RB       SLP Plan    Frequency  1x/weekly   -RB    Duration 1 week   -RB    Planned CPT's?  SLP INDIVIDUAL SPEECH THERAPY: 24681   -RB    Expected Duration of Therapy Session (SLP Eval)  45   -RB    Plan Comments  discharge next session    -RB      User Key  (r) = Recorded By, (t) = Taken By, (c) = Cosigned By    Initials Name Provider Type    RB Nette Plummer SLP Speech and Language Pathologist                Nette Plummer MA CCC-SLP  3/25/2021

## 2021-03-29 ENCOUNTER — TELEPHONE (OUTPATIENT)
Dept: FAMILY MEDICINE CLINIC | Facility: CLINIC | Age: 61
End: 2021-03-29

## 2021-03-29 DIAGNOSIS — I10 ESSENTIAL HYPERTENSION: ICD-10-CM

## 2021-03-29 RX ORDER — LISINOPRIL 20 MG/1
20 TABLET ORAL DAILY
Qty: 90 TABLET | Refills: 1 | Status: SHIPPED | OUTPATIENT
Start: 2021-03-29 | End: 2021-03-30 | Stop reason: SDUPTHER

## 2021-03-29 NOTE — TELEPHONE ENCOUNTER
PATIENT HAS PRESENTED TO FRONT WINDOW AND STATES [facility] IS TELLING HIM THAT DR ESQUIVEL DENIED HIS LISINOPRIL.  I LOOKED IT UP AND IT SAYS REFILL TOO SOON BUT HE STATES HE HAS ENOUGH FOR THIS WEEK BUT THAT'S IT.  HE WANTS SOMEONE TO CALL HIM ABOUT THIS BECAUSE HE DOES NOT WANT TO GO WITHOUT AND IT COMES VIA MAIL.

## 2021-03-30 DIAGNOSIS — G31.84 MCI (MILD COGNITIVE IMPAIRMENT): ICD-10-CM

## 2021-03-30 DIAGNOSIS — I10 ESSENTIAL HYPERTENSION: ICD-10-CM

## 2021-03-30 RX ORDER — DONEPEZIL HYDROCHLORIDE 10 MG/1
10 TABLET, FILM COATED ORAL NIGHTLY
Qty: 90 TABLET | Refills: 3 | Status: SHIPPED | OUTPATIENT
Start: 2021-03-30 | End: 2021-12-01

## 2021-03-30 RX ORDER — LISINOPRIL 20 MG/1
20 TABLET ORAL DAILY
Qty: 90 TABLET | Refills: 1 | Status: SHIPPED | OUTPATIENT
Start: 2021-03-30 | End: 2021-04-06 | Stop reason: SDUPTHER

## 2021-04-01 ENCOUNTER — TREATMENT (OUTPATIENT)
Dept: PHYSICAL THERAPY | Facility: CLINIC | Age: 61
End: 2021-04-01

## 2021-04-01 DIAGNOSIS — R41.841 COGNITIVE COMMUNICATION DEFICIT: Primary | ICD-10-CM

## 2021-04-01 PROCEDURE — 92507 TX SP LANG VOICE COMM INDIV: CPT | Performed by: SPEECH-LANGUAGE PATHOLOGIST

## 2021-04-01 NOTE — PROGRESS NOTES
"Outpatient Speech Language Pathology   Adult Speech Language Cognitive Treatment Note/Discharge Summary       Patient Name: Justice Kiser  : 1960  MRN: [id number]  Today's Date: 2021         Visit Date: 2021   Patient Active Problem List   Diagnosis   • Essential hypertension   • Coronary artery disease involving native coronary artery of native heart with angina pectoris (CMS/HCC)   • Hyperlipidemia LDL goal <70   • Depression   • Anxiety   • Hyperglycemia   • Paresthesia of right foot   • Tremor   • GERD (gastroesophageal reflux disease)   • Arthritis   • Pierson's esophagus without dysplasia   • MCI (mild cognitive impairment)   • Mitral valve insufficiency   • Alcohol induced fatty liver   • PVD (peripheral vascular disease) with claudication (CMS/HCC)   • Alcohol abuse, in remission   • Cerebral microvascular disease          Visit Dx:    ICD-10-CM ICD-9-CM   1. Cognitive communication deficit  R41.841 799.52     Pain:0  Subjective: \"cold out there today\"   LTGs  Pt and family will implement compensatory strategies to maximize patient’s memory function so patient can continue to participate in daily activities at 90% with no cues  -modeled and targeted compensatory strategies (visual aids, labels, alarms, etc) and internal memory strategies. Able to utilize at 90%.with no cues. MET  Pt will be able to remember information needed to participate in avocational activities at 90% with no cues  -able to remember information for ADLs (gardening,community based activities, cooking, etc) at 90-95% with no cues. MET  STGs  Pt’s memory skills will be enhanced as reported by patient by utilizing internal memory strategies to recall up to 3 pieces of information after a 5 minute delay  -name recall: 4/4 after 5 minute delay   MET  Pt’s memory skills will be enhanced as reported by patient using external memory aides at 90% with no cues.  -visual aids: 95% with no cues. MET  Pt will demonstrate improved " ability to recall information by listening to paragraph and answering  questions at 90% with no cues  -newspaper article recall:95% with no cues  -gardening instructions recall: 95%  -gardening planting table recall: 95%  -menu recall:90%  -4 minute podcast recall: with note taking aid: 90%  No cues needed. MET   Pt will improve attention skills by sustaining focus to selective target/task when presented with competing stimuli or in a distracting environment in order to complete a task at 90% with no cues  -90% with no cues. MET  PT will utilize word finding strategies at 90% with no cues  -targeted semantic feature analysis. Able to utilize at 90% in conversation. MET  recertification: 6/3/21          OP SLP Education     Row Name 04/01/21 1000       Education    Barriers to Learning  No barriers identified  -RB    Education Provided  Patient demonstrated recommended strategies  -RB    Assessed  Learning needs;Learning motivation;Learning preferences;Learning readiness  -RB    Learning Motivation  Strong  -RB    Learning Method  Explanation;Demonstration  -RB    Teaching Response  Verbalized understanding;Demonstrated understanding  -RB    Education Comments  HEP; discharge planning, visual aids  -RB      User Key  (r) = Recorded By, (t) = Taken By, (c) = Cosigned By    Initials Name Effective Dates    Nette Carrizales SLP 02/28/20 -           OP SLP Assessment/Plan - 04/01/21 1000        SLP Assessment    Clinical Impression Comments  PT has met all goals, feels independent with strategies, discharge from skilled ST   -RB       SLP Plan    Plan Comments  discharge   -RB      User Key  (r) = Recorded By, (t) = Taken By, (c) = Cosigned By    Initials Name Provider Type    Nette Carrizales SLP Speech and Language Pathologist             SLP Outcome Measures (last 72 hours)      SLP Outcome Measures     Row Name 04/01/21 1000             Adult FCM Scores    Memory FCM Score  5  -RB        User Key  (r) = Recorded  By, (t) = Taken By, (c) = Cosigned By    Initials Name Effective Dates    Nette Carrizales SLP 02/28/20 -                 OP SLP Discharge Summary  Date of Discharge: 04/01/21  Reason for Discharge: all goals and outcomes met, no further needs identified  Progress Toward Achieving Short/long Term Goals: all goals met within established timelines  Discharge Destination: home  Discharge Instructions: f/u as needed      CAROLYN Melchor  4/1/2021

## 2021-04-05 ENCOUNTER — OFFICE VISIT (OUTPATIENT)
Dept: ORTHOPEDIC SURGERY | Facility: CLINIC | Age: 61
End: 2021-04-05

## 2021-04-05 VITALS
WEIGHT: 181 LBS | BODY MASS INDEX: 29.09 KG/M2 | HEIGHT: 66 IN | HEART RATE: 52 BPM | SYSTOLIC BLOOD PRESSURE: 143 MMHG | DIASTOLIC BLOOD PRESSURE: 64 MMHG

## 2021-04-05 DIAGNOSIS — S82.891D CLOSED FRACTURE OF RIGHT ANKLE WITH ROUTINE HEALING, SUBSEQUENT ENCOUNTER: ICD-10-CM

## 2021-04-05 DIAGNOSIS — Z09 FRACTURE FOLLOW-UP: Primary | ICD-10-CM

## 2021-04-05 PROCEDURE — 99212 OFFICE O/P EST SF 10 MIN: CPT | Performed by: ORTHOPAEDIC SURGERY

## 2021-04-05 NOTE — PROGRESS NOTES
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 male.  He is doing well.  No complaints of right ankle pain.    Review of Systems   Constitutional: Negative.  Negative for chills, fatigue and fever.   HENT: Negative.  Negative for congestion and dental problem.    Eyes: Negative.  Negative for blurred vision.   Respiratory: Negative.  Negative for shortness of breath.    Cardiovascular: Negative.  Negative for leg swelling.   Gastrointestinal: Negative.  Negative for abdominal pain.   Endocrine: Negative.  Negative for polyuria.   Genitourinary: Negative.  Negative for difficulty urinating.   Musculoskeletal: Positive for arthralgias.   Skin: Negative.    Allergic/Immunologic: Negative.    Neurological: Negative.    Hematological: Negative.  Negative for adenopathy.   Psychiatric/Behavioral: Negative.  Negative for behavioral problems.       ROS:    Constiutional:Pt denies fever, chills, nausea, or vomiting.  MSK:as above      Objective      Past Medical History  Past Medical History:   Diagnosis Date   • Acute maxillary sinusitis    • Elevated LFTs    • History of colonic polyps    • History of methicillin resistant Staphylococcus aureus    • History of myocardial infarction    • Hyperlipidemia    • Hypertension    • Left hand pain    • Left shoulder pain    • Personal history of congestive heart failure    • Right hip pain    • Rotator cuff syndrome          Physical Exam  /64   Pulse 52   Ht 167.6 cm (66\")   Wt 82.1 kg (181 lb)   BMI 29.21 kg/m²     Body mass index is 29.21 kg/m².    Patient is well nourished and well developed.        Ortho Exam  Right ankle has full motion no swelling no tenderness.    Imaging/Labs/EMG Reviewed:  Imaging Results (Last 24 Hours)     Procedure Component Value Units Date/Time    XR Ankle 3+ View Right [[id number]] Resulted: 04/05/21 1108     " Updated: 04/05/21 2036    Narrative:      Right Ankle X-Ray  Indication: Pain  Views: AP, Lateral, Mortise    Findings:   Healing right ankle distal fibula fracture  No bony lesion  Soft tissues normal  Normal joint spaces with mortise well-aligned, no evidence of syndesmosis   widening    prior studies available for comparison.            Assessment:  1. Fracture follow-up    2. Closed fracture of right ankle with routine healing, subsequent encounter        Plan:  1. Recommend over the counter anti-inflammatories for pain and/or swelling  2. His fracture is healed.  He is doing great.  Follow-up as needed.    Follow Up:   Return if symptoms worsen or fail to improve.      Medical Decision Making  Management Options : Low - OTC Drugs and 1 of 2 Categories = Category 1 - Review of Tests and Documents Category 2 - Assessment requiring an independent interpretation of tests         Erick Hoyt M.D., FAAOS  Orthopedic Surgeon  Fellowship Trained Sports Medicine  [facility]  Orthopedics and Sports Medicine  [address] [place] [address], [address]  [place] [address]

## 2021-04-06 DIAGNOSIS — I10 ESSENTIAL HYPERTENSION: ICD-10-CM

## 2021-04-06 RX ORDER — LISINOPRIL 20 MG/1
20 TABLET ORAL DAILY
Qty: 90 TABLET | Refills: 1 | Status: SHIPPED | OUTPATIENT
Start: 2021-04-06 | End: 2021-10-04

## 2021-05-03 NOTE — PROGRESS NOTES
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 male who resides in [place], [place]    Reason for visit:  · Valvular heart disease  · Coronary artery disease  · Hypertension    Subjective      Justice Kiser presents to [facility] for followup.    History of Present Illness  Patient is a 61-year-old male who returns today for follow-up of his coronary artery disease, mitral valve insufficiency and cardiac risk factors.  Since his last visit he has had no chest pain or worsening dyspnea.  He is still complaining of lower back pain with radiation into his bilateral hips and legs.  He underwent SHANTEL study last year that was normal and does not suggest any peripheral arterial disease.  He does report a history of back issues undergoing a discectomy in the past.      Objective     /62 (BP Location: Left arm, Patient Position: Sitting, Cuff Size: Adult)   Pulse 62   Temp 97.7 °F (36.5 °C)   Ht 167.6 cm (66\")   Wt 82.1 kg (181 lb)   SpO2 97%   BMI 29.21 kg/m²       Constitutional:       Appearance: Healthy appearance. Well-developed.   Eyes:      General: Lids are normal. No scleral icterus.     Conjunctiva/sclera: Conjunctivae normal.   HENT:      Head: Normocephalic and atraumatic.   Neck:      Thyroid: No thyromegaly.      Vascular: No carotid bruit or JVD.   Pulmonary:      Effort: Pulmonary effort is normal.      Breath sounds: Normal breath sounds. No wheezing. No rhonchi. No rales.   Cardiovascular:      Normal rate. Regular rhythm.      Murmurs: There is no murmur.      No gallop. No rub.   Pulses:     Intact distal pulses.   Edema:     Peripheral edema absent.   Abdominal:      General: There is no distension.      Palpations: Abdomen is soft. There is no abdominal mass.   Musculoskeletal:      Cervical back: Normal range of motion. Skin:     General: Skin is warm and dry.      Findings: No rash.   Neurological:      General: No focal deficit present.      " Mental Status: Alert and oriented to person, place, and time.      Gait: Gait is intact.   Psychiatric:         Attention and Perception: Attention normal.         Mood and Affect: Mood normal.         Behavior: Behavior normal.         Result Review :    Lab Results   Component Value Date    GLUCOSE 110 (H) 11/27/2018    BUN 16 12/10/2020    CREATININE 0.98 12/10/2020    EGFRIFNONA 78 12/10/2020    EGFRIFAFRI 94 12/10/2020    BCR 16.3 12/10/2020    K 4.0 12/10/2020    CO2 30.2 (H) 12/10/2020    CALCIUM 9.3 12/10/2020    PROTENTOTREF 7.6 12/10/2020    ALBUMIN 4.70 12/10/2020    LABIL2 1.6 12/10/2020    AST 31 12/10/2020    ALT 32 12/10/2020     Lab Results   Component Value Date    WBC 11.62 (H) 12/10/2020    HGB 11.4 (L) 12/10/2020    HCT 35.1 (L) 12/10/2020    MCV 80.9 12/10/2020     12/10/2020     Lab Results   Component Value Date    CHLPL 203 (H) 12/10/2020    TRIG 93 12/10/2020     (H) 12/10/2020    LDL 74 12/10/2020     Lab Results   Component Value Date    HGBA1C 5.10 06/02/2020             Assessment     Problem List Items Addressed This Visit        Cardiac and Vasculature    PVD (peripheral vascular disease) with claudication (CMS/[facility])    Overview     · ABIs (02/12/2020): Normal.         Current Assessment & Plan     · Patient's ongoing leg discomfort appears to be neuropathy.  Follow-up with PCP         Mitral valve insufficiency - Primary    Overview     · Echo (9/2018): Normal LVEF.  Moderate MR.         Current Assessment & Plan     · Stable NYHA class I-II symptoms  · Plan on repeat echocardiogram for surveillance of mitral valve in 2022         Hyperlipidemia LDL goal <70    Overview     · High intensity statin therapy indicated given the presence of coronary disease         Current Assessment & Plan     · Continue Lipitor 80 mg daily         Essential hypertension    Overview     • Target blood pressure <130/80 mmHg         Current Assessment & Plan     · Hypertension is  borderline  · Continue amlodipine 5 mg daily  · Continue hydrochlorothiazide 12.5 mg daily  · Continue lisinopril 20 mg daily  · Continue metoprolol tartrate 50 mg twice daily         Coronary artery disease involving native coronary artery of native heart with angina pectoris (CMS/[facility])    Overview     · Cardiac catheterization for NSTEMI (1/2012):  Severe 1-vessel CAD (left circumflex subtotal occlusion). Mild disease of the other coronary arteries. Successful PCI of the left circumflex using a Xience MUNA.     · Exercise nuclear stress (10/26/18): Exercise for 6.5 minutes with replication chest pain. Moderate sized inferolateral infarct. LVEF 53%  · Nuclear stress test (2/12/2020): Medium sized infarct lateral wall with no significant ischemia. LVEF 70%. Low risk study.         Current Assessment & Plan     · Patient has no chest pain or shortness of breath  · Continue aspirin 81 mg daily  · Continue metoprolol tartrate 50 mg twice daily             The patient's leg discomfort appears to be neuropathy and stemming from his back.  I suggested he follow-up with his primary care provider as he may benefit from referral to interventional neurology.  We will continue his current medications and he will follow-up 6 months or sooner if needed.    Plan   • Follow-up with primary care provider for bilateral leg neuropathy and back issues  • Continue current medications      Follow-up   Return in about 6 months (around 11/4/2021), or if symptoms worsen or fail to improve, for Follow-up with Dr. Crouch next visit.        Katelyn Domínguez, APRN  5/4/2021

## 2021-05-04 ENCOUNTER — OFFICE VISIT (OUTPATIENT)
Dept: CARDIOLOGY | Facility: CLINIC | Age: 61
End: 2021-05-04

## 2021-05-04 VITALS
TEMPERATURE: 97.7 F | HEART RATE: 62 BPM | HEIGHT: 66 IN | WEIGHT: 181 LBS | DIASTOLIC BLOOD PRESSURE: 62 MMHG | SYSTOLIC BLOOD PRESSURE: 142 MMHG | BODY MASS INDEX: 29.09 KG/M2 | OXYGEN SATURATION: 97 %

## 2021-05-04 DIAGNOSIS — I73.9 PVD (PERIPHERAL VASCULAR DISEASE) WITH CLAUDICATION (HCC): ICD-10-CM

## 2021-05-04 DIAGNOSIS — E78.5 HYPERLIPIDEMIA LDL GOAL <70: ICD-10-CM

## 2021-05-04 DIAGNOSIS — I25.119 CORONARY ARTERY DISEASE INVOLVING NATIVE CORONARY ARTERY OF NATIVE HEART WITH ANGINA PECTORIS (HCC): ICD-10-CM

## 2021-05-04 DIAGNOSIS — I34.0 MITRAL VALVE INSUFFICIENCY, UNSPECIFIED ETIOLOGY: Primary | ICD-10-CM

## 2021-05-04 DIAGNOSIS — I10 ESSENTIAL HYPERTENSION: ICD-10-CM

## 2021-05-04 PROCEDURE — 99214 OFFICE O/P EST MOD 30 MIN: CPT | Performed by: NURSE PRACTITIONER

## 2021-05-04 RX ORDER — SERTRALINE HYDROCHLORIDE 100 MG/1
100 TABLET, FILM COATED ORAL 2 TIMES DAILY
COMMUNITY
End: 2021-06-14

## 2021-05-04 NOTE — ASSESSMENT & PLAN NOTE
· Patient has no chest pain or shortness of breath  · Continue aspirin 81 mg daily  · Continue metoprolol tartrate 50 mg twice daily

## 2021-05-04 NOTE — ASSESSMENT & PLAN NOTE
· Hypertension is borderline  · Continue amlodipine 5 mg daily  · Continue hydrochlorothiazide 12.5 mg daily  · Continue lisinopril 20 mg daily  · Continue metoprolol tartrate 50 mg twice daily

## 2021-05-04 NOTE — ASSESSMENT & PLAN NOTE
· Stable NYHA class I-II symptoms  · Plan on repeat echocardiogram for surveillance of mitral valve in 2022

## 2021-05-07 ENCOUNTER — OFFICE VISIT (OUTPATIENT)
Dept: FAMILY MEDICINE CLINIC | Facility: CLINIC | Age: 61
End: 2021-05-07

## 2021-05-07 VITALS
HEIGHT: 66 IN | WEIGHT: 180 LBS | HEART RATE: 68 BPM | TEMPERATURE: 97.2 F | RESPIRATION RATE: 18 BRPM | BODY MASS INDEX: 28.93 KG/M2 | SYSTOLIC BLOOD PRESSURE: 130 MMHG | DIASTOLIC BLOOD PRESSURE: 66 MMHG

## 2021-05-07 DIAGNOSIS — R22.31 MASS OF FINGER OF RIGHT HAND: ICD-10-CM

## 2021-05-07 DIAGNOSIS — E78.00 PURE HYPERCHOLESTEROLEMIA: ICD-10-CM

## 2021-05-07 DIAGNOSIS — R53.83 OTHER FATIGUE: ICD-10-CM

## 2021-05-07 DIAGNOSIS — M54.41 CHRONIC BILATERAL LOW BACK PAIN WITH BILATERAL SCIATICA: Primary | ICD-10-CM

## 2021-05-07 DIAGNOSIS — G89.29 CHRONIC BILATERAL LOW BACK PAIN WITH BILATERAL SCIATICA: Primary | ICD-10-CM

## 2021-05-07 DIAGNOSIS — Z12.5 PROSTATE CANCER SCREENING: ICD-10-CM

## 2021-05-07 DIAGNOSIS — M54.42 CHRONIC BILATERAL LOW BACK PAIN WITH BILATERAL SCIATICA: Primary | ICD-10-CM

## 2021-05-07 DIAGNOSIS — I10 ESSENTIAL HYPERTENSION: ICD-10-CM

## 2021-05-07 DIAGNOSIS — R68.82 DECREASED LIBIDO: ICD-10-CM

## 2021-05-07 PROCEDURE — 99214 OFFICE O/P EST MOD 30 MIN: CPT | Performed by: FAMILY MEDICINE

## 2021-05-07 NOTE — PROGRESS NOTES
Assessment/Plan       Diagnoses and all orders for this visit:    1. Chronic bilateral low back pain with bilateral sciatica (Primary)  -     MRI Lumbar Spine With & Without Contrast; Future    2. Other fatigue  -     CBC & Differential  -     Comprehensive Metabolic Panel  -     TSH  -     Vitamin B12  -     Testosterone    3. Decreased libido  -     CBC & Differential  -     Comprehensive Metabolic Panel  -     Testosterone    4. Prostate cancer screening  -     PSA Screen    5. Essential hypertension  -     CBC & Differential  -     Comprehensive Metabolic Panel    6. Pure hypercholesterolemia  -     Comprehensive Metabolic Panel  -     Lipid Panel With / Chol / HDL Ratio    7. Mass of finger of right hand  -     Ambulatory Referral to Hand Surgery           Follow up: No follow-ups on file.     DISCUSSION  Lumbar back pain with bilateral leg pain.  Positive straight leg raising.  Prior surgery with discectomy.  Check MRI with and without contrast.  Patient agrees.    Fatigue.  Check CBC, CMP, TSH B12 and testosterone level.    Decrease libido.  Check CBC, CMP and testosterone level.    Prostate cancer screening.  Check PSA.    Hypertension.  Stable on current medications.  Check CBC and CMP.    Hyperlipidemia.  Check CMP and lipid panel.    Mass of finger of right hand.  Refer to hand surgery.          MEDICATIONS PRESCRIBED  Requested Prescriptions      No prescriptions requested or ordered in this encounter                -------------------------------------------    Subjective     Chief Complaint   Patient presents with   • Back Pain     burns    • Leg Pain   • knot of hands         History of Present Illness    Leg pain   Worse with walker  Barrett from hips and down to thighs ( both legs) R> L  Burning feeling worse in last 3-4 months  Worse when active    Saw card and ABIs were ok in 2020    Had prior diskectomy : many years ago    Feet : like walking on cushions and no change    Hands: knots in hands,  "fingers. + tender , right middle finger and palm    Fatigue  Depressed  No energy  Sleeping decreased  No libido  + ED issues as well    No alcohol since Dec 5th 2020    Hypertension  Currently on Prinivil 20 mg daily.  Doing well.  No active chest pain or shortness of breath.    Hyperlipidemia.  On atorvastatin 80 mg daily.  Tolerating well.      Social History     Tobacco Use   Smoking Status Former Smoker   • Packs/day: 1.50   • Years: 35.00   • Pack years: 52.50   • Types: Cigarettes   • Quit date: 2011   • Years since quitting: 10.3   Smokeless Tobacco Never Used          Past Medical History,Medications, Allergies, and social history was reviewed.          Review of Systems   Constitutional: Positive for fatigue.   HENT: Negative.    Respiratory: Negative.  Negative for shortness of breath.    Cardiovascular: Negative.  Negative for chest pain.   Gastrointestinal: Negative.    Musculoskeletal: Positive for back pain.   Neurological: Positive for numbness.   Psychiatric/Behavioral: Negative.  Positive for depressed mood.       Objective     Vitals:    05/07/21 1008   BP: 130/66   Pulse: 68   Resp: 18   Temp: 97.2 °F (36.2 °C)   Weight: 81.6 kg (180 lb)   Height: 167.6 cm (66\")          Physical Exam  Vitals and nursing note reviewed.   Constitutional:       Appearance: He is well-developed.   HENT:      Head: Normocephalic and atraumatic.      Right Ear: External ear normal.      Left Ear: External ear normal.   Eyes:      Pupils: Pupils are equal, round, and reactive to light.   Cardiovascular:      Rate and Rhythm: Normal rate and regular rhythm.      Heart sounds: Normal heart sounds.   Pulmonary:      Effort: Pulmonary effort is normal. No respiratory distress.      Breath sounds: Normal breath sounds. No wheezing or rales.   Abdominal:      General: Bowel sounds are normal. There is no distension.      Tenderness: There is no abdominal tenderness. There is no guarding or rebound.   Musculoskeletal:      " Lumbar back: Tenderness present. Decreased range of motion. Positive right straight leg raise test and positive left straight leg raise test.   Skin:     General: Skin is warm and dry.   Neurological:      Mental Status: He is alert and oriented to person, place, and time.   Psychiatric:         Behavior: Behavior normal.       Right hand.  At the palmar surface of the distal right middle finger, there is 1/2 cm tender mass.  Also tender mass palm at the base of the right thumb.                Anshul Martinez MD

## 2021-05-11 ENCOUNTER — LAB (OUTPATIENT)
Dept: FAMILY MEDICINE CLINIC | Facility: CLINIC | Age: 61
End: 2021-05-11

## 2021-05-11 DIAGNOSIS — D64.9 ANEMIA, UNSPECIFIED TYPE: Primary | ICD-10-CM

## 2021-05-11 LAB
ALBUMIN SERPL-MCNC: 4.6 G/DL (ref 3.5–5.2)
ALBUMIN/GLOB SERPL: 1.9 G/DL
ALP SERPL-CCNC: 158 U/L (ref 39–117)
ALT SERPL-CCNC: 19 U/L (ref 1–41)
AST SERPL-CCNC: 22 U/L (ref 1–40)
BASOPHILS # BLD MANUAL: 0.07 10*3/MM3 (ref 0–0.2)
BASOPHILS NFR BLD MANUAL: 1 % (ref 0–1.5)
BILIRUB SERPL-MCNC: 0.3 MG/DL (ref 0–1.2)
BUN SERPL-MCNC: 11 MG/DL (ref 8–23)
BUN/CREAT SERPL: 12.6 (ref 7–25)
CALCIUM SERPL-MCNC: 9.7 MG/DL (ref 8.6–10.5)
CHLORIDE SERPL-SCNC: 99 MMOL/L (ref 98–107)
CHOLEST SERPL-MCNC: 135 MG/DL (ref 0–200)
CHOLEST/HDLC SERPL: 2.41 {RATIO}
CO2 SERPL-SCNC: 24.8 MMOL/L (ref 22–29)
CREAT SERPL-MCNC: 0.87 MG/DL (ref 0.76–1.27)
DIFFERENTIAL COMMENT: ABNORMAL
EOSINOPHIL # BLD MANUAL: 0.3 10*3/MM3 (ref 0–0.4)
EOSINOPHIL NFR BLD MANUAL: 4 % (ref 0.3–6.2)
ERYTHROCYTE [DISTWIDTH] IN BLOOD BY AUTOMATED COUNT: 14.8 % (ref 12.3–15.4)
FERRITIN SERPL-MCNC: 11.6 NG/ML (ref 30–400)
GLOBULIN SER CALC-MCNC: 2.4 GM/DL
GLUCOSE SERPL-MCNC: 108 MG/DL (ref 65–99)
HCT VFR BLD AUTO: 30.1 % (ref 37.5–51)
HDLC SERPL-MCNC: 56 MG/DL (ref 40–60)
HGB BLD-MCNC: 9 G/DL (ref 13–17.7)
IRON SATN MFR SERPL: 5 % (ref 20–50)
IRON SERPL-MCNC: 28 MCG/DL (ref 59–158)
LDLC SERPL CALC-MCNC: 60 MG/DL (ref 0–100)
LYMPHOCYTES # BLD MANUAL: 2.03 10*3/MM3 (ref 0.7–3.1)
LYMPHOCYTES NFR BLD MANUAL: 27.3 % (ref 19.6–45.3)
Lab: NORMAL
MCH RBC QN AUTO: 22.3 PG (ref 26.6–33)
MCHC RBC AUTO-ENTMCNC: 29.9 G/DL (ref 31.5–35.7)
MCV RBC AUTO: 74.7 FL (ref 79–97)
MONOCYTES # BLD MANUAL: 0.22 10*3/MM3 (ref 0.1–0.9)
MONOCYTES NFR BLD MANUAL: 3 % (ref 5–12)
NEUTROPHILS # BLD MANUAL: 4.81 10*3/MM3 (ref 1.7–7)
NEUTROPHILS NFR BLD MANUAL: 64.6 % (ref 42.7–76)
NRBC BLD AUTO-RTO: 0 /100 WBC (ref 0–0.2)
PLATELET # BLD AUTO: 288 10*3/MM3 (ref 140–450)
PLATELET BLD QL SMEAR: ABNORMAL
POTASSIUM SERPL-SCNC: 4.8 MMOL/L (ref 3.5–5.2)
PROT SERPL-MCNC: 7 G/DL (ref 6–8.5)
PSA SERPL-MCNC: 0.3 NG/ML (ref 0–4)
RBC # BLD AUTO: 4.03 10*6/MM3 (ref 4.14–5.8)
RBC MORPH BLD: ABNORMAL
SODIUM SERPL-SCNC: 136 MMOL/L (ref 136–145)
TESTOST SERPL-MCNC: 555 NG/DL (ref 264–916)
TIBC SERPL-MCNC: 596 MCG/DL
TRIGL SERPL-MCNC: 102 MG/DL (ref 0–150)
TSH SERPL DL<=0.005 MIU/L-ACNC: 1.93 UIU/ML (ref 0.27–4.2)
UIBC SERPL-MCNC: 568 MCG/DL (ref 112–346)
VIT B12 SERPL-MCNC: 583 PG/ML (ref 211–946)
VLDLC SERPL CALC-MCNC: 19 MG/DL (ref 5–40)
WBC # BLD AUTO: 7.45 10*3/MM3 (ref 3.4–10.8)
WRITTEN AUTHORIZATION: NORMAL

## 2021-05-13 DIAGNOSIS — D64.9 ANEMIA, UNSPECIFIED TYPE: Primary | ICD-10-CM

## 2021-05-13 DIAGNOSIS — D50.9 IRON DEFICIENCY ANEMIA, UNSPECIFIED IRON DEFICIENCY ANEMIA TYPE: ICD-10-CM

## 2021-05-13 RX ORDER — PNV NO.95/FERROUS FUM/FOLIC AC 28MG-0.8MG
325 TABLET ORAL
Qty: 30 TABLET | Refills: 1 | Status: SHIPPED | OUTPATIENT
Start: 2021-05-13 | End: 2021-07-12

## 2021-05-14 LAB — HEMOCCULT STL QL IA: NEGATIVE

## 2021-06-02 ENCOUNTER — OFFICE VISIT (OUTPATIENT)
Dept: NEUROLOGY | Facility: CLINIC | Age: 61
End: 2021-06-02

## 2021-06-02 VITALS
HEIGHT: 66 IN | BODY MASS INDEX: 28.28 KG/M2 | OXYGEN SATURATION: 98 % | TEMPERATURE: 97.3 F | HEART RATE: 57 BPM | WEIGHT: 176 LBS

## 2021-06-02 DIAGNOSIS — F33.1 MODERATE EPISODE OF RECURRENT MAJOR DEPRESSIVE DISORDER (HCC): ICD-10-CM

## 2021-06-02 DIAGNOSIS — I67.89 CEREBRAL MICROVASCULAR DISEASE: ICD-10-CM

## 2021-06-02 DIAGNOSIS — G31.84 MCI (MILD COGNITIVE IMPAIRMENT): Primary | ICD-10-CM

## 2021-06-02 PROCEDURE — 99214 OFFICE O/P EST MOD 30 MIN: CPT | Performed by: NURSE PRACTITIONER

## 2021-06-02 RX ORDER — MIRTAZAPINE 30 MG/1
TABLET, FILM COATED ORAL
Qty: 90 TABLET | Refills: 1 | Status: SHIPPED | OUTPATIENT
Start: 2021-06-02 | End: 2021-08-13 | Stop reason: SDUPTHER

## 2021-06-02 NOTE — PROGRESS NOTES
Subjective:     Patient ID: Justice Kiser is a 61 y.o. male.    CC:   Chief Complaint   Patient presents with   • miild cognitive impairment       HPI:   History of Present Illness   Justice Kiser is a 61 y.o. male who returns to clinic today for 4 month follow up on suspected MCI. He has noted symptoms since at least 2017 marked initially by forgetfulness , repetitiveness  and word-finding difficulties. This has gradually worsened over time, but over past 4 months he feels it is stable. Additional symptoms have included impairments in orientation and executive function. There have been associated symptoms of depression and irritability. He has also noted visual hallucinations of people in his bedroom, primarily at night, but he tells me he has had no ETOH with no alcohol since December 15th, 2020. He does have occasional hallucinations. He denies any impairments in ADL's. He manages his medications and finances. He drives occasionally a short distance, but normally his nephew drives him. He is currently residing with his nephew in [place]. Has occasional tremors in hands but this has improved.      Prior evaluation has included an MRI of the brain showing chronic white matter changes, an unremarkable ECHO/Carotid Dopplers and screening bloodwork. He is currently taking donepezil, memantine and was taking thiamine, but recently quit since no longer drinking.   He also had an updated MRI of the brain on 7/23/2018 which showed chronic small vessel ischemic changes with old lacunar infarcts.  He is on aspirin and statin therapy.    6/2/2021 Today-he is with his nephew today. He feels like he is doing about the same with his memory. He completed SLP cognitive therapy and this was helpful. He continues on both donepezil and memantine for his memory with good tolerance. He has had some minor falls without injury. He continues to have trouble with balance. He also reports he is going to have an MRI of his back  tomorrow and also surgery on right hand next week. EMG/[facility] 2/3/2021 showed mild peripheral neuropathy. He has not tried PT for his balance. He is wanting to wait and see about MRI L spine results ordered by PCP first. Previously had back surgery which was helpful. He is able to take his own medications. He needs occasional reminders from his nephew. He does still manage his finances. His nephew reports he has trouble with focus and concentration and specifically numbers and processing. He is on sertraline 100mg twice a day for depression. He feels like he continues to be depressed and this is a bit worse. He also does not sleep very well.    The following portions of the patient's history were reviewed and updated as appropriate: allergies, current medications, past family history, past medical history, past social history, past surgical history and problem list.    Past Medical History:   Diagnosis Date   • Acute maxillary sinusitis    • Elevated LFTs    • History of colonic polyps    • History of methicillin resistant Staphylococcus aureus    • History of myocardial infarction    • Hyperlipidemia    • Hypertension    • Left hand pain    • Left shoulder pain    • Personal history of congestive heart failure    • Right hip pain    • Rotator cuff syndrome        Past Surgical History:   Procedure Laterality Date   • CORONARY ANGIOPLASTY WITH STENT PLACEMENT  01/04/2012    Circumflex Xscience V 3.5 MM x 23 mm   • GALLBLADDER SURGERY  2017   • SHOULDER SURGERY  12/20/2018    Dr. Hoyt Rotator cuff repair       Social History     Socioeconomic History   • Marital status: Single     Spouse name: Not on file   • Number of children: Not on file   • Years of education: Not on file   • Highest education level: Not on file   Tobacco Use   • Smoking status: Former Smoker     Packs/day: 1.50     Years: 35.00     Pack years: 52.50     Types: Cigarettes     Quit date: 2011     Years since quitting: 10.4   • Smokeless tobacco:  Never Used   Vaping Use   • Vaping Use: Never used   Substance and Sexual Activity   • Alcohol use: Not Currently     Comment: 5 mixed drinks per day. recently quit all alcohol 12/2020   • Drug use: Yes     Types: Marijuana   • Sexual activity: Defer       Family History   Problem Relation Age of Onset   • Hypertension Mother    • Heart attack Mother    • Hyperlipidemia Mother    • Alcohol abuse Mother    • Alcohol abuse Father    • Dementia Sister    • Heart disease Sister    • Alcohol abuse Maternal Aunt    • Alcohol abuse Paternal Uncle    • Diabetes Maternal Grandmother    • Heart disease Brother    • Stroke Sister         Review of Systems   Constitutional: Negative for chills, fatigue, fever and unexpected weight change.   HENT: Negative for ear pain, hearing loss, nosebleeds, rhinorrhea and sore throat.    Eyes: Negative for photophobia, pain, discharge, itching and visual disturbance.   Respiratory: Negative for cough, chest tightness, shortness of breath and wheezing.    Cardiovascular: Negative for chest pain, palpitations and leg swelling.   Gastrointestinal: Negative for abdominal pain, blood in stool, constipation, diarrhea, nausea and vomiting.   Genitourinary: Negative for dysuria, frequency, hematuria and urgency.   Musculoskeletal: Negative for arthralgias, back pain, gait problem, joint swelling, myalgias, neck pain and neck stiffness.   Skin: Negative for rash and wound.   Allergic/Immunologic: Negative for environmental allergies and food allergies.   Neurological: Negative for dizziness, tremors, seizures, syncope, speech difficulty, weakness, light-headedness, numbness and headaches.   Hematological: Negative for adenopathy. Does not bruise/bleed easily.   Psychiatric/Behavioral: Negative for agitation, confusion, decreased concentration, hallucinations, sleep disturbance and suicidal ideas. The patient is not nervous/anxious.    All other systems reviewed and are negative.    "    Objective:  Pulse 57   Temp 97.3 °F (36.3 °C)   Ht 167.6 cm (66\")   Wt 79.8 kg (176 lb)   SpO2 98%   BMI 28.41 kg/m²     Neurologic Exam     Mental Status   Oriented to person, place, and time.   Speech: speech is normal   Level of consciousness: alert    Cranial Nerves   Cranial nerves II through XII intact.     Motor Exam   Muscle bulk: normal  Overall muscle tone: normal    Strength   Strength 5/5 throughout.     Gait, Coordination, and Reflexes     Gait  Gait: (antalgic)    Coordination   Finger to nose coordination: normal    Tremor   Resting tremor: absent  Intention tremor: present (minimal BUE kinetic hand tremor)  Action tremor: absent    Reflexes   Right brachioradialis: 2+  Left brachioradialis: 2+  Right biceps: 2+  Left biceps: 2+  Right patellar: 1+  Left patellar: 1+  Right achilles: 1+  Left achilles: 1+  Right : 2+  Left : 2+      Physical Exam  Constitutional:       Appearance: Normal appearance.   Musculoskeletal:      Lumbar back: Decreased range of motion (reports chronic LBP, having MRI L spine tomorrow ordered by PCP to address pain).   Neurological:      Mental Status: He is alert and oriented to person, place, and time.      Coordination: Finger-Nose-Finger Test normal.      Deep Tendon Reflexes: Strength normal.      Reflex Scores:       Bicep reflexes are 2+ on the right side and 2+ on the left side.       Brachioradialis reflexes are 2+ on the right side and 2+ on the left side.       Patellar reflexes are 1+ on the right side and 1+ on the left side.       Achilles reflexes are 1+ on the right side and 1+ on the left side.  Psychiatric:         Mood and Affect: Mood is depressed.         Speech: Speech normal.         Behavior: Behavior normal.         Thought Content: Thought content normal.         Cognition and Memory: He exhibits impaired recent memory.         Judgment: Judgment normal.     MMSE 29/30 2/3 recall, stable    Assessment/Plan:       Diagnoses and all " orders for this visit:    1. MCI (mild cognitive impairment) (Primary)  Comments:  continue namenda and aricept as prescribed, no refills needed today    2. Cerebral microvascular disease  Comments:  continue aspirin and statin    3. Moderate episode of recurrent major depressive disorder (CMS/HCC)  Comments:  add mirtazapine, consider psychiatry in future, continue zoloft  Orders:  -     mirtazapine (Remeron) 30 MG tablet; Take 1/2 tablet at night for 2 weeks then increase to 1 tablet at night  Dispense: 90 tablet; Refill: 1           Continue current medications. Will add mirtazapine for his sleep and depression. I did offer referral to psychiatry but he wishes to wait for now. He should discuss MRI L spine findings with PCP and further recommendations; PT may be helpful. He will f/u in our memory clinic in 6 months. He will call if he has trouble tolerating the mirtazapine or cannot tell improvement.    Reviewed medications, potential side effects and signs and symptoms to report. Discussed risk versus benefits of treatment plan with patient and/or family-including medications, labs and radiology that may be ordered. Addressed questions and concerns during visit. Patient and/or family verbalized understanding and agree with plan.    AS THE PROVIDER, I PERSONALLY WORE PPE DURING ENTIRE FACE TO FACE ENCOUNTER IN CLINIC WITH THE PATIENT. PATIENT ALSO WORE PPE DURING ENTIRE FACE TO FACE ENCOUNTER EXCEPT FOR A MAX OF 30 SECONDS DURING NEUROLOGICAL EVALUATION OF CRANIAL NERVES AND THEN MASK WAS PLACED BACK OVER PATIENT FACE FOR REMAINDER OF VISIT. I WASHED MY HANDS BEFORE AND AFTER VISIT.    During this visit the following were done:  Labs Reviewed [x]    Labs Ordered []    Radiology Reports Reviewed []    Radiology Ordered []    PCP Records Reviewed [x]    Referring Provider Records Reviewed []    ER Records Reviewed []    Hospital Records Reviewed []    History Obtained From Family [x]    Radiology Images Reviewed []     Other Reviewed []    Records Requested []      Alberta Singletary, APRN  6/2/2021

## 2021-06-03 ENCOUNTER — HOSPITAL ENCOUNTER (OUTPATIENT)
Dept: MRI IMAGING | Facility: HOSPITAL | Age: 61
Discharge: HOME | End: 2021-06-03
Admitting: FAMILY MEDICINE

## 2021-06-03 DIAGNOSIS — M54.41 CHRONIC BILATERAL LOW BACK PAIN WITH BILATERAL SCIATICA: ICD-10-CM

## 2021-06-03 DIAGNOSIS — M54.42 CHRONIC BILATERAL LOW BACK PAIN WITH BILATERAL SCIATICA: ICD-10-CM

## 2021-06-03 DIAGNOSIS — G89.29 CHRONIC BILATERAL LOW BACK PAIN WITH BILATERAL SCIATICA: ICD-10-CM

## 2021-06-03 PROCEDURE — A9577 INJ MULTIHANCE: HCPCS | Performed by: FAMILY MEDICINE

## 2021-06-03 PROCEDURE — 0 GADOBENATE DIMEGLUMINE 529 MG/ML SOLUTION: Performed by: FAMILY MEDICINE

## 2021-06-03 PROCEDURE — 72158 MRI LUMBAR SPINE W/O & W/DYE: CPT

## 2021-06-03 RX ADMIN — GADOBENATE DIMEGLUMINE 15 ML: 529 INJECTION, SOLUTION INTRAVENOUS at 09:12

## 2021-06-04 DIAGNOSIS — R93.7 ABNORMAL MRI, LUMBAR SPINE: ICD-10-CM

## 2021-06-04 DIAGNOSIS — M51.16 LUMBAR DISC DISEASE WITH RADICULOPATHY: Primary | ICD-10-CM

## 2021-06-05 DIAGNOSIS — R10.13 EPIGASTRIC ABDOMINAL PAIN: ICD-10-CM

## 2021-06-05 DIAGNOSIS — K21.9 GASTROESOPHAGEAL REFLUX DISEASE: ICD-10-CM

## 2021-06-07 RX ORDER — OMEPRAZOLE 40 MG/1
CAPSULE, DELAYED RELEASE ORAL
Qty: 90 CAPSULE | Refills: 1 | Status: SHIPPED | OUTPATIENT
Start: 2021-06-07 | End: 2022-01-27 | Stop reason: SDUPTHER

## 2021-06-08 ENCOUNTER — LAB (OUTPATIENT)
Dept: FAMILY MEDICINE CLINIC | Facility: CLINIC | Age: 61
End: 2021-06-08

## 2021-06-08 DIAGNOSIS — D64.9 ANEMIA, UNSPECIFIED TYPE: ICD-10-CM

## 2021-06-09 LAB
BASOPHILS # BLD AUTO: 0.06 10*3/MM3 (ref 0–0.2)
BASOPHILS NFR BLD AUTO: 1 % (ref 0–1.5)
EOSINOPHIL # BLD AUTO: 0.19 10*3/MM3 (ref 0–0.4)
EOSINOPHIL NFR BLD AUTO: 3 % (ref 0.3–6.2)
ERYTHROCYTE [DISTWIDTH] IN BLOOD BY AUTOMATED COUNT: 21.9 % (ref 12.3–15.4)
FERRITIN SERPL-MCNC: 40.6 NG/ML (ref 30–400)
HCT VFR BLD AUTO: 37.2 % (ref 37.5–51)
HGB BLD-MCNC: 11.2 G/DL (ref 13–17.7)
IMM GRANULOCYTES # BLD AUTO: 0.05 10*3/MM3 (ref 0–0.05)
IMM GRANULOCYTES NFR BLD AUTO: 0.8 % (ref 0–0.5)
IRON SATN MFR SERPL: 8 % (ref 20–50)
IRON SERPL-MCNC: 40 MCG/DL (ref 59–158)
LYMPHOCYTES # BLD AUTO: 1.82 10*3/MM3 (ref 0.7–3.1)
LYMPHOCYTES NFR BLD AUTO: 28.8 % (ref 19.6–45.3)
MCH RBC QN AUTO: 24.9 PG (ref 26.6–33)
MCHC RBC AUTO-ENTMCNC: 30.1 G/DL (ref 31.5–35.7)
MCV RBC AUTO: 82.9 FL (ref 79–97)
MONOCYTES # BLD AUTO: 0.47 10*3/MM3 (ref 0.1–0.9)
MONOCYTES NFR BLD AUTO: 7.4 % (ref 5–12)
NEUTROPHILS # BLD AUTO: 3.72 10*3/MM3 (ref 1.7–7)
NEUTROPHILS NFR BLD AUTO: 59 % (ref 42.7–76)
NRBC BLD AUTO-RTO: 0 /100 WBC (ref 0–0.2)
PLATELET # BLD AUTO: 218 10*3/MM3 (ref 140–450)
RBC # BLD AUTO: 4.49 10*6/MM3 (ref 4.14–5.8)
TIBC SERPL-MCNC: 500 MCG/DL
UIBC SERPL-MCNC: 460 MCG/DL (ref 112–346)
WBC # BLD AUTO: 6.31 10*3/MM3 (ref 3.4–10.8)

## 2021-06-14 RX ORDER — SERTRALINE HYDROCHLORIDE 100 MG/1
TABLET, FILM COATED ORAL
Qty: 180 TABLET | Refills: 1 | Status: SHIPPED | OUTPATIENT
Start: 2021-06-14 | End: 2021-10-04

## 2021-06-30 ENCOUNTER — OFFICE VISIT (OUTPATIENT)
Dept: NEUROSURGERY | Facility: CLINIC | Age: 61
End: 2021-06-30

## 2021-06-30 VITALS — BODY MASS INDEX: 29.99 KG/M2 | HEIGHT: 65 IN | WEIGHT: 180 LBS | TEMPERATURE: 98.2 F

## 2021-06-30 DIAGNOSIS — M48.062 SPINAL STENOSIS OF LUMBAR REGION WITH NEUROGENIC CLAUDICATION: Primary | ICD-10-CM

## 2021-06-30 DIAGNOSIS — M54.12 CERVICAL RADICULOPATHY: ICD-10-CM

## 2021-06-30 PROCEDURE — 99203 OFFICE O/P NEW LOW 30 MIN: CPT | Performed by: NEUROLOGICAL SURGERY

## 2021-06-30 NOTE — PROGRESS NOTES
Patient: Justice Kiser  : 1960    Primary Care Provider: Anshul Martinez MD    Requesting Provider: As above        History    Chief Complaint:   1.  Low back and lower extremity pain with walking and standing intolerance.  2.  Neck and left upper extremity pain.    History of Present Illness: Mr. Kiser is a 61-year-old former  who remotely underwent lumbar discectomy by another surgeon.  I believe this was in .  He generally did well with some low-grade symptoms.  For the last year or more he has had progressive back pain that will extend into his legs.  He has tingling and heaviness that occurs in his legs after he stands or walks for 10-15 minutes.  He is also developed some cervico-thoracic pain that extends down the left arm into the forearm.  He is not sure what makes the symptoms better or worse.  He denies any bowel or bladder dysfunction.  He has not undergone any formal treatment.    Review of Systems   Constitutional: Positive for fatigue. Negative for activity change, appetite change, chills, diaphoresis, fever and unexpected weight change.   HENT: Positive for dental problem, hearing loss, postnasal drip and sinus pressure. Negative for congestion, drooling, ear discharge, ear pain, facial swelling, mouth sores, nosebleeds, rhinorrhea, sinus pain, sneezing, sore throat, tinnitus, trouble swallowing and voice change.    Eyes: Negative for photophobia, pain, discharge, redness, itching and visual disturbance.   Respiratory: Positive for cough, shortness of breath and wheezing. Negative for apnea, choking, chest tightness and stridor.    Cardiovascular: Negative for chest pain, palpitations and leg swelling.   Gastrointestinal: Positive for diarrhea. Negative for abdominal distention, abdominal pain, anal bleeding, blood in stool, constipation, nausea, rectal pain and vomiting.   Endocrine: Negative for cold intolerance, heat intolerance, polydipsia, polyphagia and polyuria.  "  Genitourinary: Negative for decreased urine volume, difficulty urinating, discharge, dysuria, enuresis, flank pain, frequency, genital sores, hematuria, penile pain, penile swelling, scrotal swelling, testicular pain and urgency.   Musculoskeletal: Positive for back pain, neck pain and neck stiffness. Negative for arthralgias, gait problem, joint swelling and myalgias.   Skin: Negative for color change, pallor, rash and wound.   Allergic/Immunologic: Negative for environmental allergies, food allergies and immunocompromised state.   Neurological: Positive for weakness and numbness. Negative for dizziness, tremors, seizures, syncope, facial asymmetry, speech difficulty, light-headedness and headaches.   Hematological: Negative for adenopathy. Bruises/bleeds easily.   Psychiatric/Behavioral: Positive for confusion, decreased concentration and hallucinations. Negative for agitation, behavioral problems, dysphoric mood, self-injury, sleep disturbance and suicidal ideas. The patient is nervous/anxious and is hyperactive.        The patient's past medical history, past surgical history, family history, and social history have been reviewed at length in the electronic medical record.    Physical Exam:   Temp 98.2 °F (36.8 °C)   Ht 165.1 cm (65\")   Wt 81.6 kg (180 lb)   BMI 29.95 kg/m²   CONSTITUTIONAL: Patient is well-nourished, pleasant and appears stated age.  CV: Heart regular rate and rhythm without murmur, rub, or gallop.  PULMONARY: Lungs are clear to ascultation.  MUSCULOSKELETAL:  Neck tenderness to palpation is not observed.   ROM in the neck is normal.  Right leg raising is negative.  King signs are negative.  NEUROLOGICAL:  Orientation, memory, attention span, language function, and cognition have been examined and are intact.  Strength is intact in the upper and lower extremities to direct testing.  Muscle tone is normal throughout.  Station and gait are normal.  Sensation is intact to light touch " testing throughout.  Deep tendon reflexes are 3+ and symmetrical in his arms and 1+ in his legs..  Zachary's Sign is negative bilaterally. No clonus is elicited.  Coordination is intact.      Medical Decision Making    Data Review:   (All imaging studies were personally reviewed unless stated otherwise)  MRI of the lumbar spine dated 6/3/2021 demonstrates a laminotomy defect on the right at L5-S1.  There is prominent disc protrusion central to leftward at L4-5 that causes a fair amount of stenosis.  There may be postsurgical changes on the right at L4-5 as well.  There is generous grade stenosis at L3-4.    Diagnosis:   1.  Lumbar stenosis with neurogenic claudication.  2.  Cervical radiculopathy.    Treatment Options:   I discussed treatment options with the patient in terms of his back difficulties.  This would entail physical therapy, epidural injections, or decompressive laminectomy at L3-4 and L4-5.  He is going to consider his options but he is really struggling with his upper back and arm pain at this point.  I am going to go ahead and check a cervical MRI study and see him in follow-up thereafter.       Diagnosis Plan   1. Spinal stenosis of lumbar region with neurogenic claudication     2. Cervical radiculopathy  MRI Cervical Spine Without Contrast       Scribed for Marin Santana MD by Mini Jacome AYLEEN 6/30/2021 12:55 EDT      I, Dr. Santana, personally performed the services described in the documentation, as scribed in my presence, and it is both accurate and complete.

## 2021-07-08 ENCOUNTER — OFFICE VISIT (OUTPATIENT)
Dept: FAMILY MEDICINE CLINIC | Facility: CLINIC | Age: 61
End: 2021-07-08

## 2021-07-08 VITALS
WEIGHT: 182 LBS | HEART RATE: 54 BPM | SYSTOLIC BLOOD PRESSURE: 116 MMHG | DIASTOLIC BLOOD PRESSURE: 68 MMHG | HEIGHT: 65 IN | TEMPERATURE: 98 F | RESPIRATION RATE: 18 BRPM | BODY MASS INDEX: 30.32 KG/M2

## 2021-07-08 DIAGNOSIS — R73.9 HYPERGLYCEMIA: ICD-10-CM

## 2021-07-08 DIAGNOSIS — D50.9 IRON DEFICIENCY ANEMIA, UNSPECIFIED IRON DEFICIENCY ANEMIA TYPE: ICD-10-CM

## 2021-07-08 DIAGNOSIS — R19.06 EPIGASTRIC MASS: ICD-10-CM

## 2021-07-08 DIAGNOSIS — I10 ESSENTIAL HYPERTENSION: Primary | ICD-10-CM

## 2021-07-08 PROCEDURE — 99214 OFFICE O/P EST MOD 30 MIN: CPT | Performed by: FAMILY MEDICINE

## 2021-07-08 NOTE — PROGRESS NOTES
Assessment/Plan       Diagnoses and all orders for this visit:    1. Essential hypertension (Primary)  -     Comprehensive Metabolic Panel    2. Hyperglycemia  -     Comprehensive Metabolic Panel  -     Hemoglobin A1c    3. Iron deficiency anemia, unspecified iron deficiency anemia type  -     CBC & Differential  -     Iron and TIBC  -     Ferritin    4. Epigastric mass  -     CBC & Differential  -     CT Abdomen Pelvis With Contrast; Future  -     Amylase  -     Lipase           Follow up: Return for follow up depends on review of labs and testing.     DISCUSSION  Hypertension.  Blood pressure is doing well on current medications.  Check CMP.  Continue lisinopril 20 mg daily.    Hyperglycemia.  Check CMP and A1c.    Iron deficiency.  Last check had shown improvement.  Recheck today.    Epigastric abdominal mass.  No recall of feeling this mass in the past.  Check CBC, amylase and lipase.  Recommend check CT scan of the abdomen.  He is agreeable.          MEDICATIONS PRESCRIBED  Requested Prescriptions      No prescriptions requested or ordered in this encounter            -------------------------------------------    Subjective     Chief Complaint   Patient presents with   • Hypertension     f/u          History of Present Illness    HTN  No change in meds  No chest pain   No shortness of breath  No edema  No headaches  No change in meds  No alcohol    Iron Def anemai  On iron pills   No blood in BM  Less fatigue now  More energy now            Neck and back pain   Saw Dr Santana  + neck and left arm numbness and heavy feeling  [facility]  Does not feel right in the neck    Back pain   He does not want surg yet if can avoid it  Had MRI lumbar as well            Social History     Tobacco Use   Smoking Status Former Smoker   • Packs/day: 1.50   • Years: 35.00   • Pack years: 52.50   • Types: Cigarettes   • Quit date: 2011   • Years since quitting: 10.5   Smokeless Tobacco Never Used          Past Medical  "History,Medications, Allergies, and social history was reviewed.          Review of Systems   Constitutional: Positive for fatigue.   HENT: Negative.    Respiratory: Negative.    Cardiovascular: Negative.    Gastrointestinal: Negative.    Musculoskeletal: Positive for back pain and neck pain.   Neurological: Positive for numbness.       Objective     Vitals:    07/08/21 1116   BP: 116/68   Pulse: 54   Resp: 18   Temp: 98 °F (36.7 °C)   Weight: 82.6 kg (182 lb)   Height: 165.1 cm (65\")          Physical Exam  Vitals and nursing note reviewed.   Constitutional:       General: He is not in acute distress.     Appearance: Normal appearance. He is well-developed. He is not ill-appearing.   HENT:      Head: Normocephalic and atraumatic.      Right Ear: Hearing, tympanic membrane, ear canal and external ear normal.      Left Ear: Hearing, tympanic membrane, ear canal and external ear normal.      Nose: Nose normal. No congestion or rhinorrhea.      Mouth/Throat:      Mouth: Mucous membranes are moist.      Pharynx: No oropharyngeal exudate or posterior oropharyngeal erythema.   Eyes:      General: Lids are normal.      Conjunctiva/sclera: Conjunctivae normal.      Pupils: Pupils are equal, round, and reactive to light.   Neck:      Thyroid: No thyromegaly.   Cardiovascular:      Rate and Rhythm: Normal rate and regular rhythm.      Heart sounds: Normal heart sounds. No murmur heard.   No friction rub.   Pulmonary:      Effort: Pulmonary effort is normal. No respiratory distress.      Breath sounds: Normal breath sounds. No wheezing or rales.   Abdominal:      General: Bowel sounds are normal. There is no distension.      Palpations: Abdomen is soft. There is mass (Epigastric.  Approximately 4 to 5 cm.  Mild tenderness.).      Tenderness: There is no abdominal tenderness. There is no guarding or rebound.   Musculoskeletal:      Cervical back: Normal range of motion and neck supple.      Right lower leg: No edema.      Left " lower leg: No edema.   Skin:     General: Skin is warm and dry.   Neurological:      General: No focal deficit present.      Mental Status: He is alert.   Psychiatric:         Mood and Affect: Mood normal.         Speech: Speech normal.         Behavior: Behavior normal.                     Anshul Martinez MD

## 2021-07-09 LAB
ALBUMIN SERPL-MCNC: 4.8 G/DL (ref 3.5–5.2)
ALBUMIN/GLOB SERPL: 1.6 G/DL
ALP SERPL-CCNC: 158 U/L (ref 39–117)
ALT SERPL-CCNC: 45 U/L (ref 1–41)
AMYLASE SERPL-CCNC: 87 U/L (ref 28–100)
AST SERPL-CCNC: 47 U/L (ref 1–40)
BASOPHILS # BLD AUTO: 0.07 10*3/MM3 (ref 0–0.2)
BASOPHILS NFR BLD AUTO: 0.8 % (ref 0–1.5)
BILIRUB SERPL-MCNC: 0.3 MG/DL (ref 0–1.2)
BUN SERPL-MCNC: 13 MG/DL (ref 8–23)
BUN/CREAT SERPL: 14.8 (ref 7–25)
CALCIUM SERPL-MCNC: 9.8 MG/DL (ref 8.6–10.5)
CHLORIDE SERPL-SCNC: 101 MMOL/L (ref 98–107)
CO2 SERPL-SCNC: 26.2 MMOL/L (ref 22–29)
CREAT SERPL-MCNC: 0.88 MG/DL (ref 0.76–1.27)
EOSINOPHIL # BLD AUTO: 0.25 10*3/MM3 (ref 0–0.4)
EOSINOPHIL NFR BLD AUTO: 3 % (ref 0.3–6.2)
ERYTHROCYTE [DISTWIDTH] IN BLOOD BY AUTOMATED COUNT: 22.1 % (ref 12.3–15.4)
FERRITIN SERPL-MCNC: 52.8 NG/ML (ref 30–400)
GLOBULIN SER CALC-MCNC: 3 GM/DL
GLUCOSE SERPL-MCNC: 101 MG/DL (ref 65–99)
HBA1C MFR BLD: 5.6 % (ref 4.8–5.6)
HCT VFR BLD AUTO: 42.2 % (ref 37.5–51)
HGB BLD-MCNC: 13.4 G/DL (ref 13–17.7)
IMM GRANULOCYTES # BLD AUTO: 0.06 10*3/MM3 (ref 0–0.05)
IMM GRANULOCYTES NFR BLD AUTO: 0.7 % (ref 0–0.5)
IRON SATN MFR SERPL: 39 % (ref 20–50)
IRON SERPL-MCNC: 220 MCG/DL (ref 59–158)
LIPASE SERPL-CCNC: 44 U/L (ref 13–60)
LYMPHOCYTES # BLD AUTO: 2.76 10*3/MM3 (ref 0.7–3.1)
LYMPHOCYTES NFR BLD AUTO: 33.2 % (ref 19.6–45.3)
MCH RBC QN AUTO: 27.3 PG (ref 26.6–33)
MCHC RBC AUTO-ENTMCNC: 31.8 G/DL (ref 31.5–35.7)
MCV RBC AUTO: 85.9 FL (ref 79–97)
MONOCYTES # BLD AUTO: 0.78 10*3/MM3 (ref 0.1–0.9)
MONOCYTES NFR BLD AUTO: 9.4 % (ref 5–12)
NEUTROPHILS # BLD AUTO: 4.39 10*3/MM3 (ref 1.7–7)
NEUTROPHILS NFR BLD AUTO: 52.9 % (ref 42.7–76)
NRBC BLD AUTO-RTO: 0 /100 WBC (ref 0–0.2)
PLATELET # BLD AUTO: 232 10*3/MM3 (ref 140–450)
POTASSIUM SERPL-SCNC: 3.9 MMOL/L (ref 3.5–5.2)
PROT SERPL-MCNC: 7.8 G/DL (ref 6–8.5)
RBC # BLD AUTO: 4.91 10*6/MM3 (ref 4.14–5.8)
SODIUM SERPL-SCNC: 137 MMOL/L (ref 136–145)
TIBC SERPL-MCNC: 561 MCG/DL
UIBC SERPL-MCNC: 341 MCG/DL (ref 112–346)
WBC # BLD AUTO: 8.31 10*3/MM3 (ref 3.4–10.8)

## 2021-07-12 DIAGNOSIS — D50.9 IRON DEFICIENCY ANEMIA, UNSPECIFIED IRON DEFICIENCY ANEMIA TYPE: ICD-10-CM

## 2021-07-12 RX ORDER — PNV NO.95/FERROUS FUM/FOLIC AC 28MG-0.8MG
TABLET ORAL
Qty: 30 TABLET | Refills: 2 | Status: SHIPPED | OUTPATIENT
Start: 2021-07-12 | End: 2022-01-11

## 2021-07-22 ENCOUNTER — HOSPITAL ENCOUNTER (OUTPATIENT)
Dept: CT IMAGING | Facility: HOSPITAL | Age: 61
Discharge: HOME | End: 2021-07-22
Admitting: FAMILY MEDICINE

## 2021-07-22 DIAGNOSIS — R19.06 EPIGASTRIC MASS: ICD-10-CM

## 2021-07-22 PROCEDURE — 25010000002 IOPAMIDOL 61 % SOLUTION: Performed by: FAMILY MEDICINE

## 2021-07-22 PROCEDURE — 74177 CT ABD & PELVIS W/CONTRAST: CPT

## 2021-07-22 RX ADMIN — IOPAMIDOL 95 ML: 612 INJECTION, SOLUTION INTRAVENOUS at 13:19

## 2021-07-23 ENCOUNTER — OFFICE VISIT (OUTPATIENT)
Dept: NEUROSURGERY | Facility: CLINIC | Age: 61
End: 2021-07-23

## 2021-07-23 ENCOUNTER — HOSPITAL ENCOUNTER (OUTPATIENT)
Dept: MRI IMAGING | Facility: HOSPITAL | Age: 61
Discharge: HOME | End: 2021-07-23
Admitting: NEUROLOGICAL SURGERY

## 2021-07-23 VITALS — HEIGHT: 65 IN | BODY MASS INDEX: 31.16 KG/M2 | WEIGHT: 187 LBS | TEMPERATURE: 97.7 F

## 2021-07-23 DIAGNOSIS — M48.062 SPINAL STENOSIS OF LUMBAR REGION WITH NEUROGENIC CLAUDICATION: ICD-10-CM

## 2021-07-23 DIAGNOSIS — M47.22 CERVICAL SPONDYLOSIS WITH RADICULOPATHY: Primary | ICD-10-CM

## 2021-07-23 DIAGNOSIS — M54.12 CERVICAL RADICULOPATHY: ICD-10-CM

## 2021-07-23 PROCEDURE — 99214 OFFICE O/P EST MOD 30 MIN: CPT | Performed by: NEUROLOGICAL SURGERY

## 2021-07-23 PROCEDURE — 72141 MRI NECK SPINE W/O DYE: CPT

## 2021-07-23 RX ORDER — GABAPENTIN 300 MG/1
CAPSULE ORAL
Qty: 90 CAPSULE | Refills: 0 | Status: SHIPPED | OUTPATIENT
Start: 2021-07-23 | End: 2021-08-20 | Stop reason: SDUPTHER

## 2021-07-23 NOTE — PROGRESS NOTES
Patient: Justice Kiser  : 1960    Primary Care Provider: Anshul Martinez MD    Requesting Provider: As above        History    Chief Complaint:   1.  Low back and lower extremity pain with walking and standing intolerance.  2.  Neck and left upper extremity pain.    History of Present Illness: Mr. Kiser is a 61-year-old former  who remotely underwent lumbar discectomy by another surgeon.  I believe this was in .  He generally did well with some low-grade symptoms.  For the last year or more he has had progressive back pain that will extend into his legs.  He has tingling and heaviness that occurs in his legs after he stands or walks for 10-15 minutes.  He is also developed some cervico-thoracic pain that extends down the left arm into the forearm.  He is not sure what makes the symptoms better or worse.  He denies any bowel or bladder dysfunction.  He has not undergone any formal treatment.  The pain in his left arm now extends down into the left thenar eminence.  He has an itching feeling in the left thumb.  His back symptoms remain unchanged.    Review of Systems   Constitutional: Positive for fatigue. Negative for activity change, appetite change, chills, diaphoresis, fever and unexpected weight change.   HENT: Positive for dental problem, hearing loss, postnasal drip and sinus pressure. Negative for congestion, drooling, ear discharge, ear pain, facial swelling, mouth sores, nosebleeds, rhinorrhea, sinus pain, sneezing, sore throat, tinnitus, trouble swallowing and voice change.    Eyes: Negative for photophobia, pain, discharge, redness, itching and visual disturbance.   Respiratory: Positive for cough, shortness of breath and wheezing. Negative for apnea, choking, chest tightness and stridor.    Cardiovascular: Negative for chest pain, palpitations and leg swelling.   Gastrointestinal: Positive for diarrhea. Negative for abdominal distention, abdominal pain, anal bleeding, blood in  "stool, constipation, nausea, rectal pain and vomiting.   Endocrine: Negative for cold intolerance, heat intolerance, polydipsia, polyphagia and polyuria.   Genitourinary: Negative for decreased urine volume, difficulty urinating, discharge, dysuria, enuresis, flank pain, frequency, genital sores, hematuria, penile pain, penile swelling, scrotal swelling, testicular pain and urgency.   Musculoskeletal: Positive for back pain, neck pain and neck stiffness. Negative for arthralgias, gait problem, joint swelling and myalgias.   Skin: Negative for color change, pallor, rash and wound.   Allergic/Immunologic: Negative for environmental allergies, food allergies and immunocompromised state.   Neurological: Positive for weakness and numbness. Negative for dizziness, tremors, seizures, syncope, facial asymmetry, speech difficulty, light-headedness and headaches.   Hematological: Negative for adenopathy. Bruises/bleeds easily.   Psychiatric/Behavioral: Positive for confusion, decreased concentration and hallucinations. Negative for agitation, behavioral problems, dysphoric mood, self-injury, sleep disturbance and suicidal ideas. The patient is nervous/anxious and is hyperactive.        The patient's past medical history, past surgical history, family history, and social history have been reviewed at length in the electronic medical record.    Physical Exam:   Temp 97.7 °F (36.5 °C)   Ht 165.1 cm (65\")   Wt 84.8 kg (187 lb)   BMI 31.12 kg/m²   MUSCULOSKELETAL:  Neck tenderness to palpation is not observed.   ROM in the neck is normal.  NEUROLOGICAL:  Strength is intact in the upper and lower extremities to direct testing.  Muscle tone is normal throughout.  Station and gait are normal.  Sensation is intact to light touch testing throughout.    Medical Decision Making    Data Review:   (All imaging studies were personally reviewed unless stated otherwise)  MRI of the lumbar spine dated 6/3/2021 demonstrates a laminotomy " defect on the right at L5-S1.  There is prominent disc protrusion central to leftward at L4-5 that causes a fair amount of stenosis.  There may be postsurgical changes on the right at L4-5 as well.  There is generous grade stenosis at L3-4.    Cervical MRI demonstrates diffuse degenerative disc disease and spondylosis.  There is some recess narrowing at see 3-4 but there are more prominent findings noted bilaterally at C4-5 and C5-6.    Diagnosis:   1.  Cervical spondylosis with left upper extremity radiculopathy.  2.  Lumbar stenosis with neurogenic claudication.    Treatment Options:   I have referred the patient for physical therapy with traction.  I have also prescribed Neurontin.  He will follow-up in several weeks.  If his cervical issues persist then we will need to consider ACDF C4-6.  For now we are going to put his lumbar stenosis issues on the back burner.  If those issues continue to be problematic then he will require decompressive laminectomies at L3-4 and L4-5.       Diagnosis Plan   1. Cervical spondylosis with radiculopathy  gabapentin (NEURONTIN) 300 MG capsule    Ambulatory Referral to Physical Therapy Evaluate and treat; Stretching, ROM, Strengthening   2. Spinal stenosis of lumbar region with neurogenic claudication         Scribed for Marin Santana MD by Mini Jacome, [facility] 7/23/2021 11:37 EDT      I, Dr. Santana, personally performed the services described in the documentation, as scribed in my presence, and it is both accurate and complete.

## 2021-08-13 DIAGNOSIS — F33.1 MODERATE EPISODE OF RECURRENT MAJOR DEPRESSIVE DISORDER (HCC): ICD-10-CM

## 2021-08-13 RX ORDER — MIRTAZAPINE 30 MG/1
TABLET, FILM COATED ORAL
Qty: 90 TABLET | Refills: 1 | Status: SHIPPED | OUTPATIENT
Start: 2021-08-13 | End: 2021-12-01

## 2021-08-20 ENCOUNTER — OFFICE VISIT (OUTPATIENT)
Dept: NEUROSURGERY | Facility: CLINIC | Age: 61
End: 2021-08-20

## 2021-08-20 VITALS — TEMPERATURE: 97.1 F | BODY MASS INDEX: 31.96 KG/M2 | WEIGHT: 191.8 LBS | HEIGHT: 65 IN

## 2021-08-20 DIAGNOSIS — M47.22 CERVICAL SPONDYLOSIS WITH RADICULOPATHY: Primary | ICD-10-CM

## 2021-08-20 DIAGNOSIS — M48.062 SPINAL STENOSIS OF LUMBAR REGION WITH NEUROGENIC CLAUDICATION: ICD-10-CM

## 2021-08-20 DIAGNOSIS — E78.5 HYPERLIPIDEMIA LDL GOAL <70: ICD-10-CM

## 2021-08-20 PROCEDURE — 99213 OFFICE O/P EST LOW 20 MIN: CPT | Performed by: NEUROLOGICAL SURGERY

## 2021-08-20 RX ORDER — GABAPENTIN 300 MG/1
300 CAPSULE ORAL 3 TIMES DAILY
Qty: 90 CAPSULE | Refills: 0 | Status: SHIPPED | OUTPATIENT
Start: 2021-08-20 | End: 2021-09-21

## 2021-08-20 RX ORDER — ATORVASTATIN CALCIUM 80 MG/1
80 TABLET, FILM COATED ORAL
Qty: 90 TABLET | Refills: 3 | Status: SHIPPED | OUTPATIENT
Start: 2021-08-20 | End: 2022-01-27 | Stop reason: SDUPTHER

## 2021-08-20 NOTE — PROGRESS NOTES
Patient: Justice Kiser  : 1960    Primary Care Provider: Anshul Martinez MD    Requesting Provider: As above        History    Chief Complaint:   1.  Low back and lower extremity pain with walking and standing intolerance.  2.  Neck and left upper extremity pain.    History of Present Illness: Mr. Kiser is a 61-year-old former  who remotely underwent lumbar discectomy by another surgeon.  I believe this was in .  He generally did well with some low-grade symptoms.  For the last year or more he has had progressive back pain that will extend into his legs.  He has tingling and heaviness that occurs in his legs after he stands or walks for 10-15 minutes.  He is also developed some cervico-thoracic pain that extends down the left arm into the forearm.  He is not sure what makes the symptoms better or worse.  He denies any bowel or bladder dysfunction.  He has not undergone any formal treatment.  The pain in his left arm now extends down into the left thenar eminence.  He has an itching feeling in the left thumb.  His arm and neck symptoms are most problematic.  His back symptoms remain unchanged.  Physical therapy actually made his neck and arm symptoms worse.  Gabapentin is helping to some extent.       Review of Systems   Constitutional: Positive for fatigue. Negative for activity change, appetite change, chills, diaphoresis, fever and unexpected weight change.   HENT: Positive for dental problem, hearing loss and postnasal drip. Negative for congestion, drooling, ear discharge, ear pain, facial swelling, mouth sores, nosebleeds, rhinorrhea, sinus pressure, sinus pain, sneezing, sore throat, tinnitus, trouble swallowing and voice change.    Eyes: Negative for photophobia, pain, discharge, redness, itching and visual disturbance.   Respiratory: Positive for wheezing. Negative for apnea, cough, choking, chest tightness, shortness of breath and stridor.    Cardiovascular: Negative for chest  "pain, palpitations and leg swelling.   Gastrointestinal: Positive for diarrhea (intermittent). Negative for abdominal distention, abdominal pain, anal bleeding, blood in stool, constipation, nausea, rectal pain and vomiting.   Endocrine: Negative for cold intolerance, heat intolerance, polydipsia, polyphagia and polyuria.   Genitourinary: Negative for decreased urine volume, difficulty urinating, discharge, dysuria, enuresis, flank pain, frequency, genital sores, hematuria, penile pain, penile swelling, scrotal swelling, testicular pain and urgency.   Musculoskeletal: Positive for back pain, neck pain and neck stiffness. Negative for arthralgias, gait problem, joint swelling and myalgias.   Skin: Negative for color change, pallor, rash and wound.   Allergic/Immunologic: Negative for environmental allergies, food allergies and immunocompromised state.   Neurological: Positive for weakness and numbness. Negative for dizziness, tremors, seizures, syncope, facial asymmetry, speech difficulty, light-headedness and headaches.   Hematological: Negative for adenopathy. Bruises/bleeds easily.   Psychiatric/Behavioral: Negative for agitation, behavioral problems, confusion, decreased concentration, dysphoric mood, hallucinations, self-injury, sleep disturbance and suicidal ideas. The patient is nervous/anxious and is hyperactive.    All other systems reviewed and are negative.      The patient's past medical history, past surgical history, family history, and social history have been reviewed at length in the electronic medical record.    Physical Exam:   Temp 97.1 °F (36.2 °C)   Ht 165.1 cm (65\")   Wt 87 kg (191 lb 12.8 oz)   BMI 31.92 kg/m²   MUSCULOSKELETAL:  Neck tenderness to palpation is not observed.   ROM in the neck is normal.  NEUROLOGICAL:  Strength is intact in the upper and lower extremities to direct testing.  Muscle tone is normal throughout.  Station and gait are normal.  Sensation is intact to light touch " testing throughout.  Deep tendon reflexes are 3+ and symmetrical in the arms and difficult to elicit in the legs..  Zachary's Sign is negative bilaterally.         Medical Decision Making    Data Review:   (All imaging studies were personally reviewed unless stated otherwise)  MRI of the lumbar spine dated 6/3/2021 demonstrates a laminotomy defect on the right at L5-S1.  There is prominent disc protrusion central to leftward at L4-5 that causes a fair amount of stenosis.  There may be postsurgical changes on the right at L4-5 as well.  There is generous grade stenosis at L3-4.     Cervical MRI demonstrates diffuse degenerative disc disease and spondylosis.  There is some recess narrowing at C3-4 but there are more prominent findings noted bilaterally at C4-5 and C5-6.    Diagnosis:   1.  Cervical spondylosis with left upper extremity radiculopathy.  2.  Lumbar stenosis with neurogenic claudication.    Treatment Options:   I discussed treatment options with the patient.  He is not enthusiastic about epidural injections.  Currently, he does not want to pursue surgical intervention.  I have renewed his Neurontin.  He will follow-up in our clinic in about 6 weeks to check on his progress.  If he continues to struggle then we will need to strongly consider C4-6 ACDF.       Diagnosis Plan   1. Cervical spondylosis with radiculopathy  gabapentin (NEURONTIN) 300 MG capsule   2. Spinal stenosis of lumbar region with neurogenic claudication         Scribed for Marin Santana MD by NISSA Lambert 8/20/2021 11:54 EDT      I, Dr. Santana, personally performed the services described in the documentation, as scribed in my presence, and it is both accurate and complete.

## 2021-09-16 DIAGNOSIS — I10 ESSENTIAL HYPERTENSION: ICD-10-CM

## 2021-09-16 DIAGNOSIS — I25.119 CORONARY ARTERY DISEASE INVOLVING NATIVE CORONARY ARTERY OF NATIVE HEART WITH ANGINA PECTORIS (HCC): ICD-10-CM

## 2021-09-16 RX ORDER — METOPROLOL TARTRATE 50 MG/1
TABLET, FILM COATED ORAL
Qty: 90 TABLET | Refills: 1 | Status: SHIPPED | OUTPATIENT
Start: 2021-09-16 | End: 2022-01-27 | Stop reason: SDUPTHER

## 2021-09-20 DIAGNOSIS — M47.22 CERVICAL SPONDYLOSIS WITH RADICULOPATHY: ICD-10-CM

## 2021-09-21 RX ORDER — GABAPENTIN 300 MG/1
CAPSULE ORAL
Qty: 90 CAPSULE | Refills: 0 | Status: SHIPPED | OUTPATIENT
Start: 2021-09-21 | End: 2021-10-25

## 2021-09-21 NOTE — TELEPHONE ENCOUNTER
Provider:  Max  Caller:  Automated refill request  Surgery:  NA  Surgery Date: NA    Last visit:  08/20/2021  Next visit: 09/29/2021    Reason for call:           Automated refill request for rx, Gabapentin.    Requested Prescriptions     Pending Prescriptions Disp Refills   • gabapentin (NEURONTIN) 300 MG capsule [Pharmacy Med Name: Gabapentin 300 MG Oral Capsule] 90 capsule 0     Sig: TAKE 1 CAPSULE BY MOUTH THREE TIMES DAILY     ALEJANDRO:    06/09/2021 Tramadol Hcl  50MG/50MG/50MG/50MG/50MG/  1960 7 2 PRATIMA [address]OD [facility] 10-  [phone]  [facility] 18 1  07/23/2021 Gabapentin 300MG 1960 90 33 MAX KU [facility] 10-  71  [facility] 1  08/23/2021 Gabapentin 300MG 1960 90 30 MAX KU [facility] 10-  [phone]  [facility] 1

## 2021-09-29 ENCOUNTER — OFFICE VISIT (OUTPATIENT)
Dept: NEUROSURGERY | Facility: CLINIC | Age: 61
End: 2021-09-29

## 2021-09-29 VITALS
HEIGHT: 65 IN | BODY MASS INDEX: 31.72 KG/M2 | SYSTOLIC BLOOD PRESSURE: 130 MMHG | WEIGHT: 190.4 LBS | OXYGEN SATURATION: 98 % | DIASTOLIC BLOOD PRESSURE: 70 MMHG | TEMPERATURE: 97.7 F | HEART RATE: 53 BPM

## 2021-09-29 DIAGNOSIS — M54.12 CERVICAL RADICULOPATHY: ICD-10-CM

## 2021-09-29 DIAGNOSIS — M47.22 CERVICAL SPONDYLOSIS WITH RADICULOPATHY: Primary | ICD-10-CM

## 2021-09-29 DIAGNOSIS — M48.062 SPINAL STENOSIS OF LUMBAR REGION WITH NEUROGENIC CLAUDICATION: ICD-10-CM

## 2021-09-29 PROCEDURE — 99213 OFFICE O/P EST LOW 20 MIN: CPT | Performed by: PHYSICIAN ASSISTANT

## 2021-09-29 NOTE — PROGRESS NOTES
Patient: Justice Kiser  : 1960  Chart #: [id number]    Date of Service: 2021    CHIEF COMPLAINT:   1.  Neck and left upper extremity pain  2.  Low back and lower extremity pain with walking and standing intolerance    History of Present Illness Mr. Kiser is seen in follow-up.  He is a 61-year-old former  who remotely underwent lumbar discectomy by another surgeon.  This was sometime in the early .  In general he did well but said he is always had low back difficulties.  For the last year he has had more progressive low back pain that will extend into his legs.  He has tingling and heaviness that occurs in his legs after he stands and walks for 10 to 15 minutes.  He has also developed some neck pain that extends down the left arm.  MRI of the cervical spine demonstrated prominent findings at C4-5 and C5 6L to be source for his symptoms.  He was treated with formal physical therapy which aggravated symptoms.  More recently he started Neurontin.  That has helped more than anything.  He really would like to avoid another surgery at all cost.      Past Medical History:   Diagnosis Date   • Acute maxillary sinusitis    • Arthritis    • CHF (congestive heart failure) (CMS/HCC)    • Elevated LFTs    • History of colonic polyps    • History of methicillin resistant Staphylococcus aureus    • History of myocardial infarction    • Hyperlipidemia    • Hypertension    • Left hand pain    • Left shoulder pain    • Myocardial infarction (CMS/HCC)    • Personal history of congestive heart failure    • Right hip pain    • Rotator cuff syndrome          Current Outpatient Medications:   •  aspirin 81 MG EC tablet, Take 1 tablet by mouth Daily., Disp: , Rfl:   •  atorvastatin (LIPITOR) 80 MG tablet, TAKE 1 TABLET BY MOUTH EVERY NIGHT AT BEDTIME., Disp: 90 tablet, Rfl: 3  •  donepezil (ARICEPT) 10 MG tablet, Take 1 tablet by mouth Every Night., Disp: 90 tablet, Rfl: 3  •  ferrous sulfate 325 (65 Fe) MG  tablet, Take 1 tablet by mouth once daily with breakfast, Disp: 30 tablet, Rfl: 2  •  furosemide (LASIX) 20 MG tablet, TAKE 1 TABLET EVERY DAY, Disp: 90 tablet, Rfl: 1  •  gabapentin (NEURONTIN) 300 MG capsule, TAKE 1 CAPSULE BY MOUTH THREE TIMES DAILY, Disp: 90 capsule, Rfl: 0  •  hydroCHLOROthiazide (MICROZIDE) 12.5 MG capsule, Take 1 capsule by mouth Daily., Disp: 90 capsule, Rfl: 3  •  lisinopril (PRINIVIL,ZESTRIL) 20 MG tablet, Take 1 tablet by mouth Daily., Disp: 90 tablet, Rfl: 1  •  memantine (NAMENDA) 10 MG tablet, Take 1 tablet by mouth 2 (Two) Times a Day., Disp: 180 tablet, Rfl: 3  •  metoprolol tartrate (LOPRESSOR) 50 MG tablet, TAKE 1/2 TABLET TWICE DAILY, Disp: 90 tablet, Rfl: 1  •  mirtazapine (Remeron) 30 MG tablet, Take 1/2 tablet at night for 2 weeks then increase to 1 tablet at night, Disp: 90 tablet, Rfl: 1  •  Multiple Vitamins-Minerals (CENTRUM SILVER) tablet, Take 1 tablet by mouth Daily., Disp: , Rfl:   •  omeprazole (priLOSEC) 40 MG capsule, TAKE 1 CAPSULE EVERY DAY, Disp: 90 capsule, Rfl: 1  •  sertraline (ZOLOFT) 100 MG tablet, TAKE 2 TABLETS EVERY DAY, Disp: 180 tablet, Rfl: 1    Past Surgical History:   Procedure Laterality Date   • CORONARY ANGIOPLASTY WITH STENT PLACEMENT  01/04/2012    Circumflex Xscience V 3.5 MM x 23 mm   • GALLBLADDER SURGERY  2017   • LUMBAR DISCECTOMY  1992    2 level - [facility]   • SHOULDER SURGERY  12/20/2018    Dr. Hoyt Rotator cuff repair       Social History     Socioeconomic History   • Marital status:      Spouse name: Not on file   • Number of children: Not on file   • Years of education: Not on file   • Highest education level: Not on file   Tobacco Use   • Smoking status: Former Smoker     Packs/day: 1.50     Years: 35.00     Pack years: 52.50     Types: Cigarettes     Quit date: 2011     Years since quitting: 10.7   • Smokeless tobacco: Never Used   Vaping Use   • Vaping Use: Never used   Substance and Sexual Activity   •  Alcohol use: Not Currently     Comment: 5 mixed drinks per day. recently quit all alcohol 12/2020   • Drug use: Yes     Types: Marijuana   • Sexual activity: Defer         Review of Systems   Constitutional: Negative for activity change, appetite change, chills, diaphoresis, fatigue, fever and unexpected weight change.   HENT: Negative for congestion, dental problem, drooling, ear discharge, ear pain, facial swelling, hearing loss, mouth sores, nosebleeds, postnasal drip, rhinorrhea, sinus pressure, sinus pain, sneezing, sore throat, tinnitus, trouble swallowing and voice change.    Eyes: Negative for photophobia, pain, discharge, redness, itching and visual disturbance.   Respiratory: Negative for apnea, cough, choking, chest tightness, shortness of breath, wheezing and stridor.    Cardiovascular: Negative for chest pain, palpitations and leg swelling.   Gastrointestinal: Negative for abdominal distention, abdominal pain, anal bleeding, blood in stool, constipation, diarrhea, nausea, rectal pain and vomiting.   Endocrine: Negative for cold intolerance, heat intolerance, polydipsia, polyphagia and polyuria.   Genitourinary: Negative for decreased urine volume, difficulty urinating, dysuria, enuresis, flank pain, frequency, genital sores, hematuria and urgency.   Musculoskeletal: Positive for back pain. Negative for arthralgias, gait problem, joint swelling, myalgias, neck pain and neck stiffness.   Skin: Negative for color change, pallor, rash and wound.   Allergic/Immunologic: Negative for environmental allergies, food allergies and immunocompromised state.   Neurological: Positive for numbness. Negative for dizziness, tremors, seizures, syncope, facial asymmetry, speech difficulty, weakness, light-headedness and headaches.   Hematological: Negative for adenopathy. Does not bruise/bleed easily.   Psychiatric/Behavioral: Negative for agitation, behavioral problems, confusion, decreased concentration, dysphoric mood,  "hallucinations, self-injury, sleep disturbance and suicidal ideas. The patient is not nervous/anxious and is not hyperactive.    All other systems reviewed and are negative.      Objective   Vital Signs: Blood pressure 130/70, pulse 53, temperature 97.7 °F (36.5 °C), height 165.1 cm (65\"), weight 86.4 kg (190 lb 6.4 oz), SpO2 98 %.  Physical Exam  Vitals and nursing note reviewed.   Constitutional:       General: He is not in acute distress.     Appearance: He is well-developed.   HENT:      Head: Normocephalic and atraumatic.   Eyes:      Pupils: Pupils are equal, round, and reactive to light.   Cardiovascular:      Heart sounds: Normal heart sounds.   Pulmonary:      Breath sounds: Normal breath sounds.   Psychiatric:         Behavior: Behavior normal.         Thought Content: Thought content normal.     Musculoskeletal:     Strength is intact in upper and lower extremities to direct testing.     Station and gait are normal.  Neurologic:     Muscle tone is normal throughout.     Coordination is intact.     Sensation is intact to light touch throughout.     Patient is oriented to person, place, and time.         Independent review of radiographic imaging: MRI of the cervical spine dated 7/23/2021 demonstrates diffuse degenerative disc disease and spondylosis. There is recess narrowing bilaterally at C4-5 and C5-6.    Assessment/Plan   Diagnosis:  1.  Cervical spondylosis with left upper extremity radiculopathy.  2.  Lumbar stenosis with neurogenic claudication.    Medical Decision Making: Fortunately patient is doing much better.  Neurontin has been helpful.  I would like him to continue his current regimen.  I will see him back in 6-8 weeks.  If he continues to do well, we may consider backing him off at that point.  He may call our office in the interim if he has any questions or concerns.    Diagnoses and all orders for this visit:    1. Cervical spondylosis with radiculopathy (Primary)    2. Spinal stenosis of " lumbar region with neurogenic claudication    3. Cervical radiculopathy                        Patient's Body mass index is 31.68 kg/m². indicating that he is obese (BMI >30). Obesity-related health conditions include the following: hypertension, dyslipidemias and GERD. Obesity is unchanged. BMI is is above average; BMI management plan is completed. We discussed portion control and increasing exercise..         Elayne Tompkins PA-C  Patient Care Team:  Anshul Martinez MD as PCP - General  Anshul Martinez MD as PCP - [facility] as Cardiologist (Interventional Cardiology)  Erick Hoyt MD as Surgeon (Orthopedic Surgery)

## 2021-10-02 DIAGNOSIS — G31.84 MCI (MILD COGNITIVE IMPAIRMENT): ICD-10-CM

## 2021-10-02 DIAGNOSIS — I10 ESSENTIAL HYPERTENSION: ICD-10-CM

## 2021-10-04 RX ORDER — SERTRALINE HYDROCHLORIDE 100 MG/1
TABLET, FILM COATED ORAL
Qty: 180 TABLET | Refills: 1 | Status: SHIPPED | OUTPATIENT
Start: 2021-10-04 | End: 2022-01-27 | Stop reason: SDUPTHER

## 2021-10-04 RX ORDER — LISINOPRIL 20 MG/1
TABLET ORAL
Qty: 90 TABLET | Refills: 1 | Status: SHIPPED | OUTPATIENT
Start: 2021-10-04 | End: 2022-03-28

## 2021-10-04 RX ORDER — MEMANTINE HYDROCHLORIDE 10 MG/1
TABLET ORAL
Qty: 180 TABLET | Refills: 3 | OUTPATIENT
Start: 2021-10-04

## 2021-10-08 DIAGNOSIS — G31.84 MCI (MILD COGNITIVE IMPAIRMENT): ICD-10-CM

## 2021-10-08 RX ORDER — MEMANTINE HYDROCHLORIDE 10 MG/1
10 TABLET ORAL 2 TIMES DAILY
Qty: 180 TABLET | Refills: 3 | Status: SHIPPED | OUTPATIENT
Start: 2021-10-08 | End: 2021-12-01

## 2021-10-08 NOTE — TELEPHONE ENCOUNTER
PT STATES THAT HE NO LONGER USES [facility].  HE STATES HE USES [facility] AND [facility] IS TELLING HIM THAT HIS RX FOR MEMANTINEWAS CANCELED.  PLEASE ADVISE.    PT STATES THAT HE IS OUT OF HIS MEMANTINE AND HASN'T HAD ANY THE PAST COUPLE DAYS.    CALLED [facility] DUE TO IT BEING EOD.  [facility] WAS ABLE TO SEND RX TO [facility].

## 2021-10-11 DIAGNOSIS — I10 ESSENTIAL HYPERTENSION: ICD-10-CM

## 2021-10-11 RX ORDER — HYDROCHLOROTHIAZIDE 12.5 MG/1
12.5 CAPSULE, GELATIN COATED ORAL DAILY
Qty: 90 CAPSULE | Refills: 3 | Status: SHIPPED | OUTPATIENT
Start: 2021-10-11 | End: 2022-01-27 | Stop reason: SDUPTHER

## 2021-10-22 DIAGNOSIS — M47.22 CERVICAL SPONDYLOSIS WITH RADICULOPATHY: ICD-10-CM

## 2021-10-23 NOTE — TELEPHONE ENCOUNTER
Provider:  Max  Caller:  Automated refill request  Surgery:  NA  Surgery Date:    Last visit:  Office Visit with Elayne Tompkins PA-C (09/29/2021)    Next visit: 11/16/21    Reason for call:         Requested Prescriptions     Pending Prescriptions Disp Refills   • gabapentin (NEURONTIN) 300 MG capsule [Pharmacy Med Name: Gabapentin 300 MG Oral Capsule] 90 capsule 0     Sig: TAKE 1 CAPSULE BY MOUTH THREE TIMES DAILY     Sukhjinder:     07/23/2021 Gabapentin 300MG 1960 90 33 MAX KU [facility] [address] 1 08/23/2021 Gabapentin 300MG 1960 90 30 MAX KU [facility] [address] 1 09/21/2021 Gabapentin 300MG 1960 90 30 [facility] [address] [place] [address]

## 2021-10-25 RX ORDER — GABAPENTIN 300 MG/1
CAPSULE ORAL
Qty: 90 CAPSULE | Refills: 0 | Status: SHIPPED | OUTPATIENT
Start: 2021-10-25 | End: 2021-11-16 | Stop reason: SDUPTHER

## 2021-11-16 ENCOUNTER — OFFICE VISIT (OUTPATIENT)
Dept: NEUROSURGERY | Facility: CLINIC | Age: 61
End: 2021-11-16

## 2021-11-16 VITALS
TEMPERATURE: 97.6 F | WEIGHT: 198 LBS | BODY MASS INDEX: 32.99 KG/M2 | DIASTOLIC BLOOD PRESSURE: 68 MMHG | SYSTOLIC BLOOD PRESSURE: 126 MMHG | HEIGHT: 65 IN

## 2021-11-16 DIAGNOSIS — M48.062 SPINAL STENOSIS OF LUMBAR REGION WITH NEUROGENIC CLAUDICATION: ICD-10-CM

## 2021-11-16 DIAGNOSIS — M54.12 CERVICAL RADICULOPATHY: ICD-10-CM

## 2021-11-16 DIAGNOSIS — M47.22 CERVICAL SPONDYLOSIS WITH RADICULOPATHY: Primary | ICD-10-CM

## 2021-11-16 PROCEDURE — 99213 OFFICE O/P EST LOW 20 MIN: CPT | Performed by: PHYSICIAN ASSISTANT

## 2021-11-16 RX ORDER — GABAPENTIN 300 MG/1
300 CAPSULE ORAL 3 TIMES DAILY
Qty: 90 CAPSULE | Refills: 2 | Status: SHIPPED | OUTPATIENT
Start: 2021-11-16 | End: 2022-01-14

## 2021-11-16 NOTE — PROGRESS NOTES
Patient: Justice Kiser  : 1960  Chart #: [id number]    Date of Service: 2021    CHIEF COMPLAINT:   1.  Neck and left upper extremity pain  2.  Low back and lower extremity pain with walking and standing intolerance    History of Present Illness Mr. Kiser is seen in follow-up.  He is a 61-year-old former  who remotely underwent lumbar discectomy by another surgeon.  This was sometime in the early s.  In general he did well but said he is always had low back difficulties.  For the last year he has had more progressive low back pain that will extend into his legs.  He has tingling and heaviness that occurs in his legs after he stands and walks for 10 to 15 minutes.  He has also developed some neck pain that extends down the left arm.  MRI of the cervical spine demonstrated prominent findings at C4-5 and C5 6L to be source for his symptoms.  He was treated with formal physical therapy which aggravated symptoms.  More recently he started Neurontin.  That has helped more than anything.  He really would like to avoid another surgery at all cost.  He is currently taking Neurontin 1 tab in the morning and 1 tab in the evening.  Every now and then he will take a third dose but seems to be managing well with twice daily dosing.  Overall he is doing well and has no new complaints today.      Past Medical History:   Diagnosis Date   • Acute maxillary sinusitis    • Arthritis    • CHF (congestive heart failure) (HCC)    • Elevated LFTs    • History of colonic polyps    • History of methicillin resistant Staphylococcus aureus    • History of myocardial infarction    • Hyperlipidemia    • Hypertension    • Left hand pain    • Left shoulder pain    • Myocardial infarction (HCC)    • Personal history of congestive heart failure    • Right hip pain    • Rotator cuff syndrome          Current Outpatient Medications:   •  aspirin 81 MG EC tablet, Take 1 tablet by mouth Daily., Disp: , Rfl:   •   atorvastatin (LIPITOR) 80 MG tablet, TAKE 1 TABLET BY MOUTH EVERY NIGHT AT BEDTIME., Disp: 90 tablet, Rfl: 3  •  donepezil (ARICEPT) 10 MG tablet, Take 1 tablet by mouth Every Night., Disp: 90 tablet, Rfl: 3  •  ferrous sulfate 325 (65 Fe) MG tablet, Take 1 tablet by mouth once daily with breakfast, Disp: 30 tablet, Rfl: 2  •  furosemide (LASIX) 20 MG tablet, TAKE 1 TABLET EVERY DAY, Disp: 90 tablet, Rfl: 1  •  gabapentin (NEURONTIN) 300 MG capsule, TAKE 1 CAPSULE BY MOUTH THREE TIMES DAILY, Disp: 90 capsule, Rfl: 0  •  hydroCHLOROthiazide (MICROZIDE) 12.5 MG capsule, Take 1 capsule by mouth Daily., Disp: 90 capsule, Rfl: 3  •  lisinopril (PRINIVIL,ZESTRIL) 20 MG tablet, TAKE 1 TABLET EVERY DAY, Disp: 90 tablet, Rfl: 1  •  memantine (NAMENDA) 10 MG tablet, Take 1 tablet by mouth 2 (Two) Times a Day., Disp: 180 tablet, Rfl: 3  •  metoprolol tartrate (LOPRESSOR) 50 MG tablet, TAKE 1/2 TABLET TWICE DAILY, Disp: 90 tablet, Rfl: 1  •  mirtazapine (Remeron) 30 MG tablet, Take 1/2 tablet at night for 2 weeks then increase to 1 tablet at night, Disp: 90 tablet, Rfl: 1  •  Multiple Vitamins-Minerals (CENTRUM SILVER) tablet, Take 1 tablet by mouth Daily., Disp: , Rfl:   •  omeprazole (priLOSEC) 40 MG capsule, TAKE 1 CAPSULE EVERY DAY, Disp: 90 capsule, Rfl: 1  •  sertraline (ZOLOFT) 100 MG tablet, TAKE 2 TABLETS EVERY DAY, Disp: 180 tablet, Rfl: 1    Past Surgical History:   Procedure Laterality Date   • CORONARY ANGIOPLASTY WITH STENT PLACEMENT  01/04/2012    Circumflex Xscience V 3.5 MM x 23 mm   • GALLBLADDER SURGERY  2017   • LUMBAR DISCECTOMY  1992    2 level - [facility]   • SHOULDER SURGERY  12/20/2018    Dr. Hoyt Rotator cuff repair       Social History     Socioeconomic History   • Marital status:    Tobacco Use   • Smoking status: Former Smoker     Packs/day: 1.50     Years: 35.00     Pack years: 52.50     Types: Cigarettes     Quit date: 2011     Years since quitting: 10.8   • Smokeless  tobacco: Never Used   Vaping Use   • Vaping Use: Never used   Substance and Sexual Activity   • Alcohol use: Not Currently     Comment: 5 mixed drinks per day. recently quit all alcohol 12/2020   • Drug use: Yes     Types: Marijuana   • Sexual activity: Defer         Review of Systems   Constitutional: Negative for activity change, appetite change, chills, diaphoresis, fatigue, fever and unexpected weight change.   HENT: Negative for congestion, dental problem, drooling, ear discharge, ear pain, facial swelling, hearing loss, mouth sores, nosebleeds, postnasal drip, rhinorrhea, sinus pressure, sinus pain, sneezing, sore throat, tinnitus, trouble swallowing and voice change.    Eyes: Negative for photophobia, pain, discharge, redness, itching and visual disturbance.   Respiratory: Negative for apnea, cough, choking, chest tightness, shortness of breath, wheezing and stridor.    Cardiovascular: Negative for chest pain, palpitations and leg swelling.   Gastrointestinal: Negative for abdominal distention, abdominal pain, anal bleeding, blood in stool, constipation, diarrhea, nausea, rectal pain and vomiting.   Endocrine: Negative for cold intolerance, heat intolerance, polydipsia, polyphagia and polyuria.   Genitourinary: Negative for decreased urine volume, difficulty urinating, dysuria, enuresis, flank pain, frequency, genital sores, hematuria and urgency.   Musculoskeletal: Positive for back pain and neck pain. Negative for arthralgias, gait problem, joint swelling, myalgias and neck stiffness.   Skin: Negative for color change, pallor, rash and wound.   Allergic/Immunologic: Negative for environmental allergies, food allergies and immunocompromised state.   Neurological: Positive for numbness. Negative for dizziness, tremors, seizures, syncope, facial asymmetry, speech difficulty, weakness, light-headedness and headaches.   Hematological: Negative for adenopathy. Does not bruise/bleed easily.  "  Psychiatric/Behavioral: Negative for agitation, behavioral problems, confusion, decreased concentration, dysphoric mood, hallucinations, self-injury, sleep disturbance and suicidal ideas. The patient is not nervous/anxious and is not hyperactive.    All other systems reviewed and are negative.      Objective   Vital Signs: Blood pressure 126/68, temperature 97.6 °F (36.4 °C), height 165.1 cm (65\"), weight 89.8 kg (198 lb).  Physical Exam  Vitals and nursing note reviewed.   Constitutional:       General: He is not in acute distress.     Appearance: He is well-developed.   HENT:      Head: Normocephalic and atraumatic.   Eyes:      Pupils: Pupils are equal, round, and reactive to light.   Cardiovascular:      Heart sounds: Normal heart sounds.   Pulmonary:      Breath sounds: Normal breath sounds.   Psychiatric:         Behavior: Behavior normal.         Thought Content: Thought content normal.     Musculoskeletal:     Strength is intact in upper and lower extremities to direct testing.     Station and gait are normal.  Neurologic:     Muscle tone is normal throughout.     Coordination is intact.     Sensation is intact to light touch throughout.     Patient is oriented to person, place, and time.         Independent review of radiographic imaging: MRI of the cervical spine dated 7/23/2021 demonstrates diffuse degenerative disc disease and spondylosis. There is recess narrowing bilaterally at C4-5 and C5-6.    Assessment/Plan   Diagnosis:  1.  Cervical spondylosis with left upper extremity radiculopathy.  2.  Lumbar stenosis with neurogenic claudication.    Medical Decision Making: I would like to hold tight with patient's therapy.  He seems to be doing well with Neurontin.  He is taking it twice a day, rarely 3 times a day.  He will follow-up with me in 4-5 months to check on things and discuss his medications.  I will continue to renew his Neurontin in the meantime.  He will call if he has any new or worsening " symptoms.    Diagnoses and all orders for this visit:    1. Cervical spondylosis with radiculopathy (Primary)    2. Spinal stenosis of lumbar region with neurogenic claudication    3. Cervical radiculopathy                        Patient's Body mass index is 32.95 kg/m². indicating that he is obese (BMI >30). Obesity-related health conditions include the following: hypertension, dyslipidemias and GERD. Obesity is unchanged. BMI is is above average; BMI management plan is completed. We discussed portion control and increasing exercise..         Elayne Tompkins PA-C  Patient Care Team:  Anshul Martinez MD as PCP - General  Anshul Martinez MD as PCP - [facility] as Cardiologist (Interventional Cardiology)  Erick Hoyt MD as Surgeon (Orthopedic Surgery)

## 2021-12-01 ENCOUNTER — OFFICE VISIT (OUTPATIENT)
Dept: NEUROLOGY | Facility: CLINIC | Age: 61
End: 2021-12-01

## 2021-12-01 VITALS
DIASTOLIC BLOOD PRESSURE: 80 MMHG | SYSTOLIC BLOOD PRESSURE: 130 MMHG | HEIGHT: 65 IN | WEIGHT: 196 LBS | BODY MASS INDEX: 32.65 KG/M2

## 2021-12-01 DIAGNOSIS — F33.1 MODERATE EPISODE OF RECURRENT MAJOR DEPRESSIVE DISORDER (HCC): ICD-10-CM

## 2021-12-01 DIAGNOSIS — G31.84 MCI (MILD COGNITIVE IMPAIRMENT): Primary | ICD-10-CM

## 2021-12-01 DIAGNOSIS — I67.89 CEREBRAL MICROVASCULAR DISEASE: ICD-10-CM

## 2021-12-01 PROCEDURE — 99214 OFFICE O/P EST MOD 30 MIN: CPT | Performed by: NURSE PRACTITIONER

## 2021-12-01 RX ORDER — MEMANTINE HYDROCHLORIDE 10 MG/1
10 TABLET ORAL 2 TIMES DAILY
Qty: 180 TABLET | Refills: 3 | Status: SHIPPED | OUTPATIENT
Start: 2021-12-01 | End: 2022-01-27 | Stop reason: SDUPTHER

## 2021-12-01 RX ORDER — MIRTAZAPINE 30 MG/1
30 TABLET, FILM COATED ORAL NIGHTLY
Qty: 90 TABLET | Refills: 3 | Status: SHIPPED | OUTPATIENT
Start: 2021-12-01 | End: 2022-05-18 | Stop reason: SDUPTHER

## 2021-12-01 RX ORDER — DONEPEZIL HYDROCHLORIDE 10 MG/1
10 TABLET, FILM COATED ORAL NIGHTLY
Qty: 90 TABLET | Refills: 3 | Status: SHIPPED | OUTPATIENT
Start: 2021-12-01 | End: 2022-03-28

## 2021-12-01 NOTE — PROGRESS NOTES
Subjective:     Patient ID: Justice Kiser is a 61 y.o. male.    CC:   Chief Complaint   Patient presents with   • mild cognitive impairment       HPI:   History of Present Illness   Justice Kiser is a 61 y.o. male who returns to clinic today for 6 month follow up on suspected MCI. He has noted symptoms since at least 2017 marked initially by forgetfulness , repetitiveness  and word-finding difficulties. This has gradually worsened over time, but over past 4 months he feels it is stable. Additional symptoms have included impairments in orientation and executive function. There have been associated symptoms of depression and irritability. He has also noted visual hallucinations of people in his bedroom, primarily at night, but he tells me he has had no ETOH with no alcohol since December 15th, 2020. He does have occasional hallucinations. He denies any impairments in ADL's. He manages his medications and finances. He drives occasionally a short distance, but normally his nephew drives him. He is currently residing with his nephew in [place]. Has occasional tremors in hands but this has improved with quitting alcohol intake.      Prior evaluation has included an MRI of the brain showing chronic white matter changes, an unremarkable ECHO/Carotid Dopplers and screening bloodwork. He is currently taking donepezil, memantine and was taking thiamine, but recently quit since no longer drinking.   He also had an updated MRI of the brain on 7/23/2018 which showed chronic small vessel ischemic changes with old lacunar infarcts.  He is on aspirin and statin therapy.     12/1/2021 Today-He is here by himself today. His nephew is undergoing chemo for lung cancer right now. He drove himself today. He continues on both donepezil and memantine for his memory with good tolerance. He continues to have trouble with balance but no recent falls. He is seeing neurosurgery and now on gabapentin to help with neuropathy, neck and low  back pain. He is sleeping better and mood is better on the mirtazapine 30mg at night. He has no new concerns but believes his short term memory is slightly worsened. He is able to take his own medications. He needs occasional reminders from his nephew. He does still manage his finances. He is resting better and his pain is much better. He previously completed the cognitive rehab with [facility].    The following portions of the patient's history were reviewed and updated as appropriate: allergies, current medications, past family history, past medical history, past social history, past surgical history and problem list.    Past Medical History:   Diagnosis Date   • Acute maxillary sinusitis    • Arthritis    • CHF (congestive heart failure) ([facility])    • Elevated LFTs    • History of colonic polyps    • History of methicillin resistant Staphylococcus aureus    • History of myocardial infarction    • Hyperlipidemia    • Hypertension    • Left hand pain    • Left shoulder pain    • Myocardial infarction ([facility])    • Personal history of congestive heart failure    • Right hip pain    • Rotator cuff syndrome        Past Surgical History:   Procedure Laterality Date   • CORONARY ANGIOPLASTY WITH STENT PLACEMENT  01/04/2012    Circumflex Xscience V 3.5 MM x 23 mm   • GALLBLADDER SURGERY  2017   • LUMBAR DISCECTOMY  1992    2 level - [facility]   • SHOULDER SURGERY  12/20/2018    Dr. Hoyt Rotator cuff repair       Social History     Socioeconomic History   • Marital status:    Tobacco Use   • Smoking status: Former Smoker     Packs/day: 1.50     Years: 35.00     Pack years: 52.50     Types: Cigarettes     Quit date: 2011     Years since quitting: 10.9   • Smokeless tobacco: Never Used   Vaping Use   • Vaping Use: Never used   Substance and Sexual Activity   • Alcohol use: Not Currently     Comment: 5 mixed drinks per day. recently quit all alcohol 12/2020   • Drug use: Yes     Types: Marijuana   • Sexual  "activity: Defer       Family History   Problem Relation Age of Onset   • Hypertension Mother    • Heart attack Mother    • Hyperlipidemia Mother    • Alcohol abuse Mother    • Alcohol abuse Father    • Dementia Sister    • Heart disease Sister    • Alcohol abuse Maternal Aunt    • Alcohol abuse Paternal Uncle    • Diabetes Maternal Grandmother    • Heart disease Brother    • Stroke Sister         Review of Systems   Constitutional: Negative for chills, fatigue, fever and unexpected weight change.   HENT: Negative for ear pain, hearing loss, nosebleeds, rhinorrhea and sore throat.    Eyes: Negative for photophobia, pain, discharge, itching and visual disturbance.   Respiratory: Negative for cough, chest tightness, shortness of breath and wheezing.    Cardiovascular: Negative for chest pain, palpitations and leg swelling.   Gastrointestinal: Negative for abdominal pain, blood in stool, constipation, diarrhea, nausea and vomiting.   Genitourinary: Negative for dysuria, frequency, hematuria and urgency.   Musculoskeletal: Negative for arthralgias, back pain, gait problem, joint swelling, myalgias, neck pain and neck stiffness.   Skin: Negative for rash and wound.   Allergic/Immunologic: Negative for environmental allergies and food allergies.   Neurological: Negative for dizziness, tremors, seizures, syncope, speech difficulty, weakness, light-headedness, numbness and headaches.   Hematological: Negative for adenopathy. Does not bruise/bleed easily.   Psychiatric/Behavioral: Positive for decreased concentration. Negative for agitation, confusion, hallucinations, sleep disturbance and suicidal ideas. The patient is not nervous/anxious.         Improved sleep, mood and pain control now   All other systems reviewed and are negative.       Objective:  /80   Ht 165.1 cm (65\")   Wt 88.9 kg (196 lb)   BMI 32.62 kg/m²     Neurologic Exam     Mental Status   Oriented to person, place, and time.   Registration: recalls 3 " of 3 objects. Recall at 5 minutes: recalls 2 of 3 objects.   Attention: normal. Concentration: normal.   Speech: speech is normal   Level of consciousness: alert    Cranial Nerves   Cranial nerves II through XII intact.     Motor Exam   Muscle bulk: normal  Overall muscle tone: normal    Strength   Strength 5/5 except as noted.   Decreased ROM low back and right leg     Gait, Coordination, and Reflexes     Gait  Gait: (antalgic)    Coordination   Finger to nose coordination: normal    Tremor   Resting tremor: absent  Intention tremor: present (minimal bue kinetic fine tremor noted-improved)  Action tremor: absent    Reflexes   Right brachioradialis: 2+  Left brachioradialis: 2+  Right biceps: 2+  Left biceps: 2+  Right : 2+  Left : 2+      Physical Exam  Constitutional:       Appearance: Normal appearance.   Neurological:      Mental Status: He is alert and oriented to person, place, and time.      Coordination: Finger-Nose-Finger Test normal.      Deep Tendon Reflexes:      Reflex Scores:       Bicep reflexes are 2+ on the right side and 2+ on the left side.       Brachioradialis reflexes are 2+ on the right side and 2+ on the left side.  Psychiatric:         Mood and Affect: Mood and affect normal.         Speech: Speech normal.         Behavior: Behavior normal.         Thought Content: Thought content normal.         Cognition and Memory: He exhibits impaired recent memory (minimal).         Judgment: Judgment normal.         MMSE 29/30, Delayed recall 2/3 same today 12/1/2021    Assessment/Plan:       Diagnoses and all orders for this visit:    1. MCI (mild cognitive impairment) (Primary)  -     memantine (NAMENDA) 10 MG tablet; Take 1 tablet by mouth 2 (Two) Times a Day.  Dispense: 180 tablet; Refill: 3  -     donepezil (ARICEPT) 10 MG tablet; Take 1 tablet by mouth Every Night.  Dispense: 90 tablet; Refill: 3  -     mirtazapine (Remeron) 30 MG tablet; Take 1 tablet by mouth Every Night.  Dispense: 90  tablet; Refill: 3    2. Cerebral microvascular disease  Comments:  continue aspirin and statin    3. Moderate episode of recurrent major depressive disorder (HCC)  -     mirtazapine (Remeron) 30 MG tablet; Take 1 tablet by mouth Every Night.  Dispense: 90 tablet; Refill: 3           Continue current meds. F/U in 6 months or sooner if needed. Encouraged him to use the cognitive home exercises daily. Limit multi-tasking. Set reminders. Use sticky notes. Slow down to focus more.     Reviewed medications, potential side effects and signs and symptoms to report. Discussed risk versus benefits of treatment plan with patient and/or family-including medications, labs and radiology that may be ordered. Addressed questions and concerns during visit. Patient and/or family verbalized understanding and agree with plan.    AS THE PROVIDER, I PERSONALLY WORE PPE DURING ENTIRE FACE TO FACE ENCOUNTER IN CLINIC WITH THE PATIENT. PATIENT ALSO WORE PPE DURING ENTIRE FACE TO FACE ENCOUNTER EXCEPT FOR A MAX OF 30 SECONDS DURING NEUROLOGICAL EVALUATION OF CRANIAL NERVES AND THEN MASK WAS PLACED BACK OVER PATIENT FACE FOR REMAINDER OF VISIT. I WASHED MY HANDS BEFORE AND AFTER VISIT.    During this visit the following were done:  Labs Reviewed []    Labs Ordered []    Radiology Reports Reviewed []    Radiology Ordered []    PCP Records Reviewed [x]    Referring Provider Records Reviewed []    ER Records Reviewed []    Hospital Records Reviewed []    History Obtained From Family []    Radiology Images Reviewed []    Other Reviewed [x]  Neurosurgery notes reviewed  Records Requested []      CAROL Coleman  12/1/2021

## 2021-12-13 ENCOUNTER — OFFICE VISIT (OUTPATIENT)
Dept: FAMILY MEDICINE CLINIC | Facility: CLINIC | Age: 61
End: 2021-12-13

## 2021-12-13 VITALS
HEIGHT: 65 IN | WEIGHT: 198 LBS | SYSTOLIC BLOOD PRESSURE: 124 MMHG | DIASTOLIC BLOOD PRESSURE: 68 MMHG | RESPIRATION RATE: 18 BRPM | HEART RATE: 70 BPM | TEMPERATURE: 97.2 F | BODY MASS INDEX: 32.99 KG/M2

## 2021-12-13 DIAGNOSIS — I10 ESSENTIAL HYPERTENSION: ICD-10-CM

## 2021-12-13 DIAGNOSIS — D50.9 IRON DEFICIENCY ANEMIA, UNSPECIFIED IRON DEFICIENCY ANEMIA TYPE: ICD-10-CM

## 2021-12-13 DIAGNOSIS — Z00.00 WELL ADULT EXAM: ICD-10-CM

## 2021-12-13 DIAGNOSIS — R73.9 HYPERGLYCEMIA: ICD-10-CM

## 2021-12-13 DIAGNOSIS — E78.00 PURE HYPERCHOLESTEROLEMIA: ICD-10-CM

## 2021-12-13 DIAGNOSIS — Z87.891 HISTORY OF TOBACCO USE: ICD-10-CM

## 2021-12-13 DIAGNOSIS — Z12.2 ENCOUNTER FOR SCREENING FOR LUNG CANCER: ICD-10-CM

## 2021-12-13 DIAGNOSIS — Z00.00 MEDICARE ANNUAL WELLNESS VISIT, SUBSEQUENT: Primary | ICD-10-CM

## 2021-12-13 DIAGNOSIS — I25.119 CORONARY ARTERY DISEASE INVOLVING NATIVE CORONARY ARTERY OF NATIVE HEART WITH ANGINA PECTORIS (HCC): ICD-10-CM

## 2021-12-13 DIAGNOSIS — R13.10 DYSPHAGIA, UNSPECIFIED TYPE: ICD-10-CM

## 2021-12-13 LAB
ALBUMIN SERPL-MCNC: 4.3 G/DL (ref 3.5–5.2)
ALBUMIN/GLOB SERPL: 1.4 G/DL
ALP SERPL-CCNC: 189 U/L (ref 39–117)
ALT SERPL-CCNC: 34 U/L (ref 1–41)
AST SERPL-CCNC: 43 U/L (ref 1–40)
BASOPHILS # BLD AUTO: 0.08 10*3/MM3 (ref 0–0.2)
BASOPHILS NFR BLD AUTO: 1.1 % (ref 0–1.5)
BILIRUB SERPL-MCNC: 0.4 MG/DL (ref 0–1.2)
BUN SERPL-MCNC: 10 MG/DL (ref 8–23)
BUN/CREAT SERPL: 11.5 (ref 7–25)
CALCIUM SERPL-MCNC: 9.2 MG/DL (ref 8.6–10.5)
CHLORIDE SERPL-SCNC: 104 MMOL/L (ref 98–107)
CHOLEST SERPL-MCNC: 152 MG/DL (ref 0–200)
CHOLEST/HDLC SERPL: 2.98 {RATIO}
CO2 SERPL-SCNC: 26.5 MMOL/L (ref 22–29)
CREAT SERPL-MCNC: 0.87 MG/DL (ref 0.76–1.27)
EOSINOPHIL # BLD AUTO: 0.27 10*3/MM3 (ref 0–0.4)
EOSINOPHIL NFR BLD AUTO: 3.7 % (ref 0.3–6.2)
ERYTHROCYTE [DISTWIDTH] IN BLOOD BY AUTOMATED COUNT: 12.6 % (ref 12.3–15.4)
FERRITIN SERPL-MCNC: 56.8 NG/ML (ref 30–400)
GLOBULIN SER CALC-MCNC: 3 GM/DL
GLUCOSE SERPL-MCNC: 100 MG/DL (ref 65–99)
HBA1C MFR BLD: 6 % (ref 4.8–5.6)
HCT VFR BLD AUTO: 40.1 % (ref 37.5–51)
HDLC SERPL-MCNC: 51 MG/DL (ref 40–60)
HGB BLD-MCNC: 13.5 G/DL (ref 13–17.7)
IMM GRANULOCYTES # BLD AUTO: 0.04 10*3/MM3 (ref 0–0.05)
IMM GRANULOCYTES NFR BLD AUTO: 0.5 % (ref 0–0.5)
IRON SATN MFR SERPL: 23 % (ref 20–50)
IRON SERPL-MCNC: 126 MCG/DL (ref 59–158)
LDLC SERPL CALC-MCNC: 77 MG/DL (ref 0–100)
LYMPHOCYTES # BLD AUTO: 2.86 10*3/MM3 (ref 0.7–3.1)
LYMPHOCYTES NFR BLD AUTO: 38.9 % (ref 19.6–45.3)
MCH RBC QN AUTO: 30.2 PG (ref 26.6–33)
MCHC RBC AUTO-ENTMCNC: 33.7 G/DL (ref 31.5–35.7)
MCV RBC AUTO: 89.7 FL (ref 79–97)
MONOCYTES # BLD AUTO: 0.63 10*3/MM3 (ref 0.1–0.9)
MONOCYTES NFR BLD AUTO: 8.6 % (ref 5–12)
NEUTROPHILS # BLD AUTO: 3.48 10*3/MM3 (ref 1.7–7)
NEUTROPHILS NFR BLD AUTO: 47.2 % (ref 42.7–76)
NRBC BLD AUTO-RTO: 0.1 /100 WBC (ref 0–0.2)
PLATELET # BLD AUTO: 171 10*3/MM3 (ref 140–450)
POTASSIUM SERPL-SCNC: 4.5 MMOL/L (ref 3.5–5.2)
PROT SERPL-MCNC: 7.3 G/DL (ref 6–8.5)
RBC # BLD AUTO: 4.47 10*6/MM3 (ref 4.14–5.8)
SODIUM SERPL-SCNC: 139 MMOL/L (ref 136–145)
TIBC SERPL-MCNC: 548 MCG/DL
TRIGL SERPL-MCNC: 135 MG/DL (ref 0–150)
UIBC SERPL-MCNC: 422 MCG/DL (ref 112–346)
VLDLC SERPL CALC-MCNC: 24 MG/DL (ref 5–40)
WBC # BLD AUTO: 7.36 10*3/MM3 (ref 3.4–10.8)

## 2021-12-13 PROCEDURE — 1170F FXNL STATUS ASSESSED: CPT | Performed by: FAMILY MEDICINE

## 2021-12-13 PROCEDURE — G0439 PPPS, SUBSEQ VISIT: HCPCS | Performed by: FAMILY MEDICINE

## 2021-12-13 PROCEDURE — 1160F RVW MEDS BY RX/DR IN RCRD: CPT | Performed by: FAMILY MEDICINE

## 2021-12-13 PROCEDURE — 99396 PREV VISIT EST AGE 40-64: CPT | Performed by: FAMILY MEDICINE

## 2021-12-13 PROCEDURE — 96160 PT-FOCUSED HLTH RISK ASSMT: CPT | Performed by: FAMILY MEDICINE

## 2021-12-13 NOTE — PATIENT INSTRUCTIONS
Advance Care Planning and Advance Directives     You make decisions on a daily basis - decisions about where you want to live, your career, your home, your life. Perhaps one of the most important decisions you face is your choice for future medical care. Take time to talk with your family and your healthcare team and start planning today.  Advance Care Planning is a process that can help you:  · Understand possible future healthcare decisions in light of your own experiences  · Reflect on those decision in light of your goals and values  · Discuss your decisions with those closest to you and the healthcare professionals that care for you  · Make a plan by creating a document that reflects your wishes    Surrogate Decision Maker  In the event of a medical emergency, which has left you unable to communicate or to make your own decisions, you would need someone to make decisions for you.  It is important to discuss your preferences for medical treatment with this person while you are in good health.     Qualities of a surrogate decision maker:  • Willing to take on this role and responsibility  • Knows what you want for future medical care  • Willing to follow your wishes even if they don't agree with them  • Able to make difficult medical decisions under stressful circumstances    Advance Directives  These are legal documents you can create that will guide your healthcare team and decision maker(s) when needed. These documents can be stored in the electronic medical record.    · Living Will - a legal document to guide your care if you have a terminal condition or a serious illness and are unable to communicate. States vary by statute in document names/types, but most forms may include one or more of the following:        -  Directions regarding life-prolonging treatments        -  Directions regarding artificially provided nutrition/hydration        -  Choosing a healthcare decision maker        -  Direction  regarding organ/tissue donation    · Durable Power of  for Healthcare - this document names an -in-fact to make medical decisions for you, but it may also allow this person to make personal and financial decisions for you. Please seek the advice of an  if you need this type of document.    **Advance Directives are not required and no one may discriminate against you if you do not sign one.    Medical Orders  Many states allow specific forms/orders signed by your physician to record your wishes for medical treatment in your current state of health. This form, signed in personal communication with your physician, addresses resuscitation and other medical interventions that you may or may not want.      For more information or to schedule a time with a [facility] Advance Care Planning Facilitator contact: [email or web address] or call [phone] and someone will contact you directly.

## 2021-12-13 NOTE — PROGRESS NOTES
"The ABCs of the Annual Wellness Visit  Subsequent Medicare Wellness Visit    Chief Complaint   Patient presents with   • Medicare Wellness-subsequent      Subjective    History of Present Illness:  Justice Kiser is a 61 y.o. male who presents for a Subsequent Medicare Wellness Visit.    Back and Neck Pain  The patient reports his back and neck pain has improved since starting gabapentin prescribed by neurosurgery. He has been experiencing increased drowsiness and weight gain since starting the gabapentin.     Weight Gain  He has gained 8 pounds since 09/2021, although he denies any dietary changes to account for this. He reports that he does not eat as much as he once did, and notes that he only eats a couple times per day and does not snack.  He indulges in occasional sweets such as ice cream in the evening. He questions whether his medication(s) could be contributing to the weight gain, though he does admit that he does not exercise.     Hypertension  He states that he is compliant with his hypertension medication with no recent changes. He denies any chest pain. He notes some difficulty breathing and notes that he has not been feeling normal around the esophagus and neck area. The patient is still seeing cardiology regularly. He is taking 1 baby aspirin per day.     Dysphagia   The patient reports difficulty with liquids as well as solids and also report a \"raw\" feeling in his throat. He feels as though he gets choked easily and there are some foods that will get stuck. He saw Dr. Paul one week ago who noted no concerns. His last endoscopy was performed in 2019.     Memory  The patient states that his short-term memory is poor, though unchanged from previous visits. He states that he is at maintenance level.     Hypercholesterolemia  He reports compliance with medication regimen. He denies any new muscle aches or pains. He has not had bloodwork done to check his cholesterol levels at any other facilities. The " patient is taking iron supplementation every other day. He states that the iron may have increased his energy level.     Vaccinations  The patient received an influenza vaccine on approximately 11/22/2021. He received his COVID-19 booster on 11/08/2021. He denies any difficulty with the COVID-19 vaccinations.     Health Maintenance  The patient is not in need of any medication refills today. He has not been hospitalized within the past year. He does not have a advanced directive. He has not had his annual eye exam or dental work. He recalls recent hearing changes but does not believe that intervention is warranted at this time.     Social History  The patient is a former smoker of 2 packs per day for 15+ years. He quit smoking in 2011. The patient notes that his last fall was in 12/2020 due to alcohol intake which resulted in a fractured ankle. He denies alcohol intake since this incident. He denies hemoptysis but admits to a cough productive of mucus. His nephew was recently diagnosed with lung cancer.     LUTS  He endorses urinary urgency but no dysuria.     The following portions of the patient's history were reviewed and   updated as appropriate: allergies, current medications, past family history, past medical history, past social history, past surgical history and problem list.    Compared to one year ago, the patient feels his physical   health is better.    Compared to one year ago, the patient feels his mental   health is the same.    Recent Hospitalizations:  He was not admitted to the hospital during the last year.       Current Medical Providers:  Patient Care Team:  Anshul Martinez MD as PCP - General  Anshul Martinez MD as PCP - Family Medicine  Tacho Crouch IV, MD as Cardiologist (Interventional Cardiology)  Erick Hoyt MD as Surgeon (Orthopedic Surgery)    Outpatient Medications Prior to Visit   Medication Sig Dispense Refill   • aspirin 81 MG EC tablet Take 1 tablet by mouth  Daily.     • atorvastatin (LIPITOR) 80 MG tablet TAKE 1 TABLET BY MOUTH EVERY NIGHT AT BEDTIME. 90 tablet 3   • donepezil (ARICEPT) 10 MG tablet Take 1 tablet by mouth Every Night. 90 tablet 3   • ferrous sulfate 325 (65 Fe) MG tablet Take 1 tablet by mouth once daily with breakfast 30 tablet 2   • furosemide (LASIX) 20 MG tablet TAKE 1 TABLET EVERY DAY 90 tablet 1   • gabapentin (NEURONTIN) 300 MG capsule Take 1 capsule by mouth 3 (Three) Times a Day. 90 capsule 2   • hydroCHLOROthiazide (MICROZIDE) 12.5 MG capsule Take 1 capsule by mouth Daily. 90 capsule 3   • lisinopril (PRINIVIL,ZESTRIL) 20 MG tablet TAKE 1 TABLET EVERY DAY 90 tablet 1   • memantine (NAMENDA) 10 MG tablet Take 1 tablet by mouth 2 (Two) Times a Day. 180 tablet 3   • metoprolol tartrate (LOPRESSOR) 50 MG tablet TAKE 1/2 TABLET TWICE DAILY 90 tablet 1   • mirtazapine (Remeron) 30 MG tablet Take 1 tablet by mouth Every Night. 90 tablet 3   • Multiple Vitamins-Minerals (CENTRUM SILVER) tablet Take 1 tablet by mouth Daily.     • omeprazole (priLOSEC) 40 MG capsule TAKE 1 CAPSULE EVERY DAY 90 capsule 1   • sertraline (ZOLOFT) 100 MG tablet TAKE 2 TABLETS EVERY  tablet 1     No facility-administered medications prior to visit.       No opioid medication identified on active medication list. I have reviewed chart for other potential  high risk medication/s and harmful drug interactions in the elderly.          Aspirin is on active medication list. Aspirin use is indicated based on review of current medical condition/s. Pros and cons of this therapy have been discussed today. Benefits of this medication outweigh potential harm.  Patient has been encouraged to continue taking this medication.  .      Patient Active Problem List   Diagnosis   • Essential hypertension   • Coronary artery disease involving native coronary artery of native heart with angina pectoris (HCC)   • Hyperlipidemia LDL goal <70   • Depression   • Anxiety   • Hyperglycemia   •  "Paresthesia of right foot   • Tremor   • GERD (gastroesophageal reflux disease)   • Arthritis   • Pierson's esophagus without dysplasia   • MCI (mild cognitive impairment)   • Mitral valve insufficiency   • Alcohol induced fatty liver   • PVD (peripheral vascular disease) with claudication (HCC)   • Alcohol abuse, in remission   • Cerebral microvascular disease     Advance Care Planning  Advance Directive is not on file.  ACP discussion was held with the patient during this visit. Patient does not have an advance directive, information provided.          Objective    Vitals:    12/13/21 0957   BP: 124/68   Pulse: 70   Resp: 18   Temp: 97.2 °F (36.2 °C)   Weight: 89.8 kg (198 lb)   Height: 165.1 cm (65\")   PainSc:   3     BMI Readings from Last 1 Encounters:   12/13/21 32.95 kg/m²   BMI is above normal parameters. Recommendations include: has increased weight due to meds. meds have been helping.     Does the patient have evidence of cognitive impairment? Yes, decreased short term. sees neurologist    Physical Exam  Vitals and nursing note reviewed.   Constitutional:       General: He is not in acute distress.     Appearance: Normal appearance. He is well-developed. He is not ill-appearing.   HENT:      Head: Normocephalic and atraumatic.      Right Ear: Hearing, tympanic membrane, ear canal and external ear normal.      Left Ear: Hearing, tympanic membrane, ear canal and external ear normal.      Nose: Nose normal. No congestion or rhinorrhea.      Mouth/Throat:      Mouth: Mucous membranes are moist.      Pharynx: No oropharyngeal exudate or posterior oropharyngeal erythema.   Eyes:      General: Lids are normal.      Conjunctiva/sclera: Conjunctivae normal.      Pupils: Pupils are equal, round, and reactive to light.   Neck:      Thyroid: No thyromegaly.   Cardiovascular:      Rate and Rhythm: Normal rate and regular rhythm.      Heart sounds: Normal heart sounds. No murmur heard.  No friction rub.   Pulmonary:     "  Effort: Pulmonary effort is normal. No respiratory distress.      Breath sounds: Normal breath sounds. No wheezing or rales.   Abdominal:      General: Bowel sounds are normal. There is no distension.      Palpations: Abdomen is soft. There is no mass.      Tenderness: There is no abdominal tenderness. There is no guarding or rebound.   Musculoskeletal:      Cervical back: Normal range of motion and neck supple.      Right lower leg: No edema.      Left lower leg: No edema.      Comments: Mild tenderness of low back and right hip with ROM    Skin:     General: Skin is warm and dry.   Neurological:      General: No focal deficit present.      Mental Status: He is alert.   Psychiatric:         Mood and Affect: Mood normal.         Speech: Speech normal.         Behavior: Behavior normal.                 HEALTH RISK ASSESSMENT    Smoking Status:  Social History     Tobacco Use   Smoking Status Former Smoker   • Packs/day: 1.50   • Years: 35.00   • Pack years: 52.50   • Types: Cigarettes   • Quit date: 2011   • Years since quitting: 10.9   Smokeless Tobacco Never Used     Alcohol Consumption:  Social History     Substance and Sexual Activity   Alcohol Use Not Currently    Comment: 5 mixed drinks per day. recently quit all alcohol 12/2020     Fall Risk Screen:    [facility] Fall Risk Assessment has not been completed.    Depression Screening:  PHQ-2/PHQ-9 Depression Screening 12/13/2021   Little interest or pleasure in doing things 1   Feeling down, depressed, or hopeless 1   Trouble falling or staying asleep, or sleeping too much 0   Feeling tired or having little energy 1   Poor appetite or overeating 2   Feeling bad about yourself - or that you are a failure or have let yourself or your family down 1   Trouble concentrating on things, such as reading the newspaper or watching television 1   Moving or speaking so slowly that other people could have noticed. Or the opposite - being so fidgety or restless that you have been  moving around a lot more than usual 0   Thoughts that you would be better off dead, or of hurting yourself in some way 0   Total Score 7   If you checked off any problems, how difficult have these problems made it for you to do your work, take care of things at home, or get along with other people? Somewhat difficult       Health Habits and Functional and Cognitive Screening:  Functional & Cognitive Status 12/13/2021   Do you have difficulty preparing food and eating? No   Do you have difficulty bathing yourself, getting dressed or grooming yourself? No   Do you have difficulty using the toilet? No   Do you have difficulty moving around from place to place? No   Do you have trouble with steps or getting out of a bed or a chair? No   Current Diet Limited Junk Food   Dental Exam Not up to date   Eye Exam Not up to date   Exercise (times per week) 0 times per week   Current Exercises Include No Regular Exercise   Current Exercise Activities Include -   Do you need help using the phone?  No   Are you deaf or do you have serious difficulty hearing?  No   Do you need help with transportation? No   Do you need help shopping? No   Do you need help preparing meals?  No   Do you need help with housework?  No   Do you need help with laundry? No   Do you need help taking your medications? No   Do you need help managing money? No   Do you ever drive or ride in a car without wearing a seat belt? No   Have you felt unusual stress, anger or loneliness in the last month? Yes   Who do you live with? Other   If you need help, do you have trouble finding someone available to you? No   Have you been bothered in the last four weeks by sexual problems? Yes   Do you have difficulty concentrating, remembering or making decisions? Yes       Age-appropriate Screening Schedule:  Refer to the list below for future screening recommendations based on patient's age, sex and/or medical conditions. Orders for these recommended tests are listed in  the plan section. The patient has been provided with a written plan.    Health Maintenance   Topic Date Due   • TDAP/TD VACCINES (1 - Tdap) Never done   • ZOSTER VACCINE (1 of 2) Never done   • LIPID PANEL  05/07/2022   • INFLUENZA VACCINE  Completed              Assessment/Plan   CMS Preventative Services Quick Reference  Risk Factors Identified During Encounter  Cardiovascular Disease  Dementia/Memory   Fall Risk-High or Moderate  Hearing Problem  Obesity/Overweight   Polypharmacy  The above risks/problems have been discussed with the patient.  Follow up actions/plans if indicated are seen below in the Assessment/Plan Section.  Pertinent information has been shared with the patient in the After Visit Summary.    Diagnoses and all orders for this visit:    1. Medicare annual wellness visit, subsequent (Primary)       - Overall, the patient is doing well. I advised the patient to attend an eye exam and dental exam. We discussed safety with  regard to wearing seatbelt. The patient was encouraged exercise, remaining active, healthy lifestyle, diet.    2. Well adult exam    3. Essential hypertension  -     CBC & Differential  -     Comprehensive Metabolic Panel  - This is well controlled. The patient will continue his current medication as directed and continue follow up with cardiology.    4. Dysphagia, unspecified type       - If the patients symptoms increase, he is advised to follow up with Dr. Paul.     5. Coronary artery disease involving native coronary artery of native heart with angina pectoris (HCC)    6. Hyperglycemia  -     Hemoglobin A1c    7. Iron deficiency anemia, unspecified iron deficiency anemia type  -     CBC & Differential  -     Iron and TIBC  -     Ferritin    8. Pure hypercholesterolemia  -     Comprehensive Metabolic Panel  -     Lipid Panel With / Chol / HDL Ratio    9. Encounter for screening for lung cancer  -     CT Chest Low Dose Wo; Future  - I have ordered a CT scan for lung cancer  screening due to the patients history of smoking cigarettes. We will call him to schedule.     10. History of tobacco use  -     CT Chest Low Dose Wo; Future        Follow Up:   Return in about 6 months (around 6/13/2022).     An After Visit Summary and PPPS were made available to the patient.                 Transcribed from ambient dictation for Anshul Martinez MD by Liberty Patricia.  12/13/21   18:06 EST    Patient verbalized consent to the visit recording.  I have personally performed the services described in this document as transcribed by the above individual, and it is both accurate and complete.  Anshul Martinez MD  12/13/2021  18:19 EST

## 2022-01-03 ENCOUNTER — HOSPITAL ENCOUNTER (OUTPATIENT)
Dept: CT IMAGING | Facility: HOSPITAL | Age: 62
Discharge: HOME | End: 2022-01-03
Admitting: FAMILY MEDICINE

## 2022-01-03 DIAGNOSIS — Z87.891 HISTORY OF TOBACCO USE: ICD-10-CM

## 2022-01-03 DIAGNOSIS — Z12.2 ENCOUNTER FOR SCREENING FOR LUNG CANCER: ICD-10-CM

## 2022-01-03 PROCEDURE — 71271 CT THORAX LUNG CANCER SCR C-: CPT

## 2022-01-11 DIAGNOSIS — D50.9 IRON DEFICIENCY ANEMIA, UNSPECIFIED IRON DEFICIENCY ANEMIA TYPE: ICD-10-CM

## 2022-01-11 RX ORDER — PNV NO.95/FERROUS FUM/FOLIC AC 28MG-0.8MG
TABLET ORAL
Qty: 30 TABLET | Refills: 2 | Status: SHIPPED | OUTPATIENT
Start: 2022-01-11 | End: 2022-07-12

## 2022-01-13 DIAGNOSIS — I10 ESSENTIAL HYPERTENSION: ICD-10-CM

## 2022-01-13 DIAGNOSIS — I25.119 CORONARY ARTERY DISEASE INVOLVING NATIVE CORONARY ARTERY OF NATIVE HEART WITH ANGINA PECTORIS: ICD-10-CM

## 2022-01-13 DIAGNOSIS — M47.22 CERVICAL SPONDYLOSIS WITH RADICULOPATHY: ICD-10-CM

## 2022-01-14 RX ORDER — METOPROLOL TARTRATE 50 MG/1
TABLET, FILM COATED ORAL
Qty: 90 TABLET | Refills: 0 | OUTPATIENT
Start: 2022-01-14

## 2022-01-14 RX ORDER — GABAPENTIN 300 MG/1
CAPSULE ORAL
Qty: 90 CAPSULE | Refills: 0 | Status: SHIPPED | OUTPATIENT
Start: 2022-01-14 | End: 2022-02-14

## 2022-01-14 NOTE — TELEPHONE ENCOUNTER
Provider:  Max  Caller:  Automated refill request  Surgery:  NA  Surgery Date:    Last visit:  Office Visit with Elayne Tompkins PA-C (11/16/2021)    Next visit: 03/16/22    Reason for call:         Requested Prescriptions     Pending Prescriptions Disp Refills   • gabapentin (NEURONTIN) 300 MG capsule [Pharmacy Med Name: Gabapentin 300 MG Oral Capsule] 90 capsule 0     Sig: TAKE 1 CAPSULE BY MOUTH THREE TIMES DAILY     Sukhjinder:     10/25/2021 Gabapentin 300MG 1958 90 [address] [place] [facility]    [facility] 2  11/18/2021 Gabapentin 300MG 1958 90 30 Elayne Tompkins [facility],  [facility]  [facility] 1

## 2022-01-27 ENCOUNTER — OFFICE VISIT (OUTPATIENT)
Dept: FAMILY MEDICINE CLINIC | Facility: CLINIC | Age: 62
End: 2022-01-27

## 2022-01-27 VITALS
OXYGEN SATURATION: 98 % | BODY MASS INDEX: 33.15 KG/M2 | TEMPERATURE: 98.5 F | HEIGHT: 65 IN | WEIGHT: 199 LBS | DIASTOLIC BLOOD PRESSURE: 68 MMHG | RESPIRATION RATE: 18 BRPM | HEART RATE: 66 BPM | SYSTOLIC BLOOD PRESSURE: 156 MMHG

## 2022-01-27 DIAGNOSIS — J01.00 ACUTE NON-RECURRENT MAXILLARY SINUSITIS: ICD-10-CM

## 2022-01-27 DIAGNOSIS — I25.119 CORONARY ARTERY DISEASE INVOLVING NATIVE CORONARY ARTERY OF NATIVE HEART WITH ANGINA PECTORIS: ICD-10-CM

## 2022-01-27 DIAGNOSIS — I10 ESSENTIAL HYPERTENSION: ICD-10-CM

## 2022-01-27 DIAGNOSIS — R10.13 EPIGASTRIC ABDOMINAL PAIN: ICD-10-CM

## 2022-01-27 DIAGNOSIS — K21.9 GASTROESOPHAGEAL REFLUX DISEASE: ICD-10-CM

## 2022-01-27 DIAGNOSIS — J02.9 SORE THROAT: ICD-10-CM

## 2022-01-27 DIAGNOSIS — R05.9 COUGH: Primary | ICD-10-CM

## 2022-01-27 DIAGNOSIS — G31.84 MCI (MILD COGNITIVE IMPAIRMENT): ICD-10-CM

## 2022-01-27 DIAGNOSIS — E78.5 HYPERLIPIDEMIA LDL GOAL <70: ICD-10-CM

## 2022-01-27 PROCEDURE — 99213 OFFICE O/P EST LOW 20 MIN: CPT | Performed by: FAMILY MEDICINE

## 2022-01-27 RX ORDER — MEMANTINE HYDROCHLORIDE 10 MG/1
10 TABLET ORAL 2 TIMES DAILY
Qty: 180 TABLET | Refills: 1 | Status: SHIPPED | OUTPATIENT
Start: 2022-01-27 | End: 2022-05-18

## 2022-01-27 RX ORDER — OMEPRAZOLE 40 MG/1
40 CAPSULE, DELAYED RELEASE ORAL DAILY
Qty: 90 CAPSULE | Refills: 1 | Status: SHIPPED | OUTPATIENT
Start: 2022-01-27 | End: 2022-07-25

## 2022-01-27 RX ORDER — SERTRALINE HYDROCHLORIDE 100 MG/1
200 TABLET, FILM COATED ORAL DAILY
Qty: 180 TABLET | Refills: 1 | Status: SHIPPED | OUTPATIENT
Start: 2022-01-27 | End: 2022-09-12

## 2022-01-27 RX ORDER — AMOXICILLIN 500 MG/1
1000 CAPSULE ORAL 3 TIMES DAILY
Qty: 30 CAPSULE | Refills: 0 | Status: SHIPPED | OUTPATIENT
Start: 2022-01-27 | End: 2022-03-16

## 2022-01-27 RX ORDER — FUROSEMIDE 20 MG/1
20 TABLET ORAL DAILY
Qty: 90 TABLET | Refills: 1 | Status: SHIPPED | OUTPATIENT
Start: 2022-01-27 | End: 2022-07-25

## 2022-01-27 RX ORDER — HYDROCHLOROTHIAZIDE 12.5 MG/1
12.5 CAPSULE, GELATIN COATED ORAL DAILY
Qty: 90 CAPSULE | Refills: 1 | Status: SHIPPED | OUTPATIENT
Start: 2022-01-27 | End: 2022-07-25

## 2022-01-27 RX ORDER — ATORVASTATIN CALCIUM 80 MG/1
80 TABLET, FILM COATED ORAL
Qty: 90 TABLET | Refills: 1 | Status: SHIPPED | OUTPATIENT
Start: 2022-01-27 | End: 2022-07-25

## 2022-01-27 RX ORDER — METOPROLOL TARTRATE 50 MG/1
25 TABLET, FILM COATED ORAL 2 TIMES DAILY
Qty: 90 TABLET | Refills: 1 | Status: SHIPPED | OUTPATIENT
Start: 2022-01-27 | End: 2022-07-25

## 2022-01-27 NOTE — PROGRESS NOTES
"Chief Complaint  Cough and Sore Throat    Subjective          The patient has consented to being recorded using GERRY.    Justice Kiser presents to [facility]  History of Present Illness    Cough, sore throat  The patient presents with a sore throat, nagging cough and sinus pressure and drainage for 5 days. He reports that he has been blowing his nose and it bled this morning on the right side.  He states that if he has any shortness of breath, it is short. He does have pressure in his forehead but denies any pain with palpation to his cheeks. He denies ear pain, fever, chills, or loss of taste or smell. He reports exposure to his nephew who was diagnosed with COVID-19 on 01/18/2022. He has taken amoxicillin in the past.    Medications  The patient requested refills on several medications. He states he has ran out of medications due to changing insurance and has not taken them since the beginning of 2022.    Review of Systems   Constitutional: Negative.  Negative for fever.   HENT: Positive for congestion, sinus pressure and sore throat.    Respiratory: Positive for cough and shortness of breath.    Cardiovascular: Negative.  Negative for chest pain.        Objective       Vital Signs:   /68   Pulse 66   Temp 98.5 °F (36.9 °C)   Resp 18   Ht 165.1 cm (65\")   Wt 90.3 kg (199 lb)   SpO2 98%   BMI 33.12 kg/m²     Physical Exam  Vitals and nursing note reviewed.   Constitutional:       Appearance: Normal appearance. He is well-developed.   HENT:      Head: Normocephalic and atraumatic.      Right Ear: Hearing, ear canal and external ear normal. Tympanic membrane is retracted. Tympanic membrane is not erythematous.      Left Ear: Hearing, ear canal and external ear normal. Tympanic membrane is retracted. Tympanic membrane is not erythematous.      Nose:      Right Sinus: Maxillary sinus tenderness and frontal sinus tenderness present.      Left Sinus: Maxillary sinus " tenderness and frontal sinus tenderness present.   Eyes:      General: Lids are normal.      Conjunctiva/sclera: Conjunctivae normal.      Pupils: Pupils are equal, round, and reactive to light.   Neck:      Thyroid: No thyromegaly.   Cardiovascular:      Rate and Rhythm: Normal rate and regular rhythm.      Heart sounds: Normal heart sounds. No murmur heard.  No friction rub.   Pulmonary:      Effort: Pulmonary effort is normal. No respiratory distress.      Breath sounds: Normal breath sounds. No wheezing or rales.   Musculoskeletal:      Cervical back: Normal range of motion and neck supple.   Skin:     General: Skin is warm and dry.   Neurological:      Mental Status: He is alert.   Psychiatric:         Speech: Speech normal.          Result Review :                     Assessment and Plan    Diagnoses and all orders for this visit:    1. Cough (Primary)  -     COVID-19,[facility] ROUTINE, NP/OP SWAB IN TRANSPORT MEDIA OR ESWAB 72 HR TAT - Swab, Anterior nasal        -  We will go ahead and check COVID-19 testing. He will remain in isolation until result is back. Continue to increase fluids and rest. Over the counter symptomatic relief.    2. Sore throat  -     COVID-19,[facility] ROUTINE, NP/OP SWAB IN TRANSPORT MEDIA OR ESWAB 72 HR TAT - Swab, Anterior nasal    3. Acute non-recurrent maxillary sinusitis  -     amoxicillin (AMOXIL) 500 MG capsule; Take 2 capsules by mouth 3 (Three) Times a Day.  Dispense: 30 capsule; Refill: 0        - He has had symptoms for several days now and seems to be getting worse with some discolored drainage. He also has sinus tenderness on examination.        - We will start amoxicillin 500 mg 3 times a day while waiting for the COVID-19 test to come back. Further plan once COVID-19 test has been completed.    4. Hyperlipidemia LDL goal <70  -     atorvastatin (LIPITOR) 80 MG tablet; Take 1 tablet by mouth every night at bedtime.  Dispense: 90 tablet; Refill: 1    5. Essential  hypertension  -     hydroCHLOROthiazide (MICROZIDE) 12.5 MG capsule; Take 1 capsule by mouth Daily.  Dispense: 90 capsule; Refill: 1  -     metoprolol tartrate (LOPRESSOR) 50 MG tablet; Take 0.5 tablets by mouth 2 (Two) Times a Day.  Dispense: 90 tablet; Refill: 1    6. MCI (mild cognitive impairment)  -     memantine (NAMENDA) 10 MG tablet; Take 1 tablet by mouth 2 (Two) Times a Day.  Dispense: 180 tablet; Refill: 1    7. Coronary artery disease involving native coronary artery of native heart with angina pectoris (HCC)  -     metoprolol tartrate (LOPRESSOR) 50 MG tablet; Take 0.5 tablets by mouth 2 (Two) Times a Day.  Dispense: 90 tablet; Refill: 1    8. Gastroesophageal reflux disease  -     omeprazole (priLOSEC) 40 MG capsule; Take 1 capsule by mouth Daily.  Dispense: 90 capsule; Refill: 1    9. Epigastric abdominal pain  -     omeprazole (priLOSEC) 40 MG capsule; Take 1 capsule by mouth Daily.  Dispense: 90 capsule; Refill: 1    Other orders  -     furosemide (LASIX) 20 MG tablet; Take 1 tablet by mouth Daily.  Dispense: 90 tablet; Refill: 1  -     sertraline (ZOLOFT) 100 MG tablet; Take 2 tablets by mouth Daily.  Dispense: 180 tablet; Refill: 1          DISCUSSION    As a courtesy, I refilled several of his medications for his chronic medical problems while he is here today. He wishes all prescriptions to go to [facility] pharmacy in [place].      Follow Up   Return if symptoms worsen or fail to improve.    Patient was given instructions and counseling regarding his condition or for health maintenance advice. Please see specific information pulled into the AVS if appropriate.       Anshul Martinez MD     Transcribed from ambient dictation for Anshul Martinez MD by Marietta Dos Santos.  01/27/22   11:31 EST    I have personally performed the services described in this document as transcribed by the above individual, and it is both accurate and complete.  Marietta Dos Santos  1/27/2022  11:31 EST

## 2022-01-29 LAB
LABCORP SARS-COV-2, NAA 2 DAY TAT: NORMAL
SARS-COV-2 RNA RESP QL NAA+PROBE: NOT DETECTED

## 2022-02-14 DIAGNOSIS — M47.22 CERVICAL SPONDYLOSIS WITH RADICULOPATHY: ICD-10-CM

## 2022-02-14 RX ORDER — GABAPENTIN 300 MG/1
CAPSULE ORAL
Qty: 90 CAPSULE | Refills: 0 | Status: SHIPPED | OUTPATIENT
Start: 2022-02-14 | End: 2022-03-16 | Stop reason: SDUPTHER

## 2022-02-14 NOTE — TELEPHONE ENCOUNTER
Provider:  Max  Caller:  Automated refill request  Surgery:  NA  Surgery Date:    Last visit:  Office Visit with Elayne Tompkins PA-C (11/16/2021)    Next visit: 03/16/22    Reason for call:         Requested Prescriptions     Pending Prescriptions Disp Refills   • gabapentin (NEURONTIN) 300 MG capsule [Pharmacy Med Name: Gabapentin 300 MG Oral Capsule] 90 capsule 0     Sig: TAKE 1 CAPSULE BY MOUTH THREE TIMES DAILY     Sukhjinder:     11/18/2021 Gabapentin 300MG 1960 90 30 [facility] [place] [address]  01/14/2022 Gabapentin 300MG 1958 90 30 [facility]    [facility] 2

## 2022-03-16 ENCOUNTER — OFFICE VISIT (OUTPATIENT)
Dept: NEUROSURGERY | Facility: CLINIC | Age: 62
End: 2022-03-16

## 2022-03-16 VITALS
DIASTOLIC BLOOD PRESSURE: 62 MMHG | HEIGHT: 65 IN | WEIGHT: 194 LBS | BODY MASS INDEX: 32.32 KG/M2 | SYSTOLIC BLOOD PRESSURE: 132 MMHG | TEMPERATURE: 98 F

## 2022-03-16 DIAGNOSIS — M47.22 CERVICAL SPONDYLOSIS WITH RADICULOPATHY: Primary | ICD-10-CM

## 2022-03-16 DIAGNOSIS — M48.062 SPINAL STENOSIS OF LUMBAR REGION WITH NEUROGENIC CLAUDICATION: ICD-10-CM

## 2022-03-16 DIAGNOSIS — M54.12 CERVICAL RADICULOPATHY: ICD-10-CM

## 2022-03-16 PROCEDURE — 99213 OFFICE O/P EST LOW 20 MIN: CPT | Performed by: PHYSICIAN ASSISTANT

## 2022-03-16 RX ORDER — GABAPENTIN 300 MG/1
300 CAPSULE ORAL 3 TIMES DAILY
Qty: 90 CAPSULE | Refills: 2 | Status: SHIPPED | OUTPATIENT
Start: 2022-03-16 | End: 2022-07-05

## 2022-03-16 NOTE — PROGRESS NOTES
Patient: Justice Kiser  : 1960  Chart #: [id number]    Date of Service: 3/16/22    CHIEF COMPLAINT:   1.  Neck and left upper extremity pain  2.  Low back and lower extremity pain with walking and standing intolerance    History of Present Illness Mr. Kiser is seen in follow-up.  He is a 61-year-old former  who remotely underwent lumbar discectomy by another surgeon.  This was sometime in the early .  In general he did well but said he is always had low back difficulties.  For the last year he has had more progressive low back pain that will extend into his legs.  He has tingling and heaviness that occurs in his legs after he stands and walks for 10 to 15 minutes.  He has also developed some neck pain that extends down the left arm.  MRI of the cervical spine demonstrated prominent findings at C4-5 and C5 6 to be source for his symptoms.  He was treated with formal physical therapy which aggravated symptoms.  More recently he started Neurontin.  That has helped more than anything.  He really would like to avoid another surgery at all cost.  He is currently taking Neurontin 1 tab in the morning and 1 tab in the evening.  Every now and then he will take a third dose but seems to be managing well with twice daily dosing.  Overall he is doing well and has no new complaints today.      Past Medical History:   Diagnosis Date   • Acute maxillary sinusitis    • Arthritis    • CHF (congestive heart failure) (HCC)    • Elevated LFTs    • History of colonic polyps    • History of methicillin resistant Staphylococcus aureus    • History of myocardial infarction    • Hyperlipidemia    • Hypertension    • Left hand pain    • Left shoulder pain    • Myocardial infarction (HCC)    • Personal history of congestive heart failure    • Right hip pain    • Rotator cuff syndrome          Current Outpatient Medications:   •  aspirin 81 MG EC tablet, Take 1 tablet by mouth Daily., Disp: , Rfl:   •  atorvastatin  (LIPITOR) 80 MG tablet, Take 1 tablet by mouth every night at bedtime., Disp: 90 tablet, Rfl: 1  •  donepezil (ARICEPT) 10 MG tablet, Take 1 tablet by mouth Every Night., Disp: 90 tablet, Rfl: 3  •  ferrous sulfate 325 (65 Fe) MG tablet, 1 tablet by mouth once daily with breakfast, Disp: 30 tablet, Rfl: 2  •  furosemide (LASIX) 20 MG tablet, Take 1 tablet by mouth Daily., Disp: 90 tablet, Rfl: 1  •  gabapentin (NEURONTIN) 300 MG capsule, Take 1 capsule by mouth 3 (Three) Times a Day., Disp: 90 capsule, Rfl: 2  •  hydroCHLOROthiazide (MICROZIDE) 12.5 MG capsule, Take 1 capsule by mouth Daily., Disp: 90 capsule, Rfl: 1  •  lisinopril (PRINIVIL,ZESTRIL) 20 MG tablet, TAKE 1 TABLET EVERY DAY, Disp: 90 tablet, Rfl: 1  •  memantine (NAMENDA) 10 MG tablet, Take 1 tablet by mouth 2 (Two) Times a Day., Disp: 180 tablet, Rfl: 1  •  metoprolol tartrate (LOPRESSOR) 50 MG tablet, Take 0.5 tablets by mouth 2 (Two) Times a Day., Disp: 90 tablet, Rfl: 1  •  mirtazapine (Remeron) 30 MG tablet, Take 1 tablet by mouth Every Night., Disp: 90 tablet, Rfl: 3  •  Multiple Vitamins-Minerals (CENTRUM SILVER) tablet, Take 1 tablet by mouth Daily., Disp: , Rfl:   •  omeprazole (priLOSEC) 40 MG capsule, Take 1 capsule by mouth Daily., Disp: 90 capsule, Rfl: 1  •  sertraline (ZOLOFT) 100 MG tablet, Take 2 tablets by mouth Daily., Disp: 180 tablet, Rfl: 1    Past Surgical History:   Procedure Laterality Date   • CORONARY ANGIOPLASTY WITH STENT PLACEMENT  2012    Circumflex Xscience V 3.5 MM x 23 mm   • GALLBLADDER SURGERY  2017   • LUMBAR DISCECTOMY  1992    2 level - [facility]   • SHOULDER SURGERY  2018    Dr. Hoyt Rotator cuff repair       Social History     Socioeconomic History   • Marital status: Single   Tobacco Use   • Smoking status: Former Smoker     Packs/day: 1.50     Years: 35.00     Pack years: 52.50     Types: Cigarettes     Quit date:      Years since quittin.2   • Smokeless tobacco: Never Used    Vaping Use   • Vaping Use: Never used   Substance and Sexual Activity   • Alcohol use: Not Currently     Comment: 5 mixed drinks per day. recently quit all alcohol 12/2020   • Drug use: Yes     Types: Marijuana   • Sexual activity: Defer         Review of Systems   Constitutional: Negative for activity change, appetite change, chills, diaphoresis, fatigue, fever and unexpected weight change.   HENT: Negative for congestion, dental problem, drooling, ear discharge, ear pain, facial swelling, hearing loss, mouth sores, nosebleeds, postnasal drip, rhinorrhea, sinus pressure, sinus pain, sneezing, sore throat, tinnitus, trouble swallowing and voice change.    Eyes: Negative for photophobia, pain, discharge, redness, itching and visual disturbance.   Respiratory: Negative for apnea, cough, choking, chest tightness, shortness of breath, wheezing and stridor.    Cardiovascular: Negative for chest pain, palpitations and leg swelling.   Gastrointestinal: Negative for abdominal distention, abdominal pain, anal bleeding, blood in stool, constipation, diarrhea, nausea, rectal pain and vomiting.   Endocrine: Negative for cold intolerance, heat intolerance, polydipsia, polyphagia and polyuria.   Genitourinary: Negative for decreased urine volume, difficulty urinating, dysuria, enuresis, flank pain, frequency, genital sores, hematuria and urgency.   Musculoskeletal: Positive for back pain and neck pain. Negative for arthralgias, gait problem, joint swelling, myalgias and neck stiffness.   Skin: Negative for color change, pallor, rash and wound.   Allergic/Immunologic: Negative for environmental allergies, food allergies and immunocompromised state.   Neurological: Positive for numbness. Negative for dizziness, tremors, seizures, syncope, facial asymmetry, speech difficulty, weakness, light-headedness and headaches.   Hematological: Negative for adenopathy. Does not bruise/bleed easily.   Psychiatric/Behavioral: Negative for  "agitation, behavioral problems, confusion, decreased concentration, dysphoric mood, hallucinations, self-injury, sleep disturbance and suicidal ideas. The patient is not nervous/anxious and is not hyperactive.    All other systems reviewed and are negative.      Objective   Vital Signs: Blood pressure 132/62, temperature 98 °F (36.7 °C), temperature source Infrared, height 165.1 cm (65\"), weight 88 kg (194 lb).  Physical Exam  Vitals and nursing note reviewed.   Constitutional:       General: He is not in acute distress.     Appearance: He is well-developed.   HENT:      Head: Normocephalic and atraumatic.   Eyes:      Pupils: Pupils are equal, round, and reactive to light.   Cardiovascular:      Heart sounds: Normal heart sounds.   Pulmonary:      Breath sounds: Normal breath sounds.   Psychiatric:         Behavior: Behavior normal.         Thought Content: Thought content normal.     Musculoskeletal:     Strength is intact in upper and lower extremities to direct testing.     Station and gait are normal.  Neurologic:     Muscle tone is normal throughout.     Coordination is intact.     Sensation is intact to light touch throughout.     Patient is oriented to person, place, and time.         Independent review of radiographic imaging: MRI of the cervical spine dated 7/23/2021 demonstrates diffuse degenerative disc disease and spondylosis. There is recess narrowing bilaterally at C4-5 and C5-6.    Assessment/Plan   Diagnosis:  1.  Cervical spondylosis with left upper extremity radiculopathy.  2.  Lumbar stenosis with neurogenic claudication.    Medical Decision Making: I would like to hold tight with patient's therapy.  He seems to be doing well with Neurontin.  He is taking it twice a day, rarely 3 times a day.  He will follow-up with me in 6 months to check on things and discuss his medications.  I will continue to renew his Neurontin in the meantime.  He will call if he has any new or worsening " symptoms.        Diagnoses and all orders for this visit:    1. Cervical spondylosis with radiculopathy (Primary)  -     gabapentin (NEURONTIN) 300 MG capsule; Take 1 capsule by mouth 3 (Three) Times a Day.  Dispense: 90 capsule; Refill: 2    2. Spinal stenosis of lumbar region with neurogenic claudication    3. Cervical radiculopathy                        Patient's Body mass index is 32.28 kg/m². indicating that he is obese (BMI >30). Obesity-related health conditions include the following: hypertension, dyslipidemias and GERD. Obesity is unchanged. BMI is is above average; BMI management plan is completed. We discussed portion control and increasing exercise..         Elayne Tompkins PA-C  Patient Care Team:  Anshul Martinez MD as PCP - General  Anshul Martinez MD as PCP - Family Medicine  CrouchTacho barber IV, MD as Cardiologist (Interventional Cardiology)  Erick Hoyt MD as Surgeon (Orthopedic Surgery)

## 2022-03-28 DIAGNOSIS — G31.84 MCI (MILD COGNITIVE IMPAIRMENT): ICD-10-CM

## 2022-03-28 DIAGNOSIS — I10 ESSENTIAL HYPERTENSION: ICD-10-CM

## 2022-03-28 RX ORDER — LISINOPRIL 20 MG/1
TABLET ORAL
Qty: 90 TABLET | Refills: 1 | Status: SHIPPED | OUTPATIENT
Start: 2022-03-28 | End: 2022-09-26

## 2022-03-28 RX ORDER — DONEPEZIL HYDROCHLORIDE 10 MG/1
TABLET, FILM COATED ORAL
Qty: 90 TABLET | Refills: 0 | Status: SHIPPED | OUTPATIENT
Start: 2022-03-28 | End: 2022-05-18

## 2022-05-16 ENCOUNTER — TELEPHONE (OUTPATIENT)
Dept: NEUROLOGY | Facility: CLINIC | Age: 62
End: 2022-05-16

## 2022-05-16 NOTE — TELEPHONE ENCOUNTER
Caller: Justice Kiser    Relationship: Self    Best call back number: [phone]    What was the call regarding: PT CALLED BACK. PT OKAY WITH BEING SEEN AT [facility] CONSULTS OFFICE ON Wednesday, 5/18/22. [address], [address], [place] [address] PROVIDED. ADVISED PT TO WEAR MASK & ARRIVE 15 MINS EARLY. PT VERBALIZED UNDERSTANDING.    NOTIFIED CANDACER.,  IN OFFICE, TO MAKE CHANGE IN SYSTEM.    Do you require a callback: NO    DOCUMENTING PER [employer].

## 2022-05-18 ENCOUNTER — OFFICE VISIT (OUTPATIENT)
Dept: NEUROLOGY | Facility: CLINIC | Age: 62
End: 2022-05-18

## 2022-05-18 VITALS
WEIGHT: 192 LBS | HEIGHT: 65 IN | OXYGEN SATURATION: 98 % | SYSTOLIC BLOOD PRESSURE: 140 MMHG | HEART RATE: 65 BPM | DIASTOLIC BLOOD PRESSURE: 70 MMHG | BODY MASS INDEX: 31.99 KG/M2

## 2022-05-18 DIAGNOSIS — F33.1 MODERATE EPISODE OF RECURRENT MAJOR DEPRESSIVE DISORDER: ICD-10-CM

## 2022-05-18 DIAGNOSIS — G31.84 MCI (MILD COGNITIVE IMPAIRMENT): Primary | ICD-10-CM

## 2022-05-18 DIAGNOSIS — I67.89 CEREBRAL MICROVASCULAR DISEASE: ICD-10-CM

## 2022-05-18 PROCEDURE — 99214 OFFICE O/P EST MOD 30 MIN: CPT | Performed by: NURSE PRACTITIONER

## 2022-05-18 RX ORDER — MIRTAZAPINE 30 MG/1
30 TABLET, FILM COATED ORAL NIGHTLY
Qty: 90 TABLET | Refills: 3 | Status: SHIPPED | OUTPATIENT
Start: 2022-05-18 | End: 2022-11-18

## 2022-05-18 RX ORDER — MEMANTINE HYDROCHLORIDE 10 MG/1
10 TABLET ORAL 2 TIMES DAILY
Qty: 180 TABLET | Refills: 3 | Status: SHIPPED | OUTPATIENT
Start: 2022-05-18 | End: 2022-10-03

## 2022-05-18 RX ORDER — DONEPEZIL HYDROCHLORIDE 10 MG/1
10 TABLET, FILM COATED ORAL NIGHTLY
Qty: 90 TABLET | Refills: 3 | Status: SHIPPED | OUTPATIENT
Start: 2022-05-18 | End: 2022-11-18

## 2022-05-18 NOTE — PROGRESS NOTES
"Subjective:     Patient ID: Justice Kiser is a 62 y.o. male.    CC:   Chief Complaint   Patient presents with   • mild cognitive impairment       HPI:   History of Present Illness   5/18/2022-This is a 62-year-old male who presents for 6-month neurology follow-up on suspected mild cognitive impairment with symptoms that have been present since 2017 beginning with forgetfulness, repetitiveness, and word finding difficulties, and overall has been stable per he and his family. He has had some impairments in orientation, and executive function. He has had some depression and irritability. He has had some visual hallucinations, previously consumed alcohol, but none since 12/15/2020. He does not have impairments in activities of daily living. He was living with his nephew in [place] He has been on donepezil as well as memantine for his memory.     At last visit on 12/01/2021, we did not make changes in his medications. He previously did complete cognitive rehab with our speech language pathologist. He has had chronic difficulty with balance, but no recent falls. He does follow with neurosurgery for neuropathy with chronic neck, and low back pain. He is also taking mirtazapine 30 mg at night. He is here for follow-up today.    The patient states \"If someone says something to me, I lose my complete train of thought.\" He is having trouble with focus and concentration. He did have cognitive therapy previously, which he states seemed to help some. He is able to still manage his finances, but states it is getting \"tricky\" now. He adds that he has not \"messed up anything yet.\" He states he does not like to make decisions anymore without help. He adds that he does not comprehend everything.    He reports that his nephew, Harris, who used to bring him to his appointments, passed away in 03/2022. He states that he is on his own now, and his daughter comes around, as well as his son. -he was like a brother to him. His daughter and " son help some with his finances. He is currently taking sertraline prescribed by Dr. Martinez.    The patient reports that the episodes of hallucinations have improved. He continues to be abstinent from alcohol.     He is not sleeping well, which he attributes to the gabapentin, and has worsened since the death of his nephew. He adds that he has difficulty staying asleep. He does not wear a CPAP. He thinks he might snore. He is also on mirtazapine, but is unsure if it is helping at the current dose, but he does not want to change his medications right now. He has not had a sleep study and declines this right now.    The patient denies any falls in the last 12 months. He fell and fractured his leg in 12/2020.    Previous neuro workup & history:  Prior evaluation has included an MRI of the brain showing chronic white matter changes, an unremarkable ECHO/Carotid Dopplers and screening bloodwork. He is currently taking donepezil, memantine and was taking thiamine, but recently quit since no longer drinking.   He also had an updated MRI of the brain on 7/23/2018 which showed chronic small vessel ischemic changes with old lacunar infarcts.  He is on aspirin and statin therapy.    The following portions of the patient's history were reviewed and updated as appropriate: allergies, current medications, past family history, past medical history, past social history, past surgical history and problem list.    Past Medical History:   Diagnosis Date   • Acute maxillary sinusitis    • Arthritis    • CHF (congestive heart failure) (HCC)    • Elevated LFTs    • History of colonic polyps    • History of methicillin resistant Staphylococcus aureus    • History of myocardial infarction    • Hyperlipidemia    • Hypertension    • Left hand pain    • Left shoulder pain    • Myocardial infarction (HCC)    • Personal history of congestive heart failure    • Right hip pain    • Rotator cuff syndrome        Past Surgical History:   Procedure  Laterality Date   • CORONARY ANGIOPLASTY WITH STENT PLACEMENT  2012    Circumflex Xscience V 3.5 MM x 23 mm   • GALLBLADDER SURGERY  2017   • LUMBAR DISCECTOMY  1992    2 level - [facility]   • SHOULDER SURGERY  2018    Dr. Hoyt Rotator cuff repair       Social History     Socioeconomic History   • Marital status: Single   Tobacco Use   • Smoking status: Former Smoker     Packs/day: 1.50     Years: 35.00     Pack years: 52.50     Types: Cigarettes     Quit date:      Years since quittin.3   • Smokeless tobacco: Never Used   Vaping Use   • Vaping Use: Never used   Substance and Sexual Activity   • Alcohol use: Not Currently     Comment: 5 mixed drinks per day. recently quit all alcohol 2020   • Drug use: Yes     Types: Marijuana   • Sexual activity: Defer       Family History   Problem Relation Age of Onset   • Hypertension Mother    • Heart attack Mother    • Hyperlipidemia Mother    • Alcohol abuse Mother    • Alcohol abuse Father    • Dementia Sister    • Heart disease Sister    • Alcohol abuse Maternal Aunt    • Alcohol abuse Paternal Uncle    • Diabetes Maternal Grandmother    • Heart disease Brother    • Stroke Sister           Current Outpatient Medications:   •  aspirin 81 MG EC tablet, Take 1 tablet by mouth Daily., Disp: , Rfl:   •  atorvastatin (LIPITOR) 80 MG tablet, Take 1 tablet by mouth every night at bedtime., Disp: 90 tablet, Rfl: 1  •  donepezil (ARICEPT) 10 MG tablet, Take 1 tablet by mouth Every Night., Disp: 90 tablet, Rfl: 3  •  ferrous sulfate 325 (65 Fe) MG tablet, 1 tablet by mouth once daily with breakfast, Disp: 30 tablet, Rfl: 2  •  furosemide (LASIX) 20 MG tablet, Take 1 tablet by mouth Daily., Disp: 90 tablet, Rfl: 1  •  gabapentin (NEURONTIN) 300 MG capsule, Take 1 capsule by mouth 3 (Three) Times a Day., Disp: 90 capsule, Rfl: 2  •  hydroCHLOROthiazide (MICROZIDE) 12.5 MG capsule, Take 1 capsule by mouth Daily., Disp: 90 capsule, Rfl: 1  •   lisinopril (PRINIVIL,ZESTRIL) 20 MG tablet, Take 1 tablet by mouth once daily, Disp: 90 tablet, Rfl: 1  •  memantine (NAMENDA) 10 MG tablet, Take 1 tablet by mouth 2 (Two) Times a Day., Disp: 180 tablet, Rfl: 3  •  metoprolol tartrate (LOPRESSOR) 50 MG tablet, Take 0.5 tablets by mouth 2 (Two) Times a Day., Disp: 90 tablet, Rfl: 1  •  mirtazapine (Remeron) 30 MG tablet, Take 1 tablet by mouth Every Night., Disp: 90 tablet, Rfl: 3  •  Multiple Vitamins-Minerals (CENTRUM SILVER) tablet, Take 1 tablet by mouth Daily., Disp: , Rfl:   •  omeprazole (priLOSEC) 40 MG capsule, Take 1 capsule by mouth Daily., Disp: 90 capsule, Rfl: 1  •  sertraline (ZOLOFT) 100 MG tablet, Take 2 tablets by mouth Daily., Disp: 180 tablet, Rfl: 1     Review of Systems   Constitutional: Negative for chills, fatigue, fever and unexpected weight change.   HENT: Negative for ear pain, hearing loss, nosebleeds, rhinorrhea and sore throat.    Eyes: Negative for photophobia, pain, discharge, itching and visual disturbance.   Respiratory: Negative for cough, chest tightness, shortness of breath and wheezing.    Cardiovascular: Negative for chest pain, palpitations and leg swelling.   Gastrointestinal: Negative for abdominal pain, blood in stool, constipation, diarrhea, nausea and vomiting.   Genitourinary: Negative for dysuria, frequency, hematuria and urgency.   Musculoskeletal: Negative for arthralgias, back pain, gait problem, joint swelling, myalgias, neck pain and neck stiffness.   Skin: Negative for rash and wound.   Allergic/Immunologic: Negative for environmental allergies and food allergies.   Neurological: Negative for dizziness, tremors, seizures, syncope, speech difficulty, weakness, light-headedness, numbness and headaches.   Hematological: Negative for adenopathy. Does not bruise/bleed easily.   Psychiatric/Behavioral: Positive for decreased concentration and sleep disturbance. Negative for agitation, confusion, hallucinations and  "suicidal ideas. The patient is not nervous/anxious.    All other systems reviewed and are negative.       Objective:  /70   Pulse 65   Ht 165.1 cm (65\")   Wt 87.1 kg (192 lb)   SpO2 98%   BMI 31.95 kg/m²     Neurologic Exam     Mental Status   Oriented to person, place, and time.   Speech: speech is normal   Level of consciousness: alert    Cranial Nerves   Cranial nerves II through XII intact.     Motor Exam   Muscle bulk: normal  Overall muscle tone: normal    Strength   Strength 5/5 except as noted.   Decreased ROM low back chronic     Gait, Coordination, and Reflexes     Gait  Gait: (mild antalgia)    Coordination   Finger to nose coordination: normal    Tremor   Resting tremor: absent  Intention tremor: present (mild bue kinetic fine hand tremor)  Action tremor: absent    Reflexes   Right : 2+  Left : 2+      Physical Exam  Constitutional:       Appearance: Normal appearance.   Neurological:      Mental Status: He is alert and oriented to person, place, and time.      Coordination: Finger-Nose-Finger Test normal.   Psychiatric:         Mood and Affect: Mood is depressed.         Speech: Speech normal.         Behavior: Behavior is slowed.         Thought Content: Thought content normal.         Cognition and Memory: He exhibits impaired recent memory (mild).         Judgment: Judgment normal.       In 12/2021, HbA1c was 6 percent. Iron levels have improved. Total cholesterol 152, triglycerides normal at 135, HDL was good at 51, and LDL was 77. Liver and kidney function looked good overall. Alkaline phosphatase has run in the 160s to 180s, reported as stable by Dr. Martinez. Blood counts look significantly improved.    MOCA 26/30, Recall uncued 2/5, cued 5/5 missed fluency, drawing cylinder  MMSE 29/30, Recall 2/3    Assessment/Plan:       Diagnoses and all orders for this visit:    1. MCI (mild cognitive impairment) (Primary)  -     donepezil (ARICEPT) 10 MG tablet; Take 1 tablet by mouth Every " Night.  Dispense: 90 tablet; Refill: 3  -     memantine (NAMENDA) 10 MG tablet; Take 1 tablet by mouth 2 (Two) Times a Day.  Dispense: 180 tablet; Refill: 3  -     mirtazapine (Remeron) 30 MG tablet; Take 1 tablet by mouth Every Night.  Dispense: 90 tablet; Refill: 3    2. Cerebral microvascular disease  Comments:  continue aspirin and statin    3. Moderate episode of recurrent major depressive disorder (HCC)  -     mirtazapine (Remeron) 30 MG tablet; Take 1 tablet by mouth Every Night.  Dispense: 90 tablet; Refill: 3        - The patient is doing well with his cognitive impairment, and performed well on his memory testing today. He is still able to handle his finances. He will continue donepezil, mirtazapine, and memantine.    - Continue sertraline, and mirtazapine for depression. He does not wish to increase the dose of mirtazapine at this time. He has been more depressed, and irritable since his nephew recently passed away. Referral for grief counseling was offered to the patient, but he declined. He can discuss with Dr. Martinez about making any adjustments to his medication.    - He was offered referral for sleep study for snoring & inability to stay asleep at night, but declined.    - Continue gabapentin for cervical spondylosis with radiculopathy.    He has a follow-up visit to see Dr. Martinez in 12/2022, but states he will probably see him sooner.    The patient will return for follow-up in clinic in 6 months.     My sincere condolences on the loss of his nephew.    Reviewed medications, potential side effects and signs and symptoms to report. Discussed risk versus benefits of treatment plan with patient and/or family-including medications, labs and radiology that may be ordered. Addressed questions and concerns during visit. Patient and/or family verbalized understanding and agree with plan.    AS THE PROVIDER, I PERSONALLY WORE PPE DURING ENTIRE FACE TO FACE ENCOUNTER IN CLINIC WITH THE PATIENT. PATIENT ALSO  WORE PPE DURING ENTIRE FACE TO FACE ENCOUNTER EXCEPT FOR A MAX OF 30 SECONDS DURING NEUROLOGICAL EVALUATION OF CRANIAL NERVES AND THEN MASK WAS PLACED BACK OVER PATIENT FACE FOR REMAINDER OF VISIT. I WASHED MY HANDS BEFORE AND AFTER VISIT.    During this visit the following were done:  Labs Reviewed []    Labs Ordered []    Radiology Reports Reviewed []    Radiology Ordered []    PCP Records Reviewed [x]    Referring Provider Records Reviewed []    ER Records Reviewed []    Hospital Records Reviewed []    History Obtained From Family []    Radiology Images Reviewed []    Other Reviewed [x]   Neurosurgery notes reviewed  Records Requested []      Transcribed from ambient dictation for CAROL Coleman by Ciera Mccall.  05/18/22   12:46 EDT    Patient verbalized consent to the visit recording.  I have personally performed the services described in this document as transcribed by the above individual, and it is both accurate and complete.  CAROL Coleman  5/18/2022  14:33 EDT

## 2022-06-21 ENCOUNTER — TELEPHONE (OUTPATIENT)
Dept: CARDIOLOGY | Facility: CLINIC | Age: 62
End: 2022-06-21

## 2022-06-21 NOTE — TELEPHONE ENCOUNTER
[facility] is calling for cardiac clearance for entire bottom teeth extraction. Local anesthesia with epi.     He was last seen 5/2021 and no appts scheduled. Is he OK to clear for this procedure and continue ASA? Or does he need to be seen?

## 2022-07-02 DIAGNOSIS — M47.22 CERVICAL SPONDYLOSIS WITH RADICULOPATHY: ICD-10-CM

## 2022-07-05 RX ORDER — GABAPENTIN 300 MG/1
CAPSULE ORAL
Qty: 90 CAPSULE | Refills: 0 | Status: SHIPPED | OUTPATIENT
Start: 2022-07-05 | End: 2022-08-09

## 2022-07-05 NOTE — TELEPHONE ENCOUNTER
Provider:  Turner  Caller:  Automated refill request  Surgery:    Surgery Date:    Last visit: Office Visit with Elayne Tompkins PA-C (03/16/2022)      Next visit: Appointment with Elayne Tompkins PA-C (09/13/2022)      Reason for call:         Requested Prescriptions     Pending Prescriptions Disp Refills   • gabapentin (NEURONTIN) 300 MG capsule [Pharmacy Med Name: Gabapentin 300 MG Oral Capsule] 90 capsule 0     Sig: TAKE 1 CAPSULE BY MOUTH THREE TIMES DAILY     ALEJANDRO:   01/14/2022 Gabapentin 300MG 1960 [address] [facility]  [address]  [facility] 2  02/14/2022 Gabapentin 300MG 1960 [address] Hod[facility]  [address]  [facility] 2  03/16/2022 Gabapentin 300MG 1960 [address] [facility]  [address] [place] [address] 2  04/19/2022 Gabapentin 300MG 1960 [address] [facility]  [address] [place] [address] 2  05/28/2022 Gabapentin 300MG 1960 [address] [facility]    [facility] 2  06/27/2022 Hydrocodone Bitartrate/Ac 325MG/10MG 1960 12 [address] Fabian [facility]ina [facility]  [address]  [facility] 40 2

## 2022-07-11 DIAGNOSIS — D50.9 IRON DEFICIENCY ANEMIA, UNSPECIFIED IRON DEFICIENCY ANEMIA TYPE: ICD-10-CM

## 2022-07-12 RX ORDER — PNV NO.95/FERROUS FUM/FOLIC AC 28MG-0.8MG
TABLET ORAL
Qty: 30 TABLET | Refills: 2 | Status: SHIPPED | OUTPATIENT
Start: 2022-07-12

## 2022-07-23 DIAGNOSIS — K21.9 GASTROESOPHAGEAL REFLUX DISEASE: ICD-10-CM

## 2022-07-23 DIAGNOSIS — I10 ESSENTIAL HYPERTENSION: ICD-10-CM

## 2022-07-23 DIAGNOSIS — I25.119 CORONARY ARTERY DISEASE INVOLVING NATIVE CORONARY ARTERY OF NATIVE HEART WITH ANGINA PECTORIS: ICD-10-CM

## 2022-07-23 DIAGNOSIS — R10.13 EPIGASTRIC ABDOMINAL PAIN: ICD-10-CM

## 2022-07-23 DIAGNOSIS — E78.5 HYPERLIPIDEMIA LDL GOAL <70: ICD-10-CM

## 2022-07-25 RX ORDER — FUROSEMIDE 20 MG/1
TABLET ORAL
Qty: 90 TABLET | Refills: 0 | Status: SHIPPED | OUTPATIENT
Start: 2022-07-25 | End: 2022-10-17

## 2022-07-25 RX ORDER — METOPROLOL TARTRATE 50 MG/1
TABLET, FILM COATED ORAL
Qty: 90 TABLET | Refills: 0 | Status: SHIPPED | OUTPATIENT
Start: 2022-07-25 | End: 2022-10-17

## 2022-07-25 RX ORDER — ATORVASTATIN CALCIUM 80 MG/1
TABLET, FILM COATED ORAL
Qty: 90 TABLET | Refills: 0 | Status: SHIPPED | OUTPATIENT
Start: 2022-07-25 | End: 2022-10-17

## 2022-07-25 RX ORDER — HYDROCHLOROTHIAZIDE 12.5 MG/1
CAPSULE, GELATIN COATED ORAL
Qty: 90 CAPSULE | Refills: 0 | Status: SHIPPED | OUTPATIENT
Start: 2022-07-25 | End: 2022-10-17

## 2022-07-25 RX ORDER — OMEPRAZOLE 40 MG/1
CAPSULE, DELAYED RELEASE ORAL
Qty: 90 CAPSULE | Refills: 0 | Status: SHIPPED | OUTPATIENT
Start: 2022-07-25 | End: 2022-10-17

## 2022-08-08 DIAGNOSIS — M47.22 CERVICAL SPONDYLOSIS WITH RADICULOPATHY: ICD-10-CM

## 2022-08-08 NOTE — TELEPHONE ENCOUNTER
"Provider: Max   Caller:  Automated refill request   Surgery:  NA  Surgery Date: NA   Last visit: Office Visit with Elayne Tompkins PA-C (03/16/2022)   Next visit: 09/13/2022  Last filled:  Refill with Elayne Tompkins PA-C (07/02/2022)      Reason for call:         Automated refill request for Gabapentin.     Last OV note:    \"Medical Decision Making: I would like to hold tight with patient's therapy.  He will call if he has any new or worsening symptoms.  \"He seems to be doing well with Neurontin.  He is taking it twice a day, rarely 3 times a day.  He will follow-up with me in 6 months to check on things and discuss his medications.  I will continue to renew his Neurontin in the meantime.       Requested Prescriptions     Pending Prescriptions Disp Refills   • gabapentin (NEURONTIN) 300 MG capsule [Pharmacy Med Name: Gabapentin 300 MG Oral Capsule] 90 capsule 0     Sig: TAKE 1 CAPSULE BY MOUTH THREE TIMES DAILY     ALEJANDRO:    05/28/2022 Gabapentin 300MG 1960 90 30 [facility]    [facility] 2  06/27/2022 Hydrocodone Bitartrate/Ac 325MG/10MG 1960 12 3 [facility]    [facility] 40 2  07/05/2022 Gabapentin 300MG 1960 90 30 [facility]    [facility] 2  "

## 2022-08-09 RX ORDER — GABAPENTIN 300 MG/1
CAPSULE ORAL
Qty: 90 CAPSULE | Refills: 0 | Status: SHIPPED | OUTPATIENT
Start: 2022-08-09 | End: 2022-09-13

## 2022-09-06 ENCOUNTER — OFFICE VISIT (OUTPATIENT)
Dept: FAMILY MEDICINE CLINIC | Facility: CLINIC | Age: 62
End: 2022-09-06

## 2022-09-06 VITALS
TEMPERATURE: 97.5 F | SYSTOLIC BLOOD PRESSURE: 132 MMHG | DIASTOLIC BLOOD PRESSURE: 70 MMHG | WEIGHT: 197 LBS | BODY MASS INDEX: 32.82 KG/M2 | HEIGHT: 65 IN | RESPIRATION RATE: 18 BRPM | HEART RATE: 76 BPM

## 2022-09-06 DIAGNOSIS — M54.50 CHRONIC MIDLINE LOW BACK PAIN WITHOUT SCIATICA: ICD-10-CM

## 2022-09-06 DIAGNOSIS — E78.00 PURE HYPERCHOLESTEROLEMIA: ICD-10-CM

## 2022-09-06 DIAGNOSIS — D50.9 IRON DEFICIENCY ANEMIA, UNSPECIFIED IRON DEFICIENCY ANEMIA TYPE: ICD-10-CM

## 2022-09-06 DIAGNOSIS — R53.83 OTHER FATIGUE: Primary | ICD-10-CM

## 2022-09-06 DIAGNOSIS — I10 ESSENTIAL HYPERTENSION: ICD-10-CM

## 2022-09-06 DIAGNOSIS — R73.9 HYPERGLYCEMIA: ICD-10-CM

## 2022-09-06 DIAGNOSIS — G89.29 CHRONIC MIDLINE LOW BACK PAIN WITHOUT SCIATICA: ICD-10-CM

## 2022-09-06 DIAGNOSIS — L28.1 PICKER'S NODULES: ICD-10-CM

## 2022-09-06 PROCEDURE — 99214 OFFICE O/P EST MOD 30 MIN: CPT | Performed by: FAMILY MEDICINE

## 2022-09-06 RX ORDER — TRIAMCINOLONE ACETONIDE 1 MG/G
1 CREAM TOPICAL 2 TIMES DAILY
Qty: 45 G | Refills: 1 | Status: SHIPPED | OUTPATIENT
Start: 2022-09-06

## 2022-09-06 NOTE — PROGRESS NOTES
"Chief Complaint  Fatigue    Subjective          Justice Kiser presents to [facility]  History of Present Illness    Fatigue  The patient is experiencing fatigue. He admits depression, loss of appetite, and difficulty sleeping at night. He is experiencing posterior neck pain. He denies consuming alcohol. He is taking iron every other day. He is unsure if he snores. He has not been tested for sleep apnea. He is currently taking sertraline 200 mg.     Skin lesions  He is experiencing 2 skin lesions in his posterior neck. He has applied Neosporin and Vaseline without any relief. He admits itching.     Back pain  He admits back pain, difficulty ambulating, and loss of balance.     Patient also has history of hypertension, iron deficiency anemia, hyperglycemia, and hyperlipidemia.  He is due for blood work.  Current medications were reviewed.  No changes.    He is currently also on sertraline for depression at 200 mg daily.    Review of Systems   Constitutional: Positive for fatigue.   Respiratory: Negative.  Negative for shortness of breath.    Cardiovascular: Negative.  Negative for chest pain.   Neurological: Positive for weakness. Negative for syncope.   All other systems reviewed and are negative.     A review of systems was performed, and the pertinent positives are noted in the HPI.      Objective       Vital Signs:   /70   Pulse 76   Temp 97.5 °F (36.4 °C)   Resp 18   Ht 165.1 cm (65\")   Wt 89.4 kg (197 lb)   BMI 32.78 kg/m²     Physical Exam  Vitals and nursing note reviewed.   Constitutional:       General: He is not in acute distress.     Appearance: He is well-developed. He is not ill-appearing.   HENT:      Head: Normocephalic and atraumatic.      Right Ear: External ear normal.      Left Ear: External ear normal.   Eyes:      Pupils: Pupils are equal, round, and reactive to light.   Cardiovascular:      Rate and Rhythm: Normal rate and regular rhythm.      Heart " sounds: Normal heart sounds.   Pulmonary:      Effort: Pulmonary effort is normal. No respiratory distress.      Breath sounds: Normal breath sounds. No wheezing or rales.   Abdominal:      General: There is no distension.      Tenderness: There is no abdominal tenderness. There is no guarding or rebound.   Musculoskeletal:      Right lower leg: No edema.      Left lower leg: No edema.   Skin:     General: Skin is warm and dry.   Neurological:      Mental Status: He is alert.   Psychiatric:         Behavior: Behavior normal.     Scattered nodules posterior neck, arms and lower extremities.  He admits to picking.  No evidence of secondary infection.    Result Review :                     Assessment and Plan    Diagnoses and all orders for this visit:    1. Other fatigue (Primary)  -     CBC & Differential  -     Comprehensive Metabolic Panel  -     TSH  -     Vitamin B12    2. Essential hypertension  -     CBC & Differential  -     Comprehensive Metabolic Panel    3. Iron deficiency anemia, unspecified iron deficiency anemia type  -     CBC & Differential  -     Iron and TIBC  -     Ferritin    4. Hyperglycemia  -     Hemoglobin A1c    5. Pure hypercholesterolemia  -     Comprehensive Metabolic Panel  -     Lipid Panel With / Chol / HDL Ratio    6. 's nodules  -     triamcinolone (KENALOG) 0.1 % cream; Apply 1 application topically to the appropriate area as directed 2 (Two) Times a Day.  Dispense: 45 g; Refill: 1    7. Chronic midline low back pain without sciatica          DISCUSSION    1. Fatigue  He has had worsening fatigue over the last several weeks. We will check blood work including CBC, CMP, TSH, and B12 level. Unclear etiology. Other differential diagnoses includes possible obstructive sleep apnea or worsening depression. Further plan was last back.    2. Hypertension  Blood pressure is doing well. Continue current medications. Check labs as noted.    3. Iron deficiency anemia  He is taking iron  every other day. We will check iron panel as well.    4. Hyperglycemia  We will check A1c.    5. Hyperlipidemia  We will check CMP and lipid panel.    6. Skin lesions  Examination of skin reveals evidence of 's nodules. These are scattered over his legs, arms, and there are two on his posterior neck. One option would be intralesional steroid injections, but we will start with some steroid cream initially and see if that will help. If not, then we may need to consider the injections or refer to a dermatologist. Explained also to do whatever he could to help keep him from picking those places again. He expressed understanding.    7. Chronic midline low back pain  We will do further work-up once labs are reviewed and if this is persisting.        Follow Up   Return if symptoms worsen or fail to improve, for follow up depends on review of labs and testing.    Patient was given instructions and counseling regarding his condition or for health maintenance advice. Please see specific information pulled into the AVS if appropriate.       Anshul Martinez MD     Transcribed from ambient dictation for Anshul Martinez MD by VIOLET PAZ.  09/06/22   17:11 EDT    Patient verbalized consent to the visit recording.  I have personally performed the services described in this document as transcribed by the above individual, and it is both accurate and complete.  Anshul Martinez MD  9/6/2022  19:03 EDT

## 2022-09-07 LAB
ALBUMIN SERPL-MCNC: 4.4 G/DL (ref 3.8–4.8)
ALBUMIN/GLOB SERPL: 1.5 {RATIO} (ref 1.2–2.2)
ALP SERPL-CCNC: 213 IU/L (ref 44–121)
ALT SERPL-CCNC: 36 IU/L (ref 0–44)
AST SERPL-CCNC: 43 IU/L (ref 0–40)
BASOPHILS # BLD AUTO: 0.1 X10E3/UL (ref 0–0.2)
BASOPHILS NFR BLD AUTO: 1 %
BILIRUB SERPL-MCNC: 0.3 MG/DL (ref 0–1.2)
BUN SERPL-MCNC: 19 MG/DL (ref 8–27)
BUN/CREAT SERPL: 20 (ref 10–24)
CALCIUM SERPL-MCNC: 9.3 MG/DL (ref 8.6–10.2)
CHLORIDE SERPL-SCNC: 102 MMOL/L (ref 96–106)
CHOLEST SERPL-MCNC: 157 MG/DL (ref 100–199)
CHOLEST/HDLC SERPL: 3.3 RATIO (ref 0–5)
CO2 SERPL-SCNC: 21 MMOL/L (ref 20–29)
CREAT SERPL-MCNC: 0.96 MG/DL (ref 0.76–1.27)
EGFRCR-CYS SERPLBLD CKD-EPI 2021: 88 ML/MIN/1.73
EOSINOPHIL # BLD AUTO: 0.3 X10E3/UL (ref 0–0.4)
EOSINOPHIL NFR BLD AUTO: 3 %
ERYTHROCYTE [DISTWIDTH] IN BLOOD BY AUTOMATED COUNT: 12.9 % (ref 11.6–15.4)
FERRITIN SERPL-MCNC: 95 NG/ML (ref 30–400)
GLOBULIN SER CALC-MCNC: 3 G/DL (ref 1.5–4.5)
GLUCOSE SERPL-MCNC: ABNORMAL MG/DL
HBA1C MFR BLD: 6.5 % (ref 4.8–5.6)
HCT VFR BLD AUTO: 43.9 % (ref 37.5–51)
HDLC SERPL-MCNC: 48 MG/DL
HGB BLD-MCNC: 14.4 G/DL (ref 13–17.7)
IMM GRANULOCYTES # BLD AUTO: 0 X10E3/UL (ref 0–0.1)
IMM GRANULOCYTES NFR BLD AUTO: 1 %
IRON SATN MFR SERPL: 26 % (ref 15–55)
IRON SERPL-MCNC: 102 UG/DL (ref 38–169)
LDLC SERPL CALC-MCNC: 68 MG/DL (ref 0–99)
LYMPHOCYTES # BLD AUTO: 2.7 X10E3/UL (ref 0.7–3.1)
LYMPHOCYTES NFR BLD AUTO: 35 %
MCH RBC QN AUTO: 30.5 PG (ref 26.6–33)
MCHC RBC AUTO-ENTMCNC: 32.8 G/DL (ref 31.5–35.7)
MCV RBC AUTO: 93 FL (ref 79–97)
MONOCYTES # BLD AUTO: 0.7 X10E3/UL (ref 0.1–0.9)
MONOCYTES NFR BLD AUTO: 10 %
NEUTROPHILS # BLD AUTO: 3.9 X10E3/UL (ref 1.4–7)
NEUTROPHILS NFR BLD AUTO: 50 %
PLATELET # BLD AUTO: 148 X10E3/UL (ref 150–450)
POTASSIUM SERPL-SCNC: ABNORMAL MMOL/L
PROT SERPL-MCNC: 7.4 G/DL (ref 6–8.5)
RBC # BLD AUTO: 4.72 X10E6/UL (ref 4.14–5.8)
SODIUM SERPL-SCNC: 140 MMOL/L (ref 134–144)
TIBC SERPL-MCNC: 391 UG/DL (ref 250–450)
TRIGL SERPL-MCNC: 255 MG/DL (ref 0–149)
TSH SERPL DL<=0.005 MIU/L-ACNC: 3.13 UIU/ML (ref 0.45–4.5)
UIBC SERPL-MCNC: 289 UG/DL (ref 111–343)
VIT B12 SERPL-MCNC: 895 PG/ML (ref 232–1245)
VLDLC SERPL CALC-MCNC: 41 MG/DL (ref 5–40)
WBC # BLD AUTO: 7.6 X10E3/UL (ref 3.4–10.8)

## 2022-09-12 RX ORDER — SERTRALINE HYDROCHLORIDE 100 MG/1
TABLET, FILM COATED ORAL
Qty: 180 TABLET | Refills: 1 | Status: SHIPPED | OUTPATIENT
Start: 2022-09-12 | End: 2023-03-10

## 2022-09-13 DIAGNOSIS — M47.22 CERVICAL SPONDYLOSIS WITH RADICULOPATHY: ICD-10-CM

## 2022-09-13 RX ORDER — GABAPENTIN 300 MG/1
CAPSULE ORAL
Qty: 90 CAPSULE | Refills: 0 | Status: SHIPPED | OUTPATIENT
Start: 2022-09-13 | End: 2022-11-08

## 2022-09-13 NOTE — TELEPHONE ENCOUNTER
Provider: Turner   Caller:  Justice  Surgery: -   Surgery Date:-    Last visit: Office Visit with Elayne Tompkins PA-C (03/16/2022)     Next visit: Appointment with Elayne Tompkins PA-C (09/13/2022) Pt Cancelled     Last filled: 8/9/22      Reason for call:         Requested Prescriptions     Pending Prescriptions Disp Refills   • gabapentin (NEURONTIN) 300 MG capsule [Pharmacy Med Name: Gabapentin 300 MG Oral Capsule] 90 capsule 0     Sig: TAKE 1 CAPSULE BY MOUTH THREE TIMES DAILY   ALEJANDRO:   03/16/2022 Gabapentin 300MG 1960 90 30 [facility]    [facility] 2  04/19/2022 Gabapentin 300MG 1960 90 30 [facility]    [facility] 2  05/28/2022 Gabapentin 300MG 1960 90 30 [facility]    [facility] 2  06/27/2022 Hydrocodone Bitartrate/Ac 325MG/10MG 1960 12 3 [facility]    [facility] 40 2  07/05/2022 Gabapentin 300MG 1960 90 30 [facility]    [facility] 2  08/09/2022 Gabapentin 300MG 1960 90 30 [facility]  [address] [place] [address] 2

## 2022-09-17 DIAGNOSIS — M47.22 CERVICAL SPONDYLOSIS WITH RADICULOPATHY: ICD-10-CM

## 2022-09-19 RX ORDER — GABAPENTIN 300 MG/1
CAPSULE ORAL
Qty: 90 CAPSULE | Refills: 0 | OUTPATIENT
Start: 2022-09-19

## 2022-09-24 DIAGNOSIS — I10 ESSENTIAL HYPERTENSION: ICD-10-CM

## 2022-09-26 RX ORDER — LISINOPRIL 20 MG/1
TABLET ORAL
Qty: 90 TABLET | Refills: 1 | Status: SHIPPED | OUTPATIENT
Start: 2022-09-26 | End: 2023-01-20

## 2022-10-01 DIAGNOSIS — G31.84 MCI (MILD COGNITIVE IMPAIRMENT): ICD-10-CM

## 2022-10-03 RX ORDER — MEMANTINE HYDROCHLORIDE 10 MG/1
TABLET ORAL
Qty: 180 TABLET | Refills: 0 | Status: SHIPPED | OUTPATIENT
Start: 2022-10-03 | End: 2022-11-18

## 2022-10-15 DIAGNOSIS — R10.13 EPIGASTRIC ABDOMINAL PAIN: ICD-10-CM

## 2022-10-15 DIAGNOSIS — K21.9 GASTROESOPHAGEAL REFLUX DISEASE: ICD-10-CM

## 2022-10-15 DIAGNOSIS — E78.5 HYPERLIPIDEMIA LDL GOAL <70: ICD-10-CM

## 2022-10-15 DIAGNOSIS — I25.119 CORONARY ARTERY DISEASE INVOLVING NATIVE CORONARY ARTERY OF NATIVE HEART WITH ANGINA PECTORIS: ICD-10-CM

## 2022-10-15 DIAGNOSIS — I10 ESSENTIAL HYPERTENSION: ICD-10-CM

## 2022-10-17 RX ORDER — HYDROCHLOROTHIAZIDE 12.5 MG/1
CAPSULE, GELATIN COATED ORAL
Qty: 90 CAPSULE | Refills: 0 | Status: SHIPPED | OUTPATIENT
Start: 2022-10-17 | End: 2023-01-16 | Stop reason: SDUPTHER

## 2022-10-17 RX ORDER — OMEPRAZOLE 40 MG/1
CAPSULE, DELAYED RELEASE ORAL
Qty: 90 CAPSULE | Refills: 0 | Status: SHIPPED | OUTPATIENT
Start: 2022-10-17 | End: 2023-01-20

## 2022-10-17 RX ORDER — METOPROLOL TARTRATE 50 MG/1
TABLET, FILM COATED ORAL
Qty: 90 TABLET | Refills: 0 | Status: SHIPPED | OUTPATIENT
Start: 2022-10-17 | End: 2023-01-20

## 2022-10-17 RX ORDER — ATORVASTATIN CALCIUM 80 MG/1
TABLET, FILM COATED ORAL
Qty: 90 TABLET | Refills: 0 | Status: SHIPPED | OUTPATIENT
Start: 2022-10-17 | End: 2023-01-20

## 2022-10-17 RX ORDER — FUROSEMIDE 20 MG/1
TABLET ORAL
Qty: 90 TABLET | Refills: 0 | Status: SHIPPED | OUTPATIENT
Start: 2022-10-17 | End: 2023-01-16 | Stop reason: SDUPTHER

## 2022-10-18 ENCOUNTER — OFFICE VISIT (OUTPATIENT)
Dept: NEUROSURGERY | Facility: CLINIC | Age: 62
End: 2022-10-18

## 2022-10-18 VITALS
SYSTOLIC BLOOD PRESSURE: 118 MMHG | HEIGHT: 66 IN | BODY MASS INDEX: 31.95 KG/M2 | WEIGHT: 198.8 LBS | RESPIRATION RATE: 16 BRPM | DIASTOLIC BLOOD PRESSURE: 62 MMHG

## 2022-10-18 DIAGNOSIS — M48.062 SPINAL STENOSIS, LUMBAR REGION, WITH NEUROGENIC CLAUDICATION: Primary | ICD-10-CM

## 2022-10-18 PROCEDURE — 99213 OFFICE O/P EST LOW 20 MIN: CPT | Performed by: PHYSICIAN ASSISTANT

## 2022-10-18 NOTE — PROGRESS NOTES
Patient: Justice Kiser  : 1960  Gender: male    Primary Care Provider: Anshul Martinez MD    Requesting Provider:  No referring provider defined for this encounter.     Chief Complaint: Neck pain, low back pain with standing and walking intolerance    History of Present Illness:  This is a 62-year-old gentleman who is well-known to our clinic.  He has a history of remote lumbar intervention by another surgeon.  He has been seen several times for neck and low back difficulties.  Most recently we have been treating his neck and upper extremity symptoms with gabapentin with good result.  He feels that overall he is doing well in regard to his cervical spine.  His most bothersome complaint today is low back pain.  This is progressed over the last few months.  His pain is worse with standing and walking.  At this point he is unable to tolerate most of his usual activities given his pain symptoms.  Occasionally he will have some radiation to the right lower extremity.  He denies any weakness or bowel or bladder difficulties.  He has not been through formal therapy recently as he feels that he may not tolerate this given his back pain.  He has no new studies at this time.      Past Medical and Surgical History:  Past Medical History:   Diagnosis Date   • Acute maxillary sinusitis    • Arthritis    • CHF (congestive heart failure) (HCC)    • Elevated LFTs    • History of colonic polyps    • History of methicillin resistant Staphylococcus aureus    • History of myocardial infarction    • Hyperlipidemia    • Hypertension    • Left hand pain    • Left shoulder pain    • Low back pain    • MRSA (methicillin resistant Staphylococcus aureus)    • Myocardial infarction (HCC)    • Personal history of congestive heart failure    • Right hip pain    • Rotator cuff syndrome      Past Surgical History:   Procedure Laterality Date   • BACK SURGERY     • CORONARY ANGIOPLASTY WITH STENT PLACEMENT  2012    Circumflex  Xscience V 3.5 MM x 23 mm   • GALLBLADDER SURGERY  2017   • LUMBAR DISCECTOMY  1992    2 level - [facility]   • SHOULDER SURGERY  12/20/2018    Dr. Hoyt Rotator cuff repair       Current Medications:    Current Outpatient Medications:   •  aspirin 81 MG EC tablet, Take 1 tablet by mouth Daily., Disp: , Rfl:   •  atorvastatin (LIPITOR) 80 MG tablet, TAKE 1 TABLET BY MOUTH ONCE DAILY AT BEDTIME, Disp: 90 tablet, Rfl: 0  •  donepezil (ARICEPT) 10 MG tablet, Take 1 tablet by mouth Every Night., Disp: 90 tablet, Rfl: 3  •  ferrous sulfate 325 (65 Fe) MG tablet, Take 1 tablet by mouth once daily with breakfast, Disp: 30 tablet, Rfl: 2  •  furosemide (LASIX) 20 MG tablet, Take 1 tablet by mouth once daily, Disp: 90 tablet, Rfl: 0  •  gabapentin (NEURONTIN) 300 MG capsule, TAKE 1 CAPSULE BY MOUTH THREE TIMES DAILY, Disp: 90 capsule, Rfl: 0  •  hydroCHLOROthiazide (MICROZIDE) 12.5 MG capsule, Take 1 capsule by mouth once daily, Disp: 90 capsule, Rfl: 0  •  lisinopril (PRINIVIL,ZESTRIL) 20 MG tablet, Take 1 tablet by mouth once daily, Disp: 90 tablet, Rfl: 1  •  memantine (NAMENDA) 10 MG tablet, Take 1 tablet by mouth twice daily, Disp: 180 tablet, Rfl: 0  •  metoprolol tartrate (LOPRESSOR) 50 MG tablet, Take 1/2 (one-half) tablet by mouth twice daily, Disp: 90 tablet, Rfl: 0  •  mirtazapine (Remeron) 30 MG tablet, Take 1 tablet by mouth Every Night., Disp: 90 tablet, Rfl: 3  •  Multiple Vitamins-Minerals (CENTRUM SILVER) tablet, Take 1 tablet by mouth Daily., Disp: , Rfl:   •  omeprazole (priLOSEC) 40 MG capsule, Take 1 capsule by mouth once daily, Disp: 90 capsule, Rfl: 0  •  sertraline (ZOLOFT) 100 MG tablet, Take 2 tablets by mouth once daily, Disp: 180 tablet, Rfl: 1  •  triamcinolone (KENALOG) 0.1 % cream, Apply 1 application topically to the appropriate area as directed 2 (Two) Times a Day., Disp: 45 g, Rfl: 1    Allergies:  No Known Allergies      Review of Systems   Musculoskeletal: Positive for back  "pain.   All other systems reviewed and are negative.        Physical Exam  Constitutional:       Appearance: Normal appearance.   HENT:      Head: Normocephalic and atraumatic.   Musculoskeletal:         General: Normal range of motion.      Cervical back: Normal range of motion and neck supple.   Skin:     General: Skin is warm and dry.   Neurological:      Mental Status: He is alert and oriented to person, place, and time.      Sensory: Sensation is intact.      Motor: Motor function is intact.      Coordination: Coordination is intact.      Gait: Gait is intact.   Psychiatric:         Mood and Affect: Mood normal.         Behavior: Behavior normal.           Vitals:    10/18/22 1446   BP: 118/62   BP Location: Left arm   Patient Position: Sitting   Cuff Size: Adult   Resp: 16   Weight: 90.2 kg (198 lb 12.8 oz)   Height: 167.6 cm (66\")         Independent Review of Diagnostic Imaging:  I have reviewed patient's MRI from June 2021.  There is a laminotomy defect on the right at L5-S1.  There is a prominent disc protrusion central to the left at L4-5 narrowing the central canal.  There is generous stenosis at L3-4.    Assessment:  1.  Lumbar stenosis with neurogenic claudication  2.  Cervical radiculopathy, improved    Plan:  Mr. Kiser  is seen today for worsening low back pain with standing and walking intolerance.  He has known stenosis at L3-4 and L4-5.  He continues to do well in regards to his neck however feels that his low back pain is becoming more of a problem.  He does not feel that he would be able to participate in therapy given his standing and walking pain.  I am going to order any lumbar MRI for further evaluation.  We will see patient in follow-up thereafter.        Sofia Van PA-C  "

## 2022-11-05 DIAGNOSIS — M47.22 CERVICAL SPONDYLOSIS WITH RADICULOPATHY: ICD-10-CM

## 2022-11-07 ENCOUNTER — HOSPITAL ENCOUNTER (OUTPATIENT)
Dept: MRI IMAGING | Facility: HOSPITAL | Age: 62
Discharge: HOME | End: 2022-11-07
Admitting: PHYSICIAN ASSISTANT

## 2022-11-07 DIAGNOSIS — M48.062 SPINAL STENOSIS, LUMBAR REGION, WITH NEUROGENIC CLAUDICATION: ICD-10-CM

## 2022-11-07 LAB — CREAT BLDA-MCNC: 1 MG/DL (ref 0.6–1.3)

## 2022-11-07 PROCEDURE — 82565 ASSAY OF CREATININE: CPT

## 2022-11-07 PROCEDURE — 72158 MRI LUMBAR SPINE W/O & W/DYE: CPT

## 2022-11-07 PROCEDURE — A9577 INJ MULTIHANCE: HCPCS | Performed by: PHYSICIAN ASSISTANT

## 2022-11-07 PROCEDURE — 0 GADOBENATE DIMEGLUMINE 529 MG/ML SOLUTION: Performed by: PHYSICIAN ASSISTANT

## 2022-11-07 RX ADMIN — GADOBENATE DIMEGLUMINE 18 ML: 529 INJECTION, SOLUTION INTRAVENOUS at 15:58

## 2022-11-07 NOTE — TELEPHONE ENCOUNTER
Provider:  Max  Surgery/Procedure:  EMG from 2/3/20, Carotid Duplex from 9/19/18  Surgery/Procedure Date:  ^  Last visit:   10/18/22  Next visit: 11/9/22   Patient seen in our office for spinal stenosis, lumbar region with neurogenic claudication.     Reason for call:  Requested Prescriptions     Pending Prescriptions Disp Refills   • gabapentin (NEURONTIN) 300 MG capsule [Pharmacy Med Name: Gabapentin 300 MG Oral Capsule] 90 capsule 0     Sig: TAKE 1 CAPSULE BY MOUTH THREE TIMES DAILY       ALEJANDRO  07/05/2022 Gabapentin 300MG 1960 90 30 [facility]    [facility] 2  08/09/2022 Gabapentin 300MG 1960 90 30 [facility]  [address]  [facility] 2  09/13/2022 Gabapentin 300MG 1960 90 30 [facility]  [address] [place] [address] 2

## 2022-11-08 RX ORDER — GABAPENTIN 300 MG/1
CAPSULE ORAL
Qty: 90 CAPSULE | Refills: 0 | Status: SHIPPED | OUTPATIENT
Start: 2022-11-08 | End: 2022-12-13

## 2022-11-09 ENCOUNTER — OFFICE VISIT (OUTPATIENT)
Dept: NEUROSURGERY | Facility: CLINIC | Age: 62
End: 2022-11-09

## 2022-11-09 VITALS — WEIGHT: 199.4 LBS | BODY MASS INDEX: 32.05 KG/M2 | RESPIRATION RATE: 16 BRPM | HEIGHT: 66 IN

## 2022-11-09 DIAGNOSIS — G89.29 CHRONIC BILATERAL LOW BACK PAIN WITH RIGHT-SIDED SCIATICA: ICD-10-CM

## 2022-11-09 DIAGNOSIS — M51.36 DDD (DEGENERATIVE DISC DISEASE), LUMBAR: ICD-10-CM

## 2022-11-09 DIAGNOSIS — M48.062 SPINAL STENOSIS, LUMBAR REGION, WITH NEUROGENIC CLAUDICATION: Primary | ICD-10-CM

## 2022-11-09 DIAGNOSIS — M54.41 CHRONIC BILATERAL LOW BACK PAIN WITH RIGHT-SIDED SCIATICA: ICD-10-CM

## 2022-11-09 PROBLEM — M51.369 DDD (DEGENERATIVE DISC DISEASE), LUMBAR: Status: ACTIVE | Noted: 2022-11-09

## 2022-11-09 PROCEDURE — 99213 OFFICE O/P EST LOW 20 MIN: CPT | Performed by: PHYSICIAN ASSISTANT

## 2022-11-09 RX ORDER — IBUPROFEN 800 MG/1
800 TABLET ORAL EVERY 8 HOURS PRN
COMMUNITY
Start: 2022-09-13 | End: 2022-11-18

## 2022-11-09 NOTE — PROGRESS NOTES
"Subjective     Chief Complaint: back, bilateral buttock/hip and right leg pain with walking and standing intolerance    Patient ID: Justice Kiser is a 62 y.o. male is here today for follow-up.    History of Present Illness  This is a 62-year-old gentleman who is well-known to our clinic.  He has a history of remote lumbar discectomy by another surgeon.  He has been seen several times for neck and low back difficulties.  Most recently we have been treating his neck and upper extremity symptoms with gabapentin with good result.  He feels that overall he is doing well in regard to his cervical spine.      He was seen a few weeks ago with complaints of low back pain.  This is progressed over the last few months.  His pain is worse with standing and walking.  He says that  If he is up walking for even a few minutes he will start to get pain and a \" burning, heavy pressure\" into his hips bilaterally.  He feels that he becomes weak if he is standing for too long.  Also pain will radiate down his right leg in an L4 distribution into his lateral thigh, anterior shin and top of his foot.   Pain is relieved with sitting or lying down.  He has been taking gabapentin and nonsteroidals but has not yet had any physical therapy.  He is not sure he can participate but would like to avoid surgery if possible.  He presents today with a new MRI that was ordered for review.  He denies any bowel or bladder dysfunction, or falls.    The following portions of the patient's history were reviewed and updated as appropriate: allergies, current medications, past family history, past medical history, past social history, past surgical history and problem list.    Past Medical History:   Diagnosis Date   • Acute maxillary sinusitis    • Arthritis    • CHF (congestive heart failure) ([facility])    • Elevated LFTs    • History of colonic polyps    • History of methicillin resistant Staphylococcus aureus    • History of myocardial infarction    • " Hyperlipidemia    • Hypertension    • Left hand pain    • Left shoulder pain    • Low back pain    • MRSA (methicillin resistant Staphylococcus aureus)    • Myocardial infarction (HCC)    • Personal history of congestive heart failure    • Right hip pain    • Rotator cuff syndrome      Past Surgical History:   Procedure Laterality Date   • BACK SURGERY     • CORONARY ANGIOPLASTY WITH STENT PLACEMENT  2012    Circumflex Xscience V 3.5 MM x 23 mm   • GALLBLADDER SURGERY  2017   • LUMBAR DISCECTOMY  1992    2 level - [facility]   • SHOULDER SURGERY  2018    Dr. Hoyt Rotator cuff repair       Family history:   Family History   Problem Relation Age of Onset   • Hypertension Mother    • Heart attack Mother    • Hyperlipidemia Mother    • Alcohol abuse Mother    • Alcohol abuse Father    • Dementia Sister    • Heart disease Sister    • Developmental Disability Sister    • Stroke Sister    • Alcohol abuse Maternal Aunt    • Alcohol abuse Paternal Uncle    • Diabetes Maternal Grandmother    • Heart disease Brother    • Stroke Sister        Social history:   Social History     Socioeconomic History   • Marital status: Single   Tobacco Use   • Smoking status: Former     Packs/day: 1.50     Years: 35.00     Pack years: 52.50     Types: Cigarettes     Quit date: 2011     Years since quittin.8   • Smokeless tobacco: Never   Vaping Use   • Vaping Use: Never used   Substance and Sexual Activity   • Alcohol use: Not Currently     Comment: 5 mixed drinks per day. recently quit all alcohol 2020   • Drug use: Yes     Types: Marijuana   • Sexual activity: Defer       Review of Systems   Constitutional: Negative for activity change, appetite change, chills, diaphoresis, fatigue, fever and unexpected weight change.   HENT: Negative for congestion, dental problem, drooling, ear discharge, ear pain, facial swelling, hearing loss, mouth sores, nosebleeds, postnasal drip, rhinorrhea, sinus pressure,  "sneezing, sore throat, tinnitus, trouble swallowing and voice change.    Eyes: Negative for photophobia, pain, discharge, redness, itching and visual disturbance.   Respiratory: Negative for apnea, cough, choking, chest tightness, shortness of breath, wheezing and stridor.    Cardiovascular: Negative for chest pain, palpitations and leg swelling.   Gastrointestinal: Negative for abdominal distention, abdominal pain, anal bleeding, blood in stool, constipation, diarrhea, nausea, rectal pain and vomiting.   Endocrine: Negative for cold intolerance, heat intolerance, polydipsia, polyphagia and polyuria.   Genitourinary: Negative for decreased urine volume, difficulty urinating, dysuria, enuresis, flank pain, frequency, genital sores, hematuria and urgency.   Musculoskeletal: Positive for arthralgias and back pain. Negative for gait problem, joint swelling, myalgias, neck pain and neck stiffness.   Skin: Negative for color change, pallor, rash and wound.   Allergic/Immunologic: Negative for environmental allergies, food allergies and immunocompromised state.   Neurological: Negative for dizziness, tremors, seizures, syncope, facial asymmetry, speech difficulty, weakness, light-headedness, numbness and headaches.   Hematological: Negative for adenopathy. Does not bruise/bleed easily.   Psychiatric/Behavioral: Negative for agitation, behavioral problems, confusion, decreased concentration, dysphoric mood, hallucinations, self-injury, sleep disturbance and suicidal ideas. The patient is not nervous/anxious and is not hyperactive.    All other systems reviewed and are negative.      Objective   Resp. rate 16, height 167.6 cm (66\"), weight 90.4 kg (199 lb 6.4 oz).  Body mass index is 32.18 kg/m².    Physical Exam  CONSTITUTIONAL:   - Patient is well-nourished  - Pleasant and appears stated age.  PSYCHIATRIC:  - Normal mood and affect  - Behavior is normal.  HENT:   Head: Normocephalic and Atraumatic.   Eyes:     - Pupils are " equal, round, and reactive to light.     - EOM are normal.   CV:   - Regular rate and rhythm on palpable radial pulse   PULMONARY:   - Speaking in full sentences, breathing non labored  - No wheezing   SKIN:  - Clean, dry and intact   MUSCULOSKELETAL:  - Neck/Back tenderness to palpation is not observed.   - Strength is intact in the upper and lower extremities to direct testing.  - Muscle tone is normal throughout.  - Station and gait are slow and antalgic  - ROM in neck/back is reduced due to pain  NEUROLOGICAL:  - A&Ox3  - Attention span, language function, and cognition are intact.  - Sensation is intact to light touch testing throughout.  - Coordination is intact.     Patient's Body mass index is 32.18 kg/m². indicating that he is obese     MRI of the lumbar spine dated 11/7/2022 was reviewed.  This shows multilevel lumbar spondylosis and degenerative changes.  There is moderate to severe central canal stenosis at L3-4 from an anterior disc bulge and posterior ligament thickening.  There is also a disc herniation at L4-5 eccentric to the right but with central and bilateral foraminal narrowing.  Postoperative changes are present at L5-S1.  Foraminal stenosis is present at this level as well.  Facet arthropathy is present at multiple levels    Assessment & Plan   Mr Kiser is willing to try physical therapy and steroid injections for pain control. We will order these and plan to see him back in January 2023 to discuss his progress. We will also obtain flexion/extension lumbar xrays prior to his follow up appointment.   If He Is Still Struggling follow-up visit, we will need to obtain a CT myelogram of the lumbar spine for surgical planning    Diagnoses and all orders for this visit:    1. Spinal stenosis, lumbar region, with neurogenic claudication (Primary)  -     Ambulatory Referral to Physical Therapy Evaluate and treat  -     XR spine lumbar flex and ext; Future  -     Ambulatory Referral to Pain  Management    2. Chronic bilateral low back pain with right-sided sciatica  -     Ambulatory Referral to Physical Therapy Evaluate and treat  -     XR spine lumbar flex and ext; Future  -     Ambulatory Referral to Pain Management    3. DDD (degenerative disc disease), lumbar  -     Ambulatory Referral to Physical Therapy Evaluate and treat  -     XR spine lumbar flex and ext; Future  -     Ambulatory Referral to Pain Management        Return in about 2 months (around 1/10/2023).    AYAKA BarnettC

## 2022-11-18 ENCOUNTER — OFFICE VISIT (OUTPATIENT)
Dept: NEUROLOGY | Facility: CLINIC | Age: 62
End: 2022-11-18

## 2022-11-18 VITALS
DIASTOLIC BLOOD PRESSURE: 80 MMHG | SYSTOLIC BLOOD PRESSURE: 132 MMHG | BODY MASS INDEX: 33.32 KG/M2 | OXYGEN SATURATION: 98 % | WEIGHT: 200 LBS | HEIGHT: 65 IN | HEART RATE: 54 BPM

## 2022-11-18 DIAGNOSIS — G31.84 MCI (MILD COGNITIVE IMPAIRMENT): Primary | ICD-10-CM

## 2022-11-18 DIAGNOSIS — I67.89 CEREBRAL MICROVASCULAR DISEASE: ICD-10-CM

## 2022-11-18 DIAGNOSIS — F33.1 MODERATE EPISODE OF RECURRENT MAJOR DEPRESSIVE DISORDER: ICD-10-CM

## 2022-11-18 DIAGNOSIS — F51.04 PSYCHOPHYSIOLOGICAL INSOMNIA: ICD-10-CM

## 2022-11-18 PROCEDURE — 99214 OFFICE O/P EST MOD 30 MIN: CPT | Performed by: NURSE PRACTITIONER

## 2022-11-18 RX ORDER — MIRTAZAPINE 30 MG/1
30 TABLET, FILM COATED ORAL NIGHTLY
Qty: 90 TABLET | Refills: 3 | Status: SHIPPED | OUTPATIENT
Start: 2022-11-18

## 2022-11-18 RX ORDER — DONEPEZIL HYDROCHLORIDE 10 MG/1
10 TABLET, FILM COATED ORAL NIGHTLY
Qty: 90 TABLET | Refills: 3 | Status: SHIPPED | OUTPATIENT
Start: 2022-11-18

## 2022-11-18 RX ORDER — MEMANTINE HYDROCHLORIDE 10 MG/1
10 TABLET ORAL 2 TIMES DAILY
Qty: 180 TABLET | Refills: 3 | Status: SHIPPED | OUTPATIENT
Start: 2022-11-18

## 2022-11-18 NOTE — PROGRESS NOTES
Subjective:     Patient ID: Justice Kiser is a 62 y.o. male.    CC:   Chief Complaint   Patient presents with   • mild cognitive impairment       HPI:   History of Present Illness   Today 11/18/2022- This is a pleasant 62-year-old male who presents for a 6-month neurology follow-up on some mild cognitive impairment with symptoms present since 2017. He has been taking donepezil, along with memantine, and mirtazapine for sleep and depression. He also takes aspirin and statin therapy for cerebral microvascular disease. He has previously been recommended for a sleep study, but has kindly declined. Unfortunately, last visit when we saw him, he had recently lost his nephew who helped care for him and assist him with rides. He does follow with his primary care provider on a regular basis. Most recent labs with PCP on 09/06/2022 .    He has been following with neurosurgery in regards to low back pain with prior back surgery. He has been referred to pain management for injections as well as for physical therapy for his low back symptoms.     He reports that his short-term memory is getting a little worse and he struggled with cued recall today. He notes he is doing well overall with the exception of his back. He is still driving and denies any issues. Presently he manages his finances and is planning to have his son oversee this in the future.    He states that his back pain is affecting him presently. He confirms history of back surgery and would like to avoid another surgery. He presently is having physical therapy. He denies any falls since his last visit and using a cane or walker.    He reports insomnia even with mirtazapine 30 mg. He states that he has taken melatonin in the past. He would prefer to try melatonin rather than increasing the mirtazapine dosage. He denies doing a sleep study and would like to hold off doing this until he has a solution for his back problems.    Social history: He denies drinking alcohol  or smoking.     Previous neuro workup & history:  Suspected mild cognitive impairment with symptoms that have been present since 2017 beginning with forgetfulness, repetitiveness, and word finding difficulties, and overall has been stable per he and his family. He has had some impairments in orientation, and executive function. He has had some depression and irritability. He has had some visual hallucinations, previously consumed alcohol, but none since 12/15/2020. He does not have impairments in activities of daily living. He was living with his nephew Harris in [place] previously until he passed away in March of 2022. He has been on donepezil as well as memantine for his memory.      He previously did complete cognitive rehab with our speech language pathologist. He has had chronic difficulty with balance, but no recent falls. He does follow with neurosurgery for neuropathy with chronic neck, and low back pain. He is also taking mirtazapine 30 mg at night.     His daughter and son help some with his finances. He is currently taking sertraline prescribed by Dr. Martinez.    Prior evaluation has included an MRI of the brain showing chronic white matter changes, an unremarkable ECHO/Carotid Dopplers and screening blood work. He also had an updated MRI of the brain on 7/23/2018 which showed chronic small vessel ischemic changes with old lacunar infarcts.  He is on aspirin and statin therapy.    The following portions of the patient's history were reviewed and updated as appropriate: allergies, current medications, past family history, past medical history, past social history, past surgical history and problem list.    Past Medical History:   Diagnosis Date   • Acute maxillary sinusitis    • Arthritis    • CHF (congestive heart failure) (HCC)    • Difficulty walking    • Elevated LFTs    • History of colonic polyps    • History of methicillin resistant Staphylococcus aureus    • History of myocardial infarction    •  Hyperlipidemia    • Hypertension    • Left hand pain    • Left shoulder pain    • Low back pain    • MRSA (methicillin resistant Staphylococcus aureus)    • Myocardial infarction (HCC)    • Personal history of congestive heart failure    • Right hip pain    • Rotator cuff syndrome        Past Surgical History:   Procedure Laterality Date   • BACK SURGERY     • CAROTID STENT     • CORONARY ANGIOPLASTY WITH STENT PLACEMENT  2012    Circumflex Xscience V 3.5 MM x 23 mm   • GALLBLADDER SURGERY  2017   • LUMBAR DISCECTOMY  1992    2 level - [facility]   • SHOULDER SURGERY  2018    Dr. Hoyt Rotator cuff repair       Social History     Socioeconomic History   • Marital status: Single   Tobacco Use   • Smoking status: Former     Packs/day: 1.50     Years: 35.00     Pack years: 52.50     Types: Cigarettes     Quit date: 2011     Years since quittin.8   • Smokeless tobacco: Never   Vaping Use   • Vaping Use: Never used   Substance and Sexual Activity   • Alcohol use: Not Currently     Comment: 5 mixed drinks per day. recently quit all alcohol 2020   • Drug use: Yes     Types: Marijuana   • Sexual activity: Defer       Family History   Problem Relation Age of Onset   • Hypertension Mother    • Heart attack Mother    • Hyperlipidemia Mother    • Alcohol abuse Mother    • Alcohol abuse Father    • Dementia Sister    • Heart disease Sister    • Developmental Disability Sister    • Stroke Sister    • Alcohol abuse Maternal Aunt    • Alcohol abuse Paternal Uncle    • Diabetes Maternal Grandmother    • Heart disease Brother    • Stroke Sister           Current Outpatient Medications:   •  aspirin 81 MG EC tablet, Take 1 tablet by mouth Daily., Disp: , Rfl:   •  atorvastatin (LIPITOR) 80 MG tablet, TAKE 1 TABLET BY MOUTH ONCE DAILY AT BEDTIME, Disp: 90 tablet, Rfl: 0  •  donepezil (ARICEPT) 10 MG tablet, Take 1 tablet by mouth Every Night., Disp: 90 tablet, Rfl: 3  •  ferrous sulfate 325 (65 Fe)  MG tablet, Take 1 tablet by mouth once daily with breakfast, Disp: 30 tablet, Rfl: 2  •  furosemide (LASIX) 20 MG tablet, Take 1 tablet by mouth once daily, Disp: 90 tablet, Rfl: 0  •  gabapentin (NEURONTIN) 300 MG capsule, TAKE 1 CAPSULE BY MOUTH THREE TIMES DAILY, Disp: 90 capsule, Rfl: 0  •  hydroCHLOROthiazide (MICROZIDE) 12.5 MG capsule, Take 1 capsule by mouth once daily, Disp: 90 capsule, Rfl: 0  •  lisinopril (PRINIVIL,ZESTRIL) 20 MG tablet, Take 1 tablet by mouth once daily, Disp: 90 tablet, Rfl: 1  •  memantine (NAMENDA) 10 MG tablet, Take 1 tablet by mouth 2 (Two) Times a Day., Disp: 180 tablet, Rfl: 3  •  metoprolol tartrate (LOPRESSOR) 50 MG tablet, Take 1/2 (one-half) tablet by mouth twice daily, Disp: 90 tablet, Rfl: 0  •  mirtazapine (Remeron) 30 MG tablet, Take 1 tablet by mouth Every Night., Disp: 90 tablet, Rfl: 3  •  Multiple Vitamins-Minerals (CENTRUM SILVER) tablet, Take 1 tablet by mouth Daily., Disp: , Rfl:   •  omeprazole (priLOSEC) 40 MG capsule, Take 1 capsule by mouth once daily, Disp: 90 capsule, Rfl: 0  •  sertraline (ZOLOFT) 100 MG tablet, Take 2 tablets by mouth once daily, Disp: 180 tablet, Rfl: 1  •  triamcinolone (KENALOG) 0.1 % cream, Apply 1 application topically to the appropriate area as directed 2 (Two) Times a Day., Disp: 45 g, Rfl: 1     Review of Systems   Constitutional: Negative for chills, fatigue, fever and unexpected weight change.   HENT: Negative for ear pain, hearing loss, nosebleeds, rhinorrhea and sore throat.    Eyes: Negative for photophobia, pain, discharge, itching and visual disturbance.   Respiratory: Negative for cough, chest tightness, shortness of breath and wheezing.    Cardiovascular: Negative for chest pain, palpitations and leg swelling.   Gastrointestinal: Negative for abdominal pain, blood in stool, constipation, diarrhea, nausea and vomiting.   Genitourinary: Negative for dysuria, frequency, hematuria and urgency.   Musculoskeletal: Positive for  "back pain. Negative for arthralgias, gait problem, joint swelling, myalgias, neck pain and neck stiffness.   Skin: Negative for rash and wound.   Allergic/Immunologic: Negative for environmental allergies and food allergies.   Neurological: Negative for dizziness, tremors, seizures, syncope, speech difficulty, weakness, light-headedness, numbness and headaches.   Hematological: Negative for adenopathy. Does not bruise/bleed easily.   Psychiatric/Behavioral: Positive for decreased concentration. Negative for agitation, confusion, hallucinations, sleep disturbance and suicidal ideas. The patient is not nervous/anxious.    All other systems reviewed and are negative.       Objective:  /80   Pulse 54   Ht 165.1 cm (65\")   Wt 90.7 kg (200 lb)   SpO2 98%   BMI 33.28 kg/m²     Neurologic Exam     Mental Status   Oriented to person, place, and time.   Speech: speech is normal   Level of consciousness: alert  Knowledge: good.   Abnormal comprehension.     Cranial Nerves   Cranial nerves II through XII intact.     Motor Exam   Muscle bulk: normal  Overall muscle tone: normal    Strength   Strength 5/5 except as noted.   Severe low back pain with decreased range of motion-following with neurosurgery and pending pain management eval     Gait, Coordination, and Reflexes     Gait  Gait: (severe antalgia due to severe low back pain, no assistive device)    Coordination   Finger to nose coordination: normal    Tremor   Resting tremor: absent  Intention tremor: present (minimal BUE fine kinetic hand tremor)  Action tremor: absent    Reflexes   Right : 2+  Left : 2+      Physical Exam  Constitutional:       Appearance: Normal appearance.   Musculoskeletal:      Lumbar back: Decreased range of motion.   Neurological:      Mental Status: He is alert and oriented to person, place, and time.      Cranial Nerves: Cranial nerves 2-12 are intact.      Coordination: Finger-Nose-Finger Test normal.   Psychiatric:         " Mood and Affect: Mood and affect normal. Depressed: mood improved from prior visit.         Speech: Speech normal.         Behavior: Behavior is slowed.         Thought Content: Thought content normal.         Cognition and Memory: He exhibits impaired recent memory (mild).         Judgment: Judgment normal.     His Regan Cognitive Assessment score today is a 24 out of 30, with 2 out of 5 for word recall uncued as well as cued. Prior Regan Cognitive Assessment score was a 26 out of 30.    Results:    Most recent labs on 09/06/2022 revealed a CBC with differential with platelets 148 and otherwise normal, comprehensive metabolic panel with alkaline phosphatase 213, which has been chronically elevated, and AST 43, minimally elevated, ALT 36, normal, and otherwise unremarkable. Lipid panel with total cholesterol 157, triglycerides 255, HDL 48, and LDL 68. Hemoglobin A1c was 6.5 percent. TSH level was normal at 3.130. Vitamin B12 level looked good at 895. Iron and IBC levels looked within normal range. Ferritin level was at 95, improved.    Creatinine 1.00 on 11/07/2022.    Assessment/Plan:      Diagnoses and all orders for this visit:    1. MCI (mild cognitive impairment) (Primary)  -     donepezil (ARICEPT) 10 MG tablet; Take 1 tablet by mouth Every Night.  Dispense: 90 tablet; Refill: 3  -     memantine (NAMENDA) 10 MG tablet; Take 1 tablet by mouth 2 (Two) Times a Day.  Dispense: 180 tablet; Refill: 3  -     mirtazapine (Remeron) 30 MG tablet; Take 1 tablet by mouth Every Night.  Dispense: 90 tablet; Refill: 3    2. Cerebral microvascular disease  Comments:  continue aspirin and statin lifelong    3. Moderate episode of recurrent major depressive disorder (HCC)  -     mirtazapine (Remeron) 30 MG tablet; Take 1 tablet by mouth Every Night.  Dispense: 90 tablet; Refill: 3    4. Psychophysiological insomnia  -     mirtazapine (Remeron) 30 MG tablet; Take 1 tablet by mouth Every Night.  Dispense: 90 tablet; Refill:  3    He will be seeing pain management soon for his severe low back issues. He will be working with physical therapy.     I feel that his memory overall is doing fairly well. We have discussed again the recommendation for a sleep study, as he does likely have sleep apnea. He is keeping this in mind once he can get his back feeling better. He is going to try melatonin 5 to 10 mg, 1 to 2 hours before bedtime for several weeks and if he is not seeing improvement in his sleep, he can call us for an increase in the mirtazapine dosing. He will continue the donepezil and the memantine unchanged. He will call us with any additional questions or concerns prior to follow up in clinic.     Reviewed medications, potential side effects and signs and symptoms to report. Discussed risk versus benefits of treatment plan with patient and/or family-including medications, labs and radiology that may be ordered. Addressed questions and concerns during visit. Patient and/or family verbalized understanding and agree with plan.    AS THE PROVIDER, I PERSONALLY WORE PPE DURING ENTIRE FACE TO FACE ENCOUNTER IN CLINIC WITH THE PATIENT. PATIENT ALSO WORE PPE DURING ENTIRE FACE TO FACE ENCOUNTER EXCEPT FOR A MAX OF 30 SECONDS DURING NEUROLOGICAL EVALUATION OF CRANIAL NERVES AND THEN MASK WAS PLACED BACK OVER PATIENT FACE FOR REMAINDER OF VISIT. I WASHED MY HANDS BEFORE AND AFTER VISIT.    During this visit the following were done:  Labs Reviewed [x]    Labs Ordered []    Radiology Reports Reviewed []    Radiology Ordered []    PCP Records Reviewed [x]    Referring Provider Records Reviewed []    ER Records Reviewed []    Hospital Records Reviewed []    History Obtained From Family []    Radiology Images Reviewed []    Other Reviewed [x] neurosurgery notes   Records Requested []      Transcribed from ambient dictation for CAROL Coleman by Tg Cordero.  11/18/22   11:31 EST    Patient or patient representative verbalized consent to  the visit recording.  I have personally performed the services described in this document as transcribed by the above individual, and it is both accurate and complete.  Alberta Singletary, APRN  11/20/2022  20:47 EST

## 2022-11-21 ENCOUNTER — OFFICE VISIT (OUTPATIENT)
Dept: PAIN MEDICINE | Facility: CLINIC | Age: 62
End: 2022-11-21

## 2022-11-21 VITALS
HEIGHT: 65 IN | SYSTOLIC BLOOD PRESSURE: 138 MMHG | DIASTOLIC BLOOD PRESSURE: 75 MMHG | TEMPERATURE: 96.8 F | BODY MASS INDEX: 33.26 KG/M2 | WEIGHT: 199.6 LBS | OXYGEN SATURATION: 94 % | HEART RATE: 50 BPM

## 2022-11-21 DIAGNOSIS — M48.062 SPINAL STENOSIS OF LUMBAR REGION WITH NEUROGENIC CLAUDICATION: Primary | ICD-10-CM

## 2022-11-21 DIAGNOSIS — M54.16 LUMBAR RADICULOPATHY: ICD-10-CM

## 2022-11-21 PROCEDURE — 99204 OFFICE O/P NEW MOD 45 MIN: CPT | Performed by: STUDENT IN AN ORGANIZED HEALTH CARE EDUCATION/TRAINING PROGRAM

## 2022-11-21 NOTE — PROGRESS NOTES
"11/21/2022      Referring Physician: Saskia Morgan PA-C  [address]  [place] [address]    Primary Physician: Anshul Martinez MD    CHIEF COMPLAINT or REASON FOR VISIT: No chief complaint on file.      HISTORY OF PRESENT ILLNESS:  Mr. Justice Kiser is 62 y.o. male who presents as a new patient referral for evaluation and treatment of chronic low back pain with radiation to the right lower extremity.  Patient has a remote history of L5/S1 hemilaminectomy and discectomy.  He follows with neurosurgery.  [facility] for chronic low back pain and chronic axial neck pain.  His primary pain complaint today is low back pain with radiation into the right leg and foot.  He states that over the last year and specifically the last month or 2 he has had increasing difficulty with ambulation due to low back pain.  He describes an aching pain that is more of a \"discomfort\" than sharp stabbing pain.  Patient denies any bowel or bladder dysfunction, lower extremity weakness, new onset saddle anesthesia or unexplained weight loss.     He has begun physical therapy and is engaged in home exercises.  He does take gabapentin.  He has not had any recent spine surgeries or interventions.  He sought consultation with his neurosurgical team again recently who recommended conservative management and interventional pain strategies.        Objective Pain Scoring:   BRIEF PAIN INVENTORY:  Total score:   Pain Score    11/21/22 0951   PainSc:   4   PainLoc: Back      PHQ-2: PHQ-2 Total Score: 2  PHQ-9: PHQ-9: Brief Depression Severity Measure Score: 12  Opioid Risk Tool:         Review of Systems:   ROS negative except as otherwise noted     Past Medical History:   Past Medical History:   Diagnosis Date   • Acute maxillary sinusitis    • Arthritis    • CHF (congestive heart failure) (HCC)    • Difficulty walking    • Elevated LFTs    • History of colonic polyps    • History of methicillin resistant " Staphylococcus aureus    • History of myocardial infarction    • Hyperlipidemia    • Hypertension    • Left hand pain    • Left shoulder pain    • Low back pain    • MRSA (methicillin resistant Staphylococcus aureus)    • Myocardial infarction (HCC)    • Personal history of congestive heart failure    • Right hip pain    • Rotator cuff syndrome          Past Surgical History:   Past Surgical History:   Procedure Laterality Date   • BACK SURGERY     • CAROTID STENT     • CORONARY ANGIOPLASTY WITH STENT PLACEMENT  2012    Circumflex Xscience V 3.5 MM x 23 mm   • GALLBLADDER SURGERY  2017   • LUMBAR DISCECTOMY  1992    2 level - [facility]   • SHOULDER SURGERY  2018    Dr. Hoyt Rotator cuff repair         Family History   Family History   Problem Relation Age of Onset   • Hypertension Mother    • Heart attack Mother    • Hyperlipidemia Mother    • Alcohol abuse Mother    • Alcohol abuse Father    • Dementia Sister    • Heart disease Sister    • Developmental Disability Sister    • Stroke Sister    • Alcohol abuse Maternal Aunt    • Alcohol abuse Paternal Uncle    • Diabetes Maternal Grandmother    • Heart disease Brother    • Stroke Sister          Social History   Social History     Socioeconomic History   • Marital status: Single   Tobacco Use   • Smoking status: Former     Packs/day: 1.50     Years: 35.00     Pack years: 52.50     Types: Cigarettes     Quit date: 2011     Years since quittin.8   • Smokeless tobacco: Never   Vaping Use   • Vaping Use: Never used   Substance and Sexual Activity   • Alcohol use: Not Currently     Comment: 5 mixed drinks per day. recently quit all alcohol 2020   • Drug use: Yes     Types: Hydrocodone, Marijuana   • Sexual activity: Never        Medications:     Current Outpatient Medications:   •  aspirin 81 MG EC tablet, Take 1 tablet by mouth Daily., Disp: , Rfl:   •  atorvastatin (LIPITOR) 80 MG tablet, TAKE 1 TABLET BY MOUTH ONCE DAILY AT  "BEDTIME, Disp: 90 tablet, Rfl: 0  •  donepezil (ARICEPT) 10 MG tablet, Take 1 tablet by mouth Every Night., Disp: 90 tablet, Rfl: 3  •  ferrous sulfate 325 (65 Fe) MG tablet, Take 1 tablet by mouth once daily with breakfast, Disp: 30 tablet, Rfl: 2  •  furosemide (LASIX) 20 MG tablet, Take 1 tablet by mouth once daily, Disp: 90 tablet, Rfl: 0  •  gabapentin (NEURONTIN) 300 MG capsule, TAKE 1 CAPSULE BY MOUTH THREE TIMES DAILY, Disp: 90 capsule, Rfl: 0  •  hydroCHLOROthiazide (MICROZIDE) 12.5 MG capsule, Take 1 capsule by mouth once daily, Disp: 90 capsule, Rfl: 0  •  lisinopril (PRINIVIL,ZESTRIL) 20 MG tablet, Take 1 tablet by mouth once daily, Disp: 90 tablet, Rfl: 1  •  memantine (NAMENDA) 10 MG tablet, Take 1 tablet by mouth 2 (Two) Times a Day., Disp: 180 tablet, Rfl: 3  •  metoprolol tartrate (LOPRESSOR) 50 MG tablet, Take 1/2 (one-half) tablet by mouth twice daily, Disp: 90 tablet, Rfl: 0  •  mirtazapine (Remeron) 30 MG tablet, Take 1 tablet by mouth Every Night., Disp: 90 tablet, Rfl: 3  •  Multiple Vitamins-Minerals (CENTRUM SILVER) tablet, Take 1 tablet by mouth Daily., Disp: , Rfl:   •  omeprazole (priLOSEC) 40 MG capsule, Take 1 capsule by mouth once daily, Disp: 90 capsule, Rfl: 0  •  sertraline (ZOLOFT) 100 MG tablet, Take 2 tablets by mouth once daily, Disp: 180 tablet, Rfl: 1  •  triamcinolone (KENALOG) 0.1 % cream, Apply 1 application topically to the appropriate area as directed 2 (Two) Times a Day., Disp: 45 g, Rfl: 1        Physical Exam:     Vitals:    11/21/22 0951   BP: 138/75   Pulse: 50   Temp: 96.8 °F (36 °C)   SpO2: 94%   Weight: 90.5 kg (199 lb 9.6 oz)   Height: 165.1 cm (65\")   PainSc:   4   PainLoc: Back        General: Alert and oriented, No acute distress.   HEENT: Normocephalic, atraumatic.   Cardiovascular: No gross edema  Respiratory: Respirations are non-labored    Thoracic Spine:   Inspection: no gross abnormality  Paraspinal muscle palpation: nontender  Spinous process palpation: " nontender    Lumbar Spine:   No masses or atrophy  Range of motion - Flexion normal. Extension reduced  Facet Loading: Positive bilaterally  Facet Palpation - tender bilaterally  PSIS tenderness - Negative bilaterally  King's/ABE/Thigh thrust - Negative bilaterally  Straight leg raise: Positive right  Slump test: Positive right    Motor Exam:    Strength: Rate on 1-5 scale Right Left    L1/2- hip flexion 5 5    L3- knee extension 5 5    L4- ankle dorsiflexion 5 5    L5- great toe extension 5 5    S1- ankle plantarflexion 5 5    Sensory Exam: Paresthesia and redo sensation in right L4 dermatome.    Neurologic: Cranial Nerves II-XII are grossly intact.   Clonus -negative bilaterally    Psychiatric: Cooperative.   Gait: Antalgic  Assistive Devices: None    Imaging Studies:   No results found for this or any previous visit.      Impression & Plan:   Mr. Justice Kiser is a 62 y.o. male with past medical history significant for prior L4/5 right hemilaminectomy/discectomy presents as a new patient referral from neurosurgery for evaluation of chronic low back pain with radiation to right lower extremity.  Clinical examination most consistent with lumbar spinal stenosis with neurogenic claudication and right L4 radiculopathy.  I personally reviewed the patient's lumbar MRI which demonstrates fatty endplate changes at L4/5 and L5/S1, severe central stenosis at L3/4 secondary to bulky facet arthropathy, ligamentum flavum hypertrophy and L4/5 severe stenosis secondary to disc bulge, facet arthropathy, epidural lipomatosis.     I did review the patient's lumbar MRI with him in the room.  We additionally discussed the relevant risk benefits and alternatives to proceeding with lumbar epidural steroid injection.  Patient has primarily neurogenic claudication with some right-sided radicular symptoms therefore we will proceed with right L4/5 lumbar interlaminar epidural steroid injection.  If ineffective can consider diagnostic  lumbar medial branch blocks to delineate the culpability of his facet arthropathy versus central stenosis.    1. Spinal stenosis of lumbar region with neurogenic claudication    2. Lumbar radiculopathy        PLAN:  1. Medication Recommendations: Continue gabapentin    2. Physical Therapy: Continue PT    3. Psychological: defer    4. Complementary and alternative therapies:     5. Labs: None indicated     6. Imaging: Lumbar MRI reviewed as stated above    7. Interventions: Schedule right L4/5 LESI (CPT 69247).  Consider facet blocks    8. Referrals: None indicated     9. Records requested: n/a    10. Lifestyle goals:    Return in about 4 weeks (around 12/19/2022).       [facility]  Maximo Li MD

## 2022-12-10 DIAGNOSIS — M47.22 CERVICAL SPONDYLOSIS WITH RADICULOPATHY: ICD-10-CM

## 2022-12-13 ENCOUNTER — TELEPHONE (OUTPATIENT)
Dept: NEUROSURGERY | Facility: CLINIC | Age: 62
End: 2022-12-13

## 2022-12-13 RX ORDER — GABAPENTIN 300 MG/1
CAPSULE ORAL
Qty: 90 CAPSULE | Refills: 0 | Status: SHIPPED | OUTPATIENT
Start: 2022-12-13 | End: 2023-01-20

## 2022-12-13 NOTE — TELEPHONE ENCOUNTER
Provider:  Eddie  Surgery/Procedure:  PARAS  Surgery/Procedure Date: PARAS    Last visit:   11/9/22  Next visit: NA - needs follow up scheduled in January     Reason for call: refill request for Gabapentin. Refill is appropriate, patient does need to schedule follow up in January.    Assessment & Plan     Mr Kiser is willing to try physical therapy and steroid injections for pain control. We will order these and plan to see him back in January 2023 to discuss his progress. We will also obtain flexion/extension lumbar xrays prior to his follow up appointment.   If He Is Still Struggling follow-up visit, we will need to obtain a CT myelogram of the lumbar spine for surgical planning    Sukhjinder:  07/05/2022 Gabapentin 300MG 1958 [address], [facility]    [facility] 1  08/09/2022 Gabapentin 300MG 1958 [address], [facility]    [facility] 1 09/13/2022 Gabapentin 300MG 1958 [address], [facility]    [facility] 1 11/08/2022 Gabapentin 300MG 1958 90 [address], [facility]    [facility] 1

## 2022-12-13 NOTE — TELEPHONE ENCOUNTER
Provider:  Eddie  Surgery/Procedure:  PARAS  Surgery/Procedure Date:  NA  Last visit:   11/9/22  Next visit: Needs follow up in January     Reason for call: Please schedule patient for follow up with Elyssa Morgan PA-C in January.

## 2022-12-15 ENCOUNTER — OFFICE VISIT (OUTPATIENT)
Dept: FAMILY MEDICINE CLINIC | Facility: CLINIC | Age: 62
End: 2022-12-15

## 2022-12-15 VITALS
TEMPERATURE: 97.5 F | BODY MASS INDEX: 31.82 KG/M2 | RESPIRATION RATE: 18 BRPM | SYSTOLIC BLOOD PRESSURE: 132 MMHG | HEART RATE: 50 BPM | OXYGEN SATURATION: 94 % | DIASTOLIC BLOOD PRESSURE: 62 MMHG | WEIGHT: 191 LBS | HEIGHT: 65 IN

## 2022-12-15 DIAGNOSIS — G31.84 MCI (MILD COGNITIVE IMPAIRMENT): ICD-10-CM

## 2022-12-15 DIAGNOSIS — E78.5 HYPERLIPIDEMIA LDL GOAL <70: ICD-10-CM

## 2022-12-15 DIAGNOSIS — Z00.00 MEDICARE ANNUAL WELLNESS VISIT, SUBSEQUENT: Primary | ICD-10-CM

## 2022-12-15 DIAGNOSIS — G89.29 CHRONIC BILATERAL LOW BACK PAIN WITHOUT SCIATICA: ICD-10-CM

## 2022-12-15 DIAGNOSIS — I10 ESSENTIAL HYPERTENSION: ICD-10-CM

## 2022-12-15 DIAGNOSIS — R73.9 HYPERGLYCEMIA: ICD-10-CM

## 2022-12-15 DIAGNOSIS — J06.9 VIRAL UPPER RESPIRATORY TRACT INFECTION: ICD-10-CM

## 2022-12-15 DIAGNOSIS — R45.89 DEPRESSED MOOD: ICD-10-CM

## 2022-12-15 DIAGNOSIS — M54.50 CHRONIC BILATERAL LOW BACK PAIN WITHOUT SCIATICA: ICD-10-CM

## 2022-12-15 PROCEDURE — 99214 OFFICE O/P EST MOD 30 MIN: CPT | Performed by: FAMILY MEDICINE

## 2022-12-15 PROCEDURE — 1170F FXNL STATUS ASSESSED: CPT | Performed by: FAMILY MEDICINE

## 2022-12-15 PROCEDURE — 1159F MED LIST DOCD IN RCRD: CPT | Performed by: FAMILY MEDICINE

## 2022-12-15 PROCEDURE — 1125F AMNT PAIN NOTED PAIN PRSNT: CPT | Performed by: FAMILY MEDICINE

## 2022-12-15 PROCEDURE — G0439 PPPS, SUBSEQ VISIT: HCPCS | Performed by: FAMILY MEDICINE

## 2022-12-15 NOTE — PROGRESS NOTES
The ABCs of the Annual Wellness Visit  Subsequent Medicare Wellness Visit    Subjective    Justice Kiser is a 62 y.o. male who presents for a Subsequent Medicare Wellness Visit.      Health maintenance  The patient denies any chest pain or swelling in his legs. He states he has occasional spasms in his stomach. He states he has had headaches since he has had an infiltrate. He denies any issues with urination. He denies any issues with hearing. He denies any syncopal episodes. He states he has been to the dentist and had an eye exam. He states he does not have a living will. He states he does not drink alcohol. He denies any falls. He states he is unsteady when he walks. He states he has received his flu vaccine on 10/06/2022. He states he received his second COVID-19 booster on 10/06/2022. He states he has not received his shingles vaccine.    The following portions of the patient's history were reviewed and   updated as appropriate: allergies, current medications, past family history, past medical history, past social history, past surgical history and problem list.    Compared to one year ago, the patient feels his physical   health is worse.back issues    Compared to one year ago, the patient feels his mental   health is worse. Memory a little worse    Recent Hospitalizations:  He was not admitted to the hospital during the last year.       Current Medical Providers:  Patient Care Team:  Anshul Martinez MD as PCP - General  Anshul Martinez MD as PCP - Family Medicine  Tacho Crouch IV, MD as Cardiologist (Interventional Cardiology)  Erick Hoyt MD as Surgeon (Orthopedic Surgery)  Maximo Li MD as Consulting Physician (Pain Medicine)    Outpatient Medications Prior to Visit   Medication Sig Dispense Refill   • aspirin 81 MG EC tablet Take 1 tablet by mouth Daily.     • atorvastatin (LIPITOR) 80 MG tablet TAKE 1 TABLET BY MOUTH ONCE DAILY AT BEDTIME 90 tablet 0   • donepezil (ARICEPT) 10  MG tablet Take 1 tablet by mouth Every Night. 90 tablet 3   • ferrous sulfate 325 (65 Fe) MG tablet Take 1 tablet by mouth once daily with breakfast 30 tablet 2   • furosemide (LASIX) 20 MG tablet Take 1 tablet by mouth once daily 90 tablet 0   • gabapentin (NEURONTIN) 300 MG capsule TAKE 1 CAPSULE BY MOUTH THREE TIMES DAILY 90 capsule 0   • hydroCHLOROthiazide (MICROZIDE) 12.5 MG capsule Take 1 capsule by mouth once daily 90 capsule 0   • lisinopril (PRINIVIL,ZESTRIL) 20 MG tablet Take 1 tablet by mouth once daily 90 tablet 1   • memantine (NAMENDA) 10 MG tablet Take 1 tablet by mouth 2 (Two) Times a Day. 180 tablet 3   • metoprolol tartrate (LOPRESSOR) 50 MG tablet Take 1/2 (one-half) tablet by mouth twice daily 90 tablet 0   • mirtazapine (Remeron) 30 MG tablet Take 1 tablet by mouth Every Night. 90 tablet 3   • Multiple Vitamins-Minerals (CENTRUM SILVER) tablet Take 1 tablet by mouth Daily.     • omeprazole (priLOSEC) 40 MG capsule Take 1 capsule by mouth once daily 90 capsule 0   • sertraline (ZOLOFT) 100 MG tablet Take 2 tablets by mouth once daily 180 tablet 1   • triamcinolone (KENALOG) 0.1 % cream Apply 1 application topically to the appropriate area as directed 2 (Two) Times a Day. 45 g 1     No facility-administered medications prior to visit.       No opioid medication identified on active medication list. I have reviewed chart for other potential  high risk medication/s and harmful drug interactions in the elderly.          Aspirin is on active medication list. Aspirin use is indicated based on review of current medical condition/s. Pros and cons of this therapy have been discussed today. Benefits of this medication outweigh potential harm.  Patient has been encouraged to continue taking this medication.  .      Patient Active Problem List   Diagnosis   • Essential hypertension   • Coronary artery disease involving native coronary artery of native heart with angina pectoris (HCC)   • Hyperlipidemia LDL  "goal <70   • Depression   • Anxiety   • Hyperglycemia   • Paresthesia of right foot   • Tremor   • GERD (gastroesophageal reflux disease)   • Arthritis   • Pierson's esophagus without dysplasia   • MCI (mild cognitive impairment)   • Mitral valve insufficiency   • Alcohol induced fatty liver   • PVD (peripheral vascular disease) with claudication (HCC)   • Alcohol abuse, in remission   • Cerebral microvascular disease   • Chronic bilateral low back pain with right-sided sciatica   • Spinal stenosis, lumbar region, with neurogenic claudication   • DDD (degenerative disc disease), lumbar   • Psychophysiological insomnia   • Depressed mood     Advance Care Planning  Advance Directive is not on file.  ACP discussion was held with the patient during this visit. discussed imprtance of living will     Objective    Vitals:    12/15/22 0851   BP: 132/62   Pulse: 50   Resp: 18   Temp: 97.5 °F (36.4 °C)   SpO2: 94%   Weight: 86.6 kg (191 lb)   Height: 165.1 cm (65\")   PainSc:   4     Estimated body mass index is 31.78 kg/m² as calculated from the following:    Height as of this encounter: 165.1 cm (65\").    Weight as of this encounter: 86.6 kg (191 lb).    BMI is >= 30 and <35. (Class 1 Obesity). The following options were offered after discussion;: exercise counseling/recommendations and nutrition counseling/recommendations      Does the patient have evidence of cognitive impairment? Yes, sees neurology    Lab Results   Component Value Date    CHLPL 116 12/15/2022    TRIG 118 12/15/2022    HDL 45 12/15/2022    LDL 50 12/15/2022    VLDL 21 12/15/2022    HGBA1C 6.30 (H) 12/15/2022        HEALTH RISK ASSESSMENT    Smoking Status:  Social History     Tobacco Use   Smoking Status Former   • Packs/day: 1.50   • Years: 35.00   • Pack years: 52.50   • Types: Cigarettes   • Quit date: 2011   • Years since quittin.9   Smokeless Tobacco Never     Alcohol Consumption:  Social History     Substance and Sexual Activity   Alcohol " Use Not Currently    Comment: 5 mixed drinks per day. recently quit all alcohol 12/2020     Fall Risk Screen:    DYLON Fall Risk Assessment was completed, and patient is at LOW risk for falls.Assessment completed on:12/15/2022    Depression Screening:  PHQ-2/PHQ-9 Depression Screening 12/15/2022   Retired PHQ-9 Total Score -   Retired Total Score -   Little Interest or Pleasure in Doing Things 0-->not at all   Feeling Down, Depressed or Hopeless 0-->not at all   Trouble Falling or Staying Asleep, or Sleeping Too Much -   Feeling Tired or Having Little Energy -   Poor Appetite or Overeating -   Feeling Bad about Yourself - or that You are a Failure or Have Let Yourself or Your Family Down -   Trouble Concentrating on Things, Such as Reading the Newspaper or Watching Television -   Moving or Speaking So Slowly that Other People Could Have Noticed? Or the Opposite - Being So Fidgety -   Thoughts that You Would be Better Off Dead or of Hurting Yourself in Some Way -   PHQ-9: Brief Depression Severity Measure Score 0   If You Checked Off Any Problems, How Difficult Have These Problems Made It For You to Do Your Work, Take Care of Things at Home, or Get Along with Other People? -       Health Habits and Functional and Cognitive Screening:  Functional & Cognitive Status 12/15/2022   Do you have difficulty preparing food and eating? No   Do you have difficulty bathing yourself, getting dressed or grooming yourself? No   Do you have difficulty using the toilet? No   Do you have difficulty moving around from place to place? No   Do you have trouble with steps or getting out of a bed or a chair? No   Current Diet Limited Junk Food   Dental Exam Up to date   Eye Exam Up to date   Exercise (times per week) Other        Exercise Frequency Comment infreequent   Current Exercises Include Walking   Current Exercise Activities Include -   Do you need help using the phone?  No   Are you deaf or do you have serious difficulty hearing?   No   Do you need help with transportation? No   Do you need help shopping? No   Do you need help preparing meals?  No   Do you need help with housework?  No   Do you need help with laundry? No   Do you need help taking your medications? No   Do you need help managing money? No   Do you ever drive or ride in a car without wearing a seat belt? No   Have you felt unusual stress, anger or loneliness in the last month? No   Who do you live with? Child   If you need help, do you have trouble finding someone available to you? No   Have you been bothered in the last four weeks by sexual problems? No   Do you have difficulty concentrating, remembering or making decisions? Yes       Age-appropriate Screening Schedule:  Refer to the list below for future screening recommendations based on patient's age, sex and/or medical conditions. Orders for these recommended tests are listed in the plan section. The patient has been provided with a written plan.    Health Maintenance   Topic Date Due   • TDAP/TD VACCINES (1 - Tdap) Never done   • ZOSTER VACCINE (1 of 2) Never done   • LIPID PANEL  12/15/2023   • INFLUENZA VACCINE  Completed                CMS Preventative Services Quick Reference  Risk Factors Identified During Encounter  Fall Risk-High or Moderate: Discussed Fall Prevention in the home  Immunizations Discussed/Encouraged: Shingrix and Pneumonia vaccine in a few months when he turns 63  Dental Screening Recommended  Vision Screening Recommended  The above risks/problems have been discussed with the patient.  Pertinent information has been shared with the patient in the After Visit Summary.  An After Visit Summary and PPPS were made available to the patient.      Here for Medicare wellness examination.  Continue routine health maintenance including routine dentistry, eye exam, safety, seatbelt use, exercise and proper nutrition.      Follow Up:   Next Medicare Wellness visit to be scheduled in 1 year.       Additional E&M  "Note during same encounter follows:  Patient has multiple medical problems which are significant and separately identifiable that require additional work above and beyond the Medicare Wellness Visit.      Chief Complaint  Medicare Wellness-subsequent (No concerns other than his back pain.)    Subjective        HPI  Justice Kiser is also being seen today for    Back pain  The patient presents today with complaints of back pain. He states he was supposed to have an injection in his back, however, he became ill and was unable to have the injection. He is receiving an injection in his back by Dr. Li at [facility]. He denies any pain radiating down his legs. He states the pain is localized to the left side of his back. He states he does not want to have surgery.    Hypertension  The patient's blood pressure is within normal limits today. He continues to take his blood pressure medication.    Hyperlipidemia  The patient continues to take his cholesterol medication.    Mood  The patient states his mood is doing a little better. He states his daughter has moved back in with him, which has helped some.    Memory loss  The patient states his memory is worse. He states he saw his neurologist, CAROL Echols, recently. He is currently taking Namenda and Aricept.      Review of Systems   Constitutional: Negative.    HENT: Positive for congestion. Negative for hearing loss.    Respiratory: Negative.    Cardiovascular: Negative.  Negative for leg swelling.   Gastrointestinal: Negative.         Occ ruq spasm   Genitourinary: Negative.    Musculoskeletal: Positive for back pain.   Neurological: Negative for syncope.   Psychiatric/Behavioral: Positive for dysphoric mood.       Objective   Vital Signs:  /62   Pulse 50   Temp 97.5 °F (36.4 °C)   Resp 18   Ht 165.1 cm (65\")   Wt 86.6 kg (191 lb)   SpO2 94%   BMI 31.78 kg/m²     Physical Exam  Vitals and nursing note reviewed.   Constitutional:       General: " He is not in acute distress.     Appearance: Normal appearance. He is well-developed. He is not ill-appearing.   HENT:      Head: Normocephalic and atraumatic.      Right Ear: Hearing, tympanic membrane, ear canal and external ear normal.      Left Ear: Hearing, tympanic membrane, ear canal and external ear normal.      Nose: Nose normal. No congestion or rhinorrhea.      Mouth/Throat:      Mouth: Mucous membranes are moist.      Pharynx: No oropharyngeal exudate or posterior oropharyngeal erythema.   Eyes:      General: Lids are normal.      Conjunctiva/sclera: Conjunctivae normal.      Pupils: Pupils are equal, round, and reactive to light.   Neck:      Thyroid: No thyromegaly.   Cardiovascular:      Rate and Rhythm: Normal rate and regular rhythm.      Heart sounds: Normal heart sounds. No murmur heard.    No friction rub.   Pulmonary:      Effort: Pulmonary effort is normal. No respiratory distress.      Breath sounds: Normal breath sounds. No wheezing or rales.   Abdominal:      General: Bowel sounds are normal. There is no distension.      Palpations: Abdomen is soft. There is no mass.      Tenderness: There is no abdominal tenderness. There is no guarding or rebound.   Musculoskeletal:      Right lower leg: No edema.      Left lower leg: No edema.      Comments: Tenderness of the lumbar paraspinous musculature.   Skin:     General: Skin is warm and dry.   Neurological:      General: No focal deficit present.      Mental Status: He is alert.   Psychiatric:         Mood and Affect: Mood normal.         Speech: Speech normal.         Behavior: Behavior normal.                         Assessment and Plan   Diagnoses and all orders for this visit:    1. Medicare annual wellness visit, subsequent (Primary)    2. Viral upper respiratory tract infection    3. Chronic bilateral low back pain without sciatica    4. Essential hypertension  -     Comprehensive Metabolic Panel  -     CBC & Differential    5.  Hyperlipidemia LDL goal <70  -     Comprehensive Metabolic Panel  -     Lipid Panel With / Chol / HDL Ratio    6. Depressed mood    7. MCI (mild cognitive impairment)    8. Hyperglycemia  -     Hemoglobin A1c      Viral upper respiratory infection.  Improving.  No medication needed at this time.    Chronic bilateral low back pain.  Continue follow-up with pain management and is to get injections.    Hypertension.  Blood pressure stable.  Continue lisinopril and metoprolol and hydrochlorothiazide.  Check CBC and CMP.    Hyperlipidemia.  Due for lipid panel.  Continue atorvastatin.    Depressed mood.  Stable on sertraline.    Mild cognitive impairment.  Sees neurology.  Currently on Namenda and Aricept.    Hyperglycemia.  Due for A1c check.  Last A1c was 6.5.             Follow Up   No follow-ups on file.  Patient was given instructions and counseling regarding his condition or for health maintenance advice. Please see specific information pulled into the AVS if appropriate.     Anshul Martinez MD  Transcribed from ambient dictation for Anshul Martinez MD by Ethel Marrero.  12/15/22   09:54 EST    Patient or patient representative verbalized consent to the visit recording.  I have personally performed the services described in this document as transcribed by the above individual, and it is both accurate and complete.  Anshul Martinez MD  12/16/2022  18:28 EST

## 2022-12-16 LAB
ALBUMIN SERPL-MCNC: 4.5 G/DL (ref 3.5–5.2)
ALBUMIN/GLOB SERPL: 1.5 G/DL
ALP SERPL-CCNC: 260 U/L (ref 39–117)
ALT SERPL-CCNC: 24 U/L (ref 1–41)
AST SERPL-CCNC: 32 U/L (ref 1–40)
BASOPHILS # BLD AUTO: 0.05 10*3/MM3 (ref 0–0.2)
BASOPHILS NFR BLD AUTO: 0.6 % (ref 0–1.5)
BILIRUB SERPL-MCNC: 0.3 MG/DL (ref 0–1.2)
BUN SERPL-MCNC: 26 MG/DL (ref 8–23)
BUN/CREAT SERPL: 23.9 (ref 7–25)
CALCIUM SERPL-MCNC: 9.2 MG/DL (ref 8.6–10.5)
CHLORIDE SERPL-SCNC: 101 MMOL/L (ref 98–107)
CHOLEST SERPL-MCNC: 116 MG/DL (ref 0–200)
CHOLEST/HDLC SERPL: 2.58 {RATIO}
CO2 SERPL-SCNC: 27.7 MMOL/L (ref 22–29)
CREAT SERPL-MCNC: 1.09 MG/DL (ref 0.76–1.27)
EGFRCR SERPLBLD CKD-EPI 2021: 75.8 ML/MIN/1.73
EOSINOPHIL # BLD AUTO: 0.18 10*3/MM3 (ref 0–0.4)
EOSINOPHIL NFR BLD AUTO: 2 % (ref 0.3–6.2)
ERYTHROCYTE [DISTWIDTH] IN BLOOD BY AUTOMATED COUNT: 12.4 % (ref 12.3–15.4)
GLOBULIN SER CALC-MCNC: 3 GM/DL
GLUCOSE SERPL-MCNC: 101 MG/DL (ref 65–99)
HBA1C MFR BLD: 6.3 % (ref 4.8–5.6)
HCT VFR BLD AUTO: 43.1 % (ref 37.5–51)
HDLC SERPL-MCNC: 45 MG/DL (ref 40–60)
HGB BLD-MCNC: 14.3 G/DL (ref 13–17.7)
IMM GRANULOCYTES # BLD AUTO: 0.09 10*3/MM3 (ref 0–0.05)
IMM GRANULOCYTES NFR BLD AUTO: 1 % (ref 0–0.5)
LDLC SERPL CALC-MCNC: 50 MG/DL (ref 0–100)
LYMPHOCYTES # BLD AUTO: 2.69 10*3/MM3 (ref 0.7–3.1)
LYMPHOCYTES NFR BLD AUTO: 29.9 % (ref 19.6–45.3)
MCH RBC QN AUTO: 31.6 PG (ref 26.6–33)
MCHC RBC AUTO-ENTMCNC: 33.2 G/DL (ref 31.5–35.7)
MCV RBC AUTO: 95.4 FL (ref 79–97)
MONOCYTES # BLD AUTO: 0.7 10*3/MM3 (ref 0.1–0.9)
MONOCYTES NFR BLD AUTO: 7.8 % (ref 5–12)
NEUTROPHILS # BLD AUTO: 5.3 10*3/MM3 (ref 1.7–7)
NEUTROPHILS NFR BLD AUTO: 58.7 % (ref 42.7–76)
NRBC BLD AUTO-RTO: 0.1 /100 WBC (ref 0–0.2)
PLATELET # BLD AUTO: 182 10*3/MM3 (ref 140–450)
POTASSIUM SERPL-SCNC: 4.1 MMOL/L (ref 3.5–5.2)
PROT SERPL-MCNC: 7.5 G/DL (ref 6–8.5)
RBC # BLD AUTO: 4.52 10*6/MM3 (ref 4.14–5.8)
SODIUM SERPL-SCNC: 136 MMOL/L (ref 136–145)
TRIGL SERPL-MCNC: 118 MG/DL (ref 0–150)
VLDLC SERPL CALC-MCNC: 21 MG/DL (ref 5–40)
WBC # BLD AUTO: 9.01 10*3/MM3 (ref 3.4–10.8)

## 2022-12-20 ENCOUNTER — OUTSIDE FACILITY SERVICE (OUTPATIENT)
Dept: PAIN MEDICINE | Facility: CLINIC | Age: 62
End: 2022-12-20

## 2022-12-20 ENCOUNTER — DOCUMENTATION (OUTPATIENT)
Dept: PAIN MEDICINE | Facility: CLINIC | Age: 62
End: 2022-12-20

## 2022-12-20 PROCEDURE — 62323 NJX INTERLAMINAR LMBR/SAC: CPT | Performed by: STUDENT IN AN ORGANIZED HEALTH CARE EDUCATION/TRAINING PROGRAM

## 2022-12-20 NOTE — PROGRESS NOTES
[facility]  [address] [place] [address]          PROCEDURE: Fluoroscopically-guided right L4-5 lumbar Interlaminar Epidural Steroid Injection     PRE-OP DIAGNOSIS: Lumbar spinal stenosis  POST-OP DIAGNOSIS: Same    BLOOD THINNERS (ANTIPLATELETS/ANTICOAGULANTS): Were discussed with the patient and JOSE Guidelines were followed.     CONSENT: Risks, benefits and options were explained to the patient, all questions were answered and written informed consent was obtained.     ANESTHESIA: See Anesthesia note    PROCEDURE NOTE: A pre-procedural time out was performed to confirm the correct patient, procedure, side, and site. Standard ASA monitors were applied and oxygen via nasal cannula was provided. All proceduralists donned sterile gloves with masks and surgical hats. The patient was placed prone with pillow under the abdomen and all pressure points padded. The patient's lumbar spine was prepped in standard fashion using [Chlorhexidine] and draped with sterile towels. The target lumbar interspace was identified using fluoroscopy. The overlying skin and subcutaneous tissue was anesthetized with 1% lidocaine. A 20 gauge 10 cm Tuohy needle was advanced using intermittent AP and lateral fluoroscopy. The ligamentum flavum was engaged. Access to the epidural space was gained using a loss of resistance technique to air. Needle placement was confirmed with 1 ml of Omnipaque 180 mgI/ml contrast using biplanar live fluoroscopic imaging. An epidurogram was noted without evidence of intravascular or intrathecal spread. After negative aspiration, a mixture containing 3 ml of preservative-free normal saline, dexamethasone 10mg steroid, lidocaine 1% - 2 ml local anesthetic for a total volume of 6 ml was injected under direct visualization with fluoroscopy. The Tuohy needle was flushed, removed and a bandage applied.     EBL: None     COMPLICATIONS: None     The patient was monitored for at least  30 minutes prior to discharge. Vital signs remained stable throughout the procedure and in the recovery area. There were no immediate complications and the patient tolerated the procedure well. Sensory and motor exam was unchanged from baseline. The patient received written discharge instructions prior to discharge.     FOLLOW UP: As scheduled     ADDITIONAL NOTES:     [facility]   Maximo Li MD

## 2023-01-16 ENCOUNTER — OFFICE VISIT (OUTPATIENT)
Dept: PAIN MEDICINE | Facility: CLINIC | Age: 63
End: 2023-01-16
Payer: MEDICARE

## 2023-01-16 VITALS
BODY MASS INDEX: 31.72 KG/M2 | HEIGHT: 65 IN | RESPIRATION RATE: 16 BRPM | OXYGEN SATURATION: 93 % | WEIGHT: 190.4 LBS | DIASTOLIC BLOOD PRESSURE: 72 MMHG | SYSTOLIC BLOOD PRESSURE: 122 MMHG | TEMPERATURE: 96.8 F | HEART RATE: 58 BPM

## 2023-01-16 DIAGNOSIS — M48.062 SPINAL STENOSIS OF LUMBAR REGION WITH NEUROGENIC CLAUDICATION: Primary | ICD-10-CM

## 2023-01-16 PROCEDURE — 99213 OFFICE O/P EST LOW 20 MIN: CPT | Performed by: STUDENT IN AN ORGANIZED HEALTH CARE EDUCATION/TRAINING PROGRAM

## 2023-01-16 RX ORDER — HYDROCHLOROTHIAZIDE 12.5 MG/1
CAPSULE, GELATIN COATED ORAL
COMMUNITY

## 2023-01-16 NOTE — PROGRESS NOTES
"Referring Physician: No referring provider defined for this encounter.    Primary Physician: Anshul Martinez MD    CHIEF COMPLAINT or REASON FOR VISIT: Follow-up      Initial HPI 11/21/2022:  Mr. Justice Kiser is 62 y.o. male who presents as a new patient referral for evaluation and treatment of chronic low back pain with radiation to the right lower extremity.  Patient has a remote history of L5/S1 hemilaminectomy and discectomy.  He follows with neurosurgery.  [facility] for chronic low back pain and chronic axial neck pain.  His primary pain complaint today is low back pain with radiation into the right leg and foot.  He states that over the last year and specifically the last month or 2 he has had increasing difficulty with ambulation due to low back pain.  He describes an aching pain that is more of a \"discomfort\" than sharp stabbing pain.  Patient denies any bowel or bladder dysfunction, lower extremity weakness, new onset saddle anesthesia or unexplained weight loss.     He has begun physical therapy and is engaged in home exercises.  He does take gabapentin.  He has not had any recent spine surgeries or interventions.  He sought consultation with his neurosurgical team again recently who recommended conservative management and interventional pain strategies.    Interval history: Patient returns to clinic after undergoing right-sided L4/L5 lumbar epidural steroid injection with her percent resolution of his pain ongoing.  He states that this has changed his life, allowed him to go fishing again.  Denies any additional concerns or complaints.    Interventions:  12/20/2022: Right paramedian L4/5 LESI with 100% relief ongoing      Objective Pain Scoring:   BRIEF PAIN INVENTORY:  Total score:   Pain Score    01/16/23 1331   PainSc:   1   PainLoc: Back      PHQ-2: PHQ-2 Total Score: 0  PHQ-9: PHQ-9: Brief Depression Severity Measure Score: 0  Opioid Risk Tool:         Review of Systems:   ROS " negative except as otherwise noted     Past Medical History:   Past Medical History:   Diagnosis Date   • Acute maxillary sinusitis    • Arthritis    • CHF (congestive heart failure) (HCC)    • Colon polyp    • Depression    • Difficulty walking    • Elevated LFTs    • GERD (gastroesophageal reflux disease)    • History of colonic polyps    • History of methicillin resistant Staphylococcus aureus    • History of myocardial infarction    • Hyperlipidemia    • Hypertension    • Left hand pain    • Left shoulder pain    • Low back pain    • MRSA (methicillin resistant Staphylococcus aureus)    • Myocardial infarction (HCC)    • Personal history of congestive heart failure    • Right hip pain    • Rotator cuff syndrome          Past Surgical History:   Past Surgical History:   Procedure Laterality Date   • BACK SURGERY     • CAROTID STENT     • CORONARY ANGIOPLASTY WITH STENT PLACEMENT  2012    Circumflex Xscience V 3.5 MM x 23 mm   • CORONARY STENT PLACEMENT     • ENDOMETRIAL ABLATION     • EPIDURAL BLOCK     • GALLBLADDER SURGERY  2017   • LUMBAR DISCECTOMY  1992    2 level - [facility]   • LYMPH NODE BIOPSY     • SHOULDER SURGERY  2018    Dr. Hoyt Rotator cuff repair         Family History   Family History   Problem Relation Age of Onset   • Hypertension Mother    • Heart attack Mother    • Hyperlipidemia Mother    • Alcohol abuse Mother    • Alcohol abuse Father    • Dementia Sister    • Heart disease Sister    • Developmental Disability Sister    • Stroke Sister    • Alcohol abuse Maternal Aunt    • Alcohol abuse Paternal Uncle    • Diabetes Maternal Grandmother    • Heart disease Brother    • Stroke Sister          Social History   Social History     Socioeconomic History   • Marital status: Single   Tobacco Use   • Smoking status: Former     Packs/day: 1.50     Years: 35.00     Pack years: 52.50     Types: Cigarettes     Quit date: 2011     Years since quittin.0   • Smokeless  tobacco: Never   Vaping Use   • Vaping Use: Never used   Substance and Sexual Activity   • Alcohol use: Not Currently     Comment: 5 mixed drinks per day. recently quit all alcohol 12/2020   • Drug use: Yes     Types: Hydrocodone, Marijuana   • Sexual activity: Never        Medications:     Current Outpatient Medications:   •  aspirin 81 MG EC tablet, Take 1 tablet by mouth Daily., Disp: , Rfl:   •  atorvastatin (LIPITOR) 80 MG tablet, TAKE 1 TABLET BY MOUTH ONCE DAILY AT BEDTIME, Disp: 90 tablet, Rfl: 0  •  donepezil (ARICEPT) 10 MG tablet, Take 1 tablet by mouth Every Night., Disp: 90 tablet, Rfl: 3  •  ferrous sulfate 325 (65 Fe) MG tablet, Take 1 tablet by mouth once daily with breakfast, Disp: 30 tablet, Rfl: 2  •  gabapentin (NEURONTIN) 300 MG capsule, TAKE 1 CAPSULE BY MOUTH THREE TIMES DAILY, Disp: 90 capsule, Rfl: 0  •  hydroCHLOROthiazide (MICROZIDE) 12.5 MG capsule, hydrochlorothiazide, Disp: , Rfl:   •  lisinopril (PRINIVIL,ZESTRIL) 20 MG tablet, Take 1 tablet by mouth once daily, Disp: 90 tablet, Rfl: 1  •  memantine (NAMENDA) 10 MG tablet, Take 1 tablet by mouth 2 (Two) Times a Day., Disp: 180 tablet, Rfl: 3  •  metoprolol tartrate (LOPRESSOR) 50 MG tablet, Take 1/2 (one-half) tablet by mouth twice daily, Disp: 90 tablet, Rfl: 0  •  mirtazapine (Remeron) 30 MG tablet, Take 1 tablet by mouth Every Night., Disp: 90 tablet, Rfl: 3  •  Multiple Vitamins-Minerals (CENTRUM SILVER) tablet, Take 1 tablet by mouth Daily., Disp: , Rfl:   •  omeprazole (priLOSEC) 40 MG capsule, Take 1 capsule by mouth once daily, Disp: 90 capsule, Rfl: 0  •  sertraline (ZOLOFT) 100 MG tablet, Take 2 tablets by mouth once daily, Disp: 180 tablet, Rfl: 1  •  triamcinolone (KENALOG) 0.1 % cream, Apply 1 application topically to the appropriate area as directed 2 (Two) Times a Day., Disp: 45 g, Rfl: 1        Physical Exam:     Vitals:    01/16/23 1331   BP: 122/72   BP Location: Left arm   Patient Position: Sitting   Cuff Size: Adult  "  Pulse: 58   Resp: 16   Temp: 96.8 °F (36 °C)   TempSrc: Infrared   SpO2: 93%   Weight: 86.4 kg (190 lb 6.4 oz)   Height: 165.1 cm (65\")   PainSc:   1   PainLoc: Back        General: Alert and oriented, No acute distress.   HEENT: Normocephalic, atraumatic.   Cardiovascular: No gross edema  Respiratory: Respirations are non-labored    Thoracic Spine:   Inspection: no gross abnormality  Paraspinal muscle palpation: nontender  Spinous process palpation: nontender    Lumbar Spine:   No masses or atrophy  Range of motion - Flexion normal. Extension reduced  Facet Loading: Positive bilaterally  Facet Palpation - tender bilaterally  PSIS tenderness - Negative bilaterally  King's/ABE/Thigh thrust - Negative bilaterally  Straight leg raise: Positive right  Slump test: Positive right    Motor Exam:    Strength: Rate on 1-5 scale Right Left    L1/2- hip flexion 5 5    L3- knee extension 5 5    L4- ankle dorsiflexion 5 5    L5- great toe extension 5 5    S1- ankle plantarflexion 5 5    Sensory Exam: Paresthesia and redo sensation in right L4 dermatome.    Neurologic: Cranial Nerves II-XII are grossly intact.   Clonus -negative bilaterally    Psychiatric: Cooperative.   Gait: Antalgic  Assistive Devices: None    Imaging Studies:   No results found for this or any previous visit.      Impression & Plan:   11/21/2022: Mr. Justice Kiser is a 62 y.o. male with past medical history significant for prior L4/5 right hemilaminectomy/discectomy presents as a new patient referral from neurosurgery for evaluation of chronic low back pain with radiation to right lower extremity.  Clinical examination most consistent with lumbar spinal stenosis with neurogenic claudication and right L4 radiculopathy.  I personally reviewed the patient's lumbar MRI which demonstrates fatty endplate changes at L4/5 and L5/S1, severe central stenosis at L3/4 secondary to bulky facet arthropathy, ligamentum flavum hypertrophy and L4/5 severe stenosis " secondary to disc bulge, facet arthropathy, epidural lipomatosis.     I did review the patient's lumbar MRI with him in the room.  We additionally discussed the relevant risk benefits and alternatives to proceeding with lumbar epidural steroid injection.  Patient has primarily neurogenic claudication with some right-sided radicular symptoms therefore we will proceed with right L4/5 lumbar interlaminar epidural steroid injection.  If ineffective can consider diagnostic lumbar medial branch blocks to delineate the culpability of his facet arthropathy versus central stenosis.    1/16/2023: Pain now resolved with epidural.  Can repeat as needed every 3 to 4 months    1. Spinal stenosis of lumbar region with neurogenic claudication        PLAN:  1. Medication Recommendations: Continue gabapentin    2. Physical Therapy: Continue PT    3. Psychological: defer    4. Complementary and alternative therapies:     5. Labs: None indicated     6. Imaging: None indicated    7. Interventions: Can repeat right L4/5 LESI (CPT 23966) as needed    8. Referrals: None indicated     9. Records requested: n/a    10. Lifestyle goals:    Follow up 3 months      [facility]  Maximo Li MD

## 2023-01-19 DIAGNOSIS — E78.5 HYPERLIPIDEMIA LDL GOAL <70: ICD-10-CM

## 2023-01-19 DIAGNOSIS — I25.119 CORONARY ARTERY DISEASE INVOLVING NATIVE CORONARY ARTERY OF NATIVE HEART WITH ANGINA PECTORIS: ICD-10-CM

## 2023-01-19 DIAGNOSIS — M47.22 CERVICAL SPONDYLOSIS WITH RADICULOPATHY: ICD-10-CM

## 2023-01-19 DIAGNOSIS — K21.9 GASTROESOPHAGEAL REFLUX DISEASE: ICD-10-CM

## 2023-01-19 DIAGNOSIS — I10 ESSENTIAL HYPERTENSION: ICD-10-CM

## 2023-01-19 DIAGNOSIS — R10.13 EPIGASTRIC ABDOMINAL PAIN: ICD-10-CM

## 2023-01-20 RX ORDER — ATORVASTATIN CALCIUM 80 MG/1
TABLET, FILM COATED ORAL
Qty: 90 TABLET | Refills: 0 | Status: SHIPPED | OUTPATIENT
Start: 2023-01-20

## 2023-01-20 RX ORDER — FUROSEMIDE 20 MG/1
TABLET ORAL
Qty: 90 TABLET | Refills: 0 | OUTPATIENT
Start: 2023-01-20

## 2023-01-20 RX ORDER — METOPROLOL TARTRATE 50 MG/1
TABLET, FILM COATED ORAL
Qty: 90 TABLET | Refills: 0 | Status: SHIPPED | OUTPATIENT
Start: 2023-01-20

## 2023-01-20 RX ORDER — GABAPENTIN 300 MG/1
CAPSULE ORAL
Qty: 90 CAPSULE | Refills: 0 | Status: SHIPPED | OUTPATIENT
Start: 2023-01-20 | End: 2023-03-06

## 2023-01-20 RX ORDER — OMEPRAZOLE 40 MG/1
CAPSULE, DELAYED RELEASE ORAL
Qty: 90 CAPSULE | Refills: 0 | Status: SHIPPED | OUTPATIENT
Start: 2023-01-20

## 2023-01-20 RX ORDER — LISINOPRIL 20 MG/1
TABLET ORAL
Qty: 90 TABLET | Refills: 0 | Status: SHIPPED | OUTPATIENT
Start: 2023-01-20

## 2023-01-20 RX ORDER — HYDROCHLOROTHIAZIDE 12.5 MG/1
CAPSULE, GELATIN COATED ORAL
Qty: 90 CAPSULE | Refills: 0 | OUTPATIENT
Start: 2023-01-20

## 2023-01-20 NOTE — TELEPHONE ENCOUNTER
E-Prescribing Status: Receipt confirmed by pharmacy (1/20/2023  4:03 PM EST)        Renewals    Renewal provider: Noble Barlow PA-C

## 2023-01-20 NOTE — TELEPHONE ENCOUNTER
Provider:  Max  Caller:  Automated refill request  Surgery:  NA  Surgery Date: NA   Last visit:  Office Visit with Saskia Morgan PA-C (11/09/2022)  Next visit: 02/15/2023  Last filled: 12/13/2022      Reason for call:         Automated refill request for Gabapentin. Medication pending.     Requested Prescriptions     Pending Prescriptions Disp Refills   • gabapentin (NEURONTIN) 300 MG capsule [Pharmacy Med Name: Gabapentin 300 MG Oral Capsule] 90 capsule 0     Sig: TAKE 1 CAPSULE BY MOUTH THREE TIMES DAILY     ALEJANDRO:    09/13/2022 Gabapentin 300MG 1958 90 30 [facility]    [facility] 1 11/08/2022 Gabapentin 300MG 1958 90 30 [facility]    [facility] 1 12/13/2022 Gabapentin 300MG 1958 90 30 Noble Barlow [facility]    [place] [id number]

## 2023-02-15 ENCOUNTER — OFFICE VISIT (OUTPATIENT)
Dept: NEUROSURGERY | Facility: CLINIC | Age: 63
End: 2023-02-15
Payer: MEDICARE

## 2023-02-15 ENCOUNTER — HOSPITAL ENCOUNTER (OUTPATIENT)
Dept: GENERAL RADIOLOGY | Facility: HOSPITAL | Age: 63
Discharge: HOME | End: 2023-02-15
Admitting: PHYSICIAN ASSISTANT
Payer: MEDICARE

## 2023-02-15 VITALS — TEMPERATURE: 98 F | WEIGHT: 186.8 LBS | HEIGHT: 65 IN | RESPIRATION RATE: 18 BRPM | BODY MASS INDEX: 31.12 KG/M2

## 2023-02-15 DIAGNOSIS — M51.36 DDD (DEGENERATIVE DISC DISEASE), LUMBAR: Primary | ICD-10-CM

## 2023-02-15 DIAGNOSIS — M54.41 CHRONIC BILATERAL LOW BACK PAIN WITH RIGHT-SIDED SCIATICA: ICD-10-CM

## 2023-02-15 DIAGNOSIS — M48.062 SPINAL STENOSIS, LUMBAR REGION, WITH NEUROGENIC CLAUDICATION: ICD-10-CM

## 2023-02-15 DIAGNOSIS — G89.29 CHRONIC BILATERAL LOW BACK PAIN WITH RIGHT-SIDED SCIATICA: ICD-10-CM

## 2023-02-15 DIAGNOSIS — M51.36 DDD (DEGENERATIVE DISC DISEASE), LUMBAR: ICD-10-CM

## 2023-02-15 PROCEDURE — 72120 X-RAY BEND ONLY L-S SPINE: CPT

## 2023-02-15 PROCEDURE — 99213 OFFICE O/P EST LOW 20 MIN: CPT | Performed by: PHYSICIAN ASSISTANT

## 2023-02-15 NOTE — PROGRESS NOTES
"Subjective     Chief Complaint: back, bilateral buttock/hip and right leg pain with walking and standing intolerance    Patient ID: Justice Kiser is a 62 y.o. male is here today for follow-up.    History of Present Illness  Mr Kiser is a 62-year-old gentleman who is well-known to our clinic.  He has a history of remote lumbar discectomy by another surgeon.  He has been seen several times for neck and low back difficulties.  Most recently we have been treating his neck and upper extremity symptoms with gabapentin with good result.  He feels that overall he is doing well in regard to his cervical spine.       He was most recently seen in October and November with complaints of low back pain worse with standing and walking.  He says that  If he is up walking for even a few minutes he will start to get pain and a \" burning, heavy pressure\" into his hips bilaterally.  He feels that he becomes weak if he is standing for too long.  MRI of the lumbar spine dated 11/7/2022 showed multilevel lumbar spondylosis and degenerative changes.  There is moderate to severe central canal stenosis at L3-4 from an anterior disc bulge and posterior ligament thickening.  There is also a disc herniation at L4-5 eccentric to the right but with central and bilateral foraminal narrowing.  Postoperative changes are present at L5-S1.  Foraminal stenosis is present at this level as well.  Facet arthropathy is present at multiple levels    Patient was eager to avoid surgery.  He had been taking gabapentin and nonsteroidals.  He was sent for physical therapy and referred to Dr. Li.  He had an epidural steroid injection done on 12/22/2022.  The patient states that since his injection, he has been doing much better.  He has had a follow-up with Dr. Li and there is plan in place for another injection in the future though since he is doing well currently it will be a \"wait and see\" as to the timing.  He still has some back pain and has to " rest but overall he is pleased with his progress.  He has been walking more and is eager to be able to get out into the yard some this morning.  He continues to want to avoid surgery    The following portions of the patient's history were reviewed and updated as appropriate: allergies, current medications, past family history, past medical history, past social history, past surgical history and problem list.    Past Medical History:   Diagnosis Date   • Acute maxillary sinusitis    • Arthritis    • CHF (congestive heart failure) ([facility])    • Colon polyp    • Depression    • Difficulty walking    • Elevated LFTs    • GERD (gastroesophageal reflux disease)    • History of colonic polyps    • History of methicillin resistant Staphylococcus aureus    • History of myocardial infarction    • Hyperlipidemia    • Hypertension    • Left hand pain    • Left shoulder pain    • Low back pain    • MRSA (methicillin resistant Staphylococcus aureus)    • Myocardial infarction ([facility])    • Personal history of congestive heart failure    • Right hip pain    • Rotator cuff syndrome      Past Surgical History:   Procedure Laterality Date   • BACK SURGERY     • CAROTID STENT     • CORONARY ANGIOPLASTY WITH STENT PLACEMENT  01/04/2012    Circumflex Xscience V 3.5 MM x 23 mm   • CORONARY STENT PLACEMENT     • ENDOMETRIAL ABLATION     • EPIDURAL BLOCK     • GALLBLADDER SURGERY  2017   • LUMBAR DISCECTOMY  1992    2 level - [facility]   • LYMPH NODE BIOPSY     • SHOULDER SURGERY  12/20/2018    Dr. Hoyt Rotator cuff repair       Family history:   Family History   Problem Relation Age of Onset   • Hypertension Mother    • Heart attack Mother    • Hyperlipidemia Mother    • Alcohol abuse Mother    • Alcohol abuse Father    • Dementia Sister    • Heart disease Sister    • Developmental Disability Sister    • Stroke Sister    • Alcohol abuse Maternal Aunt    • Alcohol abuse Paternal Uncle    • Diabetes Maternal Grandmother    • Heart  disease Brother    • Stroke Sister        Social history:   Social History     Socioeconomic History   • Marital status: Single   Tobacco Use   • Smoking status: Former     Packs/day: 1.50     Years: 35.00     Pack years: 52.50     Types: Cigarettes     Quit date: 2011     Years since quittin.1   • Smokeless tobacco: Never   Vaping Use   • Vaping Use: Never used   Substance and Sexual Activity   • Alcohol use: Not Currently     Comment: 5 mixed drinks per day. recently quit all alcohol 2020   • Drug use: Yes     Types: Hydrocodone, Marijuana   • Sexual activity: Never       Review of Systems   Constitutional: Negative for activity change, appetite change, chills, diaphoresis, fatigue, fever and unexpected weight change.   HENT: Negative for congestion, dental problem, drooling, ear discharge, ear pain, facial swelling, hearing loss, mouth sores, nosebleeds, postnasal drip, rhinorrhea, sinus pressure, sneezing, sore throat, tinnitus, trouble swallowing and voice change.    Eyes: Negative for photophobia, pain, discharge, redness, itching and visual disturbance.   Respiratory: Negative for apnea, cough, choking, chest tightness, shortness of breath, wheezing and stridor.    Cardiovascular: Negative for chest pain, palpitations and leg swelling.   Gastrointestinal: Negative for abdominal distention, abdominal pain, anal bleeding, blood in stool, constipation, diarrhea, nausea, rectal pain and vomiting.   Endocrine: Negative for cold intolerance, heat intolerance, polydipsia, polyphagia and polyuria.   Genitourinary: Negative for decreased urine volume, difficulty urinating, dysuria, enuresis, flank pain, frequency, genital sores, hematuria and urgency.   Musculoskeletal: Positive for back pain. Negative for arthralgias, gait problem, joint swelling, myalgias, neck pain and neck stiffness.   Skin: Negative for color change, pallor, rash and wound.   Allergic/Immunologic: Negative for environmental allergies,  "food allergies and immunocompromised state.   Neurological: Negative for dizziness, tremors, seizures, syncope, facial asymmetry, speech difficulty, weakness, light-headedness, numbness and headaches.   Hematological: Negative for adenopathy. Does not bruise/bleed easily.   Psychiatric/Behavioral: Negative for agitation, behavioral problems, confusion, decreased concentration, dysphoric mood, hallucinations, self-injury, sleep disturbance and suicidal ideas. The patient is not nervous/anxious and is not hyperactive.    All other systems reviewed and are negative.      Objective   Temperature 98 °F (36.7 °C), temperature source Infrared, resp. rate 18, height 165.1 cm (65\"), weight 84.7 kg (186 lb 12.8 oz).  Body mass index is 31.09 kg/m².    Physical Exam   CONSTITUTIONAL:   - Patient is well-nourished  - Pleasant and appears stated age.  PSYCHIATRIC:  - Normal mood and affect  - Behavior is normal.  HENT:   Head: Normocephalic and Atraumatic.   Eyes:     - Pupils are equal, round, and reactive to light.     - EOM are normal.   CV:   - Regular rate and rhythm on palpable radial pulse   PULMONARY:   - Speaking in full sentences, breathing non labored  - No wheezing   SKIN:  - Clean, dry and intact   MUSCULOSKELETAL:  - Neck/Back tenderness to palpation is not observed.   - Strength is intact in the upper and lower extremities to direct testing.  - Muscle tone is normal throughout.  - Station and gait are slow and antalgic  - ROM in neck/back is reduced due to pain  NEUROLOGICAL:  - A&Ox3  - Attention span, language function, and cognition are intact.  - Sensation is intact to light touch testing throughout.  - Coordination is intact.     Patient's Body mass index is 31.09 kg/m². indicating that he is obese     Flexion-extension x-rays of the lumbar spine from earlier today, 2/15/2023 , showst minimal grade 1 anterolisthesis of L4 on L5 which appears subtly exaggerated in flexion and mildly dynamic. No additional " dynamic listhesis. There is no evidence of fracture. There is redemonstrated advanced multilevel spondylosis, better   characterized on MRI comparison and appearing most advanced at L4-5 and L5-S1.    Assessment & Plan   Mr Kiser is doing better than he was last fall. Epidural steroid injections and PT have been working to control his symptoms.  Should his symptoms worsen, he would be more likely to need a fusion rather than a laminectomy.  If surgery becomes an option for him in the future, he will likely need CT myelogram of the lumbar spine for surgical planning.  For now, we will have him continue with Dr. Li and follow-up with us on an as-needed basis going forward.  Signs and symptoms of cauda equina were discussed with him.  He he will contact us should he develop any new symptoms of this    Diagnoses and all orders for this visit:    1. DDD (degenerative disc disease), lumbar (Primary)    2. Spinal stenosis, lumbar region, with neurogenic claudication        Return if symptoms worsen or fail to improve.    AYAKA BarnettC

## 2023-03-03 DIAGNOSIS — M47.22 CERVICAL SPONDYLOSIS WITH RADICULOPATHY: ICD-10-CM

## 2023-03-06 RX ORDER — GABAPENTIN 300 MG/1
CAPSULE ORAL
Qty: 90 CAPSULE | Refills: 0 | Status: SHIPPED | OUTPATIENT
Start: 2023-03-06

## 2023-03-06 NOTE — TELEPHONE ENCOUNTER
Provider:  Max  Caller:  Automated refill request  Surgery:  NA  Surgery Date: NA    Last visit:  Office Visit with Saskia Morgan PA-C (02/15/2023)  Next visit: NA-patient is seeing Dr. Li in PM  Last filled: 01/19/2023      Reason for call:         Automated refill request for Gabapentin. Medication pending.     Requested Prescriptions     Pending Prescriptions Disp Refills   • gabapentin (NEURONTIN) 300 MG capsule [Pharmacy Med Name: Gabapentin 300 MG Oral Capsule] 90 capsule 0     Sig: TAKE 1 CAPSULE BY MOUTH THREE TIMES DAILY     ALEJANDRO:    Unavailable.

## 2023-03-10 RX ORDER — SERTRALINE HYDROCHLORIDE 100 MG/1
TABLET, FILM COATED ORAL
Qty: 180 TABLET | Refills: 0 | Status: SHIPPED | OUTPATIENT
Start: 2023-03-10

## 2023-04-12 ENCOUNTER — TELEPHONE (OUTPATIENT)
Dept: NEUROLOGY | Facility: CLINIC | Age: 63
End: 2023-04-12
Payer: MEDICARE

## 2023-04-18 DIAGNOSIS — I10 ESSENTIAL HYPERTENSION: ICD-10-CM

## 2023-04-18 DIAGNOSIS — R10.13 EPIGASTRIC ABDOMINAL PAIN: ICD-10-CM

## 2023-04-18 DIAGNOSIS — K21.9 GASTROESOPHAGEAL REFLUX DISEASE: ICD-10-CM

## 2023-04-18 DIAGNOSIS — M47.22 CERVICAL SPONDYLOSIS WITH RADICULOPATHY: ICD-10-CM

## 2023-04-18 DIAGNOSIS — I25.119 CORONARY ARTERY DISEASE INVOLVING NATIVE CORONARY ARTERY OF NATIVE HEART WITH ANGINA PECTORIS: ICD-10-CM

## 2023-04-18 RX ORDER — OMEPRAZOLE 40 MG/1
40 CAPSULE, DELAYED RELEASE ORAL DAILY
Qty: 90 CAPSULE | Refills: 0 | Status: SHIPPED | OUTPATIENT
Start: 2023-04-18

## 2023-04-18 RX ORDER — METOPROLOL TARTRATE 50 MG/1
25 TABLET, FILM COATED ORAL EVERY 12 HOURS SCHEDULED
Qty: 90 TABLET | Refills: 0 | Status: SHIPPED | OUTPATIENT
Start: 2023-04-18

## 2023-04-18 RX ORDER — GABAPENTIN 300 MG/1
CAPSULE ORAL
Qty: 90 CAPSULE | Refills: 0 | Status: SHIPPED | OUTPATIENT
Start: 2023-04-18

## 2023-04-18 NOTE — TELEPHONE ENCOUNTER
Provider:  Max  Caller:  Automated refill request  Surgery:  NA  Surgery Date: NA   Last visit:  Office Visit with Saskia Morgan PA-C (02/15/2023)  Next visit: Next visit: NA-patient is seeing Dr. Li in PM  Last filled: Refill with Elayne Tompkins PA-C (03/03/2023)        Reason for call:         Automated refill request for Gabapentin. Medication pending.   Patent is PRN but is currently seeing Dr. Li in pain management.     Requested Prescriptions     Pending Prescriptions Disp Refills   • gabapentin (NEURONTIN) 300 MG capsule [Pharmacy Med Name: Gabapentin 300 MG Oral Capsule] 90 capsule 0     Sig: TAKE 1 CAPSULE BY MOUTH THREE TIMES DAILY     ALEJANDRO:    1 03/06/2023 [id number] Gabapentin 300MG Noble Barlow   PHARMACY   [address] [place]  03/06/2023 [facility]  1 01/20/2023 [id number] Gabapentin 300MG Elayne Tompkins   PHARMACY   [address] [place]  01/20/2023 [facility]

## 2023-04-19 DIAGNOSIS — E78.5 HYPERLIPIDEMIA LDL GOAL <70: ICD-10-CM

## 2023-04-19 RX ORDER — ATORVASTATIN CALCIUM 80 MG/1
TABLET, FILM COATED ORAL
Qty: 90 TABLET | Refills: 0 | Status: SHIPPED | OUTPATIENT
Start: 2023-04-19

## 2023-04-20 ENCOUNTER — OFFICE VISIT (OUTPATIENT)
Dept: PAIN MEDICINE | Facility: CLINIC | Age: 63
End: 2023-04-20
Payer: MEDICARE

## 2023-04-20 VITALS
RESPIRATION RATE: 14 BRPM | HEART RATE: 64 BPM | OXYGEN SATURATION: 94 % | HEIGHT: 65 IN | DIASTOLIC BLOOD PRESSURE: 78 MMHG | WEIGHT: 185.4 LBS | BODY MASS INDEX: 30.89 KG/M2 | SYSTOLIC BLOOD PRESSURE: 124 MMHG | TEMPERATURE: 97.4 F

## 2023-04-20 DIAGNOSIS — M48.062 SPINAL STENOSIS OF LUMBAR REGION WITH NEUROGENIC CLAUDICATION: Primary | ICD-10-CM

## 2023-04-20 DIAGNOSIS — M54.16 LUMBAR RADICULOPATHY: ICD-10-CM

## 2023-04-20 NOTE — PROGRESS NOTES
"Referring Physician: No referring provider defined for this encounter.    Primary Physician: Anshul Martinez MD    CHIEF COMPLAINT or REASON FOR VISIT: Back Pain and Follow-up      Initial HPI 11/21/2022:  Mr. Justice Kiser is 63 y.o. male who presents as a new patient referral for evaluation and treatment of chronic low back pain with radiation to the right lower extremity.  Patient has a remote history of L5/S1 hemilaminectomy and discectomy.  He follows with neurosurgery.  [facility] for chronic low back pain and chronic axial neck pain.  His primary pain complaint today is low back pain with radiation into the right leg and foot.  He states that over the last year and specifically the last month or 2 he has had increasing difficulty with ambulation due to low back pain.  He describes an aching pain that is more of a \"discomfort\" than sharp stabbing pain.  Patient denies any bowel or bladder dysfunction, lower extremity weakness, new onset saddle anesthesia or unexplained weight loss.     He has begun physical therapy and is engaged in home exercises.  He does take gabapentin.  He has not had any recent spine surgeries or interventions.  He sought consultation with his neurosurgical team again recently who recommended conservative management and interventional pain strategies.    Interval history: Patient returns to clinic.  Still doing very well from his epidural but does have certain days where he does not feel up to dealing with his pain.  Has since discontinued physical therapy.    Interventions:  12/20/2022: Right paramedian L4/5 LESI with 80% relief ongoing      Objective Pain Scoring:   BRIEF PAIN INVENTORY:  Total score:   Pain Score    04/20/23 0842   PainSc:   2   PainLoc: Back      PHQ-2: PHQ-2 Total Score: 3  PHQ-9: PHQ-9: Brief Depression Severity Measure Score: 9  Opioid Risk Tool:         Review of Systems:   ROS negative except as otherwise noted     Past Medical History:   Past " Medical History:   Diagnosis Date   • Acute maxillary sinusitis    • Arthritis    • CHF (congestive heart failure)    • Chronic pain disorder    • Colon polyp    • Depression    • Difficulty walking    • Elevated LFTs    • GERD (gastroesophageal reflux disease)    • History of colonic polyps    • History of methicillin resistant Staphylococcus aureus    • History of myocardial infarction    • Hyperlipidemia    • Hypertension    • Left hand pain    • Left shoulder pain    • Low back pain    • MRSA (methicillin resistant Staphylococcus aureus)    • Myocardial infarction    • Personal history of congestive heart failure    • Right hip pain    • Rotator cuff syndrome          Past Surgical History:   Past Surgical History:   Procedure Laterality Date   • BACK SURGERY     • CAROTID STENT     • CORONARY ANGIOPLASTY WITH STENT PLACEMENT  2012    Circumflex Xscience V 3.5 MM x 23 mm   • CORONARY STENT PLACEMENT     • ENDOMETRIAL ABLATION     • EPIDURAL BLOCK     • GALLBLADDER SURGERY  2017   • LUMBAR DISCECTOMY  1992    2 level - [facility]   • LYMPH NODE BIOPSY     • SHOULDER SURGERY  2018    Dr. Hoyt Rotator cuff repair         Family History   Family History   Problem Relation Age of Onset   • Hypertension Mother    • Heart attack Mother    • Hyperlipidemia Mother    • Alcohol abuse Mother    • Alcohol abuse Father    • Dementia Sister    • Heart disease Sister    • Developmental Disability Sister    • Stroke Sister    • Alcohol abuse Maternal Aunt    • Alcohol abuse Paternal Uncle    • Diabetes Maternal Grandmother    • Heart disease Brother    • Stroke Sister          Social History   Social History     Socioeconomic History   • Marital status: Single   Tobacco Use   • Smoking status: Former     Packs/day: 1.50     Years: 35.00     Pack years: 52.50     Types: Cigarettes     Quit date: 2011     Years since quittin.3   • Smokeless tobacco: Never   Vaping Use   • Vaping Use: Never used    Substance and Sexual Activity   • Alcohol use: Not Currently     Comment: 5 mixed drinks per day. recently quit all alcohol 12/2020   • Drug use: Yes     Types: Hydrocodone, Marijuana   • Sexual activity: Never        Medications:     Current Outpatient Medications:   •  aspirin 81 MG EC tablet, Take 1 tablet by mouth Daily., Disp: , Rfl:   •  atorvastatin (LIPITOR) 80 MG tablet, TAKE 1 TABLET BY MOUTH ONCE DAILY AT BEDTIME, Disp: 90 tablet, Rfl: 0  •  donepezil (ARICEPT) 10 MG tablet, Take 1 tablet by mouth Every Night., Disp: 90 tablet, Rfl: 3  •  ferrous sulfate 325 (65 Fe) MG tablet, Take 1 tablet by mouth once daily with breakfast (Patient not taking: Reported on 4/18/2023), Disp: 30 tablet, Rfl: 2  •  gabapentin (NEURONTIN) 300 MG capsule, TAKE 1 CAPSULE BY MOUTH THREE TIMES DAILY, Disp: 90 capsule, Rfl: 0  •  hydroCHLOROthiazide (MICROZIDE) 12.5 MG capsule, hydrochlorothiazide (Patient not taking: Reported on 4/18/2023), Disp: , Rfl:   •  lisinopril (PRINIVIL,ZESTRIL) 20 MG tablet, Take 1 tablet by mouth once daily, Disp: 90 tablet, Rfl: 0  •  memantine (NAMENDA) 10 MG tablet, Take 1 tablet by mouth 2 (Two) Times a Day., Disp: 180 tablet, Rfl: 3  •  metoprolol tartrate (LOPRESSOR) 50 MG tablet, Take 1/2 (one-half) tablet by mouth twice daily, Disp: 90 tablet, Rfl: 0  •  mirtazapine (Remeron) 30 MG tablet, Take 1 tablet by mouth Every Night., Disp: 90 tablet, Rfl: 3  •  Multiple Vitamins-Minerals (CENTRUM SILVER) tablet, Take 1 tablet by mouth Daily., Disp: , Rfl:   •  omeprazole (priLOSEC) 40 MG capsule, Take 1 capsule by mouth once daily, Disp: 90 capsule, Rfl: 0  •  sertraline (ZOLOFT) 100 MG tablet, Take 2 tablets by mouth once daily, Disp: 180 tablet, Rfl: 0  •  triamcinolone (KENALOG) 0.1 % cream, Apply 1 application topically to the appropriate area as directed 2 (Two) Times a Day., Disp: 45 g, Rfl: 1        Physical Exam:     Vitals:    04/20/23 0842   BP: 124/78   BP Location: Left arm   Patient  "Position: Sitting   Cuff Size: Adult   Pulse: 64   Resp: 14   Temp: 97.4 °F (36.3 °C)   TempSrc: Infrared   SpO2: 94%   Weight: 84.1 kg (185 lb 6.4 oz)   Height: 165.1 cm (65\")   PainSc:   2   PainLoc: Back        General: Alert and oriented, No acute distress.   HEENT: Normocephalic, atraumatic.   Cardiovascular: No gross edema  Respiratory: Respirations are non-labored    Thoracic Spine:   Inspection: no gross abnormality  Paraspinal muscle palpation: nontender  Spinous process palpation: nontender    Lumbar Spine:   No masses or atrophy  Range of motion - Flexion normal. Extension reduced  Facet Loading: Positive bilaterally  Facet Palpation - tender bilaterally  PSIS tenderness - Negative bilaterally  King's/ABE/Thigh thrust - Negative bilaterally  Straight leg raise: Positive right  Slump test: Positive right    Motor Exam:    Strength: Rate on 1-5 scale Right Left    L1/2- hip flexion 5 5    L3- knee extension 5 5    L4- ankle dorsiflexion 5 5    L5- great toe extension 5 5    S1- ankle plantarflexion 5 5    Sensory Exam: Paresthesia and redo sensation in right L4 dermatome.    Neurologic: Cranial Nerves II-XII are grossly intact.   Clonus -negative bilaterally    Psychiatric: Cooperative.   Gait: Antalgic  Assistive Devices: None    Imaging Studies:   No results found for this or any previous visit.      Impression & Plan:   11/21/2022: Mr. Justice Kiser is a 63 y.o. male with past medical history significant for prior L4/5 right hemilaminectomy/discectomy presents as a new patient referral from neurosurgery for evaluation of chronic low back pain with radiation to right lower extremity.  Clinical examination most consistent with lumbar spinal stenosis with neurogenic claudication and right L4 radiculopathy.  I personally interpreted the patient's lumbar MRI which demonstrates fatty endplate changes at L4/5 and L5/S1, severe central stenosis at L3/4 secondary to bulky facet arthropathy, ligamentum " flavum hypertrophy and L4/5 severe stenosis secondary to disc bulge, facet arthropathy, epidural lipomatosis.     I did review the patient's lumbar MRI with him in the room.  We additionally discussed the relevant risk benefits and alternatives to proceeding with lumbar epidural steroid injection.  Patient has primarily neurogenic claudication with some right-sided radicular symptoms therefore we will proceed with right L4/5 lumbar interlaminar epidural steroid injection.  If ineffective can consider diagnostic lumbar medial branch blocks to delineate the culpability of his facet arthropathy versus central stenosis.    1/16/2023: Pain now resolved with epidural.  Can repeat as needed every 3 to 4 months  4/20/2023: Continued excellent benefit.  Elevated PHQ, will refer for pain psych.    1. Spinal stenosis of lumbar region with neurogenic claudication    2. Lumbar radiculopathy        PLAN:  1. Medication Recommendations: Agree with gabapentin    2. Physical Therapy: Continue HEP    3. Psychological: Referral to pain psychology     4. Complementary and alternative therapies:     5. Labs: None indicated     6. Imaging: None indicated    7. Interventions: Patient can call for repeat right L4/5 LESI (CPT 49163) as needed every 3 to 4 months    8. Referrals: None indicated     9. Records requested: n/a    10. Lifestyle goals:    Follow up as needed      [facility]  Maximo Li MD

## 2023-04-26 ENCOUNTER — OFFICE VISIT (OUTPATIENT)
Dept: PSYCHIATRY | Facility: CLINIC | Age: 63
End: 2023-04-26
Payer: MEDICARE

## 2023-04-26 DIAGNOSIS — F33.0 MILD EPISODE OF RECURRENT MAJOR DEPRESSIVE DISORDER: Primary | ICD-10-CM

## 2023-05-26 NOTE — PROGRESS NOTES
"[facility]                  Initial Assessment      Initial Adult Note     Date:2023   Patient Name: Justice Kiser  : 1960   MRN: [id number]   Time IN: 12:57  Time OUT: 1:55     Referring Provider: Anshul Martinez MD    Chief Complaint:      ICD-10-CM ICD-9-CM   1. Mild episode of recurrent major depressive disorder  F33.0 296.31        History of Present Illness:   Justice Kiser is a 63 y.o. male who is being seen today for initial psychiatric diagnostic evaluation. Medical records reviewed indicated that Mr. Kiser has a history of a L4-L5 right romero-laminectomy/discectomy. He was referred to pain management due to, \"chronic low back pain with radiation to right lower extremity\". A consultation with Dr. Li on 23 indicated diagnoses of, \"spinale stenosis of lumbar region with neurogenic claudication\", as evidenced by an MRI. Treatment for the patient's pain have included medication (Gabapentin), physical therapy, epidural steroid injections, etc. Dr. Li indicated that his pain was \"resolved with epidural\" and a \"continued excellent benefit\". He was referred to this provider due to his elevated score on the PHQ-9.    Past Psychiatric History:   In the area of mental health, the patient denies a history of psychiatric treatment. He denies a history of psychiatric hospitalization.    Subjective      PHQ-9 Score Total:  9  JUDITH-7 Total Score: 3        Significant Life Events:   History of Verbal, physical, sexual abuse? no  Has patient experienced a death / loss of relationship? no  Has patient experienced a major accident or tragic events? no    Work History:   Highest level of education obtained: high school graduate, no college  Ever been active duty in the ? no  Patient's Occupation: Retired. Formerly  for [employer] for 23 years    Interpersonal/Relational:  Marital Status:     Support system: Has 2 " "children - a son and daughter, has a [facility]     Mental/Behavioral Health History:  History of prior treatment or hospitalization: No  Past diagnoses: depression  Are there any significant health issues (current or past): Coronary angioplasty with stent placement in 2012  History of seizures: No    Family Psychiatric History:  Alcohol abuse    History of Substance Use:  Patient answered yes - history of opioid and cannabis use     Triggers: (Persons/Places/Things/Events/Thought/Emotions): \"I can't do the things I used to do\" - denies major life stressors    Social History: Former cigarette smoker, quit drinking alcohol    Past Medical History: See chart. Reports a history of depression and chronic pain    Past Surgical History: 2-level lumbar disc in 1992, colonoscopy, cardiac catheterization    Family History: Alcohol abuse     Psychiatric Medications:  Zoloft - recently increased    Objective     Physical Exam    Mental Status Exam:     Hygiene:   good  Cooperation:  Cooperative  Eye Contact:  Good  Psychomotor Behavior:  Appropriate  Affect:  Appropriate  Hopelessness: Denies  Speech:  Normal  Thought Progress: Goal directed  Thought Content:  Normal  Suicidal:  None  Homicidal:  None  Hallucinations:  Reports having some visual hallucinations, none evident  Delusion:  None  Memory:  Deficits  Orientation:  Grossly intact  Reliability:  good  Insight:  Fair  Judgement:  Fair  Impulse Control:  Fair    Assessment / Plan      Visit Diagnosis/Orders Placed This Visit:    ICD-10-CM ICD-9-CM   1. Mild episode of recurrent major depressive disorder  F33.0 296.31        PLAN:  Safety: Vulnerable population  Risk Assessment: Risk of self-harm acutely low. Risk of self-harm chronically is low  Risk Factors: None indicated  Supportive Factors: Life satisfaction    This initial consultation with Mr. Kiser focused on rapport building and exploring more about his biopsychosocial history. He was provided with a safe, " confidential environment to facilitate the development of a positive therapeutic relationship. Open, honest communication was encouraged, and the patient was allowed to freely discuss his history without interruption or judgement. Unconditional positive regard, active listening skills, and empathy were used to assist the patient with clarifying physical and mental health goals.    Mr. Kiser reports that he initially injured his back at work and has a history of  a two-level discectomy in 1998. He states he has been pain-free for over 20 years until he injured himself again while playing golf. Now, he notes an inability to stand for more than 15-20 minutes and that he can't sit for more than 30-45 minutes. He reports that he feels pressure in his lower lumbar spine and neck. His states taking Gabapentin for his nerve pain and that a recent injection by Dr. Li was beneficial - he indicates having tolerable pain for over four months now.     A current stressor for him appears to be related to increased problems with memory. Records reviewed indicate that he was prescribed medication for mild cognitive impairment with cerebral microvascular disease by CAROL Echols on 11/8/22 (see bolded not below):    Previous neuro workup & history:  Suspected mild cognitive impairment with symptoms that have been present since 2017 beginning with forgetfulness, repetitiveness, and word finding difficulties, and overall has been stable per he and his family. He has had some impairments in orientation, and executive function. He has had some depression and irritability. He has had some visual hallucinations, previously consumed alcohol, but none since 12/15/2020. He does not have impairments in activities of daily living. He was living with his nephew Harris in [place] previously until he passed away in March of 2022. He has been on donepezil as well as memantine for his memory.      He previously did complete cognitive  rehab with our speech language pathologist. He has had chronic difficulty with balance, but no recent falls. He does follow with neurosurgery for neuropathy with chronic neck, and low back pain. He is also taking mirtazapine 30 mg at night.     His daughter and son help some with his finances. He is currently taking sertraline prescribed by Dr. Martinez.     Prior evaluation has included an MRI of the brain showing chronic white matter changes, an unremarkable ECHO/Carotid Dopplers and screening blood work. He also had an updated MRI of the brain on 7/23/2018 which showed chronic small vessel ischemic changes with old lacunar infarcts.  He is on aspirin and statin therapy.    Collaboratively discussed the patient's need to take a multidisciplinary and holistic approach to managing his chronic pain. Educated the patient about Ridgeway control theory and how factors such as mood can affect the frequency and severity of his pain long-term. Analyzed the importance of having a tailored coping action plan for preventing and/or managing identified stressful reactions using skills such as relaxation, cognitive reframing, and problem-solving. Assisted the patient with downloading the CBT-I chico on his phone and reviewed how to access progressive muscle relaxation, deep breathing, guided imagery, and other exercises to cope with his chronic pain.     TREATMENT STRATEGIES/INTERVENTIONS:   Attend regular psychotherapy sessions to reduce and/or ameliorate his symptoms of depression and learn more effective ways to cope with pain.     Treatment options were discussed during today's visit. The patient acknowledged and verbally consented to continue with the current treatment plan. He was educated on the importance of compliance with treatment and follow-up appointments. The patient seems reasonably able to adhere to the treatment plan.      SAFETY CONSIDERATIONS:    The patient adamantly and convincingly denies current suicidal or  homicidal ideation or perceptual disturbance. Risk factors which would indicate the need for a higher level of care include thoughts to harm self or others and/or self-harming behavior. The patient is to contact this office, call 911 or 988 for the Suicide and Crisis Lifeline, or present to the nearest emergency room should any of these events occur.     QUALITY MEASURES:     TOBACCO USE:  Former smoker    Mr. Kiser appears aware of the risks of tobacco use.     Follow Up: Return in about 1 month (around 5/26/2023) for Counseling Session.     Queenie Cespedes, PhD, Temp Licensed Psychologist

## 2023-05-31 DIAGNOSIS — G31.84 MCI (MILD COGNITIVE IMPAIRMENT): ICD-10-CM

## 2023-05-31 DIAGNOSIS — F33.1 MODERATE EPISODE OF RECURRENT MAJOR DEPRESSIVE DISORDER: ICD-10-CM

## 2023-05-31 DIAGNOSIS — F51.04 PSYCHOPHYSIOLOGICAL INSOMNIA: ICD-10-CM

## 2023-05-31 RX ORDER — MIRTAZAPINE 30 MG/1
TABLET, FILM COATED ORAL
Qty: 90 TABLET | Refills: 1 | Status: SHIPPED | OUTPATIENT
Start: 2023-05-31

## 2023-05-31 NOTE — TELEPHONE ENCOUNTER
Rx Refill Note  Requested Prescriptions     Pending Prescriptions Disp Refills   • mirtazapine (REMERON) 30 MG tablet [Pharmacy Med Name: Mirtazapine 30 MG Oral Tablet] 90 tablet 0     Sig: TAKE 1 TABLET BY MOUTH ONCE DAILY AT NIGHT      Last office visit with prescribing clinician: 11/18/2022   Last telemedicine visit with prescribing clinician: Visit date not found   Next office visit with prescribing clinician: 9/28/2023                         Would you like a call back once the refill request has been completed: [] Yes [] No    If the office needs to give you a call back, can they leave a voicemail: [] Yes [] No    Susana Conway CMA  05/31/23, 15:38 EDT

## 2023-06-06 DIAGNOSIS — M47.22 CERVICAL SPONDYLOSIS WITH RADICULOPATHY: ICD-10-CM

## 2023-06-06 RX ORDER — GABAPENTIN 300 MG/1
CAPSULE ORAL
Qty: 90 CAPSULE | Refills: 0 | OUTPATIENT
Start: 2023-06-06

## 2023-06-06 RX ORDER — SERTRALINE HYDROCHLORIDE 100 MG/1
TABLET, FILM COATED ORAL
Qty: 180 TABLET | Refills: 0 | Status: SHIPPED | OUTPATIENT
Start: 2023-06-06

## 2023-06-06 NOTE — TELEPHONE ENCOUNTER
Provider:  Max  Caller:  Automated refill request  Surgery:  NA  Surgery Date: NA   Last visit:  Office Visit with Saskia Morgan PA-C (02/15/2023)   Next visit: NA  Last filled: 04/19/2023      Reason for call:         Automated refill request for Gabapentin. Patient is to continue care with Dr. Li and f/u as needed with our office. Medication pending.     Requested Prescriptions     Pending Prescriptions Disp Refills    gabapentin (NEURONTIN) 300 MG capsule [Pharmacy Med Name: Gabapentin 300 MG Oral Capsule] 90 capsule 0     Sig: TAKE 1 CAPSULE BY MOUTH THREE TIMES DAILY     ALEJANDRO:    1 04/19/2023 [id number] Gabapentin 300MG [facility]   PHARMACY   [address] [place]  04/18/2023 [facility]      1 03/06/2023 [id number] Gabapentin 300MG [facility]   PHARMACY   [address] [place]  03/06/2023 [facility]    1 01/20/2023 [id number] Gabapentin 300MG [facility]   PHARMACY   [address] [place]  01/20/2023 [facility]

## 2023-06-08 DIAGNOSIS — M47.22 CERVICAL SPONDYLOSIS WITH RADICULOPATHY: ICD-10-CM

## 2023-06-08 NOTE — TELEPHONE ENCOUNTER
Please deny.    S/w patient and referred to previous refill request that patient needs to request from Dr. Li. Patient voiced understanding and was thankful for the call back.     Requested Prescriptions     Pending Prescriptions Disp Refills    gabapentin (NEURONTIN) 300 MG capsule [Pharmacy Med Name: Gabapentin 300 MG Oral Capsule] 90 capsule 0     Sig: TAKE 1 CAPSULE BY MOUTH THREE TIMES DAILY

## 2023-06-09 RX ORDER — GABAPENTIN 300 MG/1
300 CAPSULE ORAL 3 TIMES DAILY
Qty: 90 CAPSULE | Refills: 0 | Status: SHIPPED | OUTPATIENT
Start: 2023-06-09

## 2023-06-09 RX ORDER — GABAPENTIN 300 MG/1
CAPSULE ORAL
Qty: 90 CAPSULE | Refills: 0 | OUTPATIENT
Start: 2023-06-09

## 2023-06-09 RX ORDER — GABAPENTIN 300 MG/1
300 CAPSULE ORAL 3 TIMES DAILY
Qty: 90 CAPSULE | Refills: 0 | Status: SHIPPED | OUTPATIENT
Start: 2023-06-09 | End: 2023-06-09 | Stop reason: SDUPTHER

## 2023-06-09 NOTE — TELEPHONE ENCOUNTER
PATIENT WILL NEED ENOUGH FROM [facility] TO GET THROUGH NEXT FRIDAY UNTIL  CAN SEE HIM. WE CANNOT RX ANY UNTIL HE IS SEEN.

## 2023-06-16 ENCOUNTER — OFFICE VISIT (OUTPATIENT)
Dept: PAIN MEDICINE | Facility: CLINIC | Age: 63
End: 2023-06-16
Payer: MEDICARE

## 2023-06-16 VITALS
HEIGHT: 65 IN | WEIGHT: 184.2 LBS | SYSTOLIC BLOOD PRESSURE: 142 MMHG | HEART RATE: 51 BPM | DIASTOLIC BLOOD PRESSURE: 88 MMHG | BODY MASS INDEX: 30.69 KG/M2 | OXYGEN SATURATION: 96 % | RESPIRATION RATE: 14 BRPM | TEMPERATURE: 96.5 F

## 2023-06-16 DIAGNOSIS — M54.16 LUMBAR RADICULOPATHY: ICD-10-CM

## 2023-06-16 DIAGNOSIS — Z51.81 THERAPEUTIC DRUG MONITORING: Primary | ICD-10-CM

## 2023-06-16 DIAGNOSIS — M47.22 CERVICAL SPONDYLOSIS WITH RADICULOPATHY: ICD-10-CM

## 2023-06-16 DIAGNOSIS — M48.062 SPINAL STENOSIS OF LUMBAR REGION WITH NEUROGENIC CLAUDICATION: Primary | ICD-10-CM

## 2023-06-16 RX ORDER — GABAPENTIN 300 MG/1
300 CAPSULE ORAL 2 TIMES DAILY
Qty: 60 CAPSULE | Refills: 2 | Status: SHIPPED | OUTPATIENT
Start: 2023-06-16

## 2023-06-16 NOTE — PROGRESS NOTES
"Referring Physician: No referring provider defined for this encounter.    Primary Physician: Anshul Martinez MD    CHIEF COMPLAINT or REASON FOR VISIT: No chief complaint on file.      Initial HPI 11/21/2022:  Mr. Justice Kiser is 63 y.o. male who presents as a new patient referral for evaluation and treatment of chronic low back pain with radiation to the right lower extremity.  Patient has a remote history of L5/S1 hemilaminectomy and discectomy.  He follows with neurosurgery.  [facility] for chronic low back pain and chronic axial neck pain.  His primary pain complaint today is low back pain with radiation into the right leg and foot.  He states that over the last year and specifically the last month or 2 he has had increasing difficulty with ambulation due to low back pain.  He describes an aching pain that is more of a \"discomfort\" than sharp stabbing pain.  Patient denies any bowel or bladder dysfunction, lower extremity weakness, new onset saddle anesthesia or unexplained weight loss.     He has begun physical therapy and is engaged in home exercises.  He does take gabapentin.  He has not had any recent spine surgeries or interventions.  He sought consultation with his neurosurgical team again recently who recommended conservative management and interventional pain strategies.    Interval history: Patient returns to clinic.  Relief from epidural starting to wane, now 60%.  NSA is asked that we take over gabapentin which I am happy to do.  Discussed that gabapentin is a controlled substance in the [facility], we will adhere to requirements.  He has been seeing pain psychology with good result.  He does report that despite being prescribed 3 times daily he actually takes gabapentin 300 mg twice a day.    Interventions:  12/20/2022: Right paramedian L4/5 LESI with 60% relief ongoing      Objective Pain Scoring:   BRIEF PAIN INVENTORY:  Total score:   Pain Score    06/16/23 1008   PainSc:   " 3   PainLoc: Back      PHQ-2: PHQ-2 Total Score: 2  PHQ-9: PHQ-9: Brief Depression Severity Measure Score: 9  Opioid Risk Tool:         Review of Systems:   ROS negative except as otherwise noted     Past Medical History:   Past Medical History:   Diagnosis Date    Acute maxillary sinusitis     Arthritis     CHF (congestive heart failure)     Chronic pain disorder     Colon polyp     Depression     Difficulty walking     Elevated LFTs     GERD (gastroesophageal reflux disease)     History of colonic polyps     History of methicillin resistant Staphylococcus aureus     History of myocardial infarction     Hyperlipidemia     Hypertension     Left hand pain     Left shoulder pain     Low back pain     MRSA (methicillin resistant Staphylococcus aureus)     Myocardial infarction     Neck pain     Personal history of congestive heart failure     Right hip pain     Rotator cuff syndrome          Past Surgical History:   Past Surgical History:   Procedure Laterality Date    BACK SURGERY      CAROTID STENT      CORONARY ANGIOPLASTY WITH STENT PLACEMENT  01/04/2012    Circumflex Xscience V 3.5 MM x 23 mm    CORONARY STENT PLACEMENT      ENDOMETRIAL ABLATION      EPIDURAL BLOCK      GALLBLADDER SURGERY  2017    LUMBAR DISCECTOMY  1992    [facility] [place]    LYMPH NODE BIOPSY      SHOULDER SURGERY  12/20/2018    Dr. Hoyt Rotator cuff repair         Family History   Family History   Problem Relation Age of Onset    Hypertension Mother     Heart attack Mother     Hyperlipidemia Mother     Alcohol abuse Mother     Alcohol abuse Father     Dementia Sister     Heart disease Sister     Developmental Disability Sister     Stroke Sister     Alcohol abuse Maternal Aunt     Alcohol abuse Paternal Uncle     Diabetes Maternal Grandmother     Heart disease Brother     Stroke Sister          Social History   Social History     Socioeconomic History    Marital status: Single   Tobacco Use    Smoking status: Former      Packs/day: 1.50     Years: 35.00     Pack years: 52.50     Types: Cigarettes     Quit date: 2011     Years since quittin.4    Smokeless tobacco: Never   Vaping Use    Vaping Use: Never used   Substance and Sexual Activity    Alcohol use: Not Currently     Comment: 5 mixed drinks per day. recently quit all alcohol 2020    Drug use: Yes     Types: Hydrocodone, Marijuana    Sexual activity: Not Currently     Partners: Female        Medications:     Current Outpatient Medications:     aspirin 81 MG EC tablet, Take 1 tablet by mouth Daily., Disp: , Rfl:     atorvastatin (LIPITOR) 80 MG tablet, TAKE 1 TABLET BY MOUTH ONCE DAILY AT BEDTIME, Disp: 90 tablet, Rfl: 0    donepezil (ARICEPT) 10 MG tablet, Take 1 tablet by mouth Every Night., Disp: 90 tablet, Rfl: 3    gabapentin (NEURONTIN) 300 MG capsule, Take 1 capsule by mouth 3 (Three) Times a Day., Disp: 90 capsule, Rfl: 0    hydroCHLOROthiazide (MICROZIDE) 12.5 MG capsule, , Disp: , Rfl:     lisinopril (PRINIVIL,ZESTRIL) 20 MG tablet, Take 1 tablet by mouth once daily, Disp: 90 tablet, Rfl: 0    memantine (NAMENDA) 10 MG tablet, Take 1 tablet by mouth 2 (Two) Times a Day., Disp: 180 tablet, Rfl: 3    metoprolol tartrate (LOPRESSOR) 50 MG tablet, Take 1/2 (one-half) tablet by mouth twice daily, Disp: 90 tablet, Rfl: 0    mirtazapine (REMERON) 30 MG tablet, TAKE 1 TABLET BY MOUTH ONCE DAILY AT NIGHT, Disp: 90 tablet, Rfl: 1    Multiple Vitamins-Minerals (CENTRUM SILVER) tablet, Take 1 tablet by mouth Daily., Disp: , Rfl:     omeprazole (priLOSEC) 40 MG capsule, Take 1 capsule by mouth once daily, Disp: 90 capsule, Rfl: 0    sertraline (ZOLOFT) 100 MG tablet, Take 2 tablets by mouth once daily, Disp: 180 tablet, Rfl: 0    triamcinolone (KENALOG) 0.1 % cream, Apply 1 application topically to the appropriate area as directed 2 (Two) Times a Day., Disp: 45 g, Rfl: 1    ferrous sulfate 325 (65 Fe) MG tablet, Take 1 tablet by mouth once daily with breakfast (Patient  "not taking: Reported on 4/18/2023), Disp: 30 tablet, Rfl: 2        Physical Exam:     Vitals:    06/16/23 1008   BP: 142/88   BP Location: Left arm   Patient Position: Sitting   Cuff Size: Adult   Pulse: 51   Resp: 14   Temp: 96.5 °F (35.8 °C)   TempSrc: Infrared   SpO2: 96%   Weight: 83.6 kg (184 lb 3.2 oz)   Height: 165.1 cm (65\")   PainSc:   3   PainLoc: Back        General: Alert and oriented, No acute distress.   HEENT: Normocephalic, atraumatic.   Cardiovascular: No gross edema  Respiratory: Respirations are non-labored    Thoracic Spine:   Inspection: no gross abnormality  Paraspinal muscle palpation: nontender  Spinous process palpation: nontender    Lumbar Spine:   No masses or atrophy  Range of motion - Flexion normal. Extension reduced  Facet Loading: Positive bilaterally  Facet Palpation - tender bilaterally  PSIS tenderness - Negative bilaterally  King's/ABE/Thigh thrust - Negative bilaterally  Straight leg raise: Positive right  Slump test: Positive right    Motor Exam:    Strength: Rate on 1-5 scale Right Left    L1/2- hip flexion 5 5    L3- knee extension 5 5    L4- ankle dorsiflexion 5 5    L5- great toe extension 5 5    S1- ankle plantarflexion 5 5    Sensory Exam: Paresthesia and redo sensation in right L4 dermatome.    Neurologic: Cranial Nerves II-XII are grossly intact.   Clonus -negative bilaterally    Psychiatric: Cooperative.   Gait: Antalgic  Assistive Devices: None    Imaging Studies:   No results found for this or any previous visit.      Impression & Plan:   11/21/2022: Mr. Justice Kiser is a 63 y.o. male with past medical history significant for prior L4/5 right hemilaminectomy/discectomy presents as a new patient referral from neurosurgery for evaluation of chronic low back pain with radiation to right lower extremity.  Clinical examination most consistent with lumbar spinal stenosis with neurogenic claudication and right L4 radiculopathy.  I personally interpreted the patient's " lumbar MRI which demonstrates fatty endplate changes at L4/5 and L5/S1, severe central stenosis at L3/4 secondary to bulky facet arthropathy, ligamentum flavum hypertrophy and L4/5 severe stenosis secondary to disc bulge, facet arthropathy, epidural lipomatosis.     I did review the patient's lumbar MRI with him in the room.  We additionally discussed the relevant risk benefits and alternatives to proceeding with lumbar epidural steroid injection.  Patient has primarily neurogenic claudication with some right-sided radicular symptoms therefore we will proceed with right L4/5 lumbar interlaminar epidural steroid injection.  If ineffective can consider diagnostic lumbar medial branch blocks to delineate the culpability of his facet arthropathy versus central stenosis.    1/16/2023: Pain now resolved with epidural.  Can repeat as needed every 3 to 4 months  4/20/2023: Continued excellent benefit.  Elevated PHQ, will refer for pain psych.  6/16/2023: Continued benefit from epidural, pain psych.  Refill gabapentin.    1. Spinal stenosis of lumbar region with neurogenic claudication    2. Cervical spondylosis with radiculopathy    3. Lumbar radiculopathy          PLAN:  1. Medication Recommendations: Refill gabapentin 300 mg BID #2 refills.  Sukhjinder report was reviewed and consistent.  Controlled substance agreement signed and on file    2. Physical Therapy: Continue HEP    3. Psychological: Continue pain psychology     4. Complementary and alternative therapies:     5. Labs: We will obtain medication compliance urinalysis    6. Imaging: None indicated    7. Interventions: Can repeat right L4/5 LESI (CPT 86185) as needed every 3 to 4 months    8. Referrals: None indicated     9. Records requested: n/a    10. Lifestyle goals:    Follow up 3 months    [facility]  Maximo Li MD

## 2023-07-25 DIAGNOSIS — I10 ESSENTIAL HYPERTENSION: ICD-10-CM

## 2023-07-25 RX ORDER — LISINOPRIL 20 MG/1
TABLET ORAL
Qty: 30 TABLET | Refills: 0 | Status: SHIPPED | OUTPATIENT
Start: 2023-07-25

## 2023-08-10 ENCOUNTER — HOSPITAL ENCOUNTER (OUTPATIENT)
Dept: CARDIOLOGY | Facility: HOSPITAL | Age: 63
Setting detail: HOSPITAL OUTPATIENT SURGERY
Discharge: HOME | End: 2023-08-10
Payer: MEDICARE

## 2023-08-10 VITALS
HEIGHT: 65 IN | BODY MASS INDEX: 30.66 KG/M2 | WEIGHT: 184 LBS | HEART RATE: 55 BPM | SYSTOLIC BLOOD PRESSURE: 194 MMHG | DIASTOLIC BLOOD PRESSURE: 80 MMHG

## 2023-08-10 DIAGNOSIS — R07.9 CHEST PAIN, UNSPECIFIED TYPE: ICD-10-CM

## 2023-08-10 DIAGNOSIS — I25.119 CORONARY ARTERY DISEASE INVOLVING NATIVE CORONARY ARTERY OF NATIVE HEART WITH ANGINA PECTORIS: ICD-10-CM

## 2023-08-10 LAB
BH CV REST NUCLEAR ISOTOPE DOSE: 9.6 MCI
BH CV STRESS BP STAGE 1: NORMAL
BH CV STRESS BP STAGE 2: NORMAL
BH CV STRESS DURATION MIN STAGE 1: 3
BH CV STRESS DURATION MIN STAGE 2: 2
BH CV STRESS DURATION SEC STAGE 1: 0
BH CV STRESS DURATION SEC STAGE 2: 32
BH CV STRESS GRADE STAGE 1: 10
BH CV STRESS GRADE STAGE 2: 12
BH CV STRESS HR STAGE 1: 112
BH CV STRESS HR STAGE 2: 133
BH CV STRESS METS STAGE 1: 5
BH CV STRESS METS STAGE 2: 7.5
BH CV STRESS NUCLEAR ISOTOPE DOSE: 33 MCI
BH CV STRESS O2 STAGE 1: 97
BH CV STRESS O2 STAGE 2: 97
BH CV STRESS PROTOCOL 1: NORMAL
BH CV STRESS RECOVERY BP: NORMAL MMHG
BH CV STRESS RECOVERY HR: 80 BPM
BH CV STRESS SPEED STAGE 1: 1.7
BH CV STRESS SPEED STAGE 2: 2.5
BH CV STRESS STAGE 1: 1
BH CV STRESS STAGE 2: 2
LV EF NUC BP: 65 %
MAXIMAL PREDICTED HEART RATE: 155 BPM
PERCENT MAX PREDICTED HR: 85.81 %
STRESS BASELINE BP: NORMAL MMHG
STRESS BASELINE HR: 50 BPM
STRESS O2 SAT REST: 97 %
STRESS PERCENT HR: 101 %
STRESS POST ESTIMATED WORKLOAD: 6.5 METS
STRESS POST EXERCISE DUR MIN: 5 MIN
STRESS POST EXERCISE DUR SEC: 32 SEC
STRESS POST O2 SAT PEAK: 97 %
STRESS POST PEAK BP: NORMAL MMHG
STRESS POST PEAK HR: 133 BPM
STRESS TARGET HR: 132 BPM

## 2023-08-10 PROCEDURE — A9500 TC99M SESTAMIBI: HCPCS | Performed by: FAMILY MEDICINE

## 2023-08-10 PROCEDURE — 78452 HT MUSCLE IMAGE SPECT MULT: CPT

## 2023-08-10 PROCEDURE — 0 TECHNETIUM SESTAMIBI: Performed by: FAMILY MEDICINE

## 2023-08-10 PROCEDURE — 93017 CV STRESS TEST TRACING ONLY: CPT

## 2023-08-10 RX ADMIN — TECHNETIUM TC 99M SESTAMIBI 1 DOSE: 1 INJECTION INTRAVENOUS at 07:35

## 2023-08-10 RX ADMIN — TECHNETIUM TC 99M SESTAMIBI 1 DOSE: 1 INJECTION INTRAVENOUS at 09:15

## 2023-08-21 DIAGNOSIS — I25.119 CORONARY ARTERY DISEASE INVOLVING NATIVE CORONARY ARTERY OF NATIVE HEART WITH ANGINA PECTORIS: Primary | ICD-10-CM

## 2023-08-21 DIAGNOSIS — R07.9 CHEST PAIN, UNSPECIFIED TYPE: ICD-10-CM

## 2023-09-05 RX ORDER — SERTRALINE HYDROCHLORIDE 100 MG/1
TABLET, FILM COATED ORAL
Qty: 180 TABLET | Refills: 0 | Status: SHIPPED | OUTPATIENT
Start: 2023-09-05

## 2023-09-11 NOTE — PROGRESS NOTES
Cardiology Outpatient Visit      Identification: Justice Kiser is a 63 y.o. male who resides in [place], [place].     Reason for visit:  Chest pain  Previous MI  Reestablish care      Subjective      Justice Trinidad is a 63-year-old gentleman who has a history of lateral wall infarct in 2012.  The patient has been lost to cardiology follow-up over the last several years.  Several months ago, the patient started experiencing episodes of sharp chest pains with no exacerbating or alleviating factors.  The pain last for a couple seconds in duration and would make him catch his breath.  He states that discomfort was not similar to what he experienced at the time of his heart attack.    As part of evaluation, Dr. Martinez ordered nuclear stress testing which showed lateral wall infarct consistent with his previous history and previous nuclear stress test.    The patient states his blood pressures been elevated of recent.    Review of Systems   Constitutional: Negative for malaise/fatigue.   Eyes:  Negative for vision loss in left eye and vision loss in right eye.   Cardiovascular:  Negative for chest pain, dyspnea on exertion, near-syncope, orthopnea, palpitations, paroxysmal nocturnal dyspnea and syncope.   Musculoskeletal:  Negative for myalgias.   Neurological:  Negative for brief paralysis, excessive daytime sleepiness, focal weakness, numbness, paresthesias and weakness.   All other systems reviewed and are negative.    No Known Allergies      Current Outpatient Medications   Medication Instructions    aspirin 81 mg, Oral, Daily    atorvastatin (LIPITOR) 80 mg, Oral, Every Night at Bedtime    donepezil (ARICEPT) 10 mg, Oral, Nightly    lisinopril-hydrochlorothiazide (PRINZIDE,ZESTORETIC) 20-12.5 MG per tablet 1 tablet, Oral, Daily    memantine (NAMENDA) 10 mg, Oral, 2 Times Daily    metoprolol tartrate (LOPRESSOR) 25 mg, Oral, Every 12 Hours Scheduled    mirtazapine (REMERON) 30 MG tablet TAKE 1 TABLET BY MOUTH ONCE  "DAILY AT NIGHT    Multiple Vitamins-Minerals (CENTRUM SILVER) tablet 1 tablet, Oral, Daily    omeprazole (PRILOSEC) 40 mg, Oral, Daily    sertraline (ZOLOFT) 100 MG tablet Take 2 tablets by mouth once daily         Objective     /89   Pulse 52   Ht 165.1 cm (65\")   Wt 83 kg (183 lb)   SpO2 95%   BMI 30.45 kg/m²       Constitutional:       Appearance: Healthy appearance.   Eyes:      General: No scleral icterus.  Neck:      Thyroid: No thyroid mass.      Vascular: No carotid bruit or JVD. JVD normal.   Pulmonary:      Effort: Pulmonary effort is normal.      Breath sounds: Normal breath sounds.   Cardiovascular:      Normal rate. Regular rhythm.      Murmurs: There is no murmur.      No gallop.    Edema:     Peripheral edema absent.   Skin:     General: Skin is warm. There is no cyanosis.   Neurological:      General: No focal deficit present.      Mental Status: Alert.   Psychiatric:         Attention and Perception: Attention normal.       Result Review  (reviewed with patient):            Lab Results   Component Value Date    GLUCOSE 99 07/07/2023    BUN 19 07/07/2023    CREATININE 1.15 07/07/2023    EGFRRESULT 71 07/07/2023    BCR 17 07/07/2023    K 4.7 07/07/2023    CO2 24 07/07/2023    CALCIUM 9.2 07/07/2023    PROTENTOTREF 7.4 07/07/2023    ALBUMIN 4.2 07/07/2023    BILITOT 0.4 07/07/2023    AST 37 07/07/2023    ALT 22 07/07/2023     Lab Results   Component Value Date    WBC 7.9 07/07/2023    HGB 13.1 07/07/2023    HCT 38.7 07/07/2023    MCV 92 07/07/2023     (L) 07/07/2023     Lab Results   Component Value Date    CHLPL 143 07/07/2023    TRIG 85 07/07/2023    HDL 61 07/07/2023    LDL 66 07/07/2023     Lab Results   Component Value Date    HGBA1C 6.0 (H) 07/07/2023           Assessment     Diagnoses and all orders for this visit:    1. Coronary artery disease involving native coronary artery of native heart with angina pectoris (Primary)  Overview:  Cardiac catheterization for NSTEMI " (1/2012):  Severe 1-vessel CAD (left circumflex subtotal occlusion). Mild disease of the other coronary arteries. Successful PCI of the left circumflex using a Xience MUNA.     Exercise nuclear stress (10/26/18): Exercise for 6.5 minutes with replication chest pain. Moderate sized inferolateral infarct. LVEF 53%  Nuclear stress test (2/12/2020): Medium sized infarct lateral wall with no significant ischemia. LVEF 70%.   Pharmacologic nuclear stress (8/10/2023): Moderate inferior lateral infarct.    Assessment & Plan:  Recent chest pain symptoms not consistent with angina  Nuclear stress test is consistent with known lateral wall myocardial infarction and similar to previous stress testing.    Continue aspirin, beta-blocker, and statin therapy    Orders:  -     aspirin 81 MG EC tablet; Take 1 tablet by mouth Daily.  -     metoprolol tartrate (LOPRESSOR) 50 MG tablet; Take 0.5 tablets by mouth Every 12 (Twelve) Hours.  Dispense: 90 tablet; Refill: 0    2. Essential hypertension  Overview:  Target blood pressure <130/80 mmHg    Assessment & Plan:  Elevated blood pressure today  Change lisinopril and HCTZ dosing to lisinopril HCTZ 20-12.5 mg twice daily   Continue metoprolol    Orders:  -     metoprolol tartrate (LOPRESSOR) 50 MG tablet; Take 0.5 tablets by mouth Every 12 (Twelve) Hours.  Dispense: 90 tablet; Refill: 0  -     lisinopril-hydrochlorothiazide (PRINZIDE,ZESTORETIC) 20-12.5 MG per tablet; Take 1 tablet by mouth Daily.  Dispense: 180 tablet; Refill: 3    3. Hyperlipidemia LDL goal <70  Overview:  High intensity statin therapy indicated given the presence of coronary disease    Assessment & Plan:  Reasonably controlled LDL  Continue atorvastatin    Orders:  -     atorvastatin (LIPITOR) 80 MG tablet; Take 1 tablet by mouth every night at bedtime.  Dispense: 90 tablet; Refill: 3          Plan   Discontinue HCTZ and lisinopril  Start lisinopril HCTZ 20-12.5 mg twice daily  Patient will monitor blood pressure at  home and contact us via [employer] if sustaining BPs >130/80 mm Luis Felipe      Follow-up   Return in about 6 months (around 3/12/2024) for Follow-up with cardiology APRN/PA.        Aries Crouch MD, FACC, [facility]  9/12/2023

## 2023-09-12 ENCOUNTER — OFFICE VISIT (OUTPATIENT)
Dept: CARDIOLOGY | Facility: CLINIC | Age: 63
End: 2023-09-12
Payer: MEDICARE

## 2023-09-12 VITALS
SYSTOLIC BLOOD PRESSURE: 139 MMHG | BODY MASS INDEX: 30.49 KG/M2 | DIASTOLIC BLOOD PRESSURE: 89 MMHG | WEIGHT: 183 LBS | HEART RATE: 52 BPM | HEIGHT: 65 IN | OXYGEN SATURATION: 95 %

## 2023-09-12 DIAGNOSIS — E78.5 HYPERLIPIDEMIA LDL GOAL <70: ICD-10-CM

## 2023-09-12 DIAGNOSIS — I25.119 CORONARY ARTERY DISEASE INVOLVING NATIVE CORONARY ARTERY OF NATIVE HEART WITH ANGINA PECTORIS: Primary | ICD-10-CM

## 2023-09-12 DIAGNOSIS — I10 ESSENTIAL HYPERTENSION: ICD-10-CM

## 2023-09-12 PROBLEM — R45.89 DEPRESSED MOOD: Status: RESOLVED | Noted: 2022-12-15 | Resolved: 2023-09-12

## 2023-09-12 RX ORDER — LISINOPRIL 20 MG/1
20 TABLET ORAL DAILY
Qty: 90 TABLET | Refills: 3 | Status: CANCELLED | OUTPATIENT
Start: 2023-09-12

## 2023-09-12 RX ORDER — ASPIRIN 81 MG/1
81 TABLET ORAL DAILY
Start: 2023-09-12

## 2023-09-12 RX ORDER — METOPROLOL TARTRATE 50 MG/1
25 TABLET, FILM COATED ORAL EVERY 12 HOURS SCHEDULED
Qty: 90 TABLET | Refills: 0 | Status: SHIPPED | OUTPATIENT
Start: 2023-09-12

## 2023-09-12 RX ORDER — HYDROCHLOROTHIAZIDE 12.5 MG/1
12.5 CAPSULE, GELATIN COATED ORAL EVERY MORNING
Qty: 90 CAPSULE | Refills: 3 | Status: CANCELLED | OUTPATIENT
Start: 2023-09-12

## 2023-09-12 RX ORDER — ATORVASTATIN CALCIUM 80 MG/1
80 TABLET, FILM COATED ORAL
Qty: 90 TABLET | Refills: 3 | Status: SHIPPED | OUTPATIENT
Start: 2023-09-12

## 2023-09-12 RX ORDER — LISINOPRIL AND HYDROCHLOROTHIAZIDE 20; 12.5 MG/1; MG/1
1 TABLET ORAL DAILY
Qty: 180 TABLET | Refills: 3 | Status: SHIPPED | OUTPATIENT
Start: 2023-09-12

## 2023-09-12 NOTE — ASSESSMENT & PLAN NOTE
Recent chest pain symptoms not consistent with angina  Nuclear stress test is consistent with known lateral wall myocardial infarction and similar to previous stress testing.    Continue aspirin, beta-blocker, and statin therapy

## 2023-09-12 NOTE — ASSESSMENT & PLAN NOTE
Elevated blood pressure today  Change lisinopril and HCTZ dosing to lisinopril HCTZ 20-12.5 mg twice daily   Continue metoprolol

## 2023-09-28 ENCOUNTER — OFFICE VISIT (OUTPATIENT)
Dept: NEUROLOGY | Facility: CLINIC | Age: 63
End: 2023-09-28
Payer: MEDICARE

## 2023-09-28 VITALS
SYSTOLIC BLOOD PRESSURE: 130 MMHG | BODY MASS INDEX: 30.32 KG/M2 | HEIGHT: 65 IN | WEIGHT: 182 LBS | DIASTOLIC BLOOD PRESSURE: 60 MMHG | OXYGEN SATURATION: 96 % | HEART RATE: 55 BPM

## 2023-09-28 DIAGNOSIS — F51.04 PSYCHOPHYSIOLOGICAL INSOMNIA: ICD-10-CM

## 2023-09-28 DIAGNOSIS — G31.84 MCI (MILD COGNITIVE IMPAIRMENT): Primary | ICD-10-CM

## 2023-09-28 PROCEDURE — 99214 OFFICE O/P EST MOD 30 MIN: CPT | Performed by: NURSE PRACTITIONER

## 2023-09-28 PROCEDURE — 3078F DIAST BP <80 MM HG: CPT | Performed by: NURSE PRACTITIONER

## 2023-09-28 PROCEDURE — 1160F RVW MEDS BY RX/DR IN RCRD: CPT | Performed by: NURSE PRACTITIONER

## 2023-09-28 PROCEDURE — 1159F MED LIST DOCD IN RCRD: CPT | Performed by: NURSE PRACTITIONER

## 2023-09-28 PROCEDURE — 3075F SYST BP GE 130 - 139MM HG: CPT | Performed by: NURSE PRACTITIONER

## 2023-09-28 RX ORDER — MEMANTINE HYDROCHLORIDE 10 MG/1
10 TABLET ORAL 2 TIMES DAILY
Qty: 180 TABLET | Refills: 3 | Status: SHIPPED | OUTPATIENT
Start: 2023-09-28

## 2023-09-28 RX ORDER — DONEPEZIL HYDROCHLORIDE 10 MG/1
10 TABLET, FILM COATED ORAL NIGHTLY
Qty: 90 TABLET | Refills: 3 | Status: SHIPPED | OUTPATIENT
Start: 2023-09-28

## 2023-09-28 RX ORDER — MIRTAZAPINE 30 MG/1
30 TABLET, FILM COATED ORAL NIGHTLY
Qty: 90 TABLET | Refills: 3 | Status: SHIPPED | OUTPATIENT
Start: 2023-09-28

## 2023-09-28 NOTE — LETTER
September 28, 2023     Anshul Martinez MD  [address]  [place]  [place] [address]    Patient: Justice Kiser   YOB: 1960   Date of Visit: 9/28/2023       Dear Anshul Martinez MD    Justice Kiser was in my office today. Below is a copy of my note.    If you have questions, please do not hesitate to call me. I look forward to following Justice along with you.         Sincerely,        CAROL Coleman        CC: MD Tacho Macias IV, MD Kimberly Reynolds, APRN    Subjective:     Patient ID: Justice Kiser is a 63 y.o. male.    CC:   Chief Complaint   Patient presents with   • mild cognitive impairment       HPI:   History of Present Illness  Today 9/28/2023-    This is a 63-year-old male who presents for 11-month neurology follow-up on suspected mild cognitive impairment with symptoms present since 2017. He also has known cerebral microvascular disease, moderate depression, and insomnia. He also follows with neurosurgery and pain management for chronic back pain. He has been taking donepezil 10 mg daily, memantine 10 mg twice per day, both for cognitive enhancement. He has been on mirtazapine 30 mg at night for his insomnia. He is currently prescribed sertraline by his PCP for his depression. He is here for a follow-up and reevaluation of symptoms today.    Today, he reports his memory has improved. He denies any hallucinations, or delusions. He notes occasional confusion and losing his sense of direction while driving. He can get around, but he has noticed this a couple of times. He lives with his daughter, who can help him. He takes all his medications on his own. He is still managing his finances. He denies any falls in the past year. He is no longer taking gabapentin.    Has had lumbar epidural injections. He weaned himself off gabapentin. He has been [place] neuro[place] [facility] [place] has had 2 major back surgeries in the  past.    He is doing well with the mirtazapine. His sleep has improved.    He saw Dr. Crouch with cardiology on 09/12/2023, and everything was stable. He is scheduled to see CAROL Marina, in 04/2024. He reports his blood pressure was slightly elevated, and he was having some chest discomfort. He ran out of his medication and did not realize it needed a refill. He had a stress test, and his blood pressure was extremely elevated the day of the stress test. He notes that his blood pressure is currently stable with being back on his Hypertension medications.     Previous neuro workup & history:  Suspected mild cognitive impairment with symptoms that have been present since 2017 beginning with forgetfulness, repetitiveness, and word finding difficulties, and overall has been stable per he and his family. He has had some impairments in orientation, and executive function. He has had some depression and irritability. He has had some visual hallucinations, previously consumed alcohol, but none since 12/15/2020. He does not have impairments in activities of daily living. He was living with his nephew Harris in [place] previously until he passed away in March of 2022. He has been on donepezil as well as memantine for his memory.      He previously did complete cognitive rehab with our speech language pathologist. He has had chronic difficulty with balance, but no recent falls. He does follow with neurosurgery for neuropathy with chronic neck, and low back pain. He is also taking mirtazapine 30 mg at night.     His daughter and son help some with his finances. He is currently taking sertraline prescribed by Dr. Martinez.     Prior evaluation has included an MRI of the brain showing chronic white matter changes, an unremarkable ECHO/Carotid Dopplers and screening blood work. He also had an updated MRI of the brain on 7/23/2018 which showed chronic small vessel ischemic changes with old lacunar infarcts.  He is on  aspirin and statin therapy.    He has been following with neurosurgery in regards to low back pain with prior back surgery. He has a spinal cord stimulator now.     He is still driving and denies any issues. Presently he manages his finances and is planning to have his son oversee this in the future. His daughter lives with him.     He states that his back pain is affecting him presently. He confirms history of back surgery and would like to avoid another surgery.      Social history: He denies drinking alcohol or smoking.  The following portions of the patient's history were reviewed and updated as appropriate: allergies, current medications, past family history, past medical history, past social history, past surgical history, and problem list.    Past Medical History:   Diagnosis Date   • Acute maxillary sinusitis    • Arthritis    • CHF (congestive heart failure)    • Chronic pain disorder    • Colon polyp    • Depression    • Difficulty walking    • Elevated LFTs    • GERD (gastroesophageal reflux disease)    • History of colonic polyps    • History of methicillin resistant Staphylococcus aureus    • History of myocardial infarction    • Hyperlipidemia    • Hypertension    • Left hand pain    • Left shoulder pain    • Low back pain    • MRSA (methicillin resistant Staphylococcus aureus)    • Myocardial infarction    • Neck pain    • Personal history of congestive heart failure    • Right hip pain    • Rotator cuff syndrome        Past Surgical History:   Procedure Laterality Date   • BACK SURGERY     • CAROTID STENT     • CORONARY ANGIOPLASTY WITH STENT PLACEMENT  01/04/2012    Circumflex Xscience V 3.5 MM x 23 mm   • CORONARY STENT PLACEMENT     • ENDOMETRIAL ABLATION     • EPIDURAL BLOCK     • GALLBLADDER SURGERY  2017   • LUMBAR DISCECTOMY  1992    2 level - [facility]   • LYMPH NODE BIOPSY     • SHOULDER SURGERY  12/20/2018    Dr. Hoyt Rotator cuff repair       Social History      Socioeconomic History   • Marital status: Single   Tobacco Use   • Smoking status: Former     Packs/day: 1.50     Years: 35.00     Pack years: 52.50     Types: Cigarettes     Start date:      Quit date: 2011     Years since quittin.7     Passive exposure: Past   • Smokeless tobacco: Never   Vaping Use   • Vaping Use: Never used   Substance and Sexual Activity   • Alcohol use: Not Currently     Comment: 5 mixed drinks per day. recently quit all alcohol 2020   • Drug use: Yes     Types: Marijuana   • Sexual activity: Not Currently     Partners: Female       Family History   Problem Relation Age of Onset   • Hypertension Mother    • Heart attack Mother    • Hyperlipidemia Mother    • Alcohol abuse Mother    • Alcohol abuse Father    • Dementia Sister    • Heart disease Sister    • Developmental Disability Sister    • Stroke Sister    • Alcohol abuse Maternal Aunt    • Alcohol abuse Paternal Uncle    • Diabetes Maternal Grandmother    • Heart disease Brother    • Stroke Sister           Current Outpatient Medications:   •  aspirin 81 MG EC tablet, Take 1 tablet by mouth Daily., Disp: , Rfl:   •  atorvastatin (LIPITOR) 80 MG tablet, Take 1 tablet by mouth every night at bedtime., Disp: 90 tablet, Rfl: 3  •  donepezil (ARICEPT) 10 MG tablet, Take 1 tablet by mouth Every Night., Disp: 90 tablet, Rfl: 3  •  lisinopril-hydrochlorothiazide (PRINZIDE,ZESTORETIC) 20-12.5 MG per tablet, Take 1 tablet by mouth Daily., Disp: 180 tablet, Rfl: 3  •  memantine (NAMENDA) 10 MG tablet, Take 1 tablet by mouth 2 (Two) Times a Day., Disp: 180 tablet, Rfl: 3  •  metoprolol tartrate (LOPRESSOR) 50 MG tablet, Take 0.5 tablets by mouth Every 12 (Twelve) Hours., Disp: 90 tablet, Rfl: 0  •  mirtazapine (REMERON) 30 MG tablet, Take 1 tablet by mouth Every Night., Disp: 90 tablet, Rfl: 3  •  Multiple Vitamins-Minerals (CENTRUM SILVER) tablet, Take 1 tablet by mouth Daily., Disp: , Rfl:   •  omeprazole (priLOSEC) 40 MG capsule,  "Take 1 capsule by mouth once daily, Disp: 90 capsule, Rfl: 0  •  sertraline (ZOLOFT) 100 MG tablet, Take 2 tablets by mouth once daily, Disp: 180 tablet, Rfl: 0     Review of Systems   Musculoskeletal:  Positive for back pain and gait problem.   Psychiatric/Behavioral:  Positive for decreased concentration.    All other systems reviewed and are negative.     Objective:  /60   Pulse 55   Ht 165.1 cm (65\")   Wt 82.6 kg (182 lb)   SpO2 96%   BMI 30.29 kg/m²     Neurologic Exam  Mental Status   Oriented to person, place, and time.   Speech: speech is normal   Level of consciousness: alert  Knowledge: good.   Abnormal comprehension.     Cranial Nerves   Cranial nerves II through XII intact.     Motor Exam   Muscle bulk: normal  Overall muscle tone: normal     Strength   Strength 5/5 except as noted.   Moderate low back pain with decreased range of motion      Gait, Coordination, and Reflexes      Gait  Gait: (severe antalgia due to severe low back pain, no assistive device)     Coordination   Finger to nose coordination: normal     Tremor   Resting tremor: absent  Intention tremor: present (minimal BUE fine kinetic hand tremor)  Action tremor: absent     Reflexes   Right : 2+  Left : 2+     Physical Exam  Constitutional:       Appearance: Normal appearance.   Musculoskeletal:      Lumbar back: Decreased range of motion.   Neurological:      Mental Status: He is alert and oriented to person, place, and time.      Cranial Nerves: Cranial nerves 2-12 are intact.      Coordination: Finger-Nose-Finger Test normal.   Psychiatric:         Mood and Affect: Mood and affect normal.      Speech: Speech normal.         Behavior: Behavior is slowed.         Thought Content: Thought content normal.         Cognition and Memory: He exhibits impaired recent memory (mild).         Judgment: Judgment normal.    At his last visit in clinic on 11/18/2022, his [facility] Cognitive Assessment score was a 24 out of 30 with 2 " out of 5 for word recall. Prior to that, his [facility] Cognitive Assessment score was a 26 out of 30.    Today, his [facility] Cognitive Assessment score today is a 25 out of 30, 2 out of 5 for word recall uncued, 4 out of 5 for word recall cued.    Assessment/Plan:       Diagnoses and all orders for this visit:    1. MCI (mild cognitive impairment) (Primary)  -     memantine (NAMENDA) 10 MG tablet; Take 1 tablet by mouth 2 (Two) Times a Day.  Dispense: 180 tablet; Refill: 3  -     mirtazapine (REMERON) 30 MG tablet; Take 1 tablet by mouth Every Night.  Dispense: 90 tablet; Refill: 3  -     donepezil (ARICEPT) 10 MG tablet; Take 1 tablet by mouth Every Night.  Dispense: 90 tablet; Refill: 3    2. Psychophysiological insomnia  -     mirtazapine (REMERON) 30 MG tablet; Take 1 tablet by mouth Every Night.  Dispense: 90 tablet; Refill: 3         From a neurological standpoint, he is doing excellent. We are going to follow up in 1 year. We will keep medications the same. He will continue with all his specialists.     He will call us with any additional questions or concerns prior to follow up in the clinic.    Reviewed medications, potential side effects and signs and symptoms to report. Discussed risk versus benefits of treatment plan with patient and/or family-including medications, labs and radiology that may be ordered. Addressed questions and concerns during visit. Patient and/or family verbalized understanding and agree with plan.    During this visit the following were done:  Labs Reviewed []    Labs Ordered []    Radiology Reports Reviewed []    Radiology Ordered []    PCP Records Reviewed []    Referring Provider Records Reviewed []    ER Records Reviewed []    Hospital Records Reviewed []    History Obtained From Family []    Radiology Images Reviewed []    Other Reviewed [x]  Neurosurgery, cardiology and pain management notes reviewed  Records Requested []      Note to patient: The 21st Century Cures Act makes  medical notes like these available to patients in the interest of transparency. However, be advised this is a medical document. It is intended as peer to peer communication. It is written in medical language and may contain abbreviations or verbiage that are unfamiliar. It may appear blunt or direct. Medical documents are intended to carry relevant information, facts as evident, and the clinical opinion of the provider.      Transcribed from ambient dictation for CAROL Coleman by Savita Estrella.  09/28/23   10:22 EDT    Patient or patient representative verbalized consent to the visit recording.  I have personally performed the services described in this document as transcribed by the above individual, and it is both accurate and complete.  CAROL Coleman  9/28/2023  10:37 EDT

## 2023-09-28 NOTE — PROGRESS NOTES
Subjective:     Patient ID: Justice Kiser is a 63 y.o. male.    CC:   Chief Complaint   Patient presents with    mild cognitive impairment       HPI:   History of Present Illness  Today 9/28/2023-    This is a 63-year-old male who presents for 11-month neurology follow-up on suspected mild cognitive impairment with symptoms present since 2017. He also has known cerebral microvascular disease, moderate depression, and insomnia. He also follows with neurosurgery and pain management for chronic back pain. He has been taking donepezil 10 mg daily, memantine 10 mg twice per day, both for cognitive enhancement. He has been on mirtazapine 30 mg at night for his insomnia. He is currently prescribed sertraline by his PCP for his depression. He is here for a follow-up and reevaluation of symptoms today.    Today, he reports his memory has improved. He denies any hallucinations, or delusions. He notes occasional confusion and losing his sense of direction while driving. He can get around, but he has noticed this a couple of times. He lives with his daughter, who can help him. He takes all his medications on his own. He is still managing his finances. He denies any falls in the past year. He is no longer taking gabapentin.    Has had lumbar epidural injections. He weaned himself off gabapentin. He has been evaluated by neurosurgery and now pain management with [facility]. He has had 2 major back surgeries in the past.    He is doing well with the mirtazapine. His sleep has improved.    He saw Dr. Crouch with cardiology on 09/12/2023, and everything was stable. He is scheduled to see CAROL Marina, in 04/2024. He reports his blood pressure was slightly elevated, and he was having some chest discomfort. He ran out of his medication and did not realize it needed a refill. He had a stress test, and his blood pressure was extremely elevated the day of the stress test. He notes that his blood pressure is currently stable with  being back on his Hypertension medications.     Previous neuro workup & history:  Suspected mild cognitive impairment with symptoms that have been present since 2017 beginning with forgetfulness, repetitiveness, and word finding difficulties, and overall has been stable per he and his family. He has had some impairments in orientation, and executive function. He has had some depression and irritability. He has had some visual hallucinations, previously consumed alcohol, but none since 12/15/2020. He does not have impairments in activities of daily living. He was living with his nephew Harris in [place] previously until he passed away in March of 2022. He has been on donepezil as well as memantine for his memory.      He previously did complete cognitive rehab with our speech language pathologist. He has had chronic difficulty with balance, but no recent falls. He does follow with neurosurgery for neuropathy with chronic neck, and low back pain. He is also taking mirtazapine 30 mg at night.     His daughter and son help some with his finances. He is currently taking sertraline prescribed by Dr. Martinez.     Prior evaluation has included an MRI of the brain showing chronic white matter changes, an unremarkable ECHO/Carotid Dopplers and screening blood work. He also had an updated MRI of the brain on 7/23/2018 which showed chronic small vessel ischemic changes with old lacunar infarcts.  He is on aspirin and statin therapy.    He has been following with neurosurgery in regards to low back pain with prior back surgery. He has a spinal cord stimulator now.     He is still driving and denies any issues. Presently he manages his finances and is planning to have his son oversee this in the future. His daughter lives with him.     He states that his back pain is affecting him presently. He confirms history of back surgery and would like to avoid another surgery.      Social history: He denies drinking alcohol or smoking.  The  following portions of the patient's history were reviewed and updated as appropriate: allergies, current medications, past family history, past medical history, past social history, past surgical history, and problem list.    Past Medical History:   Diagnosis Date    Acute maxillary sinusitis     Arthritis     CHF (congestive heart failure)     Chronic pain disorder     Colon polyp     Depression     Difficulty walking     Elevated LFTs     GERD (gastroesophageal reflux disease)     History of colonic polyps     History of methicillin resistant Staphylococcus aureus     History of myocardial infarction     Hyperlipidemia     Hypertension     Left hand pain     Left shoulder pain     Low back pain     MRSA (methicillin resistant Staphylococcus aureus)     Myocardial infarction     Neck pain     Personal history of congestive heart failure     Right hip pain     Rotator cuff syndrome        Past Surgical History:   Procedure Laterality Date    BACK SURGERY      CAROTID STENT      CORONARY ANGIOPLASTY WITH STENT PLACEMENT  2012    Circumflex Xscience V 3.5 MM x 23 mm    CORONARY STENT PLACEMENT      ENDOMETRIAL ABLATION      EPIDURAL BLOCK      GALLBLADDER SURGERY  2017    LUMBAR DISCECTOMY  1992    2 level - [facility]    LYMPH NODE BIOPSY      SHOULDER SURGERY  2018    Dr. Hoyt Rotator cuff repair       Social History     Socioeconomic History    Marital status: Single   Tobacco Use    Smoking status: Former     Packs/day: 1.50     Years: 35.00     Pack years: 52.50     Types: Cigarettes     Start date:      Quit date: 2011     Years since quittin.7     Passive exposure: Past    Smokeless tobacco: Never   Vaping Use    Vaping Use: Never used   Substance and Sexual Activity    Alcohol use: Not Currently     Comment: 5 mixed drinks per day. recently quit all alcohol 2020    Drug use: Yes     Types: Marijuana    Sexual activity: Not Currently     Partners: Female       Family  "History   Problem Relation Age of Onset    Hypertension Mother     Heart attack Mother     Hyperlipidemia Mother     Alcohol abuse Mother     Alcohol abuse Father     Dementia Sister     Heart disease Sister     Developmental Disability Sister     Stroke Sister     Alcohol abuse Maternal Aunt     Alcohol abuse Paternal Uncle     Diabetes Maternal Grandmother     Heart disease Brother     Stroke Sister           Current Outpatient Medications:     aspirin 81 MG EC tablet, Take 1 tablet by mouth Daily., Disp: , Rfl:     atorvastatin (LIPITOR) 80 MG tablet, Take 1 tablet by mouth every night at bedtime., Disp: 90 tablet, Rfl: 3    donepezil (ARICEPT) 10 MG tablet, Take 1 tablet by mouth Every Night., Disp: 90 tablet, Rfl: 3    lisinopril-hydrochlorothiazide (PRINZIDE,ZESTORETIC) 20-12.5 MG per tablet, Take 1 tablet by mouth Daily., Disp: 180 tablet, Rfl: 3    memantine (NAMENDA) 10 MG tablet, Take 1 tablet by mouth 2 (Two) Times a Day., Disp: 180 tablet, Rfl: 3    metoprolol tartrate (LOPRESSOR) 50 MG tablet, Take 0.5 tablets by mouth Every 12 (Twelve) Hours., Disp: 90 tablet, Rfl: 0    mirtazapine (REMERON) 30 MG tablet, Take 1 tablet by mouth Every Night., Disp: 90 tablet, Rfl: 3    Multiple Vitamins-Minerals (CENTRUM SILVER) tablet, Take 1 tablet by mouth Daily., Disp: , Rfl:     omeprazole (priLOSEC) 40 MG capsule, Take 1 capsule by mouth once daily, Disp: 90 capsule, Rfl: 0    sertraline (ZOLOFT) 100 MG tablet, Take 2 tablets by mouth once daily, Disp: 180 tablet, Rfl: 0     Review of Systems   Musculoskeletal:  Positive for back pain and gait problem.   Psychiatric/Behavioral:  Positive for decreased concentration.    All other systems reviewed and are negative.     Objective:  /60   Pulse 55   Ht 165.1 cm (65\")   Wt 82.6 kg (182 lb)   SpO2 96%   BMI 30.29 kg/m²     Neurologic Exam  Mental Status   Oriented to person, place, and time.   Speech: speech is normal   Level of consciousness: " alert  Knowledge: good.   Abnormal comprehension.     Cranial Nerves   Cranial nerves II through XII intact.     Motor Exam   Muscle bulk: normal  Overall muscle tone: normal     Strength   Strength 5/5 except as noted.   Moderate low back pain with decreased range of motion      Gait, Coordination, and Reflexes      Gait  Gait: (severe antalgia due to severe low back pain, no assistive device)     Coordination   Finger to nose coordination: normal     Tremor   Resting tremor: absent  Intention tremor: present (minimal BUE fine kinetic hand tremor)  Action tremor: absent     Reflexes   Right : 2+  Left : 2+     Physical Exam  Constitutional:       Appearance: Normal appearance.   Musculoskeletal:      Lumbar back: Decreased range of motion.   Neurological:      Mental Status: He is alert and oriented to person, place, and time.      Cranial Nerves: Cranial nerves 2-12 are intact.      Coordination: Finger-Nose-Finger Test normal.   Psychiatric:         Mood and Affect: Mood and affect normal.      Speech: Speech normal.         Behavior: Behavior is slowed.         Thought Content: Thought content normal.         Cognition and Memory: He exhibits impaired recent memory (mild).         Judgment: Judgment normal.    At his last visit in clinic on 11/18/2022, his Irving Cognitive Assessment score was a 24 out of 30 with 2 out of 5 for word recall. Prior to that, his Regan Cognitive Assessment score was a 26 out of 30.    Today, his Regan Cognitive Assessment score today is a 25 out of 30, 2 out of 5 for word recall uncued, 4 out of 5 for word recall cued.    Assessment/Plan:       Diagnoses and all orders for this visit:    1. MCI (mild cognitive impairment) (Primary)  -     memantine (NAMENDA) 10 MG tablet; Take 1 tablet by mouth 2 (Two) Times a Day.  Dispense: 180 tablet; Refill: 3  -     mirtazapine (REMERON) 30 MG tablet; Take 1 tablet by mouth Every Night.  Dispense: 90 tablet; Refill: 3  -      donepezil (ARICEPT) 10 MG tablet; Take 1 tablet by mouth Every Night.  Dispense: 90 tablet; Refill: 3    2. Psychophysiological insomnia  -     mirtazapine (REMERON) 30 MG tablet; Take 1 tablet by mouth Every Night.  Dispense: 90 tablet; Refill: 3         From a neurological standpoint, he is doing excellent. We are going to follow up in 1 year. We will keep medications the same. He will continue with all his specialists.     He will call us with any additional questions or concerns prior to follow up in the clinic.    Reviewed medications, potential side effects and signs and symptoms to report. Discussed risk versus benefits of treatment plan with patient and/or family-including medications, labs and radiology that may be ordered. Addressed questions and concerns during visit. Patient and/or family verbalized understanding and agree with plan.    During this visit the following were done:  Labs Reviewed []    Labs Ordered []    Radiology Reports Reviewed []    Radiology Ordered []    PCP Records Reviewed []    Referring Provider Records Reviewed []    ER Records Reviewed []    Hospital Records Reviewed []    History Obtained From Family []    Radiology Images Reviewed []    Other Reviewed [x]  Neurosurgery, cardiology and pain management notes reviewed  Records Requested []      Note to patient: The 21st Century Cures Act makes medical notes like these available to patients in the interest of transparency. However, be advised this is a medical document. It is intended as peer to peer communication. It is written in medical language and may contain abbreviations or verbiage that are unfamiliar. It may appear blunt or direct. Medical documents are intended to carry relevant information, facts as evident, and the clinical opinion of the provider.      Transcribed from ambient dictation for CAROL Coleman by Savita Estrella.  09/28/23   10:22 EDT    Patient or patient representative verbalized consent to the  visit recording.  I have personally performed the services described in this document as transcribed by the above individual, and it is both accurate and complete.  Alberta Singletary, APRN  9/28/2023  10:37 EDT

## 2023-10-03 ENCOUNTER — PATIENT ROUNDING (BHMG ONLY) (OUTPATIENT)
Dept: NEUROLOGY | Facility: CLINIC | Age: 63
End: 2023-10-03
Payer: MEDICARE

## 2023-10-03 NOTE — PROGRESS NOTES
A My-Chart message has been sent to the patient for PATIENT [facility] with [facility]

## 2023-10-16 DIAGNOSIS — K21.9 GASTROESOPHAGEAL REFLUX DISEASE: ICD-10-CM

## 2023-10-16 DIAGNOSIS — R10.13 EPIGASTRIC ABDOMINAL PAIN: ICD-10-CM

## 2023-10-17 RX ORDER — OMEPRAZOLE 40 MG/1
40 CAPSULE, DELAYED RELEASE ORAL DAILY
Qty: 90 CAPSULE | Refills: 0 | Status: SHIPPED | OUTPATIENT
Start: 2023-10-17

## 2023-12-05 RX ORDER — SERTRALINE HYDROCHLORIDE 100 MG/1
TABLET, FILM COATED ORAL
Qty: 180 TABLET | Refills: 0 | Status: SHIPPED | OUTPATIENT
Start: 2023-12-05

## 2024-01-15 DIAGNOSIS — K21.9 GASTROESOPHAGEAL REFLUX DISEASE: ICD-10-CM

## 2024-01-15 DIAGNOSIS — I25.119 CORONARY ARTERY DISEASE INVOLVING NATIVE CORONARY ARTERY OF NATIVE HEART WITH ANGINA PECTORIS: ICD-10-CM

## 2024-01-15 DIAGNOSIS — R10.13 EPIGASTRIC ABDOMINAL PAIN: ICD-10-CM

## 2024-01-15 DIAGNOSIS — I10 ESSENTIAL HYPERTENSION: ICD-10-CM

## 2024-01-16 RX ORDER — METOPROLOL TARTRATE 50 MG/1
25 TABLET, FILM COATED ORAL EVERY 12 HOURS
Qty: 90 TABLET | Refills: 3 | Status: SHIPPED | OUTPATIENT
Start: 2024-01-16

## 2024-01-16 RX ORDER — OMEPRAZOLE 40 MG/1
40 CAPSULE, DELAYED RELEASE ORAL DAILY
Qty: 90 CAPSULE | Refills: 0 | Status: SHIPPED | OUTPATIENT
Start: 2024-01-16

## 2024-03-05 RX ORDER — SERTRALINE HYDROCHLORIDE 100 MG/1
TABLET, FILM COATED ORAL
Qty: 180 TABLET | Refills: 0 | Status: SHIPPED | OUTPATIENT
Start: 2024-03-05

## 2024-04-03 PROBLEM — I73.9 PVD (PERIPHERAL VASCULAR DISEASE) WITH CLAUDICATION: Status: RESOLVED | Noted: 2020-01-14 | Resolved: 2024-04-03

## 2024-04-03 NOTE — PROGRESS NOTES
Cardiology Outpatient Visit      Identification: Justice 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 male who resides in [place], [place]    Reason for visit:  Coronary Artery Disease      Subjective      Patient is a 64-year-old gentleman who returns today for follow-up of his coronary artery disease, moderate mitral regurgitation and cardiac risk factors.  At his last visit the patient had reported having sharp chest pains and his primary care provider ordered a stress test which showed lateral wall infarct and consistent with the previous stress and his history of a MI in 2012.  The patient thinks that his chest discomfort was because of his blood pressure.  He had been forgetting to take his hydrochlorothiazide but this was changed as a combination pill with lisinopril and his blood pressures have been improved and he has had no further symptoms.  He denies chest pain, dyspnea, orthopnea, palpitations or syncope.  Cholesterol levels have been controlled.          Review of Systems   Constitutional: Negative for malaise/fatigue.   Eyes:  Negative for vision loss in left eye and vision loss in right eye.   Cardiovascular:  Negative for chest pain, dyspnea on exertion, near-syncope, orthopnea, palpitations, paroxysmal nocturnal dyspnea and syncope.   Musculoskeletal:  Negative for myalgias.   Neurological:  Negative for brief paralysis, excessive daytime sleepiness, focal weakness, numbness, paresthesias and weakness.   All other systems reviewed and are negative.      No Known Allergies      Current Outpatient Medications   Medication Instructions    aspirin 81 mg, Oral, Daily    atorvastatin (LIPITOR) 80 mg, Oral, Every Night at Bedtime    donepezil (ARICEPT) 10 mg, Oral, Nightly    lisinopril-hydrochlorothiazide (PRINZIDE,ZESTORETIC) 20-12.5 MG per tablet 1 tablet, Oral, Daily    memantine (NAMENDA) 10 mg, Oral, 2 Times Daily    metoprolol tartrate (LOPRESSOR) 25 mg, Oral, Every 12 Hours    mirtazapine (REMERON) 30 mg, Oral, Nightly  "   Multiple Vitamins-Minerals (CENTRUM SILVER) tablet 1 tablet, Oral, Daily    omeprazole (PRILOSEC) 40 mg, Oral, Daily, APPOINTMENT NEEDED FOR FURTHER REFILLS    sertraline (ZOLOFT) 100 MG tablet Take 2 tablets by mouth once daily         Objective     /62 (BP Location: Right arm, Patient Position: Sitting)   Pulse 56   Ht 165.1 cm (65\")   Wt 88.5 kg (195 lb)   SpO2 95%   BMI 32.45 kg/m²       Constitutional:       General: Awake.      Appearance: Healthy appearance.   Pulmonary:      Effort: Pulmonary effort is normal.      Breath sounds: Normal breath sounds.   Cardiovascular:      Normal rate. Regular rhythm.      Murmurs: There is a systolic murmur.   Pulses:     Intact distal pulses.   Edema:     Peripheral edema absent.   Skin:     General: Skin is warm and dry.   Neurological:      Mental Status: Alert and oriented to person, place and time.   Psychiatric:         Behavior: Behavior is cooperative.         Result Review  (reviewed with patient):            Lab Results   Component Value Date    GLUCOSE 99 07/07/2023    BUN 19 07/07/2023    CREATININE 1.15 07/07/2023    EGFRRESULT 71 07/07/2023    BCR 17 07/07/2023    K 4.7 07/07/2023    CO2 24 07/07/2023    CALCIUM 9.2 07/07/2023    PROTENTOTREF 7.4 07/07/2023    ALBUMIN 4.2 07/07/2023    BILITOT 0.4 07/07/2023    AST 37 07/07/2023    ALT 22 07/07/2023     Lab Results   Component Value Date    WBC 7.9 07/07/2023    HGB 13.1 07/07/2023    HCT 38.7 07/07/2023    MCV 92 07/07/2023     (L) 07/07/2023     Lab Results   Component Value Date    CHLPL 143 07/07/2023    TRIG 85 07/07/2023    HDL 61 07/07/2023    LDL 66 07/07/2023     Lab Results   Component Value Date    HGBA1C 6.0 (H) 07/07/2023           Assessment     Diagnoses and all orders for this visit:    1. Coronary artery disease involving native coronary artery of native heart with angina pectoris (Primary)  Overview:  Cardiac catheterization for NSTEMI (1/2012):  Severe 1-vessel CAD " (left circumflex subtotal occlusion). Mild disease of the other coronary arteries. Successful PCI of the left circumflex using a Xience MUNA.     Exercise nuclear stress (10/26/18): Exercise for 6.5 minutes with replication chest pain. Moderate sized inferolateral infarct. LVEF 53%  Nuclear stress test (2/12/2020): Medium sized infarct lateral wall with no significant ischemia. LVEF 70%.   Pharmacologic nuclear stress (8/10/2023): Moderate inferior lateral infarct.    Assessment & Plan:  No signs or symptoms of angina  Continue aspirin 81 mg daily      2. Mitral valve insufficiency, unspecified etiology  Overview:  Echo (9/2018): Normal LVEF.  Moderate MR.    Assessment & Plan:  Stable NYHA class II symptoms  Repeat echocardiogram at next visit for surveillance of moderate MR    Orders:  -     Adult Transthoracic Echo Complete W/ Cont if Necessary Per Protocol; Future    3. Essential hypertension  Overview:  Target blood pressure <130/80 mmHg    Assessment & Plan:  Hypertension is controlled  Continue metoprolol tartrate 25 mg twice daily  Continue lisinopril/hydrochlorothiazide 20/12.5 mg 1 tablet daily      4. Hyperlipidemia LDL goal <70  Overview:  High intensity statin therapy indicated given the presence of coronary disease    Assessment & Plan:   Continue Lipitor 80 mg daily            Plan   No signs or symptoms of angina or heart failure  Continue current medications  Repeat echocardiogram on same day as next visit for surveillance of mitral regurgitation      Follow-up   Return in about 9 months (around 1/9/2025), or if symptoms worsen or fail to improve, for Follow-up with Dr. Crouch next visit.      Katelyn Domínguez, CAROL  4/9/2024

## 2024-04-09 ENCOUNTER — OFFICE VISIT (OUTPATIENT)
Dept: CARDIOLOGY | Facility: CLINIC | Age: 64
End: 2024-04-09
Payer: MEDICARE

## 2024-04-09 VITALS
SYSTOLIC BLOOD PRESSURE: 132 MMHG | DIASTOLIC BLOOD PRESSURE: 62 MMHG | BODY MASS INDEX: 32.49 KG/M2 | HEART RATE: 56 BPM | WEIGHT: 195 LBS | OXYGEN SATURATION: 95 % | HEIGHT: 65 IN

## 2024-04-09 DIAGNOSIS — I34.0 MITRAL VALVE INSUFFICIENCY, UNSPECIFIED ETIOLOGY: ICD-10-CM

## 2024-04-09 DIAGNOSIS — E78.5 HYPERLIPIDEMIA LDL GOAL <70: ICD-10-CM

## 2024-04-09 DIAGNOSIS — I25.119 CORONARY ARTERY DISEASE INVOLVING NATIVE CORONARY ARTERY OF NATIVE HEART WITH ANGINA PECTORIS: Primary | ICD-10-CM

## 2024-04-09 DIAGNOSIS — I10 ESSENTIAL HYPERTENSION: ICD-10-CM

## 2024-04-09 PROCEDURE — 99214 OFFICE O/P EST MOD 30 MIN: CPT | Performed by: NURSE PRACTITIONER

## 2024-04-09 PROCEDURE — 1160F RVW MEDS BY RX/DR IN RCRD: CPT | Performed by: NURSE PRACTITIONER

## 2024-04-09 PROCEDURE — 1159F MED LIST DOCD IN RCRD: CPT | Performed by: NURSE PRACTITIONER

## 2024-04-09 PROCEDURE — 3078F DIAST BP <80 MM HG: CPT | Performed by: NURSE PRACTITIONER

## 2024-04-09 PROCEDURE — 3075F SYST BP GE 130 - 139MM HG: CPT | Performed by: NURSE PRACTITIONER

## 2024-04-09 NOTE — ASSESSMENT & PLAN NOTE
Stable NYHA class II symptoms  Repeat echocardiogram at next visit for surveillance of moderate MR

## 2024-04-09 NOTE — ASSESSMENT & PLAN NOTE
Hypertension is controlled  Continue metoprolol tartrate 25 mg twice daily  Continue lisinopril/hydrochlorothiazide 20/12.5 mg 1 tablet daily

## 2024-04-16 DIAGNOSIS — K21.9 GASTROESOPHAGEAL REFLUX DISEASE: ICD-10-CM

## 2024-04-16 DIAGNOSIS — R10.13 EPIGASTRIC ABDOMINAL PAIN: ICD-10-CM

## 2024-04-17 RX ORDER — OMEPRAZOLE 40 MG/1
CAPSULE, DELAYED RELEASE ORAL
Qty: 90 CAPSULE | Refills: 0 | Status: SHIPPED | OUTPATIENT
Start: 2024-04-17

## 2024-04-19 ENCOUNTER — OFFICE VISIT (OUTPATIENT)
Dept: FAMILY MEDICINE CLINIC | Facility: CLINIC | Age: 64
End: 2024-04-19
Payer: MEDICARE

## 2024-04-19 VITALS
HEIGHT: 65 IN | BODY MASS INDEX: 31.99 KG/M2 | WEIGHT: 192 LBS | SYSTOLIC BLOOD PRESSURE: 132 MMHG | RESPIRATION RATE: 18 BRPM | DIASTOLIC BLOOD PRESSURE: 66 MMHG | TEMPERATURE: 97.1 F | HEART RATE: 78 BPM

## 2024-04-19 DIAGNOSIS — R73.9 HYPERGLYCEMIA: ICD-10-CM

## 2024-04-19 DIAGNOSIS — Z12.2 SCREENING FOR LUNG CANCER: ICD-10-CM

## 2024-04-19 DIAGNOSIS — G31.84 MCI (MILD COGNITIVE IMPAIRMENT): ICD-10-CM

## 2024-04-19 DIAGNOSIS — Z87.891 HISTORY OF NICOTINE DEPENDENCE: ICD-10-CM

## 2024-04-19 DIAGNOSIS — I25.119 CORONARY ARTERY DISEASE INVOLVING NATIVE CORONARY ARTERY OF NATIVE HEART WITH ANGINA PECTORIS: ICD-10-CM

## 2024-04-19 DIAGNOSIS — F41.9 ANXIETY: ICD-10-CM

## 2024-04-19 DIAGNOSIS — I10 ESSENTIAL HYPERTENSION: Primary | ICD-10-CM

## 2024-04-19 DIAGNOSIS — Z23 NEED FOR PNEUMOCOCCAL VACCINATION: ICD-10-CM

## 2024-04-19 DIAGNOSIS — K21.9 GASTROESOPHAGEAL REFLUX DISEASE WITHOUT ESOPHAGITIS: ICD-10-CM

## 2024-04-19 DIAGNOSIS — K22.70 BARRETT'S ESOPHAGUS WITHOUT DYSPLASIA: ICD-10-CM

## 2024-04-19 NOTE — LETTER
[facility]  Vaccine Consent Form    Patient Name:  Justice Kiser  Patient :  1960     Vaccine(s) Ordered    Pneumococcal Conjugate Vaccine 20-Valent (PCV20)        Screening Checklist  The following questions should be completed prior to vaccination. If you answer “yes” to any question, it does not necessarily mean you should not be vaccinated. It just means we may need to clarify or ask more questions. If a question is unclear, please ask your healthcare provider to explain it.    Yes No   Any fever or moderate to severe illness today (mild illness and/or antibiotic treatment are not contraindications)?     Do you have a history of a serious reaction to any previous vaccinations, such as anaphylaxis, encephalopathy within 7 days, Guillain-La Place syndrome within 6 weeks, seizure?     Have you received any live vaccine(s) (e.g MMR, MIGUEL) or any other vaccines in the last month (to ensure duplicate doses aren't given)?     Do you have an anaphylactic allergy to latex (DTaP, DTaP-IPV, Hep A, Hep B, MenB, RV, Td, Tdap), baker’s yeast (Hep B, HPV), polysorbates (RSV, nirsevimab, PCV 20, Rotavirrus, Tdap, Shingrix), or gelatin (MIGUEL, MMR)?     Do you have an anaphylactic allergy to neomycin (Rabies, MIGUEL, MMR, IPV, Hep A), polymyxin B (IPV), or streptomycin (IPV)?      Any cancer, leukemia, AIDS, or other immune system disorder? (MIGUEL, MMR, RV)     Do you have a parent, brother, or sister with an immune system problem (if immune competence of vaccine recipient clinically verified, can proceed)? (MMR, MIGUEL)     Any recent steroid treatments for >2 weeks, chemotherapy, or radiation treatment? (MIGUEL, MMR)     Have you received antibody-containing blood transfusions or IVIG in the past 11 months (recommended interval is dependent on product)? (MMR, MIGUEL)     Have you taken antiviral drugs (acyclovir, famciclovir, valacyclovir for MIGUEL) in the last 24 or 48 hours, respectively?      Are you pregnant or planning to become  "pregnant within 1 month? (MIGUEL, MMR, HPV, IPV, MenB, Abrexvy; For Hep B- refer to Engerix-B; For RSV - Abrysvo is indicated for 32-36 weeks of pregnancy from September to January)     For infants, have you ever been told your child has had intussusception or a medical emergency involving obstruction of the intestine (Rotavirus)? If not for an infant, can skip this question.         *Ordering Physicians/APC should be consulted if \"yes\" is checked by the patient or guardian above.  I have received, read, and understand the Vaccine Information Statement (VIS) for each vaccine ordered.  I have considered my or my child's health status as well as the health status of my close contacts.  I have taken the opportunity to discuss my vaccine questions with my or my child's health care provider.   I have requested that the ordered vaccine(s) be given to me or my child.  I understand the benefits and risks of the vaccines.  I understand that I should remain in the clinic for 15 minutes after receiving the vaccine(s).  _________________________________________________________  Signature of Patient or Parent/Legal Guardian ____________________  Date     "

## 2024-04-19 NOTE — PROGRESS NOTES
"Chief Complaint  Depression (F/u )    Subjective          Justice Kiser presents to [facility]    History of Present Illness  The patient is here for follow-up.    The patient reports overall well-being, with the exception of the typical aches and pains he has learned to manage. He continues his regimen of lisinopril and hydrochlorothiazide. He denies experiencing chest pain, respiratory distress, or headaches. He acknowledges the presence of mild edema in his legs. He consulted his cardiologist last week and has a follow-up appointment scheduled for either 09/2024 or 10/2024. His memory remains stable, and he continues to take Aricept. He is under the care of a neurologist and has an upcoming appointment. His acid reflux is well-managed with omeprazole, which he takes daily. He has been diagnosed with Pierson's esophagus and occasionally experiences dysphagia, leading to choking and food getting sl stuck. His appetite has decreased. He ceased smoking in 2011 following a myocardial infarction. He has never received a pneumonia vaccine. He does not believe he has ever had chickenpox. He is current with his COVID-19 vaccinations. He experiences spasms on the right side of his liver.   He denies alcohol intake.        OTHER NOTES:        Review of Systems   Constitutional: Negative.    HENT: Negative.     Respiratory: Negative.     Cardiovascular: Negative.    Gastrointestinal: Negative.  Positive for GERD.   All other systems reviewed and are negative.       Objective       Vital Signs:   /66   Pulse 78   Temp 97.1 °F (36.2 °C)   Resp 18   Ht 165.1 cm (65\")   Wt 87.1 kg (192 lb)   BMI 31.95 kg/m²     Physical Exam  Vitals and nursing note reviewed.   Constitutional:       General: He is not in acute distress.     Appearance: Normal appearance. He is well-developed. He is not ill-appearing.      Comments: Sl yellow tinged skin   HENT:      Head: Normocephalic and " atraumatic.      Right Ear: Hearing, tympanic membrane, ear canal and external ear normal.      Left Ear: Hearing, tympanic membrane, ear canal and external ear normal.      Nose: Nose normal. No congestion or rhinorrhea.      Mouth/Throat:      Mouth: Mucous membranes are moist.      Pharynx: No oropharyngeal exudate or posterior oropharyngeal erythema.   Eyes:      General: Lids are normal.      Conjunctiva/sclera: Conjunctivae normal.      Pupils: Pupils are equal, round, and reactive to light.   Neck:      Thyroid: No thyromegaly.   Cardiovascular:      Rate and Rhythm: Normal rate and regular rhythm.      Heart sounds: Normal heart sounds. No murmur heard.     No friction rub.   Pulmonary:      Effort: Pulmonary effort is normal. No respiratory distress.      Breath sounds: Normal breath sounds. No wheezing or rales.   Abdominal:      General: Bowel sounds are normal. There is no distension.      Palpations: Abdomen is soft. There is no mass.      Tenderness: There is no abdominal tenderness. There is no guarding or rebound.   Musculoskeletal:      Right lower leg: No edema.      Left lower leg: No edema.   Skin:     General: Skin is warm and dry.   Neurological:      General: No focal deficit present.      Mental Status: He is alert.   Psychiatric:         Mood and Affect: Mood normal.         Speech: Speech normal.         Behavior: Behavior normal.            Physical Exam  Vital Signs  The patient's blood pressure is 132/66.    Result Review :            Other Results    Results               Assessment and Plan    Diagnoses and all orders for this visit:    1. Essential hypertension (Primary)  -     CBC & Differential  -     Comprehensive Metabolic Panel    2. Coronary artery disease involving native coronary artery of native heart with angina pectoris  -     Comprehensive Metabolic Panel  -     Lipid Panel With / Chol / HDL Ratio    3. Hyperglycemia  -     Comprehensive Metabolic Panel  -     Hemoglobin  A1c    4. MCI (mild cognitive impairment)    5. Gastroesophageal reflux disease without esophagitis    6. Pierson's esophagus without dysplasia    7. Screening for lung cancer  -     CT Chest Low Dose Wo; Future    8. History of nicotine dependence  -     CT Chest Low Dose Wo; Future    9. Need for pneumococcal vaccination  -     Pneumococcal Conjugate Vaccine 20-Valent (PCV20)    10. Anxiety               DISCUSSION    Assessment & Plan  1. Hypertension.  The patient's blood pressure is currently stable with his current medication regimen.    2. Coronary artery disease.  The patient will persist with his atorvastatin regimen. A CMP and lipid panel will be conducted.    3. Hyperglycemia.  An A1c test will be conducted to monitor the patient's hyperglycemia.    4. Mild cognitive impairment.  The patient will maintain his Aricept regimen and maintain his follow-up appointments with neurology.    5. Gastroesophageal reflux disease.  The patient's history of Pierson's esophagus is crucial for him to continue his omeprazole regimen.    6. Lung cancer screening.  The patient will be scheduled for lung cancer screening. He agrees    A Prevnar 20 vaccination will be administered today. Additionally, the RSV vaccination has been recommended.    7. Anxiety.  The patient's anxiety is currently stable with his sertraline regimen. The patient will continue with the sertraline regimen.    Follow-up  The patient is scheduled for a Medicare wellness exam in 6 months.    Sukhjinder dated 4/19/2024  was reviewed and appropriate.     Follow Up   Return in about 6 months (around 10/19/2024) for Medicare Wellness exam.    Patient was given instructions and counseling regarding his condition or for health maintenance advice. Please see specific information pulled into the AVS if appropriate.       Anshul Martinez MD    Patient or patient representative verbalized consent for the use of Ambient Listening during the visit with  Anshul Martinez,  MD for chart documentation. 4/19/2024  14:37 EDT

## 2024-04-20 LAB
ALBUMIN SERPL-MCNC: 4.3 G/DL (ref 3.9–4.9)
ALBUMIN/GLOB SERPL: 1.4 {RATIO} (ref 1.2–2.2)
ALP SERPL-CCNC: 189 IU/L (ref 44–121)
ALT SERPL-CCNC: 25 IU/L (ref 0–44)
AST SERPL-CCNC: 44 IU/L (ref 0–40)
BASOPHILS # BLD AUTO: 0.1 X10E3/UL (ref 0–0.2)
BASOPHILS NFR BLD AUTO: 1 %
BILIRUB SERPL-MCNC: 0.7 MG/DL (ref 0–1.2)
BUN SERPL-MCNC: 12 MG/DL (ref 8–27)
BUN/CREAT SERPL: 12 (ref 10–24)
CALCIUM SERPL-MCNC: 9.6 MG/DL (ref 8.6–10.2)
CHLORIDE SERPL-SCNC: 103 MMOL/L (ref 96–106)
CHOLEST SERPL-MCNC: 144 MG/DL (ref 100–199)
CHOLEST/HDLC SERPL: 2.2 RATIO (ref 0–5)
CO2 SERPL-SCNC: 23 MMOL/L (ref 20–29)
CREAT SERPL-MCNC: 0.99 MG/DL (ref 0.76–1.27)
EGFRCR SERPLBLD CKD-EPI 2021: 84 ML/MIN/1.73
EOSINOPHIL # BLD AUTO: 0.2 X10E3/UL (ref 0–0.4)
EOSINOPHIL NFR BLD AUTO: 3 %
ERYTHROCYTE [DISTWIDTH] IN BLOOD BY AUTOMATED COUNT: 13.7 % (ref 11.6–15.4)
GLOBULIN SER CALC-MCNC: 3.1 G/DL (ref 1.5–4.5)
GLUCOSE SERPL-MCNC: 123 MG/DL (ref 70–99)
HBA1C MFR BLD: 6.1 % (ref 4.8–5.6)
HCT VFR BLD AUTO: 38.7 % (ref 37.5–51)
HDLC SERPL-MCNC: 65 MG/DL
HGB BLD-MCNC: 12.3 G/DL (ref 13–17.7)
IMM GRANULOCYTES # BLD AUTO: 0 X10E3/UL (ref 0–0.1)
IMM GRANULOCYTES NFR BLD AUTO: 0 %
LDLC SERPL CALC-MCNC: 62 MG/DL (ref 0–99)
LYMPHOCYTES # BLD AUTO: 2.8 X10E3/UL (ref 0.7–3.1)
LYMPHOCYTES NFR BLD AUTO: 38 %
MCH RBC QN AUTO: 28.8 PG (ref 26.6–33)
MCHC RBC AUTO-ENTMCNC: 31.8 G/DL (ref 31.5–35.7)
MCV RBC AUTO: 91 FL (ref 79–97)
MONOCYTES # BLD AUTO: 0.7 X10E3/UL (ref 0.1–0.9)
MONOCYTES NFR BLD AUTO: 9 %
NEUTROPHILS # BLD AUTO: 3.7 X10E3/UL (ref 1.4–7)
NEUTROPHILS NFR BLD AUTO: 49 %
PLATELET # BLD AUTO: 132 X10E3/UL (ref 150–450)
POTASSIUM SERPL-SCNC: 4.4 MMOL/L (ref 3.5–5.2)
PROT SERPL-MCNC: 7.4 G/DL (ref 6–8.5)
RBC # BLD AUTO: 4.27 X10E6/UL (ref 4.14–5.8)
SODIUM SERPL-SCNC: 140 MMOL/L (ref 134–144)
TRIGL SERPL-MCNC: 92 MG/DL (ref 0–149)
VLDLC SERPL CALC-MCNC: 17 MG/DL (ref 5–40)
WBC # BLD AUTO: 7.5 X10E3/UL (ref 3.4–10.8)

## 2024-06-03 RX ORDER — SERTRALINE HYDROCHLORIDE 100 MG/1
TABLET, FILM COATED ORAL
Qty: 180 TABLET | Refills: 0 | Status: SHIPPED | OUTPATIENT
Start: 2024-06-03

## 2024-07-11 DIAGNOSIS — K21.9 GASTROESOPHAGEAL REFLUX DISEASE: ICD-10-CM

## 2024-07-11 DIAGNOSIS — R10.13 EPIGASTRIC ABDOMINAL PAIN: ICD-10-CM

## 2024-07-11 RX ORDER — OMEPRAZOLE 40 MG/1
CAPSULE, DELAYED RELEASE ORAL
Qty: 90 CAPSULE | Refills: 0 | Status: SHIPPED | OUTPATIENT
Start: 2024-07-11

## 2024-08-26 RX ORDER — SERTRALINE HYDROCHLORIDE 100 MG/1
TABLET, FILM COATED ORAL
Qty: 180 TABLET | Refills: 0 | Status: SHIPPED | OUTPATIENT
Start: 2024-08-26

## 2024-09-19 DIAGNOSIS — G31.84 MCI (MILD COGNITIVE IMPAIRMENT): ICD-10-CM

## 2024-09-20 ENCOUNTER — OFFICE VISIT (OUTPATIENT)
Dept: NEUROLOGY | Facility: CLINIC | Age: 64
End: 2024-09-20
Payer: MEDICARE

## 2024-09-20 VITALS
HEIGHT: 65 IN | WEIGHT: 183.4 LBS | TEMPERATURE: 98 F | HEART RATE: 60 BPM | SYSTOLIC BLOOD PRESSURE: 144 MMHG | BODY MASS INDEX: 30.56 KG/M2 | DIASTOLIC BLOOD PRESSURE: 70 MMHG | OXYGEN SATURATION: 96 %

## 2024-09-20 DIAGNOSIS — F51.04 PSYCHOPHYSIOLOGICAL INSOMNIA: ICD-10-CM

## 2024-09-20 DIAGNOSIS — G31.84 MCI (MILD COGNITIVE IMPAIRMENT): ICD-10-CM

## 2024-09-20 PROCEDURE — 1160F RVW MEDS BY RX/DR IN RCRD: CPT | Performed by: NURSE PRACTITIONER

## 2024-09-20 PROCEDURE — 3077F SYST BP >= 140 MM HG: CPT | Performed by: NURSE PRACTITIONER

## 2024-09-20 PROCEDURE — 1159F MED LIST DOCD IN RCRD: CPT | Performed by: NURSE PRACTITIONER

## 2024-09-20 PROCEDURE — 3078F DIAST BP <80 MM HG: CPT | Performed by: NURSE PRACTITIONER

## 2024-09-20 PROCEDURE — 99214 OFFICE O/P EST MOD 30 MIN: CPT | Performed by: NURSE PRACTITIONER

## 2024-09-20 RX ORDER — MIRTAZAPINE 30 MG/1
30 TABLET, FILM COATED ORAL NIGHTLY
Qty: 90 TABLET | Refills: 3 | Status: SHIPPED | OUTPATIENT
Start: 2024-09-20

## 2024-09-20 RX ORDER — MEMANTINE HYDROCHLORIDE 10 MG/1
10 TABLET ORAL 2 TIMES DAILY
Qty: 180 TABLET | Refills: 3 | Status: SHIPPED | OUTPATIENT
Start: 2024-09-20

## 2024-09-20 RX ORDER — MEMANTINE HYDROCHLORIDE 10 MG/1
10 TABLET ORAL 2 TIMES DAILY
Qty: 180 TABLET | Refills: 0 | OUTPATIENT
Start: 2024-09-20

## 2024-09-20 RX ORDER — DONEPEZIL HYDROCHLORIDE 10 MG/1
10 TABLET, FILM COATED ORAL NIGHTLY
Qty: 90 TABLET | Refills: 3 | Status: SHIPPED | OUTPATIENT
Start: 2024-09-20

## 2024-09-28 DIAGNOSIS — E78.5 HYPERLIPIDEMIA LDL GOAL <70: ICD-10-CM

## 2024-09-30 RX ORDER — ATORVASTATIN CALCIUM 80 MG/1
80 TABLET, FILM COATED ORAL
Qty: 90 TABLET | Refills: 0 | Status: SHIPPED | OUTPATIENT
Start: 2024-09-30

## 2024-09-30 NOTE — TELEPHONE ENCOUNTER
Lab Results   Component Value Date    GLUCOSE 123 (H) 04/19/2024    BUN 12 04/19/2024    CREATININE 0.99 04/19/2024     04/19/2024    K 4.4 04/19/2024     04/19/2024    CALCIUM 9.6 04/19/2024    ALBUMIN 4.3 04/19/2024    ALT 25 04/19/2024    AST 44 (H) 04/19/2024    ALKPHOS 189 (H) 04/19/2024    BILITOT 0.7 04/19/2024    BCR 12 04/19/2024    ANIONGAP 7.0 11/27/2018      Lab Results   Component Value Date    CHLPL 144 04/19/2024    TRIG 92 04/19/2024    HDL 65 04/19/2024    LDL 62 04/19/2024

## 2024-10-02 NOTE — PROGRESS NOTES
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     Reason for visit:  Coronary Artery Disease      Subjective      Justice returns to the office today for follow-up.  He has had a stable clinical course.  He had an echocardiogram performed prior to his office visit today which showed stable moderate mitral regurgitation.  He does get short of breath when he is walking but his primary complaint is pain in his back and legs.    Review of Systems   All other systems reviewed and are negative.      No Known Allergies      Current Outpatient Medications   Medication Instructions    aspirin 81 mg, Oral, Daily    atorvastatin (LIPITOR) 80 mg, Oral, Every Night at Bedtime    donepezil (ARICEPT) 10 mg, Oral, Nightly    lisinopril-hydrochlorothiazide (PRINZIDE,ZESTORETIC) 20-12.5 MG per tablet 1 tablet, Oral, Daily    memantine (NAMENDA) 10 mg, Oral, 2 Times Daily    metoprolol tartrate (LOPRESSOR) 25 mg, Oral, 2 Times Daily    mirtazapine (REMERON) 30 mg, Oral, Nightly    Multiple Vitamins-Minerals (CENTRUM SILVER) tablet 1 tablet, Oral, Daily    omeprazole (priLOSEC) 40 MG capsule TAKE 1 CAPSULE BY MOUTH ONCE DAILY . APPOINTMENT REQUIRED FOR FUTURE REFILLS    sertraline (ZOLOFT) 100 MG tablet Take 2 tablets by mouth once daily         Objective     /60 (BP Location: Left arm, Patient Position: Sitting, Cuff Size: Adult)   Pulse 62   Ht 165.1 cm (65\")   Wt 81.2 kg (179 lb)   SpO2 97%   BMI 29.79 kg/m²       Constitutional:       Appearance: Healthy appearance.   Eyes:      General: No scleral icterus.  Neck:      Thyroid: No thyroid mass.      Vascular: No carotid bruit or JVD. JVD normal.   Pulmonary:      Effort: Pulmonary effort is normal.      Breath sounds: Normal breath sounds.   Cardiovascular:      Normal rate. Regular rhythm.      Murmurs: There is a grade 1/6 high frequency blowing holosystolic murmur at the apex.      No gallop.  "   Edema:     Peripheral edema absent.   Skin:     General: Skin is warm. There is no cyanosis.   Neurological:      General: No focal deficit present.      Mental Status: Alert.   Psychiatric:         Attention and Perception: Attention normal.         Result Review  (reviewed with patient):            Lab Results   Component Value Date    GLUCOSE 123 (H) 04/19/2024    BUN 12 04/19/2024    CREATININE 0.99 04/19/2024     04/19/2024    K 4.4 04/19/2024     04/19/2024    CALCIUM 9.6 04/19/2024    ALBUMIN 4.3 04/19/2024    ALT 25 04/19/2024    AST 44 (H) 04/19/2024    ALKPHOS 189 (H) 04/19/2024    BILITOT 0.7 04/19/2024    BCR 12 04/19/2024    ANIONGAP 7.0 11/27/2018     Lab Results   Component Value Date    WBC 7.5 04/19/2024    HGB 12.3 (L) 04/19/2024    HCT 38.7 04/19/2024    MCV 91 04/19/2024     (L) 04/19/2024     Lab Results   Component Value Date    CHLPL 144 04/19/2024    TRIG 92 04/19/2024    HDL 65 04/19/2024    LDL 62 04/19/2024     Lab Results   Component Value Date    HGBA1C 6.1 (H) 04/19/2024           Assessment     Diagnoses and all orders for this visit:    1. Coronary artery disease involving native coronary artery of native heart with angina pectoris (Primary)  Overview:  Cardiac catheterization for NSTEMI (1/2012):  Severe 1-vessel CAD (left circumflex subtotal occlusion). Mild disease of the other coronary arteries. Successful PCI of the left circumflex using a Xience MUNA.     Exercise nuclear stress (10/26/18): Exercise for 6.5 minutes with replication chest pain. Moderate sized inferolateral infarct. LVEF 53%  Nuclear stress test (2/12/2020): Medium sized infarct lateral wall with no significant ischemia. LVEF 70%.   Pharmacologic nuclear stress (8/10/2023): Moderate inferior lateral infarct.    Assessment & Plan:  No angina  Continue antiplatelet and statin    Orders:  -     metoprolol tartrate (LOPRESSOR) 25 MG tablet; Take 1 tablet by mouth 2 (Two) Times a Day.  Dispense: 180  tablet; Refill: 3    2. Essential hypertension  Overview:  Target blood pressure <130/80 mmHg    Assessment & Plan:  Controlled  Continue lisinopril-HCTZ    Orders:  -     metoprolol tartrate (LOPRESSOR) 25 MG tablet; Take 1 tablet by mouth 2 (Two) Times a Day.  Dispense: 180 tablet; Refill: 3    3. Hyperlipidemia LDL goal <70  Overview:  High intensity statin therapy indicated given the presence of coronary disease    Assessment & Plan:   LDL controlled  Continue atorvastatin    Orders:  -     atorvastatin (LIPITOR) 80 MG tablet; Take 1 tablet by mouth every night at bedtime.  Dispense: 90 tablet; Refill: 3    4. Nonrheumatic mitral valve regurgitation  Overview:  Echo (9/2018): Normal LVEF.  Moderate MR.  Echo (10/8/2024): LVEF 60%.  Moderate MR    Assessment & Plan:  Stable NYHA class II symptoms  Echo performed today shows stable moderate mitral regurgitation            Plan   Continue present medical therapy      Follow-up   Return in about 1 year (around 10/8/2025).        Aries Crouch MD, FACC, [facility]  10/8/2024

## 2024-10-08 ENCOUNTER — HOSPITAL ENCOUNTER (OUTPATIENT)
Dept: CARDIOLOGY | Facility: HOSPITAL | Age: 64
Discharge: HOME | End: 2024-10-08
Admitting: NURSE PRACTITIONER
Payer: MEDICARE

## 2024-10-08 ENCOUNTER — OFFICE VISIT (OUTPATIENT)
Dept: CARDIOLOGY | Facility: CLINIC | Age: 64
End: 2024-10-08
Payer: MEDICARE

## 2024-10-08 VITALS
SYSTOLIC BLOOD PRESSURE: 128 MMHG | HEIGHT: 65 IN | HEART RATE: 62 BPM | WEIGHT: 179 LBS | BODY MASS INDEX: 29.82 KG/M2 | DIASTOLIC BLOOD PRESSURE: 60 MMHG | OXYGEN SATURATION: 97 %

## 2024-10-08 DIAGNOSIS — I34.0 MITRAL VALVE INSUFFICIENCY, UNSPECIFIED ETIOLOGY: ICD-10-CM

## 2024-10-08 DIAGNOSIS — I25.119 CORONARY ARTERY DISEASE INVOLVING NATIVE CORONARY ARTERY OF NATIVE HEART WITH ANGINA PECTORIS: Primary | ICD-10-CM

## 2024-10-08 DIAGNOSIS — I34.0 NONRHEUMATIC MITRAL VALVE REGURGITATION: ICD-10-CM

## 2024-10-08 DIAGNOSIS — I10 ESSENTIAL HYPERTENSION: ICD-10-CM

## 2024-10-08 DIAGNOSIS — E78.5 HYPERLIPIDEMIA LDL GOAL <70: ICD-10-CM

## 2024-10-08 LAB
AORTIC DIMENSIONLESS INDEX: 0.93 (DI)
BH CV ECHO MEAS - AO MAX PG: 5.6 MMHG
BH CV ECHO MEAS - AO MEAN PG: 3 MMHG
BH CV ECHO MEAS - AO ROOT DIAM: 2.9 CM
BH CV ECHO MEAS - AO V2 MAX: 118.3 CM/SEC
BH CV ECHO MEAS - AO V2 VTI: 27.4 CM
BH CV ECHO MEAS - AVA(I,D): 2.9 CM2
BH CV ECHO MEAS - IVS/LVPW: 1 CM
BH CV ECHO MEAS - IVSD: 1.1 CM
BH CV ECHO MEAS - LA DIMENSION: 4.1 CM
BH CV ECHO MEAS - LAT PEAK E' VEL: 10 CM/SEC
BH CV ECHO MEAS - LV MAX PG: 4.3 MMHG
BH CV ECHO MEAS - LV MEAN PG: 2 MMHG
BH CV ECHO MEAS - LV V1 MAX: 103.8 CM/SEC
BH CV ECHO MEAS - LV V1 VTI: 25.5 CM
BH CV ECHO MEAS - LVIDD: 4.7 CM
BH CV ECHO MEAS - LVIDS: 3.6 CM
BH CV ECHO MEAS - LVOT DIAM: 2 CM
BH CV ECHO MEAS - LVPWD: 1.1 CM
BH CV ECHO MEAS - MED PEAK E' VEL: 7.1 CM/SEC
BH CV ECHO MEAS - MR MAX PG: 85.5 MMHG
BH CV ECHO MEAS - MR MAX VEL: 462.4 CM/SEC
BH CV ECHO MEAS - MV A MAX VEL: 40.4 CM/SEC
BH CV ECHO MEAS - MV DEC SLOPE: 413.4 CM/SEC2
BH CV ECHO MEAS - MV E MAX VEL: 93 CM/SEC
BH CV ECHO MEAS - MV E/A: 2.3
BH CV ECHO MEAS - MV MAX PG: 4.1 MMHG
BH CV ECHO MEAS - MV MEAN PG: 1.1 MMHG
BH CV ECHO MEAS - MV P1/2T: 78 MSEC
BH CV ECHO MEAS - MV V2 VTI: 40.6 CM
BH CV ECHO MEAS - PA ACC TIME: 0.17 SEC
BH CV ECHO MEAS - RAP SYSTOLE: 3 MMHG
BH CV ECHO MEAS - RVSP: 20.9 MMHG
BH CV ECHO MEAS - TAPSE (>1.6): 2.6 CM
BH CV ECHO MEAS - TR MAX PG: 17.9 MMHG
BH CV ECHO MEAS - TR MAX VEL: 211.3 CM/SEC
BH CV ECHO MEASUREMENTS AVERAGE E/E' RATIO: 10.88
BH CV XLRA - RV BASE: 4.2 CM
BH CV XLRA - RV LENGTH: 6.8 CM
BH CV XLRA - RV MID: 3.2 CM
BH CV XLRA - TDI S': 16.2 CM/SEC
LV EF 2D ECHO EST: 60 %
MV VALVE AREA BY PLANIMETRY: 1.97 CM2

## 2024-10-08 PROCEDURE — 93306 TTE W/DOPPLER COMPLETE: CPT | Performed by: INTERNAL MEDICINE

## 2024-10-08 PROCEDURE — 3078F DIAST BP <80 MM HG: CPT | Performed by: INTERNAL MEDICINE

## 2024-10-08 PROCEDURE — 1160F RVW MEDS BY RX/DR IN RCRD: CPT | Performed by: INTERNAL MEDICINE

## 2024-10-08 PROCEDURE — 3074F SYST BP LT 130 MM HG: CPT | Performed by: INTERNAL MEDICINE

## 2024-10-08 PROCEDURE — G2211 COMPLEX E/M VISIT ADD ON: HCPCS | Performed by: INTERNAL MEDICINE

## 2024-10-08 PROCEDURE — 93306 TTE W/DOPPLER COMPLETE: CPT

## 2024-10-08 PROCEDURE — 99214 OFFICE O/P EST MOD 30 MIN: CPT | Performed by: INTERNAL MEDICINE

## 2024-10-08 PROCEDURE — 1159F MED LIST DOCD IN RCRD: CPT | Performed by: INTERNAL MEDICINE

## 2024-10-08 RX ORDER — ATORVASTATIN CALCIUM 80 MG/1
80 TABLET, FILM COATED ORAL
Qty: 90 TABLET | Refills: 3 | Status: SHIPPED | OUTPATIENT
Start: 2024-10-08

## 2024-10-08 RX ORDER — METOPROLOL TARTRATE 25 MG/1
25 TABLET, FILM COATED ORAL 2 TIMES DAILY
Qty: 180 TABLET | Refills: 3 | Status: SHIPPED | OUTPATIENT
Start: 2024-10-08

## 2024-10-13 DIAGNOSIS — R10.13 EPIGASTRIC ABDOMINAL PAIN: ICD-10-CM

## 2024-10-13 DIAGNOSIS — K21.9 GASTROESOPHAGEAL REFLUX DISEASE: ICD-10-CM

## 2024-10-14 RX ORDER — OMEPRAZOLE 40 MG/1
CAPSULE, DELAYED RELEASE ORAL
Qty: 90 CAPSULE | Refills: 0 | Status: SHIPPED | OUTPATIENT
Start: 2024-10-14

## 2024-10-21 ENCOUNTER — OFFICE VISIT (OUTPATIENT)
Dept: FAMILY MEDICINE CLINIC | Facility: CLINIC | Age: 64
End: 2024-10-21
Payer: MEDICARE

## 2024-10-21 VITALS
WEIGHT: 182 LBS | TEMPERATURE: 97.1 F | HEIGHT: 65 IN | RESPIRATION RATE: 18 BRPM | BODY MASS INDEX: 30.32 KG/M2 | SYSTOLIC BLOOD PRESSURE: 122 MMHG | DIASTOLIC BLOOD PRESSURE: 80 MMHG | HEART RATE: 70 BPM

## 2024-10-21 DIAGNOSIS — I10 ESSENTIAL HYPERTENSION: Primary | ICD-10-CM

## 2024-10-21 DIAGNOSIS — K22.70 BARRETT'S ESOPHAGUS WITHOUT DYSPLASIA: ICD-10-CM

## 2024-10-21 DIAGNOSIS — F41.9 ANXIETY: ICD-10-CM

## 2024-10-21 DIAGNOSIS — Z12.5 PROSTATE CANCER SCREENING: ICD-10-CM

## 2024-10-21 DIAGNOSIS — R73.9 HYPERGLYCEMIA: ICD-10-CM

## 2024-10-21 DIAGNOSIS — G31.84 MCI (MILD COGNITIVE IMPAIRMENT): ICD-10-CM

## 2024-10-21 DIAGNOSIS — Z53.20 LUNG CANCER SCREENING DECLINED BY PATIENT: ICD-10-CM

## 2024-10-21 DIAGNOSIS — R53.83 OTHER FATIGUE: ICD-10-CM

## 2024-10-21 DIAGNOSIS — Z23 NEED FOR INFLUENZA VACCINATION: ICD-10-CM

## 2024-10-21 DIAGNOSIS — Z29.11 NEED FOR RSV IMMUNIZATION: ICD-10-CM

## 2024-10-21 DIAGNOSIS — I34.0 NONRHEUMATIC MITRAL VALVE REGURGITATION: ICD-10-CM

## 2024-10-21 DIAGNOSIS — I25.119 CORONARY ARTERY DISEASE INVOLVING NATIVE CORONARY ARTERY OF NATIVE HEART WITH ANGINA PECTORIS: ICD-10-CM

## 2024-10-21 PROCEDURE — 1125F AMNT PAIN NOTED PAIN PRSNT: CPT | Performed by: FAMILY MEDICINE

## 2024-10-21 PROCEDURE — 3079F DIAST BP 80-89 MM HG: CPT | Performed by: FAMILY MEDICINE

## 2024-10-21 PROCEDURE — G0008 ADMIN INFLUENZA VIRUS VAC: HCPCS | Performed by: FAMILY MEDICINE

## 2024-10-21 PROCEDURE — 3074F SYST BP LT 130 MM HG: CPT | Performed by: FAMILY MEDICINE

## 2024-10-21 PROCEDURE — 90662 IIV NO PRSV INCREASED AG IM: CPT | Performed by: FAMILY MEDICINE

## 2024-10-21 PROCEDURE — 99214 OFFICE O/P EST MOD 30 MIN: CPT | Performed by: FAMILY MEDICINE

## 2024-10-21 PROCEDURE — 1159F MED LIST DOCD IN RCRD: CPT | Performed by: FAMILY MEDICINE

## 2024-10-21 PROCEDURE — 1160F RVW MEDS BY RX/DR IN RCRD: CPT | Performed by: FAMILY MEDICINE

## 2024-10-21 NOTE — PROGRESS NOTES
"Chief Complaint  Hypertension (6 month f/u )    Subjective          Justice Kiser presents to [facility]    HPI         The patient is a 64-year-old male here for a follow-up visit.    He reports feeling well overall, with no chest pain or shortness of breath. He has been informed of a leaking valve in his heart, but it is not severe enough to require intervention at this time.    His memory remains stable, and he continues to take medication for it. He also takes sertraline for anxiety, which he reports is under control. However, he mentions experiencing a lack of energy and interest, as well as a decreased sexual desire.    He does not have sleep apnea. He experiences occasional abdominal spasms and has been making efforts to lose weight. He has noticed a slow urinary stream and wakes up once during the night to urinate.    SOCIAL HISTORY  He quit smoking in 2011. He smoked a pack a day for at least 20 years.    IMMUNIZATIONS  He got influenza vaccine today.       OTHER NOTES:          Review of Systems   Constitutional: Negative.    HENT: Negative.     Respiratory: Negative.     Cardiovascular: Negative.    Gastrointestinal: Negative.    Genitourinary:         Slow stream. Once to urine at night   Psychiatric/Behavioral: Negative.     All other systems reviewed and are negative.       Objective       Vital Signs:   /80   Pulse 70   Temp 97.1 °F (36.2 °C)   Resp 18   Ht 165.1 cm (65\")   Wt 82.6 kg (182 lb)   BMI 30.29 kg/m²     Physical Exam  Vitals and nursing note reviewed.   Constitutional:       General: He is not in acute distress.     Appearance: Normal appearance. He is well-developed. He is not ill-appearing.   HENT:      Head: Normocephalic and atraumatic.      Right Ear: Tympanic membrane, ear canal and external ear normal.      Left Ear: Tympanic membrane, ear canal and external ear normal.      Mouth/Throat:      Mouth: Mucous membranes are moist.      " Pharynx: No oropharyngeal exudate or posterior oropharyngeal erythema.   Eyes:      Extraocular Movements: Extraocular movements intact.   Cardiovascular:      Rate and Rhythm: Normal rate and regular rhythm.      Heart sounds: Normal heart sounds.   Pulmonary:      Effort: Pulmonary effort is normal. No respiratory distress.      Breath sounds: Normal breath sounds. No wheezing or rales.   Abdominal:      Tenderness: There is no abdominal tenderness. There is no guarding or rebound.   Musculoskeletal:      Right lower leg: No edema.      Left lower leg: No edema.   Skin:     General: Skin is warm and dry.   Neurological:      Mental Status: He is alert.   Psychiatric:         Mood and Affect: Mood normal.         Behavior: Behavior normal.             Vital Signs  Blood pressure measures 122/80.       Result Review :            Other Results    Results  Imaging  Echocardiogram shows heart function at 60% and moderately leaky mitral valve.             Assessment and Plan    Diagnoses and all orders for this visit:    1. Essential hypertension (Primary)    2. Nonrheumatic mitral valve regurgitation    3. MCI (mild cognitive impairment)    4. Need for influenza vaccination  -     Fluzone High-Dose 65+yrs    5. Pierson's esophagus without dysplasia    6. Anxiety    7. Other fatigue  -     CBC & Differential  -     Comprehensive Metabolic Panel  -     TSH  -     Vitamin B12  -     Testosterone    8. Hyperglycemia  -     Hemoglobin A1c    9. Coronary artery disease involving native coronary artery of native heart with angina pectoris  -     Comprehensive Metabolic Panel  -     Lipid Panel With / Chol / HDL Ratio    10. Lung cancer screening declined by patient    11. Need for RSV immunization  -     RSVPreF3 Vac Recomb Adjuvanted (AREXVY) 120 MCG/0.5ML reconstituted suspension injection; Inject 0.5 mL into the appropriate muscle as directed by prescriber 1 (One) Time for 1 dose.  Dispense: 0.5 mL; Refill: 0    12.  Prostate cancer screening  -     PSA Screen               DISCUSSION    Overall stable.  Check blood work as noted.  Further plan once labs are back.     1. Mitral Valve Regurgitation.  The echocardiogram shows a heart function of 60%, which is satisfactory. The mitral valve is moderately leaky but not severe enough to require immediate intervention. He will continue to monitor this condition and follow up in a year.    2. Anxiety.  He continues to take sertraline for anxiety. He reports a lack of energy and loss of interest in activities. Blood work will be conducted to assess B12, thyroid, and testosterone levels to rule out other causes of fatigue. If testosterone levels are low, a repeat test will be necessary for confirmation.    3. Pierson's Esophagus.  He is advised to continue taking omeprazole for acid reflux management.    4. Hypertension.  His blood pressure is well-controlled at 122/80. He will continue taking metoprolol.    5. Health Maintenance.  He received a flu shot today. He was advised to get the COVID-19 and RSV vaccines at the pharmacy. A prescription for the RSV vaccine was provided. A low-dose CT scan for lung cancer screening was recommended due to his smoking history. PSA testing for prostate cancer screening will be conducted. An A1c test will also be performed to monitor for diabetes.    Follow-up  Return in 6 months for follow up, unless lab results indicate otherwise.           Follow Up   Return in about 6 months (around 4/21/2025).    Patient was given instructions and counseling regarding his condition or for health maintenance advice. Please see specific information pulled into the AVS if appropriate.       Anshul Martinez MD    Patient or patient representative verbalized consent for the use of Ambient Listening during the visit with  Anshul Martinez MD for chart documentation. 10/21/2024  10:06 EDT

## 2024-10-22 LAB
ALBUMIN SERPL-MCNC: 4.1 G/DL (ref 3.5–5.2)
ALBUMIN/GLOB SERPL: 1.4 G/DL
ALP SERPL-CCNC: 258 U/L (ref 39–117)
ALT SERPL-CCNC: 26 U/L (ref 1–41)
AST SERPL-CCNC: 47 U/L (ref 1–40)
BASOPHILS # BLD AUTO: 0.03 10*3/MM3 (ref 0–0.2)
BASOPHILS NFR BLD AUTO: 0.4 % (ref 0–1.5)
BILIRUB SERPL-MCNC: 0.4 MG/DL (ref 0–1.2)
BUN SERPL-MCNC: 16 MG/DL (ref 8–23)
BUN/CREAT SERPL: 15.2 (ref 7–25)
CALCIUM SERPL-MCNC: 9.2 MG/DL (ref 8.6–10.5)
CHLORIDE SERPL-SCNC: 102 MMOL/L (ref 98–107)
CHOLEST SERPL-MCNC: 140 MG/DL (ref 0–200)
CHOLEST/HDLC SERPL: 1.94 {RATIO}
CO2 SERPL-SCNC: 25.8 MMOL/L (ref 22–29)
CREAT SERPL-MCNC: 1.05 MG/DL (ref 0.76–1.27)
EGFRCR SERPLBLD CKD-EPI 2021: 78.3 ML/MIN/1.73
EOSINOPHIL # BLD AUTO: 0.13 10*3/MM3 (ref 0–0.4)
EOSINOPHIL NFR BLD AUTO: 1.8 % (ref 0.3–6.2)
ERYTHROCYTE [DISTWIDTH] IN BLOOD BY AUTOMATED COUNT: 14.3 % (ref 12.3–15.4)
GLOBULIN SER CALC-MCNC: 3 GM/DL
GLUCOSE SERPL-MCNC: 107 MG/DL (ref 65–99)
HBA1C MFR BLD: 5.6 % (ref 4.8–5.6)
HCT VFR BLD AUTO: 35.9 % (ref 37.5–51)
HDLC SERPL-MCNC: 72 MG/DL (ref 40–60)
HGB BLD-MCNC: 11.8 G/DL (ref 13–17.7)
IMM GRANULOCYTES # BLD AUTO: 0.02 10*3/MM3 (ref 0–0.05)
IMM GRANULOCYTES NFR BLD AUTO: 0.3 % (ref 0–0.5)
LDLC SERPL CALC-MCNC: 52 MG/DL (ref 0–100)
LYMPHOCYTES # BLD AUTO: 2.13 10*3/MM3 (ref 0.7–3.1)
LYMPHOCYTES NFR BLD AUTO: 29.7 % (ref 19.6–45.3)
MCH RBC QN AUTO: 29.4 PG (ref 26.6–33)
MCHC RBC AUTO-ENTMCNC: 32.9 G/DL (ref 31.5–35.7)
MCV RBC AUTO: 89.5 FL (ref 79–97)
MONOCYTES # BLD AUTO: 0.82 10*3/MM3 (ref 0.1–0.9)
MONOCYTES NFR BLD AUTO: 11.4 % (ref 5–12)
NEUTROPHILS # BLD AUTO: 4.05 10*3/MM3 (ref 1.7–7)
NEUTROPHILS NFR BLD AUTO: 56.4 % (ref 42.7–76)
NRBC BLD AUTO-RTO: 0 /100 WBC (ref 0–0.2)
PLATELET # BLD AUTO: 123 10*3/MM3 (ref 140–450)
POTASSIUM SERPL-SCNC: 5.1 MMOL/L (ref 3.5–5.2)
PROT SERPL-MCNC: 7.1 G/DL (ref 6–8.5)
PSA SERPL-MCNC: 0.33 NG/ML (ref 0–4)
RBC # BLD AUTO: 4.01 10*6/MM3 (ref 4.14–5.8)
SODIUM SERPL-SCNC: 137 MMOL/L (ref 136–145)
TESTOST SERPL-MCNC: 971 NG/DL (ref 264–916)
TRIGL SERPL-MCNC: 85 MG/DL (ref 0–150)
TSH SERPL DL<=0.005 MIU/L-ACNC: 1.29 UIU/ML (ref 0.27–4.2)
VIT B12 SERPL-MCNC: 1042 PG/ML (ref 211–946)
VLDLC SERPL CALC-MCNC: 16 MG/DL (ref 5–40)
WBC # BLD AUTO: 7.18 10*3/MM3 (ref 3.4–10.8)

## 2024-11-11 ENCOUNTER — APPOINTMENT (OUTPATIENT)
Dept: GENERAL RADIOLOGY | Facility: HOSPITAL | Age: 64
End: 2024-11-11
Payer: MEDICARE

## 2024-11-11 ENCOUNTER — APPOINTMENT (OUTPATIENT)
Dept: MRI IMAGING | Facility: HOSPITAL | Age: 64
End: 2024-11-11
Payer: MEDICARE

## 2024-11-11 ENCOUNTER — HOSPITAL ENCOUNTER (INPATIENT)
Facility: HOSPITAL | Age: 64
LOS: 11 days | Discharge: HOME | End: 2024-11-22
Attending: EMERGENCY MEDICINE | Admitting: INTERNAL MEDICINE
Payer: MEDICARE

## 2024-11-11 ENCOUNTER — APPOINTMENT (OUTPATIENT)
Dept: CARDIOLOGY | Facility: HOSPITAL | Age: 64
End: 2024-11-11
Payer: MEDICARE

## 2024-11-11 DIAGNOSIS — M19.90 ARTHRITIS: ICD-10-CM

## 2024-11-11 DIAGNOSIS — L03.116 CELLULITIS OF LEFT ANKLE: ICD-10-CM

## 2024-11-11 DIAGNOSIS — A41.9 ACUTE SEPSIS: Primary | ICD-10-CM

## 2024-11-11 DIAGNOSIS — L03.119 ANKLE CELLULITIS: ICD-10-CM

## 2024-11-11 DIAGNOSIS — Z86.14 HISTORY OF MRSA INFECTION: ICD-10-CM

## 2024-11-11 DIAGNOSIS — R20.2 PARESTHESIA OF RIGHT FOOT: ICD-10-CM

## 2024-11-11 DIAGNOSIS — I25.119 CORONARY ARTERY DISEASE INVOLVING NATIVE CORONARY ARTERY OF NATIVE HEART WITH ANGINA PECTORIS: ICD-10-CM

## 2024-11-11 LAB
ALBUMIN SERPL-MCNC: 3.8 G/DL (ref 3.5–5.2)
ALBUMIN/GLOB SERPL: 0.9 G/DL
ALP SERPL-CCNC: 258 U/L (ref 39–117)
ALT SERPL W P-5'-P-CCNC: 26 U/L (ref 1–41)
ANION GAP SERPL CALCULATED.3IONS-SCNC: 13 MMOL/L (ref 5–15)
AST SERPL-CCNC: 44 U/L (ref 1–40)
BASOPHILS # BLD AUTO: 0.02 10*3/MM3 (ref 0–0.2)
BASOPHILS NFR BLD AUTO: 0.1 % (ref 0–1.5)
BH CV LOWER VASCULAR LEFT COMMON FEMORAL COMPRESS: NORMAL
BH CV LOWER VASCULAR LEFT COMMON FEMORAL PHASIC: NORMAL
BH CV LOWER VASCULAR LEFT COMMON FEMORAL SPONT: NORMAL
BH CV LOWER VASCULAR LEFT DISTAL FEMORAL COMPRESS: NORMAL
BH CV LOWER VASCULAR LEFT DISTAL FEMORAL PHASIC: NORMAL
BH CV LOWER VASCULAR LEFT DISTAL FEMORAL SPONT: NORMAL
BH CV LOWER VASCULAR LEFT GASTRONEMIUS COMPRESS: NORMAL
BH CV LOWER VASCULAR LEFT GREATER SAPH AK COMPRESS: NORMAL
BH CV LOWER VASCULAR LEFT GREATER SAPH BK COMPRESS: NORMAL
BH CV LOWER VASCULAR LEFT LESSER SAPH COMPRESS: NORMAL
BH CV LOWER VASCULAR LEFT MID FEMORAL COMPRESS: NORMAL
BH CV LOWER VASCULAR LEFT MID FEMORAL PHASIC: NORMAL
BH CV LOWER VASCULAR LEFT MID FEMORAL SPONT: NORMAL
BH CV LOWER VASCULAR LEFT PERONEAL AUGMENT: NORMAL
BH CV LOWER VASCULAR LEFT POPLITEAL COMPRESS: NORMAL
BH CV LOWER VASCULAR LEFT POPLITEAL PHASIC: NORMAL
BH CV LOWER VASCULAR LEFT POPLITEAL SPONT: NORMAL
BH CV LOWER VASCULAR LEFT POSTERIOR TIBIAL AUGMENT: NORMAL
BH CV LOWER VASCULAR LEFT POSTERIOR TIBIAL COMPRESS: NORMAL
BH CV LOWER VASCULAR LEFT PROFUNDA FEMORAL SPONT: NORMAL
BH CV LOWER VASCULAR LEFT PROXIMAL FEMORAL COMPRESS: NORMAL
BH CV LOWER VASCULAR LEFT PROXIMAL FEMORAL PHASIC: NORMAL
BH CV LOWER VASCULAR LEFT PROXIMAL FEMORAL SPONT: NORMAL
BH CV LOWER VASCULAR LEFT SAPHENOFEMORAL JUNCTION COMPRESS: NORMAL
BH CV LOWER VASCULAR LEFT SAPHENOFEMORAL JUNCTION PHASIC: NORMAL
BH CV LOWER VASCULAR LEFT SAPHENOFEMORAL JUNCTION SPONT: NORMAL
BH CV LOWER VASCULAR RIGHT COMMON FEMORAL PHASIC: NORMAL
BH CV LOWER VASCULAR RIGHT COMMON FEMORAL SPONT: NORMAL
BH CV VAS PRELIMINARY FINDINGS SCRIPTING: 1
BILIRUB SERPL-MCNC: 0.8 MG/DL (ref 0–1.2)
BUN SERPL-MCNC: 12 MG/DL (ref 8–23)
BUN/CREAT SERPL: 11.8 (ref 7–25)
CALCIUM SPEC-SCNC: 8.9 MG/DL (ref 8.6–10.5)
CHLORIDE SERPL-SCNC: 97 MMOL/L (ref 98–107)
CO2 SERPL-SCNC: 23 MMOL/L (ref 22–29)
CREAT SERPL-MCNC: 1.02 MG/DL (ref 0.76–1.27)
CRP SERPL-MCNC: 19.33 MG/DL (ref 0–0.5)
D-LACTATE SERPL-SCNC: 1.1 MMOL/L (ref 0.5–2)
DEPRECATED RDW RBC AUTO: 50.6 FL (ref 37–54)
EGFRCR SERPLBLD CKD-EPI 2021: 81.1 ML/MIN/1.73
EOSINOPHIL # BLD AUTO: 0.01 10*3/MM3 (ref 0–0.4)
EOSINOPHIL NFR BLD AUTO: 0.1 % (ref 0.3–6.2)
ERYTHROCYTE [DISTWIDTH] IN BLOOD BY AUTOMATED COUNT: 16.1 % (ref 12.3–15.4)
ERYTHROCYTE [SEDIMENTATION RATE] IN BLOOD: 107 MM/HR (ref 0–20)
GLOBULIN UR ELPH-MCNC: 4.4 GM/DL
GLUCOSE SERPL-MCNC: 119 MG/DL (ref 65–99)
HCT VFR BLD AUTO: 36 % (ref 37.5–51)
HGB BLD-MCNC: 12 G/DL (ref 13–17.7)
IMM GRANULOCYTES # BLD AUTO: 0.1 10*3/MM3 (ref 0–0.05)
IMM GRANULOCYTES NFR BLD AUTO: 0.7 % (ref 0–0.5)
LYMPHOCYTES # BLD AUTO: 2.29 10*3/MM3 (ref 0.7–3.1)
LYMPHOCYTES NFR BLD AUTO: 15.8 % (ref 19.6–45.3)
MCH RBC QN AUTO: 28.7 PG (ref 26.6–33)
MCHC RBC AUTO-ENTMCNC: 33.3 G/DL (ref 31.5–35.7)
MCV RBC AUTO: 86.1 FL (ref 79–97)
MONOCYTES # BLD AUTO: 1.45 10*3/MM3 (ref 0.1–0.9)
MONOCYTES NFR BLD AUTO: 10 % (ref 5–12)
NEUTROPHILS NFR BLD AUTO: 10.58 10*3/MM3 (ref 1.7–7)
NEUTROPHILS NFR BLD AUTO: 73.3 % (ref 42.7–76)
NRBC BLD AUTO-RTO: 0 /100 WBC (ref 0–0.2)
PLATELET # BLD AUTO: 145 10*3/MM3 (ref 140–450)
PMV BLD AUTO: 11.4 FL (ref 6–12)
POTASSIUM SERPL-SCNC: 3.5 MMOL/L (ref 3.5–5.2)
PROCALCITONIN SERPL-MCNC: 0.54 NG/ML (ref 0–0.25)
PROT SERPL-MCNC: 8.2 G/DL (ref 6–8.5)
RBC # BLD AUTO: 4.18 10*6/MM3 (ref 4.14–5.8)
SODIUM SERPL-SCNC: 133 MMOL/L (ref 136–145)
WBC NRBC COR # BLD AUTO: 14.45 10*3/MM3 (ref 3.4–10.8)

## 2024-11-11 PROCEDURE — 73610 X-RAY EXAM OF ANKLE: CPT

## 2024-11-11 PROCEDURE — 87205 SMEAR GRAM STAIN: CPT | Performed by: EMERGENCY MEDICINE

## 2024-11-11 PROCEDURE — 25010000002 CEFTRIAXONE PER 250 MG: Performed by: EMERGENCY MEDICINE

## 2024-11-11 PROCEDURE — 85025 COMPLETE CBC W/AUTO DIFF WBC: CPT | Performed by: EMERGENCY MEDICINE

## 2024-11-11 PROCEDURE — 25510000002 GADOBENATE DIMEGLUMINE 529 MG/ML SOLUTION: Performed by: STUDENT IN AN ORGANIZED HEALTH CARE EDUCATION/TRAINING PROGRAM

## 2024-11-11 PROCEDURE — 93971 EXTREMITY STUDY: CPT | Performed by: INTERNAL MEDICINE

## 2024-11-11 PROCEDURE — 73723 MRI JOINT LWR EXTR W/O&W/DYE: CPT

## 2024-11-11 PROCEDURE — 25010000002 MORPHINE PER 10 MG: Performed by: EMERGENCY MEDICINE

## 2024-11-11 PROCEDURE — 86140 C-REACTIVE PROTEIN: CPT | Performed by: EMERGENCY MEDICINE

## 2024-11-11 PROCEDURE — 83605 ASSAY OF LACTIC ACID: CPT | Performed by: EMERGENCY MEDICINE

## 2024-11-11 PROCEDURE — 99222 1ST HOSP IP/OBS MODERATE 55: CPT | Performed by: STUDENT IN AN ORGANIZED HEALTH CARE EDUCATION/TRAINING PROGRAM

## 2024-11-11 PROCEDURE — A9577 INJ MULTIHANCE: HCPCS | Performed by: STUDENT IN AN ORGANIZED HEALTH CARE EDUCATION/TRAINING PROGRAM

## 2024-11-11 PROCEDURE — 25010000002 VANCOMYCIN 1.75-0.9 GM/500ML-% SOLUTION: Performed by: EMERGENCY MEDICINE

## 2024-11-11 PROCEDURE — 87070 CULTURE OTHR SPECIMN AEROBIC: CPT | Performed by: EMERGENCY MEDICINE

## 2024-11-11 PROCEDURE — 93971 EXTREMITY STUDY: CPT

## 2024-11-11 PROCEDURE — 99285 EMERGENCY DEPT VISIT HI MDM: CPT

## 2024-11-11 PROCEDURE — 84145 PROCALCITONIN (PCT): CPT | Performed by: INTERNAL MEDICINE

## 2024-11-11 PROCEDURE — 36415 COLL VENOUS BLD VENIPUNCTURE: CPT

## 2024-11-11 PROCEDURE — 87040 BLOOD CULTURE FOR BACTERIA: CPT | Performed by: EMERGENCY MEDICINE

## 2024-11-11 PROCEDURE — 25010000002 HYDROMORPHONE PER 4 MG: Performed by: STUDENT IN AN ORGANIZED HEALTH CARE EDUCATION/TRAINING PROGRAM

## 2024-11-11 PROCEDURE — 25010000002 ONDANSETRON PER 1 MG: Performed by: EMERGENCY MEDICINE

## 2024-11-11 PROCEDURE — 80053 COMPREHEN METABOLIC PANEL: CPT | Performed by: EMERGENCY MEDICINE

## 2024-11-11 PROCEDURE — 85652 RBC SED RATE AUTOMATED: CPT | Performed by: EMERGENCY MEDICINE

## 2024-11-11 PROCEDURE — 87641 MR-STAPH DNA AMP PROBE: CPT | Performed by: INTERNAL MEDICINE

## 2024-11-11 RX ORDER — SERTRALINE HYDROCHLORIDE 100 MG/1
200 TABLET, FILM COATED ORAL DAILY
Status: DISCONTINUED | OUTPATIENT
Start: 2024-11-12 | End: 2024-11-22 | Stop reason: HOSPADM

## 2024-11-11 RX ORDER — ENOXAPARIN SODIUM 100 MG/ML
40 INJECTION SUBCUTANEOUS DAILY
Status: DISCONTINUED | OUTPATIENT
Start: 2024-11-12 | End: 2024-11-22 | Stop reason: HOSPADM

## 2024-11-11 RX ORDER — DONEPEZIL HYDROCHLORIDE 10 MG/1
10 TABLET, FILM COATED ORAL NIGHTLY
Status: DISCONTINUED | OUTPATIENT
Start: 2024-11-11 | End: 2024-11-22 | Stop reason: HOSPADM

## 2024-11-11 RX ORDER — ATORVASTATIN CALCIUM 40 MG/1
80 TABLET, FILM COATED ORAL NIGHTLY
Status: DISCONTINUED | OUTPATIENT
Start: 2024-11-11 | End: 2024-11-22 | Stop reason: HOSPADM

## 2024-11-11 RX ORDER — OXYCODONE HYDROCHLORIDE 5 MG/1
5 TABLET ORAL EVERY 6 HOURS PRN
Status: DISPENSED | OUTPATIENT
Start: 2024-11-11 | End: 2024-11-16

## 2024-11-11 RX ORDER — METOPROLOL TARTRATE 25 MG/1
25 TABLET, FILM COATED ORAL 2 TIMES DAILY
Status: DISCONTINUED | OUTPATIENT
Start: 2024-11-11 | End: 2024-11-22 | Stop reason: HOSPADM

## 2024-11-11 RX ORDER — HYDROCHLOROTHIAZIDE 12.5 MG/1
12.5 TABLET ORAL
Status: DISCONTINUED | OUTPATIENT
Start: 2024-11-12 | End: 2024-11-22 | Stop reason: HOSPADM

## 2024-11-11 RX ORDER — SODIUM CHLORIDE 0.9 % (FLUSH) 0.9 %
10 SYRINGE (ML) INJECTION AS NEEDED
Status: DISCONTINUED | OUTPATIENT
Start: 2024-11-11 | End: 2024-11-22 | Stop reason: HOSPADM

## 2024-11-11 RX ORDER — MORPHINE SULFATE 4 MG/ML
4 INJECTION, SOLUTION INTRAMUSCULAR; INTRAVENOUS ONCE
Status: COMPLETED | OUTPATIENT
Start: 2024-11-11 | End: 2024-11-11

## 2024-11-11 RX ORDER — ASPIRIN 81 MG/1
81 TABLET ORAL DAILY
Status: DISCONTINUED | OUTPATIENT
Start: 2024-11-12 | End: 2024-11-22 | Stop reason: HOSPADM

## 2024-11-11 RX ORDER — SODIUM CHLORIDE 0.9 % (FLUSH) 0.9 %
10 SYRINGE (ML) INJECTION EVERY 12 HOURS SCHEDULED
Status: DISCONTINUED | OUTPATIENT
Start: 2024-11-11 | End: 2024-11-22 | Stop reason: HOSPADM

## 2024-11-11 RX ORDER — ACETAMINOPHEN 325 MG/1
650 TABLET ORAL EVERY 6 HOURS PRN
Status: DISCONTINUED | OUTPATIENT
Start: 2024-11-11 | End: 2024-11-22 | Stop reason: HOSPADM

## 2024-11-11 RX ORDER — ONDANSETRON 2 MG/ML
4 INJECTION INTRAMUSCULAR; INTRAVENOUS ONCE
Status: COMPLETED | OUTPATIENT
Start: 2024-11-11 | End: 2024-11-11

## 2024-11-11 RX ORDER — LISINOPRIL 20 MG/1
20 TABLET ORAL
Status: DISCONTINUED | OUTPATIENT
Start: 2024-11-12 | End: 2024-11-22 | Stop reason: HOSPADM

## 2024-11-11 RX ORDER — ONDANSETRON 2 MG/ML
4 INJECTION INTRAMUSCULAR; INTRAVENOUS EVERY 6 HOURS PRN
Status: DISCONTINUED | OUTPATIENT
Start: 2024-11-11 | End: 2024-11-20 | Stop reason: SDUPTHER

## 2024-11-11 RX ORDER — SODIUM CHLORIDE 9 MG/ML
40 INJECTION, SOLUTION INTRAVENOUS AS NEEDED
Status: DISCONTINUED | OUTPATIENT
Start: 2024-11-11 | End: 2024-11-22 | Stop reason: HOSPADM

## 2024-11-11 RX ORDER — MEMANTINE HYDROCHLORIDE 10 MG/1
10 TABLET ORAL 2 TIMES DAILY
Status: DISCONTINUED | OUTPATIENT
Start: 2024-11-11 | End: 2024-11-22 | Stop reason: HOSPADM

## 2024-11-11 RX ORDER — POTASSIUM CHLORIDE 1500 MG/1
40 TABLET, EXTENDED RELEASE ORAL ONCE
Status: COMPLETED | OUTPATIENT
Start: 2024-11-11 | End: 2024-11-11

## 2024-11-11 RX ORDER — PANTOPRAZOLE SODIUM 40 MG/1
40 TABLET, DELAYED RELEASE ORAL
Status: DISCONTINUED | OUTPATIENT
Start: 2024-11-12 | End: 2024-11-22 | Stop reason: HOSPADM

## 2024-11-11 RX ORDER — MIRTAZAPINE 15 MG/1
30 TABLET, FILM COATED ORAL NIGHTLY
Status: DISCONTINUED | OUTPATIENT
Start: 2024-11-11 | End: 2024-11-22 | Stop reason: HOSPADM

## 2024-11-11 RX ORDER — VANCOMYCIN 1.75 GRAM/500 ML IN 0.9 % SODIUM CHLORIDE INTRAVENOUS
22 ONCE
Status: COMPLETED | OUTPATIENT
Start: 2024-11-11 | End: 2024-11-11

## 2024-11-11 RX ORDER — HYDROMORPHONE HYDROCHLORIDE 1 MG/ML
0.5 INJECTION, SOLUTION INTRAMUSCULAR; INTRAVENOUS; SUBCUTANEOUS EVERY 6 HOURS PRN
Status: DISCONTINUED | OUTPATIENT
Start: 2024-11-11 | End: 2024-11-14 | Stop reason: ALTCHOICE

## 2024-11-11 RX ADMIN — Medication 1750 MG: at 21:41

## 2024-11-11 RX ADMIN — Medication 10 ML: at 22:37

## 2024-11-11 RX ADMIN — MORPHINE SULFATE 4 MG: 4 INJECTION, SOLUTION INTRAMUSCULAR; INTRAVENOUS at 19:46

## 2024-11-11 RX ADMIN — POTASSIUM CHLORIDE 40 MEQ: 1500 TABLET, EXTENDED RELEASE ORAL at 22:36

## 2024-11-11 RX ADMIN — GADOBENATE DIMEGLUMINE 15 ML: 529 INJECTION, SOLUTION INTRAVENOUS at 20:42

## 2024-11-11 RX ADMIN — MEMANTINE 10 MG: 10 TABLET ORAL at 22:35

## 2024-11-11 RX ADMIN — ATORVASTATIN CALCIUM 80 MG: 40 TABLET, FILM COATED ORAL at 22:36

## 2024-11-11 RX ADMIN — MIRTAZAPINE 30 MG: 15 TABLET, FILM COATED ORAL at 22:37

## 2024-11-11 RX ADMIN — METOPROLOL TARTRATE 25 MG: 25 TABLET, FILM COATED ORAL at 22:36

## 2024-11-11 RX ADMIN — DONEPEZIL HYDROCHLORIDE 10 MG: 10 TABLET, FILM COATED ORAL at 22:37

## 2024-11-11 RX ADMIN — HYDROMORPHONE HYDROCHLORIDE 0.5 MG: 1 INJECTION, SOLUTION INTRAMUSCULAR; INTRAVENOUS; SUBCUTANEOUS at 22:12

## 2024-11-11 RX ADMIN — SODIUM CHLORIDE 2000 MG: 900 INJECTION INTRAVENOUS at 19:45

## 2024-11-11 RX ADMIN — ONDANSETRON 4 MG: 2 INJECTION INTRAMUSCULAR; INTRAVENOUS at 19:30

## 2024-11-11 NOTE — ED PROVIDER NOTES
[place]    EMERGENCY DEPARTMENT ENCOUNTER      Pt Name: Justice Kiser  MRN: [id number]  YOB: 1960  Date of evaluation: 11/11/2024  Provider: Matthew Martinez MD    CHIEF COMPLAINT       Chief Complaint   Patient presents with    Leg Swelling         HISTORY OF PRESENT ILLNESS   Justice 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 male who presents to the emergency department with complaint of several days of worsening swelling of the left foot, ankle, and lower leg with associated erythema.  Has had some chills at home as well.  Reports that he struck his left ankle on an object a couple days prior to the onset of sweat swelling and redness.  He denies any associated chest pain or shortness of breath.      Nursing notes were reviewed.    REVIEW OF SYSTEMS     ROS:  A chief complaint appropriate review of systems was completed and is negative except as noted in the HPI.      PAST MEDICAL HISTORY     Past Medical History:   Diagnosis Date    Acute maxillary sinusitis     Arthritis     CHF (congestive heart failure)     Chronic pain disorder     Colon polyp     Depression     Difficulty walking     Elevated LFTs     GERD (gastroesophageal reflux disease)     History of colonic polyps     History of methicillin resistant Staphylococcus aureus     History of myocardial infarction     Hyperlipidemia     Hypertension     Left hand pain     Left shoulder pain     Low back pain     MRSA (methicillin resistant Staphylococcus aureus)     Myocardial infarction     Neck pain     Personal history of congestive heart failure     Right hip pain     Rotator cuff syndrome          SURGICAL HISTORY       Past Surgical History:   Procedure Laterality Date    BACK SURGERY      CAROTID STENT      CORONARY ANGIOPLASTY WITH STENT PLACEMENT  01/04/2012    Circumflex Xscience V 3.5 MM x 23 mm    CORONARY STENT PLACEMENT      ENDOMETRIAL ABLATION      EPIDURAL BLOCK      GALLBLADDER SURGERY  2017    LUMBAR DISCECTOMY  1992    2 level - [facility]  [place]    LYMPH NODE BIOPSY      SHOULDER SURGERY  12/20/2018    Dr. Hoyt Rotator cuff repair         CURRENT MEDICATIONS       Current Facility-Administered Medications:     acetaminophen (TYLENOL) tablet 650 mg, 650 mg, Oral, Q6H PRN, Yamilet Savage MD    [START ON 11/12/2024] aspirin EC tablet 81 mg, 81 mg, Oral, Daily, Yamilet Savage MD    atorvastatin (LIPITOR) tablet 80 mg, 80 mg, Oral, Nightly, Yamilet Savage MD    Calcium Replacement - Follow Nurse / BPA Driven Protocol, , Does not apply, PRN, Yamilet Savage MD    [START ON 11/12/2024] cefTRIAXone (ROCEPHIN) 2,000 mg in sodium chloride 0.9 % 100 mL MBP, 2,000 mg, Intravenous, Q24H, Yamilet Savage MD    donepezil (ARICEPT) tablet 10 mg, 10 mg, Oral, Nightly, Yamilet Savage MD    Enoxaparin Sodium (LOVENOX) syringe 40 mg, 40 mg, Subcutaneous, Daily, Yamilet Savage MD    [START ON 11/12/2024] lisinopril (PRINIVIL,ZESTRIL) tablet 20 mg, 20 mg, Oral, Q24H **AND** [START ON 11/12/2024] hydroCHLOROthiazide oral 12.5 mg, 12.5 mg, Oral, Q24H, Yamilet Savage MD    HYDROmorphone (DILAUDID) injection 0.5 mg, 0.5 mg, Intravenous, Q6H PRN, Yamilet Savage MD    Magnesium Standard Dose Replacement - Follow Nurse / BPA Driven Protocol, , Does not apply, Hussein KIRKLAND Christina Marie, MD    memantine (NAMENDA) tablet 10 mg, 10 mg, Oral, BID, Yamilet Savage MD    metoprolol tartrate (LOPRESSOR) tablet 25 mg, 25 mg, Oral, BID, Yamilet Savage MD    mirtazapine (REMERON) tablet 30 mg, 30 mg, Oral, Nightly, Yamilet Savage MD    ondansetron (ZOFRAN) injection 4 mg, 4 mg, Intravenous, Q6H PRN, Yamilet Savage MD    oxyCODONE (ROXICODONE) immediate release tablet 5 mg, 5 mg, Oral, Q6H PRN, Yamilet Savage MD    [START ON  11/12/2024] pantoprazole (PROTONIX) EC tablet 40 mg, 40 mg, Oral, Q AM, Yamilet Savage MD    Pharmacy to dose vancomycin, , Does not apply, Continuous PRN, Yamilet Savage MD    Phosphorus Replacement - Follow Nurse / BPA Driven Protocol, , Does not apply, PRN, Yamilet Savage MD    potassium chloride (KLOR-CON M20) CR tablet 40 mEq, 40 mEq, Oral, Once, Yamilet Savage MD    Potassium Replacement - Follow Nurse / BPA Driven Protocol, , Does not apply, PRN, Yamilet Savage MD    [START ON 11/12/2024] sertraline (ZOLOFT) tablet 200 mg, 200 mg, Oral, Daily, Yamilet Savage MD    sodium chloride 0.9 % flush 10 mL, 10 mL, Intravenous, Q12H, Yamilet Savage MD    sodium chloride 0.9 % flush 10 mL, 10 mL, Intravenous, PRN, Yamilet Savage MD    sodium chloride 0.9 % infusion 40 mL, 40 mL, Intravenous, PRN, Yamilet Savage MD    vancomycin IVPB 1750 mg in 0.9% Sodium Chloride (premix) 500 mL, 22 mg/kg, Intravenous, Once, Matthew Martinez MD    Current Outpatient Medications:     aspirin 81 MG EC tablet, Take 1 tablet by mouth Daily., Disp: , Rfl:     atorvastatin (LIPITOR) 80 MG tablet, Take 1 tablet by mouth every night at bedtime., Disp: 90 tablet, Rfl: 3    donepezil (ARICEPT) 10 MG tablet, Take 1 tablet by mouth Every Night., Disp: 90 tablet, Rfl: 3    lisinopril-hydrochlorothiazide (PRINZIDE,ZESTORETIC) 20-12.5 MG per tablet, Take 1 tablet by mouth Daily., Disp: 180 tablet, Rfl: 3    memantine (NAMENDA) 10 MG tablet, Take 1 tablet by mouth 2 (Two) Times a Day., Disp: 180 tablet, Rfl: 3    metoprolol tartrate (LOPRESSOR) 25 MG tablet, Take 1 tablet by mouth 2 (Two) Times a Day., Disp: 180 tablet, Rfl: 3    mirtazapine (REMERON) 30 MG tablet, Take 1 tablet by mouth Every Night., Disp: 90 tablet, Rfl: 3    Multiple Vitamins-Minerals (CENTRUM SILVER) tablet, Take 1  "tablet by mouth Daily., Disp: , Rfl:     omeprazole (priLOSEC) 40 MG capsule, TAKE 1 CAPSULE BY MOUTH ONCE DAILY *NEEDS  APPOINTMENT  FOR  MORE  REFILLS*, Disp: 90 capsule, Rfl: 0    sertraline (ZOLOFT) 100 MG tablet, Take 2 tablets by mouth once daily, Disp: 180 tablet, Rfl: 0    ALLERGIES     Patient has no known allergies.    FAMILY HISTORY       Family History   Problem Relation Age of Onset    Hypertension Mother     Heart attack Mother     Hyperlipidemia Mother     Alcohol abuse Mother     Alcohol abuse Father     Dementia Sister     Heart disease Sister     Developmental Disability Sister     Stroke Sister     Alcohol abuse Maternal Aunt     Alcohol abuse Paternal Uncle     Diabetes Maternal Grandmother     Heart disease Brother     Stroke Sister           SOCIAL HISTORY       Social History     Socioeconomic History    Marital status: Single   Tobacco Use    Smoking status: Former     Current packs/day: 0.00     Average packs/day: 1.5 packs/day for 36.0 years (54.0 ttl pk-yrs)     Types: Cigarettes     Start date:      Quit date: 2011     Years since quittin.8     Passive exposure: Past    Smokeless tobacco: Never   Vaping Use    Vaping status: Never Used   Substance and Sexual Activity    Alcohol use: Not Currently     Comment: 5 mixed drinks per day. recently quit all alcohol 2020    Drug use: Not Currently     Types: Hydrocodone, Marijuana     Comment: occas    Sexual activity: Not Currently     Partners: Female         PHYSICAL EXAM    (up to 7 for level 4, 8 or more for level 5)     Vitals:    24 1228   BP: 138/49   BP Location: Left arm   Patient Position: Sitting   Pulse: 93   Resp: 16   Temp: 100.3 °F (37.9 °C)   TempSrc: Oral   SpO2: 95%   Weight: 79.4 kg (175 lb)   Height: 162.6 cm (64\")       General: Awake, alert, no acute distress.  HEENT: Conjunctivae normal.  Neck: Trachea midline.  Cardiac: Heart regular rate, rhythm, no murmurs, rubs, or gallops  Lungs: Lungs are clear " to auscultation, there is no wheezing, rhonchi, or rales. There is no use of accessory muscles.  Chest wall: There is no tenderness to palpation over the chest wall or over ribs  Abdomen: Abdomen is soft, nontender, nondistended. There are no firm or pulsatile masses, no rebound rigidity or guarding.   Musculoskeletal: No deformity.  Neuro: Alert and oriented x 4.  Dermatology: Pitting edema with warmth and redness extending from the left lateral midfoot, medial ankle, and midway up the left lower leg.  There is no crepitus, blistering, or necrosis.  Significant pain with passive range of motion of the foot.  DP and PT pulse 1+ and comparable to the opposite side.  Psych: Mentation is grossly normal, cognition is grossly normal. Affect is appropriate.        DIAGNOSTIC RESULTS     EKG: All EKGs are interpreted by the Emergency Department Physician who either signs or Co-signs this chart in the absence of a cardiologist.    No orders to display         RADIOLOGY:   [x] Radiologist's Report Reviewed:  XR Ankle 3+ View Left   Final Result   Impression:   Soft tissue edema about the ankle. No evidence of acute fracture.         Electronically Signed: Luis Angel Cordero MD     11/11/2024 4:20 PM EST     Workstation ID: [id number]      MRI Ankle Left With & Without Contrast    (Results Pending)       I ordered and independently reviewed the above noted radiographic studies.        LABS:    I have reviewed and interpreted all of the currently available lab results from this visit (if applicable):  Results for orders placed or performed during the hospital encounter of 11/11/24   Duplex Venous Lower Extremity - Left CAR    Collection Time: 11/11/24  1:29 PM   Result Value Ref Range    Right Common Femoral Spont Y     Right Common Femoral Phasic Y     Left Common Femoral Spont Y     Left Common Femoral Phasic Y     Left Common Femoral Compress C     Left Saphenofemoral Junction Spont Y     Left Saphenofemoral Junction Phasic Y      Left Saphenofemoral Junction Compress C     Left Profunda Femoral Spont Y     Left Proximal Femoral Spont Y     Left Proximal Femoral Phasic Y     Left Proximal Femoral Compress C     Left Mid Femoral Spont Y     Left Mid Femoral Phasic Y     Left Mid Femoral Compress C     Left Distal Femoral Spont Y     Left Distal Femoral Phasic Y     Left Distal Femoral Compress C     Left Popliteal Spont Y     Left Popliteal Phasic Y     Left Popliteal Compress C     Left Posterior Tibial Compress C     Left Posterior Tibial Augment Y     Left Peroneal Augment Y     Left Gastronemius Compress C     Left Greater Saph AK Compress C     Left Greater Saph BK Compress C     Left Lesser Saph Compress C     BH CV VAS PRELIMINARY FINDINGS SCRIPTING 1.0    Comprehensive Metabolic Panel    Collection Time: 11/11/24  2:50 PM    Specimen: Blood   Result Value Ref Range    Glucose 119 (H) 65 - 99 mg/dL    BUN 12 8 - 23 mg/dL    Creatinine 1.02 0.76 - 1.27 mg/dL    Sodium 133 (L) 136 - 145 mmol/L    Potassium 3.5 3.5 - 5.2 mmol/L    Chloride 97 (L) 98 - 107 mmol/L    CO2 23.0 22.0 - 29.0 mmol/L    Calcium 8.9 8.6 - 10.5 mg/dL    Total Protein 8.2 6.0 - 8.5 g/dL    Albumin 3.8 3.5 - 5.2 g/dL    ALT (SGPT) 26 1 - 41 U/L    AST (SGOT) 44 (H) 1 - 40 U/L    Alkaline Phosphatase 258 (H) 39 - 117 U/L    Total Bilirubin 0.8 0.0 - 1.2 mg/dL    Globulin 4.4 gm/dL    A/G Ratio 0.9 g/dL    BUN/Creatinine Ratio 11.8 7.0 - 25.0    Anion Gap 13.0 5.0 - 15.0 mmol/L    eGFR 81.1 >60.0 mL/min/1.73   CBC Auto Differential    Collection Time: 11/11/24  2:50 PM    Specimen: Blood   Result Value Ref Range    WBC 14.45 (H) 3.40 - 10.80 10*3/mm3    RBC 4.18 4.14 - 5.80 10*6/mm3    Hemoglobin 12.0 (L) 13.0 - 17.7 g/dL    Hematocrit 36.0 (L) 37.5 - 51.0 %    MCV 86.1 79.0 - 97.0 fL    MCH 28.7 26.6 - 33.0 pg    MCHC 33.3 31.5 - 35.7 g/dL    RDW 16.1 (H) 12.3 - 15.4 %    RDW-SD 50.6 37.0 - 54.0 fl    MPV 11.4 6.0 - 12.0 fL    Platelets 145 140 - 450 10*3/mm3     Neutrophil % 73.3 42.7 - 76.0 %    Lymphocyte % 15.8 (L) 19.6 - 45.3 %    Monocyte % 10.0 5.0 - 12.0 %    Eosinophil % 0.1 (L) 0.3 - 6.2 %    Basophil % 0.1 0.0 - 1.5 %    Immature Grans % 0.7 (H) 0.0 - 0.5 %    Neutrophils, Absolute 10.58 (H) 1.70 - 7.00 10*3/mm3    Lymphocytes, Absolute 2.29 0.70 - 3.10 10*3/mm3    Monocytes, Absolute 1.45 (H) 0.10 - 0.90 10*3/mm3    Eosinophils, Absolute 0.01 0.00 - 0.40 10*3/mm3    Basophils, Absolute 0.02 0.00 - 0.20 10*3/mm3    Immature Grans, Absolute 0.10 (H) 0.00 - 0.05 10*3/mm3    nRBC 0.0 0.0 - 0.2 /100 WBC   Sedimentation Rate    Collection Time: 11/11/24  2:50 PM    Specimen: Blood   Result Value Ref Range    Sed Rate 107 (H) 0 - 20 mm/hr   C-reactive Protein    Collection Time: 11/11/24  2:50 PM    Specimen: Blood   Result Value Ref Range    C-Reactive Protein 19.33 (H) 0.00 - 0.50 mg/dL   Procalcitonin    Collection Time: 11/11/24  2:50 PM    Specimen: Blood   Result Value Ref Range    Procalcitonin 0.54 (H) 0.00 - 0.25 ng/mL   Body Fluid Culture - Body Fluid, Ankle, Left    Collection Time: 11/11/24  6:07 PM    Specimen: Ankle, Left; Body Fluid   Result Value Ref Range    Gram Stain Many (4+) Red blood cells     Gram Stain Rare (1+) WBCs seen     Gram Stain No organisms seen    Lactic Acid, Plasma    Collection Time: 11/11/24  6:41 PM    Specimen: Blood   Result Value Ref Range    Lactate 1.1 0.5 - 2.0 mmol/L        If labs were ordered, I independently reviewed the results and considered them in treating the patient.      EMERGENCY DEPARTMENT COURSE and DIFFERENTIAL DIAGNOSIS/MDM:   Vitals:  AS OF 20:49 EST    BP - 138/49  HR - 93  TEMP - 100.3 °F (37.9 °C) (Oral)  O2 SATS - 95%        Discussion below represents my analysis of pertinent findings related to patient's condition, differential diagnosis, treatment plan and final disposition.      Differential diagnosis:  The differential diagnosis associated with the patient's presentation includes: Cellulitis,  abscess, septic arthritis      Independent interpretations (ECG/rhythm strip/X-ray/US/CT scan): I independently interpreted the patient's left ankle x-ray and cardiac monitor.  Patient is in sinus rhythm and there is no evidence of acute bony abnormality.      Patient's care impacted by:   [] Diabetes   [] Hypertension   [x] Coronary Artery Disease   [] Cancer   [] Other:     Care significantly affected by Social Determinants of Health (housing and economic circumstances, unemployment)    [] Yes     [x] No   If yes, Patient's care significantly limited by  Social Determinants of Health including:    [] Inadequate housing    [] Low income    [] Alcoholism and drug addiction in family    [] Problems related to primary support group    [] Unemployment    [] Problems related to employment    [] Other Social Determinants of Health:       Consideration of admission/observation vs discharge: Patient presents with findings concerning for acute sepsis secondary to cellulitis and warrants admission for further management    ED Course:    ED Course as of 11/11/24 2049 Mon Nov 11, 2024 1827 Spoke with orthopedics Dr. Sidhu.  Discussed physical examination, attempted arthrocentesis, fluid quality, labs.  Recommends following cultures, IV antibiotics, MRI ankle. [NS]   1830 Patient presents with left lower extremity cellulitis. Patient injured his left lateral ankle about a week ago and since that time his had progressive redness and swelling now extending up the leg. Significant edema about the ankle with inability to ambulate due to pain in that area. Borderline febrile with temp of 100.3 on arrival, slightly tachycardic, white count of 14, sed rate of around 20. I performed an arthrocentesis and got a little bit of clear fluid out but it also had a little bit of blood and unfortunately by the time he got to the lab it was clotted and they were unable to perform a cell count. They are still running culture. I spoke with  Dr. Sidhu with Ortho who said he would see him. Recommends IV antibiotics, following the culture, MRI ankle which I have ordered. Have ordered vancomycin and Rocephin. [NS]   1830 Discussed case with hospitalist Dr. Weir.  Discussed patient history, presentation, workup.  Excess patient for admission. [NS]      ED Course User Index  [NS] Matthew Martinez MD             PROCEDURES:  Arthrocentesis  Indication: Rule out septic arthritis  Consent: Verbal from patient  Technique: The left ankle was evaluated for appropriate landmarks. The overlying skin was anesthetized using approximately 5 mL of lidocaine 1% without epinephrine. The area was then prepped with chlorhexidine and draped in usual sterile fashion. A 22 gauge needle was used to enter the joint space and advanced until there was return of clear fluid with a small amount of blood.  Approximately 0.5 mL of fluid was aspirated and sent to the lab for analysis. Patient tolerated the procedure well. Neurovascular exam following procedure reveals patient to be intact.  Complications: None      CRITICAL CARE TIME    Approximately 35 minutes of discontinuous critical care time was provided to this patient by myself absent of any time spent performing procedures.  Patient presents critically ill with acute sepsis secondary to left leg cellulitis placing the cardiovascular, respiratory, neurologic, renal systems at risk requiring the following interventions: Broad-spectrum IV antibiotics, interpretation of labs and imaging, frequent reassessment, specialist consultation, coordination of admission.  Patient at high risk of deterioration and possibly death without these interventions.      FINAL IMPRESSION      1. Acute sepsis    2. Cellulitis of left ankle    3. History of MRSA infection          DISPOSITION/PLAN     ED Disposition       ED Disposition   Decision to Admit    Condition   --    Comment   Level of Care: Telemetry [5]   Diagnosis: Left leg cellulitis  [[id number]]   Admitting Physician: ALEXY MCBRIDE [[id number]]   Attending Physician: ALEXY MCBRIDE []   Certification: I Certify That Inpatient Hospital Services Are Medically Necessary For Greater Than 2 Midnights                   Comment: Please note this report has been produced using speech recognition software.      Matthew Martinez MD  Attending Emergency Physician             Matthew Martinez MD  11/11/24 2050

## 2024-11-12 LAB
ANION GAP SERPL CALCULATED.3IONS-SCNC: 15 MMOL/L (ref 5–15)
BUN SERPL-MCNC: 15 MG/DL (ref 8–23)
BUN/CREAT SERPL: 16.9 (ref 7–25)
CALCIUM SPEC-SCNC: 8.4 MG/DL (ref 8.6–10.5)
CHLORIDE SERPL-SCNC: 101 MMOL/L (ref 98–107)
CO2 SERPL-SCNC: 21 MMOL/L (ref 22–29)
CREAT SERPL-MCNC: 0.89 MG/DL (ref 0.76–1.27)
DEPRECATED RDW RBC AUTO: 52.7 FL (ref 37–54)
EGFRCR SERPLBLD CKD-EPI 2021: 94.5 ML/MIN/1.73
ERYTHROCYTE [DISTWIDTH] IN BLOOD BY AUTOMATED COUNT: 16.5 % (ref 12.3–15.4)
GLUCOSE SERPL-MCNC: 94 MG/DL (ref 65–99)
HCT VFR BLD AUTO: 34 % (ref 37.5–51)
HGB BLD-MCNC: 11.1 G/DL (ref 13–17.7)
MCH RBC QN AUTO: 28.5 PG (ref 26.6–33)
MCHC RBC AUTO-ENTMCNC: 32.6 G/DL (ref 31.5–35.7)
MCV RBC AUTO: 87.2 FL (ref 79–97)
MRSA DNA SPEC QL NAA+PROBE: NEGATIVE
PLATELET # BLD AUTO: 148 10*3/MM3 (ref 140–450)
PMV BLD AUTO: 12.3 FL (ref 6–12)
POTASSIUM SERPL-SCNC: 3.7 MMOL/L (ref 3.5–5.2)
RBC # BLD AUTO: 3.9 10*6/MM3 (ref 4.14–5.8)
SODIUM SERPL-SCNC: 137 MMOL/L (ref 136–145)
WBC NRBC COR # BLD AUTO: 13.93 10*3/MM3 (ref 3.4–10.8)

## 2024-11-12 PROCEDURE — 99232 SBSQ HOSP IP/OBS MODERATE 35: CPT | Performed by: FAMILY MEDICINE

## 2024-11-12 PROCEDURE — 85027 COMPLETE CBC AUTOMATED: CPT | Performed by: STUDENT IN AN ORGANIZED HEALTH CARE EDUCATION/TRAINING PROGRAM

## 2024-11-12 PROCEDURE — 25010000002 CEFTRIAXONE PER 250 MG: Performed by: STUDENT IN AN ORGANIZED HEALTH CARE EDUCATION/TRAINING PROGRAM

## 2024-11-12 PROCEDURE — 25010000002 ENOXAPARIN PER 10 MG: Performed by: STUDENT IN AN ORGANIZED HEALTH CARE EDUCATION/TRAINING PROGRAM

## 2024-11-12 PROCEDURE — 25810000003 SODIUM CHLORIDE 0.9 % SOLUTION 250 ML FLEX CONT

## 2024-11-12 PROCEDURE — 25010000002 VANCOMYCIN 750 MG RECONSTITUTED SOLUTION 1 EACH VIAL

## 2024-11-12 PROCEDURE — 80048 BASIC METABOLIC PNL TOTAL CA: CPT | Performed by: STUDENT IN AN ORGANIZED HEALTH CARE EDUCATION/TRAINING PROGRAM

## 2024-11-12 RX ORDER — SACCHAROMYCES BOULARDII 250 MG
250 CAPSULE ORAL 2 TIMES DAILY
Status: DISCONTINUED | OUTPATIENT
Start: 2024-11-12 | End: 2024-11-22 | Stop reason: HOSPADM

## 2024-11-12 RX ORDER — CHOLESTYRAMINE LIGHT 4 G/5.7G
1 POWDER, FOR SUSPENSION ORAL EVERY 12 HOURS SCHEDULED
Status: DISCONTINUED | OUTPATIENT
Start: 2024-11-12 | End: 2024-11-22 | Stop reason: HOSPADM

## 2024-11-12 RX ADMIN — METOPROLOL TARTRATE 25 MG: 25 TABLET, FILM COATED ORAL at 21:44

## 2024-11-12 RX ADMIN — CHOLEYSTYRAMINE LIGHT 4 G: 4 POWDER, FOR SUSPENSION ORAL at 21:45

## 2024-11-12 RX ADMIN — ENOXAPARIN SODIUM 40 MG: 100 INJECTION SUBCUTANEOUS at 08:36

## 2024-11-12 RX ADMIN — MEMANTINE 10 MG: 10 TABLET ORAL at 08:37

## 2024-11-12 RX ADMIN — VANCOMYCIN HYDROCHLORIDE 750 MG: 750 INJECTION, POWDER, LYOPHILIZED, FOR SOLUTION INTRAVENOUS at 10:21

## 2024-11-12 RX ADMIN — PANTOPRAZOLE SODIUM 40 MG: 40 TABLET, DELAYED RELEASE ORAL at 05:39

## 2024-11-12 RX ADMIN — METOPROLOL TARTRATE 25 MG: 25 TABLET, FILM COATED ORAL at 08:38

## 2024-11-12 RX ADMIN — Medication 10 ML: at 21:46

## 2024-11-12 RX ADMIN — DONEPEZIL HYDROCHLORIDE 10 MG: 10 TABLET, FILM COATED ORAL at 21:44

## 2024-11-12 RX ADMIN — ATORVASTATIN CALCIUM 80 MG: 40 TABLET, FILM COATED ORAL at 21:44

## 2024-11-12 RX ADMIN — OXYCODONE 5 MG: 5 TABLET ORAL at 12:01

## 2024-11-12 RX ADMIN — OXYCODONE 5 MG: 5 TABLET ORAL at 18:07

## 2024-11-12 RX ADMIN — OXYCODONE 5 MG: 5 TABLET ORAL at 23:29

## 2024-11-12 RX ADMIN — CHOLEYSTYRAMINE LIGHT 4 G: 4 POWDER, FOR SUSPENSION ORAL at 11:58

## 2024-11-12 RX ADMIN — SERTRALINE 100 MG: 100 TABLET, FILM COATED ORAL at 08:37

## 2024-11-12 RX ADMIN — SODIUM CHLORIDE 2000 MG: 900 INJECTION INTRAVENOUS at 08:35

## 2024-11-12 RX ADMIN — Medication 10 ML: at 10:21

## 2024-11-12 RX ADMIN — MEMANTINE 10 MG: 10 TABLET ORAL at 21:44

## 2024-11-12 RX ADMIN — Medication 250 MG: at 21:45

## 2024-11-12 RX ADMIN — Medication 250 MG: at 10:21

## 2024-11-12 RX ADMIN — OXYCODONE 5 MG: 5 TABLET ORAL at 05:39

## 2024-11-12 RX ADMIN — MIRTAZAPINE 30 MG: 15 TABLET, FILM COATED ORAL at 21:44

## 2024-11-12 RX ADMIN — LISINOPRIL 20 MG: 20 TABLET ORAL at 08:38

## 2024-11-12 RX ADMIN — HYDROCHLOROTHIAZIDE 12.5 MG: 12.5 TABLET ORAL at 14:25

## 2024-11-12 RX ADMIN — ASPIRIN 81 MG: 81 TABLET, COATED ORAL at 08:38

## 2024-11-12 RX ADMIN — VANCOMYCIN HYDROCHLORIDE 750 MG: 750 INJECTION, POWDER, LYOPHILIZED, FOR SOLUTION INTRAVENOUS at 21:46

## 2024-11-12 NOTE — CONSULTS
Orthopedic Consult      Patient: Justice Kiser  YOB: 1960     Date of Admission: 11/11/2024  4:36 PM    Medical Record Number: [id number]    Attending Physician: IRMA Vera DO    Consulting Physician: Ravin Sidhu MD    Reason for Consult:   left ankle cellulitis    History of Present Illness: 64 y.o. male admitted to [facility], [place], operated by [facility] with Left leg cellulitis [L03.116].     The patient was evaluated in the emergency room and was diagnosed with  left ankle cellulitis.    Patient reports he had a significant trauma to his ankle about 3-4 days ago increasing redness and swelling in the ankle and foot tracking up the proximal aspect of his shin anteriorly.    He was seen in the emergency department with a performed an aspiration emergency room doctor reported clear fluid however it had clotted off by the time he got to the labs of the unable to get a cell count but are following cultures.    He does report he had a MRSA infection in his chin several years ago was a soft tissue infection as well that had to be hospitalized for to get IV antibiotics but did not require any surgery.    He denies any history of smoking or diabetes  Past medical history, Past surgical history, family history, Social history, current medications, home medications Have been reviewed by me.    Past Medical History:   Diagnosis Date    Acute maxillary sinusitis     Arthritis     CHF (congestive heart failure)     Chronic pain disorder     Colon polyp     Depression     Difficulty walking     Elevated LFTs     GERD (gastroesophageal reflux disease)     History of colonic polyps     History of methicillin resistant Staphylococcus aureus     History of myocardial infarction     Hyperlipidemia     Hypertension     Left hand pain     Left shoulder pain     Low back pain     MRSA (methicillin resistant Staphylococcus aureus)     Myocardial infarction     Neck pain     Personal history of congestive heart failure     Right  hip pain     Rotator cuff syndrome      Past Surgical History:   Procedure Laterality Date    BACK SURGERY      CAROTID STENT      CORONARY ANGIOPLASTY WITH STENT PLACEMENT  2012    Circumflex Xscience V 3.5 MM x 23 mm    CORONARY STENT PLACEMENT      ENDOMETRIAL ABLATION      EPIDURAL BLOCK      GALLBLADDER SURGERY  2017    LUMBAR DISCECTOMY  1992    2 level - [facility]    LYMPH NODE BIOPSY      SHOULDER SURGERY  2018    Dr. Hoyt Rotator cuff repair     Social History     Occupational History    Not on file   Tobacco Use    Smoking status: Former     Current packs/day: 0.00     Average packs/day: 1.5 packs/day for 36.0 years (54.0 ttl pk-yrs)     Types: Cigarettes     Start date:      Quit date: 2011     Years since quittin.8     Passive exposure: Past    Smokeless tobacco: Never   Vaping Use    Vaping status: Never Used   Substance and Sexual Activity    Alcohol use: Not Currently     Comment: 5 mixed drinks per day. recently quit all alcohol 2020    Drug use: Not Currently     Types: Hydrocodone, Marijuana     Comment: occas    Sexual activity: Not Currently     Partners: Female      Social History     Social History Narrative    Not on file     Family History   Problem Relation Age of Onset    Hypertension Mother     Heart attack Mother     Hyperlipidemia Mother     Alcohol abuse Mother     Alcohol abuse Father     Dementia Sister     Heart disease Sister     Developmental Disability Sister     Stroke Sister     Alcohol abuse Maternal Aunt     Alcohol abuse Paternal Uncle     Diabetes Maternal Grandmother     Heart disease Brother     Stroke Sister         No Known Allergies     Home Medications:  Medications Prior to Admission   Medication Sig Dispense Refill Last Dose/Taking    aspirin 81 MG EC tablet Take 1 tablet by mouth Daily.   2024    atorvastatin (LIPITOR) 80 MG tablet Take 1 tablet by mouth every night at bedtime. 90 tablet 3 11/10/2024    donepezil  (ARICEPT) 10 MG tablet Take 1 tablet by mouth Every Night. 90 tablet 3 11/10/2024    memantine (NAMENDA) 10 MG tablet Take 1 tablet by mouth 2 (Two) Times a Day. 180 tablet 3 11/10/2024    metoprolol tartrate (LOPRESSOR) 25 MG tablet Take 1 tablet by mouth 2 (Two) Times a Day. 180 tablet 3 11/11/2024    mirtazapine (REMERON) 30 MG tablet Take 1 tablet by mouth Every Night. 90 tablet 3 11/10/2024    sertraline (ZOLOFT) 100 MG tablet Take 2 tablets by mouth once daily 180 tablet 0 11/10/2024    lisinopril-hydrochlorothiazide (PRINZIDE,ZESTORETIC) 20-12.5 MG per tablet Take 1 tablet by mouth Daily. 180 tablet 3     Multiple Vitamins-Minerals (CENTRUM SILVER) tablet Take 1 tablet by mouth Daily.       omeprazole (priLOSEC) 40 MG capsule TAKE 1 CAPSULE BY MOUTH ONCE DAILY *NEEDS  APPOINTMENT  FOR  MORE  REFILLS* 90 capsule 0        Current Medications:  Scheduled Meds:aspirin, 81 mg, Oral, Daily  atorvastatin, 80 mg, Oral, Nightly  cefTRIAXone, 2,000 mg, Intravenous, Q24H  cholestyramine light, 1 packet, Oral, Q12H  donepezil, 10 mg, Oral, Nightly  enoxaparin, 40 mg, Subcutaneous, Daily  lisinopril, 20 mg, Oral, Q24H   And  hydroCHLOROthiazide, 12.5 mg, Oral, Q24H  memantine, 10 mg, Oral, BID  metoprolol tartrate, 25 mg, Oral, BID  mirtazapine, 30 mg, Oral, Nightly  pantoprazole, 40 mg, Oral, Q AM  saccharomyces boulardii, 250 mg, Oral, BID  sertraline, 200 mg, Oral, Daily  sodium chloride, 10 mL, Intravenous, Q12H  vancomycin, 750 mg, Intravenous, Q12H      Continuous Infusions:Pharmacy to dose vancomycin,       PRN Meds:.  acetaminophen    Calcium Replacement - Follow Nurse / BPA Driven Protocol    HYDROmorphone    Magnesium Standard Dose Replacement - Follow Nurse / BPA Driven Protocol    ondansetron    oxyCODONE    Pharmacy to dose vancomycin    Phosphorus Replacement - Follow Nurse / BPA Driven Protocol    Potassium Replacement - Follow Nurse / BPA Driven Protocol    sodium chloride    sodium chloride    Review of  "Systems:   A 12 point system review was reviewed with the patient and from the chart  and is negative except as mentioned in history of present illness.      Physical Exam: 64 y.o. male                    Vitals:    11/12/24 0345 11/12/24 0400 11/12/24 0600 11/12/24 0712   BP: 115/58   135/57   BP Location: Right arm   Right arm   Patient Position: Lying   Lying   Pulse: 62 58 63 69   Resp: 16   16   Temp: 98.8 °F (37.1 °C)   98.2 °F (36.8 °C)   TempSrc: Oral   Oral   SpO2: 93% 93% 92% 93%   Weight:       Height:            Gait: Not evaluated.     Mental/HEENT/cardio/skin: The patient's general appearance was well-nourished, well-hydrated, no acute distress.  Orientation was alert and oriented ×3.  The patient's mood was normal.   Pulmonary exam shows normal late exchange, no labored breathing, or shortness of breath.    The  skin exam showed normal temperature and color in the area of examination.    Extremities:  left lower extremity: New line significant erythema and swelling in the anterior aspect of the tibia and lateral aspect of the foot.  Patient reports some paresthesias in the lateral aspect of the foot and toes with sensation feels slightly different than other parts of his leg however he is able to have a good motion of his toes and slightly restricted motion of his ankle secondary to pain from the swelling    Pulses:  Pulses palpable and equal bilaterally    Diagnostic Tests:    Results from last 7 days   Lab Units 11/12/24  0552 11/11/24  1450   WBC 10*3/mm3 13.93* 14.45*   HEMOGLOBIN g/dL 11.1* 12.0*   HEMATOCRIT % 34.0* 36.0*   PLATELETS 10*3/mm3 148 145     Results from last 7 days   Lab Units 11/12/24  0552 11/11/24  1450   SODIUM mmol/L 137 133*   POTASSIUM mmol/L 3.7 3.5   CHLORIDE mmol/L 101 97*   CO2 mmol/L 21.0* 23.0   BUN mg/dL 15 12   CREATININE mg/dL 0.89 1.02   GLUCOSE mg/dL 94 119*   CALCIUM mg/dL 8.4* 8.9         No results found for: \"URICACID\"  No results found for: " "\"CRYSTAL\"  Microbiology Results (last 10 days)       Procedure Component Value - Date/Time    MRSA Screen, PCR (Inpatient) - Swab, Nares [[id number]]  (Normal) Collected: 11/11/24 2345    Lab Status: Final result Specimen: Swab from Nares Updated: 11/12/24 0859     MRSA PCR Negative    Narrative:      The negative predictive value of this diagnostic test is high and should only be used to consider de-escalating anti-MRSA therapy. A positive result may indicate colonization with MRSA and must be correlated clinically.  MRSA Negative    Body Fluid Culture - Body Fluid, Ankle, Left [[id number]] Collected: 11/11/24 1807    Lab Status: Preliminary result Specimen: Body Fluid from Ankle, Left Updated: 11/12/24 0802     Body Fluid Culture No growth     Gram Stain Many (4+) Red blood cells      Rare (1+) WBCs seen      No organisms seen          MRI Ankle Left With & Without Contrast    Result Date: 11/12/2024  1.Mild to moderate tendinopathy and partial-thickness longitudinal tearing within the peroneus longus tendon below the ankle. 2.Soft tissue edema/induration noted dorsally and laterally about the ankle. Cellulitis may be considered in the appropriate clinical setting. 3.Otherwise, no significant abnormality is identified. 4.No suspicious contrast enhancement is identified. Electronically Signed: Kamari Calabrese MD  11/12/2024 9:07 AM EST  Workstation ID: [id number]    XR Ankle 3+ View Left    Result Date: 11/11/2024  Impression: Soft tissue edema about the ankle. No evidence of acute fracture. Electronically Signed: Luis Angel Cordero MD  11/11/2024 4:20 PM EST  Workstation ID: [id number]     The labs, X-ray results for preoperative evaluation have been reviewed by me.    Assessment:  64-year-old male with a left lower extremity cellulitis      Left leg cellulitis      Plan:     The MRI performed does not show any abscess or collection that would require surgical intervention.    This cellulitis appears to just be located in the " skin and there is no need for any surgery new line patient can be weight-bearing as tolerated we would recommend elevation to help with the swelling of the leg IV antibiotics per infectious disease new line we will follow up cultures from the aspiration but unlikely due B positive with the ER is report of a clear aspirate and patient likely just need treatment with antibiotics for the cellulitis no surgical intervention new line please call with questions    Date: 11/12/2024    Ravin Sidhu MD     CC: Anshul Martinez MD; MD Jody Canseco M Scott, DO

## 2024-11-12 NOTE — CASE MANAGEMENT/SOCIAL WORK
Discharge Planning Assessment  [facility]     Patient Name: Justice Kiser  MRN: [id number]  Today's Date: 11/12/2024    Admit Date: 11/11/2024    Plan: ongoing   Discharge Needs Assessment       Row Name 11/12/24 1144       Living Environment    People in Home child(leatha), adult    Name(s) of People in Home dtr    Current Living Arrangements home    Potentially Unsafe Housing Conditions none    Primary Care Provided by self    Provides Primary Care For no one    Family Caregiver if Needed child(leatha), adult    Family Caregiver Names dtr    Quality of Family Relationships unable to assess    Able to Return to Prior Arrangements yes       Resource/Environmental Concerns    Resource/Environmental Concerns none    Transportation Concerns none       Transition Planning    Patient/Family Anticipates Transition to home    Patient/Family Anticipated Services at Transition none    Transportation Anticipated family or friend will provide       Discharge Needs Assessment    Readmission Within the Last 30 Days no previous admission in last 30 days    Equipment Currently Used at Home bp cuff;shower chair    Concerns to be Addressed denies needs/concerns at this time;discharge planning    Anticipated Changes Related to Illness none    Equipment Needed After Discharge none    Current Discharge Risk physical impairment    Discharge Coordination/Progress ongoing                   Discharge Plan       Row Name 11/12/24 0108       Plan    Plan ongoing    Patient/Family in Agreement with Plan yes    Plan Comments Spoke with patient at bedside regarding dsicharge planning.  Patient denies use of HH and reports he has Blood Pressure machine and Shower Chair.  Patient reports he has prescription coverage with medications being affordable.  He lives in a single level house with ground level entry and reports his daughter lives with him.  Patient reports being independent at home prior to this jaren.  No discharge needs verbalized.  GARCIA  following.  Patient plan is to discharge home, will need HH if need IV abx, family will transport.    Final Discharge Disposition Code 01 - home or self-care                  Continued Care and Services - Admitted Since 11/11/2024    No active coordination exists for this encounter.       Expected Discharge Date and Time       Expected Discharge Date Expected Discharge Time    Nov 13, 2024            Demographic Summary       Row Name 11/12/24 1143       General Information    Admission Type inpatient    Arrived From home    Referral Source admission list    Reason for Consult discharge planning    Preferred Language English    General Information Comments Anshul Martinez MD       Contact Information    Permission Granted to Share Info With     Contact Information Comments milan Kerns  [phone]                   Functional Status       Row Name 11/12/24 1143       Functional Status    Usual Activity Tolerance good    Current Activity Tolerance good       Functional Status, IADL    Medications independent    Meal Preparation independent    Housekeeping independent    Laundry independent    Shopping independent       Employment/    Employment Status retired    Employment/ Comments [employer] Medicare Replacement                   Psychosocial    No documentation.                  Abuse/Neglect    No documentation.                  Legal    No documentation.                  Substance Abuse    No documentation.                  Patient Forms    No documentation.                     Deidra Nunes RN

## 2024-11-12 NOTE — PAYOR COMM NOTE
"Justice Jenkins (64 y.o. Male)       Date of Birth   1960    Social Security Number       Address   [address] [facility] [address]    Home Phone   [phone]    MRN   [id number]       [facility]    Marital Status   Single                            Admission Date   11/11/24    Admission Type   Emergency    Admitting Provider   IRMA Vera DO    Attending Provider   IRMA Vera DO    Department, Room/Bed   [facility] 4G, S461/1       Discharge Date       Discharge Disposition       Discharge Destination                                 Attending Provider: IRMA Vear DO    Allergies: No Known Allergies    Isolation: None   Infection: None   Code Status: CPR    Ht: 162.6 cm (64\")   Wt: 77.4 kg (170 lb 11.2 oz)    Admission Cmt: None   Principal Problem: Left leg cellulitis [L03.116]                   Active Insurance as of 11/11/2024       Primary Coverage       Payor Plan Insurance Group Employer/Plan Group    AETNA MEDICARE REPLACEMENT AETNA MEDICARE REPLACEMENT [id number]       Payor Plan Address Payor Plan Phone Number Payor Plan Fax Number Effective Dates    [address] [phone]  1/1/2024 - None Entered    [facility] [id number]         Subscriber Name Subscriber Birth Date Member ID       JUSTICE JENKINS 1960 [id number]                     Emergency Contacts        (Rel.) Home Phone Work Phone Mobile Phone    Trace Jenkins (Son) [phone] -- [phone]              [place]: NPI [id number] Tax ID [id number]  Insurance Information                  AETNA MEDICARE REPLACEMENT/AETNA MEDICARE REPLACEMENT Phone: [phone]    Subscriber: [place] Subscriber#: [id number]    Group#: [id number] Precert#: --    Authorization#: [id number] Effective Date: --             History & Physical        Muna-Yamilet Taylor MD at 11/11/24 [facility]              [facility] Medicine Services  HISTORY AND " PHYSICAL    Patient Name: Justice Kiser  : 1960  MRN: [id number]  Primary Care Physician: Anshul Martinez MD  Date of admission: 2024      Subjective  Subjective     Chief Complaint:  LLE pain and swelling    HPI:  Justice 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 male with CAD s/p PIC w/ DIS , HTN, HLD, anxiety/depression, MCI who presents for LLE pain, redness and swelling for 2 days. Pt reports that he hit his L ankle on his bed frame on Friday. It turned red but there was no break in the skin. Sat it began swelling and becoming more painful. He was trying to go see his PCP today but was unable to get in so he came to the ED. Pt reports he has had difficulty walking on his LLE for the last couple days. He also developed N/V, fever, chills and overall malaise over the weekend.     Joint aspiration done in the ED, unable to run a cell count wanted due to blood however cultures pending.  No evidence of fracture on x-ray, soft tissue edema present.  Labs significant for leukocytosis elevated CRP and sed rate.  Patient denies chest pain shortness of breath cough.      Personal History     Past Medical History:   Diagnosis Date    Acute maxillary sinusitis     Arthritis     CHF (congestive heart failure)     Chronic pain disorder     Colon polyp     Depression     Difficulty walking     Elevated LFTs     GERD (gastroesophageal reflux disease)     History of colonic polyps     History of methicillin resistant Staphylococcus aureus     History of myocardial infarction     Hyperlipidemia     Hypertension     Left hand pain     Left shoulder pain     Low back pain     MRSA (methicillin resistant Staphylococcus aureus)     Myocardial infarction     Neck pain     Personal history of congestive heart failure     Right hip pain     Rotator cuff syndrome            Past Surgical History:   Procedure Laterality Date    BACK SURGERY      CAROTID STENT      CORONARY ANGIOPLASTY WITH STENT PLACEMENT  2012    Circumflex Xscience  V 3.5 MM x 23 mm    CORONARY STENT PLACEMENT      ENDOMETRIAL ABLATION      EPIDURAL BLOCK      GALLBLADDER SURGERY  2017    LUMBAR DISCECTOMY  1992    2 level - [facility]    LYMPH NODE BIOPSY      SHOULDER SURGERY  12/20/2018    Dr. Hoyt Rotator cuff repair       Family History: family history includes Alcohol abuse in his father, maternal aunt, mother, and paternal uncle; Dementia in his sister; Developmental Disability in his sister; Diabetes in his maternal grandmother; Heart attack in his mother; Heart disease in his brother and sister; Hyperlipidemia in his mother; Hypertension in his mother; Stroke in his sister and sister.     Social History:  reports that he quit smoking about 13 years ago. His smoking use included cigarettes. He started smoking about 49 years ago. He has a 54 pack-year smoking history. He has been exposed to tobacco smoke. He has never used smokeless tobacco. He reports that he does not currently use alcohol. He reports that he does not currently use drugs after having used the following drugs: Hydrocodone and Marijuana.  Social History     Social History Narrative    Not on file       Medications:  Available home medication information reviewed.  aspirin, atorvastatin, donepezil, lisinopril-hydrochlorothiazide, memantine, metoprolol tartrate, mirtazapine, multivitamin with minerals, omeprazole, and sertraline    No Known Allergies    Objective  Objective     Vital Signs:   Temp:  [100.3 °F (37.9 °C)] 100.3 °F (37.9 °C)  Heart Rate:  [93] 93  Resp:  [16] 16  BP: (138)/(49) 138/49       Physical Exam  Constitutional:       General: He is not in acute distress.  HENT:      Head: Normocephalic and atraumatic.   Cardiovascular:      Rate and Rhythm: Normal rate and regular rhythm.      Heart sounds: No murmur heard.  Pulmonary:      Effort: Pulmonary effort is normal. No respiratory distress.   Abdominal:      General: There is no distension.      Palpations: Abdomen is soft.    Musculoskeletal:      Right lower leg: No edema.      Comments: Left lower extremity with significant erythema swelling and warmth shelter up the calf down into his foot.  Range of motion of the ankle limited.  Knee range of motion within normal limits.  No visible wound   Neurological:      General: No focal deficit present.      Mental Status: He is alert. Mental status is at baseline.   Psychiatric:         Mood and Affect: Mood normal.         Thought Content: Thought content normal.            Result Review:  I have personally reviewed the results from the time of this admission to 11/11/2024 20:01 EST and agree with these findings:  [x]  Laboratory list / accordion  [x]  Microbiology  [x]  Radiology  []  EKG/Telemetry   []  Cardiology/Vascular   []  Pathology  [x]  Old records  []  Other:  Most notable findings include: Leukocytosis, elevated CRP and sed rate, soft tissue edema on x-ray of his ankle.      LAB RESULTS:      Lab 11/11/24  1841 11/11/24  1450   WBC  --  14.45*   HEMOGLOBIN  --  12.0*   HEMATOCRIT  --  36.0*   PLATELETS  --  145   NEUTROS ABS  --  10.58*   IMMATURE GRANS (ABS)  --  0.10*   LYMPHS ABS  --  2.29   MONOS ABS  --  1.45*   EOS ABS  --  0.01   MCV  --  86.1   SED RATE  --  107*   CRP  --  19.33*   PROCALCITONIN  --  0.54*   LACTATE 1.1  --          Lab 11/11/24  1450   SODIUM 133*   POTASSIUM 3.5   CHLORIDE 97*   CO2 23.0   ANION GAP 13.0   BUN 12   CREATININE 1.02   EGFR 81.1   GLUCOSE 119*   CALCIUM 8.9         Lab 11/11/24  1450   TOTAL PROTEIN 8.2   ALBUMIN 3.8   GLOBULIN 4.4   ALT (SGPT) 26   AST (SGOT) 44*   BILIRUBIN 0.8   ALK PHOS 258*                         Microbiology Results (last 10 days)       Procedure Component Value - Date/Time    Body Fluid Culture - Body Fluid, Ankle, Left [[id number]] Collected: 11/11/24 1807    Lab Status: Preliminary result Specimen: Body Fluid from Ankle, Left Updated: 11/11/24 1900     Gram Stain Many (4+) Red blood cells      Rare (1+) WBCs  seen      No organisms seen            XR Ankle 3+ View Left    Result Date: 11/11/2024  XR ANKLE 3+ VW LEFT Date of Exam: 11/11/2024 2:29 PM EST Indication: L ankle injury Comparison: None available. Findings: There is no evidence of acute fracture. Small well-corticated ossific fragment inferior to the medial malleolus is favored to represent remote injury. Normal joint alignment. No significant degenerative changes. Calcaneal enthesopathy. Atherosclerotic calcifications. Soft tissue edema about the ankle.     Impression: Impression: Soft tissue edema about the ankle. No evidence of acute fracture. Electronically Signed: Luis Angel Cordero MD  11/11/2024 4:20 PM EST  Workstation ID: [id number]    Duplex Venous Lower Extremity - Left CAR    Result Date: 11/11/2024    Normal left lower extremity venous duplex scan.      Results for orders placed during the hospital encounter of 10/08/24    Adult Transthoracic Echo Complete W/ Cont if Necessary Per Protocol    Interpretation Summary    Left ventricular systolic function is normal. Estimated left ventricular EF = 60%    Moderate mitral valve regurgitation is present.    Estimated right ventricular systolic pressure from tricuspid regurgitation is normal (<35 mmHg).      Assessment & Plan  Assessment & Plan       Left leg cellulitis    Justice 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 male with CAD s/p PCI w/ DIS 2012, HTN, HLD, anxiety/depression, MCI who presents for LLE pain, redness and swelling for 2 days    Left lower extremity cellulitis  Sepsis  - Patient meeting septic criteria with tachycardia, fevers at home, leukocytosis, source of infection  - Joint aspiration performed in the ED however unable to run cell count on fluid due to blood in the sample  -Joint fluid culture pending  - Blood cultures pending  - Will continue Vanco and ceftriaxone  - MRI of the ankle pending  -Zofran for nausea  - As needed pain medications ordered  - ED spoke to Dr. Sidhu with ortho, they will follow    CAD  s/p PCI w/ DEI  Hypertension  Hyperlipidemia  - Continue metoprolol, atorvastatin, lisinopril/HCTZ    Anxiety/depression  - Continue home Zoloft and Remeron    MCI  - Continue home donepezil and memantine      VTE Prophylaxis:  Pharmacologic VTE prophylaxis orders are present.      CODE STATUS:    Code Status and Medical Interventions: CPR (Attempt to Resuscitate); Full Support   Ordered at: 11/11/24 1943     Level Of Support Discussed With:    Patient     Code Status (Patient has no pulse and is not breathing):    CPR (Attempt to Resuscitate)     Medical Interventions (Patient has pulse or is breathing):    Full Support       Expected Discharge   Expected Discharge Date: 11/13/2024; Expected Discharge Time:      Yamilet Savage MD  11/11/24     Electronically signed by Yamilet Savage MD at 11/11/24 2001       Current Facility-Administered Medications   Medication Dose Route Frequency Provider Last Rate Last Admin    acetaminophen (TYLENOL) tablet 650 mg  650 mg Oral Q6H PRN Yamilet Savage MD        aspirin EC tablet 81 mg  81 mg Oral Daily Yamilet Savage MD        atorvastatin (LIPITOR) tablet 80 mg  80 mg Oral Nightly Yamilet Savage MD   80 mg at 11/11/24 2236    Calcium Replacement - Follow Nurse / BPA Driven Protocol   Does not apply PRN Yamilet Savage MD        cefTRIAXone (ROCEPHIN) 2,000 mg in sodium chloride 0.9 % 100 mL MBP  2,000 mg Intravenous Q24H Yamilet Savage MD        donepezil (ARICEPT) tablet 10 mg  10 mg Oral Nightly Yamilet Savage MD   10 mg at 11/11/24 2237    Enoxaparin Sodium (LOVENOX) syringe 40 mg  40 mg Subcutaneous Daily Yamilet Savage MD        lisinopril (PRINIVIL,ZESTRIL) tablet 20 mg  20 mg Oral Q24H Yamilet Savage MD        And    hydroCHLOROthiazide oral 12.5 mg  12.5 mg Oral Q24H Yamilet Savage  MD Jeannine        HYDROmorphone (DILAUDID) injection 0.5 mg  0.5 mg Intravenous Q6H PRN Yamielt Savage MD   0.5 mg at 11/11/24 2212    Magnesium Standard Dose Replacement - Follow Nurse / BPA Driven Protocol   Does not apply PRYamilet Adler MD        memantine (NAMENDA) tablet 10 mg  10 mg Oral BID Yamilet Savage MD   10 mg at 11/11/24 2235    metoprolol tartrate (LOPRESSOR) tablet 25 mg  25 mg Oral BID Yamilet Savage MD   25 mg at 11/11/24 2236    mirtazapine (REMERON) tablet 30 mg  30 mg Oral Nightly Yamilet Savage MD   30 mg at 11/11/24 2237    ondansetron (ZOFRAN) injection 4 mg  4 mg Intravenous Q6H PRN Yamilet Savage MD        oxyCODONE (ROXICODONE) immediate release tablet 5 mg  5 mg Oral Q6H PRN Yamilet Savage MD   5 mg at 11/12/24 0539    pantoprazole (PROTONIX) EC tablet 40 mg  40 mg Oral Q AM Yamilet Savage MD   40 mg at 11/12/24 0539    Pharmacy to dose vancomycin   Does not apply Continuous PRN Yamilet Savage MD        Phosphorus Replacement - Follow Nurse / BPA Driven Protocol   Does not apply Yamilet Rizzo MD        Potassium Replacement - Follow Nurse / BPA Driven Protocol   Does not apply Yamilet Rizzo MD        sertraline (ZOLOFT) tablet 200 mg  200 mg Oral Daily Yamilet Savage MD        sodium chloride 0.9 % flush 10 mL  10 mL Intravenous Q12H Yamilet Savage MD   10 mL at 11/11/24 2237    sodium chloride 0.9 % flush 10 mL  10 mL Intravenous PRN Yamilet Savage MD        sodium chloride 0.9 % infusion 40 mL  40 mL Intravenous PRN Yamilet Savage MD        vancomycin 750 mg in sodium chloride 0.9 % 250 mL IVPB-VTB  750 mg Intravenous Q12H Sukumar Quiroga, PharmD         Lab Results (last 24 hours)       Procedure Component  Value Units Date/Time    Body Fluid Culture - Body Fluid, Ankle, Left [[id number]] Collected: 11/11/24 1807    Specimen: Body Fluid from Ankle, Left Updated: 11/12/24 0802     Body Fluid Culture No growth     Gram Stain Many (4+) Red blood cells      Rare (1+) WBCs seen      No organisms seen    Basic Metabolic Panel [[id number]]  (Abnormal) Collected: 11/12/24 0552    Specimen: Blood Updated: 11/12/24 0712     Glucose 94 mg/dL      BUN 15 mg/dL      Creatinine 0.89 mg/dL      Sodium 137 mmol/L      Potassium 3.7 mmol/L      Chloride 101 mmol/L      CO2 21.0 mmol/L      Calcium 8.4 mg/dL      BUN/Creatinine Ratio 16.9     Anion Gap 15.0 mmol/L      eGFR 94.5 mL/min/1.73     Narrative:      GFR Normal >60  Chronic Kidney Disease <60  Kidney Failure <15      CBC (No Diff) [[id number]]  (Abnormal) Collected: 11/12/24 0552    Specimen: Blood Updated: 11/12/24 0653     WBC 13.93 10*3/mm3      RBC 3.90 10*6/mm3      Hemoglobin 11.1 g/dL      Hematocrit 34.0 %      MCV 87.2 fL      MCH 28.5 pg      MCHC 32.6 g/dL      RDW 16.5 %      RDW-SD 52.7 fl      MPV 12.3 fL      Platelets 148 10*3/mm3     MRSA Screen, PCR (Inpatient) - Swab, Nares [[id number]] Collected: 11/11/24 2345    Specimen: Swab from Nares Updated: 11/12/24 0011    Sedimentation Rate [[id number]]  (Abnormal) Collected: 11/11/24 1450    Specimen: Blood Updated: 11/11/24 1941     Sed Rate 107 mm/hr     Blood Culture - Blood, Arm, Right [[id number]] Collected: 11/11/24 1904    Specimen: Blood from Arm, Right Updated: 11/11/24 1916    Blood Culture - Blood, Arm, Left [[id number]] Collected: 11/11/24 1840    Specimen: Blood from Arm, Left Updated: 11/11/24 1909    Lactic Acid, Plasma [[id number]]  (Normal) Collected: 11/11/24 1841    Specimen: Blood Updated: 11/11/24 1904     Lactate 1.1 mmol/L      Comment: Falsely depressed results may occur on samples drawn from patients receiving N-Acetylcysteine (NAC) or Metamizole.       Procalcitonin [[id number]]  (Abnormal)  "Collected: 11/11/24 1450    Specimen: Blood Updated: 11/11/24 1859     Procalcitonin 0.54 ng/mL     Narrative:      As a Marker for Sepsis (Non-Neonates):    1. <0.5 ng/mL represents a low risk of severe sepsis and/or septic shock.  2. >2 ng/mL represents a high risk of severe sepsis and/or septic shock.    As a Marker for Lower Respiratory Tract Infections that require antibiotic therapy:    PCT on Admission    Antibiotic Therapy       6-12 Hrs later    >0.5                Strongly Recommended  >0.25 - <0.5        Recommended   0.1 - 0.25          Discouraged              Remeasure/reassess PCT  <0.1                Strongly Discouraged     Remeasure/reassess PCT    As 28 day mortality risk marker: \"Change in Procalcitonin Result\" (>80% or <=80%) if Day 0 (or Day 1) and Day 4 values are available. Refer to http://www.[employer]s-pct-calculator.com    Change in PCT <=80%  A decrease of PCT levels below or equal to 80% defines a positive change in PCT test result representing a higher risk for 28-day all-cause mortality of patients diagnosed with severe sepsis for septic shock.    Change in PCT >80%  A decrease of PCT levels of more than 80% defines a negative change in PCT result representing a lower risk for 28-day all-cause mortality of patients diagnosed with severe sepsis or septic shock.       C-reactive Protein [[id number]]  (Abnormal) Collected: 11/11/24 1450    Specimen: Blood Updated: 11/11/24 1732     C-Reactive Protein 19.33 mg/dL     Comprehensive Metabolic Panel [[id number]]  (Abnormal) Collected: 11/11/24 1450    Specimen: Blood Updated: 11/11/24 1542     Glucose 119 mg/dL      BUN 12 mg/dL      Creatinine 1.02 mg/dL      Sodium 133 mmol/L      Potassium 3.5 mmol/L      Chloride 97 mmol/L      CO2 23.0 mmol/L      Calcium 8.9 mg/dL      Total Protein 8.2 g/dL      Albumin 3.8 g/dL      ALT (SGPT) 26 U/L      AST (SGOT) 44 U/L      Alkaline Phosphatase 258 U/L      Total Bilirubin 0.8 mg/dL      Globulin 4.4 " gm/dL      Comment: Calculated Result        A/G Ratio 0.9 g/dL      BUN/Creatinine Ratio 11.8     Anion Gap 13.0 mmol/L      eGFR 81.1 mL/min/1.73     Narrative:      GFR Normal >60  Chronic Kidney Disease <60  Kidney Failure <15      CBC & Differential [[id number]]  (Abnormal) Collected: 11/11/24 1450    Specimen: Blood Updated: 11/11/24 1517    Narrative:      The following orders were created for panel order CBC & Differential.  Procedure                               Abnormality         Status                     ---------                               -----------         ------                     CBC Auto Differential[[id number]]        Abnormal            Final result                 Please view results for these tests on the individual orders.    CBC Auto Differential [[id number]]  (Abnormal) Collected: 11/11/24 1450    Specimen: Blood Updated: 11/11/24 1517     WBC 14.45 10*3/mm3      RBC 4.18 10*6/mm3      Hemoglobin 12.0 g/dL      Hematocrit 36.0 %      MCV 86.1 fL      MCH 28.7 pg      MCHC 33.3 g/dL      RDW 16.1 %      RDW-SD 50.6 fl      MPV 11.4 fL      Platelets 145 10*3/mm3      Neutrophil % 73.3 %      Lymphocyte % 15.8 %      Monocyte % 10.0 %      Eosinophil % 0.1 %      Basophil % 0.1 %      Immature Grans % 0.7 %      Neutrophils, Absolute 10.58 10*3/mm3      Lymphocytes, Absolute 2.29 10*3/mm3      Monocytes, Absolute 1.45 10*3/mm3      Eosinophils, Absolute 0.01 10*3/mm3      Basophils, Absolute 0.02 10*3/mm3      Immature Grans, Absolute 0.10 10*3/mm3      nRBC 0.0 /100 WBC           Imaging Results (Last 24 Hours)       Procedure Component Value Units Date/Time    MRI Ankle Left With & Without Contrast [[id number]] Resulted: 11/11/24 2016     Updated: 11/11/24 2044    XR Ankle 3+ View Left [[id number]] Collected: 11/11/24 1616     Updated: 11/11/24 1623    Narrative:      XR ANKLE 3+ VW LEFT    Date of Exam: 11/11/2024 2:29 PM EST    Indication: L ankle injury    Comparison: None  "available.    Findings:  There is no evidence of acute fracture. Small well-corticated ossific fragment inferior to the medial malleolus is favored to represent remote injury. Normal joint alignment. No significant degenerative changes. Calcaneal enthesopathy. Atherosclerotic   calcifications. Soft tissue edema about the ankle.      Impression:      Impression:  Soft tissue edema about the ankle. No evidence of acute fracture.      Electronically Signed: Luis Angel Cordero MD    11/11/2024 4:20 PM EST    Workstation ID: [id number]          Orders (last 24 hrs)        Start     Ordered    11/13/24 0600  Vancomycin, Trough  Morning Draw         11/11/24 2230 11/12/24 0900  aspirin EC tablet 81 mg  Daily         11/11/24 1932 11/12/24 0900  sertraline (ZOLOFT) tablet 200 mg  Daily         11/11/24 1932 11/12/24 0900  Enoxaparin Sodium (LOVENOX) syringe 40 mg  Daily         11/11/24 1949 11/12/24 0900  lisinopril (PRINIVIL,ZESTRIL) tablet 20 mg  Every 24 Hours Scheduled        Placed in \"And\" Linked Group    11/11/24 1956 11/12/24 0900  hydroCHLOROthiazide oral 12.5 mg  Every 24 Hours Scheduled        Placed in \"And\" Linked Group    11/11/24 1956 11/12/24 0900  vancomycin 750 mg in sodium chloride 0.9 % 250 mL IVPB-VTB  Every 12 Hours         11/11/24 2230 11/12/24 0800  Oral Care  2 Times Daily       11/11/24 1943 11/12/24 0800  cefTRIAXone (ROCEPHIN) 2,000 mg in sodium chloride 0.9 % 100 mL MBP  Every 24 Hours         11/11/24 1943 11/12/24 0700  Inpatient Orthopedic Surgery Consult  Once        Specialty:  Orthopedic Surgery  Provider:  (Not yet assigned)    11/11/24 2000 11/12/24 0600  pantoprazole (PROTONIX) EC tablet 40 mg  Every Early Morning         11/11/24 1932 11/12/24 0600  Basic Metabolic Panel  Morning Draw         11/11/24 1943 11/12/24 0600  CBC (No Diff)  Morning Draw         11/11/24 1943 11/11/24 2238  Initiate & Follow Hypercapnic Monitoring Guideline for Opioid " Administration via EtCO2 and / or SpO2  Continuous        Comments: Follow Hypercapnic Monitoring Guideline As Outlined in Process Instructions (Open Order Report to View Full Instructions)    11/11/24 2237 11/11/24 2238  Opioid Administration - Document EtCO2 and / or SpO2 With Each Set of Vitals & Any Change in Patient Status  Per Order Details        Comments: With Each Set of Vitals & Any Change in Patient Status    11/11/24 2237 11/11/24 2238  Opioid Administration - Notify Provider Hypercapnic Monitoring  Continuous        Comments: Open Order Report to View Parameters Requiring Provider Notification    11/11/24 2237 11/11/24 2238  Opioid Administration - Continuous Pulse Oximetry (SpO2)  Continuous         11/11/24 2237 11/11/24 2230  atorvastatin (LIPITOR) tablet 80 mg  Nightly         11/11/24 1932 11/11/24 2230  donepezil (ARICEPT) tablet 10 mg  Nightly         11/11/24 1932 11/11/24 2230  memantine (NAMENDA) tablet 10 mg  2 Times Daily         11/11/24 1932 11/11/24 2230  metoprolol tartrate (LOPRESSOR) tablet 25 mg  2 Times Daily         11/11/24 1932 11/11/24 2200  Incentive Spirometry  Every 4 Hours While Awake       11/11/24 1943    11/11/24 2100  mirtazapine (REMERON) tablet 30 mg  Nightly         11/11/24 1932 11/11/24 2100  sodium chloride 0.9 % flush 10 mL  Every 12 Hours Scheduled         11/11/24 1943    11/11/24 2040  gadobenate dimeglumine (MULTIHANCE) injection 15 mL  Once in Imaging         11/11/24 2024 11/11/24 2005  potassium chloride (KLOR-CON M20) CR tablet 40 mEq  Once         11/11/24 1949 11/11/24 2000  vancomycin IVPB 1750 mg in 0.9% Sodium Chloride (premix) 500 mL  Once         11/11/24 1828    11/11/24 2000  Vital Signs  Every 4 Hours       11/11/24 1943 11/11/24 2000  oxyCODONE (ROXICODONE) immediate release tablet 5 mg  Every 6 Hours PRN         11/11/24 1943    11/11/24 1937  Code Status and Medical Interventions: CPR (Attempt to  Resuscitate); Full Support  Continuous         11/11/24 1943    11/11/24 1936  Pharmacy to Dose enoxaparin (LOVENOX)  Continuous PRN,   Status:  Discontinued         11/11/24 1943    11/11/24 1934  HYDROmorphone (DILAUDID) injection 0.5 mg  Every 6 Hours PRN         11/11/24 1943    11/11/24 1933  acetaminophen (TYLENOL) tablet 650 mg  Every 6 Hours PRN         11/11/24 1943    11/11/24 1932  Pharmacy to dose vancomycin  Continuous PRN         11/11/24 1943    11/11/24 1927  ondansetron (ZOFRAN) injection 4 mg  Every 6 Hours PRN         11/11/24 1943    11/11/24 1927  Bowel Regimen Not Indicated  Once         11/11/24 1943    11/11/24 1926  Potassium Replacement - Follow Nurse / BPA Driven Protocol  As Needed         11/11/24 1943    11/11/24 1926  Magnesium Standard Dose Replacement - Follow Nurse / BPA Driven Protocol  As Needed         11/11/24 1943    11/11/24 1926  Phosphorus Replacement - Follow Nurse / BPA Driven Protocol  As Needed         11/11/24 1943    11/11/24 1926  Calcium Replacement - Follow Nurse / BPA Driven Protocol  As Needed         11/11/24 1943    11/11/24 1926  Intake & Output  Every Shift       11/11/24 1943    11/11/24 1926  Weigh Patient  Once         11/11/24 1943    11/11/24 1926  Insert Peripheral IV  Once         11/11/24 1943    11/11/24 1926  Saline Lock & Maintain IV Access  Continuous         11/11/24 1943    11/11/24 1925  sodium chloride 0.9 % flush 10 mL  As Needed         11/11/24 1943    11/11/24 1925  sodium chloride 0.9 % infusion 40 mL  As Needed         11/11/24 1943    11/11/24 1910  cefTRIAXone (ROCEPHIN) 2,000 mg in sodium chloride 0.9 % 100 mL MBP  Once         11/11/24 1828    11/11/24 1910  Morphine sulfate (PF) injection 4 mg  Once         11/11/24 1831    11/11/24 1908  MRSA Screen, PCR (Inpatient) - Swab, Nares  Once         11/11/24 1907    11/11/24 1847  ondansetron (ZOFRAN) injection 4 mg  Once         11/11/24 1831    11/11/24 1833  Procalcitonin  STAT          11/11/24 1832    11/11/24 1832  Inpatient Admission  Once         11/11/24 1831    11/11/24 1832  Diet: Regular/House; Fluid Consistency: Thin (IDDSI 0)  Diet Effective Now         11/11/24 1831    11/11/24 1828  MRI Ankle Left With & Without Contrast  1 Time Imaging         11/11/24 1827    11/11/24 1828  Blood Culture - Blood, Arm, Right  Once         11/11/24 1827    11/11/24 1828  Blood Culture - Blood, Arm, Left  Once         11/11/24 1827    11/11/24 1828  Lactic Acid, Plasma  Once         11/11/24 1827    11/11/24 1718  Body Fluid Culture - Body Fluid, Ankle, Left  STAT         11/11/24 1717    11/11/24 1718  Sedimentation Rate  Once         11/11/24 1717    11/11/24 1718  C-reactive Protein  STAT         11/11/24 1717    11/11/24 1717  Body Fluid Cell Count With Differential - Body Fluid, Ankle, Left  Once,   Status:  Canceled         11/11/24 1716    11/11/24 1717  Body fluid cell count - Body Fluid, Ankle, Left  PROCEDURE ONCE,   Status:  Canceled         11/11/24 1717    11/11/24 1257  CBC & Differential  Once         11/11/24 1256    11/11/24 1257  Comprehensive Metabolic Panel  Once         11/11/24 1256    11/11/24 1257  Duplex Venous Lower Extremity - Left CAR  Once         11/11/24 1256    11/11/24 1257  XR Ankle 3+ View Left  1 Time Imaging         11/11/24 1256    11/11/24 1257  CBC Auto Differential  PROCEDURE ONCE         11/11/24 1257                  Operative/Procedure Notes (last 24 hours)  Notes from 11/11/24 0829 through 11/12/24 0829   No notes of this type exist for this encounter.       Physician Progress Notes (last 24 hours)  Notes from 11/11/24 0829 through 11/12/24 0829   No notes of this type exist for this encounter.       Consult Notes (last 24 hours)  Notes from 11/11/24 0829 through 11/12/24 0829   No notes of this type exist for this encounter.

## 2024-11-12 NOTE — H&P
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 male with CAD s/p PIC w/ DIS 2012, HTN, HLD, anxiety/depression, MCI who presents for LLE pain, redness and swelling for 2 days. Pt reports that he hit his L ankle on his bed frame on Friday. It turned red but there was no break in the skin. Sat it began swelling and becoming more painful. He was trying to go see his PCP today but was unable to get in so he came to the ED. Pt reports he has had difficulty walking on his LLE for the last couple days. He also developed N/V, fever, chills and overall malaise over the weekend.     Joint aspiration done in the ED, unable to run a cell count wanted due to blood however cultures pending.  No evidence of fracture on x-ray, soft tissue edema present.  Labs significant for leukocytosis elevated CRP and sed rate.  Patient denies chest pain shortness of breath cough.      Personal History     Past Medical History:   Diagnosis Date    Acute maxillary sinusitis     Arthritis     CHF (congestive heart failure)     Chronic pain disorder     Colon polyp     Depression     Difficulty walking     Elevated LFTs     GERD (gastroesophageal reflux disease)     History of colonic polyps     History of methicillin resistant Staphylococcus aureus     History of myocardial infarction     Hyperlipidemia     Hypertension     Left hand pain     Left shoulder pain     Low back pain     MRSA (methicillin resistant Staphylococcus aureus)     Myocardial infarction     Neck pain     Personal history of congestive heart failure     Right hip pain     Rotator cuff syndrome            Past Surgical History:   Procedure Laterality Date    BACK SURGERY      CAROTID STENT       CORONARY ANGIOPLASTY WITH STENT PLACEMENT  01/04/2012    Circumflex Xscience V 3.5 MM x 23 mm    CORONARY STENT PLACEMENT      ENDOMETRIAL ABLATION      EPIDURAL BLOCK      GALLBLADDER SURGERY  2017    LUMBAR DISCECTOMY  1992    2 level - [facility] [place]    LYMPH NODE BIOPSY      SHOULDER SURGERY  12/20/2018    Dr. Hoyt Rotator cuff repair       Family History: family history includes Alcohol abuse in his father, maternal aunt, mother, and paternal uncle; Dementia in his sister; Developmental Disability in his sister; Diabetes in his maternal grandmother; Heart attack in his mother; Heart disease in his brother and sister; Hyperlipidemia in his mother; Hypertension in his mother; Stroke in his sister and sister.     Social History:  reports that he quit smoking about 13 years ago. His smoking use included cigarettes. He started smoking about 49 years ago. He has a 54 pack-year smoking history. He has been exposed to tobacco smoke. He has never used smokeless tobacco. He reports that he does not currently use alcohol. He reports that he does not currently use drugs after having used the following drugs: Hydrocodone and Marijuana.  Social History     Social History Narrative    Not on file       Medications:  Available home medication information reviewed.  aspirin, atorvastatin, donepezil, lisinopril-hydrochlorothiazide, memantine, metoprolol tartrate, mirtazapine, multivitamin with minerals, omeprazole, and sertraline    No Known Allergies    Objective   Objective     Vital Signs:   Temp:  [100.3 °F (37.9 °C)] 100.3 °F (37.9 °C)  Heart Rate:  [93] 93  Resp:  [16] 16  BP: (138)/(49) 138/49       Physical Exam  Constitutional:       General: He is not in acute distress.  HENT:      Head: Normocephalic and atraumatic.   Cardiovascular:      Rate and Rhythm: Normal rate and regular rhythm.      Heart sounds: No murmur heard.  Pulmonary:      Effort: Pulmonary effort is normal. No respiratory distress.    Abdominal:      General: There is no distension.      Palpations: Abdomen is soft.   Musculoskeletal:      Right lower leg: No edema.      Comments: Left lower extremity with significant erythema swelling and warmth CHCF up the calf down into his foot.  Range of motion of the ankle limited.  Knee range of motion within normal limits.  No visible wound   Neurological:      General: No focal deficit present.      Mental Status: He is alert. Mental status is at baseline.   Psychiatric:         Mood and Affect: Mood normal.         Thought Content: Thought content normal.            Result Review:  I have personally reviewed the results from the time of this admission to 11/11/2024 20:01 EST and agree with these findings:  [x]  Laboratory list / accordion  [x]  Microbiology  [x]  Radiology  []  EKG/Telemetry   []  Cardiology/Vascular   []  Pathology  [x]  Old records  []  Other:  Most notable findings include: Leukocytosis, elevated CRP and sed rate, soft tissue edema on x-ray of his ankle.      LAB RESULTS:      Lab 11/11/24  1841 11/11/24  1450   WBC  --  14.45*   HEMOGLOBIN  --  12.0*   HEMATOCRIT  --  36.0*   PLATELETS  --  145   NEUTROS ABS  --  10.58*   IMMATURE GRANS (ABS)  --  0.10*   LYMPHS ABS  --  2.29   MONOS ABS  --  1.45*   EOS ABS  --  0.01   MCV  --  86.1   SED RATE  --  107*   CRP  --  19.33*   PROCALCITONIN  --  0.54*   LACTATE 1.1  --          Lab 11/11/24  1450   SODIUM 133*   POTASSIUM 3.5   CHLORIDE 97*   CO2 23.0   ANION GAP 13.0   BUN 12   CREATININE 1.02   EGFR 81.1   GLUCOSE 119*   CALCIUM 8.9         Lab 11/11/24  1450   TOTAL PROTEIN 8.2   ALBUMIN 3.8   GLOBULIN 4.4   ALT (SGPT) 26   AST (SGOT) 44*   BILIRUBIN 0.8   ALK PHOS 258*                         Microbiology Results (last 10 days)       Procedure Component Value - Date/Time    Body Fluid Culture - Body Fluid, Ankle, Left [[id number]] Collected: 11/11/24 1807    Lab Status: Preliminary result Specimen: Body Fluid from Ankle, Left  Updated: 11/11/24 1900     Gram Stain Many (4+) Red blood cells      Rare (1+) WBCs seen      No organisms seen            XR Ankle 3+ View Left    Result Date: 11/11/2024  XR ANKLE 3+ VW LEFT Date of Exam: 11/11/2024 2:29 PM EST Indication: L ankle injury Comparison: None available. Findings: There is no evidence of acute fracture. Small well-corticated ossific fragment inferior to the medial malleolus is favored to represent remote injury. Normal joint alignment. No significant degenerative changes. Calcaneal enthesopathy. Atherosclerotic calcifications. Soft tissue edema about the ankle.     Impression: Impression: Soft tissue edema about the ankle. No evidence of acute fracture. Electronically Signed: Luis Angel Cordero MD  11/11/2024 4:20 PM EST  Workstation ID: [id number]    Duplex Venous Lower Extremity - Left CAR    Result Date: 11/11/2024    Normal left lower extremity venous duplex scan.      Results for orders placed during the hospital encounter of 10/08/24    Adult Transthoracic Echo Complete W/ Cont if Necessary Per Protocol    Interpretation Summary    Left ventricular systolic function is normal. Estimated left ventricular EF = 60%    Moderate mitral valve regurgitation is present.    Estimated right ventricular systolic pressure from tricuspid regurgitation is normal (<35 mmHg).      Assessment & Plan   Assessment & Plan       Left leg cellulitis    Justice 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 male with CAD s/p PCI w/ DIS 2012, HTN, HLD, anxiety/depression, MCI who presents for LLE pain, redness and swelling for 2 days    Left lower extremity cellulitis  Sepsis  - Patient meeting septic criteria with tachycardia, fevers at home, leukocytosis, source of infection  - Joint aspiration performed in the ED however unable to run cell count on fluid due to blood in the sample  -Joint fluid culture pending  - Blood cultures pending  - Will continue Vanco and ceftriaxone  - MRI of the ankle pending  -Zofran for nausea  - As needed  pain medications ordered  - ED spoke to Dr. Sidhu with ortho, they will follow    CAD s/p PCI w/ DEI  Hypertension  Hyperlipidemia  - Continue metoprolol, atorvastatin, lisinopril/HCTZ    Anxiety/depression  - Continue home Zoloft and Remeron    MCI  - Continue home donepezil and memantine      VTE Prophylaxis:  Pharmacologic VTE prophylaxis orders are present.      CODE STATUS:    Code Status and Medical Interventions: CPR (Attempt to Resuscitate); Full Support   Ordered at: 11/11/24 1943     Level Of Support Discussed With:    Patient     Code Status (Patient has no pulse and is not breathing):    CPR (Attempt to Resuscitate)     Medical Interventions (Patient has pulse or is breathing):    Full Support       Expected Discharge   Expected Discharge Date: 11/13/2024; Expected Discharge Time:      Yamilet Savage MD  11/11/24

## 2024-11-12 NOTE — PROGRESS NOTES
[facility] Medicine Services  PROGRESS NOTE    Patient Name: Justice Kiser  : 1960  MRN: [id number]    Date of Admission: 2024  Primary Care Physician: Anshul Martinez MD    Subjective   Subjective     CC:  LLE pain, swelling     HPI:  Patient is a 63 yo M seen and examined by me this AM, patient seen and examined by me this AM, patient reports some improvement in his swelling of his leg today, still with pain and redness. Orthopedics consulted and following at this time. He reports having some loose stools that have started with antibiotics, probiotic has been ordered, no other acute complaints or problems at this time.       Objective   Objective     Vital Signs:   Temp:  [98.2 °F (36.8 °C)-98.8 °F (37.1 °C)] 98.2 °F (36.8 °C)  Heart Rate:  [55-72] 72  Resp:  [16] 16  BP: (107-148)/(50-72) 141/72     Physical Exam:  Constitutional: No acute distress, awake, alert, frail appearing, resting comfortably in bed.   HENT: NCAT, nares patent, mucous membranes moist  Respiratory: Decreased BS bilaterally, no rhonchi or wheezing, respiratory effort normal   Cardiovascular: RRR, no murmurs, rubs, or gallops  Gastrointestinal: Positive bowel sounds, soft, nontender, nondistended  Musculoskeletal/skin: edema of LLE, increased erythema and swelling of foot involving exterior ankle and extending up calf, does not extend past marked border.   Psychiatric: Appropriate affect, cooperative  Neurologic: Oriented x 3, strength symmetric in all extremities, Cranial Nerves grossly intact to confrontation, speech clear      Results Reviewed:  LAB RESULTS:      Lab 24  0552 24  1841 24  1450   WBC 13.93*  --  14.45*   HEMOGLOBIN 11.1*  --  12.0*   HEMATOCRIT 34.0*  --  36.0*   PLATELETS 148  --  145   NEUTROS ABS  --   --  10.58*   IMMATURE GRANS (ABS)  --   --  0.10*   LYMPHS ABS  --   --  2.29   MONOS ABS  --   --  1.45*   EOS ABS  --   --  0.01   MCV 87.2  --  86.1   SED RATE   --   --  107*   CRP  --   --  19.33*   PROCALCITONIN  --   --  0.54*   LACTATE  --  1.1  --          Lab 11/12/24  0552 11/11/24  1450   SODIUM 137 133*   POTASSIUM 3.7 3.5   CHLORIDE 101 97*   CO2 21.0* 23.0   ANION GAP 15.0 13.0   BUN 15 12   CREATININE 0.89 1.02   EGFR 94.5 81.1   GLUCOSE 94 119*   CALCIUM 8.4* 8.9         Lab 11/11/24  1450   TOTAL PROTEIN 8.2   ALBUMIN 3.8   GLOBULIN 4.4   ALT (SGPT) 26   AST (SGOT) 44*   BILIRUBIN 0.8   ALK PHOS 258*                     Brief Urine Lab Results       None            Microbiology Results Abnormal       None            MRI Ankle Left With & Without Contrast    Result Date: 11/12/2024  MRI ANKLE LEFT W WO CONTRAST Date of Exam: 11/11/2024 8:10 PM EST Indication: L ankle redness/swelling, eval for septic joint findings.  Comparison: None available. Technique:  Routine multiplanar/multisequence images of the left ankle were obtained before and after the uneventful administration of 15 mL Multihance. FINDINGS: Bone and joint: Anatomic alignment is maintained. No significant joint effusion is seen. Nonspecific subcortical cystic changes within the navicular bone. Otherwise, the visualized marrow signal is unremarkable. Calcaneal enthesophytes are noted. Ligaments: The anterior talofibular ligament, posterior talofibular ligament, calcaneofibular ligament, and deltoid ligament appear intact. Tendons: There is mild to moderate tendinopathy and partial-thickness longitudinal tearing within the peroneus longus tendon below the ankle. Peroneus brevis tendon is unremarkable. Medial ankle tendons, extensor tendons, and Achilles tendon appear unremarkable. Muscles and soft tissues: No muscle edema or atrophy is identified.  No soft tissue or cystic mass lesion is seen. Soft tissue edema/induration noted dorsally and laterally about the ankle. No suspicious contrast enhancement is identified.     Impression: 1.Mild to moderate tendinopathy and partial-thickness longitudinal  tearing within the peroneus longus tendon below the ankle. 2.Soft tissue edema/induration noted dorsally and laterally about the ankle. Cellulitis may be considered in the appropriate clinical setting. 3.Otherwise, no significant abnormality is identified. 4.No suspicious contrast enhancement is identified. Electronically Signed: Kamari Calabrese MD  11/12/2024 9:07 AM EST  Workstation ID: [id number]    XR Ankle 3+ View Left    Result Date: 11/11/2024  XR ANKLE 3+ VW LEFT Date of Exam: 11/11/2024 2:29 PM EST Indication: L ankle injury Comparison: None available. Findings: There is no evidence of acute fracture. Small well-corticated ossific fragment inferior to the medial malleolus is favored to represent remote injury. Normal joint alignment. No significant degenerative changes. Calcaneal enthesopathy. Atherosclerotic calcifications. Soft tissue edema about the ankle.     Impression: Impression: Soft tissue edema about the ankle. No evidence of acute fracture. Electronically Signed: Luis Angel Cordero MD  11/11/2024 4:20 PM EST  Workstation ID: [id number]    Duplex Venous Lower Extremity - Left CAR    Result Date: 11/11/2024    Normal left lower extremity venous duplex scan.      Results for orders placed during the hospital encounter of 10/08/24    Adult Transthoracic Echo Complete W/ Cont if Necessary Per Protocol    Interpretation Summary    Left ventricular systolic function is normal. Estimated left ventricular EF = 60%    Moderate mitral valve regurgitation is present.    Estimated right ventricular systolic pressure from tricuspid regurgitation is normal (<35 mmHg).      Current medications:  Scheduled Meds:aspirin, 81 mg, Oral, Daily  atorvastatin, 80 mg, Oral, Nightly  cefTRIAXone, 2,000 mg, Intravenous, Q24H  cholestyramine light, 1 packet, Oral, Q12H  donepezil, 10 mg, Oral, Nightly  enoxaparin, 40 mg, Subcutaneous, Daily  lisinopril, 20 mg, Oral, Q24H   And  hydroCHLOROthiazide, 12.5 mg, Oral, Q24H  memantine, 10  mg, Oral, BID  metoprolol tartrate, 25 mg, Oral, BID  mirtazapine, 30 mg, Oral, Nightly  pantoprazole, 40 mg, Oral, Q AM  saccharomyces boulardii, 250 mg, Oral, BID  sertraline, 200 mg, Oral, Daily  sodium chloride, 10 mL, Intravenous, Q12H  vancomycin, 750 mg, Intravenous, Q12H      Continuous Infusions:Pharmacy to dose vancomycin,       PRN Meds:.  acetaminophen    Calcium Replacement - Follow Nurse / BPA Driven Protocol    HYDROmorphone    Magnesium Standard Dose Replacement - Follow Nurse / BPA Driven Protocol    ondansetron    oxyCODONE    Pharmacy to dose vancomycin    Phosphorus Replacement - Follow Nurse / BPA Driven Protocol    Potassium Replacement - Follow Nurse / BPA Driven Protocol    sodium chloride    sodium chloride    Assessment & Plan   Assessment & Plan     Active Hospital Problems    Diagnosis  POA    **Left leg cellulitis [L03.116]  Yes      Resolved Hospital Problems   No resolved problems to display.        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 male with CAD s/p PCI w/ DIS 2012, HTN, HLD, anxiety/depression, MCI who presents for LLE pain, redness and swelling for 2 days. Patient being followed now by orthopedics, patient transferred to my services on the AM of 11/12, patient resting comfortably in bed, reports pain still present but able to move foot better today. MRI has returned, no obvious osteo, cellulitis and mild to moderate tendinopathy/partial thickness longitudinal tearing of the peroneus longus tendon. Continue to follow at this time, continued IV antibiotics and will follow      Left lower extremity cellulitis  Sepsis  - Patient meeting septic criteria with tachycardia, fevers at home, leukocytosis, source of infection  - Joint aspiration performed in the ED however unable to run cell count on fluid due to blood in the sample  -Joint fluid culture currently no growth   - Blood cultures pending  - Will continue Vanco and ceftriaxone  -- Reviewed orthopedic  recommendations, will consult ID for evaluation as well in the AM   - MRI of the ankle as above, cellulitis, and tendinopathy/partial tearing peroneus longus tendon   - Continue to follow with orthopedics      CAD s/p PCI w/ DEI  Hypertension  Hyperlipidemia  - Continue metoprolol, atorvastatin, lisinopril/HCTZ     Anxiety/depression  - Continue home Zoloft and Remeron     MCI  - Continue home donepezil and memantine    Expected Discharge Location and Transportation: D  Expected Discharge   Expected Discharge Date: 11/15/2024; Expected Discharge Time:      VTE Prophylaxis:  Pharmacologic VTE prophylaxis orders are present.         AM-PAC 6 Clicks Score (PT): 18 (11/12/24 0800)    CODE STATUS:   Code Status and Medical Interventions: CPR (Attempt to Resuscitate); Full Support   Ordered at: 11/11/24 1943     Level Of Support Discussed With:    Patient     Code Status (Patient has no pulse and is not breathing):    CPR (Attempt to Resuscitate)     Medical Interventions (Patient has pulse or is breathing):    Full Support       MEHRDAD Vera, DO  11/12/24

## 2024-11-12 NOTE — ED NOTES
Justice Kiser    Nursing Report ED to Floor:  Mental status: ALERT AND ORIENTED X4  Ambulatory status: WHEELCHAIR  Oxygen Therapy:  RA  Cardiac Rhythm: NSR  Admitted from: ER  Safety Concerns:  FALL RISK  Social Issues: NONE  ED Room #:  11    ED Nurse Phone Extension - [phone] or may call [phone].      HPI:   Chief Complaint   Patient presents with    Leg Swelling       Past Medical History:  Past Medical History:   Diagnosis Date    Acute maxillary sinusitis     Arthritis     CHF (congestive heart failure)     Chronic pain disorder     Colon polyp     Depression     Difficulty walking     Elevated LFTs     GERD (gastroesophageal reflux disease)     History of colonic polyps     History of methicillin resistant Staphylococcus aureus     History of myocardial infarction     Hyperlipidemia     Hypertension     Left hand pain     Left shoulder pain     Low back pain     MRSA (methicillin resistant Staphylococcus aureus)     Myocardial infarction     Neck pain     Personal history of congestive heart failure     Right hip pain     Rotator cuff syndrome         Past Surgical History:  Past Surgical History:   Procedure Laterality Date    BACK SURGERY      CAROTID STENT      CORONARY ANGIOPLASTY WITH STENT PLACEMENT  01/04/2012    Circumflex Xscience V 3.5 MM x 23 mm    CORONARY STENT PLACEMENT      ENDOMETRIAL ABLATION      EPIDURAL BLOCK      GALLBLADDER SURGERY  2017    LUMBAR DISCECTOMY  1992    2 level - [facility]    LYMPH NODE BIOPSY      SHOULDER SURGERY  12/20/2018    Dr. Hoyt Rotator cuff repair        Admitting Doctor:   Yamilet Savage MD    Consulting Provider(s):  Consults       No orders found from 10/13/2024 to 11/12/2024.             Admitting Diagnosis:   The primary encounter diagnosis was Acute sepsis. Diagnoses of Cellulitis of left ankle and History of MRSA infection were also pertinent to this visit.    Most Recent Vitals:   Vitals:    11/11/24 1228   BP: 138/49   BP  "Location: Left arm   Patient Position: Sitting   Pulse: 93   Resp: 16   Temp: 100.3 °F (37.9 °C)   TempSrc: Oral   SpO2: 95%   Weight: 79.4 kg (175 lb)   Height: 162.6 cm (64\")       Active LDAs/IV Access:   Lines, Drains & Airways       Active LDAs       Name Placement date Placement time Site Days    Peripheral IV 11/11/24 1840 Left Antecubital 11/11/24 1840  Antecubital  less than 1                    Labs (abnormal labs have a star):   Labs Reviewed   COMPREHENSIVE METABOLIC PANEL - Abnormal; Notable for the following components:       Result Value    Glucose 119 (*)     Sodium 133 (*)     Chloride 97 (*)     AST (SGOT) 44 (*)     Alkaline Phosphatase 258 (*)     All other components within normal limits    Narrative:     GFR Normal >60  Chronic Kidney Disease <60  Kidney Failure <15     CBC WITH AUTO DIFFERENTIAL - Abnormal; Notable for the following components:    WBC 14.45 (*)     Hemoglobin 12.0 (*)     Hematocrit 36.0 (*)     RDW 16.1 (*)     Lymphocyte % 15.8 (*)     Eosinophil % 0.1 (*)     Immature Grans % 0.7 (*)     Neutrophils, Absolute 10.58 (*)     Monocytes, Absolute 1.45 (*)     Immature Grans, Absolute 0.10 (*)     All other components within normal limits   SEDIMENTATION RATE - Abnormal; Notable for the following components:    Sed Rate 107 (*)     All other components within normal limits   C-REACTIVE PROTEIN - Abnormal; Notable for the following components:    C-Reactive Protein 19.33 (*)     All other components within normal limits   PROCALCITONIN - Abnormal; Notable for the following components:    Procalcitonin 0.54 (*)     All other components within normal limits    Narrative:     As a Marker for Sepsis (Non-Neonates):    1. <0.5 ng/mL represents a low risk of severe sepsis and/or septic shock.  2. >2 ng/mL represents a high risk of severe sepsis and/or septic shock.    As a Marker for Lower Respiratory Tract Infections that require antibiotic therapy:    PCT on Admission    Antibiotic " "Therapy       6-12 Hrs later    >0.5                Strongly Recommended  >0.25 - <0.5        Recommended   0.1 - 0.25          Discouraged              Remeasure/reassess PCT  <0.1                Strongly Discouraged     Remeasure/reassess PCT    As 28 day mortality risk marker: \"Change in Procalcitonin Result\" (>80% or <=80%) if Day 0 (or Day 1) and Day 4 values are available. Refer to [email or web address]-pct-[email or web address]    Change in PCT <=80%  A decrease of PCT levels below or equal to 80% defines a positive change in PCT test result representing a higher risk for 28-day all-cause mortality of patients diagnosed with severe sepsis for septic shock.    Change in PCT >80%  A decrease of PCT levels of more than 80% defines a negative change in PCT result representing a lower risk for 28-day all-cause mortality of patients diagnosed with severe sepsis or septic shock.      LACTIC ACID, PLASMA - Normal   BODY FLUID CULTURE   BLOOD CULTURE   BLOOD CULTURE   MRSA SCREEN, PCR   CBC AND DIFFERENTIAL    Narrative:     The following orders were created for panel order CBC & Differential.  Procedure                               Abnormality         Status                     ---------                               -----------         ------                     CBC Auto Differential[[id number]]        Abnormal            Final result                 Please view results for these tests on the individual orders.       Meds Given in ED:   Medications   vancomycin IVPB 1750 mg in 0.9% Sodium Chloride (premix) 500 mL (has no administration in time range)   cefTRIAXone (ROCEPHIN) 2,000 mg in sodium chloride 0.9 % 100 mL MBP (2,000 mg Intravenous New Bag 11/11/24 1945)   ondansetron (ZOFRAN) injection 4 mg (has no administration in time range)   aspirin EC tablet 81 mg (has no administration in time range)   atorvastatin (LIPITOR) tablet 80 mg (has no administration in time range)   donepezil (ARICEPT) tablet 10 mg (has no " administration in time range)   memantine (NAMENDA) tablet 10 mg (has no administration in time range)   metoprolol tartrate (LOPRESSOR) tablet 25 mg (has no administration in time range)   pantoprazole (PROTONIX) EC tablet 40 mg (has no administration in time range)   sertraline (ZOLOFT) tablet 200 mg (has no administration in time range)   mirtazapine (REMERON) tablet 30 mg (has no administration in time range)   sodium chloride 0.9 % flush 10 mL (has no administration in time range)   sodium chloride 0.9 % flush 10 mL (has no administration in time range)   sodium chloride 0.9 % infusion 40 mL (has no administration in time range)   Potassium Replacement - Follow Nurse / BPA Driven Protocol (has no administration in time range)   Magnesium Standard Dose Replacement - Follow Nurse / BPA Driven Protocol (has no administration in time range)   Phosphorus Replacement - Follow Nurse / BPA Driven Protocol (has no administration in time range)   Calcium Replacement - Follow Nurse / BPA Driven Protocol (has no administration in time range)   ondansetron (ZOFRAN) injection 4 mg (has no administration in time range)   Pharmacy to dose vancomycin (has no administration in time range)   cefTRIAXone (ROCEPHIN) 2,000 mg in sodium chloride 0.9 % 100 mL MBP (has no administration in time range)   oxyCODONE (ROXICODONE) immediate release tablet 5 mg (has no administration in time range)   acetaminophen (TYLENOL) tablet 650 mg (has no administration in time range)   HYDROmorphone (DILAUDID) injection 0.5 mg (has no administration in time range)   Pharmacy to Dose enoxaparin (LOVENOX) (has no administration in time range)   Morphine sulfate (PF) injection 4 mg (4 mg Intravenous Given 11/11/24 1946)     Pharmacy to Dose enoxaparin (LOVENOX),   Pharmacy to dose vancomycin,          Last NIH score:                                                          Dysphagia screening results:  Patient Factors Component (Dysphagia:Stroke or  Rule-out)  Best Eye Response: 4-->(E4) spontaneous (11/11/24 1837)  Best Motor Response: 6-->(M6) obeys commands (11/11/24 1837)  Best Verbal Response: 5-->(V5) oriented (11/11/24 1837)  [place] Coma Scale Score: 15 (11/11/24 1837)     [place] Coma Scale:  No data recorded     CIWA:        Restraint Type:            Isolation Status:  No active isolations

## 2024-11-12 NOTE — PROGRESS NOTES
"Pharmacy Consult-Vancomycin Dosing  Justice Kiser is a  64 y.o. male receiving vancomycin therapy.     Indication: SSTI  Consulting Provider: hospitalist   ID Consult:     Goal AUC: 400 - 600 mg/L*hr    Current Antimicrobial Therapy  Anti-Infectives (From admission, onward)      Ordered     Dose/Rate Route Frequency Start Stop    11/11/24 2230  vancomycin 750 mg in sodium chloride 0.9 % 250 mL IVPB-VTB        Ordering Provider: Sukumar Quiroga, PharmD    750 mg  333.3 mL/hr over 45 Minutes Intravenous Every 12 Hours 11/12/24 0900 11/16/24 0859    11/11/24 1943  cefTRIAXone (ROCEPHIN) 2,000 mg in sodium chloride 0.9 % 100 mL MBP        Ordering Provider: Yamilet Savage MD    2,000 mg  200 mL/hr over 30 Minutes Intravenous Every 24 Hours 11/12/24 0800 11/17/24 0759    11/11/24 1828  vancomycin IVPB 1750 mg in 0.9% Sodium Chloride (premix) 500 mL        Ordering Provider: Matthew Martinez MD    22 mg/kg × 79.4 kg  285.7 mL/hr over 105 Minutes Intravenous Once 11/11/24 2000      11/11/24 1943  Pharmacy to dose vancomycin        Ordering Provider: Yamilet Savage MD     Does not apply Continuous PRN 11/11/24 1932 11/16/24 1931    11/11/24 1828  cefTRIAXone (ROCEPHIN) 2,000 mg in sodium chloride 0.9 % 100 mL MBP        Ordering Provider: Matthwe Martinez MD    2,000 mg  200 mL/hr over 30 Minutes Intravenous Once 11/11/24 1910 11/11/24 2015            Allergies  Allergies as of 11/11/2024    (No Known Allergies)       Labs    Results from last 7 days   Lab Units 11/11/24  1450   BUN mg/dL 12   CREATININE mg/dL 1.02       Results from last 7 days   Lab Units 11/11/24  1450   WBC 10*3/mm3 14.45*       Evaluation of Dosing     Last Dose Received in the ED/Outside Facility: 1750mg  Is Patient on Dialysis or Renal Replacement: no    Ht - 162.6 cm (64\")  Wt - 77.4 kg (170 lb 11.2 oz)    Estimated Creatinine Clearance: 67 mL/min (by C-G formula based on SCr of 1.02 mg/dL).    Intake & " Output (last 3 days)       None            Microbiology and Radiology  Microbiology Results (last 10 days)       Procedure Component Value - Date/Time    Body Fluid Culture - Body Fluid, Ankle, Left [[id number]] Collected: 11/11/24 1807    Lab Status: Preliminary result Specimen: Body Fluid from Ankle, Left Updated: 11/11/24 1900     Gram Stain Many (4+) Red blood cells      Rare (1+) WBCs seen      No organisms seen            Reported Vancomycin Levels                         InsightRX AUC Calculation:    Current AUC:   0 mg/L*hr    Predicted Steady State AUC :   465 mg/L*hr    Assessment/Plan: The patient will be started on a bolus of 20mg/kg for a dose of 1750mg . Will initiate maintenance dose at 750 mg IV every 12 hours.  Plan for trough as patient approaches steady state, prior to the 4th dose.  Due to infection severity, will target an AUC of 400-600.      Pharmacy will continue to follow the patient’s culture results and clinical progress daily.    Sukumar Quiroga, LakhwinderD

## 2024-11-13 LAB
ALBUMIN SERPL-MCNC: 3.1 G/DL (ref 3.5–5.2)
ALBUMIN/GLOB SERPL: 1 G/DL
ALP SERPL-CCNC: 208 U/L (ref 39–117)
ALT SERPL W P-5'-P-CCNC: 19 U/L (ref 1–41)
ANION GAP SERPL CALCULATED.3IONS-SCNC: 13 MMOL/L (ref 5–15)
AST SERPL-CCNC: 41 U/L (ref 1–40)
BASOPHILS # BLD AUTO: 0.03 10*3/MM3 (ref 0–0.2)
BASOPHILS NFR BLD AUTO: 0.4 % (ref 0–1.5)
BILIRUB SERPL-MCNC: 0.7 MG/DL (ref 0–1.2)
BUN SERPL-MCNC: 12 MG/DL (ref 8–23)
BUN/CREAT SERPL: 14.8 (ref 7–25)
CALCIUM SPEC-SCNC: 8 MG/DL (ref 8.6–10.5)
CHLORIDE SERPL-SCNC: 103 MMOL/L (ref 98–107)
CO2 SERPL-SCNC: 21 MMOL/L (ref 22–29)
CREAT SERPL-MCNC: 0.81 MG/DL (ref 0.76–1.27)
CRP SERPL-MCNC: 22.25 MG/DL (ref 0–0.5)
DEPRECATED RDW RBC AUTO: 53.1 FL (ref 37–54)
EGFRCR SERPLBLD CKD-EPI 2021: 97.2 ML/MIN/1.73
EOSINOPHIL # BLD AUTO: 0.09 10*3/MM3 (ref 0–0.4)
EOSINOPHIL NFR BLD AUTO: 1.1 % (ref 0.3–6.2)
ERYTHROCYTE [DISTWIDTH] IN BLOOD BY AUTOMATED COUNT: 16.6 % (ref 12.3–15.4)
GLOBULIN UR ELPH-MCNC: 3.2 GM/DL
GLUCOSE SERPL-MCNC: 87 MG/DL (ref 65–99)
HCT VFR BLD AUTO: 30.9 % (ref 37.5–51)
HGB BLD-MCNC: 10.1 G/DL (ref 13–17.7)
IMM GRANULOCYTES # BLD AUTO: 0.06 10*3/MM3 (ref 0–0.05)
IMM GRANULOCYTES NFR BLD AUTO: 0.7 % (ref 0–0.5)
LYMPHOCYTES # BLD AUTO: 1.46 10*3/MM3 (ref 0.7–3.1)
LYMPHOCYTES NFR BLD AUTO: 17.9 % (ref 19.6–45.3)
MAGNESIUM SERPL-MCNC: 1.7 MG/DL (ref 1.6–2.4)
MCH RBC QN AUTO: 28.7 PG (ref 26.6–33)
MCHC RBC AUTO-ENTMCNC: 32.7 G/DL (ref 31.5–35.7)
MCV RBC AUTO: 87.8 FL (ref 79–97)
MONOCYTES # BLD AUTO: 0.86 10*3/MM3 (ref 0.1–0.9)
MONOCYTES NFR BLD AUTO: 10.6 % (ref 5–12)
NEUTROPHILS NFR BLD AUTO: 5.64 10*3/MM3 (ref 1.7–7)
NEUTROPHILS NFR BLD AUTO: 69.3 % (ref 42.7–76)
NRBC BLD AUTO-RTO: 0 /100 WBC (ref 0–0.2)
PLATELET # BLD AUTO: 112 10*3/MM3 (ref 140–450)
PMV BLD AUTO: 11.8 FL (ref 6–12)
POTASSIUM SERPL-SCNC: 3.4 MMOL/L (ref 3.5–5.2)
POTASSIUM SERPL-SCNC: 4.2 MMOL/L (ref 3.5–5.2)
PROCALCITONIN SERPL-MCNC: 0.29 NG/ML (ref 0–0.25)
PROT SERPL-MCNC: 6.3 G/DL (ref 6–8.5)
RBC # BLD AUTO: 3.52 10*6/MM3 (ref 4.14–5.8)
SODIUM SERPL-SCNC: 137 MMOL/L (ref 136–145)
VANCOMYCIN TROUGH SERPL-MCNC: 9.9 MCG/ML (ref 5–20)
WBC NRBC COR # BLD AUTO: 8.14 10*3/MM3 (ref 3.4–10.8)

## 2024-11-13 PROCEDURE — 80053 COMPREHEN METABOLIC PANEL: CPT | Performed by: FAMILY MEDICINE

## 2024-11-13 PROCEDURE — 25010000002 ENOXAPARIN PER 10 MG: Performed by: STUDENT IN AN ORGANIZED HEALTH CARE EDUCATION/TRAINING PROGRAM

## 2024-11-13 PROCEDURE — 83735 ASSAY OF MAGNESIUM: CPT | Performed by: FAMILY MEDICINE

## 2024-11-13 PROCEDURE — 97530 THERAPEUTIC ACTIVITIES: CPT

## 2024-11-13 PROCEDURE — 97162 PT EVAL MOD COMPLEX 30 MIN: CPT

## 2024-11-13 PROCEDURE — 25010000002 CEFTRIAXONE PER 250 MG: Performed by: STUDENT IN AN ORGANIZED HEALTH CARE EDUCATION/TRAINING PROGRAM

## 2024-11-13 PROCEDURE — 84132 ASSAY OF SERUM POTASSIUM: CPT | Performed by: FAMILY MEDICINE

## 2024-11-13 PROCEDURE — 80202 ASSAY OF VANCOMYCIN: CPT

## 2024-11-13 PROCEDURE — 85025 COMPLETE CBC W/AUTO DIFF WBC: CPT | Performed by: FAMILY MEDICINE

## 2024-11-13 PROCEDURE — 84145 PROCALCITONIN (PCT): CPT | Performed by: FAMILY MEDICINE

## 2024-11-13 PROCEDURE — 99232 SBSQ HOSP IP/OBS MODERATE 35: CPT | Performed by: FAMILY MEDICINE

## 2024-11-13 PROCEDURE — 86140 C-REACTIVE PROTEIN: CPT | Performed by: FAMILY MEDICINE

## 2024-11-13 RX ORDER — POTASSIUM CHLORIDE 1500 MG/1
40 TABLET, EXTENDED RELEASE ORAL EVERY 4 HOURS
Status: COMPLETED | OUTPATIENT
Start: 2024-11-13 | End: 2024-11-13

## 2024-11-13 RX ADMIN — MEMANTINE 10 MG: 10 TABLET ORAL at 20:33

## 2024-11-13 RX ADMIN — CHOLEYSTYRAMINE LIGHT 4 G: 4 POWDER, FOR SUSPENSION ORAL at 08:47

## 2024-11-13 RX ADMIN — OXYCODONE 5 MG: 5 TABLET ORAL at 05:52

## 2024-11-13 RX ADMIN — MEMANTINE 10 MG: 10 TABLET ORAL at 08:50

## 2024-11-13 RX ADMIN — PANTOPRAZOLE SODIUM 40 MG: 40 TABLET, DELAYED RELEASE ORAL at 05:52

## 2024-11-13 RX ADMIN — CHOLEYSTYRAMINE LIGHT 4 G: 4 POWDER, FOR SUSPENSION ORAL at 20:32

## 2024-11-13 RX ADMIN — Medication 250 MG: at 08:49

## 2024-11-13 RX ADMIN — LISINOPRIL 20 MG: 20 TABLET ORAL at 08:49

## 2024-11-13 RX ADMIN — ATORVASTATIN CALCIUM 80 MG: 40 TABLET, FILM COATED ORAL at 20:32

## 2024-11-13 RX ADMIN — METOPROLOL TARTRATE 25 MG: 25 TABLET, FILM COATED ORAL at 08:47

## 2024-11-13 RX ADMIN — ASPIRIN 81 MG: 81 TABLET, COATED ORAL at 08:50

## 2024-11-13 RX ADMIN — METOPROLOL TARTRATE 25 MG: 25 TABLET, FILM COATED ORAL at 20:32

## 2024-11-13 RX ADMIN — HYDROCHLOROTHIAZIDE 12.5 MG: 12.5 TABLET ORAL at 11:44

## 2024-11-13 RX ADMIN — OXYCODONE 5 MG: 5 TABLET ORAL at 17:35

## 2024-11-13 RX ADMIN — MIRTAZAPINE 30 MG: 15 TABLET, FILM COATED ORAL at 20:32

## 2024-11-13 RX ADMIN — SERTRALINE 200 MG: 100 TABLET, FILM COATED ORAL at 08:49

## 2024-11-13 RX ADMIN — OXYCODONE 5 MG: 5 TABLET ORAL at 11:44

## 2024-11-13 RX ADMIN — POTASSIUM CHLORIDE 40 MEQ: 1500 TABLET, EXTENDED RELEASE ORAL at 11:44

## 2024-11-13 RX ADMIN — POTASSIUM CHLORIDE 40 MEQ: 1500 TABLET, EXTENDED RELEASE ORAL at 15:26

## 2024-11-13 RX ADMIN — ENOXAPARIN SODIUM 40 MG: 100 INJECTION SUBCUTANEOUS at 08:50

## 2024-11-13 RX ADMIN — Medication 250 MG: at 20:33

## 2024-11-13 RX ADMIN — DONEPEZIL HYDROCHLORIDE 10 MG: 10 TABLET, FILM COATED ORAL at 20:32

## 2024-11-13 RX ADMIN — SODIUM CHLORIDE 2000 MG: 900 INJECTION INTRAVENOUS at 08:50

## 2024-11-13 RX ADMIN — Medication 10 ML: at 20:33

## 2024-11-13 NOTE — PROGRESS NOTES
"Pharmacy Consult-Vancomycin Dosing  Justice Kiser is a  64 y.o. male receiving vancomycin therapy.     Indication: SSTI  Consulting Provider: hospitalist   ID Consult:   Goal AUC: 400 - 600 mg/L*hr    Current Antimicrobial Therapy  Anti-Infectives (From admission, onward)      Ordered     Dose/Rate Route Frequency Start Stop    11/11/24 2230  vancomycin 750 mg in sodium chloride 0.9 % 250 mL IVPB-VTB        Ordering Provider: Sukumar Quiroga, PharmD    750 mg  333.3 mL/hr over 45 Minutes Intravenous Every 12 Hours 11/12/24 0900 11/16/24 0859    11/11/24 1943  cefTRIAXone (ROCEPHIN) 2,000 mg in sodium chloride 0.9 % 100 mL MBP        Ordering Provider: Yamilet Savage MD    2,000 mg  200 mL/hr over 30 Minutes Intravenous Every 24 Hours 11/12/24 0800 11/17/24 0759    11/11/24 1828  vancomycin IVPB 1750 mg in 0.9% Sodium Chloride (premix) 500 mL        Ordering Provider: Matthew Martinez MD    22 mg/kg × 79.4 kg  285.7 mL/hr over 105 Minutes Intravenous Once 11/11/24 2000 11/11/24 2326    11/11/24 1943  Pharmacy to dose vancomycin        Ordering Provider: Yamilet Savage MD     Not Applicable Continuous PRN 11/11/24 1932 11/16/24 1931    11/11/24 1828  cefTRIAXone (ROCEPHIN) 2,000 mg in sodium chloride 0.9 % 100 mL MBP        Ordering Provider: Matthew Martinez MD    2,000 mg  200 mL/hr over 30 Minutes Intravenous Once 11/11/24 1910 11/11/24 2015          Allergies  Allergies as of 11/11/2024    (No Known Allergies)     Labs    Results from last 7 days   Lab Units 11/13/24  0609 11/12/24  0552 11/11/24  1450   BUN mg/dL 12 15 12   CREATININE mg/dL 0.81 0.89 1.02     Results from last 7 days   Lab Units 11/13/24  0608 11/12/24  0552 11/11/24  1450   WBC 10*3/mm3 8.14 13.93* 14.45*     Evaluation of Dosing     Last Dose Received in the ED/Outside Facility: 1750mg  Is Patient on Dialysis or Renal Replacement: no    Ht - 162.6 cm (64\")  Wt - 77.4 kg (170 lb 11.2 oz)    Estimated " Creatinine Clearance: 84.4 mL/min (by C-G formula based on SCr of 0.81 mg/dL).    Intake & Output (last 3 days)       None          Microbiology and Radiology  Microbiology Results (last 10 days)       Procedure Component Value - Date/Time    MRSA Screen, PCR (Inpatient) - Swab, Nares [[id number]]  (Normal) Collected: 11/11/24 2345    Lab Status: Final result Specimen: Swab from Nares Updated: 11/12/24 0859     MRSA PCR Negative    Narrative:      The negative predictive value of this diagnostic test is high and should only be used to consider de-escalating anti-MRSA therapy. A positive result may indicate colonization with MRSA and must be correlated clinically.  MRSA Negative    Blood Culture - Blood, Arm, Right [[id number]]  (Normal) Collected: 11/11/24 1904    Lab Status: Preliminary result Specimen: Blood from Arm, Right Updated: 11/12/24 1931     Blood Culture No growth at 24 hours    Blood Culture - Blood, Arm, Left [[id number]]  (Normal) Collected: 11/11/24 1840    Lab Status: Preliminary result Specimen: Blood from Arm, Left Updated: 11/12/24 1915     Blood Culture No growth at 24 hours    Body Fluid Culture - Body Fluid, Ankle, Left [[id number]] Collected: 11/11/24 1807    Lab Status: Preliminary result Specimen: Body Fluid from Ankle, Left Updated: 11/13/24 0715     Body Fluid Culture No growth at 2 days     Gram Stain Many (4+) Red blood cells      Rare (1+) WBCs seen      No organisms seen          Reported Vancomycin Levels    Results from last 7 days   Lab Units 11/13/24  0609   VANCOMYCIN TR mcg/mL 9.90         InsightRX AUC Calculation:    Current AUC:   311 mg/L*hr    Predicted Steady State AUC on Current Dose: 306 mg/L*hr  _________________________________    Predicted Steady State AUC on New Dose:   506 mg/L*hr      Assessment/Plan:     1. Pharmacy to dose vancomycin for SSTI.  Goal  to 600 mg/L*hr      Noted patient history of MRSA  2. Patient received a loading dose of vancomycin 1750 mg IV  x1 on 11/11 at 2141. (~ 20 mg/kg)  3. Started on a maintenance dose of vancomycin 750 mg IV q12h (~ 9.7 mg/kg) associated with AUCss 306 mg/L*hr      Recommend to increase to vancomycin 1250 mg IV q12h (~ 16.1 mg/kg) for predicted AUCss 506 mg/L*hr.  4. Vancomycin random level ~ 7.5 hours after third dose was 9.9 mcg/mL  5. Patient is afebrile, MRSA PCR and blood cultures negative. PCT 0.29, SCr 0.81,  mL/24hrs, ,monitor.  6. Monitor renal function, cultures and sensitivities, and clinical status, and adjust regimen as necessary.  Pharmacy will continue to follow.      Monique Cespedes, PharmD  11/13/2024  08:10 EST

## 2024-11-13 NOTE — PROGRESS NOTES
[facility] Medicine Services  PROGRESS NOTE    Patient Name: Justice Kiser  : 1960  MRN: [id number]    Date of Admission: 2024  Primary Care Physician: Anshul Martinez MD    Subjective   Subjective     CC:  LLE pain, swelling     HPI:  Patient is a 63 yo M seen and examined again this AM, he reports still having pain and swelling LLE, continuing to follow at this time, ID has been consulted, no new acute complaints.       Objective   Objective     Vital Signs:   Temp:  [98 °F (36.7 °C)-99.1 °F (37.3 °C)] 98.2 °F (36.8 °C)  Heart Rate:  [58-70] 70  Resp:  [16-18] 18  BP: (130-150)/(70-80) 150/76     Physical Exam:  Constitutional: No acute distress, awake, alert, frail appearing, resting comfortably in bed.   HENT: NCAT, nares patent, mucous membranes moist  Respiratory: Decreased BS bilaterally, no rhonchi or wheezing, respiratory effort normal   Cardiovascular: RRR, no murmurs, rubs, or gallops  Gastrointestinal: Positive bowel sounds, soft, nontender, nondistended  Musculoskeletal/skin: edema of LLE, improving erythema but persistent swelling of foot involving exterior ankle and extending up calf, does not extend past marked border.   Psychiatric: Appropriate affect, cooperative  Neurologic: Oriented x 3, strength symmetric in all extremities, Cranial Nerves grossly intact to confrontation, speech clear      Results Reviewed:  LAB RESULTS:      Lab 24  0609 24  0608 24  0552 24  1841 24  1450   WBC  --  8.14 13.93*  --  14.45*   HEMOGLOBIN  --  10.1* 11.1*  --  12.0*   HEMATOCRIT  --  30.9* 34.0*  --  36.0*   PLATELETS  --  112* 148  --  145   NEUTROS ABS  --  5.64  --   --  10.58*   IMMATURE GRANS (ABS)  --  0.06*  --   --  0.10*   LYMPHS ABS  --  1.46  --   --  2.29   MONOS ABS  --  0.86  --   --  1.45*   EOS ABS  --  0.09  --   --  0.01   MCV  --  87.8 87.2  --  86.1   SED RATE  --   --   --   --  107*   CRP 22.25*  --   --   --  19.33*    PROCALCITONIN 0.29*  --   --   --  0.54*   LACTATE  --   --   --  1.1  --          Lab 11/13/24  0609 11/12/24  0552 11/11/24  1450   SODIUM 137 137 133*   POTASSIUM 3.4* 3.7 3.5   CHLORIDE 103 101 97*   CO2 21.0* 21.0* 23.0   ANION GAP 13.0 15.0 13.0   BUN 12 15 12   CREATININE 0.81 0.89 1.02   EGFR 97.2 94.5 81.1   GLUCOSE 87 94 119*   CALCIUM 8.0* 8.4* 8.9   MAGNESIUM 1.7  --   --          Lab 11/13/24  0609 11/11/24  1450   TOTAL PROTEIN 6.3 8.2   ALBUMIN 3.1* 3.8   GLOBULIN 3.2 4.4   ALT (SGPT) 19 26   AST (SGOT) 41* 44*   BILIRUBIN 0.7 0.8   ALK PHOS 208* 258*                     Brief Urine Lab Results       None            Microbiology Results Abnormal       None            MRI Ankle Left With & Without Contrast    Result Date: 11/12/2024  MRI ANKLE LEFT W WO CONTRAST Date of Exam: 11/11/2024 8:10 PM EST Indication: L ankle redness/swelling, eval for septic joint findings.  Comparison: None available. Technique:  Routine multiplanar/multisequence images of the left ankle were obtained before and after the uneventful administration of 15 mL Multihance. FINDINGS: Bone and joint: Anatomic alignment is maintained. No significant joint effusion is seen. Nonspecific subcortical cystic changes within the navicular bone. Otherwise, the visualized marrow signal is unremarkable. Calcaneal enthesophytes are noted. Ligaments: The anterior talofibular ligament, posterior talofibular ligament, calcaneofibular ligament, and deltoid ligament appear intact. Tendons: There is mild to moderate tendinopathy and partial-thickness longitudinal tearing within the peroneus longus tendon below the ankle. Peroneus brevis tendon is unremarkable. Medial ankle tendons, extensor tendons, and Achilles tendon appear unremarkable. Muscles and soft tissues: No muscle edema or atrophy is identified.  No soft tissue or cystic mass lesion is seen. Soft tissue edema/induration noted dorsally and laterally about the ankle. No suspicious  contrast enhancement is identified.     Impression: 1.Mild to moderate tendinopathy and partial-thickness longitudinal tearing within the peroneus longus tendon below the ankle. 2.Soft tissue edema/induration noted dorsally and laterally about the ankle. Cellulitis may be considered in the appropriate clinical setting. 3.Otherwise, no significant abnormality is identified. 4.No suspicious contrast enhancement is identified. Electronically Signed: Kamari Calabrese MD  11/12/2024 9:07 AM EST  Workstation ID: [id number]     Results for orders placed during the hospital encounter of 10/08/24    Adult Transthoracic Echo Complete W/ Cont if Necessary Per Protocol    Interpretation Summary    Left ventricular systolic function is normal. Estimated left ventricular EF = 60%    Moderate mitral valve regurgitation is present.    Estimated right ventricular systolic pressure from tricuspid regurgitation is normal (<35 mmHg).      Current medications:  Scheduled Meds:aspirin, 81 mg, Oral, Daily  atorvastatin, 80 mg, Oral, Nightly  cefTRIAXone, 2,000 mg, Intravenous, Q24H  cholestyramine light, 1 packet, Oral, Q12H  donepezil, 10 mg, Oral, Nightly  enoxaparin, 40 mg, Subcutaneous, Daily  lisinopril, 20 mg, Oral, Q24H   And  hydroCHLOROthiazide, 12.5 mg, Oral, Q24H  memantine, 10 mg, Oral, BID  metoprolol tartrate, 25 mg, Oral, BID  mirtazapine, 30 mg, Oral, Nightly  pantoprazole, 40 mg, Oral, Q AM  saccharomyces boulardii, 250 mg, Oral, BID  sertraline, 200 mg, Oral, Daily  sodium chloride, 10 mL, Intravenous, Q12H      Continuous Infusions:     PRN Meds:.  acetaminophen    Calcium Replacement - Follow Nurse / BPA Driven Protocol    HYDROmorphone    Magnesium Standard Dose Replacement - Follow Nurse / BPA Driven Protocol    ondansetron    oxyCODONE    Phosphorus Replacement - Follow Nurse / BPA Driven Protocol    Potassium Replacement - Follow Nurse / BPA Driven Protocol    sodium chloride    sodium chloride    Assessment & Plan    Assessment & Plan     Active Hospital Problems    Diagnosis  POA    **Left leg cellulitis [L03.116]  Yes      Resolved Hospital Problems   No resolved problems to display.        Brief Hospital Course to date:  Jutsice 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 male with CAD s/p PCI w/ DIS 2012, HTN, HLD, anxiety/depression, MCI who presents for LLE pain, redness and swelling for 2 days. Patient being followed now by orthopedics, patient transferred to my services on the AM of 11/12, patient resting comfortably in bed, reports pain still present but able to move foot better today. MRI has returned, no obvious osteo, cellulitis and mild to moderate tendinopathy/partial thickness longitudinal tearing of the peroneus longus tendon. Continue to follow at this time, continued IV antibiotics and will follow  Seen again on 11/13, patient without much improvement yet, plans for PT evaluation and ID as well for antibiotic evaluation      Left lower extremity cellulitis  Sepsis  - Patient meeting septic criteria with tachycardia, fevers at home, leukocytosis, source of infection  - Joint aspiration performed in the ED however unable to run cell count on fluid due to blood in the sample  -Joint fluid culture currently no growth   - Blood cultures pending  - Vanc now stopped, planned for continued rocephin   -- Reviewed orthopedic recommendations, will consult ID for evaluation as well  - MRI of the ankle as above, cellulitis, and tendinopathy/partial tearing peroneus longus tendon   - Continue to follow with orthopedics      CAD s/p PCI w/ DEI  Hypertension  Hyperlipidemia  - Continue metoprolol, atorvastatin, lisinopril/HCTZ     Anxiety/depression  - Continue home Zoloft and Remeron     MCI  - Continue home donepezil and memantine    Expected Discharge Location and Transportation: D  Expected Discharge   Expected Discharge Date: 11/15/2024; Expected Discharge Time:      VTE Prophylaxis:  Pharmacologic VTE prophylaxis orders are present.          AM-PAC 6 Clicks Score (PT): 18 (11/13/24 4439)    CODE STATUS:   Code Status and Medical Interventions: CPR (Attempt to Resuscitate); Full Support   Ordered at: 11/11/24 1943     Level Of Support Discussed With:    Patient     Code Status (Patient has no pulse and is not breathing):    CPR (Attempt to Resuscitate)     Medical Interventions (Patient has pulse or is breathing):    Full Support       MEHRDAD Vera, DO  11/13/24

## 2024-11-13 NOTE — THERAPY EVALUATION
Patient Name: Justice Kiser  : 1960    MRN: [id number]                              Today's Date: 2024       Admit Date: 2024    Visit Dx:     ICD-10-CM ICD-9-CM   1. Acute sepsis  A41.9 038.9     995.91   2. Cellulitis of left ankle  L03.116 682.6   3. History of MRSA infection  Z86.14 V12.04     Patient Active Problem List   Diagnosis    Essential hypertension    Coronary artery disease involving native coronary artery of native heart with angina pectoris    Hyperlipidemia LDL goal <70    Depression    Anxiety    Hyperglycemia    Paresthesia of right foot    Tremor    GERD (gastroesophageal reflux disease)    Arthritis    Pierson's esophagus without dysplasia    MCI (mild cognitive impairment)    Mitral valve insufficiency    Alcohol induced fatty liver    Alcohol abuse, in remission    Cerebral microvascular disease    Chronic bilateral low back pain with right-sided sciatica    Spinal stenosis, lumbar region, with neurogenic claudication    DDD (degenerative disc disease), lumbar    Psychophysiological insomnia    Left leg cellulitis     Past Medical History:   Diagnosis Date    Acute maxillary sinusitis     Arthritis     CHF (congestive heart failure)     Chronic pain disorder     Colon polyp     Depression     Difficulty walking     Elevated LFTs     GERD (gastroesophageal reflux disease)     History of colonic polyps     History of methicillin resistant Staphylococcus aureus     History of myocardial infarction     Hyperlipidemia     Hypertension     Left hand pain     Left shoulder pain     Low back pain     MRSA (methicillin resistant Staphylococcus aureus)     Myocardial infarction     Neck pain     Personal history of congestive heart failure     Right hip pain     Rotator cuff syndrome      Past Surgical History:   Procedure Laterality Date    BACK SURGERY      CAROTID STENT      CORONARY ANGIOPLASTY WITH STENT PLACEMENT  2012    Circumflex Xscience V 3.5 MM x 23 mm    CORONARY  STENT PLACEMENT      ENDOMETRIAL ABLATION      EPIDURAL BLOCK      GALLBLADDER SURGERY  2017    LUMBAR DISCECTOMY  1992    2 level - [facility]    LYMPH NODE BIOPSY      SHOULDER SURGERY  12/20/2018    Dr. Hoyt Rotator cuff repair      General Information       [facility] Name 11/13/24 8457          Physical Therapy Time and Intention    Document Type evaluation  -     Mode of Treatment physical therapy  -       Row Name 11/13/24 3557          General Information    Patient Profile Reviewed yes  -BA     Prior Level of Function independent:;all household mobility;community mobility;gait;transfer;bed mobility;ADL's;home management;driving  Reported he did not use an AD for ambulation; has rollator walker and cane available if needed.  No falls.  -     Existing Precautions/Restrictions fall;weight bearing;left;other (see comments)  tendinopathy/partial tearing peroneous longus tendon; WBAT LLE  -     Barriers to Rehab none identified  -[facility] Name 11/13/24 1335          Living Environment    People in Home child(leatha), adult  Lives with dtr.  -       Row Name 11/13/24 1331          Home Main Entrance    Number of Stairs, Main Entrance none  -       Row Name 11/13/24 1334          Stairs Within Home, Primary    Number of Stairs, Within Home, Primary none  -       Row Name 11/13/24 1332          Cognition    Orientation Status (Cognition) oriented x 3  -       Row Name 11/13/24 9686          Safety Issues/Impairments Affecting Functional Mobility    Safety Issues Affecting Function (Mobility) insight into deficits/self-awareness;awareness of need for assistance;safety precaution awareness;safety precautions follow-through/compliance  -     Impairments Affecting Function (Mobility) pain;balance;coordination;endurance/activity tolerance;motor control;range of motion (ROM);sensation/sensory awareness;strength  -               User Key  (r) = Recorded By, (t) = Taken By, (c) = Cosigned By       Initials Name Provider Type     Sumaya Lopez, PT Physical Therapist                   Mobility       Row Name 11/13/24 1340          Bed Mobility    Bed Mobility supine-sit;scooting/bridging  -     Scooting/Bridging [place] (Bed Mobility) standby assist;verbal cues  -     Supine-Sit [place] (Bed Mobility) standby assist;verbal cues  -     Assistive Device (Bed Mobility) bed rails;head of bed elevated  -     Comment, (Bed Mobility) VCs for sequencing and pacing.  Significant increased pain in LLE once transitioning to LLE being in gravity dependent position; reported throbbing pain.  Required extended time to adjust to new position.  -       Row Name 11/13/24 1340          Bed-Chair Transfer    Bed-Chair [place] (Transfers) contact guard;verbal cues  -     Assistive Device (Bed-Chair Transfers) walker, front-wheeled  -BA     Comment, (Bed-Chair Transfer) SPT from bed to chair toward R side with taking a few single leg hops on RLE.  VCs/TCs for technique, sequencing of steps, and walker management.  Preferred to maintain NWB LLE d/t increased pain.  -       Row Name 11/13/24 1340          Sit-Stand Transfer    Sit-Stand [place] (Transfers) contact guard;verbal cues;nonverbal cues (demo/gesture)  -     Assistive Device (Sit-Stand Transfers) walker, front-wheeled  -BA     Comment, (Sit-Stand Transfer) STS x 2 reps from EOB and chair.  VCs/TCs for optimal pre-positioning, sequencing, and hand placement.  Pt preferred to maintain NWB LLE d/t increased pain.  -       Row Name 11/13/24 1340          Gait/Stairs (Locomotion)    [place] Level (Gait) contact guard;1 person assist;1 person to manage equipment;verbal cues  -     Assistive Device (Gait) walker, front-wheeled  -BA     Distance in Feet (Gait) 10  -BA     Deviations/Abnormal Patterns (Gait) bilateral deviations;anca decreased;gait speed decreased;stride length decreased;left sided  deviations;antalgic;weight shifting decreased  -     Bilateral Gait Deviations forward flexed posture;heel strike decreased  -     Left Sided Gait Deviations weight shift ability decreased  -     Comment, (Gait/Stairs) Demo'd more of step-to gait pattern with L leg lead.  Antalgic gait with significant decreased stance time or toleration to weightbearing on LLE.  Attempting to and able to place very minimal weight through LLE, more through L toes.  Tends to keep LLE in more of inverted position with attempt at weightbearing more so on lateral aspect of L foot/toes.  VCs/TCs for sequencing of steps, to attempt to improve weightbearing through LLE, upright posture, and walker management.  Gait distance limited by pain and fatigue.  -       Row Name 11/13/24 1340          Mobility    Extremity Weight-bearing Status left lower extremity  -     Left Lower Extremity (Weight-bearing Status) weight-bearing as tolerated (WBAT)  -               User Key  (r) = Recorded By, (t) = Taken By, (c) = Cosigned By      Initials Name Provider Type     Sumaya Lopez, PT Physical Therapist                   Obj/Interventions       Row Name 11/13/24 1354          Range of Motion Comprehensive    General Range of Motion lower extremity range of motion deficits identified  -     Comment, General Range of Motion AROM B hip, B knee, and R ankle WFL.  Decreased AROM/AAROM L ankle DF to ~neutral.  -       Row Name 11/13/24 1358          Strength Comprehensive (MMT)    General Manual Muscle Testing (MMT) Assessment lower extremity strength deficits identified  -     Comment, General Manual Muscle Testing (MMT) Assessment Grossly L hip 4-/5, R hip 4/5, L knee 3+ to 4-/5, R knee 4+/5, L ankle 3+/5, R ankle 4+/5.  -       Row Name 11/13/24 1355          Motor Skills    Motor Skills coordination;functional endurance  -     Coordination gross motor deficit;left;lower extremity;moderate impairment  -     Functional  Endurance Decreased functional endurance.  Fatigues with activity.  -       Row Name 11/13/24 1354          Balance    Balance Assessment sitting static balance;sitting dynamic balance;standing static balance;standing dynamic balance  -     Static Sitting Balance standby assist  -     Dynamic Sitting Balance contact guard  -     Position, Sitting Balance unsupported;sitting edge of bed;sitting in chair  -BA     Static Standing Balance contact guard;verbal cues  -BA     Dynamic Standing Balance contact guard;verbal cues  -BA     Position/Device Used, Standing Balance supported;walker, front-wheeled  -     Comment, Balance Intermittent mild instability with ambulation activity with FWW; no overt LOB noted.  Rec use of FWW for mobility at this time for improved balance and support.  -       Row Name 11/13/24 6266          Sensory Assessment (Somatosensory)    Bilateral LE Sensory Assessment light touch awareness;impaired;other (see comments)  Reported numbness in R toes and more tingling and hypersensitivity in L toes and ankle.  -               User Key  (r) = Recorded By, (t) = Taken By, (c) = Cosigned By      Initials Name Provider Type    Sumaya August, PT Physical Therapist                   Goals/Plan       [facility] Name 11/13/24 1518          Bed Mobility Goal 1 (PT)    Activity/Assistive Device (Bed Mobility Goal 1, PT) sit to supine/supine to sit  -BA     [place] Level/Cues Needed (Bed Mobility Goal 1, PT) independent  -BA     Time Frame (Bed Mobility Goal 1, PT) short term goal (STG);5 days  -       Row Name 11/13/24 1518          Transfer Goal 1 (PT)    Activity/Assistive Device (Transfer Goal 1, PT) sit-to-stand/stand-to-sit;bed-to-chair/chair-to-bed;walker, rolling  -     [place] Level/Cues Needed (Transfer Goal 1, PT) independent  -BA     Time Frame (Transfer Goal 1, PT) long term goal (LTG);10 days  -[facility] Name 11/13/24 1518          Gait Training Goal 1 (PT)     Activity/Assistive Device (Gait Training Goal 1, PT) gait (walking locomotion);assistive device use;walker, rolling  -BA     [place] Level (Gait Training Goal 1, PT) supervision required  -BA     Distance (Gait Training Goal 1, PT) 150  -BA     Time Frame (Gait Training Goal 1, PT) long term goal (LTG);10 days  -       Row Name 11/13/24 1510          Therapy Assessment/Plan (PT)    Planned Therapy Interventions (PT) balance training;bed mobility training;gait training;home exercise program;motor coordination training;neuromuscular re-education;patient/family education;postural re-education;ROM (range of motion);strengthening;stretching;transfer training  -               User Key  (r) = Recorded By, (t) = Taken By, (c) = Cosigned By      Initials Name Provider Type    Sumaya August, PT Physical Therapist                   Clinical Impression       Row Name 11/13/24 1500          Pain    Pretreatment Pain Rating 6/10  -BA     Posttreatment Pain Rating 9/10  -BA     Pain Location ankle  -BA     Pain Side/Orientation left;anterior  -BA     Pain Management Interventions exercise or physical activity utilized;positioning techniques utilized;activity modification encouraged;nursing notified  -BA     Response to Pain Interventions activity participation with increased pain  -BA     Pre/Posttreatment Pain Comment Reported increased pain in L ankle in gravity dependent position and with participation in mobility and ambulation.  Pt reported he felt like he didn't need any pain meds at this time; RN notified and managing.  -BA       Row Name 11/13/24 1508          Plan of Care Review    Plan of Care Reviewed With patient  -BA     Outcome Evaluation PT initial Eval completed.  Pt presents with generalized weakness, L ankle pain, balance deficits, decreased functional endurance, and decreased indep with functional mobility compared to baseline.  Ambulated 10ft with CGAx1+1 and FWW.  Limited by L ankle pain.  Rec  use of FWW for mobility at this time for improved balance and support.  Pt will benefit from skilled IP PT to improve indep and safety with mobility and promote return to PLOF.  Rec HWA, HHPT, and DME of FWW upon d/c.  -       Row Name 11/13/24 1507          Therapy Assessment/Plan (PT)    Patient/Family Therapy Goals Statement (PT) to return home and to PLOF  -     Rehab Potential (PT) good  -BA     Criteria for Skilled Interventions Met (PT) yes;meets criteria;skilled treatment is necessary  -     Therapy Frequency (PT) daily  -BA     Predicted Duration of Therapy Intervention (PT) 10 days  -       Row Name 11/13/24 1507          Vital Signs    Pre Systolic BP Rehab 141  -BA     Pre Treatment Diastolic BP 70  -BA     Post Systolic BP Rehab 150  -BA     Post Treatment Diastolic BP 76  -BA     Pretreatment Heart Rate (beats/min) 66  -BA     Posttreatment Heart Rate (beats/min) 71  -BA     Pre SpO2 (%) 96  -BA     O2 Delivery Pre Treatment room air  -BA     O2 Delivery Intra Treatment room air  -BA     Post SpO2 (%) 95  -BA     O2 Delivery Post Treatment room air  -BA     Pre Patient Position Supine  -BA     Post Patient Position Sitting  -       Row Name 11/13/24 1507          Positioning and Restraints    Pre-Treatment Position in bed  -BA     Post Treatment Position chair  -BA     In Chair notified nsg;reclined;call light within reach;encouraged to call for assist;exit alarm on;waffle cushion;legs elevated;LLE elevated;L heel elevated  -               User Key  (r) = Recorded By, (t) = Taken By, (c) = Cosigned By      Initials Name Provider Type     Sumaya Lopez, PT Physical Therapist                   Outcome Measures       Row Name 11/13/24 1519 11/13/24 0800       How much help from another person do you currently need...    Turning from your back to your side while in flat bed without using bedrails? 4  -BA 4  -LN    Moving from lying on back to sitting on the side of a flat bed without  bedrails? 3  -BA 3  -LN    Moving to and from a bed to a chair (including a wheelchair)? 3  -BA 3  -LN    Standing up from a chair using your arms (e.g., wheelchair, bedside chair)? 3  -BA 2  -LN    Climbing 3-5 steps with a railing? 2  -BA 1  -LN    To walk in hospital room? 3  -BA 2  -LN    AM-PAC 6 Clicks Score (PT) 18  -BA 15  -LN    Highest Level of Mobility Goal 6 --> Walk 10 steps or more  -BA 4 --> Transfer to chair/commode  -LN      Row Name 11/13/24 1519          Functional Assessment    Outcome Measure Options AM-PAC 6 Clicks Basic Mobility (PT)  -BA               User Key  (r) = Recorded By, (t) = Taken By, (c) = Cosigned By      Initials Name Provider Type    LN Idalia Crystal, RN Registered Nurse    BA Sumaya Lopez, PT Physical Therapist                                 Physical Therapy Education       Title: PT OT SLP Therapies (In Progress)       Topic: Physical Therapy (In Progress)       Point: Mobility training (Done)       Learning Progress Summary            Patient Acceptance, E, VU,NR by BA at 11/13/2024 1519                      Point: Home exercise program (Not Started)       Learner Progress:  Not documented in this visit.              Point: Body mechanics (Done)       Learning Progress Summary            Patient Acceptance, E, VU,NR by BA at 11/13/2024 1519                      Point: Precautions (Done)       Learning Progress Summary            Patient Acceptance, E, VU,NR by BA at 11/13/2024 1519                                      User Key       Initials Effective Dates Name Provider Type Discipline    BA 09/21/21 -  Sumaya Lopez, HOMER Physical Therapist PT                  PT Recommendation and Plan  Planned Therapy Interventions (PT): balance training, bed mobility training, gait training, home exercise program, motor coordination training, neuromuscular re-education, patient/family education, postural re-education, ROM (range of motion), strengthening, stretching, transfer  training  Outcome Evaluation: PT initial Eval completed.  Pt presents with generalized weakness, L ankle pain, balance deficits, decreased functional endurance, and decreased indep with functional mobility compared to baseline.  Ambulated 10ft with CGAx1+1 and FWW.  Limited by L ankle pain.  Rec use of FWW for mobility at this time for improved balance and support.  Pt will benefit from skilled IP PT to improve indep and safety with mobility and promote return to PLOF.  Rec HWA, HHPT, and DME of FWW upon d/c.     Time Calculation:   PT Evaluation Complexity  History, PT Evaluation Complexity: 3 or more personal factors and/or comorbidities  Examination of Body Systems (PT Eval Complexity): total of 3 or more elements  Clinical Presentation (PT Evaluation Complexity): evolving  Clinical Decision Making (PT Evaluation Complexity): moderate complexity  Overall Complexity (PT Evaluation Complexity): moderate complexity     PT Charges       Row Name 11/13/24 1520             Time Calculation    Start Time 1051  -BA      PT Received On 11/13/24  -BA      PT Goal Re-Cert Due Date 11/23/24  -BA         Time Calculation- PT    Total Timed Code Minutes- PT 15 minute(s)  -BA         Timed Charges    49428 - PT Therapeutic Activity Minutes 15  -BA         Untimed Charges    PT Eval/Re-eval Minutes 57  -BA         Total Minutes    Timed Charges Total Minutes 15  -BA      Untimed Charges Total Minutes 57  -BA       Total Minutes 72  -BA                User Key  (r) = Recorded By, (t) = Taken By, (c) = Cosigned By      Initials Name Provider Type    BA Sumaya Lopez, PT Physical Therapist                  Therapy Charges for Today       Code Description Service Date Service Provider Modifiers Qty    37557799480 HC PT THERAPEUTIC ACT EA 15 MIN 11/13/2024 Sumaya Lopez, PT GP 1    86666945558 HC PT EVAL MOD COMPLEXITY 4 11/13/2024 Sumaya Lopez, PT GP 1    52027181131 HC PT THER SUPP EA 15 MIN 11/13/2024 Juan Manuel Guzman  Sumaya, PT GP 2            PT G-Codes  Outcome Measure Options: AM-PAC 6 Clicks Basic Mobility (PT)  AM-PAC 6 Clicks Score (PT): 18  PT Discharge Summary  Anticipated Discharge Disposition (PT): home with assist, home with home health    Sumaya Lopez, PT  11/13/2024

## 2024-11-13 NOTE — PLAN OF CARE
Goal Outcome Evaluation:  Plan of Care Reviewed With: patient           Outcome Evaluation: PT initial Eval completed.  Pt presents with generalized weakness, L ankle pain, balance deficits, decreased functional endurance, and decreased indep with functional mobility compared to baseline.  Ambulated 10ft with CGAx1+1 and FWW.  Limited by L ankle pain.  Rec use of FWW for mobility at this time for improved balance and support.  Pt will benefit from skilled IP PT to improve indep and safety with mobility and promote return to PLOF.  Rec HWA, HHPT, and DME of FWW upon d/c.    Anticipated Discharge Disposition (PT): home with assist, home with home health

## 2024-11-13 NOTE — CASE MANAGEMENT/SOCIAL WORK
Continued Stay Note   [place]     Patient Name: Justice Kiser  MRN: [id number]  Today's Date: 11/13/2024    Admit Date: 11/11/2024    Plan: update   Discharge Plan       Row Name 11/13/24 1152       Plan    Plan update    Patient/Family in Agreement with Plan yes    Plan Comments Per MDR, PT to see potential boot or shoe fitting.  Spoke with patient at bedside regarding discharge plan, currently up to chair and complains of pain.  No immediate discharge needs verbalized.  CM following.  Patient plan is to discharge home, may need HH with IV abx.    Final Discharge Disposition Code 06 - home with home health care                   Discharge Codes    No documentation.                 Expected Discharge Date and Time       Expected Discharge Date Expected Discharge Time    Nov 15, 2024               Deidra Nunes RN

## 2024-11-13 NOTE — CONSULTS
Justice Kiser  1960  [id number]    Date of Consult: 11/13/2024    Date of Admission: 11/11/2024      Requesting Provider: Kvng Vera DO  Evaluating Physician: Sebastian Quijano MD    CC:   Chief Complaint   Patient presents with    Leg Swelling       Reason for Consultation:Left LE edema and pain    History of present illness:    Patient is a 64 y.o.  Yr old male with history of coronary artery disease and prior hypertension hyperlipidemia history of trauma to the left ankle he reports hit it on the side of the bed with bruising had some pain swelling erythema began to worsen over the past week admitted with warmth to touch swelling with cellulitis causing fevers chills leukocytosis elevated inflammatory markers status post arthrocentesis left ankle with bloody fluid cultures negative improving with empiric antibiotics currently on vancomycin and ceftriaxone with infectious disease consultation requested.    Past Medical History:   Diagnosis Date    Acute maxillary sinusitis     Arthritis     CHF (congestive heart failure)     Chronic pain disorder     Colon polyp     Depression     Difficulty walking     Elevated LFTs     GERD (gastroesophageal reflux disease)     History of colonic polyps     History of methicillin resistant Staphylococcus aureus     History of myocardial infarction     Hyperlipidemia     Hypertension     Left hand pain     Left shoulder pain     Low back pain     MRSA (methicillin resistant Staphylococcus aureus)     Myocardial infarction     Neck pain     Personal history of congestive heart failure     Right hip pain     Rotator cuff syndrome        Past Surgical History:   Procedure Laterality Date    BACK SURGERY      CAROTID STENT      CORONARY ANGIOPLASTY WITH STENT PLACEMENT  01/04/2012    Circumflex Xscience V 3.5 MM x 23 mm    CORONARY STENT PLACEMENT      ENDOMETRIAL ABLATION      EPIDURAL BLOCK      GALLBLADDER SURGERY  2017    LUMBAR DISCECTOMY  1992    [facility]  [place]    LYMPH NODE BIOPSY      SHOULDER SURGERY  12/20/2018    Dr. Hoyt Rotator cuff repair       Pediatric History   Patient Parents    Not on file     Other Topics Concern    Not on file   Social History Narrative    Not on file       family history includes Alcohol abuse in his father, maternal aunt, mother, and paternal uncle; Dementia in his sister; Developmental Disability in his sister; Diabetes in his maternal grandmother; Heart attack in his mother; Heart disease in his brother and sister; Hyperlipidemia in his mother; Hypertension in his mother; Stroke in his sister and sister.    No Known Allergies    Medication:  Current Facility-Administered Medications   Medication Dose Route Frequency Provider Last Rate Last Admin    acetaminophen (TYLENOL) tablet 650 mg  650 mg Oral Q6H PRN Yamilet Savage MD        aspirin EC tablet 81 mg  81 mg Oral Daily Yamilet Savage MD   81 mg at 11/13/24 0850    atorvastatin (LIPITOR) tablet 80 mg  80 mg Oral Nightly Yamilet Savage MD   80 mg at 11/12/24 2144    Calcium Replacement - Follow Nurse / BPA Driven Protocol   Not Applicable PRN Yamilet Savage MD        cefTRIAXone (ROCEPHIN) 2,000 mg in sodium chloride 0.9 % 100 mL MBP  2,000 mg Intravenous Q24H Yamilet Savage  mL/hr at 11/13/24 0850 2,000 mg at 11/13/24 0850    cholestyramine light packet 4 g  1 packet Oral Q12H IRMA Vera DO   4 g at 11/13/24 0847    donepezil (ARICEPT) tablet 10 mg  10 mg Oral Nightly Yamilet Savage MD   10 mg at 11/12/24 2144    Enoxaparin Sodium (LOVENOX) syringe 40 mg  40 mg Subcutaneous Daily Yamilet Savage MD   40 mg at 11/13/24 0850    lisinopril (PRINIVIL,ZESTRIL) tablet 20 mg  20 mg Oral Q24H Yamilet Savage MD   20 mg at 11/13/24 0849    And    hydroCHLOROthiazide tablet 12.5 mg  12.5 mg Oral Q24H Yamilet Savage  MD   12.5 mg at 11/12/24 1425    HYDROmorphone (DILAUDID) injection 0.5 mg  0.5 mg Intravenous Q6H PRN Yamilet Savage MD   0.5 mg at 11/11/24 2212    Magnesium Standard Dose Replacement - Follow Nurse / BPA Driven Protocol   Not Applicable Yamilet Rizzo MD        memantine (NAMENDA) tablet 10 mg  10 mg Oral BID Yamilet Savage MD   10 mg at 11/13/24 0850    metoprolol tartrate (LOPRESSOR) tablet 25 mg  25 mg Oral BID Yamilet Savage MD   25 mg at 11/13/24 0847    mirtazapine (REMERON) tablet 30 mg  30 mg Oral Nightly Yamilet Savage MD   30 mg at 11/12/24 2144    ondansetron (ZOFRAN) injection 4 mg  4 mg Intravenous Q6H PRN Yamilet Savage MD        oxyCODONE (ROXICODONE) immediate release tablet 5 mg  5 mg Oral Q6H PRN Yamilet Savage MD   5 mg at 11/13/24 0552    pantoprazole (PROTONIX) EC tablet 40 mg  40 mg Oral Q AM Yamilet Savage MD   40 mg at 11/13/24 0552    Phosphorus Replacement - Follow Nurse / BPA Driven Protocol   Not Applicable PRYamilet Adler MD        potassium chloride (KLOR-CON M20) CR tablet 40 mEq  40 mEq Oral Q4H IRMA Vera DO        Potassium Replacement - Follow Nurse / BPA Driven Protocol   Not Applicable PRYamilet Adler MD        saccharomyces boulardii (FLORASTOR) capsule 250 mg  250 mg Oral BID IRMA Vera DO   250 mg at 11/13/24 0849    sertraline (ZOLOFT) tablet 200 mg  200 mg Oral Daily Yamilet Savage MD   200 mg at 11/13/24 0849    sodium chloride 0.9 % flush 10 mL  10 mL Intravenous Q12H Yamilet Savage MD   10 mL at 11/12/24 2146    sodium chloride 0.9 % flush 10 mL  10 mL Intravenous PRN Yamilet Savage MD        sodium chloride 0.9 % infusion 40 mL  40 mL Intravenous PRN Yamilet Savage MD      "      Antibiotics:  Vancomycin and ceftriaxone      Review of Systems  Full 12 point review of systems reviewed and negative except for pain of ankle fevers chills left lower extremity swelling warm to touch with erythema and bruising    Physical Exam:   Vital Signs   /70   Pulse 69   Temp 98.3 °F (36.8 °C) (Oral)   Resp 16   Ht 162.6 cm (64\")   Wt 77.4 kg (170 lb 11.2 oz)   SpO2 95%   BMI 29.30 kg/m²   Temp (24hrs), Av.5 °F (36.9 °C), Min:98 °F (36.7 °C), Max:99.1 °F (37.3 °C)      GENERAL: Awake and alert, in no acute distress.   HEENT: Normocephalic, atraumatic.  PERRL. EOMI. No conjunctival injection. No icterus. Oropharynx clear without evidence of thrush or exudate. No evidence of periodontal disease.    NECK: Supple without nuchal rigidity  LYMPH: No cervical, axillary or inguinal lymphadenopathy. No neck masses  HEART: RRR; No murmur, rubs, gallops.   LUNGS: Clear to auscultation bilaterally without wheezing, rales, rhonchi. Normal respiratory effort.  ABDOMEN: Soft, nontender, nondistended. Positive bowel sounds. No rebound or guarding.   EXT:  No cyanosis, clubbing or edema  : Normal appearing genitalia without Lemus catheter.  MSK: Decreased range of motion of left ankle  SKIN: Left ankle with warmth to touch erythema and bruising  NEURO: Oriented to PPT. No focal deficits.   PSYCHIATRIC: Normal insight and judgement. Cooperative with PE    Laboratory Data    Results from last 7 days   Lab Units 24  0608 24  0552 24  1450   WBC 10*3/mm3 8.14 13.93* 14.45*   HEMOGLOBIN g/dL 10.1* 11.1* 12.0*   HEMATOCRIT % 30.9* 34.0* 36.0*   PLATELETS 10*3/mm3 112* 148 145     Results from last 7 days   Lab Units 24  0609   SODIUM mmol/L 137   POTASSIUM mmol/L 3.4*   CHLORIDE mmol/L 103   CO2 mmol/L 21.0*   BUN mg/dL 12   CREATININE mg/dL 0.81   GLUCOSE mg/dL 87   CALCIUM mg/dL 8.0*     Results from last 7 days   Lab Units 24  0609   ALK PHOS U/L 208*   BILIRUBIN mg/dL 0.7 "   ALT (SGPT) U/L 19   AST (SGOT) U/L 41*     Results from last 7 days   Lab Units 11/11/24  1450   SED RATE mm/hr 107*     Results from last 7 days   Lab Units 11/13/24  0609   CRP mg/dL 22.25*       Estimated Creatinine Clearance: 84.4 mL/min (by C-G formula based on SCr of 0.81 mg/dL).      Microbiology:  neg    Radiology:  Imaging Results (Last 24 Hours)       ** No results found for the last 24 hours. **              PROBLEM LIST:   LLE acute post traumatic cellulitis  History of coronary disease  History of hypertension and hyperlipidemia  Fevers chills  Leukocytosis elevated inflammatory markers    ASSESSMENT:  Patient is a 64-year-old with history of trauma to the left ankle developed bruising soft tissue edema followed by increased pain swelling erythema of left ankle with acute cellulitis developing over the past few days warmth to touch erythema leukocytosis elevated inflammatory markers arthrocentesis did not reveal septic arthritis had received empiric vancomycin and ceftriaxone    Will DC vancomycin reduce the risk of acute kidney injury monitor on monotherapy with ceftriaxone but does have history of MRSA will monitor labs in a.m. and clinically if worsening add back MRSA coverage with daptomycin may be a candidate for outpatient infusion versus switch to oral antibiotic upon discharge if improving    PLAN:  D/c vancomycin  Continue ceftriaxone and monitor response.  Does have history of MRSA if worsens overnight or in the morning consider adding daptomycin  Monitor labs of CBC BMP and CRP and CK       Sebastian Quijano MD  11/13/2024

## 2024-11-14 LAB
ALBUMIN SERPL-MCNC: 3.2 G/DL (ref 3.5–5.2)
ALBUMIN/GLOB SERPL: 0.9 G/DL
ALP SERPL-CCNC: 225 U/L (ref 39–117)
ALT SERPL W P-5'-P-CCNC: 18 U/L (ref 1–41)
ANION GAP SERPL CALCULATED.3IONS-SCNC: 13 MMOL/L (ref 5–15)
AST SERPL-CCNC: 40 U/L (ref 1–40)
BASOPHILS # BLD AUTO: 0.04 10*3/MM3 (ref 0–0.2)
BASOPHILS NFR BLD AUTO: 0.4 % (ref 0–1.5)
BILIRUB SERPL-MCNC: 0.7 MG/DL (ref 0–1.2)
BUN SERPL-MCNC: 12 MG/DL (ref 8–23)
BUN/CREAT SERPL: 16.7 (ref 7–25)
CALCIUM SPEC-SCNC: 8.2 MG/DL (ref 8.6–10.5)
CHLORIDE SERPL-SCNC: 110 MMOL/L (ref 98–107)
CO2 SERPL-SCNC: 20 MMOL/L (ref 22–29)
CREAT SERPL-MCNC: 0.72 MG/DL (ref 0.76–1.27)
CRP SERPL-MCNC: 18.97 MG/DL (ref 0–0.5)
DEPRECATED RDW RBC AUTO: 53.9 FL (ref 37–54)
EGFRCR SERPLBLD CKD-EPI 2021: 100.8 ML/MIN/1.73
EOSINOPHIL # BLD AUTO: 0.14 10*3/MM3 (ref 0–0.4)
EOSINOPHIL NFR BLD AUTO: 1.5 % (ref 0.3–6.2)
ERYTHROCYTE [DISTWIDTH] IN BLOOD BY AUTOMATED COUNT: 16.5 % (ref 12.3–15.4)
GLOBULIN UR ELPH-MCNC: 3.4 GM/DL
GLUCOSE SERPL-MCNC: 95 MG/DL (ref 65–99)
HCT VFR BLD AUTO: 32.1 % (ref 37.5–51)
HGB BLD-MCNC: 10.3 G/DL (ref 13–17.7)
IMM GRANULOCYTES # BLD AUTO: 0.17 10*3/MM3 (ref 0–0.05)
IMM GRANULOCYTES NFR BLD AUTO: 1.8 % (ref 0–0.5)
LYMPHOCYTES # BLD AUTO: 2.05 10*3/MM3 (ref 0.7–3.1)
LYMPHOCYTES NFR BLD AUTO: 22.3 % (ref 19.6–45.3)
MAGNESIUM SERPL-MCNC: 1.9 MG/DL (ref 1.6–2.4)
MCH RBC QN AUTO: 28.3 PG (ref 26.6–33)
MCHC RBC AUTO-ENTMCNC: 32.1 G/DL (ref 31.5–35.7)
MCV RBC AUTO: 88.2 FL (ref 79–97)
MONOCYTES # BLD AUTO: 0.79 10*3/MM3 (ref 0.1–0.9)
MONOCYTES NFR BLD AUTO: 8.6 % (ref 5–12)
NEUTROPHILS NFR BLD AUTO: 6.01 10*3/MM3 (ref 1.7–7)
NEUTROPHILS NFR BLD AUTO: 65.4 % (ref 42.7–76)
NRBC BLD AUTO-RTO: 0 /100 WBC (ref 0–0.2)
PLATELET # BLD AUTO: 146 10*3/MM3 (ref 140–450)
PMV BLD AUTO: 11.8 FL (ref 6–12)
POTASSIUM SERPL-SCNC: 4.4 MMOL/L (ref 3.5–5.2)
PROT SERPL-MCNC: 6.6 G/DL (ref 6–8.5)
RBC # BLD AUTO: 3.64 10*6/MM3 (ref 4.14–5.8)
SODIUM SERPL-SCNC: 143 MMOL/L (ref 136–145)
WBC NRBC COR # BLD AUTO: 9.2 10*3/MM3 (ref 3.4–10.8)

## 2024-11-14 PROCEDURE — 25010000002 ENOXAPARIN PER 10 MG: Performed by: STUDENT IN AN ORGANIZED HEALTH CARE EDUCATION/TRAINING PROGRAM

## 2024-11-14 PROCEDURE — 25010000002 CEFTRIAXONE PER 250 MG: Performed by: STUDENT IN AN ORGANIZED HEALTH CARE EDUCATION/TRAINING PROGRAM

## 2024-11-14 PROCEDURE — 83735 ASSAY OF MAGNESIUM: CPT | Performed by: FAMILY MEDICINE

## 2024-11-14 PROCEDURE — 85025 COMPLETE CBC W/AUTO DIFF WBC: CPT | Performed by: FAMILY MEDICINE

## 2024-11-14 PROCEDURE — 80053 COMPREHEN METABOLIC PANEL: CPT | Performed by: FAMILY MEDICINE

## 2024-11-14 PROCEDURE — 99232 SBSQ HOSP IP/OBS MODERATE 35: CPT | Performed by: FAMILY MEDICINE

## 2024-11-14 PROCEDURE — 86140 C-REACTIVE PROTEIN: CPT | Performed by: INTERNAL MEDICINE

## 2024-11-14 RX ADMIN — MIRTAZAPINE 30 MG: 15 TABLET, FILM COATED ORAL at 21:10

## 2024-11-14 RX ADMIN — METOPROLOL TARTRATE 25 MG: 25 TABLET, FILM COATED ORAL at 21:10

## 2024-11-14 RX ADMIN — CHOLEYSTYRAMINE LIGHT 4 G: 4 POWDER, FOR SUSPENSION ORAL at 09:38

## 2024-11-14 RX ADMIN — Medication 250 MG: at 09:38

## 2024-11-14 RX ADMIN — Medication 250 MG: at 21:11

## 2024-11-14 RX ADMIN — MEMANTINE 10 MG: 10 TABLET ORAL at 09:37

## 2024-11-14 RX ADMIN — LISINOPRIL 20 MG: 20 TABLET ORAL at 09:38

## 2024-11-14 RX ADMIN — METOPROLOL TARTRATE 25 MG: 25 TABLET, FILM COATED ORAL at 09:38

## 2024-11-14 RX ADMIN — SERTRALINE 200 MG: 100 TABLET, FILM COATED ORAL at 09:37

## 2024-11-14 RX ADMIN — ATORVASTATIN CALCIUM 80 MG: 40 TABLET, FILM COATED ORAL at 21:08

## 2024-11-14 RX ADMIN — ENOXAPARIN SODIUM 40 MG: 100 INJECTION SUBCUTANEOUS at 09:38

## 2024-11-14 RX ADMIN — OXYCODONE 5 MG: 5 TABLET ORAL at 05:52

## 2024-11-14 RX ADMIN — OXYCODONE 5 MG: 5 TABLET ORAL at 21:10

## 2024-11-14 RX ADMIN — MEMANTINE 10 MG: 10 TABLET ORAL at 21:10

## 2024-11-14 RX ADMIN — Medication 10 ML: at 13:32

## 2024-11-14 RX ADMIN — Medication 10 ML: at 21:11

## 2024-11-14 RX ADMIN — PANTOPRAZOLE SODIUM 40 MG: 40 TABLET, DELAYED RELEASE ORAL at 05:53

## 2024-11-14 RX ADMIN — SODIUM CHLORIDE 2000 MG: 900 INJECTION INTRAVENOUS at 09:38

## 2024-11-14 RX ADMIN — HYDROCHLOROTHIAZIDE 12.5 MG: 12.5 TABLET ORAL at 09:38

## 2024-11-14 RX ADMIN — DONEPEZIL HYDROCHLORIDE 10 MG: 10 TABLET, FILM COATED ORAL at 21:10

## 2024-11-14 RX ADMIN — ASPIRIN 81 MG: 81 TABLET, COATED ORAL at 09:38

## 2024-11-14 NOTE — PROGRESS NOTES
"[facility] Progress Note    Date of Admission: 2024      Antibiotics: ceft      CC:   Chief Complaint   Patient presents with    Leg Swelling       S: No f/c/s. No n/v/d. Hemodynamically stable.  Improvement of left ankle pain and swelling white blood cell count down CRP down overall slowly improving better ambulation    O:  /68 (BP Location: Right arm, Patient Position: Lying)   Pulse 62   Temp 97.6 °F (36.4 °C) (Oral)   Resp 18   Ht 162.6 cm (64\")   Wt 77.4 kg (170 lb 11.2 oz)   SpO2 95%   BMI 29.30 kg/m²   Temp (24hrs), Av.4 °F (36.9 °C), Min:97.6 °F (36.4 °C), Max:99 °F (37.2 °C)      PE:     GENERAL: Awake and alert, in no acute distress.   HEENT: Normocephalic, atraumatic.  PERRL. EOMI. No conjunctival injection. No icterus. Oropharynx clear without evidence of thrush or exudate. No evidence of periodontal disease.    NECK: Supple without nuchal rigidity  LYMPH: No cervical, axillary or inguinal lymphadenopathy. No neck masses  HEART: RRR; No murmur, rubs, gallops.   LUNGS: Clear to auscultation bilaterally without wheezing, rales, rhonchi. Normal respiratory effort.  ABDOMEN: Soft, nontender, nondistended. Positive bowel sounds. No rebound or guarding.   EXT:  No cyanosis, clubbing or edema  : Normal appearing genitalia without Lemus catheter.  MSK: Decreased range of motion of left ankle  SKIN: Left ankle with warmth to touch erythema and bruising  NEURO: Oriented to PPT. No focal deficits.   PSYCHIATRIC: Normal insight and judgement. Cooperative with PE     Laboratory Data    Results from last 7 days   Lab Units 24  0550 24  0608 24  0552   WBC 10*3/mm3 9.20 8.14 13.93*   HEMOGLOBIN g/dL 10.3* 10.1* 11.1*   HEMATOCRIT % 32.1* 30.9* 34.0*   PLATELETS 10*3/mm3 146 112* 148     Results from last 7 days   Lab Units 24  0550   SODIUM mmol/L 143   POTASSIUM mmol/L 4.4   CHLORIDE mmol/L 110*   CO2 mmol/L 20.0*   BUN mg/dL 12   CREATININE mg/dL 0.72*   GLUCOSE mg/dL 95 "   CALCIUM mg/dL 8.2*     Results from last 7 days   Lab Units 11/14/24  0550   ALK PHOS U/L 225*   BILIRUBIN mg/dL 0.7   ALT (SGPT) U/L 18   AST (SGOT) U/L 40     Results from last 7 days   Lab Units 11/11/24  1450   SED RATE mm/hr 107*     Results from last 7 days   Lab Units 11/14/24  0550   CRP mg/dL 18.97*       Estimated Creatinine Clearance: 94.9 mL/min (A) (by C-G formula based on SCr of 0.72 mg/dL (L)).      Microbiology:  neg    Radiology:  Imaging Results (Last 24 Hours)       ** No results found for the last 24 hours. **            PROBLEM LIST:   LLE acute post traumatic cellulitis  History of coronary disease  History of hypertension and hyperlipidemia  Fevers chills  Leukocytosis elevated inflammatory markers     ASSESSMENT:  Patient is a 64-year-old with history of trauma to the left ankle developed bruising soft tissue edema followed by increased pain swelling erythema of left ankle with acute cellulitis developing over the past few days warmth to touch erythema leukocytosis elevated inflammatory markers arthrocentesis did not reveal septic arthritis had received empiric vancomycin and ceftriaxone     Will DC vancomycin reduce the risk of acute kidney injury monitor on monotherapy with ceftriaxone but does have history of MRSA will monitor labs in a.m. and clinically if worsening add back MRSA coverage with daptomycin may be a candidate for outpatient infusion versus switch to oral antibiotic upon discharge if improving     PLAN:    Continue ceftriaxone and monitor response.  Does have history of MRSA if worsens overnight or in the morning consider adding daptomycin  Monitor labs of CBC BMP and CRP and CK    Check if can do some ceft infusions at [facility] daily with PIV          Sebastian Quijano MD  11/14/2024

## 2024-11-14 NOTE — PROGRESS NOTES
[facility] Medicine Services  PROGRESS NOTE    Patient Name: Justice Kiser  : 1960  MRN: [id number]    Date of Admission: 2024  Primary Care Physician: Anshul Martinez MD    Subjective   Subjective     CC:  LLE pain, swelling     HPI:  Patient is a 64-year-old male seen and examined by me again this a.m., continued improvement in redness of left lower extremity but continued bruising and edema.  Reports still having pain but was able to ambulate some with therapy. No other new acute complaints.       Objective   Objective     Vital Signs:   Temp:  [98.1 °F (36.7 °C)-99 °F (37.2 °C)] 98.1 °F (36.7 °C)  Heart Rate:  [55-71] 66  Resp:  [16-18] 16  BP: (129-150)/(69-93) 146/76     Physical Exam:  Constitutional: No acute distress, awake, alert, frail appearing, resting in bed. Currently on RA  HENT: NCAT, nares patent, mucous membranes moist  Respiratory: Decreased BS bilaterally, no rhonchi or wheezing, respiratory effort normal   Cardiovascular: RRR, no murmurs, rubs, or gallops  Gastrointestinal: Positive bowel sounds, soft, nontender, nondistended  Musculoskeletal/skin: edema of LLE, improving and now only mild erythema but persistent swelling of foot involving exterior ankle and extending up calf, does not extend past marked border. Patient now with more significant bruising of the LLE   Psychiatric: Appropriate affect, cooperative  Neurologic: Oriented x 3, strength symmetric in all extremities, Cranial Nerves grossly intact to confrontation, speech clear      Results Reviewed:  LAB RESULTS:      Lab 24  0550 24  0609 24  0608 24  0552 24  1841 24  1450   WBC 9.20  --  8.14 13.93*  --  14.45*   HEMOGLOBIN 10.3*  --  10.1* 11.1*  --  12.0*   HEMATOCRIT 32.1*  --  30.9* 34.0*  --  36.0*   PLATELETS 146  --  112* 148  --  145   NEUTROS ABS 6.01  --  5.64  --   --  10.58*   IMMATURE GRANS (ABS) 0.17*  --  0.06*  --   --  0.10*   LYMPHS ABS  2.05  --  1.46  --   --  2.29   MONOS ABS 0.79  --  0.86  --   --  1.45*   EOS ABS 0.14  --  0.09  --   --  0.01   MCV 88.2  --  87.8 87.2  --  86.1   SED RATE  --   --   --   --   --  107*   CRP  --  22.25*  --   --   --  19.33*   PROCALCITONIN  --  0.29*  --   --   --  0.54*   LACTATE  --   --   --   --  1.1  --          Lab 11/14/24  0550 11/13/24  1845 11/13/24  0609 11/12/24  0552 11/11/24  1450   SODIUM 143  --  137 137 133*   POTASSIUM 4.4 4.2 3.4* 3.7 3.5   CHLORIDE 110*  --  103 101 97*   CO2 20.0*  --  21.0* 21.0* 23.0   ANION GAP 13.0  --  13.0 15.0 13.0   BUN 12  --  12 15 12   CREATININE 0.72*  --  0.81 0.89 1.02   EGFR 100.8  --  97.2 94.5 81.1   GLUCOSE 95  --  87 94 119*   CALCIUM 8.2*  --  8.0* 8.4* 8.9   MAGNESIUM 1.9  --  1.7  --   --          Lab 11/14/24  0550 11/13/24  0609 11/11/24  1450   TOTAL PROTEIN 6.6 6.3 8.2   ALBUMIN 3.2* 3.1* 3.8   GLOBULIN 3.4 3.2 4.4   ALT (SGPT) 18 19 26   AST (SGOT) 40 41* 44*   BILIRUBIN 0.7 0.7 0.8   ALK PHOS 225* 208* 258*                     Brief Urine Lab Results       None            Microbiology Results Abnormal       None            No radiology results from the last 24 hrs    Results for orders placed during the hospital encounter of 10/08/24    Adult Transthoracic Echo Complete W/ Cont if Necessary Per Protocol    Interpretation Summary    Left ventricular systolic function is normal. Estimated left ventricular EF = 60%    Moderate mitral valve regurgitation is present.    Estimated right ventricular systolic pressure from tricuspid regurgitation is normal (<35 mmHg).      Current medications:  Scheduled Meds:aspirin, 81 mg, Oral, Daily  atorvastatin, 80 mg, Oral, Nightly  cefTRIAXone, 2,000 mg, Intravenous, Q24H  cholestyramine light, 1 packet, Oral, Q12H  donepezil, 10 mg, Oral, Nightly  enoxaparin, 40 mg, Subcutaneous, Daily  lisinopril, 20 mg, Oral, Q24H   And  hydroCHLOROthiazide, 12.5 mg, Oral, Q24H  memantine, 10 mg, Oral, BID  metoprolol  tartrate, 25 mg, Oral, BID  mirtazapine, 30 mg, Oral, Nightly  pantoprazole, 40 mg, Oral, Q AM  saccharomyces boulardii, 250 mg, Oral, BID  sertraline, 200 mg, Oral, Daily  sodium chloride, 10 mL, Intravenous, Q12H      Continuous Infusions:     PRN Meds:.  acetaminophen    Calcium Replacement - Follow Nurse / BPA Driven Protocol    Magnesium Standard Dose Replacement - Follow Nurse / BPA Driven Protocol    ondansetron    oxyCODONE    Phosphorus Replacement - Follow Nurse / BPA Driven Protocol    Potassium Replacement - Follow Nurse / BPA Driven Protocol    sodium chloride    sodium chloride    Assessment & Plan   Assessment & Plan     Active Hospital Problems    Diagnosis  POA    **Left leg cellulitis [L03.116]  Yes      Resolved Hospital Problems   No resolved problems to display.        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 male with CAD s/p PCI w/ DIS 2012, HTN, HLD, anxiety/depression, MCI who presents for LLE pain, redness and swelling for 2 days. Patient being followed now by orthopedics, patient transferred to my services on the AM of 11/12, patient resting comfortably in bed, reports pain still present but able to move foot better today. MRI has returned, no obvious osteo, cellulitis and mild to moderate tendinopathy/partial thickness longitudinal tearing of the peroneus longus tendon. Continue to follow at this time, continued IV antibiotics and will follow  Seen again on 11/13, patient without much improvement yet, plans for PT evaluation and ID as well for antibiotic evaluation. No acute changes on 11/14, ID consulted and helping with management of antibiotics at this time, continued IV at this time and pain control. Continued therapy as tolerated, patient likely home when ready for discharge.      Left lower extremity cellulitis  Sepsis  - Patient meeting septic criteria with tachycardia, fevers at home, leukocytosis, source of infection  - Joint aspiration performed in the ED  however unable to run cell count on fluid due to blood in the sample  -Joint fluid culture currently no growth to date   - Blood cultures no growth as well   - Vanc now stopped, planned for continued rocephin   -- Reviewed orthopedic recommendations, will consult ID for evaluation as well  - MRI of the ankle as above, cellulitis, and tendinopathy/partial tearing peroneus longus tendon   - Continue to follow with orthopedics and ID, patient likely will be home possibly with home health when ready for discharge, will likely need walker      CAD s/p PCI w/ DEI  Hypertension  Hyperlipidemia  - Continue metoprolol, atorvastatin, lisinopril/HCTZ     Anxiety/depression  - Continue home Zoloft and Remeron     MCI  - Continue home donepezil and memantine    Expected Discharge Location and Transportation: home +/- home health  Expected Discharge   Expected Discharge Date: 11/15/2024; Expected Discharge Time:      VTE Prophylaxis:  Pharmacologic VTE prophylaxis orders are present.         AM-PAC 6 Clicks Score (PT): 18 (11/13/24 1938)    CODE STATUS:   Code Status and Medical Interventions: CPR (Attempt to Resuscitate); Full Support   Ordered at: 11/11/24 1943     Level Of Support Discussed With:    Patient     Code Status (Patient has no pulse and is not breathing):    CPR (Attempt to Resuscitate)     Medical Interventions (Patient has pulse or is breathing):    Full Support       MEHRDAD Vera,   11/14/24

## 2024-11-15 LAB
ALBUMIN SERPL-MCNC: 3.1 G/DL (ref 3.5–5.2)
ALBUMIN/GLOB SERPL: 0.9 G/DL
ALP SERPL-CCNC: 272 U/L (ref 39–117)
ALT SERPL W P-5'-P-CCNC: 19 U/L (ref 1–41)
ANION GAP SERPL CALCULATED.3IONS-SCNC: 9 MMOL/L (ref 5–15)
AST SERPL-CCNC: 40 U/L (ref 1–40)
BASOPHILS # BLD AUTO: 0.03 10*3/MM3 (ref 0–0.2)
BASOPHILS NFR BLD AUTO: 0.4 % (ref 0–1.5)
BILIRUB SERPL-MCNC: 0.7 MG/DL (ref 0–1.2)
BUN SERPL-MCNC: 11 MG/DL (ref 8–23)
BUN/CREAT SERPL: 14.1 (ref 7–25)
CALCIUM SPEC-SCNC: 8.1 MG/DL (ref 8.6–10.5)
CHLORIDE SERPL-SCNC: 103 MMOL/L (ref 98–107)
CK SERPL-CCNC: 63 U/L (ref 20–200)
CO2 SERPL-SCNC: 23 MMOL/L (ref 22–29)
CREAT SERPL-MCNC: 0.78 MG/DL (ref 0.76–1.27)
CRP SERPL-MCNC: 13.86 MG/DL (ref 0–0.5)
DEPRECATED RDW RBC AUTO: 51.5 FL (ref 37–54)
EGFRCR SERPLBLD CKD-EPI 2021: 98.4 ML/MIN/1.73
EOSINOPHIL # BLD AUTO: 0.16 10*3/MM3 (ref 0–0.4)
EOSINOPHIL NFR BLD AUTO: 2.1 % (ref 0.3–6.2)
ERYTHROCYTE [DISTWIDTH] IN BLOOD BY AUTOMATED COUNT: 16 % (ref 12.3–15.4)
GLOBULIN UR ELPH-MCNC: 3.5 GM/DL
GLUCOSE SERPL-MCNC: 99 MG/DL (ref 65–99)
HCT VFR BLD AUTO: 31 % (ref 37.5–51)
HGB BLD-MCNC: 10 G/DL (ref 13–17.7)
IMM GRANULOCYTES # BLD AUTO: 0.1 10*3/MM3 (ref 0–0.05)
IMM GRANULOCYTES NFR BLD AUTO: 1.3 % (ref 0–0.5)
LYMPHOCYTES # BLD AUTO: 1.75 10*3/MM3 (ref 0.7–3.1)
LYMPHOCYTES NFR BLD AUTO: 23.4 % (ref 19.6–45.3)
MAGNESIUM SERPL-MCNC: 1.8 MG/DL (ref 1.6–2.4)
MCH RBC QN AUTO: 28.2 PG (ref 26.6–33)
MCHC RBC AUTO-ENTMCNC: 32.3 G/DL (ref 31.5–35.7)
MCV RBC AUTO: 87.6 FL (ref 79–97)
MONOCYTES # BLD AUTO: 0.61 10*3/MM3 (ref 0.1–0.9)
MONOCYTES NFR BLD AUTO: 8.2 % (ref 5–12)
NEUTROPHILS NFR BLD AUTO: 4.82 10*3/MM3 (ref 1.7–7)
NEUTROPHILS NFR BLD AUTO: 64.6 % (ref 42.7–76)
NRBC BLD AUTO-RTO: 0 /100 WBC (ref 0–0.2)
PLATELET # BLD AUTO: 140 10*3/MM3 (ref 140–450)
PMV BLD AUTO: 11.3 FL (ref 6–12)
POTASSIUM SERPL-SCNC: 4.1 MMOL/L (ref 3.5–5.2)
PROT SERPL-MCNC: 6.6 G/DL (ref 6–8.5)
RBC # BLD AUTO: 3.54 10*6/MM3 (ref 4.14–5.8)
SODIUM SERPL-SCNC: 135 MMOL/L (ref 136–145)
WBC NRBC COR # BLD AUTO: 7.47 10*3/MM3 (ref 3.4–10.8)

## 2024-11-15 PROCEDURE — 25010000002 ENOXAPARIN PER 10 MG: Performed by: STUDENT IN AN ORGANIZED HEALTH CARE EDUCATION/TRAINING PROGRAM

## 2024-11-15 PROCEDURE — 99232 SBSQ HOSP IP/OBS MODERATE 35: CPT | Performed by: PEDIATRICS

## 2024-11-15 PROCEDURE — 80053 COMPREHEN METABOLIC PANEL: CPT | Performed by: FAMILY MEDICINE

## 2024-11-15 PROCEDURE — 86140 C-REACTIVE PROTEIN: CPT | Performed by: INTERNAL MEDICINE

## 2024-11-15 PROCEDURE — 82550 ASSAY OF CK (CPK): CPT | Performed by: INTERNAL MEDICINE

## 2024-11-15 PROCEDURE — 83735 ASSAY OF MAGNESIUM: CPT | Performed by: FAMILY MEDICINE

## 2024-11-15 PROCEDURE — 85025 COMPLETE CBC W/AUTO DIFF WBC: CPT | Performed by: FAMILY MEDICINE

## 2024-11-15 PROCEDURE — 25010000002 CEFTRIAXONE PER 250 MG: Performed by: STUDENT IN AN ORGANIZED HEALTH CARE EDUCATION/TRAINING PROGRAM

## 2024-11-15 RX ORDER — DOXYCYCLINE 100 MG/1
100 CAPSULE ORAL EVERY 12 HOURS SCHEDULED
Status: DISCONTINUED | OUTPATIENT
Start: 2024-11-15 | End: 2024-11-22 | Stop reason: HOSPADM

## 2024-11-15 RX ORDER — PETROLATUM,WHITE
OINTMENT IN PACKET (GRAM) TOPICAL AS NEEDED
Status: DISCONTINUED | OUTPATIENT
Start: 2024-11-15 | End: 2024-11-22 | Stop reason: HOSPADM

## 2024-11-15 RX ADMIN — HYDROCHLOROTHIAZIDE 12.5 MG: 12.5 TABLET ORAL at 08:50

## 2024-11-15 RX ADMIN — DOXYCYCLINE 100 MG: 100 CAPSULE ORAL at 21:33

## 2024-11-15 RX ADMIN — METOPROLOL TARTRATE 25 MG: 25 TABLET, FILM COATED ORAL at 21:33

## 2024-11-15 RX ADMIN — OXYCODONE 5 MG: 5 TABLET ORAL at 15:46

## 2024-11-15 RX ADMIN — MIRTAZAPINE 30 MG: 15 TABLET, FILM COATED ORAL at 21:33

## 2024-11-15 RX ADMIN — PANTOPRAZOLE SODIUM 40 MG: 40 TABLET, DELAYED RELEASE ORAL at 06:10

## 2024-11-15 RX ADMIN — ASPIRIN 81 MG: 81 TABLET, COATED ORAL at 08:50

## 2024-11-15 RX ADMIN — SODIUM CHLORIDE 2000 MG: 900 INJECTION INTRAVENOUS at 08:51

## 2024-11-15 RX ADMIN — DONEPEZIL HYDROCHLORIDE 10 MG: 10 TABLET, FILM COATED ORAL at 21:33

## 2024-11-15 RX ADMIN — MEMANTINE 10 MG: 10 TABLET ORAL at 21:33

## 2024-11-15 RX ADMIN — Medication 10 ML: at 08:49

## 2024-11-15 RX ADMIN — OXYCODONE 5 MG: 5 TABLET ORAL at 06:09

## 2024-11-15 RX ADMIN — MEMANTINE 10 MG: 10 TABLET ORAL at 08:50

## 2024-11-15 RX ADMIN — Medication 10 ML: at 21:35

## 2024-11-15 RX ADMIN — METOPROLOL TARTRATE 25 MG: 25 TABLET, FILM COATED ORAL at 08:50

## 2024-11-15 RX ADMIN — CHOLEYSTYRAMINE LIGHT 4 G: 4 POWDER, FOR SUSPENSION ORAL at 12:17

## 2024-11-15 RX ADMIN — Medication 250 MG: at 08:50

## 2024-11-15 RX ADMIN — ENOXAPARIN SODIUM 40 MG: 100 INJECTION SUBCUTANEOUS at 08:55

## 2024-11-15 RX ADMIN — Medication 250 MG: at 21:33

## 2024-11-15 RX ADMIN — LISINOPRIL 20 MG: 20 TABLET ORAL at 08:50

## 2024-11-15 RX ADMIN — OXYCODONE 5 MG: 5 TABLET ORAL at 21:33

## 2024-11-15 RX ADMIN — ATORVASTATIN CALCIUM 80 MG: 40 TABLET, FILM COATED ORAL at 21:33

## 2024-11-15 RX ADMIN — CHOLEYSTYRAMINE LIGHT 4 G: 4 POWDER, FOR SUSPENSION ORAL at 21:34

## 2024-11-15 RX ADMIN — SERTRALINE 200 MG: 100 TABLET, FILM COATED ORAL at 08:50

## 2024-11-15 RX ADMIN — DOXYCYCLINE 100 MG: 100 CAPSULE ORAL at 08:50

## 2024-11-15 NOTE — CASE MANAGEMENT/SOCIAL WORK
Continued Stay Note   [facility]     Patient Name: Justice Kiser  MRN: [id number]  Today's Date: 11/15/2024    Admit Date: 11/11/2024    Plan: update   Discharge Plan       Row Name 11/15/24 1440       Plan    Plan update    Patient/Family in Agreement with Plan yes    Plan Comments Per CM consult order to make arrangements for OP IV abx at [facility], called [facility] [phone] and spoke to Kristin who request orders and clinical documentation to be faxed to the attention of Kerline at [phone] and she will attempt to aquire an auth if needed.  Spoke with patient at bedside regarding discharge plan who advises no one is telling him anything and does not know when he is expected to leave.  No new discharge needs verbalized.  CM following.  Patient plan is to discharge home with OP infusion,    Final Discharge Disposition Code 01 - home or self-care                   Discharge Codes    No documentation.                 Expected Discharge Date and Time       Expected Discharge Date Expected Discharge Time    Nov 15, 2024               Deidra Nunes RN

## 2024-11-15 NOTE — PROGRESS NOTES
[facility] Medicine Services  PROGRESS NOTE    Patient Name: Justice Kiser  : 1960  MRN: [id number]    Date of Admission: 2024  Primary Care Physician: Anshul Martinez MD    Subjective   Subjective     CC:  LLE pain, swelling     HPI:  Pt doing ok.  L leg still working.  Hurts to walk on it.  Feels that the redness improving and some of the swelling is better.      Objective   Objective     Vital Signs:   Temp:  [97.4 °F (36.3 °C)-99.6 °F (37.6 °C)] 97.8 °F (36.6 °C)  Heart Rate:  [54-89] 69  Resp:  [16-18] 16  BP: (127-165)/(61-96) 145/73     Physical Exam:  Constitutional: No acute distress, awake, alert, frail appearing, resting in bed. Currently on RA  HENT: NCAT, nares patent, mucous membranes moist  Respiratory: Decreased BS bilaterally, no rhonchi or wheezing, respiratory effort normal   Cardiovascular: RRR, no murmurs, rubs, or gallops  Gastrointestinal: Positive bowel sounds, soft, nontender, nondistended  Musculoskeletal/skin: edema of LLE, erythema around entire leg with significant bruising/hematoma on the lateral side with warmth to the ankle and distal calf.  No prior pictures visible for comparison.  Psychiatric: Appropriate affect, cooperative  Neurologic: Oriented x 3, strength symmetric in all extremities, Cranial Nerves grossly intact to confrontation, speech clear      Results Reviewed:  LAB RESULTS:      Lab 11/15/24  0553 24  0550 24  0609 24  0608 24  0552 24  1841 24  1450   WBC 7.47 9.20  --  8.14 13.93*  --  14.45*   HEMOGLOBIN 10.0* 10.3*  --  10.1* 11.1*  --  12.0*   HEMATOCRIT 31.0* 32.1*  --  30.9* 34.0*  --  36.0*   PLATELETS 140 146  --  112* 148  --  145   NEUTROS ABS 4.82 6.01  --  5.64  --   --  10.58*   IMMATURE GRANS (ABS) 0.10* 0.17*  --  0.06*  --   --  0.10*   LYMPHS ABS 1.75 2.05  --  1.46  --   --  2.29   MONOS ABS 0.61 0.79  --  0.86  --   --  1.45*   EOS ABS 0.16 0.14  --  0.09  --   --  0.01    MCV 87.6 88.2  --  87.8 87.2  --  86.1   SED RATE  --   --   --   --   --   --  107*   CRP 13.86* 18.97* 22.25*  --   --   --  19.33*   PROCALCITONIN  --   --  0.29*  --   --   --  0.54*   LACTATE  --   --   --   --   --  1.1  --          Lab 11/15/24  0553 11/14/24  0550 11/13/24  1845 11/13/24  0609 11/12/24  0552 11/11/24  1450   SODIUM 135* 143  --  137 137 133*   POTASSIUM 4.1 4.4 4.2 3.4* 3.7 3.5   CHLORIDE 103 110*  --  103 101 97*   CO2 23.0 20.0*  --  21.0* 21.0* 23.0   ANION GAP 9.0 13.0  --  13.0 15.0 13.0   BUN 11 12  --  12 15 12   CREATININE 0.78 0.72*  --  0.81 0.89 1.02   EGFR 98.4 100.8  --  97.2 94.5 81.1   GLUCOSE 99 95  --  87 94 119*   CALCIUM 8.1* 8.2*  --  8.0* 8.4* 8.9   MAGNESIUM 1.8 1.9  --  1.7  --   --          Lab 11/15/24  0553 11/14/24  0550 11/13/24  0609 11/11/24  1450   TOTAL PROTEIN 6.6 6.6 6.3 8.2   ALBUMIN 3.1* 3.2* 3.1* 3.8   GLOBULIN 3.5 3.4 3.2 4.4   ALT (SGPT) 19 18 19 26   AST (SGOT) 40 40 41* 44*   BILIRUBIN 0.7 0.7 0.7 0.8   ALK PHOS 272* 225* 208* 258*                     Brief Urine Lab Results       None            Microbiology Results Abnormal       None            No radiology results from the last 24 hrs    Results for orders placed during the hospital encounter of 10/08/24    Adult Transthoracic Echo Complete W/ Cont if Necessary Per Protocol    Interpretation Summary    Left ventricular systolic function is normal. Estimated left ventricular EF = 60%    Moderate mitral valve regurgitation is present.    Estimated right ventricular systolic pressure from tricuspid regurgitation is normal (<35 mmHg).      Current medications:  Scheduled Meds:aspirin, 81 mg, Oral, Daily  atorvastatin, 80 mg, Oral, Nightly  [START ON 11/16/2024] cefTRIAXone, 2,000 mg, Intravenous, Q24H  cholestyramine light, 1 packet, Oral, Q12H  donepezil, 10 mg, Oral, Nightly  doxycycline, 100 mg, Oral, Q12H  enoxaparin, 40 mg, Subcutaneous, Daily  lisinopril, 20 mg, Oral, Q24H    And  hydroCHLOROthiazide, 12.5 mg, Oral, Q24H  memantine, 10 mg, Oral, BID  metoprolol tartrate, 25 mg, Oral, BID  mirtazapine, 30 mg, Oral, Nightly  pantoprazole, 40 mg, Oral, Q AM  saccharomyces boulardii, 250 mg, Oral, BID  sertraline, 200 mg, Oral, Daily  sodium chloride, 10 mL, Intravenous, Q12H      Continuous Infusions:     PRN Meds:.  acetaminophen    Calcium Replacement - Follow Nurse / BPA Driven Protocol    Magnesium Standard Dose Replacement - Follow Nurse / BPA Driven Protocol    ondansetron    oxyCODONE    Phosphorus Replacement - Follow Nurse / BPA Driven Protocol    Potassium Replacement - Follow Nurse / BPA Driven Protocol    sodium chloride    sodium chloride    White Petrolatum    Assessment & Plan   Assessment & Plan     Active Hospital Problems    Diagnosis  POA    **Left leg cellulitis [L03.116]  Yes      Resolved Hospital Problems   No resolved problems to display.        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 male with CAD s/p PCI w/ DIS 2012, HTN, HLD, anxiety/depression, MCI who presents for LLE pain, redness and swelling.  MRI no obvious osteo, cellulitis and mild to moderate tendinopathy/partial thickness longitudinal tearing of the peroneus longus tendon. Continue to follow at this time, continued IV antibiotics and will follow        Left lower extremity cellulitis  Sepsis  - Patient meeting septic criteria with tachycardia, fevers at home, leukocytosis, source of infection  -Joint fluid culture currently no growth to date   - Blood cultures no growth as well   - MRI of the ankle as above, cellulitis, and tendinopathy/partial tearing peroneus longus tendon   - ID following.  Cont rocephin.  Started doxy today     CAD s/p PCI w/ DEI  Hypertension  Hyperlipidemia  - Continue metoprolol, atorvastatin, lisinopril/HCTZ     Anxiety/depression  - Continue home Zoloft and Remeron     MCI  - Continue home donepezil and memantine    Expected Discharge Location and  Transportation: home +/- home health  Expected Discharge   Expected Discharge Date: 11/16/2024; Expected Discharge Time:      VTE Prophylaxis:  Pharmacologic VTE prophylaxis orders are present.         AM-PAC 6 Clicks Score (PT): 19 (11/15/24 0800)    CODE STATUS:   Code Status and Medical Interventions: CPR (Attempt to Resuscitate); Full Support   Ordered at: 11/11/24 1943     Level Of Support Discussed With:    Patient     Code Status (Patient has no pulse and is not breathing):    CPR (Attempt to Resuscitate)     Medical Interventions (Patient has pulse or is breathing):    Full Support       Christine Diehl MD  11/15/24

## 2024-11-15 NOTE — PROGRESS NOTES
"[facility] Progress Note    Date of Admission: 2024      Antibiotics: ceft      CC:   Chief Complaint   Patient presents with    Leg Swelling       S: Pt with no fevers and decreased left ankle pain still with swelling but down.  white blood cell count down CRP down overall slowly improving better ambulation    O:  /70 (BP Location: Right arm, Patient Position: Lying)   Pulse 61   Temp 98.3 °F (36.8 °C) (Oral)   Resp 18   Ht 162.6 cm (64\")   Wt 77.4 kg (170 lb 11.2 oz)   SpO2 94%   BMI 29.30 kg/m²   Temp (24hrs), Av.3 °F (36.8 °C), Min:97.4 °F (36.3 °C), Max:99.6 °F (37.6 °C)      PE:     GENERAL: Awake and alert, in no acute distress.   HEENT: Normocephalic, atraumatic.  PERRL. EOMI. No conjunctival injection. No icterus. Oropharynx clear without evidence of thrush or exudate. No evidence of periodontal disease.    NECK: Supple without nuchal rigidity  LYMPH: No cervical, axillary or inguinal lymphadenopathy. No neck masses  HEART: RRR; No murmur, rubs, gallops.   LUNGS: Clear to auscultation bilaterally without wheezing, rales, rhonchi. Normal respiratory effort.  ABDOMEN: Soft, nontender, nondistended. Positive bowel sounds. No rebound or guarding.   EXT:  No cyanosis, clubbing or edema  : Normal appearing genitalia without Lemus catheter.  MSK: Decreased range of motion of left ankle  SKIN: Left ankle with warmth to touch erythema and bruising  NEURO: Oriented to PPT. No focal deficits.   PSYCHIATRIC: Normal insight and judgement. Cooperative with PE     Laboratory Data    Results from last 7 days   Lab Units 11/15/24  0553 24  0550 24  0608   WBC 10*3/mm3 7.47 9.20 8.14   HEMOGLOBIN g/dL 10.0* 10.3* 10.1*   HEMATOCRIT % 31.0* 32.1* 30.9*   PLATELETS 10*3/mm3 140 146 112*     Results from last 7 days   Lab Units 11/15/24  0553   SODIUM mmol/L 135*   POTASSIUM mmol/L 4.1   CHLORIDE mmol/L 103   CO2 mmol/L 23.0   BUN mg/dL 11   CREATININE mg/dL 0.78   GLUCOSE mg/dL 99   CALCIUM mg/dL " 8.1*     Results from last 7 days   Lab Units 11/15/24  0553   ALK PHOS U/L 272*   BILIRUBIN mg/dL 0.7   ALT (SGPT) U/L 19   AST (SGOT) U/L 40     Results from last 7 days   Lab Units 11/11/24  1450   SED RATE mm/hr 107*     Results from last 7 days   Lab Units 11/15/24  0553   CRP mg/dL 13.86*       Estimated Creatinine Clearance: 87.6 mL/min (by C-G formula based on SCr of 0.78 mg/dL).      Microbiology:  neg    Radiology:  Imaging Results (Last 24 Hours)       ** No results found for the last 24 hours. **            PROBLEM LIST:   LLE acute post traumatic cellulitis  History of coronary disease  History of hypertension and hyperlipidemia  Fevers chills  Leukocytosis elevated inflammatory markers     ASSESSMENT:  Patient is a 64-year-old with history of trauma to the left ankle developed bruising soft tissue edema followed by increased pain swelling erythema of left ankle with acute cellulitis developing over the past few days warmth to touch erythema leukocytosis elevated inflammatory markers arthrocentesis did not reveal septic arthritis had received empiric vancomycin and ceftriaxone     Will DC vancomycin reduce the risk of acute kidney injury monitor on monotherapy with ceftriaxone but does have history of MRSA will monitor labs in a.m. and clinically if worsening add back MRSA coverage with daptomycin may be a candidate for outpatient infusion versus switch to oral antibiotic upon discharge if improving     PLAN:    Continue ceftriaxone and monitor response    Add doxy    Check if can do some ceft infusions at [facility] daily with PIV [facility] and Hospitalist that pt continue iv ceftriaxone here through weekend and monitor response and labs then Ill be back on Monday to help with d/c plans and f/u.          Sebastian Quijano MD  11/15/2024

## 2024-11-15 NOTE — CASE MANAGEMENT/SOCIAL WORK
Continued Stay Note  [facility]     Patient Name: Justice Kiser  MRN: [id number]  Today's Date: 11/15/2024    Admit Date: 11/11/2024    Plan: update   Discharge Plan       Row Name 11/15/24 1538       Plan    Plan update    Patient/Family in Agreement with Plan yes    Plan Comments Called Kerline with [facility] infusion who has received the orders and will check to see if it will require an auth, will call back to advise.  CM following.    Final Discharge Disposition Code 01 - home or self-care      Row Name 11/15/24 1440       Plan    Plan update    Patient/Family in Agreement with Plan yes    Plan Comments Per CM consult order to make arrangements for OP IV abx at [facility], called [facility] [phone] and spoke to Kristin who request orders and clinical documentation to be faxed to the attention of Kerline at [phone] and she will attempt to aquire an auth if needed.  Spoke with patient at bedside regarding discharge plan who advises no one is telling him anything and does not know when he is expected to leave.  No new discharge needs verbalized.  CM following.  Patient plan is to discharge home with OP infusion,    Final Discharge Disposition Code 01 - home or self-care                   Discharge Codes    No documentation.                 Expected Discharge Date and Time       Expected Discharge Date Expected Discharge Time    Nov 16, 2024               Deidra Nunes RN

## 2024-11-16 LAB
BACTERIA FLD CULT: NORMAL
BACTERIA SPEC AEROBE CULT: NORMAL
BACTERIA SPEC AEROBE CULT: NORMAL
GRAM STN SPEC: NORMAL

## 2024-11-16 PROCEDURE — 97110 THERAPEUTIC EXERCISES: CPT

## 2024-11-16 PROCEDURE — 97116 GAIT TRAINING THERAPY: CPT

## 2024-11-16 PROCEDURE — 99232 SBSQ HOSP IP/OBS MODERATE 35: CPT | Performed by: NURSE PRACTITIONER

## 2024-11-16 PROCEDURE — 25010000002 ENOXAPARIN PER 10 MG: Performed by: STUDENT IN AN ORGANIZED HEALTH CARE EDUCATION/TRAINING PROGRAM

## 2024-11-16 PROCEDURE — 25010000002 CEFTRIAXONE PER 250 MG: Performed by: INTERNAL MEDICINE

## 2024-11-16 RX ADMIN — Medication 250 MG: at 21:12

## 2024-11-16 RX ADMIN — OXYCODONE 5 MG: 5 TABLET ORAL at 06:09

## 2024-11-16 RX ADMIN — MIRTAZAPINE 30 MG: 15 TABLET, FILM COATED ORAL at 21:12

## 2024-11-16 RX ADMIN — MEMANTINE 10 MG: 10 TABLET ORAL at 10:30

## 2024-11-16 RX ADMIN — SODIUM CHLORIDE 2000 MG: 900 INJECTION INTRAVENOUS at 10:28

## 2024-11-16 RX ADMIN — AVOBENZONE, HOMOSALATE, OCTISALATE, OCTOCRYLENE, AND OXYBENZONE 1 PACKET: 29.4; 147; 49; 25.4; 58.8 LOTION TOPICAL at 12:28

## 2024-11-16 RX ADMIN — METOPROLOL TARTRATE 25 MG: 25 TABLET, FILM COATED ORAL at 10:29

## 2024-11-16 RX ADMIN — ATORVASTATIN CALCIUM 80 MG: 40 TABLET, FILM COATED ORAL at 21:12

## 2024-11-16 RX ADMIN — Medication 250 MG: at 10:30

## 2024-11-16 RX ADMIN — ASPIRIN 81 MG: 81 TABLET, COATED ORAL at 10:29

## 2024-11-16 RX ADMIN — DOXYCYCLINE 100 MG: 100 CAPSULE ORAL at 21:12

## 2024-11-16 RX ADMIN — CHOLEYSTYRAMINE LIGHT 4 G: 4 POWDER, FOR SUSPENSION ORAL at 10:28

## 2024-11-16 RX ADMIN — DOXYCYCLINE 100 MG: 100 CAPSULE ORAL at 10:29

## 2024-11-16 RX ADMIN — HYDROCHLOROTHIAZIDE 12.5 MG: 12.5 TABLET ORAL at 10:30

## 2024-11-16 RX ADMIN — OXYCODONE 5 MG: 5 TABLET ORAL at 18:11

## 2024-11-16 RX ADMIN — METOPROLOL TARTRATE 25 MG: 25 TABLET, FILM COATED ORAL at 21:12

## 2024-11-16 RX ADMIN — DONEPEZIL HYDROCHLORIDE 10 MG: 10 TABLET, FILM COATED ORAL at 21:12

## 2024-11-16 RX ADMIN — PANTOPRAZOLE SODIUM 40 MG: 40 TABLET, DELAYED RELEASE ORAL at 06:09

## 2024-11-16 RX ADMIN — Medication 10 ML: at 21:13

## 2024-11-16 RX ADMIN — Medication 10 ML: at 10:30

## 2024-11-16 RX ADMIN — SERTRALINE 200 MG: 100 TABLET, FILM COATED ORAL at 10:30

## 2024-11-16 RX ADMIN — LISINOPRIL 20 MG: 20 TABLET ORAL at 10:30

## 2024-11-16 RX ADMIN — CHOLEYSTYRAMINE LIGHT 4 G: 4 POWDER, FOR SUSPENSION ORAL at 21:12

## 2024-11-16 RX ADMIN — MEMANTINE 10 MG: 10 TABLET ORAL at 21:12

## 2024-11-16 RX ADMIN — ENOXAPARIN SODIUM 40 MG: 100 INJECTION SUBCUTANEOUS at 10:28

## 2024-11-16 NOTE — PROGRESS NOTES
[facility] Medicine Services  PROGRESS NOTE    Patient Name: Justice Kiser  : 1960  MRN: [id number]    Date of Admission: 2024  Primary Care Physician: Anshul Martinez MD    Subjective   Subjective     CC:  Follow-up LLE pain, swelling    HPI:  Patient seen resting in bed in no apparent distress.  No acute events overnight per nursing. Left lower extremity pain has been uncontrolled at times. He endorses loose stools. Discussed starting daily fiber supplement. Probiotic already in place.       Objective   Objective     Vital Signs:   Temp:  [96.9 °F (36.1 °C)-98.5 °F (36.9 °C)] 96.9 °F (36.1 °C)  Heart Rate:  [52-79] 63  Resp:  [18] 18  BP: (105-155)/(61-78) 111/78     Physical Exam:  Constitutional: No acute distress, awake, alert, chronically ill-appearing  HENT: NCAT, mucous membranes moist  Respiratory: Clear to auscultation bilaterally, respiratory effort normal on room air  Cardiovascular: RRR, no murmurs, cap refill brisk   Gastrointestinal: Positive bowel sounds, soft, nontender, nondistended  Musculoskeletal: LLE edema with circumferential erythema from ankle to upper calf  Psychiatric: Appropriate affect, cooperative  Neurologic: Oriented x 3, moves all extremities, speech clear  Skin: warm, dry, no visible rash     Results Reviewed:  LAB RESULTS:      Lab 11/15/24  0553 24  0550 24  0609 24  0608 24  0552 24  1841 24  1450   WBC 7.47 9.20  --  8.14 13.93*  --  14.45*   HEMOGLOBIN 10.0* 10.3*  --  10.1* 11.1*  --  12.0*   HEMATOCRIT 31.0* 32.1*  --  30.9* 34.0*  --  36.0*   PLATELETS 140 146  --  112* 148  --  145   NEUTROS ABS 4.82 6.01  --  5.64  --   --  10.58*   IMMATURE GRANS (ABS) 0.10* 0.17*  --  0.06*  --   --  0.10*   LYMPHS ABS 1.75 2.05  --  1.46  --   --  2.29   MONOS ABS 0.61 0.79  --  0.86  --   --  1.45*   EOS ABS 0.16 0.14  --  0.09  --   --  0.01   MCV 87.6 88.2  --  87.8 87.2  --  86.1   SED RATE  --   --   --    --   --   --  107*   CRP 13.86* 18.97* 22.25*  --   --   --  19.33*   PROCALCITONIN  --   --  0.29*  --   --   --  0.54*   LACTATE  --   --   --   --   --  1.1  --          Lab 11/15/24  0553 11/14/24  0550 11/13/24  1845 11/13/24  0609 11/12/24  0552 11/11/24  1450   SODIUM 135* 143  --  137 137 133*   POTASSIUM 4.1 4.4 4.2 3.4* 3.7 3.5   CHLORIDE 103 110*  --  103 101 97*   CO2 23.0 20.0*  --  21.0* 21.0* 23.0   ANION GAP 9.0 13.0  --  13.0 15.0 13.0   BUN 11 12  --  12 15 12   CREATININE 0.78 0.72*  --  0.81 0.89 1.02   EGFR 98.4 100.8  --  97.2 94.5 81.1   GLUCOSE 99 95  --  87 94 119*   CALCIUM 8.1* 8.2*  --  8.0* 8.4* 8.9   MAGNESIUM 1.8 1.9  --  1.7  --   --          Lab 11/15/24  0553 11/14/24  0550 11/13/24  0609 11/11/24  1450   TOTAL PROTEIN 6.6 6.6 6.3 8.2   ALBUMIN 3.1* 3.2* 3.1* 3.8   GLOBULIN 3.5 3.4 3.2 4.4   ALT (SGPT) 19 18 19 26   AST (SGOT) 40 40 41* 44*   BILIRUBIN 0.7 0.7 0.7 0.8   ALK PHOS 272* 225* 208* 258*                     Brief Urine Lab Results       None            Microbiology Results Abnormal       None            No radiology results from the last 24 hrs    Results for orders placed during the hospital encounter of 10/08/24    Adult Transthoracic Echo Complete W/ Cont if Necessary Per Protocol    Interpretation Summary    Left ventricular systolic function is normal. Estimated left ventricular EF = 60%    Moderate mitral valve regurgitation is present.    Estimated right ventricular systolic pressure from tricuspid regurgitation is normal (<35 mmHg).      Current medications:  Scheduled Meds:aspirin, 81 mg, Oral, Daily  atorvastatin, 80 mg, Oral, Nightly  cefTRIAXone, 2,000 mg, Intravenous, Q24H  cholestyramine light, 1 packet, Oral, Q12H  donepezil, 10 mg, Oral, Nightly  doxycycline, 100 mg, Oral, Q12H  enoxaparin, 40 mg, Subcutaneous, Daily  lisinopril, 20 mg, Oral, Q24H   And  hydroCHLOROthiazide, 12.5 mg, Oral, Q24H  memantine, 10 mg, Oral, BID  metoprolol tartrate, 25 mg,  Oral, BID  mirtazapine, 30 mg, Oral, Nightly  pantoprazole, 40 mg, Oral, Q AM  Psyllium, 1 packet, Oral, Daily  saccharomyces boulardii, 250 mg, Oral, BID  sertraline, 200 mg, Oral, Daily  sodium chloride, 10 mL, Intravenous, Q12H      Continuous Infusions:   PRN Meds:.  acetaminophen    Calcium Replacement - Follow Nurse / BPA Driven Protocol    Magnesium Standard Dose Replacement - Follow Nurse / BPA Driven Protocol    ondansetron    oxyCODONE    Phosphorus Replacement - Follow Nurse / BPA Driven Protocol    Potassium Replacement - Follow Nurse / BPA Driven Protocol    sodium chloride    sodium chloride    White Petrolatum    Assessment & Plan   Assessment & Plan     Active Hospital Problems    Diagnosis  POA    **Left leg cellulitis [L03.116]  Yes      Resolved Hospital Problems   No resolved problems to display.        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 male with CAD s/p PCI w/ DIS 2012, HTN, HLD, anxiety/depression, MCI who presents for LLE pain, redness and swelling.  MRI no obvious osteo, cellulitis and mild to moderate tendinopathy/partial thickness longitudinal tearing of the peroneus longus tendon. Continue to follow at this time, continued IV antibiotics and will follow        This patient's problems and plans were partially entered by my partner and updated as appropriate by me 11/16/24.    Left lower extremity cellulitis  Sepsis  - Patient met sepsis criteria with tachycardia, fevers at home, leukocytosis, source of infection  -Joint fluid culture currently no growth to date   - Blood cultures no growth as well   - MRI of the ankle as above, cellulitis, and tendinopathy/partial tearing peroneus longus tendon   - ID following   -Continue rocephin,doxy   -AM labs      CAD s/p PCI w/ DEI  Hypertension  Hyperlipidemia  - Continue metoprolol, atorvastatin, lisinopril/HCTZ     Anxiety/depression  - Continue home Zoloft and Remeron     MCI  - Continue home donepezil and memantine      Expected Discharge Location and Transportation: home +/- home health  Expected Discharge   Expected Discharge Date: 11/17/2024; Expected Discharge Time:       VTE Prophylaxis:  Pharmacologic VTE prophylaxis orders are present.    AM-PAC 6 Clicks Score (PT): 19 (11/15/24 2000)    CODE STATUS:   Code Status and Medical Interventions: CPR (Attempt to Resuscitate); Full Support   Ordered at: 11/11/24 1943     Level Of Support Discussed With:    Patient     Code Status (Patient has no pulse and is not breathing):    CPR (Attempt to Resuscitate)     Medical Interventions (Patient has pulse or is breathing):    Full Support       Kev Sarkar, CAROL  11/16/24

## 2024-11-16 NOTE — PLAN OF CARE
Goal Outcome Evaluation:  Plan of Care Reviewed With: patient        Progress: improving  Outcome Evaluation: patient ambulated 15' + 15' with CGA x1 and rolling walker for support, patient reported increased weight bearing on L LE with less pain. Further mobility limited by weakness, fatigue and pain. Recommend home with assist and HHPT at D/C,

## 2024-11-16 NOTE — THERAPY TREATMENT NOTE
Patient Name: Justice Kiser  : 1960    MRN: [id number]                              Today's Date: 2024       Admit Date: 2024    Visit Dx:     ICD-10-CM ICD-9-CM   1. Acute sepsis  A41.9 038.9     995.91   2. Cellulitis of left ankle  L03.116 682.6   3. History of MRSA infection  Z86.14 V12.04     Patient Active Problem List   Diagnosis    Essential hypertension    Coronary artery disease involving native coronary artery of native heart with angina pectoris    Hyperlipidemia LDL goal <70    Depression    Anxiety    Hyperglycemia    Paresthesia of right foot    Tremor    GERD (gastroesophageal reflux disease)    Arthritis    Pierson's esophagus without dysplasia    MCI (mild cognitive impairment)    Mitral valve insufficiency    Alcohol induced fatty liver    Alcohol abuse, in remission    Cerebral microvascular disease    Chronic bilateral low back pain with right-sided sciatica    Spinal stenosis, lumbar region, with neurogenic claudication    DDD (degenerative disc disease), lumbar    Psychophysiological insomnia    Left leg cellulitis     Past Medical History:   Diagnosis Date    Acute maxillary sinusitis     Arthritis     CHF (congestive heart failure)     Chronic pain disorder     Colon polyp     Depression     Difficulty walking     Elevated LFTs     GERD (gastroesophageal reflux disease)     History of colonic polyps     History of methicillin resistant Staphylococcus aureus     History of myocardial infarction     Hyperlipidemia     Hypertension     Left hand pain     Left shoulder pain     Low back pain     MRSA (methicillin resistant Staphylococcus aureus)     Myocardial infarction     Neck pain     Personal history of congestive heart failure     Right hip pain     Rotator cuff syndrome      Past Surgical History:   Procedure Laterality Date    BACK SURGERY      CAROTID STENT      CORONARY ANGIOPLASTY WITH STENT PLACEMENT  2012    Circumflex Xscience V 3.5 MM x 23 mm    CORONARY  STENT PLACEMENT      ENDOMETRIAL ABLATION      EPIDURAL BLOCK      GALLBLADDER SURGERY  2017    LUMBAR DISCECTOMY  1992    2 level - [facility]    LYMPH NODE BIOPSY      SHOULDER SURGERY  12/20/2018    Dr. Hoyt Rotator cuff repair      General Information       Row Name 11/16/24 1420          Physical Therapy Time and Intention    Document Type therapy note (daily note)  -AS     Mode of Treatment physical therapy  -AS       Row Name 11/16/24 1420          General Information    Patient Profile Reviewed yes  -AS     Existing Precautions/Restrictions fall;weight bearing;left;other (see comments)  partial tearing peroneous longus tendon, WBAT L LE  -AS     Barriers to Rehab none identified  -AS       Row Name 11/16/24 1420          Cognition    Orientation Status (Cognition) oriented x 3  -AS       Row Name 11/16/24 1420          Safety Issues/Impairments Affecting Functional Mobility    Safety Issues Affecting Function (Mobility) awareness of need for assistance;safety precautions follow-through/compliance;sequencing abilities;positioning of assistive device  -AS     Impairments Affecting Function (Mobility) pain;balance;coordination;endurance/activity tolerance;motor control;range of motion (ROM);sensation/sensory awareness;strength  -AS     Comment, Safety Issues/Impairments (Mobility) alert and following commands  -AS               User Key  (r) = Recorded By, (t) = Taken By, (c) = Cosigned By      Initials Name Provider Type    AS Alberta Garcia PTA Physical Therapist Assistant                   Mobility       Row Name 11/16/24 1423          Bed Mobility    Supine-Sit [place] (Bed Mobility) modified independence  -AS     Assistive Device (Bed Mobility) head of bed elevated  -AS     Comment, (Bed Mobility) no physical assist needed  -AS       Row Name 11/16/24 1423          Transfers    Comment, (Transfers) cues for hand placement  -AS       Row Name 11/16/24 1423          Bed-Chair  Transfer    Bed-Chair [place] (Transfers) verbal cues;contact guard;1 person assist  -AS     Assistive Device (Bed-Chair Transfers) walker, front-wheeled  -AS       Row Name 11/16/24 1423          Sit-Stand Transfer    Sit-Stand [place] (Transfers) verbal cues;contact guard;1 person assist  -AS     Assistive Device (Sit-Stand Transfers) walker, front-wheeled  -AS     Comment, (Sit-Stand Transfer) cues for hand placement  -AS       Row Name 11/16/24 1423          Gait/Stairs (Locomotion)    [place] Level (Gait) verbal cues;contact guard;1 person assist  -AS     Assistive Device (Gait) walker, front-wheeled  -AS     Distance in Feet (Gait) 15  + 15  -AS     Deviations/Abnormal Patterns (Gait) bilateral deviations;anca decreased;gait speed decreased;stride length decreased;left sided deviations;antalgic;weight shifting decreased  -AS     Bilateral Gait Deviations forward flexed posture;heel strike decreased  -AS     Left Sided Gait Deviations weight shift ability decreased  -AS     Comment, (Gait/Stairs) patient ambulated 15' + 15' with CGA x1 and rolling walker for support, patient reported increased weight bearing on L LE with less pain. Further mobility limited by weakness, fatigue and pain.  -AS               User Key  (r) = Recorded By, (t) = Taken By, (c) = Cosigned By      Initials Name Provider Type    AS Alberta Garcia PTA Physical Therapist Assistant                   Obj/Interventions       Row Name 11/16/24 1427          Motor Skills    Therapeutic Exercise ankle  -AS       Row Name 11/16/24 1427          Ankle (Therapeutic Exercise)    Ankle (Therapeutic Exercise) AROM (active range of motion)  -AS     Ankle AROM (Therapeutic Exercise) bilateral;dorsiflexion;plantarflexion;10 repetitions;supine  -AS       Row Name 11/16/24 1427          Balance    Dynamic Standing Balance verbal cues;contact guard;1-person assist  -AS     Position/Device Used, Standing Balance supported;walker,  front-wheeled  -AS     Comment, Balance CGA for safety  -AS               User Key  (r) = Recorded By, (t) = Taken By, (c) = Cosigned By      Initials Name Provider Type    AS Alberta Garcia PTA Physical Therapist Assistant                   Goals/Plan    No documentation.                  Clinical Impression       Row Name 11/16/24 1428          Pain    Pretreatment Pain Rating 6/10  -AS     Posttreatment Pain Rating 7/10  -AS     Pain Location ankle  -AS     Pain Side/Orientation left;lower  -AS     Pain Management Interventions exercise or physical activity utilized;nursing notified;cold applied  -AS     Response to Pain Interventions activity participation with tolerable pain  -AS       Row Name 11/16/24 1428          Plan of Care Review    Plan of Care Reviewed With patient  -AS     Progress improving  -AS     Outcome Evaluation patient ambulated 15' + 15' with CGA x1 and rolling walker for support, patient reported increased weight bearing on L LE with less pain. Further mobility limited by weakness, fatigue and pain. Recommend home with assist and HHPT at D/C,  -AS       [facility] Name 11/16/24 1428          Positioning and Restraints    Pre-Treatment Position in bed  -AS     Post Treatment Position chair  -AS     In Chair reclined;call light within reach;encouraged to call for assist;waffle cushion;legs elevated  -AS               User Key  (r) = Recorded By, (t) = Taken By, (c) = Cosigned By      Initials Name Provider Type    AS Alberta Garcia PTA Physical Therapist Assistant                   Outcome Measures       Row Name 11/16/24 1429 11/16/24 0800       How much help from another person do you currently need...    Turning from your back to your side while in flat bed without using bedrails? 4  -AS 4  -CF    Moving from lying on back to sitting on the side of a flat bed without bedrails? 4  -AS 4  -CF    Moving to and from a bed to a chair (including a wheelchair)? 3  -AS 3  -CF    Standing up  from a chair using your arms (e.g., wheelchair, bedside chair)? 3  -AS 3  -CF    Climbing 3-5 steps with a railing? 3  -AS 2  -CF    To walk in hospital room? 3  -AS 3  -CF    AM-PAC 6 Clicks Score (PT) 20  -AS 19  -CF    Highest Level of Mobility Goal 6 --> Walk 10 steps or more  -AS 6 --> Walk 10 steps or more  -CF      Row Name 11/16/24 1429          Functional Assessment    Outcome Measure Options AM-PAC 6 Clicks Basic Mobility (PT)  -AS               User Key  (r) = Recorded By, (t) = Taken By, (c) = Cosigned By      Initials Name Provider Type    AS Alberta Garcia PTA Physical Therapist Assistant    Yamilet Ferguson LPN Licensed Nurse                                 Physical Therapy Education       Title: PT OT SLP Therapies (In Progress)       Topic: Physical Therapy (In Progress)       Point: Mobility training (In Progress)       Learning Progress Summary            Patient Acceptance, E, NR by AS at 11/16/2024 1429    Acceptance, E, VU,NR by BA at 11/13/2024 1519                      Point: Home exercise program (In Progress)       Learning Progress Summary            Patient Acceptance, E, NR by AS at 11/16/2024 1429                      Point: Body mechanics (In Progress)       Learning Progress Summary            Patient Acceptance, E, NR by AS at 11/16/2024 1429    Acceptance, E, VU,NR by BA at 11/13/2024 1519                      Point: Precautions (In Progress)       Learning Progress Summary            Patient Acceptance, E, NR by AS at 11/16/2024 1429    Acceptance, E, VU,NR by BA at 11/13/2024 1519                                      User Key       Initials Effective Dates Name Provider Type Discipline    AS 04/28/23 -  Alberta Garcia, PTA Physical Therapist Assistant PT    BA 09/21/21 -  Sumaya Lopez, PT Physical Therapist PT                  PT Recommendation and Plan     Progress: improving  Outcome Evaluation: patient ambulated 15' + 15' with CGA x1 and rolling walker  for support, patient reported increased weight bearing on L LE with less pain. Further mobility limited by weakness, fatigue and pain. Recommend home with assist and HHPT at D/C,     Time Calculation:         PT Charges       Row Name 11/16/24 1429             Time Calculation    Start Time 1340  -AS      PT Received On 11/16/24  -AS      PT Goal Re-Cert Due Date 11/23/24  -AS         Timed Charges    49897 - PT Therapeutic Exercise Minutes 10  -AS      12017 - Gait Training Minutes  13  -AS         Total Minutes    Timed Charges Total Minutes 23  -AS       Total Minutes 23  -AS                User Key  (r) = Recorded By, (t) = Taken By, (c) = Cosigned By      Initials Name Provider Type    AS Alberta Garcia PTA Physical Therapist Assistant                  Therapy Charges for Today       Code Description Service Date Service Provider Modifiers Qty    15306274888 HC PT THER PROC EA 15 MIN 11/16/2024 Alberta Garcia PTA GP 1    21280345118 HC GAIT TRAINING EA 15 MIN 11/16/2024 Alberta Garcia PTA GP 1            PT G-Codes  Outcome Measure Options: AM-PAC 6 Clicks Basic Mobility (PT)  AM-PAC 6 Clicks Score (PT): 20       Alberta Garcia PTA  11/16/2024

## 2024-11-17 LAB
ANION GAP SERPL CALCULATED.3IONS-SCNC: 12 MMOL/L (ref 5–15)
BASOPHILS # BLD AUTO: 0.08 10*3/MM3 (ref 0–0.2)
BASOPHILS NFR BLD AUTO: 0.9 % (ref 0–1.5)
BUN SERPL-MCNC: 14 MG/DL (ref 8–23)
BUN/CREAT SERPL: 18.9 (ref 7–25)
CALCIUM SPEC-SCNC: 8.7 MG/DL (ref 8.6–10.5)
CHLORIDE SERPL-SCNC: 103 MMOL/L (ref 98–107)
CO2 SERPL-SCNC: 21 MMOL/L (ref 22–29)
CREAT SERPL-MCNC: 0.74 MG/DL (ref 0.76–1.27)
CRP SERPL-MCNC: 5.43 MG/DL (ref 0–0.5)
DEPRECATED RDW RBC AUTO: 51.5 FL (ref 37–54)
EGFRCR SERPLBLD CKD-EPI 2021: 99.9 ML/MIN/1.73
EOSINOPHIL # BLD AUTO: 0.22 10*3/MM3 (ref 0–0.4)
EOSINOPHIL NFR BLD AUTO: 2.5 % (ref 0.3–6.2)
ERYTHROCYTE [DISTWIDTH] IN BLOOD BY AUTOMATED COUNT: 15.9 % (ref 12.3–15.4)
GLUCOSE SERPL-MCNC: 116 MG/DL (ref 65–99)
HCT VFR BLD AUTO: 35.4 % (ref 37.5–51)
HGB BLD-MCNC: 11.2 G/DL (ref 13–17.7)
IMM GRANULOCYTES # BLD AUTO: 0.17 10*3/MM3 (ref 0–0.05)
IMM GRANULOCYTES NFR BLD AUTO: 1.9 % (ref 0–0.5)
LYMPHOCYTES # BLD AUTO: 2.21 10*3/MM3 (ref 0.7–3.1)
LYMPHOCYTES NFR BLD AUTO: 25.2 % (ref 19.6–45.3)
MCH RBC QN AUTO: 28.1 PG (ref 26.6–33)
MCHC RBC AUTO-ENTMCNC: 31.6 G/DL (ref 31.5–35.7)
MCV RBC AUTO: 88.7 FL (ref 79–97)
MONOCYTES # BLD AUTO: 0.62 10*3/MM3 (ref 0.1–0.9)
MONOCYTES NFR BLD AUTO: 7.1 % (ref 5–12)
NEUTROPHILS NFR BLD AUTO: 5.47 10*3/MM3 (ref 1.7–7)
NEUTROPHILS NFR BLD AUTO: 62.4 % (ref 42.7–76)
NRBC BLD AUTO-RTO: 0 /100 WBC (ref 0–0.2)
PLATELET # BLD AUTO: 189 10*3/MM3 (ref 140–450)
PMV BLD AUTO: 10.8 FL (ref 6–12)
POTASSIUM SERPL-SCNC: 4.2 MMOL/L (ref 3.5–5.2)
RBC # BLD AUTO: 3.99 10*6/MM3 (ref 4.14–5.8)
SODIUM SERPL-SCNC: 136 MMOL/L (ref 136–145)
WBC NRBC COR # BLD AUTO: 8.77 10*3/MM3 (ref 3.4–10.8)

## 2024-11-17 PROCEDURE — 25010000002 ENOXAPARIN PER 10 MG: Performed by: STUDENT IN AN ORGANIZED HEALTH CARE EDUCATION/TRAINING PROGRAM

## 2024-11-17 PROCEDURE — 86140 C-REACTIVE PROTEIN: CPT | Performed by: INTERNAL MEDICINE

## 2024-11-17 PROCEDURE — 80048 BASIC METABOLIC PNL TOTAL CA: CPT | Performed by: INTERNAL MEDICINE

## 2024-11-17 PROCEDURE — 99232 SBSQ HOSP IP/OBS MODERATE 35: CPT | Performed by: NURSE PRACTITIONER

## 2024-11-17 PROCEDURE — 85025 COMPLETE CBC W/AUTO DIFF WBC: CPT | Performed by: INTERNAL MEDICINE

## 2024-11-17 PROCEDURE — 25010000002 CEFTRIAXONE PER 250 MG: Performed by: INTERNAL MEDICINE

## 2024-11-17 RX ADMIN — HYDROCHLOROTHIAZIDE 12.5 MG: 12.5 TABLET ORAL at 10:01

## 2024-11-17 RX ADMIN — Medication 10 ML: at 10:05

## 2024-11-17 RX ADMIN — SODIUM CHLORIDE 2000 MG: 900 INJECTION INTRAVENOUS at 10:01

## 2024-11-17 RX ADMIN — Medication 10 ML: at 20:47

## 2024-11-17 RX ADMIN — PANTOPRAZOLE SODIUM 40 MG: 40 TABLET, DELAYED RELEASE ORAL at 05:25

## 2024-11-17 RX ADMIN — CHOLEYSTYRAMINE LIGHT 4 G: 4 POWDER, FOR SUSPENSION ORAL at 20:47

## 2024-11-17 RX ADMIN — Medication 250 MG: at 20:47

## 2024-11-17 RX ADMIN — Medication 250 MG: at 10:03

## 2024-11-17 RX ADMIN — MEMANTINE 10 MG: 10 TABLET ORAL at 10:04

## 2024-11-17 RX ADMIN — CHOLEYSTYRAMINE LIGHT 4 G: 4 POWDER, FOR SUSPENSION ORAL at 10:02

## 2024-11-17 RX ADMIN — ENOXAPARIN SODIUM 40 MG: 100 INJECTION SUBCUTANEOUS at 10:01

## 2024-11-17 RX ADMIN — MIRTAZAPINE 30 MG: 15 TABLET, FILM COATED ORAL at 20:47

## 2024-11-17 RX ADMIN — SERTRALINE 200 MG: 100 TABLET, FILM COATED ORAL at 10:04

## 2024-11-17 RX ADMIN — ATORVASTATIN CALCIUM 80 MG: 40 TABLET, FILM COATED ORAL at 20:47

## 2024-11-17 RX ADMIN — DONEPEZIL HYDROCHLORIDE 10 MG: 10 TABLET, FILM COATED ORAL at 20:47

## 2024-11-17 RX ADMIN — DOXYCYCLINE 100 MG: 100 CAPSULE ORAL at 20:47

## 2024-11-17 RX ADMIN — ACETAMINOPHEN 650 MG: 325 TABLET ORAL at 20:55

## 2024-11-17 RX ADMIN — ASPIRIN 81 MG: 81 TABLET, COATED ORAL at 10:03

## 2024-11-17 RX ADMIN — DOXYCYCLINE 100 MG: 100 CAPSULE ORAL at 10:04

## 2024-11-17 RX ADMIN — MEMANTINE 10 MG: 10 TABLET ORAL at 20:47

## 2024-11-17 RX ADMIN — METOPROLOL TARTRATE 25 MG: 25 TABLET, FILM COATED ORAL at 20:47

## 2024-11-17 RX ADMIN — LISINOPRIL 20 MG: 20 TABLET ORAL at 10:02

## 2024-11-17 RX ADMIN — AVOBENZONE, HOMOSALATE, OCTISALATE, OCTOCRYLENE, AND OXYBENZONE 1 PACKET: 29.4; 147; 49; 25.4; 58.8 LOTION TOPICAL at 10:02

## 2024-11-17 RX ADMIN — METOPROLOL TARTRATE 25 MG: 25 TABLET, FILM COATED ORAL at 10:03

## 2024-11-17 NOTE — PROGRESS NOTES
Psychiatric Medicine Services  PROGRESS NOTE    Patient Name: Justice Kiser  : 1960  MRN: [id number]    Date of Admission: 2024  Primary Care Physician: Anshul Martinez MD    Subjective   Subjective     CC:  Follow up cellulitis     HPI:  Patient seen resting in bed in no apparent distress. No acute events overnight per nursing. Pain is currently well controlled. Awaiting rehab placement. No new complaints at this time.     Objective   Objective     Vital Signs:   Temp:  [96.7 °F (35.9 °C)-98.1 °F (36.7 °C)] 98.1 °F (36.7 °C)  Heart Rate:  [55-77] 64  Resp:  [16-18] 16  BP: (112-158)/(64-89) 123/64     Physical Exam:  Constitutional: No acute distress, awake, alert, chronically ill appearing   HENT: NCAT, mucous membranes moist  Respiratory: grossly clear, diminished in bases, respiratory effort normal on RA   Cardiovascular: RRR, no murmurs, cap refill brisk   Gastrointestinal: Positive bowel sounds, soft, nontender, nondistended  Musculoskeletal: LLE edema with circumferential erythema, erythema marked    Psychiatric: flat affect, cooperative  Neurologic: Oriented x 3, moves all extremities, speech clear  Skin: warm, dry, no visible rash     Results Reviewed:  LAB RESULTS:      Lab 24  0603 11/15/24  0553 24  0550 24  0609 24  0608 24  0552 24  1841 24  1450   WBC 8.77 7.47 9.20  --  8.14 13.93*  --  14.45*   HEMOGLOBIN 11.2* 10.0* 10.3*  --  10.1* 11.1*  --  12.0*   HEMATOCRIT 35.4* 31.0* 32.1*  --  30.9* 34.0*  --  36.0*   PLATELETS 189 140 146  --  112* 148  --  145   NEUTROS ABS 5.47 4.82 6.01  --  5.64  --   --  10.58*   IMMATURE GRANS (ABS) 0.17* 0.10* 0.17*  --  0.06*  --   --  0.10*   LYMPHS ABS 2.21 1.75 2.05  --  1.46  --   --  2.29   MONOS ABS 0.62 0.61 0.79  --  0.86  --   --  1.45*   EOS ABS 0.22 0.16 0.14  --  0.09  --   --  0.01   MCV 88.7 87.6 88.2  --  87.8 87.2  --  86.1   SED RATE  --   --   --   --   --   --   --   107*   CRP 5.43* 13.86* 18.97* 22.25*  --   --   --  19.33*   PROCALCITONIN  --   --   --  0.29*  --   --   --  0.54*   LACTATE  --   --   --   --   --   --  1.1  --          Lab 11/17/24  0603 11/15/24  0553 11/14/24  0550 11/13/24  1845 11/13/24  0609 11/12/24  0552   SODIUM 136 135* 143  --  137 137   POTASSIUM 4.2 4.1 4.4 4.2 3.4* 3.7   CHLORIDE 103 103 110*  --  103 101   CO2 21.0* 23.0 20.0*  --  21.0* 21.0*   ANION GAP 12.0 9.0 13.0  --  13.0 15.0   BUN 14 11 12  --  12 15   CREATININE 0.74* 0.78 0.72*  --  0.81 0.89   EGFR 99.9 98.4 100.8  --  97.2 94.5   GLUCOSE 116* 99 95  --  87 94   CALCIUM 8.7 8.1* 8.2*  --  8.0* 8.4*   MAGNESIUM  --  1.8 1.9  --  1.7  --          Lab 11/15/24  0553 11/14/24  0550 11/13/24  0609 11/11/24  1450   TOTAL PROTEIN 6.6 6.6 6.3 8.2   ALBUMIN 3.1* 3.2* 3.1* 3.8   GLOBULIN 3.5 3.4 3.2 4.4   ALT (SGPT) 19 18 19 26   AST (SGOT) 40 40 41* 44*   BILIRUBIN 0.7 0.7 0.7 0.8   ALK PHOS 272* 225* 208* 258*                     Brief Urine Lab Results       None            Microbiology Results Abnormal       None            No radiology results from the last 24 hrs    Results for orders placed during the hospital encounter of 10/08/24    Adult Transthoracic Echo Complete W/ Cont if Necessary Per Protocol    Interpretation Summary    Left ventricular systolic function is normal. Estimated left ventricular EF = 60%    Moderate mitral valve regurgitation is present.    Estimated right ventricular systolic pressure from tricuspid regurgitation is normal (<35 mmHg).      Current medications:  Scheduled Meds:aspirin, 81 mg, Oral, Daily  atorvastatin, 80 mg, Oral, Nightly  cefTRIAXone, 2,000 mg, Intravenous, Q24H  cholestyramine light, 1 packet, Oral, Q12H  donepezil, 10 mg, Oral, Nightly  doxycycline, 100 mg, Oral, Q12H  enoxaparin, 40 mg, Subcutaneous, Daily  lisinopril, 20 mg, Oral, Q24H   And  hydroCHLOROthiazide, 12.5 mg, Oral, Q24H  memantine, 10 mg, Oral, BID  metoprolol tartrate, 25 mg,  Oral, BID  mirtazapine, 30 mg, Oral, Nightly  pantoprazole, 40 mg, Oral, Q AM  Psyllium, 1 packet, Oral, Daily  saccharomyces boulardii, 250 mg, Oral, BID  sertraline, 200 mg, Oral, Daily  sodium chloride, 10 mL, Intravenous, Q12H      Continuous Infusions:   PRN Meds:.  acetaminophen    Calcium Replacement - Follow Nurse / BPA Driven Protocol    Magnesium Standard Dose Replacement - Follow Nurse / BPA Driven Protocol    ondansetron    Phosphorus Replacement - Follow Nurse / BPA Driven Protocol    Potassium Replacement - Follow Nurse / BPA Driven Protocol    sodium chloride    sodium chloride    White Petrolatum    Assessment & Plan   Assessment & Plan     Active Hospital Problems    Diagnosis  POA    **Left leg cellulitis [L03.116]  Yes      Resolved Hospital Problems   No resolved problems to display.        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 male with CAD s/p PCI w/ DIS 2012, HTN, HLD, anxiety/depression, MCI who presents for LLE pain, redness and swelling.  MRI revealed no obvious osteo with evidence of cellulitis and mild to moderate tendinopathy/partial thickness longitudinal tearing of the peroneus longus tendon. ID following and managing antibiotic regimen. Currently on Ceftriaxone and Doxycycline.       This patient's problems and plans were partially entered by my partner and updated as appropriate by me 11/17/24.     Left lower extremity cellulitis  Sepsis  - Patient met sepsis criteria with tachycardia, fevers at home, leukocytosis, source of infection  -Joint fluid culture currently no growth to date   - Blood cultures no growth as well   - MRI of the ankle as above, cellulitis, and tendinopathy/partial tearing peroneus longus tendon   - ID following   - Continue Rocephin, Doxy   - AM labs      CAD s/p PCI w/ DEI  Hypertension  Hyperlipidemia  - Continue metoprolol, atorvastatin, lisinopril/HCTZ     Anxiety/depression  - Continue home Zoloft, Remeron     MCI  - Continue home  donepezil, memantine     Expected Discharge Location and Transportation: home +/- home health  Expected Discharge   Expected Discharge Date: 11/18/2024; Expected Discharge Time:       VTE Prophylaxis:  Pharmacologic VTE prophylaxis orders are present.      AM-PAC 6 Clicks Score (PT): 20 (11/16/24 2112)    CODE STATUS:   Code Status and Medical Interventions: CPR (Attempt to Resuscitate); Full Support   Ordered at: 11/11/24 1943     Level Of Support Discussed With:    Patient     Code Status (Patient has no pulse and is not breathing):    CPR (Attempt to Resuscitate)     Medical Interventions (Patient has pulse or is breathing):    Full Support       Kev Sarkar, CAROL  11/17/24

## 2024-11-17 NOTE — PLAN OF CARE
Problem: Adult Inpatient Plan of Care  Goal: Plan of Care Review  Outcome: Progressing  Flowsheets (Taken 11/16/2024 1428 by Alberta Garcia PTA)  Plan of Care Reviewed With: patient  Goal: Patient-Specific Goal (Individualized)  Outcome: Progressing  Goal: Absence of Hospital-Acquired Illness or Injury  Outcome: Progressing  Intervention: Identify and Manage Fall Risk  Recent Flowsheet Documentation  Taken 11/17/2024 0400 by Gabriela Smith RN  Safety Promotion/Fall Prevention:   activity supervised   assistive device/personal items within reach   clutter free environment maintained   fall prevention program maintained   lighting adjusted   nonskid shoes/slippers when out of bed   room organization consistent   safety round/check completed  Taken 11/17/2024 0200 by Gabriela Smith RN  Safety Promotion/Fall Prevention:   activity supervised   assistive device/personal items within reach   clutter free environment maintained   fall prevention program maintained   lighting adjusted   nonskid shoes/slippers when out of bed   room organization consistent   safety round/check completed  Taken 11/17/2024 0000 by Gabriela Smith RN  Safety Promotion/Fall Prevention:   activity supervised   assistive device/personal items within reach   clutter free environment maintained   fall prevention program maintained   lighting adjusted   nonskid shoes/slippers when out of bed   room organization consistent   safety round/check completed  Taken 11/16/2024 2112 by Gabriela Smith RN  Safety Promotion/Fall Prevention:   assistive device/personal items within reach   activity supervised   clutter free environment maintained   fall prevention program maintained   lighting adjusted   nonskid shoes/slippers when out of bed   room organization consistent   safety round/check completed  Intervention: Prevent Skin Injury  Recent Flowsheet Documentation  Taken 11/17/2024 0400 by Gabriela Smith RN  Body Position:  position changed independently  Skin Protection:   drying agents applied   incontinence pads utilized   transparent dressing maintained  Taken 11/17/2024 0200 by Gabriela Smith RN  Body Position: supine  Skin Protection:   drying agents applied   incontinence pads utilized   transparent dressing maintained  Taken 11/17/2024 0000 by Gabriela Smith RN  Body Position: position changed independently  Skin Protection:   drying agents applied   incontinence pads utilized   transparent dressing maintained  Taken 11/16/2024 2112 by Gabriela Smith RN  Body Position: supine  Skin Protection:   drying agents applied   incontinence pads utilized   transparent dressing maintained  Intervention: Prevent Infection  Recent Flowsheet Documentation  Taken 11/17/2024 0000 by Gabriela Smith RN  Infection Prevention:   single patient room provided   rest/sleep promoted   environmental surveillance performed  Taken 11/16/2024 2112 by Gabriela Smith RN  Infection Prevention:   environmental surveillance performed   single patient room provided   rest/sleep promoted  Goal: Optimal Comfort and Wellbeing  Outcome: Progressing  Intervention: Provide Person-Centered Care  Recent Flowsheet Documentation  Taken 11/16/2024 2112 by Gabriela Smith RN  Trust Relationship/Rapport:   care explained   choices provided   questions encouraged   questions answered   thoughts/feelings acknowledged  Goal: Readiness for Transition of Care  Outcome: Progressing   Goal Outcome Evaluation:

## 2024-11-18 ENCOUNTER — APPOINTMENT (OUTPATIENT)
Dept: ULTRASOUND IMAGING | Facility: HOSPITAL | Age: 64
End: 2024-11-18
Payer: MEDICARE

## 2024-11-18 LAB
ANION GAP SERPL CALCULATED.3IONS-SCNC: 12 MMOL/L (ref 5–15)
BUN SERPL-MCNC: 17 MG/DL (ref 8–23)
BUN/CREAT SERPL: 21.8 (ref 7–25)
CALCIUM SPEC-SCNC: 8.8 MG/DL (ref 8.6–10.5)
CHLORIDE SERPL-SCNC: 106 MMOL/L (ref 98–107)
CO2 SERPL-SCNC: 21 MMOL/L (ref 22–29)
CREAT SERPL-MCNC: 0.78 MG/DL (ref 0.76–1.27)
CRP SERPL-MCNC: 3.09 MG/DL (ref 0–0.5)
DEPRECATED RDW RBC AUTO: 51.8 FL (ref 37–54)
EGFRCR SERPLBLD CKD-EPI 2021: 98.4 ML/MIN/1.73
ERYTHROCYTE [DISTWIDTH] IN BLOOD BY AUTOMATED COUNT: 16.2 % (ref 12.3–15.4)
GLUCOSE SERPL-MCNC: 107 MG/DL (ref 65–99)
HCT VFR BLD AUTO: 37.3 % (ref 37.5–51)
HGB BLD-MCNC: 12 G/DL (ref 13–17.7)
MCH RBC QN AUTO: 28.4 PG (ref 26.6–33)
MCHC RBC AUTO-ENTMCNC: 32.2 G/DL (ref 31.5–35.7)
MCV RBC AUTO: 88.4 FL (ref 79–97)
PLATELET # BLD AUTO: 203 10*3/MM3 (ref 140–450)
PMV BLD AUTO: 11 FL (ref 6–12)
POTASSIUM SERPL-SCNC: 4.2 MMOL/L (ref 3.5–5.2)
RBC # BLD AUTO: 4.22 10*6/MM3 (ref 4.14–5.8)
SODIUM SERPL-SCNC: 139 MMOL/L (ref 136–145)
WBC NRBC COR # BLD AUTO: 9 10*3/MM3 (ref 3.4–10.8)

## 2024-11-18 PROCEDURE — 76882 US LMTD JT/FCL EVL NVASC XTR: CPT

## 2024-11-18 PROCEDURE — 25010000002 CEFTRIAXONE PER 250 MG: Performed by: INTERNAL MEDICINE

## 2024-11-18 PROCEDURE — 99232 SBSQ HOSP IP/OBS MODERATE 35: CPT | Performed by: PEDIATRICS

## 2024-11-18 PROCEDURE — 80048 BASIC METABOLIC PNL TOTAL CA: CPT | Performed by: NURSE PRACTITIONER

## 2024-11-18 PROCEDURE — 25010000002 ENOXAPARIN PER 10 MG: Performed by: STUDENT IN AN ORGANIZED HEALTH CARE EDUCATION/TRAINING PROGRAM

## 2024-11-18 PROCEDURE — 85027 COMPLETE CBC AUTOMATED: CPT | Performed by: NURSE PRACTITIONER

## 2024-11-18 PROCEDURE — 86140 C-REACTIVE PROTEIN: CPT | Performed by: INTERNAL MEDICINE

## 2024-11-18 RX ORDER — OXYCODONE HYDROCHLORIDE 5 MG/1
5 TABLET ORAL EVERY 6 HOURS PRN
Status: DISCONTINUED | OUTPATIENT
Start: 2024-11-18 | End: 2024-11-20 | Stop reason: SDUPTHER

## 2024-11-18 RX ADMIN — ENOXAPARIN SODIUM 40 MG: 100 INJECTION SUBCUTANEOUS at 08:56

## 2024-11-18 RX ADMIN — Medication 10 ML: at 08:57

## 2024-11-18 RX ADMIN — ASPIRIN 81 MG: 81 TABLET, COATED ORAL at 08:57

## 2024-11-18 RX ADMIN — PANTOPRAZOLE SODIUM 40 MG: 40 TABLET, DELAYED RELEASE ORAL at 06:02

## 2024-11-18 RX ADMIN — MEMANTINE 10 MG: 10 TABLET ORAL at 20:37

## 2024-11-18 RX ADMIN — AVOBENZONE, HOMOSALATE, OCTISALATE, OCTOCRYLENE, AND OXYBENZONE 1 PACKET: 29.4; 147; 49; 25.4; 58.8 LOTION TOPICAL at 08:56

## 2024-11-18 RX ADMIN — Medication 250 MG: at 20:38

## 2024-11-18 RX ADMIN — OXYCODONE 5 MG: 5 TABLET ORAL at 16:49

## 2024-11-18 RX ADMIN — DOXYCYCLINE 100 MG: 100 CAPSULE ORAL at 20:37

## 2024-11-18 RX ADMIN — CHOLEYSTYRAMINE LIGHT 4 G: 4 POWDER, FOR SUSPENSION ORAL at 08:56

## 2024-11-18 RX ADMIN — METOPROLOL TARTRATE 25 MG: 25 TABLET, FILM COATED ORAL at 20:37

## 2024-11-18 RX ADMIN — MIRTAZAPINE 30 MG: 15 TABLET, FILM COATED ORAL at 20:37

## 2024-11-18 RX ADMIN — MEMANTINE 10 MG: 10 TABLET ORAL at 08:57

## 2024-11-18 RX ADMIN — METOPROLOL TARTRATE 25 MG: 25 TABLET, FILM COATED ORAL at 08:56

## 2024-11-18 RX ADMIN — DOXYCYCLINE 100 MG: 100 CAPSULE ORAL at 08:57

## 2024-11-18 RX ADMIN — CHOLEYSTYRAMINE LIGHT 4 G: 4 POWDER, FOR SUSPENSION ORAL at 20:37

## 2024-11-18 RX ADMIN — DONEPEZIL HYDROCHLORIDE 10 MG: 10 TABLET, FILM COATED ORAL at 20:38

## 2024-11-18 RX ADMIN — LISINOPRIL 20 MG: 20 TABLET ORAL at 08:57

## 2024-11-18 RX ADMIN — SODIUM CHLORIDE 2000 MG: 900 INJECTION INTRAVENOUS at 08:57

## 2024-11-18 RX ADMIN — Medication 250 MG: at 08:57

## 2024-11-18 RX ADMIN — SERTRALINE 200 MG: 100 TABLET, FILM COATED ORAL at 08:57

## 2024-11-18 RX ADMIN — HYDROCHLOROTHIAZIDE 12.5 MG: 12.5 TABLET ORAL at 08:56

## 2024-11-18 RX ADMIN — ATORVASTATIN CALCIUM 80 MG: 40 TABLET, FILM COATED ORAL at 20:37

## 2024-11-18 RX ADMIN — OXYCODONE 5 MG: 5 TABLET ORAL at 09:07

## 2024-11-18 RX ADMIN — Medication 10 ML: at 20:39

## 2024-11-18 NOTE — PROGRESS NOTES
[facility] Medicine Services  PROGRESS NOTE    Patient Name: Justice Kiser  : 1960  MRN: [id number]    Date of Admission: 2024  Primary Care Physician: Anshul Martinez MD    Subjective   Subjective     CC:  Follow up cellulitis     HPI:  Pt feels that the area is improving but one area is still really tender.    Objective   Objective     Vital Signs:   Temp:  [96.8 °F (36 °C)-98.1 °F (36.7 °C)] 97.9 °F (36.6 °C)  Heart Rate:  [53-67] 57  Resp:  [16-18] 16  BP: (110-132)/(69-80) 132/80     Physical Exam:  Constitutional: No acute distress, awake, alert, chronically ill appearing   HENT: NCAT, mucous membranes moist  Respiratory: grossly clear, respiratory effort normal on RA   Cardiovascular: RRR, no murmurs, cap refill brisk   Gastrointestinal: Positive bowel sounds, soft, nontender, nondistended  Musculoskeletal/SKin: LLE edema with circumferential erythema, erythema improved but with an area of fluctuance close to the lateral L ankle where the most erythematous area and tender area remain.  Psychiatric: flat affect, cooperative  Neurologic: Oriented x 3, moves all extremities, speech clear      Results Reviewed:  LAB RESULTS:      Lab 24  0551 24  0603 11/15/24  0553 24  0550 24  0609 24  0608 24  0552 24  1841 24  1450   WBC 9.00 8.77 7.47 9.20  --  8.14   < >  --  14.45*   HEMOGLOBIN 12.0* 11.2* 10.0* 10.3*  --  10.1*   < >  --  12.0*   HEMATOCRIT 37.3* 35.4* 31.0* 32.1*  --  30.9*   < >  --  36.0*   PLATELETS 203 189 140 146  --  112*   < >  --  145   NEUTROS ABS  --  5.47 4.82 6.01  --  5.64  --   --  10.58*   IMMATURE GRANS (ABS)  --  0.17* 0.10* 0.17*  --  0.06*  --   --  0.10*   LYMPHS ABS  --  2.21 1.75 2.05  --  1.46  --   --  2.29   MONOS ABS  --  0.62 0.61 0.79  --  0.86  --   --  1.45*   EOS ABS  --  0.22 0.16 0.14  --  0.09  --   --  0.01   MCV 88.4 88.7 87.6 88.2  --  87.8   < >  --  86.1   SED RATE  --   --    --   --   --   --   --   --  107*   CRP 3.09* 5.43* 13.86* 18.97* 22.25*  --   --   --  19.33*   PROCALCITONIN  --   --   --   --  0.29*  --   --   --  0.54*   LACTATE  --   --   --   --   --   --   --  1.1  --     < > = values in this interval not displayed.         Lab 11/18/24  0551 11/17/24  0603 11/15/24  0553 11/14/24  0550 11/13/24  1845 11/13/24  0609   SODIUM 139 136 135* 143  --  137   POTASSIUM 4.2 4.2 4.1 4.4 4.2 3.4*   CHLORIDE 106 103 103 110*  --  103   CO2 21.0* 21.0* 23.0 20.0*  --  21.0*   ANION GAP 12.0 12.0 9.0 13.0  --  13.0   BUN 17 14 11 12  --  12   CREATININE 0.78 0.74* 0.78 0.72*  --  0.81   EGFR 98.4 99.9 98.4 100.8  --  97.2   GLUCOSE 107* 116* 99 95  --  87   CALCIUM 8.8 8.7 8.1* 8.2*  --  8.0*   MAGNESIUM  --   --  1.8 1.9  --  1.7         Lab 11/15/24  0553 11/14/24  0550 11/13/24  0609 11/11/24  1450   TOTAL PROTEIN 6.6 6.6 6.3 8.2   ALBUMIN 3.1* 3.2* 3.1* 3.8   GLOBULIN 3.5 3.4 3.2 4.4   ALT (SGPT) 19 18 19 26   AST (SGOT) 40 40 41* 44*   BILIRUBIN 0.7 0.7 0.7 0.8   ALK PHOS 272* 225* 208* 258*                     Brief Urine Lab Results       None            Microbiology Results Abnormal       None            No radiology results from the last 24 hrs    Results for orders placed during the hospital encounter of 10/08/24    Adult Transthoracic Echo Complete W/ Cont if Necessary Per Protocol    Interpretation Summary    Left ventricular systolic function is normal. Estimated left ventricular EF = 60%    Moderate mitral valve regurgitation is present.    Estimated right ventricular systolic pressure from tricuspid regurgitation is normal (<35 mmHg).      Current medications:  Scheduled Meds:aspirin, 81 mg, Oral, Daily  atorvastatin, 80 mg, Oral, Nightly  cefTRIAXone, 2,000 mg, Intravenous, Q24H  cholestyramine light, 1 packet, Oral, Q12H  donepezil, 10 mg, Oral, Nightly  doxycycline, 100 mg, Oral, Q12H  enoxaparin, 40 mg, Subcutaneous, Daily  lisinopril, 20 mg, Oral, Q24H    And  hydroCHLOROthiazide, 12.5 mg, Oral, Q24H  memantine, 10 mg, Oral, BID  metoprolol tartrate, 25 mg, Oral, BID  mirtazapine, 30 mg, Oral, Nightly  pantoprazole, 40 mg, Oral, Q AM  Psyllium, 1 packet, Oral, Daily  saccharomyces boulardii, 250 mg, Oral, BID  sertraline, 200 mg, Oral, Daily  sodium chloride, 10 mL, Intravenous, Q12H      Continuous Infusions:   PRN Meds:.  acetaminophen    Calcium Replacement - Follow Nurse / BPA Driven Protocol    Magnesium Standard Dose Replacement - Follow Nurse / BPA Driven Protocol    ondansetron    oxyCODONE    Phosphorus Replacement - Follow Nurse / BPA Driven Protocol    Potassium Replacement - Follow Nurse / BPA Driven Protocol    sodium chloride    sodium chloride    White Petrolatum    Assessment & Plan   Assessment & Plan     Active Hospital Problems    Diagnosis  POA    **Left leg cellulitis [L03.116]  Yes      Resolved Hospital Problems   No resolved problems to display.        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 male with CAD s/p PCI w/ DIS 2012, HTN, HLD, anxiety/depression, MCI who presents for LLE pain, redness and swelling.  MRI revealed no obvious osteo with evidence of cellulitis and mild to moderate tendinopathy/partial thickness longitudinal tearing of the peroneus longus tendon. ID following and managing antibiotic regimen. Currently on Ceftriaxone and Doxycycline.       This patient's problems and plans were partially entered by my partner and updated as appropriate by me 11/18/24.     Left lower extremity cellulitis  Sepsis  - Patient met sepsis criteria with tachycardia, fevers at home, leukocytosis, source of infection  -Joint fluid culture currently no growth to date   - Blood cultures no growth as well   - MRI of the ankle as above, cellulitis, and tendinopathy/partial tearing peroneus longus tendon   - ID following   - Continue Rocephin, Doxy   --U/S to look for underlying abscess  - AM labs      CAD s/p PCI w/  DEI  Hypertension  Hyperlipidemia  - Continue metoprolol, atorvastatin, lisinopril/HCTZ     Anxiety/depression  - Continue home Zoloft, Remeron     MCI  - Continue home donepezil, memantine     Expected Discharge Location and Transportation: home +/- home health  Expected Discharge   Expected Discharge Date: 11/19/2024; Expected Discharge Time:        VTE Prophylaxis:  Pharmacologic VTE prophylaxis orders are present.      AM-PAC 6 Clicks Score (PT): 20 (11/17/24 2048)    CODE STATUS:   Code Status and Medical Interventions: CPR (Attempt to Resuscitate); Full Support   Ordered at: 11/11/24 1943     Level Of Support Discussed With:    Patient     Code Status (Patient has no pulse and is not breathing):    CPR (Attempt to Resuscitate)     Medical Interventions (Patient has pulse or is breathing):    Full Support       Christine Diehl MD  11/18/24

## 2024-11-18 NOTE — PROGRESS NOTES
"[facility] Progress Note    Date of Admission: 2024      Antibiotics: ceft      CC:   Chief Complaint   Patient presents with    Leg Swelling       S: Pt with no fevers and decreased left ankle pain wbc and crp down and improved. Pt ok with d/c today as ambulation better and pain improved.  O:  /69 (BP Location: Right arm, Patient Position: Lying)   Pulse 53   Temp 98.1 °F (36.7 °C) (Oral)   Resp 18   Ht 162.6 cm (64\")   Wt 77.4 kg (170 lb 11.2 oz)   SpO2 93%   BMI 29.30 kg/m²   Temp (24hrs), Av.6 °F (36.4 °C), Min:96.8 °F (36 °C), Max:98.1 °F (36.7 °C)      PE:     GENERAL: Awake and alert, in no acute distress.   HEENT: Normocephalic, atraumatic.  PERRL. EOMI. No conjunctival injection. No icterus. Oropharynx clear without evidence of thrush or exudate. No evidence of periodontal disease.    NECK: Supple without nuchal rigidity  LYMPH: No cervical, axillary or inguinal lymphadenopathy. No neck masses  HEART: RRR; No murmur, rubs, gallops.   LUNGS: Clear to auscultation bilaterally without wheezing, rales, rhonchi. Normal respiratory effort.  ABDOMEN: Soft, nontender, nondistended. Positive bowel sounds. No rebound or guarding.   EXT:  No cyanosis, clubbing or edema  : Normal appearing genitalia without Lemus catheter.  MSK: Decreased range of motion of left ankle  SKIN: Left ankle with warmth to touch erythema and bruising  NEURO: Oriented to PPT. No focal deficits.   PSYCHIATRIC: Normal insight and judgement. Cooperative with PE     Laboratory Data    Results from last 7 days   Lab Units 24  0551 24  0603 11/15/24  0553   WBC 10*3/mm3 9.00 8.77 7.47   HEMOGLOBIN g/dL 12.0* 11.2* 10.0*   HEMATOCRIT % 37.3* 35.4* 31.0*   PLATELETS 10*3/mm3 203 189 140     Results from last 7 days   Lab Units 24  0551   SODIUM mmol/L 139   POTASSIUM mmol/L 4.2   CHLORIDE mmol/L 106   CO2 mmol/L 21.0*   BUN mg/dL 17   CREATININE mg/dL 0.78   GLUCOSE mg/dL 107*   CALCIUM mg/dL 8.8     Results from " last 7 days   Lab Units 11/15/24  0553   ALK PHOS U/L 272*   BILIRUBIN mg/dL 0.7   ALT (SGPT) U/L 19   AST (SGOT) U/L 40     Results from last 7 days   Lab Units 11/11/24  1450   SED RATE mm/hr 107*     Results from last 7 days   Lab Units 11/18/24  0551   CRP mg/dL 3.09*       Estimated Creatinine Clearance: 87.6 mL/min (by C-G formula based on SCr of 0.78 mg/dL).      Microbiology:  neg    Radiology:  Imaging Results (Last 24 Hours)       ** No results found for the last 24 hours. **            PROBLEM LIST:   LLE acute post traumatic cellulitis  History of coronary disease  History of hypertension and hyperlipidemia  Fevers chills  Leukocytosis elevated inflammatory markers     ASSESSMENT:  Patient is a 64-year-old with history of trauma to the left ankle developed bruising soft tissue edema followed by increased pain swelling erythema of left ankle with acute cellulitis developing over the past few days warmth to touch erythema leukocytosis elevated inflammatory markers arthrocentesis did not reveal septic arthritis had received abx.      Improving with ceft and doxy and now with Wbc and crp down improving with abx, now reports of possible I and D of ankle.     PLAN:    Continue ceftriaxone and doxy    Ceftriaxone as outpt thorugh 11/22/24 then oral afterwards    Doxy 100 mg po bid x 10 days upon d/c    Check if can do some ceft infusions at [facility] daily with PIV but not able to get set up with [facility] today but pt said he can try to come to my office for daily ceftriaxone 2 g with piv through Friday if [facility] unable to provide services    Report of possible I and D now will f/u on these plans    F/u with me in office 11/26/24 if no plans for surgery and plans for d/c soon       Sebastian Quijano MD  11/18/2024

## 2024-11-18 NOTE — CASE MANAGEMENT/SOCIAL WORK
Continued Stay Note  [facility]     Patient Name: Justice Kiser  MRN: [id number]  Today's Date: 11/18/2024    Admit Date: 11/11/2024    Plan: update   Discharge Plan       Row Name 11/18/24 0925       Plan    Plan update    Patient/Family in Agreement with Plan yes    Plan Comments Received a call from Kristin at [facility] who has approvel for patient antibiotics and has scheduled him for infusion tomorrow 11/19 at 1400.  Per MD, patient may need I&D.  CM will call to cancel arrangements once plan is confirmed.  Spoke with patient at bedside regarding discharge plan and advised have arrangments for him but those plans may be changing.  Patient verbalizes understanding and agreement with plan.  He reports he is is glad he did not go home and need to come right back to the hospital.  No new discharge needs verbalized.  CM following.  Patient discharge plan is ongoing.    Final Discharge Disposition Code 01 - home or self-care                   Discharge Codes    No documentation.                 Expected Discharge Date and Time       Expected Discharge Date Expected Discharge Time    Nov 21, 2024               Deidra Nunes, RN

## 2024-11-19 ENCOUNTER — APPOINTMENT (OUTPATIENT)
Dept: MRI IMAGING | Facility: HOSPITAL | Age: 64
End: 2024-11-19
Payer: MEDICARE

## 2024-11-19 DIAGNOSIS — I10 ESSENTIAL HYPERTENSION: ICD-10-CM

## 2024-11-19 PROCEDURE — 99232 SBSQ HOSP IP/OBS MODERATE 35: CPT | Performed by: PEDIATRICS

## 2024-11-19 PROCEDURE — 25010000002 ENOXAPARIN PER 10 MG: Performed by: STUDENT IN AN ORGANIZED HEALTH CARE EDUCATION/TRAINING PROGRAM

## 2024-11-19 PROCEDURE — 25010000002 CEFTRIAXONE PER 250 MG: Performed by: INTERNAL MEDICINE

## 2024-11-19 PROCEDURE — 25510000002 GADOBENATE DIMEGLUMINE 529 MG/ML SOLUTION: Performed by: PEDIATRICS

## 2024-11-19 PROCEDURE — 97110 THERAPEUTIC EXERCISES: CPT

## 2024-11-19 PROCEDURE — 97116 GAIT TRAINING THERAPY: CPT

## 2024-11-19 PROCEDURE — A9577 INJ MULTIHANCE: HCPCS | Performed by: PEDIATRICS

## 2024-11-19 PROCEDURE — 73723 MRI JOINT LWR EXTR W/O&W/DYE: CPT

## 2024-11-19 RX ORDER — LISINOPRIL AND HYDROCHLOROTHIAZIDE 12.5; 2 MG/1; MG/1
1 TABLET ORAL DAILY
Qty: 90 TABLET | Refills: 0 | Status: SHIPPED | OUTPATIENT
Start: 2024-11-19

## 2024-11-19 RX ADMIN — DONEPEZIL HYDROCHLORIDE 10 MG: 10 TABLET, FILM COATED ORAL at 20:34

## 2024-11-19 RX ADMIN — SERTRALINE 200 MG: 100 TABLET, FILM COATED ORAL at 08:08

## 2024-11-19 RX ADMIN — MIRTAZAPINE 30 MG: 15 TABLET, FILM COATED ORAL at 20:34

## 2024-11-19 RX ADMIN — CHOLEYSTYRAMINE LIGHT 4 G: 4 POWDER, FOR SUSPENSION ORAL at 20:34

## 2024-11-19 RX ADMIN — ENOXAPARIN SODIUM 40 MG: 100 INJECTION SUBCUTANEOUS at 08:07

## 2024-11-19 RX ADMIN — GADOBENATE DIMEGLUMINE 16 ML: 529 INJECTION, SOLUTION INTRAVENOUS at 23:50

## 2024-11-19 RX ADMIN — DOXYCYCLINE 100 MG: 100 CAPSULE ORAL at 08:13

## 2024-11-19 RX ADMIN — CHOLEYSTYRAMINE LIGHT 4 G: 4 POWDER, FOR SUSPENSION ORAL at 08:07

## 2024-11-19 RX ADMIN — LISINOPRIL 20 MG: 20 TABLET ORAL at 08:13

## 2024-11-19 RX ADMIN — PANTOPRAZOLE SODIUM 40 MG: 40 TABLET, DELAYED RELEASE ORAL at 06:00

## 2024-11-19 RX ADMIN — OXYCODONE 5 MG: 5 TABLET ORAL at 02:59

## 2024-11-19 RX ADMIN — METOPROLOL TARTRATE 25 MG: 25 TABLET, FILM COATED ORAL at 20:34

## 2024-11-19 RX ADMIN — AVOBENZONE, HOMOSALATE, OCTISALATE, OCTOCRYLENE, AND OXYBENZONE 1 PACKET: 29.4; 147; 49; 25.4; 58.8 LOTION TOPICAL at 08:07

## 2024-11-19 RX ADMIN — OXYCODONE 5 MG: 5 TABLET ORAL at 20:34

## 2024-11-19 RX ADMIN — Medication 10 ML: at 20:35

## 2024-11-19 RX ADMIN — HYDROCHLOROTHIAZIDE 12.5 MG: 12.5 TABLET ORAL at 08:12

## 2024-11-19 RX ADMIN — ATORVASTATIN CALCIUM 80 MG: 40 TABLET, FILM COATED ORAL at 20:34

## 2024-11-19 RX ADMIN — DOXYCYCLINE 100 MG: 100 CAPSULE ORAL at 20:34

## 2024-11-19 RX ADMIN — OXYCODONE 5 MG: 5 TABLET ORAL at 13:03

## 2024-11-19 RX ADMIN — ASPIRIN 81 MG: 81 TABLET, COATED ORAL at 08:13

## 2024-11-19 RX ADMIN — MEMANTINE 10 MG: 10 TABLET ORAL at 08:13

## 2024-11-19 RX ADMIN — SODIUM CHLORIDE 2000 MG: 900 INJECTION INTRAVENOUS at 08:02

## 2024-11-19 RX ADMIN — Medication 10 ML: at 08:03

## 2024-11-19 RX ADMIN — MEMANTINE 10 MG: 10 TABLET ORAL at 20:34

## 2024-11-19 RX ADMIN — Medication 250 MG: at 20:34

## 2024-11-19 RX ADMIN — METOPROLOL TARTRATE 25 MG: 25 TABLET, FILM COATED ORAL at 08:08

## 2024-11-19 RX ADMIN — Medication 250 MG: at 08:15

## 2024-11-19 NOTE — THERAPY TREATMENT NOTE
Patient Name: Justice Kiser  : 1960    MRN: [id number]                              Today's Date: 2024       Admit Date: 2024    Visit Dx:     ICD-10-CM ICD-9-CM   1. Acute sepsis  A41.9 038.9     995.91   2. Cellulitis of left ankle  L03.116 682.6   3. History of MRSA infection  Z86.14 V12.04     Patient Active Problem List   Diagnosis    Essential hypertension    Coronary artery disease involving native coronary artery of native heart with angina pectoris    Hyperlipidemia LDL goal <70    Depression    Anxiety    Hyperglycemia    Paresthesia of right foot    Tremor    GERD (gastroesophageal reflux disease)    Arthritis    Pierson's esophagus without dysplasia    MCI (mild cognitive impairment)    Mitral valve insufficiency    Alcohol induced fatty liver    Alcohol abuse, in remission    Cerebral microvascular disease    Chronic bilateral low back pain with right-sided sciatica    Spinal stenosis, lumbar region, with neurogenic claudication    DDD (degenerative disc disease), lumbar    Psychophysiological insomnia    Left leg cellulitis     Past Medical History:   Diagnosis Date    Acute maxillary sinusitis     Arthritis     CHF (congestive heart failure)     Chronic pain disorder     Colon polyp     Depression     Difficulty walking     Elevated LFTs     GERD (gastroesophageal reflux disease)     History of colonic polyps     History of methicillin resistant Staphylococcus aureus     History of myocardial infarction     Hyperlipidemia     Hypertension     Left hand pain     Left shoulder pain     Low back pain     MRSA (methicillin resistant Staphylococcus aureus)     Myocardial infarction     Neck pain     Personal history of congestive heart failure     Right hip pain     Rotator cuff syndrome      Past Surgical History:   Procedure Laterality Date    BACK SURGERY      CAROTID STENT      CORONARY ANGIOPLASTY WITH STENT PLACEMENT  2012    Circumflex Xscience V 3.5 MM x 23 mm    CORONARY  STENT PLACEMENT      ENDOMETRIAL ABLATION      EPIDURAL BLOCK      GALLBLADDER SURGERY  2017    LUMBAR DISCECTOMY  1992    2 level - [facility]    LYMPH NODE BIOPSY      SHOULDER SURGERY  12/20/2018    Dr. Hoyt Rotator cuff repair      General Information       Row Name 11/19/24 0921          Physical Therapy Time and Intention    Document Type therapy note (daily note)  -AS     Mode of Treatment physical therapy  -AS       Row Name 11/19/24 0921          General Information    Patient Profile Reviewed yes  -AS     Existing Precautions/Restrictions fall;weight bearing;left;other (see comments)  partial tearing peroneous longus tendon, WBAT L LE  -AS     Barriers to Rehab none identified  -AS       Row Name 11/19/24 0921          Cognition    Orientation Status (Cognition) oriented x 4  -AS       Row Name 11/19/24 0921          Safety Issues/Impairments Affecting Functional Mobility    Safety Issues Affecting Function (Mobility) awareness of need for assistance;safety precautions follow-through/compliance;positioning of assistive device  -AS     Impairments Affecting Function (Mobility) pain;balance;endurance/activity tolerance;range of motion (ROM);strength;sensation/sensory awareness  -AS     Comment, Safety Issues/Impairments (Mobility) alert and following commands  -AS               User Key  (r) = Recorded By, (t) = Taken By, (c) = Cosigned By      Initials Name Provider Type    AS Alberta Garcia PTA Physical Therapist Assistant                   Mobility       Row Name 11/19/24 0922          Bed Mobility    Supine-Sit [place] (Bed Mobility) modified independence  -AS     Assistive Device (Bed Mobility) head of bed elevated  -AS     Comment, (Bed Mobility) no physical assist needed  -AS       Row Name 11/19/24 0922          Transfers    Comment, (Transfers) good hand placement  -AS       Row Name 11/19/24 0922          Bed-Chair Transfer    Bed-Chair [place] (Transfers) verbal  cues;contact guard;1 person assist  -AS     Assistive Device (Bed-Chair Transfers) walker, front-wheeled  -AS       Row Name 11/19/24 0922          Sit-Stand Transfer    Sit-Stand [place] (Transfers) verbal cues;contact guard;1 person assist  -AS     Assistive Device (Sit-Stand Transfers) walker, front-wheeled  -AS       Row Name 11/19/24 0922          Gait/Stairs (Locomotion)    [place] Level (Gait) verbal cues;contact guard;1 person assist  -AS     Assistive Device (Gait) walker, front-wheeled  -AS     Distance in Feet (Gait) 56  -AS     Deviations/Abnormal Patterns (Gait) bilateral deviations;anca decreased;gait speed decreased;stride length decreased;left sided deviations;antalgic;weight shifting decreased  -AS     Bilateral Gait Deviations forward flexed posture;heel strike decreased  -AS     Left Sided Gait Deviations weight shift ability decreased  -AS     Comment, (Gait/Stairs) patient ambulated 56' with CGA x1 and rolling walker for support, verbal cues for walker placement and staying close to walker. Patient reports increased weight bearing on L LE and less pain. Further mobility limited by weakness and fatigue.  -AS       Row Name 11/19/24 0922          Mobility    Extremity Weight-bearing Status left lower extremity  -AS     Left Lower Extremity (Weight-bearing Status) weight-bearing as tolerated (WBAT)  -AS               User Key  (r) = Recorded By, (t) = Taken By, (c) = Cosigned By      Initials Name Provider Type    AS Alberta Garcia PTA Physical Therapist Assistant                   Obj/Interventions       Row Name 11/19/24 0926          Motor Skills    Therapeutic Exercise knee;ankle  -AS       Row Name 11/19/24 0926          Knee (Therapeutic Exercise)    Knee (Therapeutic Exercise) strengthening exercise  -AS     Knee Strengthening (Therapeutic Exercise) bilateral;marching while seated;LAQ (long arc quad);sitting;10 repetitions  -AS       Row Name 11/19/24 0926          Ankle  (Therapeutic Exercise)    Ankle (Therapeutic Exercise) AROM (active range of motion)  -AS     Ankle AROM (Therapeutic Exercise) bilateral;dorsiflexion;plantarflexion;sitting;10 repetitions  -AS       Row Name 11/19/24 0926          Balance    Dynamic Standing Balance verbal cues;contact guard;1-person assist  -AS     Position/Device Used, Standing Balance supported;walker, front-wheeled  -AS     Comment, Balance CGA for safety  -AS               User Key  (r) = Recorded By, (t) = Taken By, (c) = Cosigned By      Initials Name Provider Type    AS Alberta Garcia, TYSHAWN Physical Therapist Assistant                   Goals/Plan    No documentation.                  Clinical Impression       Row Name 11/19/24 0926          Pain    Pretreatment Pain Rating 3/10  -AS     Posttreatment Pain Rating 2/10  -AS     Pain Location ankle  -AS     Pain Side/Orientation lower;left  -AS     Pain Management Interventions exercise or physical activity utilized  -AS     Response to Pain Interventions activity participation with decreased pain  -AS       Row Name 11/19/24 0926          Plan of Care Review    Plan of Care Reviewed With patient  -AS     Progress improving  -AS     Outcome Evaluation patient ambulated 56' with CGA x1 and rolling walker for support, verbal cues for walker placement and staying close to walker. Patient reports increased weight bearing on L LE and less pain. Further mobility limited by weakness and fatigue. Recommend home with assist and HHPT.  -AS       Row Name 11/19/24 0926          Positioning and Restraints    Pre-Treatment Position in bed  -AS     Post Treatment Position chair  -AS     In Chair reclined;call light within reach;encouraged to call for assist;exit alarm on;waffle cushion;legs elevated  -AS               User Key  (r) = Recorded By, (t) = Taken By, (c) = Cosigned By      Initials Name Provider Type    AS Alberta Garcia, TYSHAWN Physical Therapist Assistant                   Outcome  Measures       Row Name 11/19/24 0927 11/19/24 0808       How much help from another person do you currently need...    Turning from your back to your side while in flat bed without using bedrails? 4  -AS 4  -LC    Moving from lying on back to sitting on the side of a flat bed without bedrails? 4  -AS 4  -LC    Moving to and from a bed to a chair (including a wheelchair)? 3  -AS 4  -LC    Standing up from a chair using your arms (e.g., wheelchair, bedside chair)? 3  -AS 4  -LC    Climbing 3-5 steps with a railing? 3  -AS 3  -LC    To walk in hospital room? 3  -AS 4  -LC    AM-PAC 6 Clicks Score (PT) 20  -AS 23  -LC    Highest Level of Mobility Goal 6 --> Walk 10 steps or more  -AS 7 --> Walk 25 feet or more  -LC      Row Name 11/19/24 0100          How much help from another person do you currently need...    Turning from your back to your side while in flat bed without using bedrails? 4  -RN     Moving from lying on back to sitting on the side of a flat bed without bedrails? 4  -RN     Moving to and from a bed to a chair (including a wheelchair)? 3  -RN     Standing up from a chair using your arms (e.g., wheelchair, bedside chair)? 3  -RN     Climbing 3-5 steps with a railing? 3  -RN     To walk in hospital room? 3  -RN     AM-PAC 6 Clicks Score (PT) 20  -RN     Highest Level of Mobility Goal 6 --> Walk 10 steps or more  -RN       Row Name 11/19/24 0927          Functional Assessment    Outcome Measure Options AM-PAC 6 Clicks Basic Mobility (PT)  -AS               User Key  (r) = Recorded By, (t) = Taken By, (c) = Cosigned By      Initials Name Provider Type    AS Alberta Garcia, TYSHAWN Physical Therapist Assistant    Idalia Contreras RN Registered Nurse    Sherlyn Baires RN Registered Nurse                                 Physical Therapy Education       Title: PT OT SLP Therapies (In Progress)       Topic: Physical Therapy (In Progress)       Point: Mobility training (In Progress)       Learning Progress  Summary            Patient Acceptance, E, NR by AS at 11/19/2024 0927    Acceptance, E, NR by AS at 11/16/2024 1429    Acceptance, E, VU,NR by BA at 11/13/2024 1519                      Point: Home exercise program (In Progress)       Learning Progress Summary            Patient Acceptance, E, NR by AS at 11/19/2024 0927    Acceptance, E, NR by AS at 11/16/2024 1429                      Point: Body mechanics (In Progress)       Learning Progress Summary            Patient Acceptance, E, NR by AS at 11/19/2024 0927    Acceptance, E, NR by AS at 11/16/2024 1429    Acceptance, E, VU,NR by BA at 11/13/2024 1519                      Point: Precautions (In Progress)       Learning Progress Summary            Patient Acceptance, E, NR by AS at 11/19/2024 0927    Acceptance, E, NR by AS at 11/16/2024 1429    Acceptance, E, VU,NR by BA at 11/13/2024 1519                                      User Key       Initials Effective Dates Name Provider Type Discipline    AS 04/28/23 -  Alberta Garcia, PTA Physical Therapist Assistant PT    BA 09/21/21 -  Sumaya Lopez, PT Physical Therapist PT                  PT Recommendation and Plan     Progress: improving  Outcome Evaluation: patient ambulated 56' with CGA x1 and rolling walker for support, verbal cues for walker placement and staying close to walker. Patient reports increased weight bearing on L LE and less pain. Further mobility limited by weakness and fatigue. Recommend home with assist and HHPT.     Time Calculation:         PT Charges       Row Name 11/19/24 0928             Time Calculation    Start Time 0856  -AS      PT Received On 11/19/24  -AS      PT Goal Re-Cert Due Date 11/23/24  -AS         Timed Charges    76059 - PT Therapeutic Exercise Minutes 9  -AS      63065 - Gait Training Minutes  15  -AS         Total Minutes    Timed Charges Total Minutes 24  -AS       Total Minutes 24  -AS                User Key  (r) = Recorded By, (t) = Taken By, (c) =  Cosigned By      Initials Name Provider Type    AS Alberta Garcia PTA Physical Therapist Assistant                  Therapy Charges for Today       Code Description Service Date Service Provider Modifiers Qty    68556337922 HC PT THER PROC EA 15 MIN 11/19/2024 Alberta Garcia PTA GP 1    30722068021 HC GAIT TRAINING EA 15 MIN 11/19/2024 Alberta Garcia PTA GP 1            PT G-Codes  Outcome Measure Options: AM-PAC 6 Clicks Basic Mobility (PT)  AM-PAC 6 Clicks Score (PT): 20       Alberta Garcia PTA  11/19/2024

## 2024-11-19 NOTE — PLAN OF CARE
Problem: Adult Inpatient Plan of Care  Goal: Plan of Care Review  Outcome: Progressing  Goal: Patient-Specific Goal (Individualized)  Outcome: Progressing  Goal: Absence of Hospital-Acquired Illness or Injury  Outcome: Progressing  Intervention: Identify and Manage Fall Risk  Recent Flowsheet Documentation  Taken 11/19/2024 0400 by Sherlyn Salgado RN  Safety Promotion/Fall Prevention:   activity supervised   assistive device/personal items within reach   clutter free environment maintained   fall prevention program maintained   gait belt   lighting adjusted   nonskid shoes/slippers when out of bed   room organization consistent   safety round/check completed  Taken 11/19/2024 0200 by Sherlyn Salgado RN  Safety Promotion/Fall Prevention:   activity supervised   assistive device/personal items within reach   clutter free environment maintained   fall prevention program maintained   gait belt   lighting adjusted   nonskid shoes/slippers when out of bed   room organization consistent   safety round/check completed  Taken 11/19/2024 0000 by Sherlyn Salgado RN  Safety Promotion/Fall Prevention:   activity supervised   assistive device/personal items within reach   clutter free environment maintained   fall prevention program maintained   gait belt   lighting adjusted   nonskid shoes/slippers when out of bed   room organization consistent   safety round/check completed  Taken 11/18/2024 2200 by Sherlyn Salgado RN  Safety Promotion/Fall Prevention:   activity supervised   assistive device/personal items within reach   clutter free environment maintained   fall prevention program maintained   gait belt   lighting adjusted   nonskid shoes/slippers when out of bed   room organization consistent   safety round/check completed  Taken 11/18/2024 2040 by Sherlyn Salgado RN  Safety Promotion/Fall Prevention:   activity supervised   assistive device/personal items within reach   clutter free environment maintained   fall  prevention program maintained   gait belt   lighting adjusted   nonskid shoes/slippers when out of bed   room organization consistent   safety round/check completed  Intervention: Prevent Skin Injury  Recent Flowsheet Documentation  Taken 11/19/2024 0400 by Sherlyn Salgado RN  Body Position: position changed independently  Skin Protection: incontinence pads utilized  Taken 11/19/2024 0200 by Sherlyn Salgado RN  Body Position: position changed independently  Skin Protection: incontinence pads utilized  Taken 11/19/2024 0000 by Sherlyn Salgado RN  Body Position: position changed independently  Taken 11/18/2024 2200 by Sherlyn Salgado RN  Body Position: position changed independently  Skin Protection: incontinence pads utilized  Taken 11/18/2024 2040 by Sherlyn Salgado RN  Body Position: position changed independently  Intervention: Prevent Infection  Recent Flowsheet Documentation  Taken 11/19/2024 0400 by Sherlyn Salgado RN  Infection Prevention:   rest/sleep promoted   single patient room provided  Taken 11/19/2024 0200 by Sherlyn Salgado RN  Infection Prevention:   rest/sleep promoted   single patient room provided  Taken 11/19/2024 0000 by Sherlyn Salgado RN  Infection Prevention:   rest/sleep promoted   single patient room provided  Taken 11/18/2024 2200 by Sherlyn Salgado RN  Infection Prevention:   rest/sleep promoted   single patient room provided  Taken 11/18/2024 2040 by Sherlyn Salgado RN  Infection Prevention:   rest/sleep promoted   single patient room provided  Goal: Optimal Comfort and Wellbeing  Outcome: Progressing  Intervention: Monitor Pain and Promote Comfort  Recent Flowsheet Documentation  Taken 11/19/2024 0259 by Sherlyn Salgado RN  Pain Management Interventions: pain medication given  Intervention: Provide Person-Centered Care  Recent Flowsheet Documentation  Taken 11/18/2024 2040 by Sherlyn Salgado RN  Trust Relationship/Rapport:   care explained   questions answered   reassurance  provided   thoughts/feelings acknowledged  Goal: Readiness for Transition of Care  Outcome: Progressing     Problem: Comorbidity Management  Goal: Blood Pressure in Desired Range  Outcome: Progressing  Intervention: Maintain Blood Pressure Management  Recent Flowsheet Documentation  Taken 11/18/2024 2040 by Sherlyn Salgado RN  Medication Review/Management: medications reviewed     Problem: Fall Injury Risk  Goal: Absence of Fall and Fall-Related Injury  Outcome: Progressing  Intervention: Identify and Manage Contributors  Recent Flowsheet Documentation  Taken 11/18/2024 2040 by Sherlyn Salgado RN  Medication Review/Management: medications reviewed  Intervention: Promote Injury-Free Environment  Recent Flowsheet Documentation  Taken 11/19/2024 0400 by Sherlyn Salgado RN  Safety Promotion/Fall Prevention:   activity supervised   assistive device/personal items within reach   clutter free environment maintained   fall prevention program maintained   gait belt   lighting adjusted   nonskid shoes/slippers when out of bed   room organization consistent   safety round/check completed  Taken 11/19/2024 0200 by Sherlyn Salgado RN  Safety Promotion/Fall Prevention:   activity supervised   assistive device/personal items within reach   clutter free environment maintained   fall prevention program maintained   gait belt   lighting adjusted   nonskid shoes/slippers when out of bed   room organization consistent   safety round/check completed  Taken 11/19/2024 0000 by Sherlyn Salgado RN  Safety Promotion/Fall Prevention:   activity supervised   assistive device/personal items within reach   clutter free environment maintained   fall prevention program maintained   gait belt   lighting adjusted   nonskid shoes/slippers when out of bed   room organization consistent   safety round/check completed  Taken 11/18/2024 2200 by Sherlyn Salgado RN  Safety Promotion/Fall Prevention:   activity supervised   assistive device/personal  items within reach   clutter free environment maintained   fall prevention program maintained   gait belt   lighting adjusted   nonskid shoes/slippers when out of bed   room organization consistent   safety round/check completed  Taken 11/18/2024 2040 by Sherlyn Salgado RN  Safety Promotion/Fall Prevention:   activity supervised   assistive device/personal items within reach   clutter free environment maintained   fall prevention program maintained   gait belt   lighting adjusted   nonskid shoes/slippers when out of bed   room organization consistent   safety round/check completed   Goal Outcome Evaluation:

## 2024-11-19 NOTE — PLAN OF CARE
Goal Outcome Evaluation:  Plan of Care Reviewed With: patient        Progress: improving  Outcome Evaluation: patient ambulated 56' with CGA x1 and rolling walker for support, verbal cues for walker placement and staying close to walker. Patient reports increased weight bearing on L LE and less pain. Further mobility limited by weakness and fatigue. Recommend home with assist and HHPT.

## 2024-11-19 NOTE — PROGRESS NOTES
[facility]  PROGRESS NOTE    Patient Name: Justice Kiser  : 1960  MRN: [id number]    Date of Admission: 2024  Primary Care Physician: Anshul Martinez MD    Subjective   Subjective     CC:  Follow up cellulitis     HPI:  Overall he is remained stable.  He continues to feel like the 1 spot on the lateral side of his ankle is just not getting any better.    Objective   Objective     Vital Signs:   Temp:  [97.7 °F (36.5 °C)] 97.7 °F (36.5 °C)  Heart Rate:  [50-78] 71  Resp:  [18] 18  BP: (124-133)/(57-74) 124/57     Physical Exam:  Constitutional: No acute distress, awake, alert, chronically ill appearing   HENT: NCAT, mucous membranes moist  Respiratory: grossly clear, respiratory effort normal on RA   Cardiovascular: RRR, no murmurs, cap refill brisk   Gastrointestinal: Positive bowel sounds, soft, nontender, nondistended  Musculoskeletal/SKin: LLE edema with circumferential erythema, erythema improved but with an area of fluctuance close to the lateral L ankle where the most erythematous area and tender area remain.  No change from yesterday's exam  Psychiatric: flat affect, cooperative  Neurologic: Oriented x 3, moves all extremities, speech clear      Results Reviewed:  LAB RESULTS:      Lab 24  0551 24  0603 11/15/24  0553 24  0550 24  0609 24  0608   WBC 9.00 8.77 7.47 9.20  --  8.14   HEMOGLOBIN 12.0* 11.2* 10.0* 10.3*  --  10.1*   HEMATOCRIT 37.3* 35.4* 31.0* 32.1*  --  30.9*   PLATELETS 203 189 140 146  --  112*   NEUTROS ABS  --  5.47 4.82 6.01  --  5.64   IMMATURE GRANS (ABS)  --  0.17* 0.10* 0.17*  --  0.06*   LYMPHS ABS  --  2.21 1.75 2.05  --  1.46   MONOS ABS  --  0.62 0.61 0.79  --  0.86   EOS ABS  --  0.22 0.16 0.14  --  0.09   MCV 88.4 88.7 87.6 88.2  --  87.8   CRP 3.09* 5.43* 13.86* 18.97* 22.25*  --    PROCALCITONIN  --   --   --   --  0.29*  --          Lab 24  0551 24  0603 11/15/24  0553  11/14/24  0550 11/13/24  1845 11/13/24  0609   SODIUM 139 136 135* 143  --  137   POTASSIUM 4.2 4.2 4.1 4.4 4.2 3.4*   CHLORIDE 106 103 103 110*  --  103   CO2 21.0* 21.0* 23.0 20.0*  --  21.0*   ANION GAP 12.0 12.0 9.0 13.0  --  13.0   BUN 17 14 11 12  --  12   CREATININE 0.78 0.74* 0.78 0.72*  --  0.81   EGFR 98.4 99.9 98.4 100.8  --  97.2   GLUCOSE 107* 116* 99 95  --  87   CALCIUM 8.8 8.7 8.1* 8.2*  --  8.0*   MAGNESIUM  --   --  1.8 1.9  --  1.7         Lab 11/15/24  0553 11/14/24  0550 11/13/24  0609   TOTAL PROTEIN 6.6 6.6 6.3   ALBUMIN 3.1* 3.2* 3.1*   GLOBULIN 3.5 3.4 3.2   ALT (SGPT) 19 18 19   AST (SGOT) 40 40 41*   BILIRUBIN 0.7 0.7 0.7   ALK PHOS 272* 225* 208*                     Brief Urine Lab Results       None            Microbiology Results Abnormal       None            US Nonvascular Extremity Limited    Result Date: 11/19/2024  US NONVASCULAR EXTREMITY LIMITED Date of Exam: 11/18/2024 6:01 PM EST Indication: abscess- LLE. Comparison: No comparisons available. Technique: Grayscale and color Doppler ultrasound evaluation of the left lower extremity was performed.  Real time scanning was performed with image documentation of the area of interest. Findings: There is diffuse subcutaneous edema and cutaneous thickening along the left lower extremity area of interest. There is a region of poorly defined complex fluid in the anterior lower leg measuring 3.2 x 0.4 x 4.6 cm. There is an additional region of poorly defined complex fluid along the lateral lower leg measuring 8.6 x 1.2 x 5.5 cm.     Impression: Impression: Two regions of poorly defined subcutaneous fluid within the anterior and lateral left lower leg measuring up to 4.6 cm and 8.6 cm respectively. In the setting of infection these could represent developing abscesses. No drainable fluid collection at this time. Background subcutaneous edema and cutaneous thickening compatible with cellulitis. Electronically Signed: Luis Angel Cordero MD   11/19/2024 8:27 AM EST  Workstation ID: [id number]     Results for orders placed during the hospital encounter of 10/08/24    Adult Transthoracic Echo Complete W/ Cont if Necessary Per Protocol    Interpretation Summary    Left ventricular systolic function is normal. Estimated left ventricular EF = 60%    Moderate mitral valve regurgitation is present.    Estimated right ventricular systolic pressure from tricuspid regurgitation is normal (<35 mmHg).      Current medications:  Scheduled Meds:aspirin, 81 mg, Oral, Daily  atorvastatin, 80 mg, Oral, Nightly  cefTRIAXone, 2,000 mg, Intravenous, Q24H  cholestyramine light, 1 packet, Oral, Q12H  donepezil, 10 mg, Oral, Nightly  doxycycline, 100 mg, Oral, Q12H  enoxaparin, 40 mg, Subcutaneous, Daily  lisinopril, 20 mg, Oral, Q24H   And  hydroCHLOROthiazide, 12.5 mg, Oral, Q24H  memantine, 10 mg, Oral, BID  metoprolol tartrate, 25 mg, Oral, BID  mirtazapine, 30 mg, Oral, Nightly  pantoprazole, 40 mg, Oral, Q AM  Psyllium, 1 packet, Oral, Daily  saccharomyces boulardii, 250 mg, Oral, BID  sertraline, 200 mg, Oral, Daily  sodium chloride, 10 mL, Intravenous, Q12H      Continuous Infusions:   PRN Meds:.  acetaminophen    Calcium Replacement - Follow Nurse / BPA Driven Protocol    Magnesium Standard Dose Replacement - Follow Nurse / BPA Driven Protocol    ondansetron    oxyCODONE    Phosphorus Replacement - Follow Nurse / BPA Driven Protocol    Potassium Replacement - Follow Nurse / BPA Driven Protocol    sodium chloride    sodium chloride    White Petrolatum    Assessment & Plan   Assessment & Plan     Active Hospital Problems    Diagnosis  POA    **Left leg cellulitis [L03.116]  Yes      Resolved Hospital Problems   No resolved problems to display.        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 male with CAD s/p PCI w/ DIS 2012, HTN, HLD, anxiety/depression, MCI who presents for LLE pain, redness and swelling.  MRI revealed no obvious osteo with evidence of  cellulitis and mild to moderate tendinopathy/partial thickness longitudinal tearing of the peroneus longus tendon. ID following and managing antibiotic regimen. Currently on Ceftriaxone and Doxycycline.       This patient's problems and plans were partially entered by my partner and updated as appropriate by me 11/19/24.     Left lower extremity cellulitis  Sepsis  -Joint fluid culture currently no growth to date   - Blood cultures no growth as well   - MRI of the ankle as above, cellulitis, and tendinopathy/partial tearing peroneus longus tendon   - ID following   - Continue Rocephin Doxy   -- Spoke with orthopedics, they would recommend MRI.  Tentative n.p.o. after midnight for possible OR if there is evidence of abscess development.       CAD s/p PCI w/ DEI  Hypertension  Hyperlipidemia  - Continue metoprolol, atorvastatin, lisinopril/HCTZ     Anxiety/depression  - Continue home Zoloft, Remeron     MCI  - Continue home donepezil, memantine     Expected Discharge Location and Transportation: home +/- home health  Expected Discharge   Expected Discharge Date: 11/19/2024; Expected Discharge Time:        VTE Prophylaxis:  Pharmacologic VTE prophylaxis orders are present.      AM-PAC 6 Clicks Score (PT): 20 (11/19/24 4232)    CODE STATUS:   Code Status and Medical Interventions: CPR (Attempt to Resuscitate); Full Support   Ordered at: 11/11/24 1943     Level Of Support Discussed With:    Patient     Code Status (Patient has no pulse and is not breathing):    CPR (Attempt to Resuscitate)     Medical Interventions (Patient has pulse or is breathing):    Full Support       Christine Diehl MD  11/19/24

## 2024-11-19 NOTE — PROGRESS NOTES
"[facility] Progress Note    Date of Admission: 2024      Antibiotics: ceft      CC:   Chief Complaint   Patient presents with    Leg Swelling       S: Pt with no fevers and decreased left ankle pain wbc and crp down and improved. Fluid collections on US.  O:  /57 (BP Location: Left arm, Patient Position: Sitting)   Pulse 71   Temp 97.7 °F (36.5 °C) (Oral)   Resp 18   Ht 162.6 cm (64\")   Wt 77.4 kg (170 lb 11.2 oz)   SpO2 94%   BMI 29.30 kg/m²   Temp (24hrs), Av.7 °F (36.5 °C), Min:97.7 °F (36.5 °C), Max:97.7 °F (36.5 °C)      PE:     GENERAL: Awake and alert, in no acute distress.   HEENT: Normocephalic, atraumatic.  PERRL. EOMI. No conjunctival injection. No icterus. Oropharynx clear without evidence of thrush or exudate. No evidence of periodontal disease.    NECK: Supple without nuchal rigidity  LYMPH: No cervical, axillary or inguinal lymphadenopathy. No neck masses  HEART: RRR; No murmur, rubs, gallops.   LUNGS: Clear to auscultation bilaterally without wheezing, rales, rhonchi. Normal respiratory effort.  ABDOMEN: Soft, nontender, nondistended. Positive bowel sounds. No rebound or guarding.   EXT:  No cyanosis, clubbing edema of left ankle  : Normal appearing genitalia without Lemus catheter.  MSK: Decreased range of motion of left ankle  SKIN: Left ankle with warmth to touch erythema and bruising induration and fluid palpated above ankle  NEURO: Oriented to PPT. No focal deficits.   PSYCHIATRIC: Normal insight and judgement. Cooperative with PE     Laboratory Data    Results from last 7 days   Lab Units 24  0551 24  0603 11/15/24  0553   WBC 10*3/mm3 9.00 8.77 7.47   HEMOGLOBIN g/dL 12.0* 11.2* 10.0*   HEMATOCRIT % 37.3* 35.4* 31.0*   PLATELETS 10*3/mm3 203 189 140     Results from last 7 days   Lab Units 24  0551   SODIUM mmol/L 139   POTASSIUM mmol/L 4.2   CHLORIDE mmol/L 106   CO2 mmol/L 21.0*   BUN mg/dL 17   CREATININE mg/dL 0.78   GLUCOSE mg/dL 107*   CALCIUM mg/dL " 8.8     Results from last 7 days   Lab Units 11/15/24  0553   ALK PHOS U/L 272*   BILIRUBIN mg/dL 0.7   ALT (SGPT) U/L 19   AST (SGOT) U/L 40           Results from last 7 days   Lab Units 11/18/24  0551   CRP mg/dL 3.09*       Estimated Creatinine Clearance: 87.6 mL/min (by C-G formula based on SCr of 0.78 mg/dL).      Microbiology:  neg    Radiology:  Imaging Results (Last 24 Hours)       Procedure Component Value Units Date/Time    US Nonvascular Extremity Limited [[id number]] Collected: 11/19/24 0814     Updated: 11/19/24 0830    Narrative:      US NONVASCULAR EXTREMITY LIMITED    Date of Exam: 11/18/2024 6:01 PM EST    Indication: abscess- LLE.    Comparison: No comparisons available.    Technique: Grayscale and color Doppler ultrasound evaluation of the left lower extremity was performed.  Real time scanning was performed with image documentation of the area of interest.      Findings:  There is diffuse subcutaneous edema and cutaneous thickening along the left lower extremity area of interest. There is a region of poorly defined complex fluid in the anterior lower leg measuring 3.2 x 0.4 x 4.6 cm. There is an additional region of   poorly defined complex fluid along the lateral lower leg measuring 8.6 x 1.2 x 5.5 cm.      Impression:      Impression:  Two regions of poorly defined subcutaneous fluid within the anterior and lateral left lower leg measuring up to 4.6 cm and 8.6 cm respectively. In the setting of infection these could represent developing abscesses. No drainable fluid collection at this   time. Background subcutaneous edema and cutaneous thickening compatible with cellulitis.      Electronically Signed: Luis Angel Cordero MD    11/19/2024 8:27 AM EST    Workstation ID: [id number]            PROBLEM LIST:   LLE acute post traumatic cellulitis  History of coronary disease  History of hypertension and hyperlipidemia  Fevers chills  Leukocytosis elevated inflammatory markers     ASSESSMENT:  Patient is a  64-year-old with history of trauma to the left ankle developed bruising soft tissue edema followed by increased pain swelling erythema of left ankle with acute cellulitis developing over the past few days warmth to touch erythema leukocytosis elevated inflammatory markers arthrocentesis did not reveal septic arthritis had received abx.      Improving with ceft and doxy and now with Wbc and crp down improving with abx, now reports of possible I and D of ankle with hematoma possible abscess development.     PLAN:    Continue ceftriaxone and doxy    Ceftriaxone iv as outpt then oral afterwards    Doxy 100 mg po bid x 14 days upon d/c    MRI of left ankle ordered and d/w Hospitalist team    I spent greater than 35 min on his case today with more than %50 time in counseling/coordination of care d/w pt review of imaging labs and d/w Hospitalist and pt on treatment plan.    Sebastian Quijano MD  11/19/2024

## 2024-11-19 NOTE — PLAN OF CARE
Problem: Adult Inpatient Plan of Care  Goal: Plan of Care Review  Outcome: Not Progressing  Flowsheets (Taken 11/19/2024 1618)  Progress: no change  Outcome Evaluation: Pleasant, cooperative, verbalizes understanding of POC. LLE continues to be red, hot, swollen--small black hailee-sized scab in lateral mallelous area. Sensitive to touch. VSS. NAD, although appreciating pain medication as ordered. Anticipate MRI later today, NPO after MN and possible I&D tomorrow. Continue IV ABX as ordered  Plan of Care Reviewed With: patient          Goal: Patient-Specific Goal (Individualized)  Outcome: Not Progressing  Goal: Absence of Hospital-Acquired Illness or Injury  Outcome: Not Progressing  Intervention: Identify and Manage Fall Risk  Recent Flowsheet Documentation  Taken 11/19/2024 1558 by Idalia Burch, RN  Safety Promotion/Fall Prevention:   activity supervised   assistive device/personal items within reach   clutter free environment maintained   nonskid shoes/slippers when out of bed   safety round/check completed   room organization consistent  Taken 11/19/2024 1400 by Idalia Burch, RN  Safety Promotion/Fall Prevention:   activity supervised   assistive device/personal items within reach   clutter free environment maintained   nonskid shoes/slippers when out of bed   room organization consistent   safety round/check completed  Taken 11/19/2024 1200 by Idalia Burch, RN  Safety Promotion/Fall Prevention:   activity supervised   assistive device/personal items within reach   clutter free environment maintained   nonskid shoes/slippers when out of bed   room organization consistent   safety round/check completed  Taken 11/19/2024 1000 by Idalia Burch, RN  Safety Promotion/Fall Prevention:   activity supervised   assistive device/personal items within reach   clutter free environment maintained   fall prevention program maintained   nonskid shoes/slippers when out of bed   safety round/check completed   room  organization consistent  Taken 11/19/2024 0808 by Idalia Burch RN  Safety Promotion/Fall Prevention:   activity supervised   assistive device/personal items within reach   clutter free environment maintained   nonskid shoes/slippers when out of bed   room organization consistent   safety round/check completed  Intervention: Prevent Skin Injury  Recent Flowsheet Documentation  Taken 11/19/2024 1558 by Idalia Burch RN  Body Position:   position changed independently   neutral body alignment   neutral head position   heels elevated   legs elevated  Skin Protection:   protective footwear used   transparent dressing maintained  Taken 11/19/2024 1400 by Idalia Burch RN  Body Position:   legs elevated   position changed independently  Skin Protection:   protective footwear used   transparent dressing maintained  Taken 11/19/2024 1200 by Idalia Burch RN  Body Position:   neutral body alignment   neutral head position   legs elevated  Skin Protection:   protective footwear used   transparent dressing maintained  Taken 11/19/2024 1000 by Idalia Burch RN  Body Position: position changed independently  Skin Protection:   protective footwear used   transparent dressing maintained  Taken 11/19/2024 0808 by Idalia Burch RN  Body Position:   position changed independently   sitting up in bed  Skin Protection: transparent dressing maintained  Intervention: Prevent and Manage VTE (Venous Thromboembolism) Risk  Recent Flowsheet Documentation  Taken 11/19/2024 1558 by Idalia Burch RN  VTE Prevention/Management: (SQ Lovenox) other (see comments)  Taken 11/19/2024 1200 by Idalia Burch RN  VTE Prevention/Management: (SQ lovenox) other (see comments)  Taken 11/19/2024 0808 by Idalia Burch RN  VTE Prevention/Management: (SQ Lovenox) other (see comments)  Intervention: Prevent Infection  Recent Flowsheet Documentation  Taken 11/19/2024 1558 by Idalia Burch RN  Infection Prevention:   environmental surveillance performed    equipment surfaces disinfected   hand hygiene promoted   rest/sleep promoted   single patient room provided  Taken 11/19/2024 1400 by Idalia Burch RN  Infection Prevention:   environmental surveillance performed   equipment surfaces disinfected   hand hygiene promoted   rest/sleep promoted   single patient room provided  Taken 11/19/2024 1200 by Idalia Burch RN  Infection Prevention:   environmental surveillance performed   equipment surfaces disinfected   hand hygiene promoted   rest/sleep promoted   single patient room provided  Taken 11/19/2024 1000 by Idalia Burch RN  Infection Prevention:   environmental surveillance performed   equipment surfaces disinfected   hand hygiene promoted   rest/sleep promoted   single patient room provided  Taken 11/19/2024 0808 by Idalia Burch RN  Infection Prevention:   environmental surveillance performed   equipment surfaces disinfected   hand hygiene promoted   rest/sleep promoted   single patient room provided  Goal: Optimal Comfort and Wellbeing  Outcome: Not Progressing  Intervention: Monitor Pain and Promote Comfort  Recent Flowsheet Documentation  Taken 11/19/2024 1558 by Idalia Burch RN  Pain Management Interventions:   quiet environment facilitated   pillow support provided   position adjusted  Taken 11/19/2024 1400 by Idalia Burch RN  Pain Management Interventions:   quiet environment facilitated   no interventions per patient request  Taken 11/19/2024 1303 by Idalia Burch RN  Pain Management Interventions: pain medication given  Taken 11/19/2024 1200 by Idalia Burch RN  Pain Management Interventions:   pain management plan reviewed with patient/caregiver   position adjusted   pillow support provided  Taken 11/19/2024 1000 by Idalia Burch RN  Pain Management Interventions:   pillow support provided   position adjusted  Taken 11/19/2024 0808 by Idalia Burch RN  Pain Management Interventions:   pain management plan reviewed with patient/caregiver    position adjusted  Intervention: Provide Person-Centered Care  Recent Flowsheet Documentation  Taken 11/19/2024 1558 by Idalia Burch, RN  Trust Relationship/Rapport:   care explained   choices provided   emotional support provided   empathic listening provided   questions answered   questions encouraged   reassurance provided   thoughts/feelings acknowledged  Taken 11/19/2024 1400 by Idalia Burch, RN  Trust Relationship/Rapport:   care explained   choices provided   emotional support provided   empathic listening provided   questions answered   questions encouraged   reassurance provided   thoughts/feelings acknowledged  Taken 11/19/2024 1200 by Idalia Burch, RN  Trust Relationship/Rapport:   care explained   choices provided   emotional support provided   empathic listening provided   questions answered   questions encouraged   reassurance provided   thoughts/feelings acknowledged  Taken 11/19/2024 1000 by Idalia Burch, RN  Trust Relationship/Rapport:   care explained   choices provided   emotional support provided   empathic listening provided   questions answered   questions encouraged   reassurance provided   thoughts/feelings acknowledged  Taken 11/19/2024 0808 by Idalia Burch, RN  Trust Relationship/Rapport:   choices provided   care explained   empathic listening provided   emotional support provided   questions answered   questions encouraged   reassurance provided   thoughts/feelings acknowledged  Goal: Readiness for Transition of Care  Outcome: Not Progressing   Goal Outcome Evaluation:  Plan of Care Reviewed With: patient        Progress: no change  Outcome Evaluation: Pleasant, cooperative, verbalizes understanding of POC. LLE continues to be red, hot, swollen--small black hailee-sized scab in lateral mallelous area. Sensitive to touch. VSS. NAD, although appreciating pain medication as ordered. Anticipate MRI later today, NPO after MN and possible I&D tomorrow. Continue IV ABX as ordered

## 2024-11-20 ENCOUNTER — ANESTHESIA EVENT (OUTPATIENT)
Dept: PERIOP | Facility: HOSPITAL | Age: 64
End: 2024-11-20
Payer: MEDICARE

## 2024-11-20 ENCOUNTER — ANESTHESIA (OUTPATIENT)
Dept: PERIOP | Facility: HOSPITAL | Age: 64
End: 2024-11-20
Payer: MEDICARE

## 2024-11-20 LAB
ANION GAP SERPL CALCULATED.3IONS-SCNC: 12 MMOL/L (ref 5–15)
BASOPHILS # BLD AUTO: 0.05 10*3/MM3 (ref 0–0.2)
BASOPHILS NFR BLD AUTO: 0.6 % (ref 0–1.5)
BUN SERPL-MCNC: 22 MG/DL (ref 8–23)
BUN/CREAT SERPL: 28.6 (ref 7–25)
CALCIUM SPEC-SCNC: 8.4 MG/DL (ref 8.6–10.5)
CHLORIDE SERPL-SCNC: 106 MMOL/L (ref 98–107)
CK SERPL-CCNC: 46 U/L (ref 20–200)
CO2 SERPL-SCNC: 18 MMOL/L (ref 22–29)
CREAT SERPL-MCNC: 0.77 MG/DL (ref 0.76–1.27)
CRP SERPL-MCNC: 0.99 MG/DL (ref 0–0.5)
DEPRECATED RDW RBC AUTO: 51.3 FL (ref 37–54)
EGFRCR SERPLBLD CKD-EPI 2021: 98.7 ML/MIN/1.73
EOSINOPHIL # BLD AUTO: 0.15 10*3/MM3 (ref 0–0.4)
EOSINOPHIL NFR BLD AUTO: 1.9 % (ref 0.3–6.2)
ERYTHROCYTE [DISTWIDTH] IN BLOOD BY AUTOMATED COUNT: 15.9 % (ref 12.3–15.4)
GLUCOSE SERPL-MCNC: 102 MG/DL (ref 65–99)
HCT VFR BLD AUTO: 35.5 % (ref 37.5–51)
HGB BLD-MCNC: 11.6 G/DL (ref 13–17.7)
IMM GRANULOCYTES # BLD AUTO: 0.07 10*3/MM3 (ref 0–0.05)
IMM GRANULOCYTES NFR BLD AUTO: 0.9 % (ref 0–0.5)
INR PPP: 1.3 (ref 0.89–1.12)
LYMPHOCYTES # BLD AUTO: 2.51 10*3/MM3 (ref 0.7–3.1)
LYMPHOCYTES NFR BLD AUTO: 31.7 % (ref 19.6–45.3)
MCH RBC QN AUTO: 28.7 PG (ref 26.6–33)
MCHC RBC AUTO-ENTMCNC: 32.7 G/DL (ref 31.5–35.7)
MCV RBC AUTO: 87.9 FL (ref 79–97)
MONOCYTES # BLD AUTO: 0.64 10*3/MM3 (ref 0.1–0.9)
MONOCYTES NFR BLD AUTO: 8.1 % (ref 5–12)
NEUTROPHILS NFR BLD AUTO: 4.51 10*3/MM3 (ref 1.7–7)
NEUTROPHILS NFR BLD AUTO: 56.8 % (ref 42.7–76)
NRBC BLD AUTO-RTO: 0 /100 WBC (ref 0–0.2)
PLATELET # BLD AUTO: 220 10*3/MM3 (ref 140–450)
PMV BLD AUTO: 10.5 FL (ref 6–12)
POTASSIUM SERPL-SCNC: 4.5 MMOL/L (ref 3.5–5.2)
PROTHROMBIN TIME: 16.3 SECONDS (ref 12.2–14.5)
RBC # BLD AUTO: 4.04 10*6/MM3 (ref 4.14–5.8)
SODIUM SERPL-SCNC: 136 MMOL/L (ref 136–145)
WBC NRBC COR # BLD AUTO: 7.93 10*3/MM3 (ref 3.4–10.8)

## 2024-11-20 PROCEDURE — 25010000002 PROPOFOL 10 MG/ML EMULSION: Performed by: NURSE ANESTHETIST, CERTIFIED REGISTERED

## 2024-11-20 PROCEDURE — 99232 SBSQ HOSP IP/OBS MODERATE 35: CPT | Performed by: PEDIATRICS

## 2024-11-20 PROCEDURE — 87070 CULTURE OTHR SPECIMN AEROBIC: CPT | Performed by: ORTHOPAEDIC SURGERY

## 2024-11-20 PROCEDURE — 88305 TISSUE EXAM BY PATHOLOGIST: CPT | Performed by: ORTHOPAEDIC SURGERY

## 2024-11-20 PROCEDURE — 25010000002 FENTANYL CITRATE (PF) 50 MCG/ML SOLUTION

## 2024-11-20 PROCEDURE — 87205 SMEAR GRAM STAIN: CPT | Performed by: ORTHOPAEDIC SURGERY

## 2024-11-20 PROCEDURE — 85025 COMPLETE CBC W/AUTO DIFF WBC: CPT | Performed by: INTERNAL MEDICINE

## 2024-11-20 PROCEDURE — 82550 ASSAY OF CK (CPK): CPT | Performed by: INTERNAL MEDICINE

## 2024-11-20 PROCEDURE — 25010000002 MIDAZOLAM PER 1 MG: Performed by: NURSE ANESTHETIST, CERTIFIED REGISTERED

## 2024-11-20 PROCEDURE — 25010000002 CEFAZOLIN PER 500 MG: Performed by: ORTHOPAEDIC SURGERY

## 2024-11-20 PROCEDURE — 25010000002 ENOXAPARIN PER 10 MG: Performed by: ORTHOPAEDIC SURGERY

## 2024-11-20 PROCEDURE — 86140 C-REACTIVE PROTEIN: CPT | Performed by: INTERNAL MEDICINE

## 2024-11-20 PROCEDURE — 25010000002 HYDROMORPHONE PER 4 MG: Performed by: ORTHOPAEDIC SURGERY

## 2024-11-20 PROCEDURE — 25010000002 HYDROMORPHONE 1 MG/ML SOLUTION

## 2024-11-20 PROCEDURE — 87102 FUNGUS ISOLATION CULTURE: CPT | Performed by: ORTHOPAEDIC SURGERY

## 2024-11-20 PROCEDURE — 25010000002 CEFTRIAXONE PER 250 MG: Performed by: INTERNAL MEDICINE

## 2024-11-20 PROCEDURE — 87206 SMEAR FLUORESCENT/ACID STAI: CPT | Performed by: ORTHOPAEDIC SURGERY

## 2024-11-20 PROCEDURE — 25010000002 LIDOCAINE PF 1% 1 % SOLUTION: Performed by: NURSE ANESTHETIST, CERTIFIED REGISTERED

## 2024-11-20 PROCEDURE — 87176 TISSUE HOMOGENIZATION CULTR: CPT | Performed by: ORTHOPAEDIC SURGERY

## 2024-11-20 PROCEDURE — 25810000003 LACTATED RINGERS PER 1000 ML: Performed by: STUDENT IN AN ORGANIZED HEALTH CARE EDUCATION/TRAINING PROGRAM

## 2024-11-20 PROCEDURE — 25010000002 ONDANSETRON PER 1 MG: Performed by: NURSE ANESTHETIST, CERTIFIED REGISTERED

## 2024-11-20 PROCEDURE — 0J9P0ZZ DRAINAGE OF LEFT LOWER LEG SUBCUTANEOUS TISSUE AND FASCIA, OPEN APPROACH: ICD-10-PCS | Performed by: ORTHOPAEDIC SURGERY

## 2024-11-20 PROCEDURE — 85610 PROTHROMBIN TIME: CPT | Performed by: INTERNAL MEDICINE

## 2024-11-20 PROCEDURE — 25010000002 FENTANYL CITRATE (PF) 100 MCG/2ML SOLUTION: Performed by: NURSE ANESTHETIST, CERTIFIED REGISTERED

## 2024-11-20 PROCEDURE — 25810000003 LACTATED RINGERS PER 1000 ML: Performed by: NURSE ANESTHETIST, CERTIFIED REGISTERED

## 2024-11-20 PROCEDURE — 87116 MYCOBACTERIA CULTURE: CPT | Performed by: ORTHOPAEDIC SURGERY

## 2024-11-20 PROCEDURE — 87075 CULTR BACTERIA EXCEPT BLOOD: CPT | Performed by: ORTHOPAEDIC SURGERY

## 2024-11-20 PROCEDURE — 25810000003 LACTATED RINGERS PER 1000 ML: Performed by: ORTHOPAEDIC SURGERY

## 2024-11-20 PROCEDURE — 80048 BASIC METABOLIC PNL TOTAL CA: CPT | Performed by: INTERNAL MEDICINE

## 2024-11-20 RX ORDER — FENTANYL CITRATE 50 UG/ML
INJECTION, SOLUTION INTRAMUSCULAR; INTRAVENOUS
Status: COMPLETED
Start: 2024-11-20 | End: 2024-11-20

## 2024-11-20 RX ORDER — SODIUM CHLORIDE, SODIUM LACTATE, POTASSIUM CHLORIDE, CALCIUM CHLORIDE 600; 310; 30; 20 MG/100ML; MG/100ML; MG/100ML; MG/100ML
100 INJECTION, SOLUTION INTRAVENOUS CONTINUOUS
Status: ACTIVE | OUTPATIENT
Start: 2024-11-20 | End: 2024-11-21

## 2024-11-20 RX ORDER — PROMETHAZINE HYDROCHLORIDE 25 MG/1
25 TABLET ORAL ONCE AS NEEDED
Status: DISCONTINUED | OUTPATIENT
Start: 2024-11-20 | End: 2024-11-20 | Stop reason: HOSPADM

## 2024-11-20 RX ORDER — DIPHENHYDRAMINE HYDROCHLORIDE 50 MG/ML
25 INJECTION INTRAMUSCULAR; INTRAVENOUS EVERY 6 HOURS PRN
Status: DISCONTINUED | OUTPATIENT
Start: 2024-11-20 | End: 2024-11-22 | Stop reason: HOSPADM

## 2024-11-20 RX ORDER — ONDANSETRON 2 MG/ML
4 INJECTION INTRAMUSCULAR; INTRAVENOUS EVERY 6 HOURS PRN
Status: DISCONTINUED | OUTPATIENT
Start: 2024-11-20 | End: 2024-11-22 | Stop reason: HOSPADM

## 2024-11-20 RX ORDER — IPRATROPIUM BROMIDE AND ALBUTEROL SULFATE 2.5; .5 MG/3ML; MG/3ML
3 SOLUTION RESPIRATORY (INHALATION) ONCE AS NEEDED
Status: DISCONTINUED | OUTPATIENT
Start: 2024-11-20 | End: 2024-11-20 | Stop reason: HOSPADM

## 2024-11-20 RX ORDER — ACETAMINOPHEN 500 MG
1000 TABLET ORAL EVERY 8 HOURS
Status: DISCONTINUED | OUTPATIENT
Start: 2024-11-20 | End: 2024-11-22 | Stop reason: HOSPADM

## 2024-11-20 RX ORDER — SODIUM CHLORIDE, SODIUM LACTATE, POTASSIUM CHLORIDE, CALCIUM CHLORIDE 600; 310; 30; 20 MG/100ML; MG/100ML; MG/100ML; MG/100ML
INJECTION, SOLUTION INTRAVENOUS CONTINUOUS PRN
Status: DISCONTINUED | OUTPATIENT
Start: 2024-11-20 | End: 2024-11-20 | Stop reason: SURG

## 2024-11-20 RX ORDER — OXYCODONE HYDROCHLORIDE 10 MG/1
10 TABLET ORAL EVERY 4 HOURS PRN
Status: DISCONTINUED | OUTPATIENT
Start: 2024-11-20 | End: 2024-11-22

## 2024-11-20 RX ORDER — HYDRALAZINE HYDROCHLORIDE 20 MG/ML
5 INJECTION INTRAMUSCULAR; INTRAVENOUS
Status: DISCONTINUED | OUTPATIENT
Start: 2024-11-20 | End: 2024-11-20 | Stop reason: HOSPADM

## 2024-11-20 RX ORDER — ETOMIDATE 2 MG/ML
INJECTION INTRAVENOUS AS NEEDED
Status: DISCONTINUED | OUTPATIENT
Start: 2024-11-20 | End: 2024-11-20 | Stop reason: SURG

## 2024-11-20 RX ORDER — DROPERIDOL 2.5 MG/ML
0.62 INJECTION, SOLUTION INTRAMUSCULAR; INTRAVENOUS ONCE AS NEEDED
Status: DISCONTINUED | OUTPATIENT
Start: 2024-11-20 | End: 2024-11-20 | Stop reason: HOSPADM

## 2024-11-20 RX ORDER — NALOXONE HCL 0.4 MG/ML
0.4 VIAL (ML) INJECTION AS NEEDED
Status: DISCONTINUED | OUTPATIENT
Start: 2024-11-20 | End: 2024-11-20 | Stop reason: HOSPADM

## 2024-11-20 RX ORDER — MIDAZOLAM HYDROCHLORIDE 1 MG/ML
0.5 INJECTION, SOLUTION INTRAMUSCULAR; INTRAVENOUS
Status: DISCONTINUED | OUTPATIENT
Start: 2024-11-20 | End: 2024-11-20 | Stop reason: HOSPADM

## 2024-11-20 RX ORDER — TRAMADOL HYDROCHLORIDE 50 MG/1
50 TABLET ORAL EVERY 8 HOURS PRN
Status: DISCONTINUED | OUTPATIENT
Start: 2024-11-20 | End: 2024-11-22

## 2024-11-20 RX ORDER — SODIUM CHLORIDE 0.9 % (FLUSH) 0.9 %
3-10 SYRINGE (ML) INJECTION AS NEEDED
Status: DISCONTINUED | OUTPATIENT
Start: 2024-11-20 | End: 2024-11-20 | Stop reason: HOSPADM

## 2024-11-20 RX ORDER — SODIUM CHLORIDE 9 MG/ML
9 INJECTION, SOLUTION INTRAVENOUS AS NEEDED
Status: DISCONTINUED | OUTPATIENT
Start: 2024-11-20 | End: 2024-11-20 | Stop reason: HOSPADM

## 2024-11-20 RX ORDER — SODIUM CHLORIDE 9 MG/ML
40 INJECTION, SOLUTION INTRAVENOUS AS NEEDED
Status: DISCONTINUED | OUTPATIENT
Start: 2024-11-20 | End: 2024-11-20 | Stop reason: HOSPADM

## 2024-11-20 RX ORDER — FENTANYL CITRATE 50 UG/ML
INJECTION, SOLUTION INTRAMUSCULAR; INTRAVENOUS AS NEEDED
Status: DISCONTINUED | OUTPATIENT
Start: 2024-11-20 | End: 2024-11-20 | Stop reason: SURG

## 2024-11-20 RX ORDER — SODIUM CHLORIDE, SODIUM LACTATE, POTASSIUM CHLORIDE, CALCIUM CHLORIDE 600; 310; 30; 20 MG/100ML; MG/100ML; MG/100ML; MG/100ML
9 INJECTION, SOLUTION INTRAVENOUS ONCE
Status: COMPLETED | OUTPATIENT
Start: 2024-11-20 | End: 2024-11-20

## 2024-11-20 RX ORDER — LIDOCAINE HYDROCHLORIDE 10 MG/ML
INJECTION, SOLUTION EPIDURAL; INFILTRATION; INTRACAUDAL; PERINEURAL AS NEEDED
Status: DISCONTINUED | OUTPATIENT
Start: 2024-11-20 | End: 2024-11-20 | Stop reason: SURG

## 2024-11-20 RX ORDER — SODIUM CHLORIDE 0.9 % (FLUSH) 0.9 %
3 SYRINGE (ML) INJECTION EVERY 12 HOURS SCHEDULED
Status: DISCONTINUED | OUTPATIENT
Start: 2024-11-20 | End: 2024-11-20 | Stop reason: HOSPADM

## 2024-11-20 RX ORDER — OXYCODONE HYDROCHLORIDE 5 MG/1
5 TABLET ORAL EVERY 4 HOURS PRN
Status: DISCONTINUED | OUTPATIENT
Start: 2024-11-20 | End: 2024-11-22

## 2024-11-20 RX ORDER — NALOXONE HCL 0.4 MG/ML
0.1 VIAL (ML) INJECTION
Status: DISCONTINUED | OUTPATIENT
Start: 2024-11-20 | End: 2024-11-22 | Stop reason: HOSPADM

## 2024-11-20 RX ORDER — ONDANSETRON 2 MG/ML
INJECTION INTRAMUSCULAR; INTRAVENOUS AS NEEDED
Status: DISCONTINUED | OUTPATIENT
Start: 2024-11-20 | End: 2024-11-20 | Stop reason: SURG

## 2024-11-20 RX ORDER — HYDROMORPHONE HYDROCHLORIDE 1 MG/ML
0.5 INJECTION, SOLUTION INTRAMUSCULAR; INTRAVENOUS; SUBCUTANEOUS
Status: DISCONTINUED | OUTPATIENT
Start: 2024-11-20 | End: 2024-11-22

## 2024-11-20 RX ORDER — MEPERIDINE HYDROCHLORIDE 25 MG/ML
12.5 INJECTION INTRAMUSCULAR; INTRAVENOUS; SUBCUTANEOUS
Status: DISCONTINUED | OUTPATIENT
Start: 2024-11-20 | End: 2024-11-20 | Stop reason: HOSPADM

## 2024-11-20 RX ORDER — HYDROCODONE BITARTRATE AND ACETAMINOPHEN 5; 325 MG/1; MG/1
1 TABLET ORAL ONCE AS NEEDED
Status: DISCONTINUED | OUTPATIENT
Start: 2024-11-20 | End: 2024-11-20 | Stop reason: HOSPADM

## 2024-11-20 RX ORDER — EPHEDRINE SULFATE 50 MG/ML
INJECTION INTRAVENOUS AS NEEDED
Status: DISCONTINUED | OUTPATIENT
Start: 2024-11-20 | End: 2024-11-20 | Stop reason: SURG

## 2024-11-20 RX ORDER — SODIUM CHLORIDE 0.9 % (FLUSH) 0.9 %
10 SYRINGE (ML) INJECTION AS NEEDED
Status: DISCONTINUED | OUTPATIENT
Start: 2024-11-20 | End: 2024-11-20 | Stop reason: HOSPADM

## 2024-11-20 RX ORDER — HYDROMORPHONE HYDROCHLORIDE 1 MG/ML
0.5 INJECTION, SOLUTION INTRAMUSCULAR; INTRAVENOUS; SUBCUTANEOUS
Status: DISCONTINUED | OUTPATIENT
Start: 2024-11-20 | End: 2024-11-20 | Stop reason: HOSPADM

## 2024-11-20 RX ORDER — ONDANSETRON 4 MG/1
4 TABLET, ORALLY DISINTEGRATING ORAL EVERY 6 HOURS PRN
Status: DISCONTINUED | OUTPATIENT
Start: 2024-11-20 | End: 2024-11-22 | Stop reason: HOSPADM

## 2024-11-20 RX ORDER — SODIUM CHLORIDE, SODIUM LACTATE, POTASSIUM CHLORIDE, CALCIUM CHLORIDE 600; 310; 30; 20 MG/100ML; MG/100ML; MG/100ML; MG/100ML
9 INJECTION, SOLUTION INTRAVENOUS CONTINUOUS
Status: ACTIVE | OUTPATIENT
Start: 2024-11-20 | End: 2024-11-21

## 2024-11-20 RX ORDER — FAMOTIDINE 10 MG/ML
20 INJECTION, SOLUTION INTRAVENOUS
Status: COMPLETED | OUTPATIENT
Start: 2024-11-20 | End: 2024-11-20

## 2024-11-20 RX ORDER — PROMETHAZINE HYDROCHLORIDE 25 MG/1
25 SUPPOSITORY RECTAL ONCE AS NEEDED
Status: DISCONTINUED | OUTPATIENT
Start: 2024-11-20 | End: 2024-11-20 | Stop reason: HOSPADM

## 2024-11-20 RX ORDER — MAGNESIUM HYDROXIDE 1200 MG/15ML
LIQUID ORAL AS NEEDED
Status: DISCONTINUED | OUTPATIENT
Start: 2024-11-20 | End: 2024-11-20 | Stop reason: HOSPADM

## 2024-11-20 RX ORDER — PROPOFOL 10 MG/ML
VIAL (ML) INTRAVENOUS AS NEEDED
Status: DISCONTINUED | OUTPATIENT
Start: 2024-11-20 | End: 2024-11-20 | Stop reason: SURG

## 2024-11-20 RX ORDER — MIDAZOLAM HYDROCHLORIDE 1 MG/ML
INJECTION, SOLUTION INTRAMUSCULAR; INTRAVENOUS AS NEEDED
Status: DISCONTINUED | OUTPATIENT
Start: 2024-11-20 | End: 2024-11-20 | Stop reason: SURG

## 2024-11-20 RX ORDER — SODIUM CHLORIDE 0.9 % (FLUSH) 0.9 %
10 SYRINGE (ML) INJECTION EVERY 12 HOURS SCHEDULED
Status: DISCONTINUED | OUTPATIENT
Start: 2024-11-20 | End: 2024-11-20 | Stop reason: HOSPADM

## 2024-11-20 RX ORDER — ONDANSETRON 2 MG/ML
4 INJECTION INTRAMUSCULAR; INTRAVENOUS ONCE AS NEEDED
Status: DISCONTINUED | OUTPATIENT
Start: 2024-11-20 | End: 2024-11-20 | Stop reason: HOSPADM

## 2024-11-20 RX ORDER — FENTANYL CITRATE 50 UG/ML
50 INJECTION, SOLUTION INTRAMUSCULAR; INTRAVENOUS
Status: DISCONTINUED | OUTPATIENT
Start: 2024-11-20 | End: 2024-11-20 | Stop reason: HOSPADM

## 2024-11-20 RX ORDER — DROPERIDOL 2.5 MG/ML
0.62 INJECTION, SOLUTION INTRAMUSCULAR; INTRAVENOUS
Status: DISCONTINUED | OUTPATIENT
Start: 2024-11-20 | End: 2024-11-20 | Stop reason: HOSPADM

## 2024-11-20 RX ORDER — LABETALOL HYDROCHLORIDE 5 MG/ML
5 INJECTION, SOLUTION INTRAVENOUS
Status: DISCONTINUED | OUTPATIENT
Start: 2024-11-20 | End: 2024-11-20 | Stop reason: HOSPADM

## 2024-11-20 RX ORDER — DIPHENHYDRAMINE HCL 25 MG
25 CAPSULE ORAL EVERY 6 HOURS PRN
Status: DISCONTINUED | OUTPATIENT
Start: 2024-11-20 | End: 2024-11-22 | Stop reason: HOSPADM

## 2024-11-20 RX ADMIN — Medication 250 MG: at 17:24

## 2024-11-20 RX ADMIN — MEMANTINE 10 MG: 10 TABLET ORAL at 20:39

## 2024-11-20 RX ADMIN — EPHEDRINE SULFATE 15 MG: 50 INJECTION INTRAVENOUS at 15:04

## 2024-11-20 RX ADMIN — SODIUM CHLORIDE 2 G: 900 INJECTION INTRAVENOUS at 14:19

## 2024-11-20 RX ADMIN — ACETAMINOPHEN 1000 MG: 500 TABLET ORAL at 17:32

## 2024-11-20 RX ADMIN — AVOBENZONE, HOMOSALATE, OCTISALATE, OCTOCRYLENE, AND OXYBENZONE 1 PACKET: 29.4; 147; 49; 25.4; 58.8 LOTION TOPICAL at 17:25

## 2024-11-20 RX ADMIN — FENTANYL CITRATE 50 MCG: 50 INJECTION, SOLUTION INTRAMUSCULAR; INTRAVENOUS at 15:42

## 2024-11-20 RX ADMIN — PROPOFOL 60 MG: 10 INJECTION, EMULSION INTRAVENOUS at 14:29

## 2024-11-20 RX ADMIN — SODIUM CHLORIDE, POTASSIUM CHLORIDE, SODIUM LACTATE AND CALCIUM CHLORIDE: 600; 310; 30; 20 INJECTION, SOLUTION INTRAVENOUS at 13:56

## 2024-11-20 RX ADMIN — EPHEDRINE SULFATE 15 MG: 50 INJECTION INTRAVENOUS at 15:01

## 2024-11-20 RX ADMIN — OXYCODONE 5 MG: 5 TABLET ORAL at 17:25

## 2024-11-20 RX ADMIN — Medication 10 ML: at 17:27

## 2024-11-20 RX ADMIN — PROPOFOL 50 MG: 10 INJECTION, EMULSION INTRAVENOUS at 14:34

## 2024-11-20 RX ADMIN — SODIUM CHLORIDE, SODIUM LACTATE, POTASSIUM CHLORIDE, CALCIUM CHLORIDE 100 ML/HR: 20; 30; 600; 310 INJECTION, SOLUTION INTRAVENOUS at 17:33

## 2024-11-20 RX ADMIN — SERTRALINE 200 MG: 100 TABLET, FILM COATED ORAL at 17:23

## 2024-11-20 RX ADMIN — ENOXAPARIN SODIUM 40 MG: 100 INJECTION SUBCUTANEOUS at 17:25

## 2024-11-20 RX ADMIN — HYDROMORPHONE HYDROCHLORIDE 0.5 MG: 1 INJECTION, SOLUTION INTRAMUSCULAR; INTRAVENOUS; SUBCUTANEOUS at 21:54

## 2024-11-20 RX ADMIN — DOXYCYCLINE 100 MG: 100 CAPSULE ORAL at 20:44

## 2024-11-20 RX ADMIN — EPHEDRINE SULFATE 15 MG: 50 INJECTION INTRAVENOUS at 15:08

## 2024-11-20 RX ADMIN — EPHEDRINE SULFATE 10 MG: 50 INJECTION INTRAVENOUS at 14:41

## 2024-11-20 RX ADMIN — Medication 250 MG: at 20:44

## 2024-11-20 RX ADMIN — EPHEDRINE SULFATE 15 MG: 50 INJECTION INTRAVENOUS at 14:48

## 2024-11-20 RX ADMIN — METOPROLOL TARTRATE 25 MG: 25 TABLET, FILM COATED ORAL at 17:23

## 2024-11-20 RX ADMIN — DOXYCYCLINE 100 MG: 100 CAPSULE ORAL at 17:23

## 2024-11-20 RX ADMIN — MIRTAZAPINE 30 MG: 15 TABLET, FILM COATED ORAL at 20:38

## 2024-11-20 RX ADMIN — ASPIRIN 81 MG: 81 TABLET, COATED ORAL at 17:23

## 2024-11-20 RX ADMIN — DONEPEZIL HYDROCHLORIDE 10 MG: 10 TABLET, FILM COATED ORAL at 20:38

## 2024-11-20 RX ADMIN — Medication 10 ML: at 20:44

## 2024-11-20 RX ADMIN — HYDROMORPHONE HYDROCHLORIDE 0.5 MG: 1 INJECTION, SOLUTION INTRAMUSCULAR; INTRAVENOUS; SUBCUTANEOUS at 15:55

## 2024-11-20 RX ADMIN — ETOMIDATE 10 MG: 40 INJECTION, SOLUTION INTRAVENOUS at 14:28

## 2024-11-20 RX ADMIN — SODIUM CHLORIDE, SODIUM LACTATE, POTASSIUM CHLORIDE, CALCIUM CHLORIDE 9 ML/HR: 20; 30; 600; 310 INJECTION, SOLUTION INTRAVENOUS at 13:35

## 2024-11-20 RX ADMIN — FAMOTIDINE 20 MG: 10 INJECTION, SOLUTION INTRAVENOUS at 13:35

## 2024-11-20 RX ADMIN — EPHEDRINE SULFATE 10 MG: 50 INJECTION INTRAVENOUS at 14:44

## 2024-11-20 RX ADMIN — MIDAZOLAM HYDROCHLORIDE 2 MG: 1 INJECTION, SOLUTION INTRAMUSCULAR; INTRAVENOUS at 14:18

## 2024-11-20 RX ADMIN — ATORVASTATIN CALCIUM 80 MG: 40 TABLET, FILM COATED ORAL at 20:39

## 2024-11-20 RX ADMIN — ONDANSETRON 4 MG: 2 INJECTION INTRAMUSCULAR; INTRAVENOUS at 14:54

## 2024-11-20 RX ADMIN — SODIUM CHLORIDE 2 G: 900 INJECTION INTRAVENOUS at 20:43

## 2024-11-20 RX ADMIN — LIDOCAINE HYDROCHLORIDE 100 MG: 10 INJECTION, SOLUTION EPIDURAL; INFILTRATION; INTRACAUDAL; PERINEURAL at 14:27

## 2024-11-20 RX ADMIN — SODIUM CHLORIDE 2000 MG: 900 INJECTION INTRAVENOUS at 09:05

## 2024-11-20 RX ADMIN — LISINOPRIL 20 MG: 20 TABLET ORAL at 17:24

## 2024-11-20 RX ADMIN — FENTANYL CITRATE 100 MCG: 50 INJECTION, SOLUTION INTRAMUSCULAR; INTRAVENOUS at 14:25

## 2024-11-20 NOTE — PROGRESS NOTES
"Enter Query Response Below      Query Response: No evidence of CHF.    Electronically signed by Christine Diehl MD, 24, 1:31 PM EST.               If applicable, please update the problem list.     Patient: Justice Kiser        : 1960  Account: [id number]           Admit Date:         How to Respond to this query:       a. Click New Note     b. Answer query within the yellow box.                c. Update the Problem List, if applicable.      If you have any questions about this query contact me at: [email or web address]     ,     Risk Factors: 64-year-old male with a history of CHF (congestive heart failure), MRSA, myocardial infarction, HTN and CAD.  Clinical Indicators/treatment: Presented on  with redness and swelling of left lower extremity and found to be septic due to cellulitis. Per H&P. \"ECHO (10/8/24), Left ventricular systolic function is normal. Estimated left ventricular EF = 60%.\" Home medications include:  aspirin, atorvastatin, lisinopril-hydrochlorothiazide, metoprolol tartrate. No proBNP this encounter.     Please clarify the type of heart failure treated/monitored:    Chronic diastolic heart failure/HFpEF  Other- specify_________    By submitting this query, we are merely seeking further clarification of documentation to accurately reflect all conditions that you are monitoring, evaluating, treating or that extend the hospitalization or utilize additional resources of care. Please utilize your independent clinical judgment when addressing the question(s) above.     This query and your response, once completed, will be entered into the legal medical record.    Sincerely,  Glenis Santiago RN  Clinical Documentation Integrity Program     "

## 2024-11-20 NOTE — DISCHARGE INSTRUCTIONS
INCISION CARE Revision Hip and Knee:  Keep the Ace wrap covering your knee in place for the first 24 hours after surgery.  Then remove but leave the bandage in place until you are seen back in the office.  If you have not qualified for collagen dressings, please see specific wound care instructions attached with those dressings.  Okay to begin dressing changes once the packages are delivered to your home.   You have a sterile dressing in place. Only exchange the dressing if it becomes saturated (fluid draining out the sides of dressing) and notify the office. The dressing is water resistant. During the first 14 days after surgery, it is ok to shower. DO NOT scrub on or around the dressing. Do not submerge the dressing.  After 14 days, you can remove your dressing. You will have staples in place on your skin. It is OK to shower with the incision exposed. DO NOT scrub on or around the incision. DO NOT submerge the incision in pools, baths, or hot tubs for 1 month after surgery. Do not touch or pick at the incision. If you have any drainage from the incision after 7 days, cover the incision with sterile gauze and paper tape and alert the office.   Staples will be removed between 10-14 days postoperation.  This may be done by either the home health nurse, rehabilitation nurse, or during your return visit to Dr. Sidhu's office.  No creams or ointments to the incision for 1 month after surgery. After 1 month, it is recommended to massage the scar with vitamin E cream to help decrease scar formation.  Check incision every day and notify surgeon immediately if any of the following signs or symptoms are noted:  Increase in redness  Increase in swelling around the incision and of the entire extremity  Increase in pain  Drainage oozing from the incision  Pulling apart of the edges of the incision  Increase in overall body temperature (greater than 100.5 degrees)    Anticoagulants: You will be discharged on an anticoagulant.  This is a prophylactic medication that helps prevent blood clots during your post-operative period. Most patients will be on *** Aspirin 81 mg Enteric coated every 12 hours orally for 30 days. Some patients, due to increased risk factors, will need to be on a stronger anticoagulant. Dr. Sidhu will discuss this need with you.   While taking the anticoagulant, you should avoid taking any additional aspirin, and limit ibuprofen (Advil or Motrin), Aleve (Naprosyn) or other non-steroidal anti-inflammatory medications.   Notify your surgeon immediately if any rod bleeding is noted in the urine, stool, vomit, or from the nose or the incision. Blood in the stool will often appear as black rather than red. Blood in urine may appear as pink. Blood in vomit may appear as brown/black like coffee grounds.  You will need to apply pressure for longer periods of time to any cuts or abrasions to stop bleeding  Avoid alcohol while taking anticoagulants  Sequential Compression Device: You maybe be discharged home with a compression device that helps promote blood flow and prevent clots in your legs. Wear these at all times for the first two weeks.   Mobilization: The best way to avoid a blood clot is to get up and walk. 10 times a day get up and walk for 5 minutes for the first two weeks. Walking for longer periods of time will increase pain and swelling, making therapy more difficult. If taking any long travel (car or plane) in the first 1-2 months, be sure to get up and walk at least every one hour.  Driving can be resumed in 2 to 3 weeks once you have weaned off of a walker and onto a cane, and you are not taking any narcotic pain medication.     Stool Softeners: You will be at greater risk of constipation after surgery because of being less mobile and taking the pain medications.   Take stool softeners as instructed by your surgeon while on pain medications. Use over the counter Colace 100 mg 1-2 capsules twice daily.   If  stools become too loose or too frequent, decreases the dosage or stop the stool softener.  If constipation occurs despite use of stool softeners, you are to continue the stool softeners and add a laxative (Milk of Magnesia 1 ounce daily as needed).  Dulcolax oral tabs or suppository or a fleets enema can also be utilized for constipation and can be obtained over the counter.   If above interventions are unsuccessful in inducing bowel movements, please contact your family physician's office/surgeon's office.  Drink plenty of fluids and eat fruits and vegetables during your recovery time    Pain Medications utilized after surgery are narcotics and the law requires that the following information be given to all patients that are prescribed narcotics:  CLASSIFICATION: Pain medications are called Opioids and are narcotics  LEGALITIES: It is illegal to share narcotics with others and to drive within 24 hours of taking narcotics  POTENTIAL SIDE EFFECTS: Potential side effects of opioids include: nausea, vomiting, itching, dizziness, drowsiness, dry mouth, constipation, and difficulty urinating.  POTENTIAL ADVERSE EFFECTS:   Opioid tolerance can develop with use of pain medications and this simply means that it requires more and more of the medication to control pain; however, this is seen more in patients that use Opioids for longer periods of time.  Opioid dependence can develop with use of Opioids and this simply means that to stop the medication can cause withdrawal symptoms; however, this is seen with patients that use Opioids for longer periods of time.  Opioid addiction can develop with use of Opioids and the incidence of this is very unlikely in patients who take the medications as ordered and stop the medications as instructed.  Opioid overdose can be dangerous, but is unlikely when the medication is taken as ordered and stopped when ordered. It is important not to mix opioids with alcohol or with any type of  sedative, such as Benadryl, as this can lead to over sedation and respiratory difficulty.  DOSAGE:   Pain medications may be needed consistently for the first week to decrease pain and promote adequate pain relief and participation in physical therapy.  After the initial surgical pain begins to resolve, you may begin to decrease the pain medication and only take it as needed. By the end of 6 weeks, you should be off of pain medications.  You can decrease your pain medication consumption by slowly spacing out the time in between the medication, and using 650mg Tylenol when the pain is not as severe. Do not exceed 3500mg of Tylenol in 24 hours.   Refills will not be given by the office during evening hours, on weekends, or after 6 weeks post-op.  To seek refills on pain medications during the initial 6 week post-operative period, you must call the office 48 hours in advance to request the refill. The office will then notify you when to  the prescription. DO NOT wait until you are out of the medication to request a refill.

## 2024-11-20 NOTE — ANESTHESIA PROCEDURE NOTES
Airway  Urgency: elective    Date/Time: 11/20/2024 2:30 PM  Airway not difficult    General Information and Staff    Patient location during procedure: OR    Indications and Patient Condition  Indications for airway management: airway protection    Preoxygenated: yes  Mask difficulty assessment: 0 - not attempted    Final Airway Details  Final airway type: supraglottic airway      Successful airway: I-gel  Size 4     Number of attempts at approach: 1  Assessment: lips, teeth, and gum same as pre-op    Additional Comments  LMA placed without difficulty, ventilation with assist, equal breath sounds and symmetric chest rise and fall

## 2024-11-20 NOTE — PROGRESS NOTES
[facility] Medicine Services  PROGRESS NOTE    Patient Name: Justice Kiser  : 1960  MRN: [id number]    Date of Admission: 2024  Primary Care Physician: Anshul Martinez MD    Subjective   Subjective     CC:  Follow up cellulitis     HPI:  Pt doing well.  MRI completed last night.  Still with pain on the L lateral ankle.    Objective   Objective     Vital Signs:   Temp:  [97 °F (36.1 °C)-98.6 °F (37 °C)] 97 °F (36.1 °C)  Heart Rate:  [51-86] 58  Resp:  [16-18] 18  BP: (112-136)/(61-87) 129/72     Physical Exam:  Constitutional: No acute distress, awake, alert, chronically ill appearing   HENT: NCAT, mucous membranes moist  Respiratory: grossly clear, respiratory effort normal on RA   Cardiovascular: RRR, no murmurs, cap refill brisk   Gastrointestinal: Positive bowel sounds, soft, nontender, nondistended  Musculoskeletal/SKin: L ankle unchanged from prior exam with fluctuance laterally and concentrated erythema.  Psychiatric: flat affect, cooperative  Neurologic: Oriented x 3, moves all extremities, speech clear      Results Reviewed:  LAB RESULTS:      Lab 24  1113 24  0551 24  0603 11/15/24  0553 24  0550   WBC 7.93 9.00 8.77 7.47 9.20   HEMOGLOBIN 11.6* 12.0* 11.2* 10.0* 10.3*   HEMATOCRIT 35.5* 37.3* 35.4* 31.0* 32.1*   PLATELETS 220 203 189 140 146   NEUTROS ABS 4.51  --  5.47 4.82 6.01   IMMATURE GRANS (ABS) 0.07*  --  0.17* 0.10* 0.17*   LYMPHS ABS 2.51  --  2.21 1.75 2.05   MONOS ABS 0.64  --  0.62 0.61 0.79   EOS ABS 0.15  --  0.22 0.16 0.14   MCV 87.9 88.4 88.7 87.6 88.2   CRP 0.99* 3.09* 5.43* 13.86* 18.97*   PROTIME 16.3*  --   --   --   --          Lab 24  1113 24  0551 24  0603 11/15/24  0553 24  0550   SODIUM 136 139 136 135* 143   POTASSIUM 4.5 4.2 4.2 4.1 4.4   CHLORIDE 106 106 103 103 110*   CO2 18.0* 21.0* 21.0* 23.0 20.0*   ANION GAP 12.0 12.0 12.0 9.0 13.0   BUN 22 17 14 11 12   CREATININE 0.77 0.78 0.74*  0.78 0.72*   EGFR 98.7 98.4 99.9 98.4 100.8   GLUCOSE 102* 107* 116* 99 95   CALCIUM 8.4* 8.8 8.7 8.1* 8.2*   MAGNESIUM  --   --   --  1.8 1.9         Lab 11/15/24  0553 11/14/24  0550   TOTAL PROTEIN 6.6 6.6   ALBUMIN 3.1* 3.2*   GLOBULIN 3.5 3.4   ALT (SGPT) 19 18   AST (SGOT) 40 40   BILIRUBIN 0.7 0.7   ALK PHOS 272* 225*         Lab 11/20/24  1113   PROTIME 16.3*   INR 1.30*                 Brief Urine Lab Results       None            Microbiology Results Abnormal       None            MRI Ankle Left With & Without Contrast    Result Date: 11/20/2024  MRI ANKLE LEFT W WO CONTRAST Date of Exam: 11/19/2024 11:28 PM EST Indication: L ankle cellulitis.  Comparison: MRI left ankle 11/11/2024 Technique:  Routine multiplanar/multisequence images of the left ankle were obtained before and after the uneventful administration of 16 mL Multihance. Findings: BONE:  Bone marrow signal intensity is normal for age.   No osteochondral lesion identified. No periosteal or stress reaction noted. No bony destructive changes. ALIGNMENT: Normal LIGAMENTS: The distal anterior and posterior tibiofibular ligaments are intact. There is a thickened appearance involving the anterior and posterior talofibular ligaments compatible with sprain without evidence for tear. The calcaneofibular ligament appears intact. TENDONS: The posterior tibialis, flexor digitorum longus and flexor houses longus tendons appear intact without tendinosis, tenosynovitis or tear. The peroneus longus has intermediate intrasubstance signal just distal to the tip of the lateral malleolus with some additional longitudinal signal compatible with a partial-thickness tear and tendinosis. There is trace tenosynovitis. No evidence for subluxation. The peroneus brevis appears intact. The extensor tendons are intact. The Achilles tendon is intact.  CARTILAGE:  No significant loss nor focal cartilage defects. OTHER INTRA-ARTICULAR: No significant joint effusion.  No loose  bodies identified. PLANTAR FASCIA: The plantar fascia is thickened measuring up to 5 mm along its insertion without evidence for tear. OTHER EXTRA-ARTICULAR: There has been interval development of an organized fluid collection along the lateral superficial margin of the peroneal tendons. This extends along the length of the distal fibula and measures 3.4 x 1.2 x 6.3 cm in AP, transverse  and craniocaudal dimension. On postcontrast imaging there is peripheral and enhancing internal septations. There is extensive edema extending along the dorsal lateral margin of the foot. No reactive osseous changes along the lateral margin of the fibula.     Impression: Impression: 1. Interval development of an abscess along the lateral margin of the fibula measuring 3.4 x 1.2 x 6.3 cm. No reactive or bony destructive osseous changes to suggest osteomyelitis. Edema extends along the dorsal lateral margin of the foot. 2. Tendinopathy and partial-thickness tear involving the peroneus longus tendon with some trace tenosynovitis. 3. Planter fasciitis. 4. Suspected sprain of the anterior and posterior talofibular ligaments. Electronically Signed: Kristine Krause MD  11/20/2024 8:23 AM EST  Workstation ID: [id number]    US Nonvascular Extremity Limited    Result Date: 11/19/2024  US NONVASCULAR EXTREMITY LIMITED Date of Exam: 11/18/2024 6:01 PM EST Indication: abscess- LLE. Comparison: No comparisons available. Technique: Grayscale and color Doppler ultrasound evaluation of the left lower extremity was performed.  Real time scanning was performed with image documentation of the area of interest. Findings: There is diffuse subcutaneous edema and cutaneous thickening along the left lower extremity area of interest. There is a region of poorly defined complex fluid in the anterior lower leg measuring 3.2 x 0.4 x 4.6 cm. There is an additional region of poorly defined complex fluid along the lateral lower leg measuring 8.6 x 1.2 x 5.5 cm.      Impression: Impression: Two regions of poorly defined subcutaneous fluid within the anterior and lateral left lower leg measuring up to 4.6 cm and 8.6 cm respectively. In the setting of infection these could represent developing abscesses. No drainable fluid collection at this time. Background subcutaneous edema and cutaneous thickening compatible with cellulitis. Electronically Signed: Luis Angel Cordero MD  11/19/2024 8:27 AM EST  Workstation ID: [id number]     Results for orders placed during the hospital encounter of 10/08/24    Adult Transthoracic Echo Complete W/ Cont if Necessary Per Protocol    Interpretation Summary    Left ventricular systolic function is normal. Estimated left ventricular EF = 60%    Moderate mitral valve regurgitation is present.    Estimated right ventricular systolic pressure from tricuspid regurgitation is normal (<35 mmHg).      Current medications:  Scheduled Meds:[Transfer Hold] aspirin, 81 mg, Oral, Daily  [Transfer Hold] atorvastatin, 80 mg, Oral, Nightly  ceFAZolin, 2,000 mg, Intravenous, Once  cefTRIAXone, 2,000 mg, Intravenous, Q24H  [Transfer Hold] cholestyramine light, 1 packet, Oral, Q12H  [Transfer Hold] donepezil, 10 mg, Oral, Nightly  doxycycline, 100 mg, Oral, Q12H  [Transfer Hold] enoxaparin, 40 mg, Subcutaneous, Daily  famotidine, 20 mg, Intravenous, 60 Min Pre-Op  [Transfer Hold] lisinopril, 20 mg, Oral, Q24H   And  [Transfer Hold] hydroCHLOROthiazide, 12.5 mg, Oral, Q24H  lactated ringers, 9 mL/hr, Intravenous, Once  [Transfer Hold] memantine, 10 mg, Oral, BID  metoprolol tartrate, 25 mg, Oral, BID  [Transfer Hold] mirtazapine, 30 mg, Oral, Nightly  [Transfer Hold] pantoprazole, 40 mg, Oral, Q AM  [Transfer Hold] Psyllium, 1 packet, Oral, Daily  [Transfer Hold] saccharomyces boulardii, 250 mg, Oral, BID  [Transfer Hold] sertraline, 200 mg, Oral, Daily  [Transfer Hold] sodium chloride, 10 mL, Intravenous, Q12H  sodium chloride, 10 mL, Intravenous, Q12H      Continuous  Infusions:   PRN Meds:.  [Transfer Hold] acetaminophen    [Transfer Hold] Calcium Replacement - Follow Nurse / BPA Driven Protocol    [Transfer Hold] Magnesium Standard Dose Replacement - Follow Nurse / BPA Driven Protocol    midazolam    [Transfer Hold] ondansetron    [Transfer Hold] oxyCODONE    [Transfer Hold] Phosphorus Replacement - Follow Nurse / BPA Driven Protocol    Potassium Replacement - Follow Nurse / BPA Driven Protocol    [Transfer Hold] sodium chloride    sodium chloride    [Transfer Hold] sodium chloride    sodium chloride    [Transfer Hold] White Petrolatum    Assessment & Plan   Assessment & Plan     Active Hospital Problems    Diagnosis  POA    **Left leg cellulitis [L03.116]  Yes      Resolved Hospital Problems   No resolved problems to display.        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 male with CAD s/p PCI w/ DIS 2012, HTN, HLD, anxiety/depression, MCI who presents for LLE pain, redness and swelling.  MRI revealed no obvious osteo with evidence of cellulitis and mild to moderate tendinopathy/partial thickness longitudinal tearing of the peroneus longus tendon. ID following and managing antibiotic regimen. Currently on Ceftriaxone and Doxycycline.       This patient's problems and plans were partially entered by my partner and updated as appropriate by me 11/20/24.     Left lower extremity cellulitis  Sepsis  -Joint fluid culture currently no growth to date   - Blood cultures no growth as well   - MRI of the ankle 11/19--with new abscess present laterally on the L.  Going to the OR today with Dr. Sidhu.    - ID following --  - Continue Rocephin, Doxy      CAD s/p PCI w/ DEI  Hypertension  Hyperlipidemia  - Continue metoprolol, atorvastatin, lisinopril/HCTZ     Anxiety/depression  - Continue home Zoloft, Remeron     MCI  - Continue home donepezil, memantine     Expected Discharge Location and Transportation: home +/- home health  Expected Discharge   Expected Discharge  Date: 11/21/2024; Expected Discharge Time:        VTE Prophylaxis:  Pharmacologic VTE prophylaxis orders are present.      AM-PAC 6 Clicks Score (PT): 20 (11/20/24 0800)    CODE STATUS:   Code Status and Medical Interventions: CPR (Attempt to Resuscitate); Full Support   Ordered at: 11/11/24 1943     Level Of Support Discussed With:    Patient     Code Status (Patient has no pulse and is not breathing):    CPR (Attempt to Resuscitate)     Medical Interventions (Patient has pulse or is breathing):    Full Support       Christine Diehl MD  11/20/24

## 2024-11-20 NOTE — PLAN OF CARE
Problem: Adult Inpatient Plan of Care  Goal: Plan of Care Review  Outcome: Progressing  Goal: Patient-Specific Goal (Individualized)  Outcome: Progressing  Goal: Absence of Hospital-Acquired Illness or Injury  Outcome: Progressing  Intervention: Identify and Manage Fall Risk  Recent Flowsheet Documentation  Taken 11/20/2024 0400 by Sherlyn Salgado RN  Safety Promotion/Fall Prevention:   activity supervised   assistive device/personal items within reach   clutter free environment maintained   fall prevention program maintained   gait belt   lighting adjusted   nonskid shoes/slippers when out of bed   room organization consistent   safety round/check completed  Taken 11/20/2024 0000 by Sherlyn Salgado RN  Safety Promotion/Fall Prevention:   activity supervised   assistive device/personal items within reach   clutter free environment maintained   fall prevention program maintained   gait belt   lighting adjusted   nonskid shoes/slippers when out of bed   room organization consistent   safety round/check completed  Taken 11/19/2024 2200 by Sherlyn Salgado RN  Safety Promotion/Fall Prevention:   activity supervised   assistive device/personal items within reach   clutter free environment maintained   fall prevention program maintained   gait belt   lighting adjusted   nonskid shoes/slippers when out of bed   room organization consistent   safety round/check completed  Taken 11/19/2024 2034 by Sherlyn Salgado RN  Safety Promotion/Fall Prevention:   activity supervised   assistive device/personal items within reach   clutter free environment maintained   fall prevention program maintained   gait belt   lighting adjusted   nonskid shoes/slippers when out of bed   room organization consistent   safety round/check completed  Intervention: Prevent Skin Injury  Recent Flowsheet Documentation  Taken 11/20/2024 0400 by Sherlyn Salgado RN  Body Position: position changed independently  Taken 11/20/2024 0000 by Sherlyn Salgado  E, RN  Body Position: position changed independently  Skin Protection: incontinence pads utilized  Taken 11/19/2024 2200 by Sherlyn Salgado RN  Body Position: position changed independently  Skin Protection: incontinence pads utilized  Taken 11/19/2024 2034 by Sherlyn Salgado RN  Body Position: position changed independently  Intervention: Prevent Infection  Recent Flowsheet Documentation  Taken 11/20/2024 0400 by Sherlyn Salgado RN  Infection Prevention:   rest/sleep promoted   single patient room provided  Taken 11/20/2024 0000 by Sherlyn Salgado RN  Infection Prevention:   rest/sleep promoted   single patient room provided  Taken 11/19/2024 2200 by Sherlyn Salgado RN  Infection Prevention:   rest/sleep promoted   single patient room provided  Taken 11/19/2024 2034 by Sherlyn Salgado RN  Infection Prevention:   rest/sleep promoted   single patient room provided  Goal: Optimal Comfort and Wellbeing  Outcome: Progressing  Intervention: Provide Person-Centered Care  Recent Flowsheet Documentation  Taken 11/19/2024 2034 by Sherlyn Salgado RN  Trust Relationship/Rapport:   care explained   questions answered  Goal: Readiness for Transition of Care  Outcome: Progressing     Problem: Comorbidity Management  Goal: Blood Pressure in Desired Range  Outcome: Progressing  Intervention: Maintain Blood Pressure Management  Recent Flowsheet Documentation  Taken 11/19/2024 2034 by Sherlyn Salgado RN  Medication Review/Management: medications reviewed     Problem: Fall Injury Risk  Goal: Absence of Fall and Fall-Related Injury  Outcome: Progressing  Intervention: Identify and Manage Contributors  Recent Flowsheet Documentation  Taken 11/19/2024 2034 by Sherlyn Salgado RN  Medication Review/Management: medications reviewed  Intervention: Promote Injury-Free Environment  Recent Flowsheet Documentation  Taken 11/20/2024 0400 by Sherlyn Salgado RN  Safety Promotion/Fall Prevention:   activity supervised   assistive  device/personal items within reach   clutter free environment maintained   fall prevention program maintained   gait belt   lighting adjusted   nonskid shoes/slippers when out of bed   room organization consistent   safety round/check completed  Taken 11/20/2024 0000 by Sherlyn Salgado RN  Safety Promotion/Fall Prevention:   activity supervised   assistive device/personal items within reach   clutter free environment maintained   fall prevention program maintained   gait belt   lighting adjusted   nonskid shoes/slippers when out of bed   room organization consistent   safety round/check completed  Taken 11/19/2024 2200 by Sherlyn Salgado RN  Safety Promotion/Fall Prevention:   activity supervised   assistive device/personal items within reach   clutter free environment maintained   fall prevention program maintained   gait belt   lighting adjusted   nonskid shoes/slippers when out of bed   room organization consistent   safety round/check completed  Taken 11/19/2024 2034 by Sherlyn Salgado RN  Safety Promotion/Fall Prevention:   activity supervised   assistive device/personal items within reach   clutter free environment maintained   fall prevention program maintained   gait belt   lighting adjusted   nonskid shoes/slippers when out of bed   room organization consistent   safety round/check completed   Goal Outcome Evaluation:

## 2024-11-20 NOTE — OP NOTE
INCISION AND DRAINAGE LOWER EXTREMITY  Procedure Report    Patient Name:  Justice Kiser  YOB: 1960    Date of Surgery:  11/20/2024     Indications:    Patient is a 64 y.o. male noted for cellulitis of his left ankle he was originally admitted to the hospital about a week ago and started on IV antibiotics initial MRI done at the time of admission was negative for any abscess or collection he despite being on antibiotics and improving inflammatory markers the clinically he appears to have some pocket of fluid on the lateral aspect of his ankle he underwent a repeat MRI which showed a large abscess and collection of the lateral aspect of his ankle we discussed open irrigation and debridement of the abscess in order to prevent worsening of his infection.. Possible risk and benefits of the procedure including but not limited to infection, DVT, pulmonary embolism, possible injury to tendons, ligaments, nerves and/or vessels,  have been discussed in detail. Despite the risks involved, the patient elected to proceed and informed consent was obtained and the patient was scheduled for surgery.     Patient Identification:  Patient was seen in the prep, consent was reviewed, operative procedure was identified, surgical site and thigh marked.        Pre-op Diagnosis:   Cutaneous abscess of left ankle [[id number]]       Post-Op Diagnosis Codes:     * Cutaneous abscess of left ankle [L02.416]    Procedure/CPT® Codes:      Procedure(s):  INCISION AND DRAINAGE OF LEFT ANKLE    Staff:  Surgeon(s):  Ravin Sidhu MD    Anesthesia: Choice    Estimated Blood Loss: 100ml    Implants:    Nothing was implanted during the procedure    Specimen:          Specimens       ID Source Type Tests Collected By Collected At Frozen?    A Ankle, Left Tissue ANAEROBIC CULTURE  FUNGAL CULTURE  TISSUE / BONE CULTURE  TISSUE PATHOLOGY EXAM  AFB CULTURE   Ravin Sidhu MD 11/20/24 6493 No    Description: LEFT ANKLE TISSUE- #1    This  specimen was not marked as sent.    B Ankle, Left Tissue ANAEROBIC CULTURE  FUNGAL CULTURE  TISSUE / BONE CULTURE  TISSUE PATHOLOGY EXAM  AFB CULTURE   Ravin Sidhu MD 11/20/24 1523 No    Description: LEFT ANKLE TISSUE- #2    This specimen was not marked as sent.    C Ankle, Left Tissue ANAEROBIC CULTURE  FUNGAL CULTURE  TISSUE / BONE CULTURE  TISSUE PATHOLOGY EXAM  AFB CULTURE   Ravin Sidhu MD 11/20/24 1523 No    Description: LEFT ANKLE TISSUE- #3    This specimen was not marked as sent.              Findings: Gross purulence and abscess over the lateral aspect of the fibula    Complications: none    Description of Procedure:   The patient was transferred to [facility] operating room. Preoperative antibiotics Kefzol 2gm and infused prior to skin incision and to inflation of the tourniquet according to SCIP protocol. Prior to skin incision, the patient also received General. Surgical timeout was performed with the entire operative team identifying the correct patient, surgical site, and planned procedure. A well padded tourniquet was placed to the proximal aspect of the operative thigh. The operative leg was then prepped and draped in the usual sterile fashion.  After application of Esmarch, the tourniquet was inflated to 250 mmHg.    A skin incision was made vertically oriented centering over the lateral malleolus approximately 5 cm in length there is gross purulence multiple cultures were taken and the wound was debrided wees a combination of rongeur curettes and Bovie electrocautery she excisional debridement down to level of bone.  After this we then irrigated with concentrated Betadine peroxide Irrisept and then normal saline happy with our debridement we then dropped tourniquet hemostasis was achieved we packed the deep wound with quarter inch iodoform packing      The sponge, needle, and instrument counts were found to be correct.  2-0 PDS for skin followed by 3-0 nylon and Xeroform and  4 x 4's. Sterile bandage was then placed over the incision wrapped with webroll placed overwrapped with Ace wrap. The patient tolerated the procedure well and will be on postoperative antibiotics according to infectious disease. The patient will be on aspirin 81 mg twice a day starting postoperative day #1.   I discussed the satisfactory performance of the procedure with patient's family and discussed with him the postoperative management     Assistant Participation:  Surgeon(s):  Ravin Sidhu MD Kevin M Denehy, MD     Date: 11/20/2024  Time: 15:33 EST

## 2024-11-20 NOTE — ANESTHESIA PREPROCEDURE EVALUATION
Anesthesia Evaluation     Patient summary reviewed and Nursing notes reviewed   no history of anesthetic complications:   NPO Solid Status: > 8 hours  NPO Liquid Status: > 2 hours           Airway   Mallampati: I  TM distance: >3 FB  Neck ROM: full  No difficulty expected  Dental    (+) edentulous    Pulmonary - normal exam    breath sounds clear to auscultation  (+) a smoker (quit 2011) Former,  Cardiovascular - normal exam    Patient on routine beta blocker  Rhythm: regular  Rate: normal    (+) hypertension, valvular problems/murmurs (Moderate MR) MR, past MI , CAD, cardiac stents (PCI x1 in 2011) , hyperlipidemia    ROS comment: ECHO 10/2024:  ·  Left ventricular systolic function is normal. Estimated left ventricular EF = 60%  ·  Moderate mitral valve regurgitation is present.  ·  Estimated right ventricular systolic pressure from tricuspid regurgitation is normal (<35 mmHg).         Neuro/Psych  (+) tremors, psychiatric history Anxiety and DepressionDementia: Mild.    ROS Comment: Mild cognitive impairment  GI/Hepatic/Renal/Endo    (+) GERD, liver disease fatty liver disease    Musculoskeletal     (+) back pain, chronic pain, neck pain  Abdominal    Substance History   (+) alcohol use (Former alcohol abuse; abstinent for past 5-6 years), drug use (Marijuana on occasion)     OB/GYN          Other   arthritis, blood dyscrasia anemia,     ROS/Med Hx Other: Hgb 11.6              Anesthesia Plan    ASA 3     general     intravenous induction     Anesthetic plan, risks, benefits, and alternatives have been provided, discussed and informed consent has been obtained with: patient.    Plan discussed with CRNA.    CODE STATUS:    Level Of Support Discussed With: Patient  Code Status (Patient has no pulse and is not breathing): CPR (Attempt to Resuscitate)  Medical Interventions (Patient has pulse or is breathing): Full Support

## 2024-11-20 NOTE — PROGRESS NOTES
Patient has developed abscess along the lateral aspect of the ankle it is new on the repeat MRI scan.  Please keep n.p.o. will plan for OR today for open I&D

## 2024-11-20 NOTE — ANESTHESIA POSTPROCEDURE EVALUATION
Patient: Justice Kiser    Procedure Summary       Date: 11/20/24 Room / Location:  [facility] /  [facility]    Anesthesia Start: 1418 Anesthesia Stop: 1533    Procedure: INCISION AND DRAINAGE OF LEFT ANKLE (Left: Ankle) Diagnosis:       Cutaneous abscess of left ankle      (Cutaneous abscess of left ankle [[id number]])    Surgeons: Ravin Sidhu MD Provider: Roel Fuentes MD    Anesthesia Type: general ASA Status: 3            Anesthesia Type: general    Vitals  Vitals Value Taken Time   /58 11/20/24 1530   Temp 97.5    Pulse 73 11/20/24 1532   Resp 13    SpO2 99 % 11/20/24 1532   Vitals shown include unfiled device data.        Post Anesthesia Care and Evaluation    Patient location during evaluation: PACU  Patient participation: complete - patient participated  Level of consciousness: awake and alert  Pain score: 0  Pain management: adequate    Airway patency: patent  Anesthetic complications: No anesthetic complications  PONV Status: none  Cardiovascular status: hemodynamically stable and acceptable  Respiratory status: nonlabored ventilation, acceptable and nasal cannula  Hydration status: acceptable

## 2024-11-20 NOTE — CASE MANAGEMENT/SOCIAL WORK
Continued Stay Note  [facility]     Patient Name: Justice Kiser  MRN: [id number]  Today's Date: 11/20/2024    Admit Date: 11/11/2024    Plan: Home with daughter   Discharge Plan       Row Name 11/20/24 0840       Plan    Plan Home with daughter    Patient/Family in Agreement with Plan yes    Plan Comments Spoke to patient at bedside. Plan is home with daughter. Daughter will transport. Patient denies any discharge needs at this time. CM will continue to follow.    Final Discharge Disposition Code 01 - home or self-care                   Discharge Codes    No documentation.                 Expected Discharge Date and Time       Expected Discharge Date Expected Discharge Time    Nov 19, 2024               Bryan Domínguez RN

## 2024-11-20 NOTE — PROGRESS NOTES
"[facility] Progress Note    Date of Admission: 2024      Antibiotics: ceft and doxy      CC:   Chief Complaint   Patient presents with    Leg Swelling       S: Pt with no fevers and decreased left ankle pain wbc and crp down and improved. Fluid collections on US and MRI going for I and D today  O:  /61 (BP Location: Left arm, Patient Position: Lying)   Pulse 51   Temp 98.6 °F (37 °C) (Oral)   Resp 16   Ht 162.6 cm (64\")   Wt 77.4 kg (170 lb 11.2 oz)   SpO2 94%   BMI 29.30 kg/m²   Temp (24hrs), Av °F (36.7 °C), Min:97.5 °F (36.4 °C), Max:98.6 °F (37 °C)      PE:     GENERAL: Awake and alert, in no acute distress.   HEENT: Normocephalic, atraumatic.  PERRL. EOMI. No conjunctival injection. No icterus. Oropharynx clear without evidence of thrush or exudate. No evidence of periodontal disease.    NECK: Supple without nuchal rigidity  LYMPH: No cervical, axillary or inguinal lymphadenopathy. No neck masses  HEART: RRR; No murmur, rubs, gallops.   LUNGS: Clear to auscultation bilaterally without wheezing, rales, rhonchi. Normal respiratory effort.  ABDOMEN: Soft, nontender, nondistended. Positive bowel sounds. No rebound or guarding.   EXT:  No cyanosis, clubbing edema of left ankle  : Normal appearing genitalia without Lemus catheter.  MSK: Decreased range of motion of left ankle  SKIN: Left ankle with warmth to touch erythema and bruising induration and fluid palpated above ankle  NEURO: Oriented to PPT. No focal deficits.   PSYCHIATRIC: Normal insight and judgement. Cooperative with PE     Laboratory Data    Results from last 7 days   Lab Units 24  0551 24  0603 11/15/24  0553   WBC 10*3/mm3 9.00 8.77 7.47   HEMOGLOBIN g/dL 12.0* 11.2* 10.0*   HEMATOCRIT % 37.3* 35.4* 31.0*   PLATELETS 10*3/mm3 203 189 140     Results from last 7 days   Lab Units 24  0551   SODIUM mmol/L 139   POTASSIUM mmol/L 4.2   CHLORIDE mmol/L 106   CO2 mmol/L 21.0*   BUN mg/dL 17   CREATININE mg/dL 0.78 "   GLUCOSE mg/dL 107*   CALCIUM mg/dL 8.8     Results from last 7 days   Lab Units 11/15/24  0553   ALK PHOS U/L 272*   BILIRUBIN mg/dL 0.7   ALT (SGPT) U/L 19   AST (SGOT) U/L 40           Results from last 7 days   Lab Units 11/18/24  0551   CRP mg/dL 3.09*       Estimated Creatinine Clearance: 87.6 mL/min (by C-G formula based on SCr of 0.78 mg/dL).      Microbiology:  neg    Radiology:  Imaging Results (Last 24 Hours)       Procedure Component Value Units Date/Time    MRI Ankle Left With & Without Contrast [[id number]] Collected: 11/20/24 0814     Updated: 11/20/24 0826    Narrative:      MRI ANKLE LEFT W WO CONTRAST    Date of Exam: 11/19/2024 11:28 PM EST    Indication: L ankle cellulitis.     Comparison: MRI left ankle 11/11/2024    Technique:  Routine multiplanar/multisequence images of the left ankle were obtained before and after the uneventful administration of 16 mL Multihance.      Findings:  BONE:  Bone marrow signal intensity is normal for age.   No osteochondral lesion identified. No periosteal or stress reaction noted. No bony destructive changes.    ALIGNMENT: Normal    LIGAMENTS: The distal anterior and posterior tibiofibular ligaments are intact.    There is a thickened appearance involving the anterior and posterior talofibular ligaments compatible with sprain without evidence for tear. The calcaneofibular ligament appears intact.    TENDONS: The posterior tibialis, flexor digitorum longus and flexor houses longus tendons appear intact without tendinosis, tenosynovitis or tear.    The peroneus longus has intermediate intrasubstance signal just distal to the tip of the lateral malleolus with some additional longitudinal signal compatible with a partial-thickness tear and tendinosis. There is trace tenosynovitis. No evidence for   subluxation. The peroneus brevis appears intact.    The extensor tendons are intact.    The Achilles tendon is intact.     CARTILAGE:  No significant loss nor focal  cartilage defects.    OTHER INTRA-ARTICULAR: No significant joint effusion.  No loose bodies identified.    PLANTAR FASCIA: The plantar fascia is thickened measuring up to 5 mm along its insertion without evidence for tear.    OTHER EXTRA-ARTICULAR: There has been interval development of an organized fluid collection along the lateral superficial margin of the peroneal tendons. This extends along the length of the distal fibula and measures 3.4 x 1.2 x 6.3 cm in AP, transverse   and craniocaudal dimension. On postcontrast imaging there is peripheral and enhancing internal septations. There is extensive edema extending along the dorsal lateral margin of the foot. No reactive osseous changes along the lateral margin of the   fibula.          Impression:      Impression:    1. Interval development of an abscess along the lateral margin of the fibula measuring 3.4 x 1.2 x 6.3 cm. No reactive or bony destructive osseous changes to suggest osteomyelitis. Edema extends along the dorsal lateral margin of the foot.  2. Tendinopathy and partial-thickness tear involving the peroneus longus tendon with some trace tenosynovitis.  3. Planter fasciitis.  4. Suspected sprain of the anterior and posterior talofibular ligaments.        Electronically Signed: Kristine Krause MD    11/20/2024 8:23 AM EST    Workstation ID: [id number]            PROBLEM LIST:   LLE acute post traumatic cellulitis  History of coronary disease  History of hypertension and hyperlipidemia  Fevers chills  Leukocytosis elevated inflammatory markers     ASSESSMENT:  Patient is a 64-year-old with history of trauma to the left ankle developed bruising soft tissue edema followed by increased pain swelling erythema of left ankle with acute cellulitis developing over the past few days warmth to touch erythema leukocytosis elevated inflammatory markers arthrocentesis did not reveal septic arthritis had received abx.      Improving with ceft and doxy and now with Wbc  and crp down improving with abx, now reports of possible I and D of ankle with hematoma possible abscess development on MRI and for I and D today.     PLAN:    Continue ceftriaxone and doxy    Ceftriaxone iv as outpt then oral afterwards    Doxy 100 mg po bid x 14 days upon d/c    MRI of left ankle ordered and concern for abscess and going for I and D today    Sebastian Quijano MD  11/20/2024

## 2024-11-21 LAB
ANION GAP SERPL CALCULATED.3IONS-SCNC: 10 MMOL/L (ref 5–15)
BUN SERPL-MCNC: 19 MG/DL (ref 8–23)
BUN/CREAT SERPL: 21.1 (ref 7–25)
CALCIUM SPEC-SCNC: 8.5 MG/DL (ref 8.6–10.5)
CHLORIDE SERPL-SCNC: 104 MMOL/L (ref 98–107)
CO2 SERPL-SCNC: 22 MMOL/L (ref 22–29)
CREAT SERPL-MCNC: 0.9 MG/DL (ref 0.76–1.27)
EGFRCR SERPLBLD CKD-EPI 2021: 94.2 ML/MIN/1.73
GLUCOSE SERPL-MCNC: 115 MG/DL (ref 65–99)
HCT VFR BLD AUTO: 32.4 % (ref 37.5–51)
HGB BLD-MCNC: 10.3 G/DL (ref 13–17.7)
POTASSIUM SERPL-SCNC: 4 MMOL/L (ref 3.5–5.2)
SODIUM SERPL-SCNC: 136 MMOL/L (ref 136–145)

## 2024-11-21 PROCEDURE — 85018 HEMOGLOBIN: CPT | Performed by: ORTHOPAEDIC SURGERY

## 2024-11-21 PROCEDURE — 85014 HEMATOCRIT: CPT | Performed by: ORTHOPAEDIC SURGERY

## 2024-11-21 PROCEDURE — 25010000002 ENOXAPARIN PER 10 MG: Performed by: ORTHOPAEDIC SURGERY

## 2024-11-21 PROCEDURE — 97116 GAIT TRAINING THERAPY: CPT

## 2024-11-21 PROCEDURE — 25010000002 CEFAZOLIN PER 500 MG: Performed by: ORTHOPAEDIC SURGERY

## 2024-11-21 PROCEDURE — 25010000002 CEFTRIAXONE PER 250 MG: Performed by: ORTHOPAEDIC SURGERY

## 2024-11-21 PROCEDURE — 99232 SBSQ HOSP IP/OBS MODERATE 35: CPT | Performed by: INTERNAL MEDICINE

## 2024-11-21 PROCEDURE — 25010000002 HYDROMORPHONE 1 MG/ML SOLUTION: Performed by: ORTHOPAEDIC SURGERY

## 2024-11-21 PROCEDURE — 25810000003 LACTATED RINGERS PER 1000 ML: Performed by: ORTHOPAEDIC SURGERY

## 2024-11-21 PROCEDURE — 80048 BASIC METABOLIC PNL TOTAL CA: CPT | Performed by: ORTHOPAEDIC SURGERY

## 2024-11-21 PROCEDURE — 97164 PT RE-EVAL EST PLAN CARE: CPT

## 2024-11-21 PROCEDURE — 97530 THERAPEUTIC ACTIVITIES: CPT

## 2024-11-21 PROCEDURE — 25010000002 HYDROMORPHONE PER 4 MG: Performed by: ORTHOPAEDIC SURGERY

## 2024-11-21 RX ADMIN — MEMANTINE 10 MG: 10 TABLET ORAL at 08:08

## 2024-11-21 RX ADMIN — ACETAMINOPHEN 1000 MG: 500 TABLET ORAL at 09:58

## 2024-11-21 RX ADMIN — HYDROMORPHONE HYDROCHLORIDE 0.5 MG: 1 INJECTION, SOLUTION INTRAMUSCULAR; INTRAVENOUS; SUBCUTANEOUS at 04:05

## 2024-11-21 RX ADMIN — ASPIRIN 81 MG: 81 TABLET, COATED ORAL at 08:07

## 2024-11-21 RX ADMIN — OXYCODONE HYDROCHLORIDE 10 MG: 10 TABLET ORAL at 17:22

## 2024-11-21 RX ADMIN — Medication 250 MG: at 08:07

## 2024-11-21 RX ADMIN — SODIUM CHLORIDE 2000 MG: 900 INJECTION INTRAVENOUS at 09:58

## 2024-11-21 RX ADMIN — AVOBENZONE, HOMOSALATE, OCTISALATE, OCTOCRYLENE, AND OXYBENZONE 1 PACKET: 29.4; 147; 49; 25.4; 58.8 LOTION TOPICAL at 08:06

## 2024-11-21 RX ADMIN — HYDROMORPHONE HYDROCHLORIDE 0.5 MG: 1 INJECTION, SOLUTION INTRAMUSCULAR; INTRAVENOUS; SUBCUTANEOUS at 13:55

## 2024-11-21 RX ADMIN — HYDROMORPHONE HYDROCHLORIDE 0.5 MG: 1 INJECTION, SOLUTION INTRAMUSCULAR; INTRAVENOUS; SUBCUTANEOUS at 20:00

## 2024-11-21 RX ADMIN — OXYCODONE HYDROCHLORIDE 10 MG: 10 TABLET ORAL at 10:44

## 2024-11-21 RX ADMIN — CHOLEYSTYRAMINE LIGHT 4 G: 4 POWDER, FOR SUSPENSION ORAL at 10:00

## 2024-11-21 RX ADMIN — ACETAMINOPHEN 1000 MG: 500 TABLET ORAL at 03:17

## 2024-11-21 RX ADMIN — ATORVASTATIN CALCIUM 80 MG: 40 TABLET, FILM COATED ORAL at 20:01

## 2024-11-21 RX ADMIN — MEMANTINE 10 MG: 10 TABLET ORAL at 20:01

## 2024-11-21 RX ADMIN — Medication 10 ML: at 20:01

## 2024-11-21 RX ADMIN — MIRTAZAPINE 30 MG: 15 TABLET, FILM COATED ORAL at 20:00

## 2024-11-21 RX ADMIN — CHOLEYSTYRAMINE LIGHT 4 G: 4 POWDER, FOR SUSPENSION ORAL at 20:00

## 2024-11-21 RX ADMIN — LISINOPRIL 20 MG: 20 TABLET ORAL at 08:07

## 2024-11-21 RX ADMIN — PANTOPRAZOLE SODIUM 40 MG: 40 TABLET, DELAYED RELEASE ORAL at 05:41

## 2024-11-21 RX ADMIN — SERTRALINE 200 MG: 100 TABLET, FILM COATED ORAL at 08:07

## 2024-11-21 RX ADMIN — HYDROCHLOROTHIAZIDE 12.5 MG: 12.5 TABLET ORAL at 09:59

## 2024-11-21 RX ADMIN — ENOXAPARIN SODIUM 40 MG: 100 INJECTION SUBCUTANEOUS at 08:06

## 2024-11-21 RX ADMIN — SODIUM CHLORIDE 2 G: 900 INJECTION INTRAVENOUS at 05:41

## 2024-11-21 RX ADMIN — Medication 10 ML: at 08:08

## 2024-11-21 RX ADMIN — ACETAMINOPHEN 1000 MG: 500 TABLET ORAL at 17:22

## 2024-11-21 RX ADMIN — HYDROMORPHONE HYDROCHLORIDE 0.5 MG: 1 INJECTION, SOLUTION INTRAMUSCULAR; INTRAVENOUS; SUBCUTANEOUS at 08:54

## 2024-11-21 RX ADMIN — DONEPEZIL HYDROCHLORIDE 10 MG: 10 TABLET, FILM COATED ORAL at 20:00

## 2024-11-21 RX ADMIN — DOXYCYCLINE 100 MG: 100 CAPSULE ORAL at 08:07

## 2024-11-21 RX ADMIN — DOXYCYCLINE 100 MG: 100 CAPSULE ORAL at 20:00

## 2024-11-21 RX ADMIN — METOPROLOL TARTRATE 25 MG: 25 TABLET, FILM COATED ORAL at 08:08

## 2024-11-21 RX ADMIN — SODIUM CHLORIDE, SODIUM LACTATE, POTASSIUM CHLORIDE, CALCIUM CHLORIDE 100 ML/HR: 20; 30; 600; 310 INJECTION, SOLUTION INTRAVENOUS at 05:41

## 2024-11-21 RX ADMIN — Medication 250 MG: at 20:01

## 2024-11-21 NOTE — PROGRESS NOTES
"  Orthopedic Progress Note      Patient: Justice Kiser  YOB: 1960     Date of Admission: 11/11/2024  4:36 PM Medical Record Number: [id number]     Attending Physician: Felice Vega MD    Status Post:  Procedure(s):  INCISION AND DRAINAGE OF LEFT ANKLE Post Operative Day Number: 1    Subjective : No new orthopaedic complaints     Pain Relief: some relief with present medication.     Systemic Complaints: No Complaints  Vitals:    11/21/24 0600 11/21/24 0730 11/21/24 0807 11/21/24 1110   BP:  125/74  140/65   BP Location:  Right arm  Right arm   Patient Position:  Lying  Lying   Pulse: 60 57 94 64   Resp:  16  16   Temp:  97.3 °F (36.3 °C)  97.5 °F (36.4 °C)   TempSrc:  Oral  Oral   SpO2: 94% 97%  96%   Weight:       Height:           Physical Exam: 64 y.o. male    General Appearance:       Alert, cooperative, in no acute distress                  Extremities:    Dressing Clean, Dry and Intact             No clinical sign of DVT        Able to do good movements of digits    Pulses:   Pulses palpable and equal bilaterally           Diagnostic Tests:     Results from last 7 days   Lab Units 11/21/24  0647 11/20/24  1113 11/18/24  0551 11/17/24  0603   WBC 10*3/mm3  --  7.93 9.00 8.77   HEMOGLOBIN g/dL 10.3* 11.6* 12.0* 11.2*   HEMATOCRIT % 32.4* 35.5* 37.3* 35.4*   PLATELETS 10*3/mm3  --  220 203 189     Results from last 7 days   Lab Units 11/21/24  0647 11/20/24  1113 11/18/24  0551   SODIUM mmol/L 136 136 139   POTASSIUM mmol/L 4.0 4.5 4.2   CHLORIDE mmol/L 104 106 106   CO2 mmol/L 22.0 18.0* 21.0*   BUN mg/dL 19 22 17   CREATININE mg/dL 0.90 0.77 0.78   GLUCOSE mg/dL 115* 102* 107*   CALCIUM mg/dL 8.5* 8.4* 8.8     Results from last 7 days   Lab Units 11/20/24  1113   INR  1.30*     No results found for: \"URICACID\"  No results found for: \"CRYSTAL\"  Microbiology Results (last 10 days)       Procedure Component Value - Date/Time    Tissue / Bone Culture - Tissue, Ankle, Left [[id number]] Collected: " 11/20/24 1523    Lab Status: Preliminary result Specimen: Tissue from Ankle, Left Updated: 11/21/24 0738     Tissue Culture No growth     Gram Stain Few (2+) Red blood cells      Few (2+) WBCs seen      No organisms seen    Tissue / Bone Culture - Tissue, Ankle, Left [[id number]] Collected: 11/20/24 1523    Lab Status: Preliminary result Specimen: Tissue from Ankle, Left Updated: 11/21/24 0738     Tissue Culture No growth     Gram Stain Moderate (3+) WBCs seen      No organisms seen      Many (4+) Red blood cells    Tissue / Bone Culture - Tissue, Ankle, Left [[id number]] Collected: 11/20/24 1514    Lab Status: Preliminary result Specimen: Tissue from Ankle, Left Updated: 11/21/24 0738     Tissue Culture No growth     Gram Stain Moderate (3+) WBCs seen      Many (4+) Red blood cells      No organisms seen    AFB Culture - Tissue, Ankle, Left [[id number]] Collected: 11/20/24 1514    Lab Status: Preliminary result Specimen: Tissue from Ankle, Left Updated: 11/21/24 1222     AFB Stain No acid fast bacilli seen on concentrated smear    MRSA Screen, PCR (Inpatient) - Swab, Nares [[id number]]  (Normal) Collected: 11/11/24 2345    Lab Status: Final result Specimen: Swab from Nares Updated: 11/12/24 0859     MRSA PCR Negative    Narrative:      The negative predictive value of this diagnostic test is high and should only be used to consider de-escalating anti-MRSA therapy. A positive result may indicate colonization with MRSA and must be correlated clinically.  MRSA Negative    Blood Culture - Blood, Arm, Right [[id number]]  (Normal) Collected: 11/11/24 1904    Lab Status: Final result Specimen: Blood from Arm, Right Updated: 11/16/24 1931     Blood Culture No growth at 5 days    Blood Culture - Blood, Arm, Left [[id number]]  (Normal) Collected: 11/11/24 1840    Lab Status: Final result Specimen: Blood from Arm, Left Updated: 11/16/24 1916     Blood Culture No growth at 5 days    Body Fluid Culture - Body Fluid, Ankle, Left  [[id number]] Collected: 11/11/24 1807    Lab Status: Final result Specimen: Body Fluid from Ankle, Left Updated: 11/16/24 0659     Body Fluid Culture No growth at 5 days     Gram Stain Many (4+) Red blood cells      Rare (1+) WBCs seen      No organisms seen          MRI Ankle Left With & Without Contrast    Result Date: 11/20/2024  Impression: 1. Interval development of an abscess along the lateral margin of the fibula measuring 3.4 x 1.2 x 6.3 cm. No reactive or bony destructive osseous changes to suggest osteomyelitis. Edema extends along the dorsal lateral margin of the foot. 2. Tendinopathy and partial-thickness tear involving the peroneus longus tendon with some trace tenosynovitis. 3. Planter fasciitis. 4. Suspected sprain of the anterior and posterior talofibular ligaments. Electronically Signed: Kristine Krause MD  11/20/2024 8:23 AM EST  Workstation ID: [id number]     Nonvascular Extremity Limited    Result Date: 11/19/2024  Impression: Two regions of poorly defined subcutaneous fluid within the anterior and lateral left lower leg measuring up to 4.6 cm and 8.6 cm respectively. In the setting of infection these could represent developing abscesses. No drainable fluid collection at this time. Background subcutaneous edema and cutaneous thickening compatible with cellulitis. Electronically Signed: Luis Angel Cordero MD  11/19/2024 8:27 AM EST  Workstation ID: [id number]       Current Medications:  Scheduled Meds:acetaminophen, 1,000 mg, Oral, Q8H  aspirin, 81 mg, Oral, Daily  atorvastatin, 80 mg, Oral, Nightly  cefTRIAXone, 2,000 mg, Intravenous, Q24H  cholestyramine light, 1 packet, Oral, Q12H  donepezil, 10 mg, Oral, Nightly  doxycycline, 100 mg, Oral, Q12H  enoxaparin, 40 mg, Subcutaneous, Daily  lisinopril, 20 mg, Oral, Q24H   And  hydroCHLOROthiazide, 12.5 mg, Oral, Q24H  memantine, 10 mg, Oral, BID  metoprolol tartrate, 25 mg, Oral, BID  mirtazapine, 30 mg, Oral, Nightly  pantoprazole, 40 mg, Oral, Q  AM  Psyllium, 1 packet, Oral, Daily  saccharomyces boulardii, 250 mg, Oral, BID  sertraline, 200 mg, Oral, Daily  sodium chloride, 10 mL, Intravenous, Q12H      Continuous Infusions:lactated ringers, 9 mL/hr, Last Rate: 9 mL/hr (11/20/24 1726)  lactated ringers, 100 mL/hr, Last Rate: 100 mL/hr (11/21/24 5883)      PRN Meds:.  acetaminophen    Calcium Replacement - Follow Nurse / BPA Driven Protocol    diphenhydrAMINE **OR** diphenhydrAMINE    HYDROmorphone **AND** naloxone    Magnesium Standard Dose Replacement - Follow Nurse / BPA Driven Protocol    ondansetron ODT **OR** ondansetron    oxyCODONE    oxyCODONE    Phosphorus Replacement - Follow Nurse / BPA Driven Protocol    Potassium Replacement - Follow Nurse / BPA Driven Protocol    sodium chloride    sodium chloride    traMADol    White Petrolatum    Assessment: Status post  No admission procedures for hospital encounter.    Patient Active Problem List   Diagnosis    Essential hypertension    Coronary artery disease involving native coronary artery of native heart with angina pectoris    Hyperlipidemia LDL goal <70    Depression    Anxiety    Hyperglycemia    Paresthesia of right foot    Tremor    GERD (gastroesophageal reflux disease)    Arthritis    Pierson's esophagus without dysplasia    MCI (mild cognitive impairment)    Mitral valve insufficiency    Alcohol induced fatty liver    Alcohol abuse, in remission    Cerebral microvascular disease    Chronic bilateral low back pain with right-sided sciatica    Spinal stenosis, lumbar region, with neurogenic claudication    DDD (degenerative disc disease), lumbar    Psychophysiological insomnia    Left leg cellulitis       PLAN:   Continues current post-op course  Anticoagulation: Aspirin started, if okay with medicine would use this in place of Lovenox  Mobilize with PT as tolerated per protocol  Antibiotics per infectious disease  Follow-up or cultures  Discussed with nursing will keep wrap and packing in place  for today, tomorrow the dressing can be taken down and asked nursing to remove packing and then redress with Xeroform 4 x 4's and overwrapped with Ace that can stay in place until the patient follows up in the office in 2 weeks      Weight Bearing: WBAT  Discharge Plan: OK to plan for discharge in  tomorrow to home  from orthopaedic perspective.    Ravin Sidhu MD    Date: 11/21/2024    Time: 14:26 EST

## 2024-11-21 NOTE — PROGRESS NOTES
[facility] Medicine Services  PROGRESS NOTE    Patient Name: Justice Kiser  : 1960  MRN: [id number]    Date of Admission: 2024  Primary Care Physician: Anshul Martinez MD    Subjective   Subjective     CC:  Follow up cellulitis     HPI:  Left ankle tender but manageable.  No fevers    Objective   Objective     Vital Signs:   Temp:  [97 °F (36.1 °C)-98.7 °F (37.1 °C)] 97.3 °F (36.3 °C)  Heart Rate:  [54-94] 94  Resp:  [16-18] 16  BP: (102-136)/(58-87) 125/74  Flow (L/min) (Oxygen Therapy):  [2-4] 2     Physical Exam:  Non toxic, in bed  Mucous membranes moist  RRR  CTAB  Abdomen soft, NT  Alert, speech clear  Left ankle with clean wraps  Normal affect      Results Reviewed:  LAB RESULTS:      Lab 24  0647 24  1113 24  0551 24  0603 11/15/24  0553   WBC  --  7.93 9.00 8.77 7.47   HEMOGLOBIN 10.3* 11.6* 12.0* 11.2* 10.0*   HEMATOCRIT 32.4* 35.5* 37.3* 35.4* 31.0*   PLATELETS  --  220 203 189 140   NEUTROS ABS  --  4.51  --  5.47 4.82   IMMATURE GRANS (ABS)  --  0.07*  --  0.17* 0.10*   LYMPHS ABS  --  2.51  --  2.21 1.75   MONOS ABS  --  0.64  --  0.62 0.61   EOS ABS  --  0.15  --  0.22 0.16   MCV  --  87.9 88.4 88.7 87.6   CRP  --  0.99* 3.09* 5.43* 13.86*   PROTIME  --  16.3*  --   --   --          Lab 24  0647 24  1113 24  0551 24  0603 11/15/24  0553   SODIUM 136 136 139 136 135*   POTASSIUM 4.0 4.5 4.2 4.2 4.1   CHLORIDE 104 106 106 103 103   CO2 22.0 18.0* 21.0* 21.0* 23.0   ANION GAP 10.0 12.0 12.0 12.0 9.0   BUN 19 22 17 14 11   CREATININE 0.90 0.77 0.78 0.74* 0.78   EGFR 94.2 98.7 98.4 99.9 98.4   GLUCOSE 115* 102* 107* 116* 99   CALCIUM 8.5* 8.4* 8.8 8.7 8.1*   MAGNESIUM  --   --   --   --  1.8         Lab 11/15/24  0553   TOTAL PROTEIN 6.6   ALBUMIN 3.1*   GLOBULIN 3.5   ALT (SGPT) 19   AST (SGOT) 40   BILIRUBIN 0.7   ALK PHOS 272*         Lab 24  1113   PROTIME 16.3*   INR 1.30*                 Brief Urine Lab  Results       None            Microbiology Results Abnormal       None            MRI Ankle Left With & Without Contrast    Result Date: 11/20/2024  MRI ANKLE LEFT W WO CONTRAST Date of Exam: 11/19/2024 11:28 PM EST Indication: L ankle cellulitis.  Comparison: MRI left ankle 11/11/2024 Technique:  Routine multiplanar/multisequence images of the left ankle were obtained before and after the uneventful administration of 16 mL Multihance. Findings: BONE:  Bone marrow signal intensity is normal for age.   No osteochondral lesion identified. No periosteal or stress reaction noted. No bony destructive changes. ALIGNMENT: Normal LIGAMENTS: The distal anterior and posterior tibiofibular ligaments are intact. There is a thickened appearance involving the anterior and posterior talofibular ligaments compatible with sprain without evidence for tear. The calcaneofibular ligament appears intact. TENDONS: The posterior tibialis, flexor digitorum longus and flexor houses longus tendons appear intact without tendinosis, tenosynovitis or tear. The peroneus longus has intermediate intrasubstance signal just distal to the tip of the lateral malleolus with some additional longitudinal signal compatible with a partial-thickness tear and tendinosis. There is trace tenosynovitis. No evidence for subluxation. The peroneus brevis appears intact. The extensor tendons are intact. The Achilles tendon is intact.  CARTILAGE:  No significant loss nor focal cartilage defects. OTHER INTRA-ARTICULAR: No significant joint effusion.  No loose bodies identified. PLANTAR FASCIA: The plantar fascia is thickened measuring up to 5 mm along its insertion without evidence for tear. OTHER EXTRA-ARTICULAR: There has been interval development of an organized fluid collection along the lateral superficial margin of the peroneal tendons. This extends along the length of the distal fibula and measures 3.4 x 1.2 x 6.3 cm in AP, transverse  and craniocaudal  dimension. On postcontrast imaging there is peripheral and enhancing internal septations. There is extensive edema extending along the dorsal lateral margin of the foot. No reactive osseous changes along the lateral margin of the fibula.     Impression: Impression: 1. Interval development of an abscess along the lateral margin of the fibula measuring 3.4 x 1.2 x 6.3 cm. No reactive or bony destructive osseous changes to suggest osteomyelitis. Edema extends along the dorsal lateral margin of the foot. 2. Tendinopathy and partial-thickness tear involving the peroneus longus tendon with some trace tenosynovitis. 3. Planter fasciitis. 4. Suspected sprain of the anterior and posterior talofibular ligaments. Electronically Signed: Kristine Krause MD  11/20/2024 8:23 AM EST  Workstation ID: [id number]     Results for orders placed during the hospital encounter of 10/08/24    Adult Transthoracic Echo Complete W/ Cont if Necessary Per Protocol    Interpretation Summary    Left ventricular systolic function is normal. Estimated left ventricular EF = 60%    Moderate mitral valve regurgitation is present.    Estimated right ventricular systolic pressure from tricuspid regurgitation is normal (<35 mmHg).      Current medications:  Scheduled Meds:acetaminophen, 1,000 mg, Oral, Q8H  aspirin, 81 mg, Oral, Daily  atorvastatin, 80 mg, Oral, Nightly  cefTRIAXone, 2,000 mg, Intravenous, Q24H  cholestyramine light, 1 packet, Oral, Q12H  donepezil, 10 mg, Oral, Nightly  doxycycline, 100 mg, Oral, Q12H  enoxaparin, 40 mg, Subcutaneous, Daily  lisinopril, 20 mg, Oral, Q24H   And  hydroCHLOROthiazide, 12.5 mg, Oral, Q24H  memantine, 10 mg, Oral, BID  metoprolol tartrate, 25 mg, Oral, BID  mirtazapine, 30 mg, Oral, Nightly  pantoprazole, 40 mg, Oral, Q AM  Psyllium, 1 packet, Oral, Daily  saccharomyces boulardii, 250 mg, Oral, BID  sertraline, 200 mg, Oral, Daily  sodium chloride, 10 mL, Intravenous, Q12H      Continuous Infusions:lactated  ringers, 9 mL/hr, Last Rate: 9 mL/hr (11/20/24 1726)  lactated ringers, 100 mL/hr, Last Rate: 100 mL/hr (11/21/24 0541)      PRN Meds:.  acetaminophen    Calcium Replacement - Follow Nurse / BPA Driven Protocol    diphenhydrAMINE **OR** diphenhydrAMINE    HYDROmorphone **AND** naloxone    Magnesium Standard Dose Replacement - Follow Nurse / BPA Driven Protocol    ondansetron ODT **OR** ondansetron    oxyCODONE    oxyCODONE    Phosphorus Replacement - Follow Nurse / BPA Driven Protocol    Potassium Replacement - Follow Nurse / BPA Driven Protocol    sodium chloride    sodium chloride    traMADol    White Petrolatum    Assessment & Plan   Assessment & Plan     Active Hospital Problems    Diagnosis  POA    **Left leg cellulitis [L03.116]  Yes      Resolved Hospital Problems   No resolved problems to display.        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 male with CAD s/p PCI w/ DIS 2012, HTN, HLD, anxiety/depression, MCI who presents for LLE pain, redness and swelling.  MRI revealed no obvious osteo with evidence of cellulitis and mild to moderate tendinopathy/partial thickness longitudinal tearing of the peroneus longus tendon. ID following and managing antibiotic regimen. Currently on Ceftriaxone and Doxycycline.       This patient's problems and plans were partially entered by my partner and updated as appropriate by me 11/21/24.     Left lower extremity cellulitis with abscess  -s/p I&D 11/20/24, Dr. Sidhu.    --Continue Rocephin, Doxy   --follow wound cultures     CAD s/p PCI w/ DEI  Hypertension  Hyperlipidemia  - Continue metoprolol, atorvastatin, lisinopril/HCTZ     Anxiety/depression  - Continue home Zoloft, Remeron     MCI  - Continue home donepezil, memantine     Expected Discharge Location and Transportation: home +/- home health  Expected Discharge   Expected Discharge Date: 11/21/2024; Expected Discharge Time:        VTE Prophylaxis:  Pharmacologic VTE prophylaxis orders are  present.      AM-PAC 6 Clicks Score (PT): 20 (11/21/24 0800)    CODE STATUS:   Code Status and Medical Interventions: CPR (Attempt to Resuscitate); Full   Ordered at: 11/20/24 1703     Level Of Support Discussed With:    Patient     Code Status (Patient has no pulse and is not breathing):    CPR (Attempt to Resuscitate)     Medical Interventions (Patient has pulse or is breathing):    Full       Felice Vega MD  11/21/24

## 2024-11-21 NOTE — PLAN OF CARE
Goal Outcome Evaluation:  Plan of Care Reviewed With: patient, friend           Outcome Evaluation: PT ReEval completed.  Pt presents with L lateral ankle abscess s/p L ankle I&D with generalized weakness, L lateral ankle/foot pain, balance deficits, decreased functional endurance, and decreased indep with functional mobility compared to baseline.  Ambulated 50+30ft with CGA and FWW; required standing rest break d/t pain and fatigue.  Rec use of FWW for mobility at this time for improved balance and support.  Pt will benefit from skilled IP PT to improve indep and safety with mobility and promote return to PLOF.  Rec HWA, HHPT, and DME of FWW upon d/c.    Anticipated Discharge Disposition (PT): home with assist, home with home health

## 2024-11-21 NOTE — CASE MANAGEMENT/SOCIAL WORK
Continued Stay Note  [facility]     Patient Name: Justice Kiser  MRN: [id number]  Today's Date: 11/21/2024    Admit Date: 11/11/2024    Plan: Home with daughter   Discharge Plan       Row Name 11/21/24 4999       Plan    Plan Home with daughter    Patient/Family in Agreement with Plan yes    Plan Comments Update to earlier note today:  Patient changed his mind & would now like to come to [facility] to do his daily IV antibiotics. CM updated Dr. Quijano. Dr. Quijano stated he can come to [facility] daily. If discharged on Friday, he will receive his IV antibiotic dose before discharge and then come to [facility] starting Saturday morning. CM to confirm time with [facility] if patient discharges on Friday.  Also, patient is agreeable and states he would have help at home to do wound care OR he is willing to come to [facility]. CM updated Kristin at [facility] at [phone], opt. 2. CM will continue to follow.    Final Discharge Disposition Code 01 - home or self-care                   Discharge Codes    No documentation.                 Expected Discharge Date and Time       Expected Discharge Date Expected Discharge Time    Nov 21, 2024               Albina Nagel RN

## 2024-11-21 NOTE — THERAPY RE-EVALUATION
Patient Name: Justice Kiser  : 1960    MRN: [id number]                              Today's Date: 2024       Admit Date: 2024    Visit Dx:     ICD-10-CM ICD-9-CM   1. Acute sepsis  A41.9 038.9     995.91   2. Cellulitis of left ankle  L03.116 682.6   3. History of MRSA infection  Z86.14 V12.04   4. Ankle cellulitis  L03.119 682.6   5. Paresthesia of right foot  R20.2 782.0   6. Arthritis  M19.90 716.90     Patient Active Problem List   Diagnosis    Essential hypertension    Coronary artery disease involving native coronary artery of native heart with angina pectoris    Hyperlipidemia LDL goal <70    Depression    Anxiety    Hyperglycemia    Paresthesia of right foot    Tremor    GERD (gastroesophageal reflux disease)    Arthritis    Pierson's esophagus without dysplasia    MCI (mild cognitive impairment)    Mitral valve insufficiency    Alcohol induced fatty liver    Alcohol abuse, in remission    Cerebral microvascular disease    Chronic bilateral low back pain with right-sided sciatica    Spinal stenosis, lumbar region, with neurogenic claudication    DDD (degenerative disc disease), lumbar    Psychophysiological insomnia    Left leg cellulitis     Past Medical History:   Diagnosis Date    Acute maxillary sinusitis     Arthritis     CHF (congestive heart failure)     Chronic pain disorder     Colon polyp     Depression     Difficulty walking     Elevated LFTs     GERD (gastroesophageal reflux disease)     History of colonic polyps     History of methicillin resistant Staphylococcus aureus     History of myocardial infarction     Hyperlipidemia     Hypertension     Left hand pain     Left shoulder pain     Low back pain     MRSA (methicillin resistant Staphylococcus aureus)     Myocardial infarction     Neck pain     Personal history of congestive heart failure     Right hip pain     Rotator cuff syndrome      Past Surgical History:   Procedure Laterality Date    BACK SURGERY      CAROTID STENT       CORONARY ANGIOPLASTY WITH STENT PLACEMENT  01/04/2012    Circumflex Xscience V 3.5 MM x 23 mm    CORONARY STENT PLACEMENT      ENDOMETRIAL ABLATION      EPIDURAL BLOCK      GALLBLADDER SURGERY  2017    INCISION AND DRAINAGE LEG Left 11/20/2024    Procedure: INCISION AND DRAINAGE OF LEFT ANKLE;  Surgeon: Ravin Sidhu MD;  Location: [facility];  Service: Orthopedics;  Laterality: Left;    LUMBAR DISCECTOMY  1992    2 level - [facility]    LYMPH NODE BIOPSY      SHOULDER SURGERY  12/20/2018    Dr. Hoyt Rotator cuff repair      General Information       Row Name 11/21/24 1043          Physical Therapy Time and Intention    Document Type re-evaluation  -     Mode of Treatment physical therapy  -       Row Name 11/21/24 1043          General Information    Patient Profile Reviewed yes  -BA     Prior Level of Function independent:;all household mobility;community mobility;gait;transfer;bed mobility;ADL's;home management;driving  Reported he did not use an AD for ambulation; has rollator walker and cane available if needed. No falls.  -BA     Existing Precautions/Restrictions fall;weight bearing;left;other (see comments)  L lateral fibular abscess; s/p L ankle I&D 11/20; partial tearing peroneous longus tendon; WBAT LLE  -     Barriers to Rehab medically complex  -       Row Name 11/21/24 1043          Living Environment    People in Home child(leatha), adult  Lives with dtr.  -       Row Name 11/21/24 1043          Home Main Entrance    Number of Stairs, Main Entrance none  -BA       Row Name 11/21/24 1043          Stairs Within Home, Primary    Number of Stairs, Within Home, Primary none  -       Row Name 11/21/24 1043          Cognition    Orientation Status (Cognition) oriented x 3  -BA       Row Name 11/21/24 1043          Safety Issues/Impairments Affecting Functional Mobility    Safety Issues Affecting Function (Mobility) awareness of need for assistance;insight into  deficits/self-awareness;positioning of assistive device;safety precaution awareness;safety precautions follow-through/compliance  -     Impairments Affecting Function (Mobility) pain;balance;coordination;endurance/activity tolerance;range of motion (ROM);sensation/sensory awareness;strength;motor control;postural/trunk control  -               User Key  (r) = Recorded By, (t) = Taken By, (c) = Cosigned By      Initials Name Provider Type     Sumaya Lopez, PT Physical Therapist                   Mobility       Row Name 11/21/24 1050          Bed Mobility    Bed Mobility supine-sit;scooting/bridging  -     Scooting/Bridging [place] (Bed Mobility) independent  -     Supine-Sit [place] (Bed Mobility) modified independence  -     Assistive Device (Bed Mobility) head of bed elevated;bed rails  -     Comment, (Bed Mobility) Reported no dizziness upon sitting EOB.  -       Row Name 11/21/24 1050          Sit-Stand Transfer    Sit-Stand Tyler (Transfers) contact guard;verbal cues;nonverbal cues (demo/gesture)  -     Assistive Device (Sit-Stand Transfers) walker, front-wheeled  -     Comment, (Sit-Stand Transfer) VCs for sequencing and hand placement.  Reported no dizziness upon standing.  -       Row Name 11/21/24 1050          Gait/Stairs (Locomotion)    Tyler Level (Gait) contact guard;verbal cues  -     Assistive Device (Gait) walker, front-wheeled  -     Distance in Feet (Gait) 50  +30  -     Deviations/Abnormal Patterns (Gait) bilateral deviations;anca decreased;gait speed decreased;stride length decreased;left sided deviations;antalgic;weight shifting decreased  -     Bilateral Gait Deviations forward flexed posture;heel strike decreased  -     Left Sided Gait Deviations weight shift ability decreased  -     Comment, (Gait/Stairs) Demo'd step-to gait pattern initially progressing to step through gait pattern.  Antalgic gait with decreased stance time  and toleration to WB'ing on LLE.  Tends to weight bear on lateral aspect of L foot.  Required 1 standing rest break d/t fatigue and pain.  VCs/TCs for improved stride length with step through gait pattern, optimal walker positioning with staying closer to FWW, upright posture, and to place entire L foot flat on ground vs. on lateral aspect of L foot.  Gait distance limited by fatigue and pain.  -       Row Name 11/21/24 1050          Mobility    Extremity Weight-bearing Status left lower extremity  -     Left Lower Extremity (Weight-bearing Status) weight-bearing as tolerated (WBAT)  -               User Key  (r) = Recorded By, (t) = Taken By, (c) = Cosigned By      Initials Name Provider Type     Sumaya Lopez, PT Physical Therapist                   Obj/Interventions       [facility] Name 11/21/24 1146          Range of Motion Comprehensive    General Range of Motion lower extremity range of motion deficits identified  -     Comment, General Range of Motion AROM B hip, B knee, and R ankle WFL.  Decreased AROM L ankle DF/PF d/t increased bandaging/dressings, pain, and tightness.  -       Row Name 11/21/24 1146          Strength Comprehensive (MMT)    General Manual Muscle Testing (MMT) Assessment lower extremity strength deficits identified  -     Comment, General Manual Muscle Testing (MMT) Assessment Grossly B hip 4-/5, L knee 4/5, R knee 4 to 4+/5, R ankle 4+/5.  L ankle not formally assessed d/t pain from recent I&D procedure.  -       Row Name 11/21/24 1146          Motor Skills    Motor Skills coordination;functional endurance  -     Coordination gross motor deficit;left;lower extremity;minimal impairment;moderate impairment  -     Functional Endurance Decreased functional endurance.  Fatigues with activity.  -     Therapeutic Exercise ankle  -       Row Name 11/21/24 1146          Ankle (Therapeutic Exercise)    Ankle (Therapeutic Exercise) AROM (active range of motion)  -     Ankle  AROM (Therapeutic Exercise) left;dorsiflexion;plantarflexion;supine;10 repetitions  -       Row Name 11/21/24 1146          Balance    Balance Assessment sitting static balance;sitting dynamic balance;standing static balance;standing dynamic balance  -     Static Sitting Balance independent  -     Dynamic Sitting Balance standby assist  -     Position, Sitting Balance unsupported;sitting edge of bed;sitting in chair  -     Static Standing Balance contact guard  -     Dynamic Standing Balance contact guard;verbal cues  -     Position/Device Used, Standing Balance supported;walker, front-wheeled  -     Comment, Balance Progressive mild instability with ambulation activity with FWW, deven as pt fatigued and pain increased in L ankle/foot; no overt LOB noted.  Rec use of FWW for mobility at this time for improved balance and support.  -       Row Name 11/21/24 1146          Sensory Assessment (Somatosensory)    Left LE Sensory Assessment light touch awareness;impaired;other (see comments)  Reported N/T from L knee down to foot.  -     Right LE Sensory Assessment light touch awareness;intact  -               User Key  (r) = Recorded By, (t) = Taken By, (c) = Cosigned By      Initials Name Provider Type     Sumaya Lopez, PT Physical Therapist                   Goals/Plan       Row Name 11/21/24 1202          Bed Mobility Goal 1 (PT)    Activity/Assistive Device (Bed Mobility Goal 1, PT) sit to supine/supine to sit  -     [place] Level/Cues Needed (Bed Mobility Goal 1, PT) independent  -BA     Time Frame (Bed Mobility Goal 1, PT) short term goal (STG);5 days  -       Row Name 11/21/24 1202          Transfer Goal 1 (PT)    Activity/Assistive Device (Transfer Goal 1, PT) sit-to-stand/stand-to-sit;bed-to-chair/chair-to-bed;walker, rolling  -     [place] Level/Cues Needed (Transfer Goal 1, PT) independent  -BA     Time Frame (Transfer Goal 1, PT) long term goal (LTG);10 days  -        Row Name 11/21/24 1202          Gait Training Goal 1 (PT)    Activity/Assistive Device (Gait Training Goal 1, PT) gait (walking locomotion);assistive device use;walker, rolling  -BA     [place] Level (Gait Training Goal 1, PT) standby assist  -BA     Distance (Gait Training Goal 1, PT) 150ft  -BA     Time Frame (Gait Training Goal 1, PT) long term goal (LTG);10 days  -       Row Name 11/21/24 1202          Therapy Assessment/Plan (PT)    Planned Therapy Interventions (PT) balance training;bed mobility training;gait training;home exercise program;motor coordination training;neuromuscular re-education;patient/family education;postural re-education;ROM (range of motion);strengthening;stretching;transfer training  -               User Key  (r) = Recorded By, (t) = Taken By, (c) = Cosigned By      Initials Name Provider Type    Sumaya August, PT Physical Therapist                   Clinical Impression       Row Name 11/21/24 1159          Pain    Pretreatment Pain Rating 7/10  -BA     Posttreatment Pain Rating 9/10  -BA     Pain Location ankle;foot  -BA     Pain Side/Orientation left;lateral  -BA     Pain Management Interventions activity modification encouraged;exercise or physical activity utilized;movement retraining implemented;positioning techniques utilized;cold applied;nursing notified  -     Response to Pain Interventions activity participation with increased pain  -BA     Pre/Posttreatment Pain Comment Pt requesting pain meds; RN notified and managing.  -       Row Name 11/21/24 4811          Plan of Care Review    Plan of Care Reviewed With patient;friend  -     Outcome Evaluation PT ReEval completed.  Pt presents with L lateral ankle abscess s/p L ankle I&D with generalized weakness, L lateral ankle/foot pain, balance deficits, decreased functional endurance, and decreased indep with functional mobility compared to baseline.  Ambulated 50+30ft with CGA and FWW; required standing  rest break d/t pain and fatigue.  Rec use of FWW for mobility at this time for improved balance and support.  Pt will benefit from skilled IP PT to improve indep and safety with mobility and promote return to PLOF.  Rec HWA, HHPT, and DME of FWW upon d/c.  -       Row Name 11/21/24 1154          Therapy Assessment/Plan (PT)    Patient/Family Therapy Goals Statement (PT) to return home and to PLOF  -BA     Rehab Potential (PT) good  -BA     Criteria for Skilled Interventions Met (PT) yes;meets criteria;skilled treatment is necessary  -     Therapy Frequency (PT) daily  -BA     Predicted Duration of Therapy Intervention (PT) 10 days  -       Row Name 11/21/24 1154          Vital Signs    Pre Systolic BP Rehab 125  -BA     Pre Treatment Diastolic BP 74  -BA     Post Systolic BP Rehab 166  -BA     Post Treatment Diastolic BP 62  -BA     Pretreatment Heart Rate (beats/min) 57  -BA     Posttreatment Heart Rate (beats/min) 62  -BA     Pre SpO2 (%) 97  -BA     O2 Delivery Pre Treatment room air  -BA     O2 Delivery Intra Treatment room air  -BA     Post SpO2 (%) 96  -BA     O2 Delivery Post Treatment room air  -BA     Pre Patient Position Supine  -BA     Post Patient Position Sitting  -       Row Name 11/21/24 1154          Positioning and Restraints    Pre-Treatment Position in bed  -BA     Post Treatment Position chair  -BA     In Chair notified nsg;reclined;call light within reach;encouraged to call for assist;exit alarm on;waffle cushion;legs elevated;LLE elevated;L heel elevated  with friend  -               User Key  (r) = Recorded By, (t) = Taken By, (c) = Cosigned By      Initials Name Provider Type    Sumaya August, PT Physical Therapist                   Outcome Measures       Row Name 11/21/24 1203 11/21/24 0800       How much help from another person do you currently need...    Turning from your back to your side while in flat bed without using bedrails? 4  -BA 4  -CF    Moving from lying on  back to sitting on the side of a flat bed without bedrails? 4  -BA 4  -CF    Moving to and from a bed to a chair (including a wheelchair)? 3  -BA 3  -CF    Standing up from a chair using your arms (e.g., wheelchair, bedside chair)? 3  -BA 3  -CF    Climbing 3-5 steps with a railing? 3  -BA 3  -CF    To walk in hospital room? 3  -BA 3  -CF    AM-PAC 6 Clicks Score (PT) 20  -BA 20  -CF    Highest Level of Mobility Goal 6 --> Walk 10 steps or more  -BA 6 --> Walk 10 steps or more  -CF      Row Name 11/21/24 1203          Functional Assessment    Outcome Measure Options AM-PAC 6 Clicks Basic Mobility (PT)  -BA               User Key  (r) = Recorded By, (t) = Taken By, (c) = Cosigned By      Initials Name Provider Type    CF Yamilet Ortiz LPN Licensed Nurse    Sumaya August, HOMER Physical Therapist                                 Physical Therapy Education       Title: PT OT SLP Therapies (Done)       Topic: Physical Therapy (Done)       Point: Mobility training (Done)       Learning Progress Summary            Patient Acceptance, E, VU,NR by BA at 11/21/2024 1203    Acceptance, E, NR by AS at 11/19/2024 0927    Acceptance, E, NR by AS at 11/16/2024 1429    Acceptance, E, VU,NR by BA at 11/13/2024 1519                      Point: Home exercise program (Done)       Learning Progress Summary            Patient Acceptance, E, VU,NR by BA at 11/21/2024 1203    Acceptance, E, NR by AS at 11/19/2024 0927    Acceptance, E, NR by AS at 11/16/2024 1429                      Point: Body mechanics (Done)       Learning Progress Summary            Patient Acceptance, E, VU,NR by BA at 11/21/2024 1203    Acceptance, E, NR by AS at 11/19/2024 0927    Acceptance, E, NR by AS at 11/16/2024 1429    Acceptance, E, VU,NR by BA at 11/13/2024 1519                      Point: Precautions (Done)       Learning Progress Summary            Patient Acceptance, E, VU,NR by BA at 11/21/2024 1203    Acceptance, E, NR by AS at  11/19/2024 0927    Acceptance, E, NR by AS at 11/16/2024 1429    Acceptance, E, VU,NR by BA at 11/13/2024 1519                                      User Key       Initials Effective Dates Name Provider Type Discipline    AS 04/28/23 -  Alberta Garcia, PTA Physical Therapist Assistant PT    BA 09/21/21 -  Sumaya Lopez, PT Physical Therapist PT                  PT Recommendation and Plan  Planned Therapy Interventions (PT): balance training, bed mobility training, gait training, home exercise program, motor coordination training, neuromuscular re-education, patient/family education, postural re-education, ROM (range of motion), strengthening, stretching, transfer training  Outcome Evaluation: PT ReEval completed.  Pt presents with L lateral ankle abscess s/p L ankle I&D with generalized weakness, L lateral ankle/foot pain, balance deficits, decreased functional endurance, and decreased indep with functional mobility compared to baseline.  Ambulated 50+30ft with CGA and FWW; required standing rest break d/t pain and fatigue.  Rec use of FWW for mobility at this time for improved balance and support.  Pt will benefit from skilled IP PT to improve indep and safety with mobility and promote return to PLOF.  Rec HWA, HHPT, and DME of FWW upon d/c.     Time Calculation:   PT Evaluation Complexity  History, PT Evaluation Complexity: 3 or more personal factors and/or comorbidities  Examination of Body Systems (PT Eval Complexity): total of 3 or more elements  Clinical Presentation (PT Evaluation Complexity): evolving  Clinical Decision Making (PT Evaluation Complexity): moderate complexity  Overall Complexity (PT Evaluation Complexity): moderate complexity     PT Charges       Row Name 11/21/24 1203             Time Calculation    Start Time 1010  -BA      PT Received On 11/21/24  -      PT Goal Re-Cert Due Date 12/01/24  -         Time Calculation- PT    Total Timed Code Minutes- PT 29 minute(s)  -BA          Timed Charges    24915 - PT Therapeutic Exercise Minutes 5  -BA      08625 - Gait Training Minutes  13  -BA      65361 - PT Therapeutic Activity Minutes 11  -BA         Untimed Charges    PT Eval/Re-eval Minutes 39  -BA         Total Minutes    Timed Charges Total Minutes 29  -BA      Untimed Charges Total Minutes 39  -BA       Total Minutes 68  -BA                User Key  (r) = Recorded By, (t) = Taken By, (c) = Cosigned By      Initials Name Provider Type    Sumaya August, PT Physical Therapist                  Therapy Charges for Today       Code Description Service Date Service Provider Modifiers Qty    20393150253 HC GAIT TRAINING EA 15 MIN 11/21/2024 Sumaya Lopez, PT GP 1    03232314963 HC PT THERAPEUTIC ACT EA 15 MIN 11/21/2024 Sumaya Lopez, PT GP 1    [id number]  PT RE-EVAL ESTABLISHED PLAN 2 11/21/2024 Sumaya Lopez, PT GP 1            PT G-Codes  Outcome Measure Options: AM-PAC 6 Clicks Basic Mobility (PT)  AM-PAC 6 Clicks Score (PT): 20  PT Discharge Summary  Anticipated Discharge Disposition (PT): home with assist, home with home health    Sumaya Lopez, PT  11/21/2024

## 2024-11-21 NOTE — PROGRESS NOTES
"          Clinical Nutrition Assessment     Patient Name: Justice Kiser  YOB: 1960  MRN: [id number]  Date of Encounter: 11/21/24 13:36 EST  Admission date: 11/11/2024  Reason for Visit: LOS    Assessment   Nutrition Assessment   Admission Diagnosis:  Left leg cellulitis [L03.116]    Problem List:    Left leg cellulitis      PMH:   He  has a past medical history of Acute maxillary sinusitis, Arthritis, CHF (congestive heart failure), Chronic pain disorder, Colon polyp, Depression, Difficulty walking, Elevated LFTs, GERD (gastroesophageal reflux disease), History of colonic polyps, History of methicillin resistant Staphylococcus aureus, History of myocardial infarction, Hyperlipidemia, Hypertension, Left hand pain, Left shoulder pain, Low back pain, MRSA (methicillin resistant Staphylococcus aureus), Myocardial infarction, Neck pain, Personal history of congestive heart failure, Right hip pain, and Rotator cuff syndrome.    PSH:  He  has a past surgical history that includes Coronary angioplasty with stent (01/04/2012); Gallbladder surgery (2017); Shoulder surgery (12/20/2018); Lumbar discectomy (1992); Back surgery; Carotid stent; Coronary stent placement; Endometrial ablation; Lymph node biopsy; Epidural block injection; and incision and drainage leg (Left, 11/20/2024).    Applicable Nutrition History:   Skin: LE cellulitis w/abscess    Anthropometrics     Height: Height: 162.6 cm (64.02\")  Last Filed Weight: Weight: 77.4 kg (170 lb 10.2 oz) (11/20/24 1319)  Method: Weight Method: Bed scale  BMI: BMI (Calculated): 29.3    UBW:    Weight change: weight loss of 13 lbs (7.2%) over 2 month(s)    Significant?  Yes   Weight       Weight (kg) Weight (lbs) Weight Method Visit Report   4/9/2024 88.451 kg  195 lb   --    4/19/2024 87.091 kg  192 lb   --    9/20/2024 83.19 kg  183 lb 6.4 oz   --    10/8/2024 81.194 kg  179 lb   --    10/21/2024 82.555 kg  182 lb   --    11/11/2024 77.429 kg  170 lb 11.2 oz  Bed " scale      79.379 kg  175 lb  Stated     11/20/2024 77.4 kg  170 lb 10.2 oz          Nutrition Focused Physical Exam    Date: 11/21    Patient meets criteria for malnutrition diagnosis, see MSA note.     Subjective   Reported/Observed/Food/Nutrition Related History:     Pt eating well since admit, states his intake has improved in the past few days. Pt does endorse unintended wt loss over the past few months, states [id number]=[id number]. Pt is unsure what has caused diminished appetite, states he's been eating ~50% of usual intake. Pt agreeable to ONS. NKFA.    Current Nutrition Prescription   PO: Diet: Regular/House; Fluid Consistency: Thin (IDDSI 0)  Oral Nutrition Supplement:   Intake:  100% x 5 meals    Assessment & Plan   Nutrition Diagnosis   Date:  11/21            Updated:    Problem Malnutrition acute severe   Etiology Diminished appetite   Signs/Symptoms Moderate muscle wasting, wt loss >7.5% x 3 months, po intake </=75% EEN x >/=7 days   Status: New    Goal / Objectives:   Nutrition to support treatment and Maintain intake      Nutrition Intervention      Follow treatment progress, Care plan reviewed, Encourage intake, Supplement provided    Ensure/Boost+ (vanilla) ordered w/dinner    Monitoring/Evaluation:   Per protocol, PO intake, Supplement intake, Weight    Mayra Scott, MS,RD,LD  Time Spent:25

## 2024-11-21 NOTE — CASE MANAGEMENT/SOCIAL WORK
Continued Stay Note  [facility]     Patient Name: Justice Kiser  MRN: [id number]  Today's Date: 11/21/2024    Admit Date: 11/11/2024    Plan: Home with daughter   Discharge Plan       Row Name 11/21/24 1006       Plan    Plan Home with daughter    Patient/Family in Agreement with Plan yes    Plan Comments CM spoke to patient at bedside. His plan remains home with his daughter and outpatient IV antibiotics at [facility]. Daughter will transport. PT recommends home with assist and home health. Patient ambulated 56' with CGA x1. Discussed therapy's recommendation and outpatient therapy services. Patient is considering OPPT, but wants to think about it. He requested a rolling walker. Order in epic. Called to Benji with [employer] & will deliver to bedside today. CM left a message for Kerline with [facility] at [phone], opt. 2 to update. CM will continue to follow the plan of care.    Final Discharge Disposition Code 01 - home or self-care                   Discharge Codes    No documentation.                 Expected Discharge Date and Time       Expected Discharge Date Expected Discharge Time    Nov 21, 2024               Albina Nagel, HANNAH

## 2024-11-21 NOTE — PROGRESS NOTES
Malnutrition Severity Assessment    Patient Name:  Justice Kiser  YOB: 1960  MRN: [id number]  Admit Date:  11/11/2024    Patient meets criteria for : Severe Malnutrition    Comments:  Pt meets criteria for severe malnutrition in the context of acute illness indicated by moderate muscle wasting, wt loss >7.5% x 3 months, po intake </=75% EEN x >/=7 days.    Malnutrition Severity Assessment  Malnutrition Type: Acute Disease or Injury - Related Malnutrition  Malnutrition Type (Last 8 Hours)       Malnutrition Severity Assessment       Row Name 11/21/24 1346       Malnutrition Severity Assessment    Malnutrition Type Acute Disease or Injury - Related Malnutrition      Row Name 11/21/24 1346       Insufficient Energy Intake     Insufficient Energy Intake Findings Moderate    Insufficient Energy Intake  <75% of est. energy requirement for >7d)      Row Name 11/21/24 1346       Unintentional Weight Loss     Unintentional Weight Loss Findings Severe  13lb/7.2% x 2 months    Unintentional Weight Loss  Weight loss greater than 7.5% in three months      Row Name 11/21/24 1346       Muscle Loss    Loss of Muscle Mass Findings Moderate    Clavicle Bone Region Moderate - some protrusion in females, visible in males    Acromion Bone Region Moderate - acromion may slightly protrude    Posterior Calf Region Moderate - some roundness, slight firmness      Row Name 11/21/24 1346       Fat Loss    Subcutaneous Fat Loss Findings Moderate    Upper Arm Region Moderate - some fat tissue, not ample      Row Name 11/21/24 1346       Criteria Met (Must meet criteria for severity in at least 2 of these categories: M Wasting, Fat Loss, Fluid, Secondary Signs, Wt. Status, Intake)    Patient meets criteria for  Severe Malnutrition                    Electronically signed by:  Mayra Scott, MS,RD,LD  11/21/24 13:48 EST

## 2024-11-22 ENCOUNTER — READMISSION MANAGEMENT (OUTPATIENT)
Dept: CALL CENTER | Facility: HOSPITAL | Age: 64
End: 2024-11-22
Payer: MEDICARE

## 2024-11-22 VITALS
HEIGHT: 64 IN | SYSTOLIC BLOOD PRESSURE: 105 MMHG | DIASTOLIC BLOOD PRESSURE: 74 MMHG | BODY MASS INDEX: 29.13 KG/M2 | OXYGEN SATURATION: 95 % | RESPIRATION RATE: 16 BRPM | TEMPERATURE: 98 F | HEART RATE: 59 BPM | WEIGHT: 170.64 LBS

## 2024-11-22 PROBLEM — E43 SEVERE PROTEIN-CALORIE MALNUTRITION: Status: ACTIVE | Noted: 2024-11-22

## 2024-11-22 LAB
ANION GAP SERPL CALCULATED.3IONS-SCNC: 10 MMOL/L (ref 5–15)
BUN SERPL-MCNC: 14 MG/DL (ref 8–23)
BUN/CREAT SERPL: 20 (ref 7–25)
CALCIUM SPEC-SCNC: 8.3 MG/DL (ref 8.6–10.5)
CHLORIDE SERPL-SCNC: 106 MMOL/L (ref 98–107)
CO2 SERPL-SCNC: 22 MMOL/L (ref 22–29)
CREAT SERPL-MCNC: 0.7 MG/DL (ref 0.76–1.27)
CRP SERPL-MCNC: 4.4 MG/DL (ref 0–0.5)
CYTO UR: NORMAL
DEPRECATED RDW RBC AUTO: 51.9 FL (ref 37–54)
EGFRCR SERPLBLD CKD-EPI 2021: 101.6 ML/MIN/1.73
ERYTHROCYTE [DISTWIDTH] IN BLOOD BY AUTOMATED COUNT: 15.8 % (ref 12.3–15.4)
GLUCOSE SERPL-MCNC: 108 MG/DL (ref 65–99)
HCT VFR BLD AUTO: 31.8 % (ref 37.5–51)
HGB BLD-MCNC: 10 G/DL (ref 13–17.7)
LAB AP CASE REPORT: NORMAL
LAB AP CLINICAL INFORMATION: NORMAL
LAB AP DIAGNOSIS COMMENT: NORMAL
MCH RBC QN AUTO: 28.2 PG (ref 26.6–33)
MCHC RBC AUTO-ENTMCNC: 31.4 G/DL (ref 31.5–35.7)
MCV RBC AUTO: 89.8 FL (ref 79–97)
PATH REPORT.FINAL DX SPEC: NORMAL
PATH REPORT.GROSS SPEC: NORMAL
PLATELET # BLD AUTO: 175 10*3/MM3 (ref 140–450)
PMV BLD AUTO: 10.9 FL (ref 6–12)
POTASSIUM SERPL-SCNC: 4.2 MMOL/L (ref 3.5–5.2)
RBC # BLD AUTO: 3.54 10*6/MM3 (ref 4.14–5.8)
SODIUM SERPL-SCNC: 138 MMOL/L (ref 136–145)
WBC NRBC COR # BLD AUTO: 6.19 10*3/MM3 (ref 3.4–10.8)

## 2024-11-22 PROCEDURE — 86140 C-REACTIVE PROTEIN: CPT | Performed by: INTERNAL MEDICINE

## 2024-11-22 PROCEDURE — 25010000002 CEFTRIAXONE PER 250 MG: Performed by: ORTHOPAEDIC SURGERY

## 2024-11-22 PROCEDURE — 85027 COMPLETE CBC AUTOMATED: CPT | Performed by: INTERNAL MEDICINE

## 2024-11-22 PROCEDURE — 99239 HOSP IP/OBS DSCHRG MGMT >30: CPT | Performed by: INTERNAL MEDICINE

## 2024-11-22 PROCEDURE — 80048 BASIC METABOLIC PNL TOTAL CA: CPT | Performed by: ORTHOPAEDIC SURGERY

## 2024-11-22 PROCEDURE — 25010000002 ENOXAPARIN PER 10 MG: Performed by: ORTHOPAEDIC SURGERY

## 2024-11-22 PROCEDURE — 25010000002 HYDROMORPHONE PER 4 MG: Performed by: ORTHOPAEDIC SURGERY

## 2024-11-22 RX ORDER — OXYCODONE AND ACETAMINOPHEN 7.5; 325 MG/1; MG/1
1 TABLET ORAL EVERY 6 HOURS PRN
Status: DISCONTINUED | OUTPATIENT
Start: 2024-11-22 | End: 2024-11-22 | Stop reason: HOSPADM

## 2024-11-22 RX ORDER — OXYCODONE AND ACETAMINOPHEN 5; 325 MG/1; MG/1
1 TABLET ORAL EVERY 6 HOURS PRN
Qty: 18 TABLET | Refills: 0 | Status: SHIPPED | OUTPATIENT
Start: 2024-11-22

## 2024-11-22 RX ORDER — DOXYCYCLINE 100 MG/1
100 CAPSULE ORAL EVERY 12 HOURS SCHEDULED
Qty: 28 CAPSULE | Refills: 0 | Status: SHIPPED | OUTPATIENT
Start: 2024-11-22 | End: 2024-12-06

## 2024-11-22 RX ORDER — ASPIRIN 81 MG/1
81 TABLET ORAL 2 TIMES DAILY
Start: 2024-11-22 | End: 2025-01-03

## 2024-11-22 RX ORDER — SACCHAROMYCES BOULARDII 250 MG
250 CAPSULE ORAL 2 TIMES DAILY
Qty: 28 CAPSULE | Refills: 0 | Status: SHIPPED | OUTPATIENT
Start: 2024-11-22

## 2024-11-22 RX ADMIN — AVOBENZONE, HOMOSALATE, OCTISALATE, OCTOCRYLENE, AND OXYBENZONE 1 PACKET: 29.4; 147; 49; 25.4; 58.8 LOTION TOPICAL at 08:37

## 2024-11-22 RX ADMIN — ENOXAPARIN SODIUM 40 MG: 100 INJECTION SUBCUTANEOUS at 08:36

## 2024-11-22 RX ADMIN — DOXYCYCLINE 100 MG: 100 CAPSULE ORAL at 08:36

## 2024-11-22 RX ADMIN — Medication 10 ML: at 08:37

## 2024-11-22 RX ADMIN — PANTOPRAZOLE SODIUM 40 MG: 40 TABLET, DELAYED RELEASE ORAL at 05:20

## 2024-11-22 RX ADMIN — ASPIRIN 81 MG: 81 TABLET, COATED ORAL at 08:36

## 2024-11-22 RX ADMIN — MEMANTINE 10 MG: 10 TABLET ORAL at 08:36

## 2024-11-22 RX ADMIN — OXYCODONE HYDROCHLORIDE AND ACETAMINOPHEN 1 TABLET: 7.5; 325 TABLET ORAL at 10:03

## 2024-11-22 RX ADMIN — Medication 250 MG: at 08:37

## 2024-11-22 RX ADMIN — SODIUM CHLORIDE 2000 MG: 900 INJECTION INTRAVENOUS at 08:48

## 2024-11-22 RX ADMIN — OXYCODONE HYDROCHLORIDE 10 MG: 10 TABLET ORAL at 05:20

## 2024-11-22 RX ADMIN — HYDROCHLOROTHIAZIDE 12.5 MG: 12.5 TABLET ORAL at 08:36

## 2024-11-22 RX ADMIN — ACETAMINOPHEN 1000 MG: 500 TABLET ORAL at 02:02

## 2024-11-22 RX ADMIN — HYDROMORPHONE HYDROCHLORIDE 0.5 MG: 1 INJECTION, SOLUTION INTRAMUSCULAR; INTRAVENOUS; SUBCUTANEOUS at 02:02

## 2024-11-22 RX ADMIN — METOPROLOL TARTRATE 25 MG: 25 TABLET, FILM COATED ORAL at 08:36

## 2024-11-22 RX ADMIN — SERTRALINE 200 MG: 100 TABLET, FILM COATED ORAL at 08:37

## 2024-11-22 RX ADMIN — LISINOPRIL 20 MG: 20 TABLET ORAL at 08:36

## 2024-11-22 NOTE — CASE MANAGEMENT/SOCIAL WORK
Case Management Discharge Note      Final Note: Patient has orders for discharge. Per, [facility] note, patient to infuse at office. Called [facility] and spoke to Will who advises patient has appointment for infusion on Saturday and Sunday at 0945 as well as a follow up on 11/27 at 1015 to see Dr. Quijano. Spoke with patient to advise of appointments who verbalizes understanding and is in agreement with plan. No new discharge needs verbalized. Patient plan is to discharge home today via car with family to transport.         Selected Continued Care - Admitted Since 11/11/2024       Destination    No services have been selected for the patient.                Durable Medical Equipment       Service Provider Services Address Phone Fax Patient Preferred    [facility] [address] [place] [address] [place], [place] [id number] [phone] --       Internal Comment last updated by Albina Nagel, RN 11/21/2024 1006    Rolling walker                          Dialysis/Infusion    No services have been selected for the patient.                Home Medical Care    No services have been selected for the patient.                Therapy    No services have been selected for the patient.                Community Resources    No services have been selected for the patient.                Community & DME    No services have been selected for the patient.                         Final Discharge Disposition Code: 01 - home or self-care

## 2024-11-22 NOTE — OUTREACH NOTE
Prep Survey      Flowsheet Row Responses   [facility] [place], [facility] by [facility] patient discharged from? [facility]   Is LACE score < 7 ? No   Eligibility [facility]   Date of Admission 11/11/24   Date of Discharge 11/22/24   Discharge diagnosis INCISION AND DRAINAGE OF LEFT ANKLE   Does the patient have one of the following disease processes/diagnoses(primary or secondary)? General Surgery   Is there a DME ordered? Yes   What DME was ordered? Rolling walker   Prep survey completed? Yes            Ruth ARAMBULA - Registered Nurse

## 2024-11-22 NOTE — PROGRESS NOTES
"  Orthopedic Progress Note      Patient: Justice Kiser  YOB: 1960     Date of Admission: 11/11/2024  4:36 PM Medical Record Number: [id number]     Attending Physician: Felice Vega MD    Status Post:  Procedure(s):  INCISION AND DRAINAGE OF LEFT ANKLE Post Operative Day Number: 2    Subjective : No new orthopaedic complaints     Pain Relief: some relief with present medication.     Systemic Complaints: No Complaints  Vitals:    11/22/24 0000 11/22/24 0200 11/22/24 0400 11/22/24 0600   BP:   134/81    BP Location:   Right arm    Patient Position:   Lying    Pulse: 61 59 56 61   Resp:   18    Temp:   97 °F (36.1 °C)    TempSrc:   Oral    SpO2: 92% 93% 94% 91%   Weight:       Height:           Physical Exam: 64 y.o. male    General Appearance:       Alert, cooperative, in no acute distress                  Extremities:    Dressing Clean, Dry and Intact             No clinical sign of DVT        Able to do good movements of digits    Pulses:   Pulses palpable and equal bilaterally           Diagnostic Tests:     Results from last 7 days   Lab Units 11/22/24  0551 11/21/24  0647 11/20/24  1113 11/18/24  0551   WBC 10*3/mm3 6.19  --  7.93 9.00   HEMOGLOBIN g/dL 10.0* 10.3* 11.6* 12.0*   HEMATOCRIT % 31.8* 32.4* 35.5* 37.3*   PLATELETS 10*3/mm3 175  --  220 203     Results from last 7 days   Lab Units 11/22/24  0551 11/21/24  0647 11/20/24  1113   SODIUM mmol/L 138 136 136   POTASSIUM mmol/L 4.2 4.0 4.5   CHLORIDE mmol/L 106 104 106   CO2 mmol/L 22.0 22.0 18.0*   BUN mg/dL 14 19 22   CREATININE mg/dL 0.70* 0.90 0.77   GLUCOSE mg/dL 108* 115* 102*   CALCIUM mg/dL 8.3* 8.5* 8.4*     Results from last 7 days   Lab Units 11/20/24  1113   INR  1.30*     No results found for: \"URICACID\"  No results found for: \"CRYSTAL\"  Microbiology Results (last 10 days)       Procedure Component Value - Date/Time    Tissue / Bone Culture - Tissue, Ankle, Left [[id number]] Collected: 11/20/24 1523    Lab Status: Preliminary " result Specimen: Tissue from Ankle, Left Updated: 11/21/24 0738     Tissue Culture No growth     Gram Stain Few (2+) Red blood cells      Few (2+) WBCs seen      No organisms seen    Tissue / Bone Culture - Tissue, Ankle, Left [[id number]] Collected: 11/20/24 1523    Lab Status: Preliminary result Specimen: Tissue from Ankle, Left Updated: 11/21/24 0738     Tissue Culture No growth     Gram Stain Moderate (3+) WBCs seen      No organisms seen      Many (4+) Red blood cells    Tissue / Bone Culture - Tissue, Ankle, Left [[id number]] Collected: 11/20/24 1514    Lab Status: Preliminary result Specimen: Tissue from Ankle, Left Updated: 11/21/24 0738     Tissue Culture No growth     Gram Stain Moderate (3+) WBCs seen      Many (4+) Red blood cells      No organisms seen    AFB Culture - Tissue, Ankle, Left [[id number]] Collected: 11/20/24 1514    Lab Status: Preliminary result Specimen: Tissue from Ankle, Left Updated: 11/21/24 1222     AFB Stain No acid fast bacilli seen on concentrated smear          MRI Ankle Left With & Without Contrast    Result Date: 11/20/2024  Impression: 1. Interval development of an abscess along the lateral margin of the fibula measuring 3.4 x 1.2 x 6.3 cm. No reactive or bony destructive osseous changes to suggest osteomyelitis. Edema extends along the dorsal lateral margin of the foot. 2. Tendinopathy and partial-thickness tear involving the peroneus longus tendon with some trace tenosynovitis. 3. Planter fasciitis. 4. Suspected sprain of the anterior and posterior talofibular ligaments. Electronically Signed: Kristine Krause MD  11/20/2024 8:23 AM EST  Workstation ID: [id number]     Nonvascular Extremity Limited    Result Date: 11/19/2024  Impression: Two regions of poorly defined subcutaneous fluid within the anterior and lateral left lower leg measuring up to 4.6 cm and 8.6 cm respectively. In the setting of infection these could represent developing abscesses. No drainable fluid collection  at this time. Background subcutaneous edema and cutaneous thickening compatible with cellulitis. Electronically Signed: Luis Angel Cordero MD  11/19/2024 8:27 AM EST  Workstation ID: [id number]       Current Medications:  Scheduled Meds:acetaminophen, 1,000 mg, Oral, Q8H  aspirin, 81 mg, Oral, Daily  atorvastatin, 80 mg, Oral, Nightly  cefTRIAXone, 2,000 mg, Intravenous, Q24H  cholestyramine light, 1 packet, Oral, Q12H  donepezil, 10 mg, Oral, Nightly  doxycycline, 100 mg, Oral, Q12H  enoxaparin, 40 mg, Subcutaneous, Daily  lisinopril, 20 mg, Oral, Q24H   And  hydroCHLOROthiazide, 12.5 mg, Oral, Q24H  memantine, 10 mg, Oral, BID  metoprolol tartrate, 25 mg, Oral, BID  mirtazapine, 30 mg, Oral, Nightly  pantoprazole, 40 mg, Oral, Q AM  Psyllium, 1 packet, Oral, Daily  saccharomyces boulardii, 250 mg, Oral, BID  sertraline, 200 mg, Oral, Daily  sodium chloride, 10 mL, Intravenous, Q12H      Continuous Infusions:     PRN Meds:.  acetaminophen    Calcium Replacement - Follow Nurse / BPA Driven Protocol    diphenhydrAMINE **OR** diphenhydrAMINE    HYDROmorphone **AND** naloxone    Magnesium Standard Dose Replacement - Follow Nurse / BPA Driven Protocol    ondansetron ODT **OR** ondansetron    oxyCODONE    oxyCODONE    Phosphorus Replacement - Follow Nurse / BPA Driven Protocol    Potassium Replacement - Follow Nurse / BPA Driven Protocol    sodium chloride    sodium chloride    traMADol    White Petrolatum    Assessment: Status post  No admission procedures for hospital encounter.    Patient Active Problem List   Diagnosis    Essential hypertension    Coronary artery disease involving native coronary artery of native heart with angina pectoris    Hyperlipidemia LDL goal <70    Depression    Anxiety    Hyperglycemia    Paresthesia of right foot    Tremor    GERD (gastroesophageal reflux disease)    Arthritis    Pierson's esophagus without dysplasia    MCI (mild cognitive impairment)    Mitral valve insufficiency    Alcohol  induced fatty liver    Alcohol abuse, in remission    Cerebral microvascular disease    Chronic bilateral low back pain with right-sided sciatica    Spinal stenosis, lumbar region, with neurogenic claudication    DDD (degenerative disc disease), lumbar    Psychophysiological insomnia    Left leg cellulitis       PLAN:   Continues current post-op course  Anticoagulation: Aspirin started 81 mg twice daily x 6 weeks  Mobilize with PT as tolerated per protocol  Antibiotics per infectious disease  Follow-up OR cultures  Takedown postsurgical dressings today and remove packing.  Redress with Xeroform 4 x 4's and overwrapped with Ace that can stay in place until the patient follows up in the office in 2 weeks      Weight Bearing: WBAT  Discharge Plan: OK to plan for discharge in  today to home  from orthopaedic perspective.    Luis Angel Rodriguez PA-C    Date: 11/22/2024    Time: 07:32 EST

## 2024-11-22 NOTE — PLAN OF CARE
Problem: Adult Inpatient Plan of Care  Goal: Plan of Care Review  Outcome: Progressing  Goal: Patient-Specific Goal (Individualized)  Outcome: Progressing  Goal: Absence of Hospital-Acquired Illness or Injury  Outcome: Progressing  Intervention: Identify and Manage Fall Risk  Recent Flowsheet Documentation  Taken 11/22/2024 0400 by Sherlyn Salgado RN  Safety Promotion/Fall Prevention:   activity supervised   assistive device/personal items within reach   clutter free environment maintained   fall prevention program maintained   gait belt   lighting adjusted   nonskid shoes/slippers when out of bed   room organization consistent   safety round/check completed  Taken 11/22/2024 0202 by Sherlyn Salgado RN  Safety Promotion/Fall Prevention:   activity supervised   assistive device/personal items within reach   clutter free environment maintained   fall prevention program maintained   gait belt   lighting adjusted   nonskid shoes/slippers when out of bed   room organization consistent   safety round/check completed  Taken 11/22/2024 0000 by Sherlyn Salgado RN  Safety Promotion/Fall Prevention:   activity supervised   assistive device/personal items within reach   clutter free environment maintained   fall prevention program maintained   gait belt   lighting adjusted   nonskid shoes/slippers when out of bed   room organization consistent   safety round/check completed  Taken 11/21/2024 2200 by Sherlyn Salgado RN  Safety Promotion/Fall Prevention:   activity supervised   assistive device/personal items within reach   clutter free environment maintained   fall prevention program maintained   gait belt   lighting adjusted   nonskid shoes/slippers when out of bed   room organization consistent   safety round/check completed  Taken 11/21/2024 2000 by Sherlyn Salgado RN  Safety Promotion/Fall Prevention:   activity supervised   assistive device/personal items within reach   clutter free environment maintained   fall  prevention program maintained   gait belt   lighting adjusted   nonskid shoes/slippers when out of bed   room organization consistent   safety round/check completed  Intervention: Prevent Skin Injury  Recent Flowsheet Documentation  Taken 11/22/2024 0400 by Sherlyn Salgado RN  Body Position:   position changed independently   legs elevated  Skin Protection: incontinence pads utilized  Taken 11/22/2024 0202 by Sherlyn Salgado RN  Body Position: position changed independently  Skin Protection: incontinence pads utilized  Taken 11/22/2024 0000 by Sherlyn Salgado RN  Body Position: position changed independently  Skin Protection: incontinence pads utilized  Taken 11/21/2024 2200 by Sherlyn Salgado RN  Body Position: position changed independently  Skin Protection: incontinence pads utilized  Taken 11/21/2024 2000 by Sherlyn Salgado RN  Body Position:   position changed independently   legs elevated  Skin Protection: incontinence pads utilized  Intervention: Prevent Infection  Recent Flowsheet Documentation  Taken 11/22/2024 0400 by Sherlyn Salgado RN  Infection Prevention:   rest/sleep promoted   single patient room provided  Taken 11/22/2024 0202 by Sherlyn Salgado RN  Infection Prevention:   rest/sleep promoted   single patient room provided  Taken 11/22/2024 0000 by Sherlyn Salgado RN  Infection Prevention:   rest/sleep promoted   single patient room provided  Taken 11/21/2024 2200 by Sherlyn Salgado RN  Infection Prevention:   rest/sleep promoted   single patient room provided  Taken 11/21/2024 2000 by Sherlyn Salgado RN  Infection Prevention:   rest/sleep promoted   single patient room provided  Goal: Optimal Comfort and Wellbeing  Outcome: Progressing  Intervention: Monitor Pain and Promote Comfort  Recent Flowsheet Documentation  Taken 11/21/2024 2030 by Sherlyn Salgado RN  Pain Management Interventions:   unnecessary movement minimized   pillow support provided   position adjusted  Taken 11/21/2024  2000 by Sherlyn Salgado RN  Pain Management Interventions: pain medication given  Intervention: Provide Person-Centered Care  Recent Flowsheet Documentation  Taken 11/21/2024 2000 by Sherlyn Salgado RN  Trust Relationship/Rapport:   care explained   questions answered  Goal: Readiness for Transition of Care  Outcome: Progressing     Problem: Comorbidity Management  Goal: Blood Pressure in Desired Range  Outcome: Progressing  Intervention: Maintain Blood Pressure Management  Recent Flowsheet Documentation  Taken 11/21/2024 2000 by Sherlyn Salgado RN  Medication Review/Management: medications reviewed     Problem: Fall Injury Risk  Goal: Absence of Fall and Fall-Related Injury  Outcome: Progressing  Intervention: Identify and Manage Contributors  Recent Flowsheet Documentation  Taken 11/21/2024 2000 by Sherlyn Salgado RN  Medication Review/Management: medications reviewed  Intervention: Promote Injury-Free Environment  Recent Flowsheet Documentation  Taken 11/22/2024 0400 by Sherlyn Salgado RN  Safety Promotion/Fall Prevention:   activity supervised   assistive device/personal items within reach   clutter free environment maintained   fall prevention program maintained   gait belt   lighting adjusted   nonskid shoes/slippers when out of bed   room organization consistent   safety round/check completed  Taken 11/22/2024 0202 by Sherlyn Salgado RN  Safety Promotion/Fall Prevention:   activity supervised   assistive device/personal items within reach   clutter free environment maintained   fall prevention program maintained   gait belt   lighting adjusted   nonskid shoes/slippers when out of bed   room organization consistent   safety round/check completed  Taken 11/22/2024 0000 by Sherlyn Salgado RN  Safety Promotion/Fall Prevention:   activity supervised   assistive device/personal items within reach   clutter free environment maintained   fall prevention program maintained   gait belt   lighting adjusted    nonskid shoes/slippers when out of bed   room organization consistent   safety round/check completed  Taken 11/21/2024 2200 by Sherlyn Salgado RN  Safety Promotion/Fall Prevention:   activity supervised   assistive device/personal items within reach   clutter free environment maintained   fall prevention program maintained   gait belt   lighting adjusted   nonskid shoes/slippers when out of bed   room organization consistent   safety round/check completed  Taken 11/21/2024 2000 by Sherlyn Salgado RN  Safety Promotion/Fall Prevention:   activity supervised   assistive device/personal items within reach   clutter free environment maintained   fall prevention program maintained   gait belt   lighting adjusted   nonskid shoes/slippers when out of bed   room organization consistent   safety round/check completed   Goal Outcome Evaluation:

## 2024-11-22 NOTE — DISCHARGE SUMMARY
[facility] Medicine Services  DISCHARGE SUMMARY    Patient Name: Justice Kiser  : 1960  MRN: [id number]    Date of Admission: 2024  4:36 PM  Date of Discharge:  24  Primary Care Physician: Anshul Martinez MD    Consults       Date and Time Order Name Status Description    2024  5:34 PM Inpatient Infectious Diseases Consult Completed     2024  8:00 PM Inpatient Orthopedic Surgery Consult              Hospital Course     Presenting Problem: cellulitis    Active Hospital Problems    Diagnosis  POA   • **Left leg cellulitis [L03.116]  Yes   • Severe protein-calorie malnutrition [E43]  Yes      Resolved Hospital Problems   No resolved problems to display.          Hospital Course:  Justice 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 male with CAD s/p PCI w/ DIS , HTN, HLD, anxiety/depression, MCI who presented with LLE pain, redness and swelling.  Pt reports that he hit his L ankle on his bed frame on Friday. It turned red but there was no break in the skin. Sat it began swelling and becoming more painful. He was trying to go see his PCP today but was unable to get in so he came to the ED.  MRI revealed no obvious osteo with evidence of cellulitis and mild to moderate tendinopathy/partial thickness longitudinal tearing of the peroneus longus tendon. ID following and managing antibiotic regimen. Currently on Ceftriaxone and Doxycycline.          Left lower extremity cellulitis with abscess  --patient seen by Infectious Disease Dr Quijano  -s/p I&D of the left ankle abscess on 24, Dr. Sidhu.    --Continue IV Rocephin with daily infusions in ID clinic.  Doxycycline 100 mg PO BID x 14 days provided at discharge  --follow up in Orthopedic clinic in two weeks.  --asa 81 mg PO BID x 6 wks for DVT prophylaxis, daily therafter  --short course percocet for pain control. No reasonable alternative. ALEJANDRO reviewed. Discussed risks and benefits of narcotic use with patient, including but  not limited to unintentional dependence, no driving or operating machinery and safe disposal of unused meds.  Controled substances permit reviewed and signed, all questions answered.     CAD s/p PCI w/ DEI  Hypertension  Hyperlipidemia  - Continue metoprolol, atorvastatin, lisinopril/HCTZ     Anxiety/depression  - Continue home Zoloft, Remeron     MCI  - Continue home donepezil, memantine      Discharge Follow Up Recommendations for outpatient labs/diagnostics:       Day of Discharge     HPI:   Left ankle still sore.      Review of Systems  Gen: no fevers    Vital Signs:   Temp:  [97 °F (36.1 °C)-98.9 °F (37.2 °C)] 98 °F (36.7 °C)  Heart Rate:  [56-71] 59  Resp:  [16-18] 16  BP: (105-136)/(58-81) 105/74      Physical Exam:  Non toxic, in bed  MM moist  RRR  CTAB  Abdomen soft, non tender  Normal affect  Alert, speech clear  Ankle in wraps    Pertinent  and/or Most Recent Results     LAB RESULTS:      Lab 11/22/24  0551 11/21/24  0647 11/20/24  1113 11/18/24  0551 11/17/24  0603   WBC 6.19  --  7.93 9.00 8.77   HEMOGLOBIN 10.0* 10.3* 11.6* 12.0* 11.2*   HEMATOCRIT 31.8* 32.4* 35.5* 37.3* 35.4*   PLATELETS 175  --  220 203 189   NEUTROS ABS  --   --  4.51  --  5.47   IMMATURE GRANS (ABS)  --   --  0.07*  --  0.17*   LYMPHS ABS  --   --  2.51  --  2.21   MONOS ABS  --   --  0.64  --  0.62   EOS ABS  --   --  0.15  --  0.22   MCV 89.8  --  87.9 88.4 88.7   CRP 4.40*  --  0.99* 3.09* 5.43*   PROTIME  --   --  16.3*  --   --          Lab 11/22/24  0551 11/21/24  0647 11/20/24  1113 11/18/24  0551 11/17/24  0603   SODIUM 138 136 136 139 136   POTASSIUM 4.2 4.0 4.5 4.2 4.2   CHLORIDE 106 104 106 106 103   CO2 22.0 22.0 18.0* 21.0* 21.0*   ANION GAP 10.0 10.0 12.0 12.0 12.0   BUN 14 19 22 17 14   CREATININE 0.70* 0.90 0.77 0.78 0.74*   EGFR 101.6 94.2 98.7 98.4 99.9   GLUCOSE 108* 115* 102* 107* 116*   CALCIUM 8.3* 8.5* 8.4* 8.8 8.7             Lab 11/20/24  1113   PROTIME 16.3*   INR 1.30*                 Brief Urine Lab  Results       None          Microbiology Results (last 10 days)       Procedure Component Value - Date/Time    Tissue / Bone Culture - Tissue, Ankle, Left [[id number]] Collected: 11/20/24 1523    Lab Status: Preliminary result Specimen: Tissue from Ankle, Left Updated: 11/22/24 0745     Tissue Culture No growth at 2 days     Gram Stain Few (2+) Red blood cells      Few (2+) WBCs seen      No organisms seen    Tissue / Bone Culture - Tissue, Ankle, Left [[id number]] Collected: 11/20/24 1523    Lab Status: Preliminary result Specimen: Tissue from Ankle, Left Updated: 11/22/24 0746     Tissue Culture No growth at 2 days     Gram Stain Moderate (3+) WBCs seen      No organisms seen      Many (4+) Red blood cells    Tissue / Bone Culture - Tissue, Ankle, Left [[id number]] Collected: 11/20/24 1514    Lab Status: Preliminary result Specimen: Tissue from Ankle, Left Updated: 11/22/24 0746     Tissue Culture No growth at 2 days     Gram Stain Moderate (3+) WBCs seen      Many (4+) Red blood cells      No organisms seen    AFB Culture - Tissue, Ankle, Left [[id number]] Collected: 11/20/24 1514    Lab Status: Preliminary result Specimen: Tissue from Ankle, Left Updated: 11/21/24 1222     AFB Stain No acid fast bacilli seen on concentrated smear            MRI Ankle Left With & Without Contrast    Result Date: 11/20/2024  MRI ANKLE LEFT W WO CONTRAST Date of Exam: 11/19/2024 11:28 PM EST Indication: L ankle cellulitis.  Comparison: MRI left ankle 11/11/2024 Technique:  Routine multiplanar/multisequence images of the left ankle were obtained before and after the uneventful administration of 16 mL Multihance. Findings: BONE:  Bone marrow signal intensity is normal for age.   No osteochondral lesion identified. No periosteal or stress reaction noted. No bony destructive changes. ALIGNMENT: Normal LIGAMENTS: The distal anterior and posterior tibiofibular ligaments are intact. There is a thickened appearance involving the anterior and  posterior talofibular ligaments compatible with sprain without evidence for tear. The calcaneofibular ligament appears intact. TENDONS: The posterior tibialis, flexor digitorum longus and flexor houses longus tendons appear intact without tendinosis, tenosynovitis or tear. The peroneus longus has intermediate intrasubstance signal just distal to the tip of the lateral malleolus with some additional longitudinal signal compatible with a partial-thickness tear and tendinosis. There is trace tenosynovitis. No evidence for subluxation. The peroneus brevis appears intact. The extensor tendons are intact. The Achilles tendon is intact.  CARTILAGE:  No significant loss nor focal cartilage defects. OTHER INTRA-ARTICULAR: No significant joint effusion.  No loose bodies identified. PLANTAR FASCIA: The plantar fascia is thickened measuring up to 5 mm along its insertion without evidence for tear. OTHER EXTRA-ARTICULAR: There has been interval development of an organized fluid collection along the lateral superficial margin of the peroneal tendons. This extends along the length of the distal fibula and measures 3.4 x 1.2 x 6.3 cm in AP, transverse  and craniocaudal dimension. On postcontrast imaging there is peripheral and enhancing internal septations. There is extensive edema extending along the dorsal lateral margin of the foot. No reactive osseous changes along the lateral margin of the fibula.     Impression: 1. Interval development of an abscess along the lateral margin of the fibula measuring 3.4 x 1.2 x 6.3 cm. No reactive or bony destructive osseous changes to suggest osteomyelitis. Edema extends along the dorsal lateral margin of the foot. 2. Tendinopathy and partial-thickness tear involving the peroneus longus tendon with some trace tenosynovitis. 3. Planter fasciitis. 4. Suspected sprain of the anterior and posterior talofibular ligaments. Electronically Signed: Kristine Krause MD  11/20/2024 8:23 AM EST   Workstation ID: [id number]    US Nonvascular Extremity Limited    Result Date: 11/19/2024  US NONVASCULAR EXTREMITY LIMITED Date of Exam: 11/18/2024 6:01 PM EST Indication: abscess- LLE. Comparison: No comparisons available. Technique: Grayscale and color Doppler ultrasound evaluation of the left lower extremity was performed.  Real time scanning was performed with image documentation of the area of interest. Findings: There is diffuse subcutaneous edema and cutaneous thickening along the left lower extremity area of interest. There is a region of poorly defined complex fluid in the anterior lower leg measuring 3.2 x 0.4 x 4.6 cm. There is an additional region of poorly defined complex fluid along the lateral lower leg measuring 8.6 x 1.2 x 5.5 cm.     Impression: Two regions of poorly defined subcutaneous fluid within the anterior and lateral left lower leg measuring up to 4.6 cm and 8.6 cm respectively. In the setting of infection these could represent developing abscesses. No drainable fluid collection at this time. Background subcutaneous edema and cutaneous thickening compatible with cellulitis. Electronically Signed: Luis Angel Cordero MD  11/19/2024 8:27 AM EST  Workstation ID: [id number]    MRI Ankle Left With & Without Contrast    Result Date: 11/12/2024  MRI ANKLE LEFT W WO CONTRAST Date of Exam: 11/11/2024 8:10 PM EST Indication: L ankle redness/swelling, eval for septic joint findings.  Comparison: None available. Technique:  Routine multiplanar/multisequence images of the left ankle were obtained before and after the uneventful administration of 15 mL Multihance. FINDINGS: Bone and joint: Anatomic alignment is maintained. No significant joint effusion is seen. Nonspecific subcortical cystic changes within the navicular bone. Otherwise, the visualized marrow signal is unremarkable. Calcaneal enthesophytes are noted. Ligaments: The anterior talofibular ligament, posterior talofibular ligament, calcaneofibular  ligament, and deltoid ligament appear intact. Tendons: There is mild to moderate tendinopathy and partial-thickness longitudinal tearing within the peroneus longus tendon below the ankle. Peroneus brevis tendon is unremarkable. Medial ankle tendons, extensor tendons, and Achilles tendon appear unremarkable. Muscles and soft tissues: No muscle edema or atrophy is identified.  No soft tissue or cystic mass lesion is seen. Soft tissue edema/induration noted dorsally and laterally about the ankle. No suspicious contrast enhancement is identified.     1.Mild to moderate tendinopathy and partial-thickness longitudinal tearing within the peroneus longus tendon below the ankle. 2.Soft tissue edema/induration noted dorsally and laterally about the ankle. Cellulitis may be considered in the appropriate clinical setting. 3.Otherwise, no significant abnormality is identified. 4.No suspicious contrast enhancement is identified. Electronically Signed: Kamari Calabrese MD  11/12/2024 9:07 AM EST  Workstation ID: [id number]    XR Ankle 3+ View Left    Result Date: 11/11/2024  XR ANKLE 3+ VW LEFT Date of Exam: 11/11/2024 2:29 PM EST Indication: L ankle injury Comparison: None available. Findings: There is no evidence of acute fracture. Small well-corticated ossific fragment inferior to the medial malleolus is favored to represent remote injury. Normal joint alignment. No significant degenerative changes. Calcaneal enthesopathy. Atherosclerotic calcifications. Soft tissue edema about the ankle.     Impression: Soft tissue edema about the ankle. No evidence of acute fracture. Electronically Signed: Luis Angel Cordero MD  11/11/2024 4:20 PM EST  Workstation ID: [id number]    Duplex Venous Lower Extremity - Left CAR    Result Date: 11/11/2024  •  Normal left lower extremity venous duplex scan.      Results for orders placed during the hospital encounter of 11/11/24    Duplex Venous Lower Extremity - Left CAR    Interpretation Summary  •  Normal  left lower extremity venous duplex scan.      Results for orders placed during the hospital encounter of 11/11/24    Duplex Venous Lower Extremity - Left CAR    Interpretation Summary  •  Normal left lower extremity venous duplex scan.      Results for orders placed during the hospital encounter of 10/08/24    Adult Transthoracic Echo Complete W/ Cont if Necessary Per Protocol    Interpretation Summary  •  Left ventricular systolic function is normal. Estimated left ventricular EF = 60%  •  Moderate mitral valve regurgitation is present.  •  Estimated right ventricular systolic pressure from tricuspid regurgitation is normal (<35 mmHg).      Plan for Follow-up of Pending Labs/Results:   Pending Labs       Order Current Status    Anaerobic Culture - Tissue, Ankle, Left In process    Anaerobic Culture - Tissue, Ankle, Left In process    Anaerobic Culture - Tissue, Ankle, Left In process    Fungus Culture - Tissue, Ankle, Left In process    Fungus Culture - Tissue, Ankle, Left In process    Fungus Culture - Tissue, Ankle, Left In process    AFB Culture - Tissue, Ankle, Left Preliminary result    Tissue / Bone Culture - Tissue, Ankle, Left Preliminary result    Tissue / Bone Culture - Tissue, Ankle, Left Preliminary result    Tissue / Bone Culture - Tissue, Ankle, Left Preliminary result          Discharge Details        Discharge Medications        New Medications        Instructions Start Date   cefTRIAXone 2,000 mg in sodium chloride 0.9 % 100 mL IVPB   2,000 mg, Intravenous, Every 24 Hours      doxycycline 100 MG capsule  Commonly known as: MONODOX   100 mg, Oral, Every 12 Hours Scheduled      naloxone 4 MG/0.1ML nasal spray  Commonly known as: NARCAN   Call 911. Don't prime. [place] in 1 nostril for overdose. Repeat in 2-3 minutes in other nostril if no or minimal breathing/responsiveness.      oxyCODONE-acetaminophen 5-325 MG per tablet  Commonly known as: Percocet   1 tablet, Oral, Every 6 Hours PRN       saccharomyces boulardii 250 MG capsule  Commonly known as: FLORASTOR   250 mg, Oral, 2 Times Daily             Changes to Medications        Instructions Start Date   aspirin 81 MG EC tablet  What changed:   when to take this  additional instructions   81 mg, Oral, 2 Times Daily, Take twice daily for the next 6 weeks, then daily thereafter             Continue These Medications        Instructions Start Date   atorvastatin 80 MG tablet  Commonly known as: LIPITOR   80 mg, Oral, Every Night at Bedtime      donepezil 10 MG tablet  Commonly known as: ARICEPT   10 mg, Oral, Nightly      lisinopril-hydrochlorothiazide 20-12.5 MG per tablet  Commonly known as: PRINZIDE,ZESTORETIC   1 tablet, Oral, Daily      memantine 10 MG tablet  Commonly known as: NAMENDA   10 mg, Oral, 2 Times Daily      metoprolol tartrate 25 MG tablet  Commonly known as: LOPRESSOR   25 mg, Oral, 2 Times Daily      mirtazapine 30 MG tablet  Commonly known as: REMERON   30 mg, Oral, Nightly      omeprazole 40 MG capsule  Commonly known as: priLOSEC   TAKE 1 CAPSULE BY MOUTH ONCE DAILY *NEEDS  APPOINTMENT  FOR  MORE  REFILLS*      sertraline 100 MG tablet  Commonly known as: ZOLOFT   Take 2 tablets by mouth once daily             Stop These Medications      Centrum Silver tablet  Generic drug: multivitamin with minerals              No Known Allergies      Discharge Disposition:  Home or Self Care    Diet:  Hospital:  Diet Order   Procedures   • Diet: Regular/House; Fluid Consistency: Thin (IDDSI 0)            Activity:      Restrictions or Other Recommendations:         CODE STATUS:    Code Status and Medical Interventions: CPR (Attempt to Resuscitate); Full   Ordered at: 11/20/24 5302     Level Of Support Discussed With:    Patient     Code Status (Patient has no pulse and is not breathing):    CPR (Attempt to Resuscitate)     Medical Interventions (Patient has pulse or is breathing):    Full       Future Appointments   Date Time Provider  Department Center   12/2/2024 12:00 PM Channing Gilliam MD [facility]   4/21/2025  8:45 AM Anshul Martinez MD [facility]   9/8/2025 10:30 AM Alberta Singletary APRN [facility]   10/23/2025 10:00 AM Anshul Martinez MD [facility]   12/9/2025 11:30 AM Tacho Crouch IV, MD [facility]       Additional Instructions for the Follow-ups that You Need to Schedule       Discharge Follow-up with PCP   As directed       Currently Documented PCP:    Anshul Martinez MD    PCP Phone Number:    [phone]     Follow Up Details: follow up with PCP in one week        Discharge Follow-up with Specialty: follow up tomorrow in ID clinic for antibiotic infusion and daily therafter   As directed      Specialty: follow up tomorrow in ID clinic for antibiotic infusion and daily therafter        Discharge Follow-up with Specialty: follow up with Orthopedics Dr Sidhu in two weeks   As directed      Specialty: follow up with Orthopedics Dr Sidhu in two weeks                      Felice Vega MD  11/22/24      Time Spent on Discharge:  I spent  35  minutes on this discharge activity which included: face-to-face encounter with the patient, reviewing the data in the system, coordination of the care with the nursing staff as well as consultants, documentation, and entering orders.

## 2024-11-22 NOTE — PROGRESS NOTES
"[facility] Progress Note    Date of Admission: 2024      Antibiotics: ceft and doxy      CC:   Chief Complaint   Patient presents with    Leg Swelling       S: Pt with no fevers and decreased left ankle pain wbc and crp down and improved. s/p  I and D yesterday.    O:  /75 (BP Location: Right arm, Patient Position: Lying)   Pulse 67   Temp 97.2 °F (36.2 °C) (Oral)   Resp 16   Ht 162.6 cm (64.02\")   Wt 77.4 kg (170 lb 10.2 oz)   SpO2 93%   BMI 29.28 kg/m²   Temp (24hrs), Av.6 °F (36.4 °C), Min:97 °F (36.1 °C), Max:98.9 °F (37.2 °C)      PE:     GENERAL: Awake and alert, in no acute distress.   HEENT: Normocephalic, atraumatic.  PERRL. EOMI. No conjunctival injection. No icterus. Oropharynx clear without evidence of thrush or exudate. No evidence of periodontal disease.    NECK: Supple without nuchal rigidity  LYMPH: No cervical, axillary or inguinal lymphadenopathy. No neck masses  HEART: RRR; No murmur, rubs, gallops.   LUNGS: Clear to auscultation bilaterally without wheezing, rales, rhonchi. Normal respiratory effort.  ABDOMEN: Soft, nontender, nondistended. Positive bowel sounds. No rebound or guarding.   EXT:  No cyanosis, clubbing edema of left ankle  : Normal appearing genitalia without Lemus catheter.  MSK: Decreased range of motion of left ankle  SKIN: Left ankle dressed.  NEURO: Oriented to PPT. No focal deficits.   PSYCHIATRIC: Normal insight and judgement. Cooperative with PE     Laboratory Data    Results from last 7 days   Lab Units 24  0551 24  0647 24  1113 24  0551   WBC 10*3/mm3 6.19  --  7.93 9.00   HEMOGLOBIN g/dL 10.0* 10.3* 11.6* 12.0*   HEMATOCRIT % 31.8* 32.4* 35.5* 37.3*   PLATELETS 10*3/mm3 175  --  220 203     Results from last 7 days   Lab Units 24  0551   SODIUM mmol/L 138   POTASSIUM mmol/L 4.2   CHLORIDE mmol/L 106   CO2 mmol/L 22.0   BUN mg/dL 14   CREATININE mg/dL 0.70*   GLUCOSE mg/dL 108*   CALCIUM mg/dL 8.3*                 Results " from last 7 days   Lab Units 11/22/24  0551   CRP mg/dL 4.40*       Estimated Creatinine Clearance: 97.6 mL/min (A) (by C-G formula based on SCr of 0.7 mg/dL (L)).      Microbiology:  neg    Radiology:  Imaging Results (Last 24 Hours)       ** No results found for the last 24 hours. **            PROBLEM LIST:   LLE acute post traumatic cellulitis  History of coronary disease  History of hypertension and hyperlipidemia  Fevers chills  Leukocytosis elevated inflammatory markers     ASSESSMENT:  Patient is a 64-year-old with history of trauma to the left ankle developed bruising soft tissue edema followed by increased pain swelling erythema of left ankle with acute cellulitis developing over the past few days warmth to touch erythema leukocytosis elevated inflammatory markers arthrocentesis did not reveal septic arthritis had received abx.      Improving with ceft and doxy and now with Wbc and crp down improving with abx, now reports of possible I and D of ankle with hematoma possible abscess development on MRI and for I and D yesterday     PLAN:    Continue ceftriaxone and doxy    Ceftriaxone iv as outpt through 11/27/24 then oral cefuroxime 500 mg po bid x 3 weeks afterwards    Doxy 100 mg po bid x 14 days upon d/c    S/p I and D    D/c tomorrow and will infuse now at [facility]  F/u with me 11/27/24    Sebastian Quijano MD  11/22/2024

## 2024-11-23 ENCOUNTER — HOSPITAL ENCOUNTER (EMERGENCY)
Facility: HOSPITAL | Age: 64
Discharge: HOME | End: 2024-11-23
Attending: EMERGENCY MEDICINE
Payer: MEDICARE

## 2024-11-23 VITALS
DIASTOLIC BLOOD PRESSURE: 62 MMHG | BODY MASS INDEX: 28.32 KG/M2 | WEIGHT: 170 LBS | TEMPERATURE: 98 F | SYSTOLIC BLOOD PRESSURE: 141 MMHG | HEIGHT: 65 IN | OXYGEN SATURATION: 95 % | RESPIRATION RATE: 18 BRPM | HEART RATE: 78 BPM

## 2024-11-23 DIAGNOSIS — Z48.89 ENCOUNTER FOR POST SURGICAL WOUND CHECK: Primary | ICD-10-CM

## 2024-11-23 LAB
BACTERIA SPEC AEROBE CULT: NORMAL
GRAM STN SPEC: NORMAL

## 2024-11-23 PROCEDURE — 99282 EMERGENCY DEPT VISIT SF MDM: CPT

## 2024-11-23 NOTE — ED PROVIDER NOTES
EMERGENCY DEPARTMENT ENCOUNTER    Pt Name: Justice Kiser  MRN: [id number]  Pt :   1960  Room Number:  RW3/R3  Date of encounter:  2024  PCP: Anshul Martinez MD  ED Provider: CAROL Sauer    Historian: Patient    HPI:  Chief Complaint:  Lt ankle wound     Context: Justice 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 male who presents to the ED c/o Lt ankle wound.  Patient explains that 2 weeks ago he was diagnosed with cellulitis.  Patient had surgery on his left ankle.  He reports that he was discharged home yesterday.  He is returning daily to the infusion center for IV antibiotics.  Patient is here in the emergency department for a wound dressing change and wound check.  He advises that the wound is draining and he does not have any supplies.  He asked them at the infusion center if they would change the dressing and they would not.  HPI     REVIEW OF SYSTEMS  A chief complaint appropriate review of systems was completed and is negative except as noted in the HPI.     PAST MEDICAL HISTORY  Past Medical History:   Diagnosis Date    Acute maxillary sinusitis     Arthritis     CHF (congestive heart failure)     Chronic pain disorder     Colon polyp     Depression     Difficulty walking     Elevated LFTs     GERD (gastroesophageal reflux disease)     History of colonic polyps     History of methicillin resistant Staphylococcus aureus     History of myocardial infarction     Hyperlipidemia     Hypertension     Left hand pain     Left shoulder pain     Low back pain     MRSA (methicillin resistant Staphylococcus aureus)     Myocardial infarction     Neck pain     Personal history of congestive heart failure     Right hip pain     Rotator cuff syndrome        PAST SURGICAL HISTORY  Past Surgical History:   Procedure Laterality Date    BACK SURGERY      CAROTID STENT      CORONARY ANGIOPLASTY WITH STENT PLACEMENT  2012    Circumflex Xscience V 3.5 MM x 23 mm    CORONARY STENT PLACEMENT      ENDOMETRIAL ABLATION       EPIDURAL BLOCK      GALLBLADDER SURGERY  2017    INCISION AND DRAINAGE LEG Left 2024    Procedure: INCISION AND DRAINAGE OF LEFT ANKLE;  Surgeon: Ravin Sidhu MD;  Location: [facility];  Service: Orthopedics;  Laterality: Left;    LUMBAR DISCECTOMY      2 level - [facility]    LYMPH NODE BIOPSY      SHOULDER SURGERY  2018    Dr. Hoyt Rotator cuff repair       FAMILY HISTORY  Family History   Problem Relation Age of Onset    Hypertension Mother     Heart attack Mother     Hyperlipidemia Mother     Alcohol abuse Mother     Alcohol abuse Father     Dementia Sister     Heart disease Sister     Developmental Disability Sister     Stroke Sister     Alcohol abuse Maternal Aunt     Alcohol abuse Paternal Uncle     Diabetes Maternal Grandmother     Heart disease Brother     Stroke Sister        SOCIAL HISTORY  Social History     Socioeconomic History    Marital status: Single   Tobacco Use    Smoking status: Former     Current packs/day: 0.00     Average packs/day: 1.5 packs/day for 36.0 years (54.0 ttl pk-yrs)     Types: Cigarettes     Start date:      Quit date: 2011     Years since quittin.9     Passive exposure: Past    Smokeless tobacco: Never   Vaping Use    Vaping status: Never Used   Substance and Sexual Activity    Alcohol use: Not Currently     Comment: 5 mixed drinks per day. recently quit all alcohol 2020    Drug use: Not Currently     Types: Hydrocodone, Marijuana     Comment: occas    Sexual activity: Not Currently     Partners: Female       ALLERGIES  Patient has no known allergies.    PHYSICAL EXAM  Physical Exam  Vitals and nursing note reviewed.   Constitutional:       Appearance: Normal appearance. He is not ill-appearing.   HENT:      Head: Normocephalic and atraumatic.      Nose: Nose normal.      Mouth/Throat:      Mouth: Mucous membranes are moist.   Eyes:      Extraocular Movements: Extraocular movements intact.      Conjunctiva/sclera: Conjunctivae  normal.   Cardiovascular:      Rate and Rhythm: Normal rate and regular rhythm.      Pulses: Normal pulses.   Pulmonary:      Effort: Pulmonary effort is normal. No respiratory distress.      Breath sounds: Normal breath sounds.   Musculoskeletal:      Cervical back: Normal range of motion.      Comments: Lt ankle wound: 4 x 4's removed until xeroform gauze.  Additional 4 x 4's placed on the site.  Bulky gauze roll applied.  Ace wrap applied.     Skin:     General: Skin is warm and dry.   Neurological:      General: No focal deficit present.      Mental Status: He is alert and oriented to person, place, and time.   Psychiatric:         Mood and Affect: Mood normal.         Behavior: Behavior normal.           LAB RESULTS  Results for orders placed or performed during the hospital encounter of 11/11/24   Duplex Venous Lower Extremity - Left CAR    Collection Time: 11/11/24  1:29 PM   Result Value Ref Range    Right Common Femoral Spont Y     Right Common Femoral Phasic Y     Left Common Femoral Spont Y     Left Common Femoral Phasic Y     Left Common Femoral Compress C     Left Saphenofemoral Junction Spont Y     Left Saphenofemoral Junction Phasic Y     Left Saphenofemoral Junction Compress C     Left Profunda Femoral Spont Y     Left Proximal Femoral Spont Y     Left Proximal Femoral Phasic Y     Left Proximal Femoral Compress C     Left Mid Femoral Spont Y     Left Mid Femoral Phasic Y     Left Mid Femoral Compress C     Left Distal Femoral Spont Y     Left Distal Femoral Phasic Y     Left Distal Femoral Compress C     Left Popliteal Spont Y     Left Popliteal Phasic Y     Left Popliteal Compress C     Left Posterior Tibial Compress C     Left Posterior Tibial Augment Y     Left Peroneal Augment Y     Left Gastronemius Compress C     Left Greater Saph AK Compress C     Left Greater Saph BK Compress C     Left Lesser Saph Compress C     BH CV VAS PRELIMINARY FINDINGS SCRIPTING 1.0    Comprehensive Metabolic Panel     Collection Time: 11/11/24  2:50 PM    Specimen: Blood   Result Value Ref Range    Glucose 119 (H) 65 - 99 mg/dL    BUN 12 8 - 23 mg/dL    Creatinine 1.02 0.76 - 1.27 mg/dL    Sodium 133 (L) 136 - 145 mmol/L    Potassium 3.5 3.5 - 5.2 mmol/L    Chloride 97 (L) 98 - 107 mmol/L    CO2 23.0 22.0 - 29.0 mmol/L    Calcium 8.9 8.6 - 10.5 mg/dL    Total Protein 8.2 6.0 - 8.5 g/dL    Albumin 3.8 3.5 - 5.2 g/dL    ALT (SGPT) 26 1 - 41 U/L    AST (SGOT) 44 (H) 1 - 40 U/L    Alkaline Phosphatase 258 (H) 39 - 117 U/L    Total Bilirubin 0.8 0.0 - 1.2 mg/dL    Globulin 4.4 gm/dL    A/G Ratio 0.9 g/dL    BUN/Creatinine Ratio 11.8 7.0 - 25.0    Anion Gap 13.0 5.0 - 15.0 mmol/L    eGFR 81.1 >60.0 mL/min/1.73   CBC Auto Differential    Collection Time: 11/11/24  2:50 PM    Specimen: Blood   Result Value Ref Range    WBC 14.45 (H) 3.40 - 10.80 10*3/mm3    RBC 4.18 4.14 - 5.80 10*6/mm3    Hemoglobin 12.0 (L) 13.0 - 17.7 g/dL    Hematocrit 36.0 (L) 37.5 - 51.0 %    MCV 86.1 79.0 - 97.0 fL    MCH 28.7 26.6 - 33.0 pg    MCHC 33.3 31.5 - 35.7 g/dL    RDW 16.1 (H) 12.3 - 15.4 %    RDW-SD 50.6 37.0 - 54.0 fl    MPV 11.4 6.0 - 12.0 fL    Platelets 145 140 - 450 10*3/mm3    Neutrophil % 73.3 42.7 - 76.0 %    Lymphocyte % 15.8 (L) 19.6 - 45.3 %    Monocyte % 10.0 5.0 - 12.0 %    Eosinophil % 0.1 (L) 0.3 - 6.2 %    Basophil % 0.1 0.0 - 1.5 %    Immature Grans % 0.7 (H) 0.0 - 0.5 %    Neutrophils, Absolute 10.58 (H) 1.70 - 7.00 10*3/mm3    Lymphocytes, Absolute 2.29 0.70 - 3.10 10*3/mm3    Monocytes, Absolute 1.45 (H) 0.10 - 0.90 10*3/mm3    Eosinophils, Absolute 0.01 0.00 - 0.40 10*3/mm3    Basophils, Absolute 0.02 0.00 - 0.20 10*3/mm3    Immature Grans, Absolute 0.10 (H) 0.00 - 0.05 10*3/mm3    nRBC 0.0 0.0 - 0.2 /100 WBC   Sedimentation Rate    Collection Time: 11/11/24  2:50 PM    Specimen: Blood   Result Value Ref Range    Sed Rate 107 (H) 0 - 20 mm/hr   C-reactive Protein    Collection Time: 11/11/24  2:50 PM    Specimen: Blood   Result  Value Ref Range    C-Reactive Protein 19.33 (H) 0.00 - 0.50 mg/dL   Procalcitonin    Collection Time: 11/11/24  2:50 PM    Specimen: Blood   Result Value Ref Range    Procalcitonin 0.54 (H) 0.00 - 0.25 ng/mL   Body Fluid Culture - Body Fluid, Ankle, Left    Collection Time: 11/11/24  6:07 PM    Specimen: Ankle, Left; Body Fluid   Result Value Ref Range    Body Fluid Culture No growth at 5 days     Gram Stain Many (4+) Red blood cells     Gram Stain Rare (1+) WBCs seen     Gram Stain No organisms seen    Blood Culture - Blood, Arm, Left    Collection Time: 11/11/24  6:40 PM    Specimen: Arm, Left; Blood   Result Value Ref Range    Blood Culture No growth at 5 days    Lactic Acid, Plasma    Collection Time: 11/11/24  6:41 PM    Specimen: Blood   Result Value Ref Range    Lactate 1.1 0.5 - 2.0 mmol/L   Blood Culture - Blood, Arm, Right    Collection Time: 11/11/24  7:04 PM    Specimen: Arm, Right; Blood   Result Value Ref Range    Blood Culture No growth at 5 days    MRSA Screen, PCR (Inpatient) - Swab, Nares    Collection Time: 11/11/24 11:45 PM    Specimen: Nares; Swab   Result Value Ref Range    MRSA PCR Negative Negative   Basic Metabolic Panel    Collection Time: 11/12/24  5:52 AM    Specimen: Blood   Result Value Ref Range    Glucose 94 65 - 99 mg/dL    BUN 15 8 - 23 mg/dL    Creatinine 0.89 0.76 - 1.27 mg/dL    Sodium 137 136 - 145 mmol/L    Potassium 3.7 3.5 - 5.2 mmol/L    Chloride 101 98 - 107 mmol/L    CO2 21.0 (L) 22.0 - 29.0 mmol/L    Calcium 8.4 (L) 8.6 - 10.5 mg/dL    BUN/Creatinine Ratio 16.9 7.0 - 25.0    Anion Gap 15.0 5.0 - 15.0 mmol/L    eGFR 94.5 >60.0 mL/min/1.73   CBC (No Diff)    Collection Time: 11/12/24  5:52 AM    Specimen: Blood   Result Value Ref Range    WBC 13.93 (H) 3.40 - 10.80 10*3/mm3    RBC 3.90 (L) 4.14 - 5.80 10*6/mm3    Hemoglobin 11.1 (L) 13.0 - 17.7 g/dL    Hematocrit 34.0 (L) 37.5 - 51.0 %    MCV 87.2 79.0 - 97.0 fL    MCH 28.5 26.6 - 33.0 pg    MCHC 32.6 31.5 - 35.7 g/dL     RDW 16.5 (H) 12.3 - 15.4 %    RDW-SD 52.7 37.0 - 54.0 fl    MPV 12.3 (H) 6.0 - 12.0 fL    Platelets 148 140 - 450 10*3/mm3   CBC Auto Differential    Collection Time: 11/13/24  6:08 AM    Specimen: Blood   Result Value Ref Range    WBC 8.14 3.40 - 10.80 10*3/mm3    RBC 3.52 (L) 4.14 - 5.80 10*6/mm3    Hemoglobin 10.1 (L) 13.0 - 17.7 g/dL    Hematocrit 30.9 (L) 37.5 - 51.0 %    MCV 87.8 79.0 - 97.0 fL    MCH 28.7 26.6 - 33.0 pg    MCHC 32.7 31.5 - 35.7 g/dL    RDW 16.6 (H) 12.3 - 15.4 %    RDW-SD 53.1 37.0 - 54.0 fl    MPV 11.8 6.0 - 12.0 fL    Platelets 112 (L) 140 - 450 10*3/mm3    Neutrophil % 69.3 42.7 - 76.0 %    Lymphocyte % 17.9 (L) 19.6 - 45.3 %    Monocyte % 10.6 5.0 - 12.0 %    Eosinophil % 1.1 0.3 - 6.2 %    Basophil % 0.4 0.0 - 1.5 %    Immature Grans % 0.7 (H) 0.0 - 0.5 %    Neutrophils, Absolute 5.64 1.70 - 7.00 10*3/mm3    Lymphocytes, Absolute 1.46 0.70 - 3.10 10*3/mm3    Monocytes, Absolute 0.86 0.10 - 0.90 10*3/mm3    Eosinophils, Absolute 0.09 0.00 - 0.40 10*3/mm3    Basophils, Absolute 0.03 0.00 - 0.20 10*3/mm3    Immature Grans, Absolute 0.06 (H) 0.00 - 0.05 10*3/mm3    nRBC 0.0 0.0 - 0.2 /100 WBC   Vancomycin, Trough    Collection Time: 11/13/24  6:09 AM    Specimen: Blood   Result Value Ref Range    Vancomycin Trough 9.90 5.00 - 20.00 mcg/mL   Magnesium    Collection Time: 11/13/24  6:09 AM    Specimen: Blood   Result Value Ref Range    Magnesium 1.7 1.6 - 2.4 mg/dL   Comprehensive Metabolic Panel    Collection Time: 11/13/24  6:09 AM    Specimen: Blood   Result Value Ref Range    Glucose 87 65 - 99 mg/dL    BUN 12 8 - 23 mg/dL    Creatinine 0.81 0.76 - 1.27 mg/dL    Sodium 137 136 - 145 mmol/L    Potassium 3.4 (L) 3.5 - 5.2 mmol/L    Chloride 103 98 - 107 mmol/L    CO2 21.0 (L) 22.0 - 29.0 mmol/L    Calcium 8.0 (L) 8.6 - 10.5 mg/dL    Total Protein 6.3 6.0 - 8.5 g/dL    Albumin 3.1 (L) 3.5 - 5.2 g/dL    ALT (SGPT) 19 1 - 41 U/L    AST (SGOT) 41 (H) 1 - 40 U/L    Alkaline Phosphatase 208 (H) 39  - 117 U/L    Total Bilirubin 0.7 0.0 - 1.2 mg/dL    Globulin 3.2 gm/dL    A/G Ratio 1.0 g/dL    BUN/Creatinine Ratio 14.8 7.0 - 25.0    Anion Gap 13.0 5.0 - 15.0 mmol/L    eGFR 97.2 >60.0 mL/min/1.73   C-reactive Protein    Collection Time: 11/13/24  6:09 AM    Specimen: Blood   Result Value Ref Range    C-Reactive Protein 22.25 (H) 0.00 - 0.50 mg/dL   Procalcitonin    Collection Time: 11/13/24  6:09 AM    Specimen: Blood   Result Value Ref Range    Procalcitonin 0.29 (H) 0.00 - 0.25 ng/mL   Potassium    Collection Time: 11/13/24  6:45 PM    Specimen: Blood   Result Value Ref Range    Potassium 4.2 3.5 - 5.2 mmol/L   Magnesium    Collection Time: 11/14/24  5:50 AM    Specimen: Blood   Result Value Ref Range    Magnesium 1.9 1.6 - 2.4 mg/dL   Comprehensive Metabolic Panel    Collection Time: 11/14/24  5:50 AM    Specimen: Blood   Result Value Ref Range    Glucose 95 65 - 99 mg/dL    BUN 12 8 - 23 mg/dL    Creatinine 0.72 (L) 0.76 - 1.27 mg/dL    Sodium 143 136 - 145 mmol/L    Potassium 4.4 3.5 - 5.2 mmol/L    Chloride 110 (H) 98 - 107 mmol/L    CO2 20.0 (L) 22.0 - 29.0 mmol/L    Calcium 8.2 (L) 8.6 - 10.5 mg/dL    Total Protein 6.6 6.0 - 8.5 g/dL    Albumin 3.2 (L) 3.5 - 5.2 g/dL    ALT (SGPT) 18 1 - 41 U/L    AST (SGOT) 40 1 - 40 U/L    Alkaline Phosphatase 225 (H) 39 - 117 U/L    Total Bilirubin 0.7 0.0 - 1.2 mg/dL    Globulin 3.4 gm/dL    A/G Ratio 0.9 g/dL    BUN/Creatinine Ratio 16.7 7.0 - 25.0    Anion Gap 13.0 5.0 - 15.0 mmol/L    eGFR 100.8 >60.0 mL/min/1.73   CBC Auto Differential    Collection Time: 11/14/24  5:50 AM    Specimen: Blood   Result Value Ref Range    WBC 9.20 3.40 - 10.80 10*3/mm3    RBC 3.64 (L) 4.14 - 5.80 10*6/mm3    Hemoglobin 10.3 (L) 13.0 - 17.7 g/dL    Hematocrit 32.1 (L) 37.5 - 51.0 %    MCV 88.2 79.0 - 97.0 fL    MCH 28.3 26.6 - 33.0 pg    MCHC 32.1 31.5 - 35.7 g/dL    RDW 16.5 (H) 12.3 - 15.4 %    RDW-SD 53.9 37.0 - 54.0 fl    MPV 11.8 6.0 - 12.0 fL    Platelets 146 140 - 450 10*3/mm3     Neutrophil % 65.4 42.7 - 76.0 %    Lymphocyte % 22.3 19.6 - 45.3 %    Monocyte % 8.6 5.0 - 12.0 %    Eosinophil % 1.5 0.3 - 6.2 %    Basophil % 0.4 0.0 - 1.5 %    Immature Grans % 1.8 (H) 0.0 - 0.5 %    Neutrophils, Absolute 6.01 1.70 - 7.00 10*3/mm3    Lymphocytes, Absolute 2.05 0.70 - 3.10 10*3/mm3    Monocytes, Absolute 0.79 0.10 - 0.90 10*3/mm3    Eosinophils, Absolute 0.14 0.00 - 0.40 10*3/mm3    Basophils, Absolute 0.04 0.00 - 0.20 10*3/mm3    Immature Grans, Absolute 0.17 (H) 0.00 - 0.05 10*3/mm3    nRBC 0.0 0.0 - 0.2 /100 WBC   C-reactive Protein    Collection Time: 11/14/24  5:50 AM    Specimen: Blood   Result Value Ref Range    C-Reactive Protein 18.97 (H) 0.00 - 0.50 mg/dL   Magnesium    Collection Time: 11/15/24  5:53 AM    Specimen: Blood   Result Value Ref Range    Magnesium 1.8 1.6 - 2.4 mg/dL   Comprehensive Metabolic Panel    Collection Time: 11/15/24  5:53 AM    Specimen: Blood   Result Value Ref Range    Glucose 99 65 - 99 mg/dL    BUN 11 8 - 23 mg/dL    Creatinine 0.78 0.76 - 1.27 mg/dL    Sodium 135 (L) 136 - 145 mmol/L    Potassium 4.1 3.5 - 5.2 mmol/L    Chloride 103 98 - 107 mmol/L    CO2 23.0 22.0 - 29.0 mmol/L    Calcium 8.1 (L) 8.6 - 10.5 mg/dL    Total Protein 6.6 6.0 - 8.5 g/dL    Albumin 3.1 (L) 3.5 - 5.2 g/dL    ALT (SGPT) 19 1 - 41 U/L    AST (SGOT) 40 1 - 40 U/L    Alkaline Phosphatase 272 (H) 39 - 117 U/L    Total Bilirubin 0.7 0.0 - 1.2 mg/dL    Globulin 3.5 gm/dL    A/G Ratio 0.9 g/dL    BUN/Creatinine Ratio 14.1 7.0 - 25.0    Anion Gap 9.0 5.0 - 15.0 mmol/L    eGFR 98.4 >60.0 mL/min/1.73   C-reactive Protein    Collection Time: 11/15/24  5:53 AM    Specimen: Blood   Result Value Ref Range    C-Reactive Protein 13.86 (H) 0.00 - 0.50 mg/dL   CK    Collection Time: 11/15/24  5:53 AM    Specimen: Blood   Result Value Ref Range    Creatine Kinase 63 20 - 200 U/L   CBC Auto Differential    Collection Time: 11/15/24  5:53 AM    Specimen: Blood   Result Value Ref Range    WBC 7.47  3.40 - 10.80 10*3/mm3    RBC 3.54 (L) 4.14 - 5.80 10*6/mm3    Hemoglobin 10.0 (L) 13.0 - 17.7 g/dL    Hematocrit 31.0 (L) 37.5 - 51.0 %    MCV 87.6 79.0 - 97.0 fL    MCH 28.2 26.6 - 33.0 pg    MCHC 32.3 31.5 - 35.7 g/dL    RDW 16.0 (H) 12.3 - 15.4 %    RDW-SD 51.5 37.0 - 54.0 fl    MPV 11.3 6.0 - 12.0 fL    Platelets 140 140 - 450 10*3/mm3    Neutrophil % 64.6 42.7 - 76.0 %    Lymphocyte % 23.4 19.6 - 45.3 %    Monocyte % 8.2 5.0 - 12.0 %    Eosinophil % 2.1 0.3 - 6.2 %    Basophil % 0.4 0.0 - 1.5 %    Immature Grans % 1.3 (H) 0.0 - 0.5 %    Neutrophils, Absolute 4.82 1.70 - 7.00 10*3/mm3    Lymphocytes, Absolute 1.75 0.70 - 3.10 10*3/mm3    Monocytes, Absolute 0.61 0.10 - 0.90 10*3/mm3    Eosinophils, Absolute 0.16 0.00 - 0.40 10*3/mm3    Basophils, Absolute 0.03 0.00 - 0.20 10*3/mm3    Immature Grans, Absolute 0.10 (H) 0.00 - 0.05 10*3/mm3    nRBC 0.0 0.0 - 0.2 /100 WBC   Basic Metabolic Panel    Collection Time: 11/17/24  6:03 AM    Specimen: Blood   Result Value Ref Range    Glucose 116 (H) 65 - 99 mg/dL    BUN 14 8 - 23 mg/dL    Creatinine 0.74 (L) 0.76 - 1.27 mg/dL    Sodium 136 136 - 145 mmol/L    Potassium 4.2 3.5 - 5.2 mmol/L    Chloride 103 98 - 107 mmol/L    CO2 21.0 (L) 22.0 - 29.0 mmol/L    Calcium 8.7 8.6 - 10.5 mg/dL    BUN/Creatinine Ratio 18.9 7.0 - 25.0    Anion Gap 12.0 5.0 - 15.0 mmol/L    eGFR 99.9 >60.0 mL/min/1.73   C-reactive Protein    Collection Time: 11/17/24  6:03 AM    Specimen: Blood   Result Value Ref Range    C-Reactive Protein 5.43 (H) 0.00 - 0.50 mg/dL   CBC Auto Differential    Collection Time: 11/17/24  6:03 AM    Specimen: Blood   Result Value Ref Range    WBC 8.77 3.40 - 10.80 10*3/mm3    RBC 3.99 (L) 4.14 - 5.80 10*6/mm3    Hemoglobin 11.2 (L) 13.0 - 17.7 g/dL    Hematocrit 35.4 (L) 37.5 - 51.0 %    MCV 88.7 79.0 - 97.0 fL    MCH 28.1 26.6 - 33.0 pg    MCHC 31.6 31.5 - 35.7 g/dL    RDW 15.9 (H) 12.3 - 15.4 %    RDW-SD 51.5 37.0 - 54.0 fl    MPV 10.8 6.0 - 12.0 fL    Platelets  189 140 - 450 10*3/mm3    Neutrophil % 62.4 42.7 - 76.0 %    Lymphocyte % 25.2 19.6 - 45.3 %    Monocyte % 7.1 5.0 - 12.0 %    Eosinophil % 2.5 0.3 - 6.2 %    Basophil % 0.9 0.0 - 1.5 %    Immature Grans % 1.9 (H) 0.0 - 0.5 %    Neutrophils, Absolute 5.47 1.70 - 7.00 10*3/mm3    Lymphocytes, Absolute 2.21 0.70 - 3.10 10*3/mm3    Monocytes, Absolute 0.62 0.10 - 0.90 10*3/mm3    Eosinophils, Absolute 0.22 0.00 - 0.40 10*3/mm3    Basophils, Absolute 0.08 0.00 - 0.20 10*3/mm3    Immature Grans, Absolute 0.17 (H) 0.00 - 0.05 10*3/mm3    nRBC 0.0 0.0 - 0.2 /100 WBC   Basic Metabolic Panel    Collection Time: 11/18/24  5:51 AM    Specimen: Blood   Result Value Ref Range    Glucose 107 (H) 65 - 99 mg/dL    BUN 17 8 - 23 mg/dL    Creatinine 0.78 0.76 - 1.27 mg/dL    Sodium 139 136 - 145 mmol/L    Potassium 4.2 3.5 - 5.2 mmol/L    Chloride 106 98 - 107 mmol/L    CO2 21.0 (L) 22.0 - 29.0 mmol/L    Calcium 8.8 8.6 - 10.5 mg/dL    BUN/Creatinine Ratio 21.8 7.0 - 25.0    Anion Gap 12.0 5.0 - 15.0 mmol/L    eGFR 98.4 >60.0 mL/min/1.73   CBC (No Diff)    Collection Time: 11/18/24  5:51 AM    Specimen: Blood   Result Value Ref Range    WBC 9.00 3.40 - 10.80 10*3/mm3    RBC 4.22 4.14 - 5.80 10*6/mm3    Hemoglobin 12.0 (L) 13.0 - 17.7 g/dL    Hematocrit 37.3 (L) 37.5 - 51.0 %    MCV 88.4 79.0 - 97.0 fL    MCH 28.4 26.6 - 33.0 pg    MCHC 32.2 31.5 - 35.7 g/dL    RDW 16.2 (H) 12.3 - 15.4 %    RDW-SD 51.8 37.0 - 54.0 fl    MPV 11.0 6.0 - 12.0 fL    Platelets 203 140 - 450 10*3/mm3   C-reactive Protein    Collection Time: 11/18/24  5:51 AM    Specimen: Blood   Result Value Ref Range    C-Reactive Protein 3.09 (H) 0.00 - 0.50 mg/dL   CK    Collection Time: 11/20/24 11:13 AM    Specimen: Blood   Result Value Ref Range    Creatine Kinase 46 20 - 200 U/L   Protime-INR    Collection Time: 11/20/24 11:13 AM    Specimen: Blood   Result Value Ref Range    Protime 16.3 (H) 12.2 - 14.5 Seconds    INR 1.30 (H) 0.89 - 1.12   Basic Metabolic Panel     Collection Time: 11/20/24 11:13 AM    Specimen: Blood   Result Value Ref Range    Glucose 102 (H) 65 - 99 mg/dL    BUN 22 8 - 23 mg/dL    Creatinine 0.77 0.76 - 1.27 mg/dL    Sodium 136 136 - 145 mmol/L    Potassium 4.5 3.5 - 5.2 mmol/L    Chloride 106 98 - 107 mmol/L    CO2 18.0 (L) 22.0 - 29.0 mmol/L    Calcium 8.4 (L) 8.6 - 10.5 mg/dL    BUN/Creatinine Ratio 28.6 (H) 7.0 - 25.0    Anion Gap 12.0 5.0 - 15.0 mmol/L    eGFR 98.7 >60.0 mL/min/1.73   C-reactive Protein    Collection Time: 11/20/24 11:13 AM    Specimen: Blood   Result Value Ref Range    C-Reactive Protein 0.99 (H) 0.00 - 0.50 mg/dL   CBC Auto Differential    Collection Time: 11/20/24 11:13 AM    Specimen: Blood   Result Value Ref Range    WBC 7.93 3.40 - 10.80 10*3/mm3    RBC 4.04 (L) 4.14 - 5.80 10*6/mm3    Hemoglobin 11.6 (L) 13.0 - 17.7 g/dL    Hematocrit 35.5 (L) 37.5 - 51.0 %    MCV 87.9 79.0 - 97.0 fL    MCH 28.7 26.6 - 33.0 pg    MCHC 32.7 31.5 - 35.7 g/dL    RDW 15.9 (H) 12.3 - 15.4 %    RDW-SD 51.3 37.0 - 54.0 fl    MPV 10.5 6.0 - 12.0 fL    Platelets 220 140 - 450 10*3/mm3    Neutrophil % 56.8 42.7 - 76.0 %    Lymphocyte % 31.7 19.6 - 45.3 %    Monocyte % 8.1 5.0 - 12.0 %    Eosinophil % 1.9 0.3 - 6.2 %    Basophil % 0.6 0.0 - 1.5 %    Immature Grans % 0.9 (H) 0.0 - 0.5 %    Neutrophils, Absolute 4.51 1.70 - 7.00 10*3/mm3    Lymphocytes, Absolute 2.51 0.70 - 3.10 10*3/mm3    Monocytes, Absolute 0.64 0.10 - 0.90 10*3/mm3    Eosinophils, Absolute 0.15 0.00 - 0.40 10*3/mm3    Basophils, Absolute 0.05 0.00 - 0.20 10*3/mm3    Immature Grans, Absolute 0.07 (H) 0.00 - 0.05 10*3/mm3    nRBC 0.0 0.0 - 0.2 /100 WBC   Tissue / Bone Culture - Tissue, Ankle, Left    Collection Time: 11/20/24  3:14 PM    Specimen: Ankle, Left; Tissue   Result Value Ref Range    Tissue Culture No growth at 3 days     Gram Stain Moderate (3+) WBCs seen     Gram Stain Many (4+) Red blood cells     Gram Stain No organisms seen    AFB Culture - Tissue, Ankle, Left    Collection  "Time: 11/20/24  3:14 PM    Specimen: Ankle, Left; Tissue   Result Value Ref Range    AFB Stain No acid fast bacilli seen on concentrated smear    Tissue Pathology Exam    Collection Time: 11/20/24  3:14 PM    Specimen: A: Ankle, Left; Tissue    B: Ankle, Left; Tissue    C: Ankle, Left; Tissue   Result Value Ref Range    Case Report       Surgical Pathology Report                         Case: [id number]                                  Authorizing Provider:  Ravin Sidhu MD        Collected:           11/20/2024 03:14 PM          Ordering Location:     [facility]   Received:            11/21/2024 07:17 AM                                 OR                                                                           Pathologist:           Bryan Barba MD                                                          Specimens:   1) - Ankle, Left, LEFT ANKLE TISSUE- #1                                                             2) - Ankle, Left, LEFT ANKLE TISSUE- #2                                                             3) - Ankle, Left, LEFT ANKLE TISSUE- #3                                                    Clinical Information       Ankle cellulitis      Final Diagnosis       Left ankle biopsy:  Necrotic tissue with abscess formation  Negative for granulomas or malignancy  2.   Left ankle biopsy:  Necrotic tissue with abscess formation  Negative for granulomas or malignancy  3.   Left ankle biopsy:  Necrotic tissue with abscess formation  Negative for granulomas or malignancy        Comment       I recommend correlation with culture results.      Gross Description       1. Ankle, Left.  Received in formalin labeled \"ankle L\" is an aggregate of tan-pink soft tissue fragments measuring 1.9 x 1.1 x 0.4 cm.  The specimen is submitted entirely in 1A.    2. Ankle, Left.  Received in formalin labeled \"ankle L\" is an aggregate of tan-pink soft tissue fragments measuring 1.5 x 1.5 x 0.3 cm.  The specimen " "is submitted entirely in 2A.    3. Ankle, Left.  Received in formalin labeled \"ankle L\" is an aggregate of tan-pink soft tissue fragments measuring 1.9 x 1.0 x 0.3 cm.  The specimen is submitted entirely as is in 3A. [place]        Microscopic Description       The slides are reviewed and demonstrate histopathologic features supporting the above rendered diagnosis.       Anaerobic Culture - Tissue, Ankle, Left    Collection Time: 11/20/24  3:23 PM    Specimen: Ankle, Left; Tissue   Result Value Ref Range    Anaerobic Culture No anaerobes isolated at 3 days    Anaerobic Culture - Tissue, Ankle, Left    Collection Time: 11/20/24  3:23 PM    Specimen: Ankle, Left; Tissue   Result Value Ref Range    Anaerobic Culture No anaerobes isolated at 3 days    Tissue / Bone Culture - Tissue, Ankle, Left    Collection Time: 11/20/24  3:23 PM    Specimen: Ankle, Left; Tissue   Result Value Ref Range    Tissue Culture No growth at 3 days     Gram Stain Few (2+) Red blood cells     Gram Stain Few (2+) WBCs seen     Gram Stain No organisms seen    Tissue / Bone Culture - Tissue, Ankle, Left    Collection Time: 11/20/24  3:23 PM    Specimen: Ankle, Left; Tissue   Result Value Ref Range    Tissue Culture No growth at 3 days     Gram Stain Moderate (3+) WBCs seen     Gram Stain No organisms seen     Gram Stain Many (4+) Red blood cells    Basic Metabolic Panel    Collection Time: 11/21/24  6:47 AM    Specimen: Blood   Result Value Ref Range    Glucose 115 (H) 65 - 99 mg/dL    BUN 19 8 - 23 mg/dL    Creatinine 0.90 0.76 - 1.27 mg/dL    Sodium 136 136 - 145 mmol/L    Potassium 4.0 3.5 - 5.2 mmol/L    Chloride 104 98 - 107 mmol/L    CO2 22.0 22.0 - 29.0 mmol/L    Calcium 8.5 (L) 8.6 - 10.5 mg/dL    BUN/Creatinine Ratio 21.1 7.0 - 25.0    Anion Gap 10.0 5.0 - 15.0 mmol/L    eGFR 94.2 >60.0 mL/min/1.73   Hemoglobin & Hematocrit, Blood    Collection Time: 11/21/24  6:47 AM    Specimen: Blood   Result Value Ref Range    Hemoglobin 10.3 (L) 13.0 - " 17.7 g/dL    Hematocrit 32.4 (L) 37.5 - 51.0 %   Basic Metabolic Panel    Collection Time: 11/22/24  5:51 AM    Specimen: Blood   Result Value Ref Range    Glucose 108 (H) 65 - 99 mg/dL    BUN 14 8 - 23 mg/dL    Creatinine 0.70 (L) 0.76 - 1.27 mg/dL    Sodium 138 136 - 145 mmol/L    Potassium 4.2 3.5 - 5.2 mmol/L    Chloride 106 98 - 107 mmol/L    CO2 22.0 22.0 - 29.0 mmol/L    Calcium 8.3 (L) 8.6 - 10.5 mg/dL    BUN/Creatinine Ratio 20.0 7.0 - 25.0    Anion Gap 10.0 5.0 - 15.0 mmol/L    eGFR 101.6 >60.0 mL/min/1.73   CBC (No Diff)    Collection Time: 11/22/24  5:51 AM    Specimen: Blood   Result Value Ref Range    WBC 6.19 3.40 - 10.80 10*3/mm3    RBC 3.54 (L) 4.14 - 5.80 10*6/mm3    Hemoglobin 10.0 (L) 13.0 - 17.7 g/dL    Hematocrit 31.8 (L) 37.5 - 51.0 %    MCV 89.8 79.0 - 97.0 fL    MCH 28.2 26.6 - 33.0 pg    MCHC 31.4 (L) 31.5 - 35.7 g/dL    RDW 15.8 (H) 12.3 - 15.4 %    RDW-SD 51.9 37.0 - 54.0 fl    MPV 10.9 6.0 - 12.0 fL    Platelets 175 140 - 450 10*3/mm3   C-reactive Protein    Collection Time: 11/22/24  5:51 AM    Specimen: Blood   Result Value Ref Range    C-Reactive Protein 4.40 (H) 0.00 - 0.50 mg/dL       If labs were ordered, I independently reviewed the results and considered them in treating the patient.    RADIOLOGY  No orders to display     [] Radiologist's Report Reviewed:  I ordered and independently interpreted the above noted radiographic studies.  See radiologist's dictation for official interpretation.      PROCEDURES    Procedures    No orders to display       MEDICATIONS GIVEN IN ER    Medications - No data to display    MEDICAL DECISION MAKING, PROGRESS, and CONSULTS   Medical Decision Making  Justice 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 male who presents to the ED c/o Lt ankle wound.  Patient explains that 2 weeks ago he was diagnosed with cellulitis.  Patient had surgery on his left ankle.  He reports that he was discharged home yesterday.  He is returning daily to the infusion center for IV antibiotics.   Patient is here in the emergency department for a wound dressing change and wound check.  He advises that the wound is draining and he does not have any supplies.  He asked them at the infusion center if they would change the dressing and they would not.      Problems Addressed:  Encounter for post surgical wound check: acute illness or injury     Details: Left lateral ankle wound dressing changed in the ED.  Additional dressings given to the patient.  Patient to resume IV infusions.        Discussion below represents my analysis of pertinent findings related to patient's condition, differential diagnosis, treatment plan and final disposition.    Differential diagnosis:  The differential diagnosis associated with the patient's presentation includes: Wound care, cellulitis    Additional sources  Discussed/ obtained information from independent historians:   [] Spouse  [] Parent  [] Family member  [] Friend  [] EMS   [] Other:  External (non-ED) record review:   [] Inpatient record:   [] Office record:   [x] Outpatient record:   [x] Prior Outpatient labs:   [x] Prior Outpatient radiology:   [] Primary Care record:   [] Outside ED record:   [] Other:   Patient's care impacted by:   [] Diabetes  [x] Hypertension  [x] Hyperlipidemia  [] Hypothyroidism   [] Coronary Artery Disease   [] COPD   [] Cancer   [] Obesity  [] GERD   [] Tobacco Abuse   [] Substance Abuse    [] Anxiety   [] Depression   [] Other:   Care significantly affected by Social Determinants of Health (housing and economic circumstances, unemployment)    [] Yes     [x] No   If yes, Patient's care significantly limited by  Social Determinants of Health including:   [] Inadequate housing   [] Low income   [] Alcoholism and drug addiction in family   [] Problems related to primary support group   [] Unemployment   [] Problems related to employment   [] Other Social Determinants of Health:   Shared decision making: Shared decision making with patient wound  dressing was changed by the patient was in the ED.  Patient tolerated well.  Patient has an appointment to see his surgeon on Wednesday.  Patient to keep appointment as scheduled.  Patient to resume daily IV infusions.    Orders placed during this visit:  No orders of the defined types were placed in this encounter.      I considered prescription management  with:   [] Pain medication  [] Antiviral  [] Antibiotic   [] Other:   Rationale:  Additional orders considered but not ordered:  The following testing was considered but ultimately not selected after discussion with patient/family:    ED Course:              DIAGNOSIS  Final diagnoses:   Encounter for post surgical wound check       DISPOSITION    DISCHARGE    Patient discharged in stable condition.    Reviewed implications of results, diagnosis, meds, responsibility to follow up, warning signs and symptoms of possible worsening, potential complications and reasons to return to ER.    Patient/Family voiced understanding of above instructions.    Discussed plan for discharge, as there is no emergent indication for admission.  Pt/family is agreeable and understands need for follow up and possible repeat testing.  Pt/family is aware that discharge does not mean that nothing is wrong but that it indicates no emergency is currently present that requires admission and they must continue care with follow-up as given below or with a physician of their choice.     FOLLOW-UP  Anshul Martinez MD  [address]  [facility] [place] [address] [phone]               Medication List      No changes were made to your prescriptions during this visit.          ED Disposition       ED Disposition   Discharge    Condition   Stable    Comment   --               Please note that portions of this document were completed with voice recognition software.       Penny Cespedes, APRN  11/23/24 6038

## 2024-11-23 NOTE — DISCHARGE INSTRUCTIONS
Keep wound clean and covered.    Keep your appointment as scheduled for the 27th.  Continue your current antibiotics.    Elevate your extremity.

## 2024-11-25 ENCOUNTER — TRANSITIONAL CARE MANAGEMENT TELEPHONE ENCOUNTER (OUTPATIENT)
Dept: CALL CENTER | Facility: HOSPITAL | Age: 64
End: 2024-11-25
Payer: MEDICARE

## 2024-11-25 LAB
BACTERIA SPEC ANAEROBE CULT: NORMAL

## 2024-11-25 NOTE — OUTREACH NOTE
Call Center TCM Note      Flowsheet Row Responses   [facility], [place], operated by [facility] patient discharged from? [place]   Does the patient have one of the following disease processes/diagnoses(primary or secondary)? General Surgery   TCM attempt successful? Yes   Call start time 1022   Call end time 1024   Discharge diagnosis INCISION AND DRAINAGE OF LEFT ANKLE   Person spoke with today (if not patient) and relationship pt   Meds reviewed with patient/caregiver? Yes   Is the patient having any side effects they believe may be caused by any medication additions or changes? No   Does the patient have all medications related to this admission filled (includes all antibiotics, pain medications, etc.) Yes   Is the patient taking all medications as directed (includes completed medication regime)? Yes   Does the patient have an appointment with their PCP within 7-14 days of discharge? Yes  [12/2/2024 12:00 PM]   What DME was ordered? Rolling walker   Has all DME been delivered? Yes   Psychosocial issues? No   Did the patient receive a copy of their discharge instructions? Yes   Nursing interventions Reviewed instructions with patient   What is the patient's perception of their health status since discharge? Improving   Nursing interventions Nurse provided patient education   Is the patient /caregiver able to teach back basic post-op care? No tub bath, swimming, or hot tub until instructed by MD, Keep incision areas clean,dry and protected, Lifting as instructed by MD in discharge instructions, Take showers only when approved by MD-sponge bathe until then   Is the patient/caregiver able to teach back signs and symptoms of incisional infection? Increased redness, swelling or pain at the incisonal site, Increased drainage or bleeding, Pus or odor from incision, Incisional warmth, Fever   Is the patient/caregiver able to teach back steps to recovery at home? Rest and rebuild strength, gradually increase activity, Eat a well-balance diet   If  the patient is a current smoker, are they able to teach back resources for cessation? Not a smoker   Is the patient/caregiver able to teach back the hierarchy of who to call/visit for symptoms/problems? PCP, Specialist, Home health nurse, Urgent Care, ED, 911 Yes   TCM call completed? Yes   Wrap up additional comments Pt denies any fever, or increased redness, swelling, drainage at left ankle wound site. Pt did receive dsg supplies at hospital visit. Pt at infusion center today for IV antibiotics. Pt verified PCP fu appt   Call end time 1024   Would this patient benefit from a Referral to Amb Social Work? No   Is the patient interested in additional calls from an ambulatory ? No            Edel Lange RN    11/25/2024, 10:26 EST

## 2024-11-26 ENCOUNTER — LAB (OUTPATIENT)
Dept: LAB | Facility: HOSPITAL | Age: 64
End: 2024-11-26
Payer: MEDICARE

## 2024-11-26 ENCOUNTER — TRANSCRIBE ORDERS (OUTPATIENT)
Dept: LAB | Facility: HOSPITAL | Age: 64
End: 2024-11-26
Payer: MEDICARE

## 2024-11-26 DIAGNOSIS — L08.89 PITTED KERATOLYSIS: ICD-10-CM

## 2024-11-26 DIAGNOSIS — L03.116 CELLULITIS OF LEFT FOOT: ICD-10-CM

## 2024-11-26 DIAGNOSIS — L03.116 CELLULITIS OF LEFT FOOT: Primary | ICD-10-CM

## 2024-11-26 LAB
ALBUMIN SERPL-MCNC: 3.2 G/DL (ref 3.5–5.2)
ALBUMIN/GLOB SERPL: 0.8 G/DL
ALP SERPL-CCNC: 281 U/L (ref 39–117)
ALT SERPL W P-5'-P-CCNC: 28 U/L (ref 1–41)
ANION GAP SERPL CALCULATED.3IONS-SCNC: 9 MMOL/L (ref 5–15)
AST SERPL-CCNC: 56 U/L (ref 1–40)
BASOPHILS # BLD AUTO: 0.1 10*3/MM3 (ref 0–0.2)
BASOPHILS NFR BLD AUTO: 1.1 % (ref 0–1.5)
BILIRUB SERPL-MCNC: 0.3 MG/DL (ref 0–1.2)
BUN SERPL-MCNC: 19 MG/DL (ref 8–23)
BUN/CREAT SERPL: 22.9 (ref 7–25)
CALCIUM SPEC-SCNC: 8.5 MG/DL (ref 8.6–10.5)
CHLORIDE SERPL-SCNC: 103 MMOL/L (ref 98–107)
CO2 SERPL-SCNC: 25 MMOL/L (ref 22–29)
CREAT SERPL-MCNC: 0.83 MG/DL (ref 0.76–1.27)
CRP SERPL-MCNC: 0.87 MG/DL (ref 0–0.5)
DEPRECATED RDW RBC AUTO: 52.9 FL (ref 37–54)
EGFRCR SERPLBLD CKD-EPI 2021: 96.5 ML/MIN/1.73
EOSINOPHIL # BLD AUTO: 0.19 10*3/MM3 (ref 0–0.4)
EOSINOPHIL NFR BLD AUTO: 2.1 % (ref 0.3–6.2)
ERYTHROCYTE [DISTWIDTH] IN BLOOD BY AUTOMATED COUNT: 16.2 % (ref 12.3–15.4)
ERYTHROCYTE [SEDIMENTATION RATE] IN BLOOD: 67 MM/HR (ref 0–20)
GLOBULIN UR ELPH-MCNC: 4 GM/DL
GLUCOSE SERPL-MCNC: 103 MG/DL (ref 65–99)
HCT VFR BLD AUTO: 31.5 % (ref 37.5–51)
HGB BLD-MCNC: 10.1 G/DL (ref 13–17.7)
IMM GRANULOCYTES # BLD AUTO: 0.04 10*3/MM3 (ref 0–0.05)
IMM GRANULOCYTES NFR BLD AUTO: 0.5 % (ref 0–0.5)
LYMPHOCYTES # BLD AUTO: 3.24 10*3/MM3 (ref 0.7–3.1)
LYMPHOCYTES NFR BLD AUTO: 36.7 % (ref 19.6–45.3)
MCH RBC QN AUTO: 28.6 PG (ref 26.6–33)
MCHC RBC AUTO-ENTMCNC: 32.1 G/DL (ref 31.5–35.7)
MCV RBC AUTO: 89.2 FL (ref 79–97)
MONOCYTES # BLD AUTO: 0.66 10*3/MM3 (ref 0.1–0.9)
MONOCYTES NFR BLD AUTO: 7.5 % (ref 5–12)
NEUTROPHILS NFR BLD AUTO: 4.61 10*3/MM3 (ref 1.7–7)
NEUTROPHILS NFR BLD AUTO: 52.1 % (ref 42.7–76)
NRBC BLD AUTO-RTO: 0 /100 WBC (ref 0–0.2)
PLATELET # BLD AUTO: 242 10*3/MM3 (ref 140–450)
PMV BLD AUTO: 10.1 FL (ref 6–12)
POTASSIUM SERPL-SCNC: 4.3 MMOL/L (ref 3.5–5.2)
PROT SERPL-MCNC: 7.2 G/DL (ref 6–8.5)
RBC # BLD AUTO: 3.53 10*6/MM3 (ref 4.14–5.8)
SODIUM SERPL-SCNC: 137 MMOL/L (ref 136–145)
WBC NRBC COR # BLD AUTO: 8.84 10*3/MM3 (ref 3.4–10.8)

## 2024-11-26 PROCEDURE — 80053 COMPREHEN METABOLIC PANEL: CPT

## 2024-11-26 PROCEDURE — 85652 RBC SED RATE AUTOMATED: CPT

## 2024-11-26 PROCEDURE — 86140 C-REACTIVE PROTEIN: CPT

## 2024-11-26 PROCEDURE — 85025 COMPLETE CBC W/AUTO DIFF WBC: CPT

## 2024-11-26 PROCEDURE — 36415 COLL VENOUS BLD VENIPUNCTURE: CPT

## 2024-11-27 LAB
FUNGUS WND CULT: NORMAL
MYCOBACTERIUM SPEC CULT: NORMAL
NIGHT BLUE STAIN TISS: NORMAL

## 2024-12-02 ENCOUNTER — OFFICE VISIT (OUTPATIENT)
Dept: FAMILY MEDICINE CLINIC | Facility: CLINIC | Age: 64
End: 2024-12-02
Payer: MEDICARE

## 2024-12-02 VITALS
TEMPERATURE: 97.8 F | RESPIRATION RATE: 16 BRPM | HEART RATE: 51 BPM | OXYGEN SATURATION: 97 % | BODY MASS INDEX: 29.16 KG/M2 | WEIGHT: 175 LBS | HEIGHT: 65 IN | DIASTOLIC BLOOD PRESSURE: 40 MMHG | SYSTOLIC BLOOD PRESSURE: 100 MMHG

## 2024-12-02 DIAGNOSIS — L03.116 CELLULITIS OF LEFT ANKLE: ICD-10-CM

## 2024-12-02 DIAGNOSIS — Z09 HOSPITAL DISCHARGE FOLLOW-UP: Primary | ICD-10-CM

## 2024-12-02 DIAGNOSIS — R74.8 ALKALINE PHOSPHATASE ELEVATION: ICD-10-CM

## 2024-12-02 DIAGNOSIS — D64.9 ANEMIA, UNSPECIFIED TYPE: ICD-10-CM

## 2024-12-02 PROCEDURE — 1111F DSCHRG MED/CURRENT MED MERGE: CPT | Performed by: FAMILY MEDICINE

## 2024-12-02 PROCEDURE — 1159F MED LIST DOCD IN RCRD: CPT | Performed by: FAMILY MEDICINE

## 2024-12-02 PROCEDURE — G2211 COMPLEX E/M VISIT ADD ON: HCPCS | Performed by: FAMILY MEDICINE

## 2024-12-02 PROCEDURE — 3074F SYST BP LT 130 MM HG: CPT | Performed by: FAMILY MEDICINE

## 2024-12-02 PROCEDURE — 1160F RVW MEDS BY RX/DR IN RCRD: CPT | Performed by: FAMILY MEDICINE

## 2024-12-02 PROCEDURE — 3078F DIAST BP <80 MM HG: CPT | Performed by: FAMILY MEDICINE

## 2024-12-02 PROCEDURE — 1125F AMNT PAIN NOTED PAIN PRSNT: CPT | Performed by: FAMILY MEDICINE

## 2024-12-02 PROCEDURE — 99495 TRANSJ CARE MGMT MOD F2F 14D: CPT | Performed by: FAMILY MEDICINE

## 2024-12-02 RX ORDER — OXYCODONE AND ACETAMINOPHEN 5; 325 MG/1; MG/1
1 TABLET ORAL EVERY 12 HOURS PRN
Qty: 12 TABLET | Refills: 0 | Status: SHIPPED | OUTPATIENT
Start: 2024-12-02

## 2024-12-02 NOTE — PROGRESS NOTES
"Transitional Care Follow Up Visit  Subjective     Justice 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 male who presents for a transitional care management visit.    Within 48 business hours after discharge our office contacted him via telephone to coordinate his care and needs.      I reviewed and discussed the details of that call along with the discharge summary, hospital problems, inpatient lab results, inpatient diagnostic studies, and consultation reports with Justice.     Current outpatient and discharge medications have been reconciled for the patient.  Reviewed by: Channing Gilliam MD          11/22/2024     5:54 PM   Date of TCM Phone Call   [facility]   Date of Admission 11/11/2024   Date of Discharge 11/22/2024     Risk for Readmission (LACE) Score: 11 (11/22/2024  6:00 AM)      History of Present Illness   Course During Hospital Stay:      He was admitted for infection and cellulitis of the left ankle  He had I&D  He is still on IV antibiotics    Some diarrhea and some N/V  Good appetite  No fevers    He denies drinking alcohol at this time     The following portions of the patient's history were reviewed and updated as appropriate: allergies, current medications, past family history, past medical history, past social history, past surgical history, and problem list.    Review of Systems   Constitutional: Negative.    Respiratory: Negative.     Cardiovascular: Negative.    Psychiatric/Behavioral: Negative.         Objective   /40   Pulse 51   Temp 97.8 °F (36.6 °C)   Resp 16   Ht 165.1 cm (65\")   Wt 79.4 kg (175 lb)   SpO2 97%   BMI 29.12 kg/m²   Physical Exam  Vitals and nursing note reviewed.   Constitutional:       General: He is not in acute distress.     Appearance: Normal appearance. He is well-developed.   Cardiovascular:      Rate and Rhythm: Normal rate and regular rhythm.      Heart sounds: Normal heart sounds.   Pulmonary:      Effort: Pulmonary effort is normal.      Breath sounds: " Normal breath sounds.   Musculoskeletal:        Arms:         Legs:    Neurological:      Mental Status: He is alert and oriented to person, place, and time.   Psychiatric:         Mood and Affect: Mood normal.         Behavior: Behavior normal.         Thought Content: Thought content normal.         Judgment: Judgment normal.         Assessment & Plan   Diagnoses and all orders for this visit:    1. Hospital discharge follow-up (Primary)  -     CBC & Differential  -     Comprehensive Metabolic Panel  -     Gamma GT  -     Vitamin B12 & Folate  -     Iron Profile  -     Sedimentation Rate    2. Cellulitis of left ankle  -     oxyCODONE-acetaminophen (Percocet) 5-325 MG per tablet; Take 1 tablet by mouth Every 12 (Twelve) Hours As Needed for Moderate Pain.  Dispense: 12 tablet; Refill: 0  -     Sedimentation Rate    3. Alkaline phosphatase elevation  -     Comprehensive Metabolic Panel  -     Gamma GT    4. Anemia, unspecified type  -     CBC & Differential  -     Vitamin B12 & Folate  -     Iron Profile    Reviewed hospital note.  Pt still on IV antibiotiocs.  Pain still an issue, I will refill percoect short term.  Pt is aware this is short term only  Will recheck labs as noted above and ensure return to normal levels.

## 2024-12-03 ENCOUNTER — TELEPHONE (OUTPATIENT)
Dept: FAMILY MEDICINE CLINIC | Facility: CLINIC | Age: 64
End: 2024-12-03
Payer: MEDICARE

## 2024-12-03 ENCOUNTER — TRANSCRIBE ORDERS (OUTPATIENT)
Dept: LAB | Facility: HOSPITAL | Age: 64
End: 2024-12-03
Payer: MEDICARE

## 2024-12-03 ENCOUNTER — LAB (OUTPATIENT)
Dept: LAB | Facility: HOSPITAL | Age: 64
End: 2024-12-03
Payer: MEDICARE

## 2024-12-03 DIAGNOSIS — L03.116 CELLULITIS OF LEFT FOOT: Primary | ICD-10-CM

## 2024-12-03 LAB
ALBUMIN SERPL-MCNC: 3.3 G/DL (ref 3.5–5.2)
ALBUMIN SERPL-MCNC: 3.4 G/DL (ref 3.9–4.9)
ALBUMIN/GLOB SERPL: 0.9 G/DL
ALP SERPL-CCNC: 197 U/L (ref 39–117)
ALP SERPL-CCNC: 227 IU/L (ref 44–121)
ALT SERPL W P-5'-P-CCNC: 22 U/L (ref 1–41)
ALT SERPL-CCNC: 23 IU/L (ref 0–44)
ANION GAP SERPL CALCULATED.3IONS-SCNC: 8 MMOL/L (ref 5–15)
AST SERPL-CCNC: 47 U/L (ref 1–40)
AST SERPL-CCNC: 50 IU/L (ref 0–40)
BASOPHILS # BLD AUTO: 0.04 10*3/MM3 (ref 0–0.2)
BASOPHILS # BLD AUTO: 0.1 X10E3/UL (ref 0–0.2)
BASOPHILS NFR BLD AUTO: 0.5 % (ref 0–1.5)
BASOPHILS NFR BLD AUTO: 1 %
BILIRUB SERPL-MCNC: 0.4 MG/DL (ref 0–1.2)
BILIRUB SERPL-MCNC: 0.5 MG/DL (ref 0–1.2)
BUN SERPL-MCNC: 10 MG/DL (ref 8–27)
BUN SERPL-MCNC: 13 MG/DL (ref 8–23)
BUN/CREAT SERPL: 12 (ref 10–24)
BUN/CREAT SERPL: 17.1 (ref 7–25)
CALCIUM SERPL-MCNC: 8.9 MG/DL (ref 8.6–10.2)
CALCIUM SPEC-SCNC: 8.3 MG/DL (ref 8.6–10.5)
CHLORIDE SERPL-SCNC: 103 MMOL/L (ref 96–106)
CHLORIDE SERPL-SCNC: 103 MMOL/L (ref 98–107)
CO2 SERPL-SCNC: 22 MMOL/L (ref 20–29)
CO2 SERPL-SCNC: 25 MMOL/L (ref 22–29)
CREAT SERPL-MCNC: 0.76 MG/DL (ref 0.76–1.27)
CREAT SERPL-MCNC: 0.82 MG/DL (ref 0.76–1.27)
CRP SERPL-MCNC: <0.3 MG/DL (ref 0–0.5)
DEPRECATED RDW RBC AUTO: 55.7 FL (ref 37–54)
EGFRCR SERPLBLD CKD-EPI 2021: 97 ML/MIN/1.73
EGFRCR SERPLBLD CKD-EPI 2021: 99.1 ML/MIN/1.73
EOSINOPHIL # BLD AUTO: 0.33 10*3/MM3 (ref 0–0.4)
EOSINOPHIL # BLD AUTO: 0.4 X10E3/UL (ref 0–0.4)
EOSINOPHIL NFR BLD AUTO: 4 %
EOSINOPHIL NFR BLD AUTO: 4.3 % (ref 0.3–6.2)
ERYTHROCYTE [DISTWIDTH] IN BLOOD BY AUTOMATED COUNT: 14.6 % (ref 11.6–15.4)
ERYTHROCYTE [DISTWIDTH] IN BLOOD BY AUTOMATED COUNT: 16.8 % (ref 12.3–15.4)
ERYTHROCYTE [SEDIMENTATION RATE] IN BLOOD BY WESTERGREN METHOD: 16 MM/HR (ref 0–30)
ERYTHROCYTE [SEDIMENTATION RATE] IN BLOOD: 51 MM/HR (ref 0–20)
FOLATE SERPL-MCNC: 5.9 NG/ML
GGT SERPL-CCNC: 113 IU/L (ref 0–65)
GLOBULIN SER CALC-MCNC: 4.3 G/DL (ref 1.5–4.5)
GLOBULIN UR ELPH-MCNC: 3.7 GM/DL
GLUCOSE SERPL-MCNC: 112 MG/DL (ref 70–99)
GLUCOSE SERPL-MCNC: 121 MG/DL (ref 65–99)
HCT VFR BLD AUTO: 31.4 % (ref 37.5–51)
HCT VFR BLD AUTO: 33.7 % (ref 37.5–51)
HGB BLD-MCNC: 10.1 G/DL (ref 13–17.7)
HGB BLD-MCNC: 10.6 G/DL (ref 13–17.7)
IMM GRANULOCYTES # BLD AUTO: 0 X10E3/UL (ref 0–0.1)
IMM GRANULOCYTES # BLD AUTO: 0.03 10*3/MM3 (ref 0–0.05)
IMM GRANULOCYTES NFR BLD AUTO: 0 %
IMM GRANULOCYTES NFR BLD AUTO: 0.4 % (ref 0–0.5)
IRON SATN MFR SERPL: 23 % (ref 15–55)
IRON SERPL-MCNC: 81 UG/DL (ref 38–169)
LYMPHOCYTES # BLD AUTO: 2.86 10*3/MM3 (ref 0.7–3.1)
LYMPHOCYTES # BLD AUTO: 3.2 X10E3/UL (ref 0.7–3.1)
LYMPHOCYTES NFR BLD AUTO: 35 %
LYMPHOCYTES NFR BLD AUTO: 37 % (ref 19.6–45.3)
MCH RBC QN AUTO: 29 PG (ref 26.6–33)
MCH RBC QN AUTO: 29.1 PG (ref 26.6–33)
MCHC RBC AUTO-ENTMCNC: 31.5 G/DL (ref 31.5–35.7)
MCHC RBC AUTO-ENTMCNC: 32.2 G/DL (ref 31.5–35.7)
MCV RBC AUTO: 90.5 FL (ref 79–97)
MCV RBC AUTO: 92 FL (ref 79–97)
MONOCYTES # BLD AUTO: 0.65 10*3/MM3 (ref 0.1–0.9)
MONOCYTES # BLD AUTO: 0.7 X10E3/UL (ref 0.1–0.9)
MONOCYTES NFR BLD AUTO: 8 %
MONOCYTES NFR BLD AUTO: 8.4 % (ref 5–12)
NEUTROPHILS # BLD AUTO: 4.7 X10E3/UL (ref 1.4–7)
NEUTROPHILS NFR BLD AUTO: 3.82 10*3/MM3 (ref 1.7–7)
NEUTROPHILS NFR BLD AUTO: 49.4 % (ref 42.7–76)
NEUTROPHILS NFR BLD AUTO: 52 %
NRBC BLD AUTO-RTO: 0 /100 WBC (ref 0–0.2)
PLATELET # BLD AUTO: 149 10*3/MM3 (ref 140–450)
PLATELET # BLD AUTO: 186 X10E3/UL (ref 150–450)
PMV BLD AUTO: 11.3 FL (ref 6–12)
POTASSIUM SERPL-SCNC: 4 MMOL/L (ref 3.5–5.2)
POTASSIUM SERPL-SCNC: 4.4 MMOL/L (ref 3.5–5.2)
PROT SERPL-MCNC: 7 G/DL (ref 6–8.5)
PROT SERPL-MCNC: 7.7 G/DL (ref 6–8.5)
RBC # BLD AUTO: 3.47 10*6/MM3 (ref 4.14–5.8)
RBC # BLD AUTO: 3.66 X10E6/UL (ref 4.14–5.8)
SODIUM SERPL-SCNC: 136 MMOL/L (ref 134–144)
SODIUM SERPL-SCNC: 136 MMOL/L (ref 136–145)
TIBC SERPL-MCNC: 345 UG/DL (ref 250–450)
UIBC SERPL-MCNC: 264 UG/DL (ref 111–343)
VIT B12 SERPL-MCNC: 1034 PG/ML (ref 232–1245)
WBC # BLD AUTO: 9.1 X10E3/UL (ref 3.4–10.8)
WBC NRBC COR # BLD AUTO: 7.73 10*3/MM3 (ref 3.4–10.8)

## 2024-12-03 PROCEDURE — 85025 COMPLETE CBC W/AUTO DIFF WBC: CPT | Performed by: INTERNAL MEDICINE

## 2024-12-03 PROCEDURE — 80053 COMPREHEN METABOLIC PANEL: CPT | Performed by: INTERNAL MEDICINE

## 2024-12-03 PROCEDURE — 85652 RBC SED RATE AUTOMATED: CPT | Performed by: INTERNAL MEDICINE

## 2024-12-03 PROCEDURE — 36415 COLL VENOUS BLD VENIPUNCTURE: CPT | Performed by: INTERNAL MEDICINE

## 2024-12-03 PROCEDURE — 86140 C-REACTIVE PROTEIN: CPT | Performed by: INTERNAL MEDICINE

## 2024-12-04 ENCOUNTER — TRANSCRIBE ORDERS (OUTPATIENT)
Dept: PHYSICAL THERAPY | Facility: HOSPITAL | Age: 64
End: 2024-12-04
Payer: MEDICARE

## 2024-12-04 DIAGNOSIS — S91.002A ANKLE WOUND, LEFT, INITIAL ENCOUNTER: Primary | ICD-10-CM

## 2024-12-04 LAB
FUNGUS WND CULT: NORMAL
MYCOBACTERIUM SPEC CULT: NORMAL
NIGHT BLUE STAIN TISS: NORMAL

## 2024-12-09 RX ORDER — SERTRALINE HYDROCHLORIDE 100 MG/1
TABLET, FILM COATED ORAL
Qty: 180 TABLET | Refills: 0 | Status: SHIPPED | OUTPATIENT
Start: 2024-12-09

## 2024-12-11 LAB
FUNGUS WND CULT: NORMAL

## 2024-12-12 LAB
MYCOBACTERIUM SPEC CULT: NORMAL
NIGHT BLUE STAIN TISS: NORMAL

## 2024-12-18 ENCOUNTER — HOSPITAL ENCOUNTER (OUTPATIENT)
Dept: PHYSICAL THERAPY | Facility: HOSPITAL | Age: 64
Setting detail: THERAPIES SERIES
Discharge: HOME | End: 2024-12-18
Payer: MEDICARE

## 2024-12-18 DIAGNOSIS — S91.002D OPEN WOUND OF LEFT ANKLE WITH COMPLICATION, SUBSEQUENT ENCOUNTER: ICD-10-CM

## 2024-12-18 DIAGNOSIS — T81.89XD NONHEALING SURGICAL WOUND, SUBSEQUENT ENCOUNTER: Primary | ICD-10-CM

## 2024-12-18 LAB
FUNGUS WND CULT: NORMAL

## 2024-12-18 PROCEDURE — 97597 DBRDMT OPN WND 1ST 20 CM/<: CPT

## 2024-12-18 PROCEDURE — 97162 PT EVAL MOD COMPLEX 30 MIN: CPT

## 2024-12-18 NOTE — THERAPY EVALUATION
Outpatient Rehabilitation - Wound/Debridement Initial Eval  [facility]     Patient Name: Justice Kiser  : 1960  MRN: [id number]  Today's Date: 2024                  Admit Date: 2024    Visit Dx:    ICD-10-CM ICD-9-CM   1. Nonhealing surgical wound, subsequent encounter  T81.89XD V58.89     998.83   2. Open wound of left ankle with complication, subsequent encounter  S91.002D V58.89     891.1       Patient Active Problem List   Diagnosis    Essential hypertension    Coronary artery disease involving native coronary artery of native heart with angina pectoris    Hyperlipidemia LDL goal <70    Depression    Anxiety    Hyperglycemia    Paresthesia of right foot    Tremor    GERD (gastroesophageal reflux disease)    Arthritis    Pierson's esophagus without dysplasia    MCI (mild cognitive impairment)    Mitral valve insufficiency    Alcohol induced fatty liver    Alcohol abuse, in remission    Cerebral microvascular disease    Chronic bilateral low back pain with right-sided sciatica    Spinal stenosis, lumbar region, with neurogenic claudication    DDD (degenerative disc disease), lumbar    Psychophysiological insomnia    Left leg cellulitis    Severe protein-calorie malnutrition        Past Medical History:   Diagnosis Date    Arthritis     CHF (congestive heart failure)     Chronic pain disorder     Colon polyp     Depression     Difficulty walking     Elevated LFTs     GERD (gastroesophageal reflux disease)     History of colonic polyps     History of methicillin resistant Staphylococcus aureus     History of myocardial infarction     Hyperlipidemia     Hypertension         Past Surgical History:   Procedure Laterality Date    BACK SURGERY      CAROTID STENT      CORONARY ANGIOPLASTY WITH STENT PLACEMENT  2012    Circumflex Xscience V 3.5 MM x 23 mm    CORONARY STENT PLACEMENT      ENDOMETRIAL ABLATION      EPIDURAL BLOCK      GALLBLADDER SURGERY  2017    INCISION AND DRAINAGE LEG Left  11/20/2024    Procedure: INCISION AND DRAINAGE OF LEFT ANKLE;  Surgeon: Ravin Sidhu MD;  Location: [facility];  Service: Orthopedics;  Laterality: Left;    LUMBAR DISCECTOMY  1992    2 level - [facility]    LYMPH NODE BIOPSY      SHOULDER SURGERY  12/20/2018    Dr. Hoyt Rotator cuff repair        Patient History       Row Name 12/18/24 1030             History    Chief Complaint Ulcer, wound or other skin conditions;Pain  -MF      Type of Pain Ankle pain  -MF      Brief Description of Current Complaint Pt with L lat ankle wound s/p surgical I and D with prevena placement  -MF      Patient/Caregiver Goals Heal wound  -MF      Patient's Rating of General Health Fair  -MF         Pain     Pain Location Ankle  -MF      Pain at Present 3  -MF      Pain at Best 0  -MF      Vela-Perez FACES Pain Rating  4  -MF         Daily Activities    Primary Language English  -      Pt Participated in POC and Goals Yes  -MF                User Key  (r) = Recorded By, (t) = Taken By, (c) = Cosigned By      Initials Name Provider Type    Trace Cox, PT Physical Therapist                    EVALUATION     LDA Wound       Row Name 12/18/24 1030             Wound 12/18/24 1030 Left lateral ankle    Wound - Properties Group Placement Date: 12/18/24  -MF Placement Time: 1030  -MF Side: Left  -MF Orientation: lateral  - Location: ankle  - Primary Wound Type: Incision  -MF    Wound Image Images linked: 1  -MF      Dressing Appearance intact  -MF      Base moist;pink;red;slough;subcutaneous;yellow  -MF      Periwound intact;dry  -MF      Periwound Temperature warm  -MF      Periwound Skin Turgor soft  -MF      Edges irregular  -MF      Wound Length (cm) 6.5 cm  -MF      Wound Width (cm) 1.5 cm  -MF      Wound Depth (cm) 1 cm  -MF      Wound Surface Area (cm^2) 9.75 cm^2  -MF      Wound Volume (cm^3) 9.75 cm^3  -MF      Drainage Characteristics/Odor serosanguineous  -MF      Drainage Amount small  -MF       Care, Wound irrigated with;wound cleanser;debrided  -MF      Dressing Care foam;low-adherent  HFBc with mepilex Ag foam with optifoam to cover  -MF      Periwound Care cleansed with pH balanced cleanser  -MF      Retired Wound - Properties Group Placement Date: 12/18/24  - Placement Time: 1030  -MF Side: Left  -MF Orientation: lateral  -MF Location: ankle  -MF Primary Wound Type: Incision  -MF    Retired Wound - Properties Group Placement Date: 12/18/24  - Placement Time: 1030  -MF Side: Left  -MF Orientation: lateral  -MF Location: ankle  -MF Primary Wound Type: Incision  -MF    Retired Wound - Properties Group Date first assessed: 12/18/24  - Time first assessed: 1030  -MF Side: Left  -MF Location: ankle  -MF Primary Wound Type: Incision  -MF              User Key  (r) = Recorded By, (t) = Taken By, (c) = Cosigned By      Initials Name Provider Type    Trace Cox, PT Physical Therapist                      WOUND DEBRIDEMENT  Total area of Debridement: ~4cm2  Debridement Site 1  Location- Site 1: L lateral ankle wound  Selective Debridement- Site 1: Wound Surface <20cmsq  Instruments- Site 1: tweezers, scissors  Excised Tissue Description- Site 1: moderate, slough  Bleeding- Site 1: none              Therapy Education       Row Name 12/18/24 1030             Therapy Education    Education Details Pt to change dressing every 1-2 days with Vashe soak x 5 min covering with HFBc moistened with nsaline and mepilex Ag foam with optifoam to cover.  -MF      Given Bandaging/dressing change  -MF      Program New  -MF      How Provided Verbal  -MF      Provided to Patient  -MF      Level of Understanding Teach back education performed  -MF                User Key  (r) = Recorded By, (t) = Taken By, (c) = Cosigned By      Initials Name Provider Type    Trace Cox, PT Physical Therapist                    Recommendation and Plan   PT Assessment/Plan       Row Name 12/18/24 1030          PT  Assessment    Functional Limitations Other (comment);Performance in self-care ADL;Performance in leisure activities  wound management  -     Impairments Integumentary integrity;Pain  -     Assessment Comments Pt presents with complex L lateral ankle wound s/p surgical I and D and prevena placement. PT was able to lightly debride the wound to help decrease bioburden and improve granulation and healing potential. PT covered wound with HFBc and mepilex Ag foam to decrease bacterial load and manage exudate as well.  -     Rehab Potential Fair  -     Patient/caregiver participated in establishment of treatment plan and goals Yes  -     Patient would benefit from skilled therapy intervention Yes  -        PT Plan    PT Frequency 2x/week  -     Predicted Duration of Therapy Intervention (PT) 8 weeks  -     Planned CPT's? PT EVAL MOD COMPLELITY: 29773;PT SELF CARE/MGMT/TRAIN 15 MIN: 26142;PT BROOKE DEBRIDE OPEN WOUND UP TO 20 CM: 22582;PT NLFU MIST: 64477;PT MULTI LAYER COMP SYS LE  -     Physical Therapy Interventions (Optional Details) wound care;patient/family education  -     PT Plan Comments Pt to change dressing every 1-2 days at home and f/u for further debridement and dressing management.  -               User Key  (r) = Recorded By, (t) = Taken By, (c) = Cosigned By      Initials Name Provider Type     Trace Max, PT Physical Therapist                      Goals   PT OP Goals       Row Name 24 1030          PT Short Term Goals    STG Date to Achieve 25  -     STG 1 Decrease L ankle wound size by 25% as evidence of wound healing.  -     STG 2 Increase granulation to L ankle wound to > 50% to improve healing potential.  -     STG 3 Pt able to verb / demonstrate home dressing management to progress towards independent management.  -        Long Term Goals    LTG Date to Achieve 25  -     LTG 1 Decrease L ankle wound size by 75% as evidence of wound  healing.  -     LTG 2 Increase granulation to L ankle wound to > 85% to improve healing potential.  -        Time Calculation    PT Goal Re-Cert Due Date 03/18/25  -               User Key  (r) = Recorded By, (t) = Taken By, (c) = Cosigned By      Initials Name Provider Type    Trace Cox, PT Physical Therapist                    Time Calculation: Start Time: 1030  Untimed Charges  PT Eval/Re-eval Minutes: 35  Wound Care: 07340 Selective debridement  [id number] debridement: 25  Total Minutes  Untimed Charges Total Minutes: 60   Total Minutes: 60            Trace Max, PT  12/18/2024

## 2024-12-22 ENCOUNTER — HOSPITAL ENCOUNTER (OUTPATIENT)
Dept: PHYSICAL THERAPY | Facility: HOSPITAL | Age: 64
Setting detail: THERAPIES SERIES
Discharge: HOME | End: 2024-12-22
Payer: MEDICARE

## 2024-12-22 DIAGNOSIS — T81.89XD NONHEALING SURGICAL WOUND, SUBSEQUENT ENCOUNTER: Primary | ICD-10-CM

## 2024-12-22 DIAGNOSIS — R60.0 EDEMA OF LEFT LOWER LEG: ICD-10-CM

## 2024-12-22 DIAGNOSIS — S91.002D OPEN WOUND OF LEFT ANKLE WITH COMPLICATION, SUBSEQUENT ENCOUNTER: ICD-10-CM

## 2024-12-22 PROCEDURE — 97597 DBRDMT OPN WND 1ST 20 CM/<: CPT

## 2024-12-22 NOTE — THERAPY WOUND CARE TREATMENT
Outpatient Rehabilitation - Wound/Debridement Treatment Note   Laith     Patient Name: Justice Kiser  : 1960  MRN: [id number]  Today's Date: 2024                 Admit Date: 2024    Visit Dx:    ICD-10-CM ICD-9-CM   1. Nonhealing surgical wound, subsequent encounter  T81.89XD V58.89     998.83   2. Open wound of left ankle with complication, subsequent encounter  S91.002D V58.89     891.1   3. Edema of left lower leg  R60.0 782.3       Patient Active Problem List   Diagnosis    Essential hypertension    Coronary artery disease involving native coronary artery of native heart with angina pectoris    Hyperlipidemia LDL goal <70    Depression    Anxiety    Hyperglycemia    Paresthesia of right foot    Tremor    GERD (gastroesophageal reflux disease)    Arthritis    Pierson's esophagus without dysplasia    MCI (mild cognitive impairment)    Mitral valve insufficiency    Alcohol induced fatty liver    Alcohol abuse, in remission    Cerebral microvascular disease    Chronic bilateral low back pain with right-sided sciatica    Spinal stenosis, lumbar region, with neurogenic claudication    DDD (degenerative disc disease), lumbar    Psychophysiological insomnia    Left leg cellulitis    Severe protein-calorie malnutrition        Past Medical History:   Diagnosis Date    Arthritis     CHF (congestive heart failure)     Chronic pain disorder     Colon polyp     Depression     Difficulty walking     Elevated LFTs     GERD (gastroesophageal reflux disease)     History of colonic polyps     History of methicillin resistant Staphylococcus aureus     History of myocardial infarction     Hyperlipidemia     Hypertension         Past Surgical History:   Procedure Laterality Date    BACK SURGERY      CAROTID STENT      CORONARY ANGIOPLASTY WITH STENT PLACEMENT  2012    Circumflex Xscience V 3.5 MM x 23 mm    CORONARY STENT PLACEMENT      ENDOMETRIAL ABLATION      EPIDURAL BLOCK      GALLBLADDER  SURGERY  2017    INCISION AND DRAINAGE LEG Left 11/20/2024    Procedure: INCISION AND DRAINAGE OF LEFT ANKLE;  Surgeon: Ravin Sidhu MD;  Location: [facility];  Service: Orthopedics;  Laterality: Left;    LUMBAR DISCECTOMY  1992    2 level - [facility]    LYMPH NODE BIOPSY      SHOULDER SURGERY  12/20/2018    Dr. Hoyt Rotator cuff repair         EVALUATION   PT Ortho       Row Name 12/22/24 1030       Subjective    Subjective Comments Pt reports increased pain and swelling in leg, is concerned he has infection, denies fever.  States he is still on abx per Dr. Quijano.  Reports he has been up on his feet performing normal activities.  -       Subjective Pain    Able to rate subjective pain? yes  -    Pre-Treatment Pain Level 5  -JM    Post-Treatment Pain Level 5  -JM       Transfers    Sit-Stand [place] (Transfers) independent  -JM    Stand-Sit [place] (Transfers) independent  -JM    Comment, (Transfers) seated for tx  -JM       Gait/Stairs (Locomotion)    [place] Level (Gait) independent  -              User Key  (r) = Recorded By, (t) = Taken By, (c) = Cosigned By      Initials Name Provider Type    Kristin Zamora, PT Physical Therapist                     LDA Wound       Row Name 12/22/24 1030             Wound 12/18/24 1030 Left lateral ankle    Wound - Properties Group Placement Date: 12/18/24  -MF Placement Time: 1030  - Side: Left  - Orientation: lateral  -MF Location: ankle  -MF Primary Wound Type: Incision  -MF    Wound Image Images linked: 1  -JM      Dressing Appearance dressing loose;moist drainage  -      Base moist;necrotic;yellow;slough;subcutaneous;eschar;other (see comments)  intact sutures throughout wound bed, areas of depth bewteen sutures  -      Periwound intact;dry;redness;swelling;warm  -      Periwound Temperature warm  -      Periwound Skin Turgor soft  -      Edges irregular  -      Wound Length (cm) 6.3 cm  -      Wound Width  (cm) 1.5 cm  -      Wound Depth (cm) 1 cm  full depth obscured  -      Wound Surface Area (cm^2) 9.45 cm^2  -JM      Wound Volume (cm^3) 9.45 cm^3  -JM      Drainage Characteristics/Odor serosanguineous;serous  -      Drainage Amount moderate  -      Care, Wound irrigated with;wound cleanser;debrided  -      Dressing Care dressing applied;collagen;antimicrobial agent applied;foam;silver impregnated;silicone border foam  brad to depth, saline-moist HFBc, mepilex ag, mepilex border incisional dressing, size 4 compressogrip  -      Periwound Care cleansed with pH balanced cleanser;dry periwound area maintained  -      Retired Wound - Properties Group Placement Date: 12/18/24  - Placement Time: 1030  - Side: Left  - Orientation: lateral  - Location: ankle  - Primary Wound Type: Incision  -    Retired Wound - Properties Group Placement Date: 12/18/24  - Placement Time: 1030  - Side: Left  - Orientation: lateral  - Location: ankle  -MF Primary Wound Type: Incision  -MF    Retired Wound - Properties Group Date first assessed: 12/18/24  - Time first assessed: 1030  - Side: Left  - Location: ankle  -MF Primary Wound Type: Incision  -MF              User Key  (r) = Recorded By, (t) = Taken By, (c) = Cosigned By      Initials Name Provider Type    Trace Cox, PT Physical Therapist    Kristin Zamora, PT Physical Therapist                      WOUND DEBRIDEMENT  Total area of Debridement: 2cmsq  Debridement Site 1  Location- Site 1: L lateral ankle wound  Selective Debridement- Site 1: Wound Surface <20cmsq  Instruments- Site 1: tweezers  Excised Tissue Description- Site 1: minimum, slough  Bleeding- Site 1: none              Therapy Education       Row Name 12/22/24 1030             Therapy Education    Education Details Rest and elevate leg to reduce swelling, may remove compression if causing pain/discomfort.  Change dressing every 2 days or if wet/disrupted.  Seek  medical attention if S&S of infection are worsening.  -      Given Bandaging/dressing change  -      Program Reinforced;Modified  -      How Provided Verbal;Demonstration  -      Provided to Patient  -      Level of Understanding Verbalized  -                User Key  (r) = Recorded By, (t) = Taken By, (c) = Cosigned By      Initials Name Provider Type    Kristin Zamora, PT Physical Therapist                    Recommendation and Plan   PT Assessment/Plan       Row Name 12/22/24 1030          PT Assessment    Functional Limitations Other (comment);Performance in self-care ADL;Performance in leisure activities  wound management  -     Impairments Integumentary integrity;Pain  -     Assessment Comments Pt with increased redness/swelling/pain of L ankle, though denies fever and remains on oral abx.  Wound bed still mostly necrotic with areas of depth obscured by slough between sutures.  PT added brad to wound depth to help faciliate granulation, added compressogrip for light compression to reduce edema.  Instructed pt to seek medical attention if symptoms are worsening, and to rest and elevate leg to reduce swelling.  -        PT Plan    PT Frequency 2x/week  -     Physical Therapy Interventions (Optional Details) patient/family education;wound care  -     PT Plan Comments debridement, dressings, compression PRN  -               User Key  (r) = Recorded By, (t) = Taken By, (c) = Cosigned By      Initials Name Provider Type    Kristin Zamora, PT Physical Therapist                    Goals   PT OP Goals       Row Name 12/22/24 1226          Time Calculation    PT Goal Re-Cert Due Date 03/18/25  -               User Key  (r) = Recorded By, (t) = Taken By, (c) = Cosigned By      Initials Name Provider Type    Kristin Zamora, PT Physical Therapist                    PT Goal Re-Cert Due Date: 03/18/25            Time Calculation: Start Time: 1030  Untimed  Charges  17999-Yrmcvhaop debridement: 25  Total Minutes  Untimed Charges Total Minutes: 25   Total Minutes: 25  Therapy Charges for Today       Code Description Service Date Service Provider Modifiers Qty    [id number]  BROOKE DEBRIDE OPEN WOUND UP TO 20CM 12/22/2024 Kristin Davila, PT GP 1                    Kristin Davila, PT  12/22/2024

## 2024-12-25 LAB
FUNGUS WND CULT: NORMAL

## 2025-01-01 ENCOUNTER — HOSPITAL ENCOUNTER (OUTPATIENT)
Dept: PHYSICAL THERAPY | Facility: HOSPITAL | Age: 65
Setting detail: THERAPIES SERIES
Discharge: HOME | End: 2025-01-01
Payer: MEDICARE

## 2025-01-01 DIAGNOSIS — S91.002D OPEN WOUND OF LEFT ANKLE WITH COMPLICATION, SUBSEQUENT ENCOUNTER: ICD-10-CM

## 2025-01-01 DIAGNOSIS — T81.89XD NONHEALING SURGICAL WOUND, SUBSEQUENT ENCOUNTER: Primary | ICD-10-CM

## 2025-01-01 DIAGNOSIS — R60.0 EDEMA OF LEFT LOWER LEG: ICD-10-CM

## 2025-01-01 LAB
FUNGUS WND CULT: NORMAL

## 2025-01-01 PROCEDURE — 29581 APPL MULTLAYER CMPRN SYS LEG: CPT

## 2025-01-01 PROCEDURE — 97597 DBRDMT OPN WND 1ST 20 CM/<: CPT

## 2025-01-01 PROCEDURE — 97610 LOW FREQUENCY NON-THERMAL US: CPT

## 2025-01-01 NOTE — THERAPY WOUND CARE TREATMENT
Outpatient Rehabilitation - Wound/Debridement Treatment Note   Laith     Patient Name: Justice Kiser  : 1960  MRN: [id number]  Today's Date: 2025                 Admit Date: 2025    Visit Dx:    ICD-10-CM ICD-9-CM   1. Nonhealing surgical wound, subsequent encounter  T81.89XD V58.89     998.83   2. Open wound of left ankle with complication, subsequent encounter  S91.002D V58.89     891.1   3. Edema of left lower leg  R60.0 782.3             Patient Active Problem List   Diagnosis    Essential hypertension    Coronary artery disease involving native coronary artery of native heart with angina pectoris    Hyperlipidemia LDL goal <70    Depression    Anxiety    Hyperglycemia    Paresthesia of right foot    Tremor    GERD (gastroesophageal reflux disease)    Arthritis    Pierson's esophagus without dysplasia    MCI (mild cognitive impairment)    Mitral valve insufficiency    Alcohol induced fatty liver    Alcohol abuse, in remission    Cerebral microvascular disease    Chronic bilateral low back pain with right-sided sciatica    Spinal stenosis, lumbar region, with neurogenic claudication    DDD (degenerative disc disease), lumbar    Psychophysiological insomnia    Left leg cellulitis    Severe protein-calorie malnutrition        Past Medical History:   Diagnosis Date    Arthritis     CHF (congestive heart failure)     Chronic pain disorder     Colon polyp     Depression     Difficulty walking     Elevated LFTs     GERD (gastroesophageal reflux disease)     History of colonic polyps     History of methicillin resistant Staphylococcus aureus     History of myocardial infarction     Hyperlipidemia     Hypertension         Past Surgical History:   Procedure Laterality Date    BACK SURGERY      CAROTID STENT      CORONARY ANGIOPLASTY WITH STENT PLACEMENT  2012    Circumflex Xscience V 3.5 MM x 23 mm    CORONARY STENT PLACEMENT      ENDOMETRIAL ABLATION      EPIDURAL BLOCK      GALLBLADDER  SURGERY  2017    INCISION AND DRAINAGE LEG Left 11/20/2024    Procedure: INCISION AND DRAINAGE OF LEFT ANKLE;  Surgeon: Ravin Sidhu MD;  Location: [facility];  Service: Orthopedics;  Laterality: Left;    LUMBAR DISCECTOMY  1992    2 level - [facility]    LYMPH NODE BIOPSY      SHOULDER SURGERY  12/20/2018    Dr. Hoyt Rotator cuff repair         EVALUATION   PT Ortho       Row Name 01/01/25 0900       Subjective    Subjective Comments Pt reports pain is about the same, thinks his wound looks a little worse.  Still on oral abx, sees Dr[facility]ijano [facility] on 1/9/25  -       Subjective Pain    Able to rate subjective pain? yes  -JM    Pre-Treatment Pain Level 5  -JM    Post-Treatment Pain Level 5  -JM       Transfers    Sit-Stand [place] (Transfers) independent  -JM    Stand-Sit [place] (Transfers) independent  -    Comment, (Transfers) seated for tx  -JM       Gait/Stairs (Locomotion)    [place] Level (Gait) independent  -              User Key  (r) = Recorded By, (t) = Taken By, (c) = Cosigned By      Initials Name Provider Type    Kristin Zamora PT Physical Therapist                     LDA Wound       Row Name 01/01/25 0900             Wound 12/18/24 1030 Left lateral ankle    Wound - Properties Group Placement Date: 12/18/24  -MF Placement Time: 1030  -MF Side: Left  - Orientation: lateral  - Location: ankle  -MF Primary Wound Type: Incision  -MF    Wound Image Images linked: 2  -JM      Dressing Appearance intact;moist drainage  -      Base moist;necrotic;yellow;slough;subcutaneous;eschar;other (see comments);red;granulating  gaping edges with only 2-3 sutures still in place remainder embedded in slough, thick biofilm/slough, min granulation in base after debridement  -      Periwound intact;dry;redness;swelling;warm  -      Periwound Temperature warm  -      Periwound Skin Turgor soft  -      Edges irregular  -      Wound Length (cm) 6 cm  -      Wound  Width (cm) 1.8 cm  -JM      Wound Depth (cm) 1.1 cm  full depth obscured  -JM      Wound Surface Area (cm^2) 10.8 cm^2  -JM      Wound Volume (cm^3) 11.88 cm^3  -JM      Drainage Characteristics/Odor serosanguineous;serous  -JM      Drainage Amount moderate  -JM      Care, Wound cleansed with;wound cleanser;debrided;ultrasound therapy, non contact low frequency;honey applied  MIST with vashe x7min  -JM      Dressing Care dressing applied;antimicrobial agent applied;foam;silver impregnated;silicone border foam;multi-layer wrap  therahoney, HFBt to pack, mepilex ag and mepilx incisional dressing to cover, MLW  -JM      Periwound Care cleansed with pH balanced cleanser;dry periwound area maintained  -JM      Sutures Removed Intact Yes  -JM      Number of Sutures Removed 2  -JM      Retired Wound - Properties Group Placement Date: 12/18/24  - Placement Time: 1030  -MF Side: Left  - Orientation: lateral  - Location: ankle  -MF Primary Wound Type: Incision  -MF    Retired Wound - Properties Group Placement Date: 12/18/24  - Placement Time: 1030  -MF Side: Left  - Orientation: lateral  - Location: ankle  -MF Primary Wound Type: Incision  -MF    Retired Wound - Properties Group Date first assessed: 12/18/24  - Time first assessed: 1030  - Side: Left  - Location: ankle  -MF Primary Wound Type: Incision  -MF              User Key  (r) = Recorded By, (t) = Taken By, (c) = Cosigned By      Initials Name Provider Type    Trace Cox, PT Physical Therapist    Kristin Zamora PT Physical Therapist                   Lymphedema       Row Name 01/01/25 0900             Lymphedema Edema Assessment    Pitting Edema Mild  -JM         Lymphedema Pulses/Capillary Refill    Lymphedema Pulses/Capillary Refill capillary refill  -      Capillary Refill lower extremity capillary refill  -      Lower Extremity Capillary Refill left:;less than 3 seconds  -         Compression/Skin Care    Compression/Skin  Care skin care;wrapping location  -      Skin Care washed/dried;lotion applied  -      Wrapping Location lower extremity  -      Wrapping Location LE left:;foot to knee  -      Wrapping Comments size 4 compressogrip doubled distally for gradient compression  -                User Key  (r) = Recorded By, (t) = Taken By, (c) = Cosigned By      Initials Name Provider Type    Kristin Zamora, PT Physical Therapist                    WOUND DEBRIDEMENT  Total area of Debridement: 6cmsq  Debridement Site 1  Location- Site 1: L lateral ankle wound  Selective Debridement- Site 1: Wound Surface <20cmsq  Instruments- Site 1: tweezers, scissors, #15, scapel  Excised Tissue Description- Site 1: moderate, slough, other (comment) (biofilm)  Bleeding- Site 1: scant              Therapy Education       Row Name 01/01/25 0900             Therapy Education    Education Details Discontinue brad and HFBc for now.  Continue dressing change every 2-3 days, using thin layer of therahoney and HFBt to pack, and mepilex ag and incisional dressing to cover.  Pt may remove compressogrip as needed for skin breaks, but continue to elevate leg to reduce edema.  Plan to intiate MIST 2x/week to faciliate wound healing.  -CHANTEL      Given Bandaging/dressing change  -CHANTEL      Program Reinforced;Modified  -CHANTEL      How Provided Verbal;Demonstration  -      Provided to Patient  -      Level of Understanding Verbalized  -                User Key  (r) = Recorded By, (t) = Taken By, (c) = Cosigned By      Initials Name Provider Type    Kristin Zamora, PT Physical Therapist                    Recommendation and Plan   PT Assessment/Plan       Row Name 01/01/25 0900          PT Assessment    Functional Limitations Other (comment);Performance in self-care ADL;Performance in leisure activities  wound management  -     Impairments Integumentary integrity;Pain  -     Assessment Comments Pt's wound with further gaping and necrosis of  superficial tissues with several sutures loose and embedded in necrotic tissue.  Pt still with 2-3 intact sutures in place but anticipate these will also dehisce due to necrotic tissues.  PT was able to debride moderate amounts of thick biofilm from wound base with min granulation noted superiorly, no rod bone or tendon exposure.  PT initiated use of MIST to increase local blood flow and stimulate wound granulation, changed packing to HFBt to further help debridement, and full MLW for edema management to support wound tissues.  Pt may benefit from another surgical debridement and wound vac placement for closure by secondary intent.  PT will reach out to pt's providers this week to discuss recommendations.  -     Rehab Potential Fair  -     Patient/caregiver participated in establishment of treatment plan and goals Yes  -     Patient would benefit from skilled therapy intervention Yes  -        PT Plan    PT Frequency 2x/week  -     Physical Therapy Interventions (Optional Details) patient/family education;wound care  -     PT Plan Comments MIST, debridement, MLW  -               User Key  (r) = Recorded By, (t) = Taken By, (c) = Cosigned By      Initials Name Provider Type    Kristin Zamora, PT Physical Therapist                    Goals   PT OP Goals       Row Name 01/01/25 0943          Time Calculation    PT Goal Re-Cert Due Date 03/18/25  -               User Key  (r) = Recorded By, (t) = Taken By, (c) = Cosigned By      Initials Name Provider Type    Kristin Zamora, PT Physical Therapist                    PT Goal Re-Cert Due Date: 03/18/25            Time Calculation: Start Time: 0900  Untimed Charges  Wound Care: 77563 Multilayer comp below knee, 32513 NLFU Mist  80048-Mjiinyfdcn comp below knee: 10  71976-Uyrpahzpn debridement: 20  03119-VVTN Mist: 10  Total Minutes  Untimed Charges Total Minutes: 40   Total Minutes: 40  Therapy Charges for Today       Code Description Service  Date Service Provider Modifiers Qty    51265486162 HC BROOKE DEBRIDE OPEN WOUND UP TO 20CM 1/1/2025 Kristin Davila, PT GP 1    [id number] HC PT NLFU MIST 1/1/2025 Kristin Davila, PT GP 1    [id number] HC PT MULTI LAYER COMP SYS BELOW KNEE 1/1/2025 Kristin Davila, PT GP 1                    Kristin Davila, PT  1/1/2025

## 2025-01-02 DIAGNOSIS — I25.119 CORONARY ARTERY DISEASE INVOLVING NATIVE CORONARY ARTERY OF NATIVE HEART WITH ANGINA PECTORIS: ICD-10-CM

## 2025-01-02 DIAGNOSIS — I10 ESSENTIAL HYPERTENSION: ICD-10-CM

## 2025-01-02 RX ORDER — METOPROLOL TARTRATE 25 MG/1
25 TABLET, FILM COATED ORAL 2 TIMES DAILY
Qty: 180 TABLET | Refills: 3 | Status: SHIPPED | OUTPATIENT
Start: 2025-01-02

## 2025-01-02 RX ORDER — METOPROLOL TARTRATE 50 MG
50 TABLET ORAL 2 TIMES DAILY
OUTPATIENT
Start: 2025-01-02

## 2025-01-07 ENCOUNTER — HOSPITAL ENCOUNTER (OUTPATIENT)
Dept: PHYSICAL THERAPY | Facility: HOSPITAL | Age: 65
Setting detail: THERAPIES SERIES
Discharge: HOME | End: 2025-01-07
Payer: MEDICARE

## 2025-01-07 DIAGNOSIS — S91.002D OPEN WOUND OF LEFT ANKLE WITH COMPLICATION, SUBSEQUENT ENCOUNTER: ICD-10-CM

## 2025-01-07 DIAGNOSIS — T81.89XD NONHEALING SURGICAL WOUND, SUBSEQUENT ENCOUNTER: Primary | ICD-10-CM

## 2025-01-07 DIAGNOSIS — R60.0 EDEMA OF LEFT LOWER LEG: ICD-10-CM

## 2025-01-07 LAB
MYCOBACTERIUM SPEC CULT: NORMAL
NIGHT BLUE STAIN TISS: NORMAL

## 2025-01-07 PROCEDURE — 97597 DBRDMT OPN WND 1ST 20 CM/<: CPT

## 2025-01-07 PROCEDURE — 29581 APPL MULTLAYER CMPRN SYS LEG: CPT

## 2025-01-07 PROCEDURE — 97610 LOW FREQUENCY NON-THERMAL US: CPT

## 2025-01-07 NOTE — THERAPY WOUND CARE TREATMENT
Outpatient Rehabilitation - Wound/Debridement Treatment Note   [place]     Patient Name: Justice Kiser  : 1960  MRN: [id number]  Today's Date: 2025                 Admit Date: 2025    Visit Dx:    ICD-10-CM ICD-9-CM   1. Nonhealing surgical wound, subsequent encounter  T81.89XD V58.89     998.83   2. Open wound of left ankle with complication, subsequent encounter  S91.002D V58.89     891.1   3. Edema of left lower leg  R60.0 782.3     L lateral ankle      Patient Active Problem List   Diagnosis    Essential hypertension    Coronary artery disease involving native coronary artery of native heart with angina pectoris    Hyperlipidemia LDL goal <70    Depression    Anxiety    Hyperglycemia    Paresthesia of right foot    Tremor    GERD (gastroesophageal reflux disease)    Arthritis    Pierson's esophagus without dysplasia    MCI (mild cognitive impairment)    Mitral valve insufficiency    Alcohol induced fatty liver    Alcohol abuse, in remission    Cerebral microvascular disease    Chronic bilateral low back pain with right-sided sciatica    Spinal stenosis, lumbar region, with neurogenic claudication    DDD (degenerative disc disease), lumbar    Psychophysiological insomnia    Left leg cellulitis    Severe protein-calorie malnutrition        Past Medical History:   Diagnosis Date    Arthritis     CHF (congestive heart failure)     Chronic pain disorder     Colon polyp     Depression     Difficulty walking     Elevated LFTs     GERD (gastroesophageal reflux disease)     History of colonic polyps     History of methicillin resistant Staphylococcus aureus     History of myocardial infarction     Hyperlipidemia     Hypertension         Past Surgical History:   Procedure Laterality Date    BACK SURGERY      CAROTID STENT      CORONARY ANGIOPLASTY WITH STENT PLACEMENT  2012    Circumflex Xscience V 3.5 MM x 23 mm    CORONARY STENT PLACEMENT      ENDOMETRIAL ABLATION      EPIDURAL BLOCK       GALLBLADDER SURGERY  2017    INCISION AND DRAINAGE LEG Left 11/20/2024    Procedure: INCISION AND DRAINAGE OF LEFT ANKLE;  Surgeon: Ravin Sidhu MD;  [facility];  Service: Orthopedics;  Laterality: Left;    LUMBAR DISCECTOMY  1992    2 level - [facility]    LYMPH NODE BIOPSY      SHOULDER SURGERY  12/20/2018    Dr. Hoyt Rotator cuff repair         EVALUATION   PT Ortho       Row Name 01/07/25 0800       Subjective    Subjective Comments Pt reports some trouble from the wound in regards to pain. Sees Dr. Quijano Thursday.  -       Subjective Pain    Able to rate subjective pain? yes  -    Pre-Treatment Pain Level 3  -    Post-Treatment Pain Level 3  -       Transfers    Sit-Stand [place] (Transfers) independent  -    Stand-Sit [place] (Transfers) independent  -    Comment, (Transfers) seated for tx  -       Gait/Stairs (Locomotion)    [place] Level (Gait) independent  -              User Key  (r) = Recorded By, (t) = Taken By, (c) = Cosigned By      Initials Name Provider Type     Maximo Palencia, PT Physical Therapist                     LDA Wound       Row Name 01/07/25 0800             Wound 12/18/24 1030 Left lateral ankle    Wound - Properties Group Placement Date: 12/18/24  -MF Placement Time: 1030  -MF Side: Left  - Orientation: lateral  - Location: ankle  -MF Primary Wound Type: Incision  -MF    Wound Image Images linked: 1  -      Dressing Appearance intact;moist drainage  Mepilex border dressing only  -      Base moist;necrotic;yellow;slough;subcutaneous;eschar;other (see comments);red;granulating  2 sutures still in place, mostly slough/thick biofilm, min granulation in base after debridement  -      Periwound intact;dry;redness;swelling;warm  -      Periwound Temperature warm  -      Periwound Skin Turgor soft  -      Edges irregular  -      Wound Length (cm) 5.7 cm  -      Wound Width (cm) 1.8 cm  -      Wound Depth (cm)  1.1 cm  partially obscured  -      Wound Surface Area (cm^2) 10.26 cm^2  -LH      Wound Volume (cm^3) 11.286 cm^3  -LH      Drainage Characteristics/Odor serosanguineous;serous  -LH      Drainage Amount moderate  -LH      Care, Wound cleansed with;wound cleanser;debrided;ultrasound therapy, non contact low frequency;honey applied  MIST with vashe x7min  -LH      Dressing Care dressing applied;antimicrobial agent applied;foam;silver impregnated;silicone border foam;multi-layer wrap  Therahoney, HFBt, Mepilex ag, Mepilx border, MLW  -LH      Periwound Care cleansed with pH balanced cleanser;dry periwound area maintained  -      Sutures Removed Intact Yes  -LH      Number of Sutures Removed 2  -LH      Retired Wound - Properties Group Placement Date: 12/18/24  - Placement Time: 1030  -MF Side: Left  - Orientation: lateral  -MF Location: ankle  -MF Primary Wound Type: Incision  -MF    Retired Wound - Properties Group Placement Date: 12/18/24  - Placement Time: 1030  -MF Side: Left  - Orientation: lateral  - Location: ankle  -MF Primary Wound Type: Incision  -MF    Retired Wound - Properties Group Date first assessed: 12/18/24  - Time first assessed: 1030  -MF Side: Left  - Location: ankle  -MF Primary Wound Type: Incision  -MF              User Key  (r) = Recorded By, (t) = Taken By, (c) = Cosigned By      Initials Name Provider Type    Trace Cox, PT Physical Therapist     Maximo Palencia, PT Physical Therapist                   Lymphedema       Row Name 01/07/25 0800             Lymphedema Edema Assessment    Ptting Edema Category By severity  -      Pitting Edema Mild  -         Lymphedema Pulses/Capillary Refill    Lymphedema Pulses/Capillary Refill capillary refill  -      Capillary Refill lower extremity capillary refill  -      Lower Extremity Capillary Refill left:;less than 3 seconds  -         Compression/Skin Care    Compression/Skin Care skin care;wrapping location   -      Skin Care washed/dried;lotion applied  -      Wrapping Location lower extremity  -      Wrapping Location LE left:;foot to knee  -      Wrapping Comments Size 4 compressogrip doubled distally for gradient compression  -                User Key  (r) = Recorded By, (t) = Taken By, (c) = Cosigned By      Initials Name Provider Type     Maximo Palencia, PT Physical Therapist                    WOUND DEBRIDEMENT  Total area of Debridement: 6cm2  Debridement Site 1  Location- Site 1: L lateral ankle wound  Selective Debridement- Site 1: Wound Surface <20cmsq  Instruments- Site 1: tweezers, scissors, #15, scapel  Excised Tissue Description- Site 1: moderate, slough  Bleeding- Site 1: none              Therapy Education       Row Name 01/07/25 0800             Therapy Education    Education Details Continue current POC. Reviewed importance of use of BOTH HFBt and mepilex ag.  -      Given Bandaging/dressing change  -      Program Reinforced;Modified  -      How Provided Verbal;Demonstration  -      Provided to Patient  -      Level of Understanding Verbalized  -                User Key  (r) = Recorded By, (t) = Taken By, (c) = Cosigned By      Initials Name Provider Type     Maximo Palencia, PT Physical Therapist                    Recommendation and Plan   PT Assessment/Plan       Row Name 01/07/25 0800          PT Assessment    Functional Limitations Other (comment);Performance in self-care ADL;Performance in leisure activities  wound management  -     Impairments Integumentary integrity;Pain  -     Assessment Comments Pt presenting this date with only Mepilex incisional dressing to cover wound, not HFBt and no mepilex Ag in place. Pt's wound with mostly slough and thick biofilm at base requiring debridement. Pt also with 2 very loose sutures which PT removed this date, pt still with 2 intact sutures visible at base. Pt would continue to benfeit from further debridement, advanced  wound dressings, and MIST to encourage granulation formation, reduce bacterial load, and promote wound healing.  -     Rehab Potential Fair  -     Patient/caregiver participated in establishment of treatment plan and goals Yes  -     Patient would benefit from skilled therapy intervention Yes  -        PT Plan    PT Frequency 2x/week  -     Physical Therapy Interventions (Optional Details) patient/family education;wound care  -     PT Plan Comments MIST, debridement, MLW  -               User Key  (r) = Recorded By, (t) = Taken By, (c) = Cosigned By      Initials Name Provider Type     Maximo Palencia, PT Physical Therapist                    Goals   PT OP Goals       Row Name 01/07/25 0833          Time Calculation    PT Goal Re-Cert Due Date 03/18/25  -               User Key  (r) = Recorded By, (t) = Taken By, (c) = Cosigned By      Initials Name Provider Type     Maximo Palencia, PT Physical Therapist                    PT Goal Re-Cert Due Date: 03/18/25            Time Calculation: Start Time: 0800  Untimed Charges  97741-Afddvgkkzp comp below knee: 10  01468-Jevxmvcsl debridement: 15  16802-WZQY Mist: 10  Total Minutes  Untimed Charges Total Minutes: 35   Total Minutes: 35  Therapy Charges for Today       Code Description Service Date Service Provider Modifiers Qty    61671043519 HC BROOKE DEBRIDE OPEN WOUND UP TO 20CM 1/7/2025 Maximo Palencia, PT GP, KX 1    16275246858 HC PT NLFU MIST 1/7/2025 Maximo Palencia, PT GP, KX 1    97203189248 HC PT MULTI LAYER COMP SYS BELOW KNEE 1/7/2025 Maximo Palencia, PT GP, KX 1                    Maximo Palencia PT  1/7/2025

## 2025-01-09 ENCOUNTER — LAB (OUTPATIENT)
Dept: LAB | Facility: HOSPITAL | Age: 65
End: 2025-01-09
Payer: MEDICARE

## 2025-01-09 ENCOUNTER — HOSPITAL ENCOUNTER (OUTPATIENT)
Dept: PHYSICAL THERAPY | Facility: HOSPITAL | Age: 65
Setting detail: THERAPIES SERIES
Discharge: HOME | End: 2025-01-09
Payer: MEDICARE

## 2025-01-09 ENCOUNTER — TRANSCRIBE ORDERS (OUTPATIENT)
Dept: LAB | Facility: HOSPITAL | Age: 65
End: 2025-01-09
Payer: MEDICARE

## 2025-01-09 DIAGNOSIS — L08.89 PITTED KERATOLYSIS: ICD-10-CM

## 2025-01-09 DIAGNOSIS — L02.416 ABSCESS OF SKIN OF LEFT ANKLE: ICD-10-CM

## 2025-01-09 DIAGNOSIS — L03.116 CELLULITIS OF LEFT LOWER LIMB: ICD-10-CM

## 2025-01-09 DIAGNOSIS — R60.0 EDEMA OF LEFT LOWER LEG: ICD-10-CM

## 2025-01-09 DIAGNOSIS — T81.89XD NONHEALING SURGICAL WOUND, SUBSEQUENT ENCOUNTER: Primary | ICD-10-CM

## 2025-01-09 DIAGNOSIS — M65.172 OTHER INFECTIVE (TENO)SYNOVITIS, LEFT ANKLE AND FOOT: Primary | ICD-10-CM

## 2025-01-09 DIAGNOSIS — S91.002D OPEN WOUND OF LEFT ANKLE WITH COMPLICATION, SUBSEQUENT ENCOUNTER: ICD-10-CM

## 2025-01-09 LAB
ALBUMIN SERPL-MCNC: 3.6 G/DL (ref 3.5–5.2)
ALBUMIN/GLOB SERPL: 1 G/DL
ALP SERPL-CCNC: 234 U/L (ref 39–117)
ALT SERPL W P-5'-P-CCNC: 43 U/L (ref 1–41)
ANION GAP SERPL CALCULATED.3IONS-SCNC: 10 MMOL/L (ref 5–15)
AST SERPL-CCNC: 95 U/L (ref 1–40)
BASOPHILS # BLD AUTO: 0.05 10*3/MM3 (ref 0–0.2)
BASOPHILS NFR BLD AUTO: 0.6 % (ref 0–1.5)
BILIRUB SERPL-MCNC: 0.7 MG/DL (ref 0–1.2)
BUN SERPL-MCNC: 15 MG/DL (ref 8–23)
BUN/CREAT SERPL: 17.2 (ref 7–25)
CALCIUM SPEC-SCNC: 9.4 MG/DL (ref 8.6–10.5)
CHLORIDE SERPL-SCNC: 102 MMOL/L (ref 98–107)
CO2 SERPL-SCNC: 27 MMOL/L (ref 22–29)
CREAT SERPL-MCNC: 0.87 MG/DL (ref 0.76–1.27)
CRP SERPL-MCNC: <0.3 MG/DL (ref 0–0.5)
DEPRECATED RDW RBC AUTO: 51.9 FL (ref 37–54)
EGFRCR SERPLBLD CKD-EPI 2021: 95.2 ML/MIN/1.73
EOSINOPHIL # BLD AUTO: 0.26 10*3/MM3 (ref 0–0.4)
EOSINOPHIL NFR BLD AUTO: 2.9 % (ref 0.3–6.2)
ERYTHROCYTE [DISTWIDTH] IN BLOOD BY AUTOMATED COUNT: 15.9 % (ref 12.3–15.4)
ERYTHROCYTE [SEDIMENTATION RATE] IN BLOOD: 33 MM/HR (ref 0–20)
GLOBULIN UR ELPH-MCNC: 3.5 GM/DL
GLUCOSE SERPL-MCNC: 100 MG/DL (ref 65–99)
HCT VFR BLD AUTO: 32.4 % (ref 37.5–51)
HGB BLD-MCNC: 10.3 G/DL (ref 13–17.7)
IMM GRANULOCYTES # BLD AUTO: 0.02 10*3/MM3 (ref 0–0.05)
IMM GRANULOCYTES NFR BLD AUTO: 0.2 % (ref 0–0.5)
LYMPHOCYTES # BLD AUTO: 3.6 10*3/MM3 (ref 0.7–3.1)
LYMPHOCYTES NFR BLD AUTO: 40.8 % (ref 19.6–45.3)
MCH RBC QN AUTO: 28.5 PG (ref 26.6–33)
MCHC RBC AUTO-ENTMCNC: 31.8 G/DL (ref 31.5–35.7)
MCV RBC AUTO: 89.5 FL (ref 79–97)
MONOCYTES # BLD AUTO: 0.78 10*3/MM3 (ref 0.1–0.9)
MONOCYTES NFR BLD AUTO: 8.8 % (ref 5–12)
NEUTROPHILS NFR BLD AUTO: 4.11 10*3/MM3 (ref 1.7–7)
NEUTROPHILS NFR BLD AUTO: 46.7 % (ref 42.7–76)
NRBC BLD AUTO-RTO: 0 /100 WBC (ref 0–0.2)
PLATELET # BLD AUTO: 118 10*3/MM3 (ref 140–450)
PMV BLD AUTO: 11.5 FL (ref 6–12)
POTASSIUM SERPL-SCNC: 4.3 MMOL/L (ref 3.5–5.2)
PROT SERPL-MCNC: 7.1 G/DL (ref 6–8.5)
RBC # BLD AUTO: 3.62 10*6/MM3 (ref 4.14–5.8)
SODIUM SERPL-SCNC: 139 MMOL/L (ref 136–145)
WBC NRBC COR # BLD AUTO: 8.82 10*3/MM3 (ref 3.4–10.8)

## 2025-01-09 PROCEDURE — 86140 C-REACTIVE PROTEIN: CPT | Performed by: INTERNAL MEDICINE

## 2025-01-09 PROCEDURE — 97610 LOW FREQUENCY NON-THERMAL US: CPT

## 2025-01-09 PROCEDURE — 85652 RBC SED RATE AUTOMATED: CPT | Performed by: INTERNAL MEDICINE

## 2025-01-09 PROCEDURE — 85025 COMPLETE CBC W/AUTO DIFF WBC: CPT | Performed by: INTERNAL MEDICINE

## 2025-01-09 PROCEDURE — 36415 COLL VENOUS BLD VENIPUNCTURE: CPT | Performed by: INTERNAL MEDICINE

## 2025-01-09 PROCEDURE — 97597 DBRDMT OPN WND 1ST 20 CM/<: CPT

## 2025-01-09 PROCEDURE — 29581 APPL MULTLAYER CMPRN SYS LEG: CPT

## 2025-01-09 PROCEDURE — 80053 COMPREHEN METABOLIC PANEL: CPT | Performed by: INTERNAL MEDICINE

## 2025-01-09 NOTE — THERAPY WOUND CARE TREATMENT
Outpatient Rehabilitation - Wound/Debridement Treatment Note   [place]     Patient Name: Justice Kiser  : 1960  MRN: [id number]  Today's Date: 2025                 Admit Date: 2025    Visit Dx:    ICD-10-CM ICD-9-CM   1. Nonhealing surgical wound, subsequent encounter  T81.89XD V58.89     998.83   2. Open wound of left ankle with complication, subsequent encounter  S91.002D V58.89     891.1   3. Edema of left lower leg  R60.0 782.3       Patient Active Problem List   Diagnosis    Essential hypertension    Coronary artery disease involving native coronary artery of native heart with angina pectoris    Hyperlipidemia LDL goal <70    Depression    Anxiety    Hyperglycemia    Paresthesia of right foot    Tremor    GERD (gastroesophageal reflux disease)    Arthritis    Pierson's esophagus without dysplasia    MCI (mild cognitive impairment)    Mitral valve insufficiency    Alcohol induced fatty liver    Alcohol abuse, in remission    Cerebral microvascular disease    Chronic bilateral low back pain with right-sided sciatica    Spinal stenosis, lumbar region, with neurogenic claudication    DDD (degenerative disc disease), lumbar    Psychophysiological insomnia    Left leg cellulitis    Severe protein-calorie malnutrition        Past Medical History:   Diagnosis Date    Arthritis     CHF (congestive heart failure)     Chronic pain disorder     Colon polyp     Depression     Difficulty walking     Elevated LFTs     GERD (gastroesophageal reflux disease)     History of colonic polyps     History of methicillin resistant Staphylococcus aureus     History of myocardial infarction     Hyperlipidemia     Hypertension         Past Surgical History:   Procedure Laterality Date    BACK SURGERY      CAROTID STENT      CORONARY ANGIOPLASTY WITH STENT PLACEMENT  2012    Circumflex Xscience V 3.5 MM x 23 mm    CORONARY STENT PLACEMENT      ENDOMETRIAL ABLATION      EPIDURAL BLOCK      GALLBLADDER SURGERY   2017    INCISION AND DRAINAGE LEG Left 11/20/2024    Procedure: INCISION AND DRAINAGE OF LEFT ANKLE;  Surgeon: Ravin Sidhu MD;  Location: [facility];  Service: Orthopedics;  Laterality: Left;    LUMBAR DISCECTOMY  1992    2 level - [facility]    LYMPH NODE BIOPSY      SHOULDER SURGERY  12/20/2018    Dr. Hoyt Rotator cuff repair         EVALUATION   PT Ortho       Row Name 01/09/25 1200       Subjective    Subjective Comments Pt cont to have mild c/o pain off and on.  -MF       Subjective Pain    Able to rate subjective pain? yes  -MF    Pre-Treatment Pain Level 3  -MF    Post-Treatment Pain Level 3  -MF       Transfers    Sit-Stand [place] (Transfers) independent  -MF    Stand-Sit [place] (Transfers) independent  -MF    Comment, (Transfers) seated for tx  -MF       Gait/Stairs (Locomotion)    [place] Level (Gait) independent  -MF              User Key  (r) = Recorded By, (t) = Taken By, (c) = Cosigned By      Initials Name Provider Type     Trace Max, PT Physical Therapist                     LDA Wound       Row Name 01/09/25 1200             Wound 12/18/24 1030 Left lateral ankle    Wound - Properties Group Placement Date: 12/18/24  -MF Placement Time: 1030  -MF Side: Left  -MF Orientation: lateral  -MF Location: ankle  -MF Primary Wound Type: Incision  -MF    Dressing Appearance intact;moist drainage  -MF      Base moist;necrotic;yellow;slough;subcutaneous;eschar;other (see comments);red;granulating  -MF      Periwound intact;dry;redness;swelling;warm  -MF      Periwound Temperature warm  -MF      Periwound Skin Turgor soft  -MF      Edges irregular  -MF      Drainage Characteristics/Odor serosanguineous  -MF      Drainage Amount small  temp dressing from MD's office.  -MF      Care, Wound irrigated with;wound cleanser;debrided;ultrasound therapy, non contact low frequency  7 min MIST with Vashe  -MF      Dressing Care foam;low-adherent;silver impregnated  HFBc with  Mepilex Ag foam with optifoam to cover.  -MF      Periwound Care cleansed with pH balanced cleanser  -MF      Retired Wound - Properties Group Placement Date: 12/18/24  -MF Placement Time: 1030  -MF Side: Left  -MF Orientation: lateral  -MF Location: ankle  -MF Primary Wound Type: Incision  -MF    Retired Wound - Properties Group Placement Date: 12/18/24  -MF Placement Time: 1030  -MF Side: Left  -MF Orientation: lateral  -MF Location: ankle  -MF Primary Wound Type: Incision  -MF    Retired Wound - Properties Group Date first assessed: 12/18/24  -MF Time first assessed: 1030  -MF Side: Left  -MF Location: ankle  -MF Primary Wound Type: Incision  -MF              User Key  (r) = Recorded By, (t) = Taken By, (c) = Cosigned By      Initials Name Provider Type    Trace Cox, PT Physical Therapist                   Lymphedema       Row Name 01/09/25 1200             Lymphedema Edema Assessment    Ptting Edema Category By severity  -      Pitting Edema Mild  -MF         Compression/Skin Care    Compression/Skin Care skin care;wrapping location  -      Skin Care washed/dried;lotion applied  -MF      Wrapping Location lower extremity  -MF      Wrapping Location LE left:;foot to knee  -MF      Wrapping Comments Size 4 compressogrip doubled distally for gradient compression  -MF                User Key  (r) = Recorded By, (t) = Taken By, (c) = Cosigned By      Initials Name Provider Type    Trace Cox, PT Physical Therapist                    WOUND DEBRIDEMENT  Total area of Debridement: ~4cm2  Debridement Site 1  Location- Site 1: L lateral ankle wound  Selective Debridement- Site 1: Wound Surface <20cmsq  Instruments- Site 1: tweezers, scissors, #15, scapel  Excised Tissue Description- Site 1: minimum, slough  Bleeding- Site 1: none                 Recommendation and Plan   PT Assessment/Plan       Row Name 01/09/25 1200          PT Assessment    Functional Limitations Other (comment);Performance  in self-care ADL;Performance in leisure activities  -     Impairments Integumentary integrity;Pain  -     Assessment Comments PT able to debride a small amount of slough from wound base today, but remaining slough cont to be very adherent. PT will cont with MIST therapy and debridement with dressing management and compression wrapping to help further improve healing potential. .  -     Rehab Potential Fair  -     Patient/caregiver participated in establishment of treatment plan and goals Yes  -     Patient would benefit from skilled therapy intervention Yes  -        PT Plan    PT Frequency 2x/week  -     Physical Therapy Interventions (Optional Details) wound care;patient/family education  -     PT Plan Comments MIST, debridement, MLW  -               User Key  (r) = Recorded By, (t) = Taken By, (c) = Cosigned By      Initials Name Provider Type    Trace Cox, PT Physical Therapist                    Goals   PT OP Goals       Row Name 01/09/25 1200          Time Calculation    PT Goal Re-Cert Due Date 03/18/25  -               User Key  (r) = Recorded By, (t) = Taken By, (c) = Cosigned By      Initials Name Provider Type    Trace Cox, PT Physical Therapist                    PT Goal Re-Cert Due Date: 03/18/25            Time Calculation: Start Time: 1200  Untimed Charges  21538-Qhyjvicghf comp below knee: 10  01446-Izgzhepal debridement: 15  67955-QUCL Mist: 15  Total Minutes  Untimed Charges Total Minutes: 40   Total Minutes: 40              Trace Max, PT  1/9/2025

## 2025-01-14 ENCOUNTER — HOSPITAL ENCOUNTER (OUTPATIENT)
Dept: PHYSICAL THERAPY | Facility: HOSPITAL | Age: 65
Setting detail: THERAPIES SERIES
Discharge: HOME | End: 2025-01-14
Payer: MEDICARE

## 2025-01-14 DIAGNOSIS — S91.002D OPEN WOUND OF LEFT ANKLE WITH COMPLICATION, SUBSEQUENT ENCOUNTER: ICD-10-CM

## 2025-01-14 DIAGNOSIS — T81.89XD NONHEALING SURGICAL WOUND, SUBSEQUENT ENCOUNTER: Primary | ICD-10-CM

## 2025-01-14 DIAGNOSIS — R60.0 EDEMA OF LEFT LOWER LEG: ICD-10-CM

## 2025-01-14 PROCEDURE — 97610 LOW FREQUENCY NON-THERMAL US: CPT

## 2025-01-14 PROCEDURE — 29581 APPL MULTLAYER CMPRN SYS LEG: CPT

## 2025-01-14 PROCEDURE — 97597 DBRDMT OPN WND 1ST 20 CM/<: CPT

## 2025-01-14 NOTE — THERAPY WOUND CARE TREATMENT
Outpatient Rehabilitation - Wound/Debridement Treatment Note   [place]     Patient Name: Justice Kiser  : 1960  MRN: [id number]  Today's Date: 2025                 Admit Date: 2025    Visit Dx:    ICD-10-CM ICD-9-CM   1. Nonhealing surgical wound, subsequent encounter  T81.89XD V58.89     998.83   2. Edema of left lower leg  R60.0 782.3   3. Open wound of left ankle with complication, subsequent encounter  S91.002D V58.89     891.1       Patient Active Problem List   Diagnosis    Essential hypertension    Coronary artery disease involving native coronary artery of native heart with angina pectoris    Hyperlipidemia LDL goal <70    Depression    Anxiety    Hyperglycemia    Paresthesia of right foot    Tremor    GERD (gastroesophageal reflux disease)    Arthritis    Pierson's esophagus without dysplasia    MCI (mild cognitive impairment)    Mitral valve insufficiency    Alcohol induced fatty liver    Alcohol abuse, in remission    Cerebral microvascular disease    Chronic bilateral low back pain with right-sided sciatica    Spinal stenosis, lumbar region, with neurogenic claudication    DDD (degenerative disc disease), lumbar    Psychophysiological insomnia    Left leg cellulitis    Severe protein-calorie malnutrition        Past Medical History:   Diagnosis Date    Arthritis     CHF (congestive heart failure)     Chronic pain disorder     Colon polyp     Depression     Difficulty walking     Elevated LFTs     GERD (gastroesophageal reflux disease)     History of colonic polyps     History of methicillin resistant Staphylococcus aureus     History of myocardial infarction     Hyperlipidemia     Hypertension         Past Surgical History:   Procedure Laterality Date    BACK SURGERY      CAROTID STENT      CORONARY ANGIOPLASTY WITH STENT PLACEMENT  2012    Circumflex Xscience V 3.5 MM x 23 mm    CORONARY STENT PLACEMENT      ENDOMETRIAL ABLATION      EPIDURAL BLOCK      GALLBLADDER SURGERY   2017    INCISION AND DRAINAGE LEG Left 11/20/2024    Procedure: INCISION AND DRAINAGE OF LEFT ANKLE;  Surgeon: Ravin Sidhu MD;  Location: [facility], 1928–1983;  Service: Orthopedics;  Laterality: Left;    LUMBAR DISCECTOMY  1992    2 level - [facility]    LYMPH NODE BIOPSY      SHOULDER SURGERY  12/20/2018    Dr. Hoyt Rotator cuff repair         EVALUATION   PT Ortho       Row Name 01/14/25 0800       Subjective    Subjective Comments Pt with mild c/o pain with debridement, improved overall.  -MF       Subjective Pain    Able to rate subjective pain? yes  -MF    Pre-Treatment Pain Level 2  -MF    Post-Treatment Pain Level 2  -MF    Subjective Pain Comment 4-5 /10 with debridement.  -MF       Transfers    Sit-Stand [place] (Transfers) independent  -MF    Stand-Sit [place] (Transfers) independent  -MF    Comment, (Transfers) seated for tx  -MF       Gait/Stairs (Locomotion)    [place] Level (Gait) independent  -MF              User Key  (r) = Recorded By, (t) = Taken By, (c) = Cosigned By      Initials Name Provider Type     Trace Max, PT Physical Therapist                     LDA Wound       Row Name 01/14/25 0800             Wound 12/18/24 1030 Left lateral ankle    Wound - Properties Group Placement Date: 12/18/24  -MF Placement Time: 1030  -MF Side: Left  -MF Orientation: lateral  - Location: ankle  - Primary Wound Type: Incision  -MF    Wound Image Images linked: 1  -MF      Dressing Appearance intact;moist drainage  -MF      Base moist;necrotic;yellow;slough;subcutaneous;eschar;other (see comments);red;granulating  -MF      Periwound intact;dry;redness;swelling;warm  -      Periwound Temperature warm  -      Periwound Skin Turgor soft  -MF      Edges irregular  -MF      Wound Length (cm) 5.7 cm  -MF      Wound Width (cm) 1.8 cm  -MF      Wound Depth (cm) 1 cm  -MF      Wound Surface Area (cm^2) 10.26 cm^2  -MF      Wound Volume (cm^3) 10.26 cm^3  -MF      Drainage  Characteristics/Odor serous;serosanguineous  -      Drainage Amount small  -      Care, Wound irrigated with;wound cleanser;debrided;ultrasound therapy, non contact low frequency  7 min MIST with Vashe  -      Dressing Care foam;low-adherent  HFBc with mepilex Ag foam with optifoam  -      Periwound Care cleansed with pH balanced cleanser  -      Retired Wound - Properties Group Placement Date: 12/18/24  - Placement Time: 1030  -MF Side: Left  -MF Orientation: lateral  -MF Location: ankle  -MF Primary Wound Type: Incision  -MF    Retired Wound - Properties Group Placement Date: 12/18/24  - Placement Time: 1030  -MF Side: Left  -MF Orientation: lateral  -MF Location: ankle  -MF Primary Wound Type: Incision  -MF    Retired Wound - Properties Group Date first assessed: 12/18/24  - Time first assessed: 1030  -MF Side: Left  -MF Location: ankle  -MF Primary Wound Type: Incision  -MF              User Key  (r) = Recorded By, (t) = Taken By, (c) = Cosigned By      Initials Name Provider Type    Trace Cox, PT Physical Therapist                   Lymphedema       Row Name 01/14/25 0800             Lymphedema Edema Assessment    Ptting Edema Category By severity  -      Pitting Edema Mild;Moderate  moderate to ankle today with compression removed  -         Compression/Skin Care    Compression/Skin Care skin care;wrapping location  -      Skin Care washed/dried;lotion applied  -      Wrapping Location lower extremity  -      Wrapping Location LE left:;foot to knee  -      Wrapping Comments optifoam to ant ankle with size 4 compressogrip doubled and overlapping for gradient compression.  -                User Key  (r) = Recorded By, (t) = Taken By, (c) = Cosigned By      Initials Name Provider Type    Trace Cox, PT Physical Therapist                    WOUND DEBRIDEMENT  Total area of Debridement: ~2cm2  Debridement Site 1  Location- Site 1: L lateral ankle wound  Selective  Debridement- Site 1: Wound Surface <20cmsq  Instruments- Site 1: tweezers  Excised Tissue Description- Site 1: minimum, slough  Bleeding- Site 1: none                 Recommendation and Plan   PT Assessment/Plan       Row Name 01/14/25 0800          PT Assessment    Functional Limitations Other (comment);Performance in self-care ADL;Performance in leisure activities  -     Impairments Integumentary integrity;Pain  -     Assessment Comments wound measurements stable at this point, but improved granulation noted to wound base. Pt reported discomfort with compression wrapping to ant ankle and PT added optifoam to ant ankle prior to applying compressogrip for improved comfort.  -     Rehab Potential Fair  -     Patient/caregiver participated in establishment of treatment plan and goals Yes  -     Patient would benefit from skilled therapy intervention Yes  -        PT Plan    PT Frequency 2x/week  -     Physical Therapy Interventions (Optional Details) wound care;patient/family education  -     PT Plan Comments MIST, debridement, MLW  -               User Key  (r) = Recorded By, (t) = Taken By, (c) = Cosigned By      Initials Name Provider Type    Trace Cox, PT Physical Therapist                    Goals   PT OP Goals       Row Name 01/14/25 0800          Time Calculation    PT Goal Re-Cert Due Date 03/18/25  -               User Key  (r) = Recorded By, (t) = Taken By, (c) = Cosigned By      Initials Name Provider Type    Trace Cox, PT Physical Therapist                    PT Goal Re-Cert Due Date: 03/18/25            Time Calculation: Start Time: 0800  Untimed Charges  48384-[place] comp below knee: 10  49046-Qboxfwizx debridement: 10  12523-BMYD Mist: 15  Total Minutes  Untimed Charges Total Minutes: 35   Total Minutes: 35              Trace Max, PT  1/14/2025

## 2025-01-16 ENCOUNTER — HOSPITAL ENCOUNTER (OUTPATIENT)
Dept: PHYSICAL THERAPY | Facility: HOSPITAL | Age: 65
Setting detail: THERAPIES SERIES
Discharge: HOME | End: 2025-01-16
Payer: MEDICARE

## 2025-01-16 DIAGNOSIS — R60.0 EDEMA OF LEFT LOWER LEG: ICD-10-CM

## 2025-01-16 DIAGNOSIS — S91.002D OPEN WOUND OF LEFT ANKLE WITH COMPLICATION, SUBSEQUENT ENCOUNTER: ICD-10-CM

## 2025-01-16 DIAGNOSIS — T81.89XD NONHEALING SURGICAL WOUND, SUBSEQUENT ENCOUNTER: Primary | ICD-10-CM

## 2025-01-16 PROCEDURE — 97610 LOW FREQUENCY NON-THERMAL US: CPT

## 2025-01-16 PROCEDURE — 29581 APPL MULTLAYER CMPRN SYS LEG: CPT

## 2025-01-16 PROCEDURE — 97597 DBRDMT OPN WND 1ST 20 CM/<: CPT

## 2025-01-16 NOTE — THERAPY PROGRESS REPORT/RE-CERT
Outpatient Rehabilitation - Wound/Debridement Progress Note   [place]     Patient Name: Justice Kiser  : 1960  MRN: [id number]  Today's Date: 2025                 Admit Date: 2025    Visit Dx:    ICD-10-CM ICD-9-CM   1. Nonhealing surgical wound, subsequent encounter  T81.89XD V58.89     998.83   2. Edema of left lower leg  R60.0 782.3   3. Open wound of left ankle with complication, subsequent encounter  S91.002D V58.89     891.1       Patient Active Problem List   Diagnosis    Essential hypertension    Coronary artery disease involving native coronary artery of native heart with angina pectoris    Hyperlipidemia LDL goal <70    Depression    Anxiety    Hyperglycemia    Paresthesia of right foot    Tremor    GERD (gastroesophageal reflux disease)    Arthritis    Pierson's esophagus without dysplasia    MCI (mild cognitive impairment)    Mitral valve insufficiency    Alcohol induced fatty liver    Alcohol abuse, in remission    Cerebral microvascular disease    Chronic bilateral low back pain with right-sided sciatica    Spinal stenosis, lumbar region, with neurogenic claudication    DDD (degenerative disc disease), lumbar    Psychophysiological insomnia    Left leg cellulitis    Severe protein-calorie malnutrition        Past Medical History:   Diagnosis Date    Arthritis     CHF (congestive heart failure)     Chronic pain disorder     Colon polyp     Depression     Difficulty walking     Elevated LFTs     GERD (gastroesophageal reflux disease)     History of colonic polyps     History of methicillin resistant Staphylococcus aureus     History of myocardial infarction     Hyperlipidemia     Hypertension         Past Surgical History:   Procedure Laterality Date    BACK SURGERY      CAROTID STENT      CORONARY ANGIOPLASTY WITH STENT PLACEMENT  2012    Circumflex Xscience V 3.5 MM x 23 mm    CORONARY STENT PLACEMENT      ENDOMETRIAL ABLATION      EPIDURAL BLOCK      GALLBLADDER SURGERY   2017    INCISION AND DRAINAGE LEG Left 11/20/2024    Procedure: INCISION AND DRAINAGE OF LEFT ANKLE;  Surgeon: Ravin Sidhu MD;  Location: [facility];  Service: Orthopedics;  Laterality: Left;    LUMBAR DISCECTOMY  1992    2 level - [facility]    LYMPH NODE BIOPSY      SHOULDER SURGERY  12/20/2018    Dr. Hoyt Rotator cuff repair         EVALUATION   PT Ortho       Row Name 01/16/25 1100       Subjective    Subjective Comments No new complaints or changes since last tx.  -MC       Subjective Pain    Able to rate subjective pain? yes  -MC    Pre-Treatment Pain Level 2  -MC    Post-Treatment Pain Level 2  -MC    Subjective Pain Comment increased  -MC       Transfers    Sit-Stand [place] (Transfers) independent  -MC    Stand-Sit [place] (Transfers) independent  -MC    Comment, (Transfers) seated for tx  -       Gait/Stairs (Locomotion)    [place] Level (Gait) independent  -              User Key  (r) = Recorded By, (t) = Taken By, (c) = Cosigned By      Initials Name Provider Type    Holly Sosa PT Physical Therapist                     [facility] Wound       Row Name 01/16/25 1100             Wound 12/18/24 1030 Left lateral ankle    Wound - Properties Group Placement Date: 12/18/24  -MF Placement Time: 1030  -MF Side: Left  -MF Orientation: lateral  -MF Location: ankle  -MF Primary Wound Type: Incision  -MF    Dressing Appearance intact;moist drainage  -      Base moist;necrotic;yellow;slough;subcutaneous;eschar;red;granulating  some residual dissolving sutures visible  -      Periwound intact;dry;redness;swelling;warm  -      Periwound Temperature warm  -      Periwound Skin Turgor soft  -      Edges irregular  -      Drainage Characteristics/Odor serous;serosanguineous  -      Drainage Amount small  -      Care, Wound cleansed with;wound cleanser;debrided;ultrasound therapy, non contact low frequency  MIST 7 minutes with vashe  -      Dressing Care dressing  "applied;silver impregnated;collagen;antimicrobial agent applied;foam;low-adherent;border dressing  brad, saline-moist HFBc, mepilex Ag, 6\" optifoam  -      Periwound Care cleansed with pH balanced cleanser;barrier ointment applied  zguard  -      Sutures Removed Intact Yes  -MC      Number of Sutures Removed 1  -      Retired Wound - Properties Group Placement Date: 12/18/24  - Placement Time: 1030  -MF Side: Left  -MF Orientation: lateral  -MF Location: ankle  -MF Primary Wound Type: Incision  -MF    Retired Wound - Properties Group Placement Date: 12/18/24  - Placement Time: 1030  -MF Side: Left  -MF Orientation: lateral  -MF Location: ankle  -MF Primary Wound Type: Incision  -MF    Retired Wound - Properties Group Date first assessed: 12/18/24  - Time first assessed: 1030  -MF Side: Left  -MF Location: ankle  -MF Primary Wound Type: Incision  -MF              User Key  (r) = Recorded By, (t) = Taken By, (c) = Cosigned By      Initials Name Provider Type     Trace Max, PT Physical Therapist    Holly Sosa, PT Physical Therapist                   Lymphedema       Row Name 01/16/25 1100             Lymphedema Edema Assessment    Ptting Edema Category By severity  -      Pitting Edema Mild;Other (comment)  trace throughout, with mild at ankle  -         Lymphedema Pulses/Capillary Refill    Capillary Refill lower extremity capillary refill  -      Lower Extremity Capillary Refill left:;less than 3 seconds  -         Compression/Skin Care    Compression/Skin Care skin care;wrapping location  -      Skin Care washed/dried;lotion applied  -      Wrapping Location lower extremity  -      Wrapping Location LE left:;foot to knee  -      Wrapping Comments optifoam Ag to ant ankle with size 4 compressogrip doubled and overlapping for gradient compression.  -                User Key  (r) = Recorded By, (t) = Taken By, (c) = Cosigned By      Initials Name Provider Type     " Holly Bryant PT Physical Therapist                    WOUND DEBRIDEMENT  Total area of Debridement: 4 cm2  Debridement Site 1  Location- Site 1: L lateral ankle wound  Selective Debridement- Site 1: Wound Surface <20cmsq  Instruments- Site 1: tweezers  Excised Tissue Description- Site 1: moderate, slough  Bleeding- Site 1: none              Therapy Education       Row Name 01/16/25 1100             Therapy Education    Education Details Continue current POC with resuming use of brad.  -MC      Given Bandaging/dressing change  -      Program Reinforced;Modified  -      How Provided Verbal;Demonstration  -MC      Provided to Patient  -      Level of Understanding Verbalized  -                User Key  (r) = Recorded By, (t) = Taken By, (c) = Cosigned By      Initials Name Provider Type    Holly Sosa PT Physical Therapist                    Recommendation and Plan   PT Assessment/Plan       Row Name 01/16/25 1100          PT Assessment    Functional Limitations Other (comment);Performance in self-care ADL;Performance in leisure activities  -     Impairments Integumentary integrity;Pain  -     Assessment Comments Pt with stable wound dimensions since last assessment. PT able to remove some additional slough from the wound base, along with one dissolvable suture note currently holding any viable tissue together. PT added brad collagen to the wound base today to promtoe additional proliferation. Pt is progressing toward his PT wound care goals at this time, but his healing process remains complex. Pt will continue to benefit from skilled PT wound care to promote healing.  -     Rehab Potential Fair  -     Patient/caregiver participated in establishment of treatment plan and goals Yes  -     Patient would benefit from skilled therapy intervention Yes  -MC        PT Plan    PT Frequency 2x/week  -     Predicted Duration of Therapy Intervention (PT) 16 visits  -     Planned CPT's?  PT SELF CARE/MGMT/TRAIN 15 MIN: 97668;PT NONSELECT DEBRIDE 15 MIN: 78196;PT BROOKE DEBRIDE OPEN WOUND UP TO 20 CM: 64010;PT NLFU MIST: 05678;PT UNNA BOOT: 09218;PT MULTI LAYER COMP SYS LE  -     Physical Therapy Interventions (Optional Details) wound care;patient/family education  -     PT Plan Comments debridement, MIST  -               User Key  (r) = Recorded By, (t) = Taken By, (c) = Cosigned By      Initials Name Provider Type    Holly Sosa, PT Physical Therapist                    Goals   PT OP Goals       Row Name 25 1152 25 1100       PT Short Term Goals    STG Date to Achieve -- 25  -    STG 1 -- Decrease L ankle wound size by 25% as evidence of wound healing.  -    STG 1 Progress -- Ongoing  -    STG 2 -- Increase granulation to L ankle wound to > 50% to improve healing potential.  -    STG 2 Progress -- Progressing  -    STG 3 -- Pt able to verb / demonstrate home dressing management to progress towards independent management.  -    STG 3 Progress -- Progressing  -       Long Term Goals    LTG Date to Achieve -- 25  -    LTG 1 -- Decrease L ankle wound size by 75% as evidence of wound healing.  -    LTG 1 Progress -- Ongoing  -    LTG 2 -- Increase granulation to L ankle wound to > 85% to improve healing potential.  -    LTG 2 Progress -- Ongoing  -       Time Calculation    PT Goal Re-Cert Due Date 25  - 25  -              User Key  (r) = Recorded By, (t) = Taken By, (c) = Cosigned By      Initials Name Provider Type    Holly Sosa PT Physical Therapist                    PT Goal Re-Cert Due Date: 25  PT Short Term Goals  STG Date to Achieve: 25  STG 1: Decrease L ankle wound size by 25% as evidence of wound healing.  STG 1 Progress: Ongoing  STG 2: Increase granulation to L ankle wound to > 50% to improve healing potential.  STG 2 Progress: Progressing  STG 3: Pt able to verb / demonstrate home  dressing management to progress towards independent management.  STG 3 Progress: Progressing  Long Term Goals  LTG Date to Achieve: 03/18/25  LTG 1: Decrease L ankle wound size by 75% as evidence of wound healing.  LTG 1 Progress: Ongoing  LTG 2: Increase granulation to L ankle wound to > 85% to improve healing potential.  LTG 2 Progress: Ongoing      Time Calculation: Start Time: 0800  Untimed Charges  61024-[place] comp below knee: 10  27953-Mgieslzac debridement: 15  10265-XOOG Mist: 10  Total Minutes  Untimed Charges Total Minutes: 35   Total Minutes: 35              Holly Bryant, PT  1/16/2025

## 2025-01-17 DIAGNOSIS — K21.9 GASTROESOPHAGEAL REFLUX DISEASE: ICD-10-CM

## 2025-01-17 DIAGNOSIS — R10.13 EPIGASTRIC ABDOMINAL PAIN: ICD-10-CM

## 2025-01-17 RX ORDER — OMEPRAZOLE 40 MG/1
CAPSULE, DELAYED RELEASE ORAL
Qty: 90 CAPSULE | Refills: 0 | Status: SHIPPED | OUTPATIENT
Start: 2025-01-17

## 2025-01-20 ENCOUNTER — HOSPITAL ENCOUNTER (OUTPATIENT)
Dept: PHYSICAL THERAPY | Facility: HOSPITAL | Age: 65
Setting detail: THERAPIES SERIES
Discharge: HOME | End: 2025-01-20
Payer: MEDICARE

## 2025-01-20 DIAGNOSIS — S91.002D OPEN WOUND OF LEFT ANKLE WITH COMPLICATION, SUBSEQUENT ENCOUNTER: ICD-10-CM

## 2025-01-20 DIAGNOSIS — R60.0 EDEMA OF LEFT LOWER LEG: ICD-10-CM

## 2025-01-20 DIAGNOSIS — T81.89XD NONHEALING SURGICAL WOUND, SUBSEQUENT ENCOUNTER: Primary | ICD-10-CM

## 2025-01-20 PROCEDURE — 97610 LOW FREQUENCY NON-THERMAL US: CPT

## 2025-01-20 PROCEDURE — 97597 DBRDMT OPN WND 1ST 20 CM/<: CPT

## 2025-01-20 NOTE — THERAPY WOUND CARE TREATMENT
Outpatient Rehabilitation - Wound/Debridement Treatment Note   [facility]     Patient Name: Justice Kiser  : 1960  MRN: [id number]  Today's Date: 2025                 Admit Date: 2025    Visit Dx:    ICD-10-CM ICD-9-CM   1. Nonhealing surgical wound, subsequent encounter  T81.89XD V58.89     998.83   2. Edema of left lower leg  R60.0 782.3   3. Open wound of left ankle with complication, subsequent encounter  S91.002D V58.89     891.1       Patient Active Problem List   Diagnosis    Essential hypertension    Coronary artery disease involving native coronary artery of native heart with angina pectoris    Hyperlipidemia LDL goal <70    Depression    Anxiety    Hyperglycemia    Paresthesia of right foot    Tremor    GERD (gastroesophageal reflux disease)    Arthritis    Pierson's esophagus without dysplasia    MCI (mild cognitive impairment)    Mitral valve insufficiency    Alcohol induced fatty liver    Alcohol abuse, in remission    Cerebral microvascular disease    Chronic bilateral low back pain with right-sided sciatica    Spinal stenosis, lumbar region, with neurogenic claudication    DDD (degenerative disc disease), lumbar    Psychophysiological insomnia    Left leg cellulitis    Severe protein-calorie malnutrition        Past Medical History:   Diagnosis Date    Arthritis     CHF (congestive heart failure)     Chronic pain disorder     Colon polyp     Depression     Difficulty walking     Elevated LFTs     GERD (gastroesophageal reflux disease)     History of colonic polyps     History of methicillin resistant Staphylococcus aureus     History of myocardial infarction     Hyperlipidemia     Hypertension         Past Surgical History:   Procedure Laterality Date    BACK SURGERY      CAROTID STENT      CORONARY ANGIOPLASTY WITH STENT PLACEMENT  2012    Circumflex Xscience V 3.5 MM x 23 mm    CORONARY STENT PLACEMENT      ENDOMETRIAL ABLATION      EPIDURAL BLOCK      GALLBLADDER SURGERY   2017    INCISION AND DRAINAGE LEG Left 11/20/2024    Procedure: INCISION AND DRAINAGE OF LEFT ANKLE;  Surgeon: Ravin Sidhu MD;  Location: [facility];  Service: Orthopedics;  Laterality: Left;    LUMBAR DISCECTOMY  1992    2 level - [facility]    LYMPH NODE BIOPSY      SHOULDER SURGERY  12/20/2018    Dr. Hoyt Rotator cuff repair         EVALUATION   PT Ortho       Row Name 01/20/25 0830       Subjective    Subjective Comments Pt with no new issues. cont to have a little bit of sensitivity to periwound area  -MF       Subjective Pain    Able to rate subjective pain? yes  -MF    Pre-Treatment Pain Level 2  -MF    Post-Treatment Pain Level 2  -MF       Transfers    Sit-Stand [place] (Transfers) independent  -MF    Stand-Sit [place] (Transfers) independent  -MF    Comment, (Transfers) seated for tx  -MF       Gait/Stairs (Locomotion)    [place] Level (Gait) independent  -MF              User Key  (r) = Recorded By, (t) = Taken By, (c) = Cosigned By      Initials Name Provider Type     Trace Max, PT Physical Therapist                     [facility] Wound       Row Name 01/20/25 0830             Wound 12/18/24 1030 Left lateral ankle    Wound - Properties Group Placement Date: 12/18/24  -MF Placement Time: 1030  -MF Side: Left  -MF Orientation: lateral  -MF Location: ankle  -MF Primary Wound Type: Incision  -MF    Dressing Appearance intact;moist drainage  -MF      Base moist;necrotic;yellow;slough;subcutaneous;eschar;red;granulating  -MF      Periwound intact;dry;redness;swelling;warm  -MF      Periwound Temperature warm  -MF      Periwound Skin Turgor soft  -MF      Edges irregular  -MF      Drainage Characteristics/Odor serosanguineous  -MF      Drainage Amount small  -MF      Care, Wound irrigated with;wound cleanser;ultrasound therapy, non contact low frequency  7 min MIST with Vashe  -MF      Dressing Care foam;low-adherent  brad Ag with HFBc and Mepilex Ag foam / optifoam   -MF      Periwound Care dry periwound area maintained;cleansed with pH balanced cleanser  -MF      Retired Wound - Properties Group Placement Date: 12/18/24  - Placement Time: 1030 -MF Side: Left  -MF Orientation: lateral  -MF Location: ankle  -MF Primary Wound Type: Incision  -MF    Retired Wound - Properties Group Placement Date: 12/18/24  - Placement Time: 1030  -MF Side: Left  -MF Orientation: lateral  -MF Location: ankle  -MF Primary Wound Type: Incision  -MF    Retired Wound - Properties Group Date first assessed: 12/18/24  - Time first assessed: 1030  -MF Side: Left  -MF Location: ankle  -MF Primary Wound Type: Incision  -MF              User Key  (r) = Recorded By, (t) = Taken By, (c) = Cosigned By      Initials Name Provider Type    Trace Cox, PT Physical Therapist                      WOUND DEBRIDEMENT  Total area of Debridement: ~4cm2  Debridement Site 1  Location- Site 1: L lateral ankle wound  Selective Debridement- Site 1: Wound Surface <20cmsq  Instruments- Site 1: tweezers  Excised Tissue Description- Site 1: minimum, slough  Bleeding- Site 1: none                 Recommendation and Plan   PT Assessment/Plan       Row Name 01/20/25 4530          PT Assessment    Functional Limitations Other (comment);Performance in self-care ADL;Performance in leisure activities  -     Impairments Integumentary integrity;Pain  -     Assessment Comments Pt cont to have moderate adherent slough build up.  PT was able to debride a small amount, but pt will cont to benefit from MIST and debridement to help remove remaining slough and improve granulation.  -     Rehab Potential Fair  -MF     Patient/caregiver participated in establishment of treatment plan and goals Yes  -     Patient would benefit from skilled therapy intervention Yes  -MF        PT Plan    PT Frequency 2x/week  -     Physical Therapy Interventions (Optional Details) wound care;patient/family education  -     PT Plan  Comments debridement, MIST  -               User Key  (r) = Recorded By, (t) = Taken By, (c) = Cosigned By      Initials Name Provider Type    Trace Cox, PT Physical Therapist                    Goals   PT OP Goals       Row Name 01/20/25 0830          Time Calculation    PT Goal Re-Cert Due Date 03/18/25  -               User Key  (r) = Recorded By, (t) = Taken By, (c) = Cosigned By      Initials Name Provider Type    Trace Cox, PT Physical Therapist                    PT Goal Re-Cert Due Date: 03/18/25            Time Calculation: Start Time: 0830  Untimed Charges  37071-Oksjjixgr debridement: 15  89858-JWKZ Mist: 15  Total Minutes  Untimed Charges Total Minutes: 30   Total Minutes: 30              Trace Max, PT  1/20/2025

## 2025-01-23 ENCOUNTER — HOSPITAL ENCOUNTER (OUTPATIENT)
Dept: PHYSICAL THERAPY | Facility: HOSPITAL | Age: 65
Setting detail: THERAPIES SERIES
Discharge: HOME | End: 2025-01-23
Payer: MEDICARE

## 2025-01-23 DIAGNOSIS — R60.0 EDEMA OF LEFT LOWER LEG: ICD-10-CM

## 2025-01-23 DIAGNOSIS — T81.89XD NONHEALING SURGICAL WOUND, SUBSEQUENT ENCOUNTER: Primary | ICD-10-CM

## 2025-01-23 DIAGNOSIS — S91.002D OPEN WOUND OF LEFT ANKLE WITH COMPLICATION, SUBSEQUENT ENCOUNTER: ICD-10-CM

## 2025-01-23 PROCEDURE — 97610 LOW FREQUENCY NON-THERMAL US: CPT

## 2025-01-23 PROCEDURE — 97597 DBRDMT OPN WND 1ST 20 CM/<: CPT

## 2025-01-23 NOTE — THERAPY WOUND CARE TREATMENT
Outpatient Rehabilitation - Wound/Debridement Treatment Note   [place]     Patient Name: Justice Kiser  : 1960  MRN: [id number]  Today's Date: 2025                 Admit Date: 2025    Visit Dx:    ICD-10-CM ICD-9-CM   1. Nonhealing surgical wound, subsequent encounter  T81.89XD V58.89     998.83   2. Edema of left lower leg  R60.0 782.3   3. Open wound of left ankle with complication, subsequent encounter  S91.002D V58.89     891.1     L lateral ankle      Patient Active Problem List   Diagnosis    Essential hypertension    Coronary artery disease involving native coronary artery of native heart with angina pectoris    Hyperlipidemia LDL goal <70    Depression    Anxiety    Hyperglycemia    Paresthesia of right foot    Tremor    GERD (gastroesophageal reflux disease)    Arthritis    Pierson's esophagus without dysplasia    MCI (mild cognitive impairment)    Mitral valve insufficiency    Alcohol induced fatty liver    Alcohol abuse, in remission    Cerebral microvascular disease    Chronic bilateral low back pain with right-sided sciatica    Spinal stenosis, lumbar region, with neurogenic claudication    DDD (degenerative disc disease), lumbar    Psychophysiological insomnia    Left leg cellulitis    Severe protein-calorie malnutrition        Past Medical History:   Diagnosis Date    Arthritis     CHF (congestive heart failure)     Chronic pain disorder     Colon polyp     Depression     Difficulty walking     Elevated LFTs     GERD (gastroesophageal reflux disease)     History of colonic polyps     History of methicillin resistant Staphylococcus aureus     History of myocardial infarction     Hyperlipidemia     Hypertension         Past Surgical History:   Procedure Laterality Date    BACK SURGERY      CAROTID STENT      CORONARY ANGIOPLASTY WITH STENT PLACEMENT  2012    Circumflex Xscience V 3.5 MM x 23 mm    CORONARY STENT PLACEMENT      ENDOMETRIAL ABLATION      EPIDURAL BLOCK       GALLBLADDER SURGERY  2017    INCISION AND DRAINAGE LEG Left 11/20/2024    Procedure: INCISION AND DRAINAGE OF LEFT ANKLE;  Surgeon: Ravin Sidhu MD;  Location: [facility];  Service: Orthopedics;  Laterality: Left;    LUMBAR DISCECTOMY  1992    2 level - [facility]    LYMPH NODE BIOPSY      SHOULDER SURGERY  12/20/2018    Dr. Hoyt Rotator cuff repair         EVALUATION   PT Ortho       Row Name 01/23/25 0800       Subjective    Subjective Comments Pt reports he changed the optifoam dressing yesterday because it began to roll down. Reports he sees his PCP tomorrow and sees both [facility] and his surgeon next week.  -       Subjective Pain    Able to rate subjective pain? yes  -LH    Pre-Treatment Pain Level 2  -LH    Post-Treatment Pain Level 2  -       Transfers    Sit-Stand [place] (Transfers) independent  -LH    Stand-Sit [place] (Transfers) independent  -    Comment, (Transfers) long sitting for tx  -LH       Gait/Stairs (Locomotion)    [place] Level (Gait) independent  -              User Key  (r) = Recorded By, (t) = Taken By, (c) = Cosigned By      Initials Name Provider Type     Maximo Palencia, PT Physical Therapist                     [facility] Wound       Row Name 01/23/25 0800             Wound 12/18/24 1030 Left lateral ankle    Wound - Properties Group Placement Date: 12/18/24  -MF Placement Time: 1030  -MF Side: Left  - Orientation: lateral  - Location: ankle  -MF Primary Wound Type: Incision  -MF    Wound Image Images linked: 1  -      Dressing Appearance intact;moist drainage  -      Base moist;necrotic;yellow;slough;subcutaneous;red;granulating  -      Periwound intact;dry;redness;swelling;warm  -      Periwound Temperature warm  -      Periwound Skin Turgor soft  -      Edges irregular  -      Wound Length (cm) 4.5 cm  -      Wound Width (cm) 1.3 cm  -      Wound Depth (cm) 0.6 cm  partially obscured  -      Wound Surface Area (cm^2) 5.85 cm^2  " -      Wound Volume (cm^3) 3.51 cm^3  -      Drainage Characteristics/Odor serosanguineous  -      Drainage Amount small  -      Care, Wound irrigated with;wound cleanser;ultrasound therapy, non contact low frequency  7 min MIST with Vashe  -      Dressing Care foam;low-adherent  brad, saline-moist HFBc, mepilex Ag, 6\" optifoam. Single layer size 4 compressogrip  -      Periwound Care dry periwound area maintained;cleansed with pH balanced cleanser  zguard  -      Retired Wound - Properties Group Placement Date: 12/18/24  - Placement Time: 1030  -MF Side: Left  - Orientation: lateral  -MF Location: ankle  -MF Primary Wound Type: Incision  -MF    Retired Wound - Properties Group Placement Date: 12/18/24  - Placement Time: 1030  -MF Side: Left  -MF Orientation: lateral  -MF Location: ankle  -MF Primary Wound Type: Incision  -MF    Retired Wound - Properties Group Date first assessed: 12/18/24  - Time first assessed: 1030  -MF Side: Left  - Location: ankle  -MF Primary Wound Type: Incision  -MF              User Key  (r) = Recorded By, (t) = Taken By, (c) = Cosigned By      Initials Name Provider Type    MF Trace Max, PT Physical Therapist     Maximo Palencia, PT Physical Therapist                      WOUND DEBRIDEMENT  Total area of Debridement: 4cm2  Debridement Site 1  Location- Site 1: L lateral ankle wound  Selective Debridement- Site 1: Wound Surface <20cmsq  Instruments- Site 1: tweezers  Excised Tissue Description- Site 1: moderate, slough (biofilm)  Bleeding- Site 1: scant              Therapy Education       Row Name 01/23/25 0800             Therapy Education    Education Details Continue current POC.  -      Given Bandaging/dressing change  -      Program Reinforced;Modified  -      How Provided Verbal;Demonstration  -      Provided to Patient  -      Level of Understanding Verbalized  -                User Key  (r) = Recorded By, (t) = Taken By, (c) = " Cosigned By      Initials Name Provider Type     Maximo Palencia, PT Physical Therapist                    Recommendation and Plan   PT Assessment/Plan       Row Name 01/23/25 0800          PT Assessment    Functional Limitations Other (comment);Performance in self-care ADL;Performance in leisure activities  wound management\  -     Impairments Integumentary integrity;Pain  -     Assessment Comments Pt presenting this date with slow, steady improvements in L lateral ankle wound dimensions. Pt with slight increase in granulation formation at wound base following debridement of moderate amount of slough and biofilm. Pt remains with some thick, adherent necrotic tissue at base. Pt would continue to benefit from further debridement, MIST, advanced wound dressings, and MLW PRN to promote wound healing.  -     Rehab Potential Fair  -     Patient/caregiver participated in establishment of treatment plan and goals Yes  -     Patient would benefit from skilled therapy intervention Yes  -        PT Plan    PT Frequency 2x/week  -     Physical Therapy Interventions (Optional Details) wound care;patient/family education  -     PT Plan Comments debridement, MIST  -               User Key  (r) = Recorded By, (t) = Taken By, (c) = Cosigned By      Initials Name Provider Type     Maximo Palencia, PT Physical Therapist                    Goals   PT OP Goals       Row Name 01/23/25 0840          Time Calculation    PT Goal Re-Cert Due Date 03/18/25  -               User Key  (r) = Recorded By, (t) = Taken By, (c) = Cosigned By      Initials Name Provider Type     Maximo Palencia, PT Physical Therapist                    PT Goal Re-Cert Due Date: 03/18/25            Time Calculation: Start Time: 0800  Untimed Charges  63958-Jqvshdutf debridement: 15  15275-FUSN Mist: 15  Total Minutes  Untimed Charges Total Minutes: 30   Total Minutes: 30  Therapy Charges for Today       Code Description Service Date  Service Provider Modifiers Qty    12857423456 HC BROOKE DEBRIDE OPEN WOUND UP TO 20CM 1/23/2025 Maximo Palencia, PT GP, KX 1    91247299873 HC PT NLFU MIST 1/23/2025 Maximo Palencia, PT GP, KX 1                    Maximo Palencia, PT  1/23/2025

## 2025-01-28 ENCOUNTER — HOSPITAL ENCOUNTER (OUTPATIENT)
Dept: PHYSICAL THERAPY | Facility: HOSPITAL | Age: 65
Setting detail: THERAPIES SERIES
Discharge: HOME | End: 2025-01-28
Payer: MEDICARE

## 2025-01-28 DIAGNOSIS — R60.0 EDEMA OF LEFT LOWER LEG: ICD-10-CM

## 2025-01-28 DIAGNOSIS — T81.89XD NONHEALING SURGICAL WOUND, SUBSEQUENT ENCOUNTER: Primary | ICD-10-CM

## 2025-01-28 DIAGNOSIS — S91.002D OPEN WOUND OF LEFT ANKLE WITH COMPLICATION, SUBSEQUENT ENCOUNTER: ICD-10-CM

## 2025-01-28 PROCEDURE — 97610 LOW FREQUENCY NON-THERMAL US: CPT

## 2025-01-28 PROCEDURE — 97597 DBRDMT OPN WND 1ST 20 CM/<: CPT

## 2025-01-28 NOTE — THERAPY WOUND CARE TREATMENT
Outpatient Rehabilitation - Wound/Debridement Treatment Note   [facility]     Patient Name: Justice Kiser  : 1960  MRN: [id number]  Today's Date: 2025                 Admit Date: 2025    Visit Dx:    ICD-10-CM ICD-9-CM   1. Nonhealing surgical wound, subsequent encounter  T81.89XD V58.89     998.83   2. Edema of left lower leg  R60.0 782.3   3. Open wound of left ankle with complication, subsequent encounter  S91.002D V58.89     891.1                 Patient Active Problem List   Diagnosis    Essential hypertension    Coronary artery disease involving native coronary artery of native heart with angina pectoris    Hyperlipidemia LDL goal <70    Depression    Anxiety    Hyperglycemia    Paresthesia of right foot    Tremor    GERD (gastroesophageal reflux disease)    Arthritis    Pierson's esophagus without dysplasia    MCI (mild cognitive impairment)    Mitral valve insufficiency    Alcohol induced fatty liver    Alcohol abuse, in remission    Cerebral microvascular disease    Chronic bilateral low back pain with right-sided sciatica    Spinal stenosis, lumbar region, with neurogenic claudication    DDD (degenerative disc disease), lumbar    Psychophysiological insomnia    Left leg cellulitis    Severe protein-calorie malnutrition        Past Medical History:   Diagnosis Date    Arthritis     CHF (congestive heart failure)     Chronic pain disorder     Colon polyp     Depression     Difficulty walking     Elevated LFTs     GERD (gastroesophageal reflux disease)     History of colonic polyps     History of methicillin resistant Staphylococcus aureus     History of myocardial infarction     Hyperlipidemia     Hypertension         Past Surgical History:   Procedure Laterality Date    BACK SURGERY      CAROTID STENT      CORONARY ANGIOPLASTY WITH STENT PLACEMENT  2012    Circumflex Xscience V 3.5 MM x 23 mm    CORONARY STENT PLACEMENT      ENDOMETRIAL ABLATION      EPIDURAL BLOCK       "GALLBLADDER SURGERY  2017    INCISION AND DRAINAGE LEG Left 11/20/2024    Procedure: INCISION AND DRAINAGE OF LEFT ANKLE;  Surgeon: Ravin Sidhu MD;  Location: [facility];  Service: Orthopedics;  Laterality: Left;    LUMBAR DISCECTOMY  1992    2 level - [facility] [place]    LYMPH NODE BIOPSY      SHOULDER SURGERY  12/20/2018    Dr. Hoyt Rotator cuff repair         EVALUATION   PT Ortho       Row Name 01/28/25 0800       Subjective    Subjective Comments Pt reports ongoing \"discomfort\".  Reports worsening bruising of medial ankle.  States he was told he had a partial achilles tendon tear that they weren't able to repair, and has been walking and performing daily activities as normal.  PT recommended rest and leg elevation and monitor area.  -JM       Subjective Pain    Able to rate subjective pain? yes  -JM    Pre-Treatment Pain Level 2  -JM    Post-Treatment Pain Level 2  -JM       Transfers    Sit-Stand [place] (Transfers) independent  -JM    Stand-Sit [place] (Transfers) independent  -    Comment, (Transfers) long sitting for treatment  -       Gait/Stairs (Locomotion)    [place] Level (Gait) independent  -              User Key  (r) = Recorded By, (t) = Taken By, (c) = Cosigned By      Initials Name Provider Type    Kristin Zamora, PT Physical Therapist                     [facility] Wound       Row Name 01/28/25 0800             Wound 12/18/24 1030 Left lateral ankle    Wound - Properties Group Placement Date: 12/18/24  -MF Placement Time: 1030  -MF Side: Left  -MF Orientation: lateral  -MF Location: ankle  -MF Primary Wound Type: Incision  -MF    Wound Image Images linked: 3  -JM      Dressing Appearance intact;moist drainage  -      Base moist;necrotic;yellow;slough;subcutaneous;red;granulating  -      Periwound intact;dry;redness;swelling;warm;ecchymotic  min ecchymosis of posterior and medial ankle  -      Periwound Temperature warm  -      Periwound Skin Turgor soft  " "-      Edges irregular  -      Wound Length (cm) 4.4 cm  -      Wound Width (cm) 1.2 cm  -      Wound Depth (cm) 0.4 cm  depth to fibrous center, partially obscured  -      Wound Surface Area (cm^2) 5.28 cm^2  -      Wound Volume (cm^3) 2.112 cm^3  -JM      Drainage Characteristics/Odor serosanguineous  -      Drainage Amount small  -      Care, Wound cleansed with;wound cleanser;debrided;ultrasound therapy, non contact low frequency  MIST with vashe x7min  -      Dressing Care dressing applied;collagen;antimicrobial agent applied;foam;silver impregnated;silicone border foam  brad, saline-moist HFBc, mepilex ag, 6\" optifoam, size 4 compressogrip  -      Periwound Care cleansed with pH balanced cleanser;dry periwound area maintained;barrier ointment applied  zguard  -      Sutures Removed Intact Yes  -      Number of Sutures Removed 1  -JM      Retired Wound - Properties Group Placement Date: 12/18/24  - Placement Time: 1030  -MF Side: Left  - Orientation: lateral  - Location: ankle  -MF Primary Wound Type: Incision  -MF    Retired Wound - Properties Group Placement Date: 12/18/24  - Placement Time: 1030  -MF Side: Left  - Orientation: lateral  -MF Location: ankle  -MF Primary Wound Type: Incision  -MF    Retired Wound - Properties Group Date first assessed: 12/18/24  - Time first assessed: 1030  -MF Side: Left  - Location: ankle  -MF Primary Wound Type: Incision  -MF              User Key  (r) = Recorded By, (t) = Taken By, (c) = Cosigned By      Initials Name Provider Type    Trace Cox, PT Physical Therapist    Kristin Zamora, PT Physical Therapist                      WOUND DEBRIDEMENT  Total area of Debridement: 4cmsq  Debridement Site 1  Location- Site 1: L lateral ankle wound  Selective Debridement- Site 1: Wound Surface <20cmsq  Instruments- Site 1: tweezers  Excised Tissue Description- Site 1: moderate, slough, other (comment) (biofilm)  Bleeding- " Site 1: scant              Therapy Education       Row Name 01/28/25 0800             Therapy Education    Education Details Continue compression as tolerated, may remove PRN for skin breaks.  Recommended rest and leg elevation and to monitor ecchymosis.  Notify your surgeon if worsening, or notify [facility] provider if developing increased redness/swelling.  -JM      Given Bandaging/dressing change  -      Program Reinforced  -CHANTEL      How Provided Verbal;Demonstration  -      Provided to Patient  -      Level of Understanding Verbalized  -                User Key  (r) = Recorded By, (t) = Taken By, (c) = Cosigned By      Initials Name Provider Type    Kristin Zamora, PT Physical Therapist                    Recommendation and Plan   PT Assessment/Plan       Row Name 01/28/25 0800          PT Assessment    Functional Limitations Other (comment);Performance in self-care ADL;Performance in leisure activities  wound management\  -     Impairments Integumentary integrity;Pain  -     Assessment Comments Pt with improving granulation of lat L ankle wound, still some areas of depth and fibrous tissue mostly central/distal aspect and anterior edge, but posterior edge attached and superior wound bed almost fully granulated.  Pt continues to have thin biofilm buildup requiring ongoing debridement.  Continue with POC.  -     Rehab Potential Fair  -     Patient/caregiver participated in establishment of treatment plan and goals Yes  -     Patient would benefit from skilled therapy intervention Yes  -        PT Plan    PT Frequency 2x/week  -     Physical Therapy Interventions (Optional Details) patient/family education;wound care  -     PT Plan Comments MIST, debridement  -               User Key  (r) = Recorded By, (t) = Taken By, (c) = Cosigned By      Initials Name Provider Type    Kristin Zamora, PT Physical Therapist                    Goals   PT OP Goals       Row Name 01/28/25 6830           Time Calculation    PT Goal Re-Cert Due Date 03/18/25  -CHANTEL               User Key  (r) = Recorded By, (t) = Taken By, (c) = Cosigned By      Initials Name Provider Type    Kristin Zamora, PT Physical Therapist                    PT Goal Re-Cert Due Date: 03/18/25            Time Calculation: Start Time: 0800  Untimed Charges  70711-Ryabrlnkb debridement: 15  05206-VMXP Mist: 15  Total Minutes  Untimed Charges Total Minutes: 30   Total Minutes: 30  Therapy Charges for Today       Code Description Service Date Service Provider Modifiers Qty    95608823686  BROOKE DEBRIDE OPEN WOUND UP TO 20CM 1/28/2025 Kristin Davila, PT GP 1    [id number] HC PT NLFU MIST 1/28/2025 Kristin Davila, PT GP 1                    Kirstin Davila, PT  1/28/2025

## 2025-01-30 ENCOUNTER — HOSPITAL ENCOUNTER (OUTPATIENT)
Dept: PHYSICAL THERAPY | Facility: HOSPITAL | Age: 65
Setting detail: THERAPIES SERIES
Discharge: HOME | End: 2025-01-30
Payer: MEDICARE

## 2025-01-30 DIAGNOSIS — R60.0 EDEMA OF LEFT LOWER LEG: ICD-10-CM

## 2025-01-30 DIAGNOSIS — T81.89XD NONHEALING SURGICAL WOUND, SUBSEQUENT ENCOUNTER: Primary | ICD-10-CM

## 2025-01-30 DIAGNOSIS — S91.002D OPEN WOUND OF LEFT ANKLE WITH COMPLICATION, SUBSEQUENT ENCOUNTER: ICD-10-CM

## 2025-01-30 PROCEDURE — 97610 LOW FREQUENCY NON-THERMAL US: CPT

## 2025-01-30 PROCEDURE — 97597 DBRDMT OPN WND 1ST 20 CM/<: CPT

## 2025-01-30 NOTE — THERAPY WOUND CARE TREATMENT
Outpatient Rehabilitation - Wound/Debridement Treatment Note   [place]     Patient Name: Justice Kiser  : 1960  MRN: [id number]  Today's Date: 2025                 Admit Date: 2025    Visit Dx:    ICD-10-CM ICD-9-CM   1. Nonhealing surgical wound, subsequent encounter  T81.89XD V58.89     998.83   2. Edema of left lower leg  R60.0 782.3   3. Open wound of left ankle with complication, subsequent encounter  S91.002D V58.89     891.1       Patient Active Problem List   Diagnosis    Essential hypertension    Coronary artery disease involving native coronary artery of native heart with angina pectoris    Hyperlipidemia LDL goal <70    Depression    Anxiety    Hyperglycemia    Paresthesia of right foot    Tremor    GERD (gastroesophageal reflux disease)    Arthritis    Pierson's esophagus without dysplasia    MCI (mild cognitive impairment)    Mitral valve insufficiency    Alcohol induced fatty liver    Alcohol abuse, in remission    Cerebral microvascular disease    Chronic bilateral low back pain with right-sided sciatica    Spinal stenosis, lumbar region, with neurogenic claudication    DDD (degenerative disc disease), lumbar    Psychophysiological insomnia    Left leg cellulitis    Severe protein-calorie malnutrition        Past Medical History:   Diagnosis Date    Arthritis     CHF (congestive heart failure)     Chronic pain disorder     Colon polyp     Depression     Difficulty walking     Elevated LFTs     GERD (gastroesophageal reflux disease)     History of colonic polyps     History of methicillin resistant Staphylococcus aureus     History of myocardial infarction     Hyperlipidemia     Hypertension         Past Surgical History:   Procedure Laterality Date    BACK SURGERY      CAROTID STENT      CORONARY ANGIOPLASTY WITH STENT PLACEMENT  2012    Circumflex Xscience V 3.5 MM x 23 mm    CORONARY STENT PLACEMENT      ENDOMETRIAL ABLATION      EPIDURAL BLOCK      GALLBLADDER SURGERY   2017    INCISION AND DRAINAGE LEG Left 11/20/2024    Procedure: INCISION AND DRAINAGE OF LEFT ANKLE;  Surgeon: Ravin Sidhu MD;  Location: [facility];  Service: Orthopedics;  Laterality: Left;    LUMBAR DISCECTOMY  1992    2 level - [facility]    LYMPH NODE BIOPSY      SHOULDER SURGERY  12/20/2018    Dr. Hoyt Rotator cuff repair         EVALUATION   PT Ortho       Row Name 01/30/25 0800       Subjective    Subjective Comments No complaints or changes since last tx  -MC       Subjective Pain    Able to rate subjective pain? yes  -MC    Pre-Treatment Pain Level 2  -MC    Post-Treatment Pain Level 2  -MC       Transfers    Sit-Stand [place] (Transfers) independent  -MC    Stand-Sit [place] (Transfers) independent  -MC    Comment, (Transfers) long sitting for tx  -MC       Gait/Stairs (Locomotion)    [place] Level (Gait) independent  -MC              User Key  (r) = Recorded By, (t) = Taken By, (c) = Cosigned By      Initials Name Provider Type     Holly Bryant PT Physical Therapist                     LDA Wound       Row Name 01/30/25 0800             Wound 12/18/24 1030 Left lateral ankle    Wound - Properties Group Placement Date: 12/18/24  -MF Placement Time: 1030  -MF Side: Left  -MF Orientation: lateral  -MF Location: ankle  -MF Primary Wound Type: Incision  -MF    Dressing Appearance intact;moist drainage  -      Base moist;necrotic;yellow;slough;subcutaneous;red;granulating;epithelialization  -      Periwound intact;dry;redness;swelling;warm;ecchymotic  min ecchymosis of posterior and medial ankle  -      Periwound Temperature warm  -      Periwound Skin Turgor soft  -      Edges irregular  -      Drainage Characteristics/Odor serosanguineous  -      Drainage Amount small  -      Care, Wound cleansed with;wound cleanser;debrided;ultrasound therapy, non contact low frequency  MIST with vashe 6 minutes  -      Dressing Care dressing applied;silver  "impregnated;collagen;antimicrobial agent applied;foam;low-adherent;border dressing  brad, saline-moist HFBc, mepilex Ag, 6\" optifoam  -      Periwound Care cleansed with pH balanced cleanser;barrier ointment applied  zguard  -      Retired Wound - Properties Group Placement Date: 12/18/24  - Placement Time: 1030  -MF Side: Left  -MF Orientation: lateral  -MF Location: ankle  -MF Primary Wound Type: Incision  -MF    Retired Wound - Properties Group Placement Date: 12/18/24  - Placement Time: 1030  -MF Side: Left  -MF Orientation: lateral  -MF Location: ankle  -MF Primary Wound Type: Incision  -MF    Retired Wound - Properties Group Date first assessed: 12/18/24  - Time first assessed: 1030  -MF Side: Left  -MF Location: ankle  -MF Primary Wound Type: Incision  -MF              User Key  (r) = Recorded By, (t) = Taken By, (c) = Cosigned By      Initials Name Provider Type    Trace Cox, PT Physical Therapist    Holly Sosa, PT Physical Therapist                      WOUND DEBRIDEMENT  Total area of Debridement: 4 cm2  Debridement Site 1  Location- Site 1: L lateral ankle wound  Selective Debridement- Site 1: Wound Surface <20cmsq  Instruments- Site 1: tweezers  Excised Tissue Description- Site 1: moderate, slough  Bleeding- Site 1: scant, held pressure, 1 minute              Therapy Education       Row Name 01/30/25 0800             Therapy Education    Education Details Continue current POC  -MC      Given Bandaging/dressing change  -      Program Reinforced  -MC      How Provided Verbal;Demonstration  -MC      Provided to Patient  -MC      Level of Understanding Verbalized  -                User Key  (r) = Recorded By, (t) = Taken By, (c) = Cosigned By      Initials Name Provider Type    Holly Sosa, PT Physical Therapist                    Recommendation and Plan   PT Assessment/Plan       Row Name 01/30/25 0800          PT Assessment    Functional Limitations Other " (comment);Performance in self-care ADL;Performance in leisure activities  wound management\  -     Impairments Integumentary integrity;Pain  -     Assessment Comments PT able to debride some additional slough from the base today. New epithelialization noted at edges, particularly the posterior edge. Pt will continue to benefit from skilled PT wound care to promote healing.  -     Rehab Potential Fair  -     Patient/caregiver participated in establishment of treatment plan and goals Yes  -     Patient would benefit from skilled therapy intervention Yes  -        PT Plan    PT Frequency 2x/week  -     Physical Therapy Interventions (Optional Details) wound care;patient/family education  -     PT Plan Comments debridement, MIST, compression prn  -               User Key  (r) = Recorded By, (t) = Taken By, (c) = Cosigned By      Initials Name Provider Type    Holly Sosa, PT Physical Therapist                    Goals   PT OP Goals       Row Name 01/30/25 0844          Time Calculation    PT Goal Re-Cert Due Date 03/18/25  -               User Key  (r) = Recorded By, (t) = Taken By, (c) = Cosigned By      Initials Name Provider Type    Holly Sosa, PT Physical Therapist                    PT Goal Re-Cert Due Date: 03/18/25            Time Calculation: Start Time: 0800  Untimed Charges  44875-Kbdoiygkk debridement: 10  70613-VDLI Mist: 10  Total Minutes  Untimed Charges Total Minutes: 20   Total Minutes: 20              Holly Bryant PT  1/30/2025

## 2025-02-03 ENCOUNTER — HOSPITAL ENCOUNTER (OUTPATIENT)
Dept: PHYSICAL THERAPY | Facility: HOSPITAL | Age: 65
Setting detail: THERAPIES SERIES
Discharge: HOME | End: 2025-02-03
Payer: MEDICARE

## 2025-02-03 DIAGNOSIS — T81.89XD NONHEALING SURGICAL WOUND, SUBSEQUENT ENCOUNTER: Primary | ICD-10-CM

## 2025-02-03 DIAGNOSIS — S91.002D OPEN WOUND OF LEFT ANKLE WITH COMPLICATION, SUBSEQUENT ENCOUNTER: ICD-10-CM

## 2025-02-03 DIAGNOSIS — R60.0 EDEMA OF LEFT LOWER LEG: ICD-10-CM

## 2025-02-03 PROCEDURE — 97597 DBRDMT OPN WND 1ST 20 CM/<: CPT

## 2025-02-03 PROCEDURE — 97610 LOW FREQUENCY NON-THERMAL US: CPT

## 2025-02-03 NOTE — THERAPY WOUND CARE TREATMENT
Outpatient Rehabilitation - Wound/Debridement Treatment Note   [place]     Patient Name: Justice Kiser  : 1960  MRN: [id number]  Today's Date: 2/3/2025                 Admit Date: 2/3/2025    Visit Dx:    ICD-10-CM ICD-9-CM   1. Nonhealing surgical wound, subsequent encounter  T81.89XD V58.89     998.83   2. Edema of left lower leg  R60.0 782.3   3. Open wound of left ankle with complication, subsequent encounter  S91.002D V58.89     891.1         Patient Active Problem List   Diagnosis    Essential hypertension    Coronary artery disease involving native coronary artery of native heart with angina pectoris    Hyperlipidemia LDL goal <70    Depression    Anxiety    Hyperglycemia    Paresthesia of right foot    Tremor    GERD (gastroesophageal reflux disease)    Arthritis    Pierson's esophagus without dysplasia    MCI (mild cognitive impairment)    Mitral valve insufficiency    Alcohol induced fatty liver    Alcohol abuse, in remission    Cerebral microvascular disease    Chronic bilateral low back pain with right-sided sciatica    Spinal stenosis, lumbar region, with neurogenic claudication    DDD (degenerative disc disease), lumbar    Psychophysiological insomnia    Left leg cellulitis    Severe protein-calorie malnutrition        Past Medical History:   Diagnosis Date    Arthritis     CHF (congestive heart failure)     Chronic pain disorder     Colon polyp     Depression     Difficulty walking     Elevated LFTs     GERD (gastroesophageal reflux disease)     History of colonic polyps     History of methicillin resistant Staphylococcus aureus     History of myocardial infarction     Hyperlipidemia     Hypertension         Past Surgical History:   Procedure Laterality Date    BACK SURGERY      CAROTID STENT      CORONARY ANGIOPLASTY WITH STENT PLACEMENT  2012    Circumflex Xscience V 3.5 MM x 23 mm    CORONARY STENT PLACEMENT      ENDOMETRIAL ABLATION      EPIDURAL BLOCK      GALLBLADDER SURGERY   2017    INCISION AND DRAINAGE LEG Left 11/20/2024    Procedure: INCISION AND DRAINAGE OF LEFT ANKLE;  Surgeon: Ravin Sidhu MD;  Location: [facility];  Service: Orthopedics;  Laterality: Left;    LUMBAR DISCECTOMY  1992    2 level - [facility]    LYMPH NODE BIOPSY      SHOULDER SURGERY  12/20/2018    Dr. Hoyt Rotator cuff repair         EVALUATION   PT Ortho       Row Name 02/03/25 1000       Subjective    Subjective Comments Reports mild itchiness/irriataion at distal portion of wound. No other issues/complaints.  -       Subjective Pain    Able to rate subjective pain? yes  -    Pre-Treatment Pain Level 2  -    Post-Treatment Pain Level 2  -       Transfers    Sit-Stand [place] (Transfers) independent  -    Stand-Sit [place] (Transfers) independent  -    Comment, (Transfers) long sitting for tx  -       Gait/Stairs (Locomotion)    [place] Level (Gait) independent  -              User Key  (r) = Recorded By, (t) = Taken By, (c) = Cosigned By      Initials Name Provider Type     Maximo Palencia, PT Physical Therapist                     LDA Wound       Row Name 02/03/25 1000             Wound 12/18/24 1030 Left lateral ankle    Wound - Properties Group Placement Date: 12/18/24  -MF Placement Time: 1030  -MF Side: Left  - Orientation: lateral  - Location: ankle  - Primary Wound Type: Incision  -MF    Wound Image Images linked: 1  -      Dressing Appearance intact;moist drainage  -      Base moist;necrotic;yellow;slough;subcutaneous;red;granulating;epithelialization  -      Periwound intact;dry;redness;swelling;warm;ecchymotic  min ecchymosis of posterior/medial ankle. Mild irritation/rash at distal periwound.  -      Periwound Temperature warm  -      Periwound Skin Turgor soft  -      Edges irregular  -      Wound Length (cm) 4 cm  -      Wound Width (cm) 1 cm  -      Wound Depth (cm) 0.4 cm  partially obscured  -      Wound Surface Area  "(cm^2) 4 cm^2  -      Wound Volume (cm^3) 1.6 cm^3  -      Drainage Characteristics/Odor serosanguineous  -      Drainage Amount small  -      Care, Wound cleansed with;wound cleanser;debrided;ultrasound therapy, non contact low frequency  MIST with vashe 6 minutes  -      Dressing Care dressing applied;silver impregnated;collagen;antimicrobial agent applied;foam;low-adherent;border dressing  brad, saline-moist HFBc, mepilex Ag, 6\" optifoam. Single layer size 4 compressogrip  -      Periwound Care cleansed with pH balanced cleanser;barrier ointment applied  zguard  -      Retired Wound - Properties Group Placement Date: 12/18/24  - Placement Time: 1030  -MF Side: Left  -MF Orientation: lateral  -MF Location: ankle  -MF Primary Wound Type: Incision  -MF    Retired Wound - Properties Group Placement Date: 12/18/24  - Placement Time: 1030  -MF Side: Left  -MF Orientation: lateral  -MF Location: ankle  -MF Primary Wound Type: Incision  -MF    Retired Wound - Properties Group Date first assessed: 12/18/24  - Time first assessed: 1030  -MF Side: Left  -MF Location: ankle  -MF Primary Wound Type: Incision  -MF              User Key  (r) = Recorded By, (t) = Taken By, (c) = Cosigned By      Initials Name Provider Type    Trace Cox, PT Physical Therapist    LH Maximo Palencia, PT Physical Therapist                      WOUND DEBRIDEMENT  Total area of Debridement: 4cm2  Debridement Site 1  Location- Site 1: L lateral ankle wound  Selective Debridement- Site 1: Wound Surface <20cmsq  Instruments- Site 1: tweezers  Excised Tissue Description- Site 1: moderate, slough  Bleeding- Site 1: scant, held pressure, 1 minute              Therapy Education       Row Name 02/03/25 1000             Therapy Education    Education Details Continue current POC.  -      Given Bandaging/dressing change  -      Program Reinforced  -      How Provided Verbal;Demonstration  -      Provided to Patient  " -      Level of Understanding Verbalized  -                User Key  (r) = Recorded By, (t) = Taken By, (c) = Cosigned By      Initials Name Provider Type     Maximo Palencia, PT Physical Therapist                    Recommendation and Plan   PT Assessment/Plan       Row Name 02/03/25 1000          PT Assessment    Functional Limitations Other (comment);Performance in self-care ADL;Performance in leisure activities  wound management\  -     Impairments Integumentary integrity;Pain  -     Assessment Comments Pt continuing to demonstrate slow, steady improvements in wound healing as pt with increasing granulation at wound base following debridement of slough and biofilm. Pt with improving wound dimensions this date with some new epithelialization of wound edges. Pt with mild irriatation/rash at distal periwound likely related to moisture, PT applied slightly thicker layer of zguard around this are to help manage. Pt would continue to benefit from current POC to promote wound healing.  -     Rehab Potential Fair  -     Patient/caregiver participated in establishment of treatment plan and goals Yes  -     Patient would benefit from skilled therapy intervention Yes  -        PT Plan    PT Frequency 2x/week  -     Physical Therapy Interventions (Optional Details) wound care;patient/family education  -     PT Plan Comments debridement, MIST, compression prn  -               User Key  (r) = Recorded By, (t) = Taken By, (c) = Cosigned By      Initials Name Provider Type     Maximo Palencia, PT Physical Therapist                    Goals   PT OP Goals       Row Name 02/03/25 1047          Time Calculation    PT Goal Re-Cert Due Date 03/18/25  -               User Key  (r) = Recorded By, (t) = Taken By, (c) = Cosigned By      Initials Name Provider Type     Maximo Palencia, PT Physical Therapist                    PT Goal Re-Cert Due Date: 03/18/25            Time Calculation: Start Time:  1015  Untimed Charges  87527-Jfwosdrrw debridement: 15  61512-VTHV Mist: 10  Total Minutes  Untimed Charges Total Minutes: 25   Total Minutes: 25  Therapy Charges for Today       Code Description Service Date Service Provider Modifiers Qty    95608605581 HC [facility] DEBRIDE OPEN WOUND UP TO 20CM 2/3/2025 Maximo Palencia, PT GP 1    88538770483 HC PT [facility] MIST 2/3/2025 Maximo Palencia, PT GP 1                    Maximo Palencia PT  2/3/2025

## 2025-02-06 ENCOUNTER — HOSPITAL ENCOUNTER (OUTPATIENT)
Dept: PHYSICAL THERAPY | Facility: HOSPITAL | Age: 65
Setting detail: THERAPIES SERIES
Discharge: HOME | End: 2025-02-06
Payer: MEDICARE

## 2025-02-06 DIAGNOSIS — S91.002D OPEN WOUND OF LEFT ANKLE WITH COMPLICATION, SUBSEQUENT ENCOUNTER: ICD-10-CM

## 2025-02-06 DIAGNOSIS — T81.89XD NONHEALING SURGICAL WOUND, SUBSEQUENT ENCOUNTER: Primary | ICD-10-CM

## 2025-02-06 DIAGNOSIS — R60.0 EDEMA OF LEFT LOWER LEG: ICD-10-CM

## 2025-02-06 PROCEDURE — 97605 NEG PRS WND THER DME<=50SQCM: CPT

## 2025-02-06 PROCEDURE — 97597 DBRDMT OPN WND 1ST 20 CM/<: CPT

## 2025-02-06 NOTE — THERAPY WOUND CARE TREATMENT
Outpatient Rehabilitation - Wound/Debridement Treatment Note   [place]     Patient Name: Justice Kiser  : 1960  MRN: [id number]  Today's Date: 2025                 Admit Date: 2025    Visit Dx:    ICD-10-CM ICD-9-CM   1. Nonhealing surgical wound, subsequent encounter  T81.89XD V58.89     998.83   2. Edema of left lower leg  R60.0 782.3   3. Open wound of left ankle with complication, subsequent encounter  S91.002D V58.89     891.1       Patient Active Problem List   Diagnosis    Essential hypertension    Coronary artery disease involving native coronary artery of native heart with angina pectoris    Hyperlipidemia LDL goal <70    Depression    Anxiety    Hyperglycemia    Paresthesia of right foot    Tremor    GERD (gastroesophageal reflux disease)    Arthritis    Pierson's esophagus without dysplasia    MCI (mild cognitive impairment)    Mitral valve insufficiency    Alcohol induced fatty liver    Alcohol abuse, in remission    Cerebral microvascular disease    Chronic bilateral low back pain with right-sided sciatica    Spinal stenosis, lumbar region, with neurogenic claudication    DDD (degenerative disc disease), lumbar    Psychophysiological insomnia    Left leg cellulitis    Severe protein-calorie malnutrition        Past Medical History:   Diagnosis Date    Arthritis     CHF (congestive heart failure)     Chronic pain disorder     Colon polyp     Depression     Difficulty walking     Elevated LFTs     GERD (gastroesophageal reflux disease)     History of colonic polyps     History of methicillin resistant Staphylococcus aureus     History of myocardial infarction     Hyperlipidemia     Hypertension         Past Surgical History:   Procedure Laterality Date    BACK SURGERY      CAROTID STENT      CORONARY ANGIOPLASTY WITH STENT PLACEMENT  2012    Circumflex Xscience V 3.5 MM x 23 mm    CORONARY STENT PLACEMENT      ENDOMETRIAL ABLATION      EPIDURAL BLOCK      GALLBLADDER SURGERY   2017    INCISION AND DRAINAGE LEG Left 11/20/2024    Procedure: INCISION AND DRAINAGE OF LEFT ANKLE;  Surgeon: Ravin Sidhu MD;  Location: [facility];  Service: Orthopedics;  Laterality: Left;    LUMBAR DISCECTOMY  1992    2 level - [facility]    LYMPH NODE BIOPSY      SHOULDER SURGERY  12/20/2018    Dr. Hoyt Rotator cuff repair         EVALUATION   PT Ortho       Row Name 02/06/25 1400       Subjective    Subjective Comments Pt reports some additional tenderness along the area of the lateral malleolus. Otherwise no changes.  -       Subjective Pain    Able to rate subjective pain? yes  -MC    Pre-Treatment Pain Level 4  -MC    Post-Treatment Pain Level 4  -MC    Subjective Pain Comment FACES  -       Transfers    Sit-Stand [place] (Transfers) independent  -MC    Stand-Sit [place] (Transfers) independent  -    Comment, (Transfers) long sitting for tx  -       Gait/Stairs (Locomotion)    [place] Level (Gait) independent  -              User Key  (r) = Recorded By, (t) = Taken By, (c) = Cosigned By      Initials Name Provider Type     Holly Bryant PT Physical Therapist                     [facility] Wound       Row Name 02/06/25 1400             Wound 12/18/24 1030 Left lateral ankle    Wound - Properties Group Placement Date: 12/18/24  -MF Placement Time: 1030  -MF Side: Left  -MF Orientation: lateral  -MF Location: ankle  -MF Primary Wound Type: Incision  -MF    Dressing Appearance intact;moist drainage  -      Base moist;necrotic;yellow;slough;subcutaneous;red;granulating;epithelialization  thin film of slough over most of the granulation  -      Periwound intact;redness;swelling;warm;dry  improved periwound, no notable discoloration  -      Periwound Temperature warm  -      Periwound Skin Turgor soft  -      Edges irregular  -      Drainage Characteristics/Odor serosanguineous  -      Drainage Amount small  -      Care, Wound cleansed with;wound  "cleanser;debrided;ultrasound therapy, non contact low frequency  MIST with vashe 5 minutes  -      Dressing Care dressing applied;silver impregnated;collagen;antimicrobial agent applied;foam;low-adherent;silicone border foam  brad, HFBt, mepilex Ag, 6\" optifoam, single layer size 4 compressogrip  -      Periwound Care cleansed with pH balanced cleanser;barrier ointment applied  zguard  -      Retired Wound - Properties Group Placement Date: 12/18/24  - Placement Time: 1030  -MF Side: Left  -MF Orientation: lateral  -MF Location: ankle  -MF Primary Wound Type: Incision  -MF    Retired Wound - Properties Group Placement Date: 12/18/24  - Placement Time: 1030  -MF Side: Left  -MF Orientation: lateral  -MF Location: ankle  -MF Primary Wound Type: Incision  -MF    Retired Wound - Properties Group Date first assessed: 12/18/24  - Time first assessed: 1030  -MF Side: Left  -MF Location: ankle  -MF Primary Wound Type: Incision  -MF              User Key  (r) = Recorded By, (t) = Taken By, (c) = Cosigned By      Initials Name Provider Type    MF Trace Max, PT Physical Therapist    Holly Sosa, PT Physical Therapist                      WOUND DEBRIDEMENT  Total area of Debridement: 3 cm2  Debridement Site 1  Location- Site 1: L lateral ankle wound  Selective Debridement- Site 1: Wound Surface <20cmsq  Instruments- Site 1: tweezers  Excised Tissue Description- Site 1: moderate, slough  Bleeding- Site 1: seeping, held pressure, 1 minute              Therapy Education       Row Name 02/06/25 1400             Therapy Education    Education Details Expained bias of foam lower on the ankle to pad the malleous. Be on the lookout for any additional signs of swelling or discoloration in the area. Explained use of HFBt vs HFBc to try to control slough development.  -MC      Given Bandaging/dressing change  -MC      Program Reinforced  -MC      How Provided Verbal;Demonstration  -MC      Provided to " Patient  -      Level of Understanding Verbalized  -                User Key  (r) = Recorded By, (t) = Taken By, (c) = Cosigned By      Initials Name Provider Type    Holly Sosa PT Physical Therapist                    Recommendation and Plan   PT Assessment/Plan       Row Name 02/06/25 1400          PT Assessment    Functional Limitations Other (comment);Performance in self-care ADL;Performance in leisure activities  wound management\  -     Impairments Integumentary integrity;Pain  -     Assessment Comments Pt with improved periwound color and status. Pt with some additional granulation, however this was almost all covered in a thin layer of slough/film. PT switched primary contact layer to HFBt to try to limit this slough development. Pt with some tenderness along the lateral malleolus, but there is no overt s/sx of infection or other process at this time. Pt will continue to benefit from skilled PT wound care to promote healing.  -     Rehab Potential Fair  -     Patient/caregiver participated in establishment of treatment plan and goals Yes  -     Patient would benefit from skilled therapy intervention Yes  -        PT Plan    PT Frequency 2x/week  -     Physical Therapy Interventions (Optional Details) wound care;patient/family education  -     PT Plan Comments debridement, MIST, compression prn  -               User Key  (r) = Recorded By, (t) = Taken By, (c) = Cosigned By      Initials Name Provider Type    Holly Sosa PT Physical Therapist                    Goals   PT OP Goals       Row Name 02/06/25 1427          Time Calculation    PT Goal Re-Cert Due Date 03/18/25  -               User Key  (r) = Recorded By, (t) = Taken By, (c) = Cosigned By      Initials Name Provider Type    Holly Sosa PT Physical Therapist                    PT Goal Re-Cert Due Date: 03/18/25            Time Calculation: Start Time: 1030  Untimed Charges  29662-Wprnczjfd  debridement: 15  50043-NMAX Mist: 10  Total Minutes  Untimed Charges Total Minutes: 25   Total Minutes: 25              Holly Bryant, PT  2/6/2025

## 2025-02-10 ENCOUNTER — HOSPITAL ENCOUNTER (OUTPATIENT)
Dept: PHYSICAL THERAPY | Facility: HOSPITAL | Age: 65
Setting detail: THERAPIES SERIES
Discharge: HOME | End: 2025-02-10
Payer: MEDICARE

## 2025-02-10 DIAGNOSIS — R60.0 EDEMA OF LEFT LOWER LEG: ICD-10-CM

## 2025-02-10 DIAGNOSIS — T81.89XD NONHEALING SURGICAL WOUND, SUBSEQUENT ENCOUNTER: Primary | ICD-10-CM

## 2025-02-10 DIAGNOSIS — S91.002D OPEN WOUND OF LEFT ANKLE WITH COMPLICATION, SUBSEQUENT ENCOUNTER: ICD-10-CM

## 2025-02-10 PROCEDURE — 97597 DBRDMT OPN WND 1ST 20 CM/<: CPT

## 2025-02-10 PROCEDURE — 97610 LOW FREQUENCY NON-THERMAL US: CPT

## 2025-02-10 NOTE — THERAPY WOUND CARE TREATMENT
Outpatient Rehabilitation - Wound/Debridement Treatment Note   [place]     Patient Name: Justice Kiser  : 1960  MRN: [id number]  Today's Date: 2/10/2025                 Admit Date: 2/10/2025    Visit Dx:    ICD-10-CM ICD-9-CM   1. Nonhealing surgical wound, subsequent encounter  T81.89XD V58.89     998.83   2. Edema of left lower leg  R60.0 782.3   3. Open wound of left ankle with complication, subsequent encounter  S91.002D V58.89     891.1     L lateral ankle            Patient Active Problem List   Diagnosis    Essential hypertension    Coronary artery disease involving native coronary artery of native heart with angina pectoris    Hyperlipidemia LDL goal <70    Depression    Anxiety    Hyperglycemia    Paresthesia of right foot    Tremor    GERD (gastroesophageal reflux disease)    Arthritis    Pierson's esophagus without dysplasia    MCI (mild cognitive impairment)    Mitral valve insufficiency    Alcohol induced fatty liver    Alcohol abuse, in remission    Cerebral microvascular disease    Chronic bilateral low back pain with right-sided sciatica    Spinal stenosis, lumbar region, with neurogenic claudication    DDD (degenerative disc disease), lumbar    Psychophysiological insomnia    Left leg cellulitis    Severe protein-calorie malnutrition        Past Medical History:   Diagnosis Date    Arthritis     CHF (congestive heart failure)     Chronic pain disorder     Colon polyp     Depression     Difficulty walking     Elevated LFTs     GERD (gastroesophageal reflux disease)     History of colonic polyps     History of methicillin resistant Staphylococcus aureus     History of myocardial infarction     Hyperlipidemia     Hypertension         Past Surgical History:   Procedure Laterality Date    BACK SURGERY      CAROTID STENT      CORONARY ANGIOPLASTY WITH STENT PLACEMENT  2012    Circumflex Xscience V 3.5 MM x 23 mm    CORONARY STENT PLACEMENT      ENDOMETRIAL ABLATION      EPIDURAL BLOCK       GALLBLADDER SURGERY  2017    INCISION AND DRAINAGE LEG Left 11/20/2024    Procedure: INCISION AND DRAINAGE OF LEFT ANKLE;  Surgeon: Ravin Sidhu MD;  Location: [facility];  Service: Orthopedics;  Laterality: Left;    LUMBAR DISCECTOMY  1992    2 level - [facility]    LYMPH NODE BIOPSY      SHOULDER SURGERY  12/20/2018    Dr. Hoyt Rotator cuff repair         EVALUATION   PT Ortho       Row Name 02/10/25 0900       Subjective    Subjective Comments No new issues/complaints.  -       Subjective Pain    Able to rate subjective pain? yes  -    Pre-Treatment Pain Level 2  -    Post-Treatment Pain Level 2  -    Subjective Pain Comment FACES  -       Transfers    Sit-Stand [place] (Transfers) independent  -    Stand-Sit [place] (Transfers) independent  -    Comment, (Transfers) long sitting for tx  -       Gait/Stairs (Locomotion)    [place] Level (Gait) independent  -              User Key  (r) = Recorded By, (t) = Taken By, (c) = Cosigned By      Initials Name Provider Type     Maximo Palencia, PT Physical Therapist                     LDA Wound       Row Name 02/10/25 0900             Wound 12/18/24 1030 Left lateral ankle    Wound - Properties Group Placement Date: 12/18/24  -MF Placement Time: 1030  -MF Side: Left  - Orientation: lateral  - Location: ankle  - Primary Wound Type: Incision  -    Wound Image Images linked: 2  -LH      Dressing Appearance intact;moist drainage  -      Base moist;necrotic;yellow;slough;subcutaneous;red;granulating;epithelialization  thin film of slough over most of the granulation  -      Periwound intact;redness;swelling;warm;dry  improved periwound, no notable discoloration  -      Periwound Temperature warm  -      Periwound Skin Turgor soft  -      Edges irregular  -      Wound Length (cm) 3.6 cm  -      Wound Width (cm) 0.7 cm  -      Wound Depth (cm) 0.3 cm  partially obscured  -      Wound Surface  "Area (cm^2) 2.52 cm^2  -      Wound Volume (cm^3) 0.756 cm^3  -      Drainage Characteristics/Odor serosanguineous  -      Drainage Amount small  -      Care, Wound cleansed with;wound cleanser;debrided;ultrasound therapy, non contact low frequency  MIST with vashe 5 minutes  -      Dressing Care dressing applied;silver impregnated;collagen;antimicrobial agent applied;foam;low-adherent;silicone border foam  brad, HFBt, mepilex Ag, 6\" optifoam, single layer size 4 compressogrip  -      Periwound Care cleansed with pH balanced cleanser;barrier ointment applied  nosting, zguard  -      Retired Wound - Properties Group Placement Date: 12/18/24  - Placement Time: 1030  -MF Side: Left  - Orientation: lateral  - Location: ankle  -MF Primary Wound Type: Incision  -MF    Retired Wound - Properties Group Placement Date: 12/18/24  - Placement Time: 1030  -MF Side: Left  - Orientation: lateral  - Location: ankle  -MF Primary Wound Type: Incision  -MF    Retired Wound - Properties Group Date first assessed: 12/18/24  - Time first assessed: 1030  -MF Side: Left  - Location: ankle  -MF Primary Wound Type: Incision  -MF              User Key  (r) = Recorded By, (t) = Taken By, (c) = Cosigned By      Initials Name Provider Type    Trace Cox, PT Physical Therapist     Maximo Palencia, PT Physical Therapist                      WOUND DEBRIDEMENT  Total area of Debridement: 2cm2  Debridement Site 1  Location- Site 1: L lateral ankle wound  Selective Debridement- Site 1: Wound Surface <20cmsq  Instruments- Site 1: tweezers  Excised Tissue Description- Site 1: moderate, slough, other (comment) (biofilm, crust)  Bleeding- Site 1: scant, held pressure, 1 minute              Therapy Education       Row Name 02/10/25 0900             Therapy Education    Education Details Continue current POC.  -      Given Bandaging/dressing change  -      Program Reinforced  -      How Provided " Verbal;Demonstration  -      Provided to Patient  -      Level of Understanding Verbalized  -                User Key  (r) = Recorded By, (t) = Taken By, (c) = Cosigned By      Initials Name Provider Type     Maximo Palencia, PT Physical Therapist                    Recommendation and Plan   PT Assessment/Plan       Row Name 02/10/25 0900          PT Assessment    Functional Limitations Other (comment);Performance in self-care ADL;Performance in leisure activities  wound management\  -     Impairments Integumentary integrity;Pain  -     Assessment Comments Pt's L lateral ankle wound continuing to demonstrate good improvements in wound dimensions with epithelialization of wound edges and improving granulation of wound base following debridement of thin layer of biofilm. Pt still with small area of periwound skin irritation at the distal/posterior aspect of the periwound. Pt would continue to benefit from current POC to promote wound healing.  -     Rehab Potential Fair  -     Patient/caregiver participated in establishment of treatment plan and goals Yes  -     Patient would benefit from skilled therapy intervention Yes  -        PT Plan    PT Frequency 2x/week  -     Physical Therapy Interventions (Optional Details) wound care;patient/family education  -     PT Plan Comments debridement, MIST, compression prn  -               User Key  (r) = Recorded By, (t) = Taken By, (c) = Cosigned By      Initials Name Provider Type     Maximo Palencia, PT Physical Therapist                    Goals   PT OP Goals       Row Name 02/10/25 0910          Time Calculation    PT Goal Re-Cert Due Date 03/18/25  -               User Key  (r) = Recorded By, (t) = Taken By, (c) = Cosigned By      Initials Name Provider Type     Maximo Palencia, PT Physical Therapist                    PT Goal Re-Cert Due Date: 03/18/25            Time Calculation: Start Time: 0845  Untimed Charges  79162-Pkcztbzri  debridement: 15  16190-VXMD Mist: 10  Total Minutes  Untimed Charges Total Minutes: 25   Total Minutes: 25  Therapy Charges for Today       Code Description Service Date Service Provider Modifiers Qty    34534448516  BROOKE DEBRIDE OPEN WOUND UP TO 20CM 2/10/2025 Maximo Palencia, PT GP 1    [id number]  PT NLFU MIST 2/10/2025 Maximo Palencia, PT GP 1                    Maximo Palencia, PT  2/10/2025

## 2025-02-13 ENCOUNTER — HOSPITAL ENCOUNTER (OUTPATIENT)
Dept: PHYSICAL THERAPY | Facility: HOSPITAL | Age: 65
Setting detail: THERAPIES SERIES
Discharge: HOME | End: 2025-02-13
Payer: MEDICARE

## 2025-02-13 DIAGNOSIS — S91.002D OPEN WOUND OF LEFT ANKLE WITH COMPLICATION, SUBSEQUENT ENCOUNTER: ICD-10-CM

## 2025-02-13 DIAGNOSIS — T81.89XD NONHEALING SURGICAL WOUND, SUBSEQUENT ENCOUNTER: Primary | ICD-10-CM

## 2025-02-13 DIAGNOSIS — R60.0 EDEMA OF LEFT LOWER LEG: ICD-10-CM

## 2025-02-13 PROCEDURE — 97597 DBRDMT OPN WND 1ST 20 CM/<: CPT

## 2025-02-13 PROCEDURE — 97610 LOW FREQUENCY NON-THERMAL US: CPT

## 2025-02-13 NOTE — THERAPY PROGRESS REPORT/RE-CERT
Outpatient Rehabilitation - Wound/Debridement Progress Note   [place]     Patient Name: Justice Kiser  : 1960  MRN: [id number]  Today's Date: 2025                 Admit Date: 2025    Visit Dx:    ICD-10-CM ICD-9-CM   1. Nonhealing surgical wound, subsequent encounter  T81.89XD V58.89     998.83   2. Edema of left lower leg  R60.0 782.3   3. Open wound of left ankle with complication, subsequent encounter  S91.002D V58.89     891.1       Patient Active Problem List   Diagnosis    Essential hypertension    Coronary artery disease involving native coronary artery of native heart with angina pectoris    Hyperlipidemia LDL goal <70    Depression    Anxiety    Hyperglycemia    Paresthesia of right foot    Tremor    GERD (gastroesophageal reflux disease)    Arthritis    Pierson's esophagus without dysplasia    MCI (mild cognitive impairment)    Mitral valve insufficiency    Alcohol induced fatty liver    Alcohol abuse, in remission    Cerebral microvascular disease    Chronic bilateral low back pain with right-sided sciatica    Spinal stenosis, lumbar region, with neurogenic claudication    DDD (degenerative disc disease), lumbar    Psychophysiological insomnia    Left leg cellulitis    Severe protein-calorie malnutrition        Past Medical History:   Diagnosis Date    Arthritis     CHF (congestive heart failure)     Chronic pain disorder     Colon polyp     Depression     Difficulty walking     Elevated LFTs     GERD (gastroesophageal reflux disease)     History of colonic polyps     History of methicillin resistant Staphylococcus aureus     History of myocardial infarction     Hyperlipidemia     Hypertension         Past Surgical History:   Procedure Laterality Date    BACK SURGERY      CAROTID STENT      CORONARY ANGIOPLASTY WITH STENT PLACEMENT  2012    Circumflex Xscience V 3.5 MM x 23 mm    CORONARY STENT PLACEMENT      ENDOMETRIAL ABLATION      EPIDURAL BLOCK      GALLBLADDER SURGERY   "2017    INCISION AND DRAINAGE LEG Left 11/20/2024    Procedure: INCISION AND DRAINAGE OF LEFT ANKLE;  Surgeon: Ravin Sidhu MD;  Location: [facility];  Service: Orthopedics;  Laterality: Left;    LUMBAR DISCECTOMY  1992    2 level - [facility]    LYMPH NODE BIOPSY      SHOULDER SURGERY  12/20/2018    Dr. Hoyt Rotator cuff repair         EVALUATION   PT Ortho       Row Name 02/13/25 0900       Subjective    Subjective Comments No new complaints, mostly having pain distal to L ankle wound and posterior ankle area.  Ongoing edema that fluctuates, pt states he has been removing compression at night due to it rubbing his anterior ankle.  Pt continues on oral abx until next follow-up with Dr. Quijano at [facility] in a couple weeks.  -       Subjective Pain    Able to rate subjective pain? yes  -    Pre-Treatment Pain Level 1  -    Post-Treatment Pain Level 1  -    Subjective Pain Comment \"just a little bit\"  -       Transfers    Sit-Stand [place] (Transfers) independent  -    Stand-Sit [place] (Transfers) independent  -    Comment, (Transfers) long sitting for tx  -       Gait/Stairs (Locomotion)    [place] Level (Gait) independent  -              User Key  (r) = Recorded By, (t) = Taken By, (c) = Cosigned By      Initials Name Provider Type    Kristin Zamora PT Physical Therapist                     LDA Wound       Row Name 02/13/25 0900             Wound 12/18/24 1030 Left lateral ankle    Wound - Properties Group Placement Date: 12/18/24  - Placement Time: 1030  -MF Side: Left  - Orientation: lateral  - Location: ankle  - Primary Wound Type: Incision  -    Wound Image Images linked: 1  -JM      Dressing Appearance intact;moist drainage  -      Base moist;necrotic;yellow;slough;subcutaneous;red;granulating;epithelialization  skin bridges forming, scant slough in depth only  -      Periwound intact;redness;swelling;warm;dry  scant immediate maceration  -CHANTEL " "     Periwound Temperature warm  -      Periwound Skin Turgor soft  -      Edges irregular  -      Wound Length (cm) 3.3 cm  -      Wound Width (cm) 0.5 cm  -      Wound Depth (cm) 0.2 cm  -      Wound Surface Area (cm^2) 1.65 cm^2  -JM      Wound Volume (cm^3) 0.33 cm^3  -JM      Drainage Characteristics/Odor serosanguineous  -      Drainage Amount small  -      Care, Wound cleansed with;wound cleanser;debrided;ultrasound therapy, non contact low frequency  MIST with vashe x5min  -      Dressing Care dressing applied;collagen;antimicrobial agent applied;foam;silicone border foam  brad, HFBt, 4\" optifoam, single size 4 compressogrip  -      Periwound Care cleansed with pH balanced cleanser;dry periwound area maintained;barrier ointment applied  zguard  -      Retired Wound - Properties Group Placement Date: 12/18/24  - Placement Time: 1030  -MF Side: Left  - Orientation: lateral  - Location: ankle  -MF Primary Wound Type: Incision  -MF    Retired Wound - Properties Group Placement Date: 12/18/24  - Placement Time: 1030  -MF Side: Left  - Orientation: lateral  - Location: ankle  -MF Primary Wound Type: Incision  -MF    Retired Wound - Properties Group Date first assessed: 12/18/24  - Time first assessed: 1030  -MF Side: Left  - Location: ankle  -MF Primary Wound Type: Incision  -MF              User Key  (r) = Recorded By, (t) = Taken By, (c) = Cosigned By      Initials Name Provider Type    Trace Cox, PT Physical Therapist    Kristin Zamora, HOMER Physical Therapist                      WOUND DEBRIDEMENT  Total area of Debridement: 1cmsq  Debridement Site 1  Location- Site 1: L lateral ankle wound  Selective Debridement- Site 1: Wound Surface <20cmsq  Instruments- Site 1: tweezers  Excised Tissue Description- Site 1: scant, slough  Bleeding- Site 1: none              Therapy Education       Row Name 02/13/25 0900             Therapy Education    Education " Details Trial of using smaller optifoam and d/c of ag foam, pt can resume previous dressings PRN if bandage become saturated or if smaller dressing doesn't stay in place as well.  Continue leg elevation and compression, will likely benefit from compression socks after wound heals.  -JM      Given Bandaging/dressing change  -      Program Reinforced;Progressed  -CHANTEL      How Provided Verbal;Demonstration  -      Provided to Patient  -      Level of Understanding Verbalized  -                User Key  (r) = Recorded By, (t) = Taken By, (c) = Cosigned By      Initials Name Provider Type    Kristin Zamora, PT Physical Therapist                    Recommendation and Plan   PT Assessment/Plan       Row Name 25 0900          PT Assessment    Functional Limitations Other (comment);Performance in self-care ADL;Performance in leisure activities  wound management\  -     Impairments Integumentary integrity;Pain  -     Assessment Comments Pt with improved L ankle wound with new skin bridges forming and less slough, decreased wound dimensions.  Pt has met STGs and LTGs, PT added new LTG for wound closure to reflect good progress.  Pt still having edema of LLE, may benefit from compression socks/stockings long-term.  Pt will continue to benefit from skilled PT for wound debridement, dressings management, and MIST to increase local blood flow and granulation.  Continue with POC.  -     Rehab Potential Good  -     Patient/caregiver participated in establishment of treatment plan and goals Yes  -     Patient would benefit from skilled therapy intervention Yes  -        PT Plan    PT Frequency 2x/week  -     Predicted Duration of Therapy Intervention (PT) 12 visits  -     Planned CPT's? PT BROOKE DEBRIDE OPEN WOUND UP TO 20 CM: 95555;PT NONSELECT DEBRIDE 15 MIN: 34594;PT NLFU MIST: 81648;PT MULTI LAYER COMP SYS LE;PT SELF CARE/MGMT/TRAIN 15 MIN: 56762  -     Physical Therapy Interventions  (Optional Details) patient/family education;wound care  -     PT Plan Comments MIST, debridement  -               User Key  (r) = Recorded By, (t) = Taken By, (c) = Cosigned By      Initials Name Provider Type    Kristin Zamora, PT Physical Therapist                    Goals   PT OP Goals       Row Name 02/13/25 0945 02/13/25 0900       PT Short Term Goals    STG Date to Achieve -- 02/01/25  -    STG 1 -- Decrease L ankle wound size by 25% as evidence of wound healing.  -    STG 1 Progress -- Met  -    STG 2 -- Increase granulation to L ankle wound to > 50% to improve healing potential.  -    STG 2 Progress -- Met  -    STG 3 -- Pt able to verb / demonstrate home dressing management to progress towards independent management.  -    STG 3 Progress -- Met  -       Long Term Goals    LTG Date to Achieve -- 03/18/25  -    LTG 1 -- Decrease L ankle wound size by 75% as evidence of wound healing.  -    LTG 1 Progress -- Met  -    LTG 2 -- Increase granulation to L ankle wound to > 85% to improve healing potential.  -    LTG 2 Progress -- Met  -    LTG 3 -- Patient's left ankle wound to be closed/resurfaced to allow for return to prior independent activities.  -    LTG 3 Progress -- [facility]  -       Time Calculation    PT Goal Re-Cert Due Date 03/18/25  - 03/18/25  -              User Key  (r) = Recorded By, (t) = Taken By, (c) = Cosigned By      Initials Name Provider Type    Kristin Zamora, PT Physical Therapist                    PT Goal Re-Cert Due Date: 03/18/25  PT Short Term Goals  STG Date to Achieve: 02/01/25  STG 1: Decrease L ankle wound size by 25% as evidence of wound healing.  STG 1 Progress: Met  STG 2: Increase granulation to L ankle wound to > 50% to improve healing potential.  STG 2 Progress: Met  STG 3: Pt able to verb / demonstrate home dressing management to progress towards independent management.  STG 3 Progress: Met  Long Term Goals  LTG Date to Achieve:  03/18/25  LTG 1: Decrease L ankle wound size by 75% as evidence of wound healing.  LTG 1 Progress: Met  LTG 2: Increase granulation to L ankle wound to > 85% to improve healing potential.  LTG 2 Progress: Met  LTG 3: Patient's left ankle wound to be closed/resurfaced to allow for return to prior independent activities.  LTG 3 Progress: New      Time Calculation: Start Time: 0900  Untimed Charges  61747-Zdfdsvxsi debridement: 15  92520-JSWP Mist: 10  Total Minutes  Untimed Charges Total Minutes: 25   Total Minutes: 25  Therapy Charges for Today       Code Description Service Date Service Provider Modifiers Qty    52551700998 HC BROOKE DEBRIDE OPEN WOUND UP TO 20CM 2/13/2025 Kristin Davila, PT GP 1    [id number] HC PT NLFU MIST 2/13/2025 Kristin Davila, PT GP 1                    Kristin Davila, PT  2/13/2025

## 2025-02-14 DIAGNOSIS — I10 ESSENTIAL HYPERTENSION: ICD-10-CM

## 2025-02-14 RX ORDER — LISINOPRIL AND HYDROCHLOROTHIAZIDE 12.5; 2 MG/1; MG/1
1 TABLET ORAL DAILY
Qty: 90 TABLET | Refills: 0 | Status: SHIPPED | OUTPATIENT
Start: 2025-02-14

## 2025-02-14 NOTE — TELEPHONE ENCOUNTER
Lab Results   Component Value Date    GLUCOSE 100 (H) 01/09/2025    CALCIUM 9.4 01/09/2025     01/09/2025    K 4.3 01/09/2025    CO2 27.0 01/09/2025     01/09/2025    BUN 15 01/09/2025    CREATININE 0.87 01/09/2025    EGFR 95.2 01/09/2025    BCR 17.2 01/09/2025    ANIONGAP 10.0 01/09/2025

## 2025-02-17 ENCOUNTER — HOSPITAL ENCOUNTER (OUTPATIENT)
Dept: PHYSICAL THERAPY | Facility: HOSPITAL | Age: 65
Setting detail: THERAPIES SERIES
Discharge: HOME | End: 2025-02-17
Payer: MEDICARE

## 2025-02-17 DIAGNOSIS — S91.002D OPEN WOUND OF LEFT ANKLE WITH COMPLICATION, SUBSEQUENT ENCOUNTER: ICD-10-CM

## 2025-02-17 DIAGNOSIS — R60.0 EDEMA OF LEFT LOWER LEG: ICD-10-CM

## 2025-02-17 DIAGNOSIS — T81.89XD NONHEALING SURGICAL WOUND, SUBSEQUENT ENCOUNTER: Primary | ICD-10-CM

## 2025-02-17 PROCEDURE — 97597 DBRDMT OPN WND 1ST 20 CM/<: CPT

## 2025-02-17 PROCEDURE — 97610 LOW FREQUENCY NON-THERMAL US: CPT

## 2025-02-17 NOTE — THERAPY WOUND CARE TREATMENT
Outpatient Rehabilitation - Wound/Debridement Treatment Note   La[place]     Patient Name: Justice Kiser  : 1960  MRN: [id number]  Today's Date: 2025                 Admit Date: 2025    Visit Dx:    ICD-10-CM ICD-9-CM   1. Nonhealing surgical wound, subsequent encounter  T81.89XD V58.89     998.83   2. Edema of left lower leg  R60.0 782.3   3. Open wound of left ankle with complication, subsequent encounter  S91.002D V58.89     891.1             Patient Active Problem List   Diagnosis    Essential hypertension    Coronary artery disease involving native coronary artery of native heart with angina pectoris    Hyperlipidemia LDL goal <70    Depression    Anxiety    Hyperglycemia    Paresthesia of right foot    Tremor    GERD (gastroesophageal reflux disease)    Arthritis    Pierson's esophagus without dysplasia    MCI (mild cognitive impairment)    Mitral valve insufficiency    Alcohol induced fatty liver    Alcohol abuse, in remission    Cerebral microvascular disease    Chronic bilateral low back pain with right-sided sciatica    Spinal stenosis, lumbar region, with neurogenic claudication    DDD (degenerative disc disease), lumbar    Psychophysiological insomnia    Left leg cellulitis    Severe protein-calorie malnutrition        Past Medical History:   Diagnosis Date    Arthritis     CHF (congestive heart failure)     Chronic pain disorder     Colon polyp     Depression     Difficulty walking     Elevated LFTs     GERD (gastroesophageal reflux disease)     History of colonic polyps     History of methicillin resistant Staphylococcus aureus     History of myocardial infarction     Hyperlipidemia     Hypertension         Past Surgical History:   Procedure Laterality Date    BACK SURGERY      CAROTID STENT      CORONARY ANGIOPLASTY WITH STENT PLACEMENT  2012    Circumflex Xscience V 3.5 MM x 23 mm    CORONARY STENT PLACEMENT      ENDOMETRIAL ABLATION      EPIDURAL BLOCK      GALLBLADDER  SURGERY  2017    INCISION AND DRAINAGE LEG Left 11/20/2024    Procedure: INCISION AND DRAINAGE OF LEFT ANKLE;  Surgeon: Ravin Sidhu MD;  Location: [facility];  Service: Orthopedics;  Laterality: Left;    LUMBAR DISCECTOMY  1992    2 level - [facility]    LYMPH NODE BIOPSY      SHOULDER SURGERY  12/20/2018    Dr. Hoyt Rotator cuff repair         EVALUATION   PT Ortho       Row Name 02/17/25 1030       Subjective    Subjective Comments No wound pain, just general aching of L ankle with weather changes.  -       Subjective Pain    Able to rate subjective pain? yes  -    Pre-Treatment Pain Level 0  -JM    Post-Treatment Pain Level 0  -JM       Transfers    Sit-Stand [place] (Transfers) independent  -JM    Stand-Sit [place] (Transfers) independent  -    Comment, (Transfers) long sitting for tx  -JM       Gait/Stairs (Locomotion)    [place] Level (Gait) independent  -              User Key  (r) = Recorded By, (t) = Taken By, (c) = Cosigned By      Initials Name Provider Type    Kristin Zamora PT Physical Therapist                     [facility] Wound       Row Name 02/17/25 1030             Wound 12/18/24 1030 Left lateral ankle    Wound - Properties Group Placement Date: 12/18/24  -MF Placement Time: 1030  -MF Side: Left  -MF Orientation: lateral  -MF Location: ankle  -MF Primary Wound Type: Incision  -MF    Wound Image Images linked: 2  -JM      Dressing Appearance intact;moist drainage  -      Base moist;yellow;slough;subcutaneous;red;granulating;epithelialization  multiple skin bridges, centrally granulating with min slough  -      Periwound intact;redness;swelling;warm;dry  scant immediate maceration  -      Periwound Temperature warm  -      Periwound Skin Turgor soft  -      Edges irregular  -      Drainage Characteristics/Odor serosanguineous  -      Drainage Amount small  -      Care, Wound cleansed with;wound cleanser;debrided;ultrasound therapy, non  "contact low frequency  MIST with vashe x5min  -      Dressing Care dressing applied;collagen;antimicrobial agent applied;foam;silicone border foam  brad, HFBt, 4\" optifoam, single size 4 compressogrip  -      Periwound Care cleansed with pH balanced cleanser;dry periwound area maintained;barrier ointment applied  zguard  -      Retired Wound - Properties Group Placement Date: 12/18/24  - Placement Time: 1030  -MF Side: Left  -MF Orientation: lateral  -MF Location: ankle  -MF Primary Wound Type: Incision  -MF    Retired Wound - Properties Group Placement Date: 12/18/24  - Placement Time: 1030  -MF Side: Left  -MF Orientation: lateral  -MF Location: ankle  -MF Primary Wound Type: Incision  -MF    Retired Wound - Properties Group Date first assessed: 12/18/24  - Time first assessed: 1030  -MF Side: Left  -MF Location: ankle  -MF Primary Wound Type: Incision  -MF              User Key  (r) = Recorded By, (t) = Taken By, (c) = Cosigned By      Initials Name Provider Type    Trace Cox, PT Physical Therapist    Kristin Zamora, PT Physical Therapist                      WOUND DEBRIDEMENT  Total area of Debridement: 1cmsq  Debridement Site 1  Location- Site 1: L lateral ankle wound  Selective Debridement- Site 1: Wound Surface <20cmsq  Instruments- Site 1: tweezers  Excised Tissue Description- Site 1: scant, slough  Bleeding- Site 1: scant              Therapy Education       Row Name 02/17/25 1030             Therapy Education    Education Details Continuation of current dressings, likely will have wound closure in next few days, but anticipate ongoing ankle edema and pt may benefit from compression socks after wound is resolved.  -JM      Given Bandaging/dressing change  -CHANTEL      Program Reinforced;Progressed  -CHANTEL      How Provided Verbal;Demonstration  -JM      Provided to Patient  -JM      Level of Understanding Verbalized  -                User Key  (r) = Recorded By, (t) = Taken By, " (c) = Cosigned By      Initials Name Provider Type    Kristin Zamora, PT Physical Therapist                    Recommendation and Plan   PT Assessment/Plan       Row Name 02/17/25 1030          PT Assessment    Functional Limitations Other (comment);Performance in self-care ADL;Performance in leisure activities  wound management\  -     Impairments Integumentary integrity;Pain  -     Assessment Comments Pt's wound with multiple new skin bridges, with small remaining open area centrally that is granulating with min slough buildup.  Pt likely to have wound closure in next 1-2 weeks.  -     Rehab Potential Good  -     Patient/caregiver participated in establishment of treatment plan and goals Yes  -     Patient would benefit from skilled therapy intervention Yes  -        PT Plan    PT Frequency 2x/week  -     Physical Therapy Interventions (Optional Details) patient/family education;wound care  -     PT Plan Comments MIST, debridement  -               User Key  (r) = Recorded By, (t) = Taken By, (c) = Cosigned By      Initials Name Provider Type    Kristin Zamora, PT Physical Therapist                    Goals   PT OP Goals       Row Name 02/17/25 1059          Time Calculation    PT Goal Re-Cert Due Date 03/18/25  -               User Key  (r) = Recorded By, (t) = Taken By, (c) = Cosigned By      Initials Name Provider Type    Kristin Zamora, PT Physical Therapist                    PT Goal Re-Cert Due Date: 03/18/25            Time Calculation: Start Time: 1030  Untimed Charges  45795-Zgbrtzvmh debridement: 15  00880-EORA Mist: 10  Total Minutes  Untimed Charges Total Minutes: 25   Total Minutes: 25  Therapy Charges for Today       Code Description Service Date Service Provider Modifiers Qty    [id number]  [facility]KE DEBRIDE OPEN WOUND UP TO 20CM 2/17/2025 Kristin Davila, PT GP 1    [id number] HC PT [facility] MIST 2/17/2025 Kristin Davila, PT GP 1                    Kristin  ETHAN Davila, PT  2/17/2025

## 2025-02-20 ENCOUNTER — OFFICE VISIT (OUTPATIENT)
Dept: FAMILY MEDICINE CLINIC | Facility: CLINIC | Age: 65
End: 2025-02-20
Payer: MEDICARE

## 2025-02-20 ENCOUNTER — HOSPITAL ENCOUNTER (OUTPATIENT)
Dept: PHYSICAL THERAPY | Facility: HOSPITAL | Age: 65
Setting detail: THERAPIES SERIES
Discharge: HOME | End: 2025-02-20
Payer: MEDICARE

## 2025-02-20 VITALS
HEART RATE: 71 BPM | SYSTOLIC BLOOD PRESSURE: 130 MMHG | BODY MASS INDEX: 29.09 KG/M2 | TEMPERATURE: 97.2 F | WEIGHT: 174.6 LBS | DIASTOLIC BLOOD PRESSURE: 62 MMHG | HEIGHT: 65 IN | RESPIRATION RATE: 16 BRPM | OXYGEN SATURATION: 96 %

## 2025-02-20 DIAGNOSIS — Z00.00 MEDICARE ANNUAL WELLNESS VISIT, SUBSEQUENT: Primary | ICD-10-CM

## 2025-02-20 DIAGNOSIS — G31.84 MCI (MILD COGNITIVE IMPAIRMENT): ICD-10-CM

## 2025-02-20 DIAGNOSIS — S91.002D OPEN WOUND OF LEFT ANKLE WITH COMPLICATION, SUBSEQUENT ENCOUNTER: ICD-10-CM

## 2025-02-20 DIAGNOSIS — Z87.891 HISTORY OF NICOTINE DEPENDENCE: ICD-10-CM

## 2025-02-20 DIAGNOSIS — K70.0 ALCOHOL INDUCED FATTY LIVER: ICD-10-CM

## 2025-02-20 DIAGNOSIS — Z12.2 SCREENING FOR LUNG CANCER: ICD-10-CM

## 2025-02-20 DIAGNOSIS — I10 ESSENTIAL HYPERTENSION: ICD-10-CM

## 2025-02-20 DIAGNOSIS — R60.0 EDEMA OF LEFT LOWER LEG: ICD-10-CM

## 2025-02-20 DIAGNOSIS — Z87.891 PERSONAL HISTORY OF TOBACCO USE, PRESENTING HAZARDS TO HEALTH: ICD-10-CM

## 2025-02-20 DIAGNOSIS — T81.89XD NONHEALING SURGICAL WOUND, SUBSEQUENT ENCOUNTER: Primary | ICD-10-CM

## 2025-02-20 DIAGNOSIS — K22.70 BARRETT'S ESOPHAGUS WITHOUT DYSPLASIA: ICD-10-CM

## 2025-02-20 PROCEDURE — 97597 DBRDMT OPN WND 1ST 20 CM/<: CPT

## 2025-02-20 PROCEDURE — 3075F SYST BP GE 130 - 139MM HG: CPT | Performed by: FAMILY MEDICINE

## 2025-02-20 PROCEDURE — 97610 LOW FREQUENCY NON-THERMAL US: CPT

## 2025-02-20 PROCEDURE — 99213 OFFICE O/P EST LOW 20 MIN: CPT | Performed by: FAMILY MEDICINE

## 2025-02-20 PROCEDURE — 1170F FXNL STATUS ASSESSED: CPT | Performed by: FAMILY MEDICINE

## 2025-02-20 PROCEDURE — G0439 PPPS, SUBSEQ VISIT: HCPCS | Performed by: FAMILY MEDICINE

## 2025-02-20 PROCEDURE — 1125F AMNT PAIN NOTED PAIN PRSNT: CPT | Performed by: FAMILY MEDICINE

## 2025-02-20 PROCEDURE — 3078F DIAST BP <80 MM HG: CPT | Performed by: FAMILY MEDICINE

## 2025-02-20 NOTE — PROGRESS NOTES
Subjective   The ABCs of the Annual Wellness Visit  Medicare Wellness Visit      Justice 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 patient who presents for a Medicare Wellness Visit.    The following portions of the patient's history were reviewed and   updated as appropriate: allergies, current medications, past medical history, past social history, past surgical history, and problem list.    Compared to one year ago, the patient's physical   health is the same.  Compared to one year ago, the patient's mental   health is worse.    Recent Hospitalizations:  This patient has had a [facility] admission record on file within the last 365 days.  Current Medical Providers:  Patient Care Team:  Anshul Martinez MD as PCP - General  Anshul Martinez MD as PCP - Family Medicine  Tacho Crouch IV, MD as Cardiologist (Interventional Cardiology)  Erick Hoyt MD as Surgeon (Orthopedic Surgery)  Maximo Li MD as Consulting Physician (Pain Medicine)  Katelyn Domínguez APRN as Nurse Practitioner (Interventional Cardiology)    Outpatient Medications Prior to Visit   Medication Sig Dispense Refill    atorvastatin (LIPITOR) 80 MG tablet Take 1 tablet by mouth every night at bedtime. 90 tablet 3    donepezil (ARICEPT) 10 MG tablet Take 1 tablet by mouth Every Night. 90 tablet 3    lisinopril-hydrochlorothiazide (PRINZIDE,ZESTORETIC) 20-12.5 MG per tablet Take 1 tablet by mouth once daily 90 tablet 0    memantine (NAMENDA) 10 MG tablet Take 1 tablet by mouth 2 (Two) Times a Day. 180 tablet 3    metoprolol tartrate (LOPRESSOR) 25 MG tablet Take 1 tablet by mouth 2 (Two) Times a Day. 180 tablet 3    mirtazapine (REMERON) 30 MG tablet Take 1 tablet by mouth Every Night. 90 tablet 3    naloxone (NARCAN) 4 MG/0.1ML nasal spray Call 911. Don't prime. [place] in 1 nostril for overdose. Repeat in 2-3 minutes in other nostril if no or minimal breathing/responsiveness. 2 each 0    omeprazole (priLOSEC) 40 MG capsule TAKE 1 CAPSULE BY  MOUTH ONCE DAILY . APPOINTMENT REQUIRED FOR FUTURE REFILLS 90 capsule 0    saccharomyces boulardii (FLORASTOR) 250 MG capsule Take 1 capsule by mouth 2 (Two) Times a Day. 28 capsule 0    sertraline (ZOLOFT) 100 MG tablet Take 2 tablets by mouth once daily 180 tablet 0    oxyCODONE-acetaminophen (Percocet) 5-325 MG per tablet Take 1 tablet by mouth Every 12 (Twelve) Hours As Needed for Moderate Pain. (Patient not taking: Reported on 2/20/2025) 12 tablet 0     No facility-administered medications prior to visit.     Opioid medication/s are on active medication list.  and I have evaluated his active treatment plan and pain score trends (see table).  Vitals:    02/20/25 1156   PainSc: 3    PainLoc: Ankle     I have reviewed the chart for potential of high risk medication and harmful drug interactions in the elderly.        Aspirin is not on active medication list.  Aspirin use is not indicated based on review of current medical condition/s. Risk of harm outweighs potential benefits.  .    Patient Active Problem List   Diagnosis    Essential hypertension    Coronary artery disease involving native coronary artery of native heart with angina pectoris    Hyperlipidemia LDL goal <70    Depression    Anxiety    Hyperglycemia    Paresthesia of right foot    Tremor    GERD (gastroesophageal reflux disease)    Arthritis    Pierson's esophagus without dysplasia    MCI (mild cognitive impairment)    Mitral valve insufficiency    Alcohol induced fatty liver    Alcohol abuse, in remission    Cerebral microvascular disease    Chronic bilateral low back pain with right-sided sciatica    Spinal stenosis, lumbar region, with neurogenic claudication    DDD (degenerative disc disease), lumbar    Psychophysiological insomnia    Left leg cellulitis    Severe protein-calorie malnutrition     Advance Care Planning Advance Directive is not on file.  ACP discussion was held with the patient during this visit. Patient does not have an advance  "directive, information provided.            Objective   Vitals:    25 1156   BP: 130/62   BP Location: Left arm   Patient Position: Sitting   Cuff Size: Adult   Pulse: 71   Resp: 16   Temp: 97.2 °F (36.2 °C)   TempSrc: Infrared   SpO2: 96%   Weight: 79.2 kg (174 lb 9.6 oz)   Height: 165.1 cm (65\")   PainSc: 3    PainLoc: Ankle       Estimated body mass index is 29.05 kg/m² as calculated from the following:    Height as of this encounter: 165.1 cm (65\").    Weight as of this encounter: 79.2 kg (174 lb 9.6 oz).                Does the patient have evidence of cognitive impairment? Yes, Sees [facility]  Risk Assessment    Smoking Status:  Social History     Tobacco Use   Smoking Status Former    Current packs/day: 0.00    Average packs/day: 1.5 packs/day for 36.0 years (54.0 ttl pk-yrs)    Types: Cigarettes    Start date:     Quit date: 2011    Years since quittin.1    Passive exposure: Past   Smokeless Tobacco Never     Alcohol Consumption:  Social History     Substance and Sexual Activity   Alcohol Use Not Currently    Comment: 5 mixed drinks per day. recently quit all alcohol 2020       Fall Risk Screen  STEADI Fall Risk Assessment was completed, and patient is at LOW risk for falls.Assessment completed on:2025    Depression Screening   Little interest or pleasure in doing things? Several days   Feeling down, depressed, or hopeless? Not at all   PHQ-2 Total Score 1      Health Habits and Functional and Cognitive Screenin/20/2025    12:02 PM   Functional & Cognitive Status   Do you have difficulty preparing food and eating? No   Do you have difficulty bathing yourself, getting dressed or grooming yourself? No   Do you have difficulty using the toilet? No   Do you have difficulty moving around from place to place? No   Do you have trouble with steps or getting out of a bed or a " chair? No   Current Diet Unhealthy Diet   Dental Exam Up to date   Eye Exam Not up to date   Exercise (times per week) 0 times per week   Current Exercises Include No Regular Exercise   Do you need help using the phone?  No   Are you deaf or do you have serious difficulty hearing?  No   Do you need help to go to places out of walking distance? No   Do you need help shopping? No   Do you need help preparing meals?  No   Do you need help with housework?  No   Do you need help with laundry? No   Do you need help taking your medications? No   Do you need help managing money? No   Do you ever drive or ride in a car without wearing a seat belt? No   Have you felt unusual stress, anger or loneliness in the last month? No   Who do you live with? Child   If you need help, do you have trouble finding someone available to you? No   Have you been bothered in the last four weeks by sexual problems? Yes   Do you have difficulty concentrating, remembering or making decisions? Yes           Age-appropriate Screening Schedule:  Refer to the list below for future screening recommendations based on patient's age, sex and/or medical conditions. Orders for these recommended tests are listed in the plan section. The patient has been provided with a written plan.    Health Maintenance List  Health Maintenance   Topic Date Due    TDAP/TD VACCINES (1 - Tdap) Never done    ZOSTER VACCINE (1 of 2) 04/19/2025 (Originally 3/24/2008)    COVID-19 Vaccine (7 - 2024-25 season) 05/12/2025 (Originally 9/1/2024)    LUNG CANCER SCREENING  10/21/2025 (Originally 1/3/2023)    LIPID PANEL  10/21/2025    BMI FOLLOWUP  12/04/2025    ANNUAL WELLNESS VISIT  02/20/2026    COLORECTAL CANCER SCREENING  09/28/2030    HEPATITIS C SCREENING  Completed    INFLUENZA VACCINE  Completed    Pneumococcal Vaccine 50+  Completed    AAA SCREEN ONCE  Completed                                                                                                                                                 CMS Preventative Services Quick Reference  Risk Factors Identified During Encounter  Immunizations Discussed/Encouraged: Shingrix and COVID19  Dental Screening Recommended  Vision Screening Recommended    The above risks/problems have been discussed with the patient.  Pertinent information has been shared with the patient in the After Visit Summary.  An After Visit Summary and PPPS were made available to the patient.    Follow Up:   Next Medicare Wellness visit to be scheduled in 1 year.         Additional E&M Note during same encounter follows:  Patient has additional, significant, and separately identifiable condition(s)/problem(s) that require work above and beyond the Medicare Wellness Visit     Chief Complaint  Medicare Wellness-subsequent    Subjective    HPI  Justice is also being seen today for additional medical problem/s.       The patient is a 64-year-old male who is here for follow-up.    He reports persistent discomfort in his liver region, describing it as a sensation of tightening or knotting. He has a history of daily alcohol consumption but has since ceased this habit. He was previously under the care of Dr. Paul, a gastroenterologist, but has not had a recent consultation. He recalls a diagnosis of esophageal issues in 2021. He also experiences occasional spasms in the liver area. He has been on hydrocodone for several years for lung-related issues but discontinued it due to lack of efficacy. He acknowledges the need to improve his dietary habits and increase his fiber intake.    He continues to receive wound care for an ankle infection that occurred in 11/2024. He is currently on antibiotics prescribed by Dr. Rubio, an infectious disease specialist at [facility]. He expresses a desire to discontinue the antibiotics, suspecting they may be contributing to his gastrointestinal symptoms. He reports that his ankle is healing well, although the process has been  "slow.    He has been experiencing increased gas and bloating, which he attributes to his current antibiotic regimen. He admits to not maintaining a healthy diet.    He has been experiencing back pain for several years, which limits his ability to exercise. He does not have any knee or hip pain.    He is on Namenda and Aricept for memory issues, which he feels are getting a little worse. He has another appointment with Dr. Singletary coming up in 09/2025.    He has minor hearing problems. He does not experience chest pain, shortness of breath, or hemoptysis. He occasionally has sinus issues but does not cough up anything. He does not have any bowel or bladder issues, including urinary incontinence. He quit smoking in 2011 after a heart attack and used to smoke 1.5 to 2 packs a day for at least 25 years. He is interested in doing a low-dose CT scan for lung cancer screening. He has not had a shingles vaccine. He does not need refills on any medications.    SOCIAL HISTORY  - Quit drinking alcohol  - Former smoker, used to smoke 1.5 to 2 packs a day for at least 25 years, quit in 2011    MEDICATIONS  - Namenda  - Aricept  - Donepezil  - Memantine    IMMUNIZATIONS  - Up to date on all vaccines, including influenza and pneumonia vaccines  Review of Systems   Constitutional: Negative.    HENT:  Positive for hearing loss (minor).    Respiratory:  Negative for shortness of breath.    Cardiovascular: Negative.  Negative for chest pain.   Gastrointestinal: Negative.    Genitourinary: Negative.    Musculoskeletal:  Positive for arthralgias and back pain.   Psychiatric/Behavioral:  Positive for decreased concentration (memory issues).           Objective   Vital Signs:  /62 (BP Location: Left arm, Patient Position: Sitting, Cuff Size: Adult)   Pulse 71   Temp 97.2 °F (36.2 °C) (Infrared)   Resp 16   Ht 165.1 cm (65\")   Wt 79.2 kg (174 lb 9.6 oz)   SpO2 96%   BMI 29.05 kg/m²   Physical Exam  Vitals and nursing note " reviewed.   Constitutional:       General: He is not in acute distress.     Appearance: Normal appearance. He is well-developed. He is not ill-appearing.   HENT:      Head: Normocephalic and atraumatic.      Right Ear: Hearing, tympanic membrane, ear canal and external ear normal.      Left Ear: Hearing, tympanic membrane, ear canal and external ear normal.      Nose: Nose normal. No congestion or rhinorrhea.      Mouth/Throat:      Mouth: Mucous membranes are moist.      Pharynx: No oropharyngeal exudate or posterior oropharyngeal erythema.   Eyes:      General: Lids are normal.      Conjunctiva/sclera: Conjunctivae normal.      Pupils: Pupils are equal, round, and reactive to light.   Neck:      Thyroid: No thyromegaly.   Cardiovascular:      Rate and Rhythm: Normal rate and regular rhythm.      Heart sounds: Normal heart sounds. No murmur heard.     No friction rub.   Pulmonary:      Effort: Pulmonary effort is normal. No respiratory distress.      Breath sounds: Normal breath sounds. No wheezing or rales.   Abdominal:      General: Bowel sounds are normal. There is no distension.      Palpations: Abdomen is soft. There is no mass.      Tenderness: There is no abdominal tenderness. There is no guarding or rebound.   Musculoskeletal:      Right lower leg: No edema.      Left lower leg: No edema.   Skin:     General: Skin is warm and dry.   Neurological:      Mental Status: He is alert.   Psychiatric:         Mood and Affect: Mood normal.         Speech: Speech normal.         Behavior: Behavior normal.         Lungs were auscultated.  Abdominal exam was performed.    Vital Signs  Blood pressure is 130/62.            Results  - Laboratory Studies:    - Hemoglobin: 10.3    - Kidney function: Normal    - Liver function tests: Elevated              Assessment and Plan     Diagnoses and all orders for this visit:    1. Medicare annual wellness visit, subsequent (Primary)    2. Essential hypertension    3. MCI (mild  cognitive impairment)    4. Pierson's esophagus without dysplasia    5. Screening for lung cancer  -     CT chest low dose wo; Future    6. Personal history of tobacco use, presenting hazards to health  -     CT chest low dose wo; Future    7. History of nicotine dependence  -     CT chest low dose wo; Future    8. Alcohol induced fatty liver          Fatty liver disease.  His liver function tests are elevated, consistent with previous results. He has a history of alcohol use and fatty liver disease. He is advised to maintain a healthy diet and consider probiotics to manage gastrointestinal symptoms. Continued monitoring of liver function is necessary.  He is no longer drinking alcohol.    Ankle wound.  He continues to receive wound care for his ankle infection, which began in November. He is still on antibiotics prescribed by the infectious disease specialist. No changes to the current treatment plan are necessary at this time.    Gas and bloating.  He reports experiencing significant gas and bloating, which may be related to his antibiotic use. He is advised to take probiotics and improve his diet to alleviate these symptoms.    Back pain.  He has chronic back pain, which limits his ability to exercise. No new interventions are planned at this time.    Memory issues.  He reports a slight worsening in his memory despite being on donepezil and memantine. He has a follow-up appointment with nurse practitioner Alberta Singletary in September.    Health maintenance.  He is up to date on his influenza and pneumonia vaccines. He is advised to consider the new COVID-19 vaccine and the shingles vaccine. A low-dose CT scan for lung cancer screening will be scheduled due to his history of smoking.    Follow-up  The patient will follow up in 6 months.            Follow Up   Return in about 6 months (around 8/20/2025).  Patient was given instructions and counseling regarding his condition or for health maintenance advice. Please  see specific information pulled into the AVS if appropriate.  Patient or patient representative verbalized consent for the use of Ambient Listening during the visit with  Anshul Martinez MD for chart documentation. 2/20/2025  12:39 EST

## 2025-02-24 ENCOUNTER — HOSPITAL ENCOUNTER (OUTPATIENT)
Dept: PHYSICAL THERAPY | Facility: HOSPITAL | Age: 65
Setting detail: THERAPIES SERIES
Discharge: HOME | End: 2025-02-24
Payer: MEDICARE

## 2025-02-24 DIAGNOSIS — S91.002D OPEN WOUND OF LEFT ANKLE WITH COMPLICATION, SUBSEQUENT ENCOUNTER: ICD-10-CM

## 2025-02-24 DIAGNOSIS — R60.0 EDEMA OF LEFT LOWER LEG: ICD-10-CM

## 2025-02-24 DIAGNOSIS — T81.89XD NONHEALING SURGICAL WOUND, SUBSEQUENT ENCOUNTER: Primary | ICD-10-CM

## 2025-02-24 PROCEDURE — 97597 DBRDMT OPN WND 1ST 20 CM/<: CPT

## 2025-02-24 PROCEDURE — 97610 LOW FREQUENCY NON-THERMAL US: CPT

## 2025-02-24 NOTE — THERAPY WOUND CARE TREATMENT
Outpatient Rehabilitation - Wound/Debridement Treatment Note   [place]     Patient Name: Justice Kiser  : 1960  MRN: [id number]  Today's Date: 2025                 Admit Date: 2025    Visit Dx:    ICD-10-CM ICD-9-CM   1. Nonhealing surgical wound, subsequent encounter  T81.89XD V58.89     998.83   2. Edema of left lower leg  R60.0 782.3   3. Open wound of left ankle with complication, subsequent encounter  S91.002D V58.89     891.1             Patient Active Problem List   Diagnosis    Essential hypertension    Coronary artery disease involving native coronary artery of native heart with angina pectoris    Hyperlipidemia LDL goal <70    Depression    Anxiety    Hyperglycemia    Paresthesia of right foot    Tremor    GERD (gastroesophageal reflux disease)    Arthritis    Pierson's esophagus without dysplasia    MCI (mild cognitive impairment)    Mitral valve insufficiency    Alcohol induced fatty liver    Alcohol abuse, in remission    Cerebral microvascular disease    Chronic bilateral low back pain with right-sided sciatica    Spinal stenosis, lumbar region, with neurogenic claudication    DDD (degenerative disc disease), lumbar    Psychophysiological insomnia    Left leg cellulitis    Severe protein-calorie malnutrition        Past Medical History:   Diagnosis Date    Arthritis     CHF (congestive heart failure)     Chronic pain disorder     Colon polyp     Depression     Difficulty walking     Elevated LFTs     GERD (gastroesophageal reflux disease)     History of colonic polyps     History of methicillin resistant Staphylococcus aureus     History of myocardial infarction     Hyperlipidemia     Hypertension         Past Surgical History:   Procedure Laterality Date    BACK SURGERY      CAROTID STENT      CORONARY ANGIOPLASTY WITH STENT PLACEMENT  2012    Circumflex Xscience V 3.5 MM x 23 mm    CORONARY STENT PLACEMENT      ENDOMETRIAL ABLATION      EPIDURAL BLOCK      GALLBLADDER  SURGERY  2017    INCISION AND DRAINAGE LEG Left 11/20/2024    Procedure: INCISION AND DRAINAGE OF LEFT ANKLE;  Surgeon: Ravin Sidhu MD;  Location: [facility];  Service: Orthopedics;  Laterality: Left;    LUMBAR DISCECTOMY  1992    2 level - [facility]    LYMPH NODE BIOPSY      SHOULDER SURGERY  12/20/2018    Dr. Hoyt Rotator cuff repair         EVALUATION   PT Ortho       Row Name 02/24/25 1015       Subjective    Subjective Comments No complaints, less pain.  -       Subjective Pain    Able to rate subjective pain? yes  -    Pre-Treatment Pain Level 0  -JM    Post-Treatment Pain Level 0  -JM       Transfers    Sit-Stand [place] (Transfers) independent  -JM    Stand-Sit [place] (Transfers) independent  -JM    Comment, (Transfers) long sitting for tx  -JM       Gait/Stairs (Locomotion)    [place] Level (Gait) independent  -JM              User Key  (r) = Recorded By, (t) = Taken By, (c) = Cosigned By      Initials Name Provider Type    Kristin Zamora, PT Physical Therapist                     LDA Wound       Row Name 02/24/25 1015             Wound 12/18/24 1030 Left lateral ankle    Wound - Properties Group Placement Date: 12/18/24  -MF Placement Time: 1030  -MF Side: Left  -MF Orientation: lateral  -MF Location: ankle  -MF Primary Wound Type: Incision  -MF    Wound Image Images linked: 2  -JM      Dressing Appearance intact;moist drainage  -      Base moist;granulating;red;pink;epithelialization  new epithelialization, central moist red area remaining, hypertrophic crusting prox/distal aspects  -      Periwound intact;redness;swelling;warm;dry  min immediate maceration  -      Periwound Temperature warm  -      Periwound Skin Turgor soft  -      Edges irregular  -      Drainage Characteristics/Odor serosanguineous  -      Drainage Amount small  -      Care, Wound cleansed with;wound cleanser;debrided;ultrasound therapy, non contact low frequency  MIST  "with vashe x5min  -      Dressing Care dressing applied;collagen;antimicrobial agent applied;foam;silicone border foam  brad, HFBt, 4\" optifoam, single size 4 compressogrip  -      Periwound Care cleansed with pH balanced cleanser;dry periwound area maintained;barrier ointment applied  zguard  -      Retired Wound - Properties Group Placement Date: 12/18/24  - Placement Time: 1030  -MF Side: Left  -MF Orientation: lateral  -MF Location: ankle  -MF Primary Wound Type: Incision  -MF    Retired Wound - Properties Group Placement Date: 12/18/24  - Placement Time: 1030  -MF Side: Left  -MF Orientation: lateral  -MF Location: ankle  -MF Primary Wound Type: Incision  -MF    Retired Wound - Properties Group Date first assessed: 12/18/24  - Time first assessed: 1030  -MF Side: Left  -MF Location: ankle  -MF Primary Wound Type: Incision  -MF              User Key  (r) = Recorded By, (t) = Taken By, (c) = Cosigned By      Initials Name Provider Type    Trace Cox, PT Physical Therapist    Kristin Zamora, HOMER Physical Therapist                      WOUND DEBRIDEMENT  Total area of Debridement: 1cmsq  Debridement Site 1  Location- Site 1: L lateral ankle wound  Selective Debridement- Site 1: Wound Surface <20cmsq  Instruments- Site 1: tweezers  Excised Tissue Description- Site 1: scant, slough, minimum, other (comment) (hypertrophic crust)  Bleeding- Site 1: none              Therapy Education       Row Name 02/24/25 1015             Therapy Education    Education Details Continuation of POC, may be ready to try home dressing changes next tx  -JM      Given Bandaging/dressing change  -CHANTEL      Program Reinforced;Progressed  -JM      How Provided Verbal;Demonstration  -JM      Provided to Patient  -JM      Level of Understanding Verbalized  -CHANTEL                User Key  (r) = Recorded By, (t) = Taken By, (c) = Cosigned By      Initials Name Provider Type    Kristin Zamora, PT Physical Therapist "                    Recommendation and Plan   PT Assessment/Plan       Row Name 02/24/25 1015          PT Assessment    Functional Limitations Other (comment);Performance in self-care ADL;Performance in leisure activities  wound management\  -     Impairments Integumentary integrity;Pain  -     Assessment Comments Pt's wound continuing to reepithelialize, min hypertrophic crusting present requiring debridement.  Anticipate closure in next few txs.  May trial home dressing changes and d/c MIST next tx.  -     Rehab Potential Good  -     Patient/caregiver participated in establishment of treatment plan and goals Yes  -     Patient would benefit from skilled therapy intervention Yes  -        PT Plan    PT Frequency 2x/week  -     Physical Therapy Interventions (Optional Details) patient/family education;wound care  -     PT Plan Comments debridement, ?trial home dressings and d/c MIST  -               User Key  (r) = Recorded By, (t) = Taken By, (c) = Cosigned By      Initials Name Provider Type    Kristin Zamora, PT Physical Therapist                    Goals   PT OP Goals       Row Name 02/24/25 1050          Time Calculation    PT Goal Re-Cert Due Date 03/18/25  -               User Key  (r) = Recorded By, (t) = Taken By, (c) = Cosigned By      Initials Name Provider Type    Kristin Zamora, PT Physical Therapist                    PT Goal Re-Cert Due Date: 03/18/25            Time Calculation: Start Time: 1015  Untimed Charges  85057-Twkfuxgvi debridement: 15  41517-SXXS Mist: 10  Total Minutes  Untimed Charges Total Minutes: 25   Total Minutes: 25  Therapy Charges for Today       Code Description Service Date Service Provider Modifiers Qty    88928918211  BROOKE DEBRIDE OPEN WOUND UP TO 20CM 2/24/2025 Kristin Davila, PT GP 1    [id number]  PT NLFU MIST 2/24/2025 Kristin Davila, PT GP 1                    Kristin Davila, PT  2/24/2025

## 2025-02-27 ENCOUNTER — HOSPITAL ENCOUNTER (OUTPATIENT)
Dept: PHYSICAL THERAPY | Facility: HOSPITAL | Age: 65
Setting detail: THERAPIES SERIES
Discharge: HOME | End: 2025-02-27
Payer: MEDICARE

## 2025-02-27 DIAGNOSIS — R60.0 EDEMA OF LEFT LOWER LEG: ICD-10-CM

## 2025-02-27 DIAGNOSIS — S91.002D OPEN WOUND OF LEFT ANKLE WITH COMPLICATION, SUBSEQUENT ENCOUNTER: ICD-10-CM

## 2025-02-27 DIAGNOSIS — T81.89XD NONHEALING SURGICAL WOUND, SUBSEQUENT ENCOUNTER: Primary | ICD-10-CM

## 2025-02-27 PROCEDURE — 97597 DBRDMT OPN WND 1ST 20 CM/<: CPT

## 2025-02-27 PROCEDURE — 97610 LOW FREQUENCY NON-THERMAL US: CPT

## 2025-02-27 NOTE — THERAPY WOUND CARE TREATMENT
Outpatient Rehabilitation - Wound/Debridement Treatment Note   [place]     Patient Name: Justice Kiser  : 1960  MRN: [id number]  Today's Date: 2025                 Admit Date: 2025    Visit Dx:    ICD-10-CM ICD-9-CM   1. Nonhealing surgical wound, subsequent encounter  T81.89XD V58.89     998.83   2. Open wound of left ankle with complication, subsequent encounter  S91.002D V58.89     891.1   3. Edema of left lower leg  R60.0 782.3             Patient Active Problem List   Diagnosis    Essential hypertension    Coronary artery disease involving native coronary artery of native heart with angina pectoris    Hyperlipidemia LDL goal <70    Depression    Anxiety    Hyperglycemia    Paresthesia of right foot    Tremor    GERD (gastroesophageal reflux disease)    Arthritis    Pierson's esophagus without dysplasia    MCI (mild cognitive impairment)    Mitral valve insufficiency    Alcohol induced fatty liver    Alcohol abuse, in remission    Cerebral microvascular disease    Chronic bilateral low back pain with right-sided sciatica    Spinal stenosis, lumbar region, with neurogenic claudication    DDD (degenerative disc disease), lumbar    Psychophysiological insomnia    Left leg cellulitis    Severe protein-calorie malnutrition        Past Medical History:   Diagnosis Date    Arthritis     CHF (congestive heart failure)     Chronic pain disorder     Colon polyp     Depression     Difficulty walking     Elevated LFTs     GERD (gastroesophageal reflux disease)     History of colonic polyps     History of methicillin resistant Staphylococcus aureus     History of myocardial infarction     Hyperlipidemia     Hypertension         Past Surgical History:   Procedure Laterality Date    BACK SURGERY      CAROTID STENT      CORONARY ANGIOPLASTY WITH STENT PLACEMENT  2012    Circumflex Xscience V 3.5 MM x 23 mm    CORONARY STENT PLACEMENT      ENDOMETRIAL ABLATION      EPIDURAL BLOCK      GALLBLADDER  SURGERY  2017    INCISION AND DRAINAGE LEG Left 11/20/2024    Procedure: INCISION AND DRAINAGE OF LEFT ANKLE;  Surgeon: Ravin Sidhu MD;  Location: [facility];  Service: Orthopedics;  Laterality: Left;    LUMBAR DISCECTOMY  1992    2 level - [facility]    LYMPH NODE BIOPSY      SHOULDER SURGERY  12/20/2018    Dr. Hoyt Rotator cuff repair         EVALUATION   PT Ortho       Row Name 02/27/25 0930       Subjective    Subjective Comments No changes, removed compression last night and didn't replace it this morning because it was bothering the top of his ankle.  Reports increased pain in ankle with weather changes.  -       Subjective Pain    Able to rate subjective pain? yes  -    Pre-Treatment Pain Level 0  -    Post-Treatment Pain Level 0  -    Subjective Pain Comment no wound pain  -       Transfers    Sit-Stand [place] (Transfers) independent  -JM    Stand-Sit [place] (Transfers) independent  -    Comment, (Transfers) long sitting for tx  -       Gait/Stairs (Locomotion)    [place] Level (Gait) independent  -              User Key  (r) = Recorded By, (t) = Taken By, (c) = Cosigned By      Initials Name Provider Type    Kristin Zamora, PT Physical Therapist                     LDA Wound       Row Name 02/27/25 0930             Wound 12/18/24 1030 Left lateral ankle    Wound - Properties Group Placement Date: 12/18/24  -MF Placement Time: 1030  -MF Side: Left  - Orientation: lateral  -MF Location: ankle  -MF Primary Wound Type: Incision  -MF    Wound Image Images linked: 2  -JM      Dressing Appearance intact;moist drainage  -      Base moist;granulating;red;pink;epithelialization  new epithelialization, central moist red area remaining, hypertrophic crusting prox/distal aspects  -      Periwound intact;redness;swelling;warm;dry  min immediate maceration  -      Periwound Temperature warm  -      Periwound Skin Turgor soft  -      Edges irregular   "-      Wound Length (cm) 1 cm  -      Wound Width (cm) 0.2 cm  -      Wound Depth (cm) 0.1 cm  -      Wound Surface Area (cm^2) 0.2 cm^2  -      Wound Volume (cm^3) 0.02 cm^3  -      Drainage Characteristics/Odor serosanguineous  -      Drainage Amount small  -      Care, Wound cleansed with;wound cleanser;debrided;ultrasound therapy, non contact low frequency  MIST with vashe x5min  -      Dressing Care dressing applied;collagen;antimicrobial agent applied;foam;silicone border foam  brad, HFBt, 4\" optifoam, size 4 compressogrip  -      Periwound Care cleansed with pH balanced cleanser;dry periwound area maintained;barrier ointment applied  zguard  -      Retired Wound - Properties Group Placement Date: 12/18/24  - Placement Time: 1030  -MF Side: Left  - Orientation: lateral  - Location: ankle  - Primary Wound Type: Incision  -    Retired Wound - Properties Group Placement Date: 12/18/24  - Placement Time: 1030  - Side: Left  - Orientation: lateral  - Location: ankle  -MF Primary Wound Type: Incision  -MF    Retired Wound - Properties Group Date first assessed: 12/18/24  - Time first assessed: 1030  - Side: Left  - Location: ankle  -MF Primary Wound Type: Incision  -MF              User Key  (r) = Recorded By, (t) = Taken By, (c) = Cosigned By      Initials Name Provider Type    Trace Cox, PT Physical Therapist    Kristin Zamora, PT Physical Therapist                      WOUND DEBRIDEMENT  Total area of Debridement: 1cmsq  Debridement Site 1  Location- Site 1: L lateral ankle wound  Selective Debridement- Site 1: Wound Surface <20cmsq  Instruments- Site 1: tweezers  Excised Tissue Description- Site 1: scant, slough, minimum, other (comment) (hypertrophic crust)  Bleeding- Site 1: none              Therapy Education       Row Name 02/27/25 5345             Therapy Education    Education Details Continuation of POC.  -      Given Bandaging/dressing " change  -      Program Reinforced;Progressed  -      How Provided Verbal;Demonstration  -      Provided to Patient  -      Level of Understanding Verbalized  -                User Key  (r) = Recorded By, (t) = Taken By, (c) = Cosigned By      Initials Name Provider Type    Kristin Zamora, PT Physical Therapist                    Recommendation and Plan   PT Assessment/Plan       Row Name 02/27/25 0930          PT Assessment    Functional Limitations Other (comment);Performance in self-care ADL;Performance in leisure activities  wound management\  -     Impairments Integumentary integrity;Pain  -     Assessment Comments Pt with continued contraction of wound edges, small narrow moist open area remaining with min hypertrohpic crusting at prox/distal aspects requiring debridement.  PT continued with MIST to faciliate increased blood flow and stimulate closure due to slow progress, continue with POC.  -     Rehab Potential Good  -     Patient/caregiver participated in establishment of treatment plan and goals Yes  -     Patient would benefit from skilled therapy intervention Yes  -        PT Plan    PT Frequency 2x/week  -     Physical Therapy Interventions (Optional Details) patient/family education;wound care  -     PT Plan Comments debridement, MIST  -               User Key  (r) = Recorded By, (t) = Taken By, (c) = Cosigned By      Initials Name Provider Type    Kristin Zamora, PT Physical Therapist                    Goals   PT OP Goals       Row Name 02/27/25 1006          Time Calculation    PT Goal Re-Cert Due Date 03/18/25  -               User Key  (r) = Recorded By, (t) = Taken By, (c) = Cosigned By      Initials Name Provider Type    Kristin Zamora, PT Physical Therapist                    PT Goal Re-Cert Due Date: 03/18/25            Time Calculation: Start Time: 0920  Untimed Charges  15124-Feyvfmmmh debridement: 15  48651-WQTS Mist: 10  Total  Minutes  Untimed Charges Total Minutes: 25   Total Minutes: 25  Therapy Charges for Today       Code Description Service Date Service Provider Modifiers Qty    85838182960 HC BROOKE DEBRIDE OPEN WOUND UP TO 20CM 2/27/2025 Kristin Davila, PT GP 1    [id number] HC PT NLFU MIST 2/27/2025 Kristin Davila, PT GP 1                    Kristin Davila, PT  2/27/2025

## 2025-03-03 ENCOUNTER — HOSPITAL ENCOUNTER (OUTPATIENT)
Dept: PHYSICAL THERAPY | Facility: HOSPITAL | Age: 65
Setting detail: THERAPIES SERIES
Discharge: HOME | End: 2025-03-03
Payer: MEDICARE

## 2025-03-03 DIAGNOSIS — R60.0 EDEMA OF LEFT LOWER LEG: ICD-10-CM

## 2025-03-03 DIAGNOSIS — T81.89XD NONHEALING SURGICAL WOUND, SUBSEQUENT ENCOUNTER: Primary | ICD-10-CM

## 2025-03-03 DIAGNOSIS — S91.002D OPEN WOUND OF LEFT ANKLE WITH COMPLICATION, SUBSEQUENT ENCOUNTER: ICD-10-CM

## 2025-03-03 PROCEDURE — 97610 LOW FREQUENCY NON-THERMAL US: CPT

## 2025-03-03 PROCEDURE — 97597 DBRDMT OPN WND 1ST 20 CM/<: CPT

## 2025-03-03 NOTE — THERAPY WOUND CARE TREATMENT
Outpatient Rehabilitation - Wound/Debridement Treatment Note   [place]     Patient Name: Justice Kiser  : 1960  MRN: [id number]  Today's Date: 3/3/2025                 Admit Date: 3/3/2025    Visit Dx:    ICD-10-CM ICD-9-CM   1. Nonhealing surgical wound, subsequent encounter  T81.89XD V58.89     998.83   2. Open wound of left ankle with complication, subsequent encounter  S91.002D V58.89     891.1   3. Edema of left lower leg  R60.0 782.3             Patient Active Problem List   Diagnosis    Essential hypertension    Coronary artery disease involving native coronary artery of native heart with angina pectoris    Hyperlipidemia LDL goal <70    Depression    Anxiety    Hyperglycemia    Paresthesia of right foot    Tremor    GERD (gastroesophageal reflux disease)    Arthritis    Pierson's esophagus without dysplasia    MCI (mild cognitive impairment)    Mitral valve insufficiency    Alcohol induced fatty liver    Alcohol abuse, in remission    Cerebral microvascular disease    Chronic bilateral low back pain with right-sided sciatica    Spinal stenosis, lumbar region, with neurogenic claudication    DDD (degenerative disc disease), lumbar    Psychophysiological insomnia    Left leg cellulitis    Severe protein-calorie malnutrition        Past Medical History:   Diagnosis Date    Arthritis     CHF (congestive heart failure)     Chronic pain disorder     Colon polyp     Depression     Difficulty walking     Elevated LFTs     GERD (gastroesophageal reflux disease)     History of colonic polyps     History of methicillin resistant Staphylococcus aureus     History of myocardial infarction     Hyperlipidemia     Hypertension         Past Surgical History:   Procedure Laterality Date    BACK SURGERY      CAROTID STENT      CORONARY ANGIOPLASTY WITH STENT PLACEMENT  2012    Circumflex Xscience V 3.5 MM x 23 mm    CORONARY STENT PLACEMENT      ENDOMETRIAL ABLATION      EPIDURAL BLOCK      GALLBLADDER  SURGERY  2017    INCISION AND DRAINAGE LEG Left 11/20/2024    Procedure: INCISION AND DRAINAGE OF LEFT ANKLE;  Surgeon: Ravin Sidhu MD;  Location: [facility];  Service: Orthopedics;  Laterality: Left;    LUMBAR DISCECTOMY  1992    2 level - [facility]    LYMPH NODE BIOPSY      SHOULDER SURGERY  12/20/2018    Dr. Hoyt Rotator cuff repair         EVALUATION   PT Ortho       Row Name 03/03/25 0930       Subjective    Subjective Comments No new complaints, just some ongoing ankle swelling and tenderness.  -       Subjective Pain    Able to rate subjective pain? yes  -    Pre-Treatment Pain Level 0  -    Post-Treatment Pain Level 0  -    Subjective Pain Comment no wound pain  -JM       Transfers    Sit-Stand [place] (Transfers) independent  -JM    Stand-Sit [place] (Transfers) independent  -    Comment, (Transfers) long sitting for tx  -JM       Gait/Stairs (Locomotion)    [place] Level (Gait) independent  -              User Key  (r) = Recorded By, (t) = Taken By, (c) = Cosigned By      Initials Name Provider Type    Kristin Zamora, PT Physical Therapist                     [facility] Wound       Row Name 03/03/25 0930             Wound 12/18/24 1030 Left lateral ankle    Wound - Properties Group Placement Date: 12/18/24  -MF Placement Time: 1030  -MF Side: Left  -MF Orientation: lateral  -MF Location: ankle  -MF Primary Wound Type: Incision  -MF    Wound Image Images linked: 2  -JM      Dressing Appearance intact;moist drainage  -      Base moist;granulating;red;pink;epithelialization  new epithelialization, narrow central moist red area remaining, hypertrophic crusting prox/distal aspects  -      Periwound intact;redness;swelling;warm;dry  min immediate maceration  -      Periwound Temperature warm  -      Periwound Skin Turgor soft  -      Edges irregular  -      Drainage Characteristics/Odor serosanguineous  -      Drainage Amount small  -      Care,  "Wound cleansed with;wound cleanser;debrided;ultrasound therapy, non contact low frequency  MIST with vashe x5min  -      Dressing Care dressing applied;collagen;antimicrobial agent applied;foam;silicone border foam  brad, HFBt, 4\" optifoam, size 4 compressogrip  -      Periwound Care cleansed with pH balanced cleanser;dry periwound area maintained;barrier ointment applied  zguard  -      Retired Wound - Properties Group Placement Date: 12/18/24  - Placement Time: 1030  -MF Side: Left  -MF Orientation: lateral  -MF Location: ankle  -MF Primary Wound Type: Incision  -MF    Retired Wound - Properties Group Placement Date: 12/18/24  - Placement Time: 1030  -MF Side: Left  -MF Orientation: lateral  -MF Location: ankle  -MF Primary Wound Type: Incision  -MF    Retired Wound - Properties Group Date first assessed: 12/18/24  - Time first assessed: 1030  -MF Side: Left  -MF Location: ankle  -MF Primary Wound Type: Incision  -MF              User Key  (r) = Recorded By, (t) = Taken By, (c) = Cosigned By      Initials Name Provider Type    MF Trace Max, PT Physical Therapist    Kristin Zamora, PT Physical Therapist                      WOUND DEBRIDEMENT  Total area of Debridement: 1cmsq  Debridement Site 1  Location- Site 1: L lateral ankle wound  Selective Debridement- Site 1: Wound Surface <20cmsq  Instruments- Site 1: tweezers  Excised Tissue Description- Site 1: scant, slough, other (comment) (hypertrophic crust)  Bleeding- Site 1: none              Therapy Education       Row Name 03/03/25 7916             Therapy Education    Education Details Continuation of POC, elevate leg for edema.  Return to provider if edema and pain persist or worsen.  -JM      Given Bandaging/dressing change  -JM      Program Reinforced;Progressed  -CHANTEL      How Provided Verbal;Demonstration  -JM      Provided to Patient  -JM      Level of Understanding Verbalized  -CHANTEL                User Key  (r) = Recorded By, (t) " = Taken By, (c) = Cosigned By      Initials Name Provider Type    Kristin Zamora, PT Physical Therapist                    Recommendation and Plan   PT Assessment/Plan       Row Name 03/03/25 0930          PT Assessment    Functional Limitations Other (comment);Performance in self-care ADL;Performance in leisure activities  wound management\  -     Impairments Integumentary integrity;Pain  -     Assessment Comments Pt's wound with narrow slit of open moist tissue remaining, still with crusts and scant slough requiring debridement.  Pt continues to benefit from POC.  -     Rehab Potential Good  -     Patient/caregiver participated in establishment of treatment plan and goals Yes  -     Patient would benefit from skilled therapy intervention Yes  -        PT Plan    PT Frequency 2x/week  -     Physical Therapy Interventions (Optional Details) patient/family education;wound care  -     PT Plan Comments debridement, ?trial home management next 1-2 txs.  -               User Key  (r) = Recorded By, (t) = Taken By, (c) = Cosigned By      Initials Name Provider Type    Kristin Zamora, PT Physical Therapist                    Goals   PT OP Goals       Row Name 03/03/25 1158          Time Calculation    PT Goal Re-Cert Due Date 03/18/25  -               User Key  (r) = Recorded By, (t) = Taken By, (c) = Cosigned By      Initials Name Provider Type    Kristin Zamora, PT Physical Therapist                    PT Goal Re-Cert Due Date: 03/18/25            Time Calculation: Start Time: 0930  Untimed Charges  53854-Zksksqvzf debridement: 15  15731-WNEP Mist: 10  Total Minutes  Untimed Charges Total Minutes: 25   Total Minutes: 25  Therapy Charges for Today       Code Description Service Date Service Provider Modifiers Qty    [id number] HC BROOKE DEBRIDE OPEN WOUND UP TO 20CM 3/3/2025 Kristin Davila, PT GP 1    [id number] HC PT NLFU MIST 3/3/2025 Kristin Davila, PT GP 1                     Kristin Davila, PT  3/3/2025

## 2025-03-06 ENCOUNTER — HOSPITAL ENCOUNTER (OUTPATIENT)
Dept: PHYSICAL THERAPY | Facility: HOSPITAL | Age: 65
Setting detail: THERAPIES SERIES
Discharge: HOME | End: 2025-03-06
Payer: MEDICARE

## 2025-03-06 DIAGNOSIS — T81.89XD NONHEALING SURGICAL WOUND, SUBSEQUENT ENCOUNTER: Primary | ICD-10-CM

## 2025-03-06 DIAGNOSIS — S91.002D OPEN WOUND OF LEFT ANKLE WITH COMPLICATION, SUBSEQUENT ENCOUNTER: ICD-10-CM

## 2025-03-06 DIAGNOSIS — R60.0 EDEMA OF LEFT LOWER LEG: ICD-10-CM

## 2025-03-06 PROCEDURE — 97597 DBRDMT OPN WND 1ST 20 CM/<: CPT

## 2025-03-06 PROCEDURE — 97610 LOW FREQUENCY NON-THERMAL US: CPT

## 2025-03-06 NOTE — THERAPY WOUND CARE TREATMENT
Outpatient Rehabilitation - Wound/Debridement Treatment Note   [place]     Patient Name: Justice Kiser  : 1960  MRN: [id number]  Today's Date: 3/6/2025                 Admit Date: 3/6/2025    Visit Dx:    ICD-10-CM ICD-9-CM   1. Nonhealing surgical wound, subsequent encounter  T81.89XD V58.89     998.83   2. Edema of left lower leg  R60.0 782.3   3. Open wound of left ankle with complication, subsequent encounter  S91.002D V58.89     891.1         Patient Active Problem List   Diagnosis    Essential hypertension    Coronary artery disease involving native coronary artery of native heart with angina pectoris    Hyperlipidemia LDL goal <70    Depression    Anxiety    Hyperglycemia    Paresthesia of right foot    Tremor    GERD (gastroesophageal reflux disease)    Arthritis    Pierson's esophagus without dysplasia    MCI (mild cognitive impairment)    Mitral valve insufficiency    Alcohol induced fatty liver    Alcohol abuse, in remission    Cerebral microvascular disease    Chronic bilateral low back pain with right-sided sciatica    Spinal stenosis, lumbar region, with neurogenic claudication    DDD (degenerative disc disease), lumbar    Psychophysiological insomnia    Left leg cellulitis    Severe protein-calorie malnutrition        Past Medical History:   Diagnosis Date    Arthritis     CHF (congestive heart failure)     Chronic pain disorder     Colon polyp     Depression     Difficulty walking     Elevated LFTs     GERD (gastroesophageal reflux disease)     History of colonic polyps     History of methicillin resistant Staphylococcus aureus     History of myocardial infarction     Hyperlipidemia     Hypertension         Past Surgical History:   Procedure Laterality Date    BACK SURGERY      CAROTID STENT      CORONARY ANGIOPLASTY WITH STENT PLACEMENT  2012    Circumflex Xscience V 3.5 MM x 23 mm    CORONARY STENT PLACEMENT      ENDOMETRIAL ABLATION      EPIDURAL BLOCK      GALLBLADDER SURGERY   2017    INCISION AND DRAINAGE LEG Left 11/20/2024    Procedure: INCISION AND DRAINAGE OF LEFT ANKLE;  Surgeon: Ravin Sidhu MD;  Location: [facility];  Service: Orthopedics;  Laterality: Left;    LUMBAR DISCECTOMY  1992    2 level - [facility]    LYMPH NODE BIOPSY      SHOULDER SURGERY  12/20/2018    Dr. Hoyt Rotator cuff repair         EVALUATION   PT Ortho       Row Name 03/06/25 0900       Subjective    Subjective Comments Reports some occasional mild discomfort in the area, feels like the skin is pulling during activity. No other issues/complaints.  -       Subjective Pain    Able to rate subjective pain? yes  -    Pre-Treatment Pain Level 0  -    Post-Treatment Pain Level 0  -       Transfers    Sit-Stand [place] (Transfers) independent  -    Stand-Sit [place] (Transfers) independent  -    Comment, (Transfers) long sitting for tx  -       Gait/Stairs (Locomotion)    [place] Level (Gait) independent  -              User Key  (r) = Recorded By, (t) = Taken By, (c) = Cosigned By      Initials Name Provider Type     Maximo Palencia, PT Physical Therapist                     [facility] Wound       Row Name 03/06/25 0900             Wound 12/18/24 1030 Left lateral ankle    Wound - Properties Group Placement Date: 12/18/24  -MF Placement Time: 1030  -MF Side: Left  -MF Orientation: lateral  -MF Location: ankle  -MF Primary Wound Type: Incision  -MF    Wound Image Images linked: 1  -      Dressing Appearance intact;moist drainage  -      Base moist;granulating;red;pink;epithelialization  narrow central and proximal moist red area remaining, thick adherent hypertrophic crusting prox/distal aspects  -      Periwound intact;swelling;warm;dry  -      Periwound Temperature warm  -      Periwound Skin Turgor soft  -      Edges irregular  -      Drainage Characteristics/Odor serosanguineous  -      Drainage Amount scant  -      Care, Wound cleansed with;wound  "cleanser;debrided;ultrasound therapy, non contact low frequency;honey applied  MIST with vashe x5min  -      Dressing Care dressing applied;collagen;antimicrobial agent applied;foam;silicone border foam  brad, Therahoney, Mepilex ag, 4\" optifoam, size 4 compressogrip  -      Periwound Care cleansed with pH balanced cleanser;dry periwound area maintained;barrier ointment applied  zguard  -      Retired Wound - Properties Group Placement Date: 12/18/24  - Placement Time: 1030  -MF Side: Left  -MF Orientation: lateral  -MF Location: ankle  -MF Primary Wound Type: Incision  -MF    Retired Wound - Properties Group Placement Date: 12/18/24  - Placement Time: 1030  -MF Side: Left  -MF Orientation: lateral  -MF Location: ankle  -MF Primary Wound Type: Incision  -MF    Retired Wound - Properties Group Date first assessed: 12/18/24  - Time first assessed: 1030  -MF Side: Left  -MF Location: ankle  -MF Primary Wound Type: Incision  -MF              User Key  (r) = Recorded By, (t) = Taken By, (c) = Cosigned By      Initials Name Provider Type    MF Trace Max, PT Physical Therapist     Maximo Palencia, PT Physical Therapist                      WOUND DEBRIDEMENT  Total area of Debridement: 1cm2  Debridement Site 1  Location- Site 1: L lateral ankle wound  Selective Debridement- Site 1: Wound Surface <20cmsq  Instruments- Site 1: tweezers, scissors  Excised Tissue Description- Site 1: minimum, other (comment) (hypertrophic crusts)  Bleeding- Site 1: none              Therapy Education       Row Name 03/06/25 0900             Therapy Education    Education Details Continue current POC for now. Reviewed potential benefits of use of therahoney and mepilex ag. Wound will likely be closed and ready for more indep home management in 1-2 weeks.  -      Given Bandaging/dressing change  -      Program Reinforced;Progressed  -      How Provided Verbal;Demonstration  -      Provided to Patient  -      " Level of Understanding Verbalized  -                User Key  (r) = Recorded By, (t) = Taken By, (c) = Cosigned By      Initials Name Provider Type     Maximo Palencia, PT Physical Therapist                    Recommendation and Plan   PT Assessment/Plan       Row Name 03/06/25 0900          PT Assessment    Functional Limitations Other (comment);Performance in self-care ADL;Performance in leisure activities  wound management\  -     Impairments Integumentary integrity;Pain  -     Assessment Comments Pt continuing with thick, dry hypertrophic crusts throughout a majority of the wound area with 2 distinct very small/narrow slits of red, open area remaining. PT continued with MIST to help further promote localized blood flow and improve wound healing environment. PT trialed use of therahoney and Mepilex ag to help soften remaining crusts as pt with no evidence of maceration noted this date. Pt would continue to benefit from current POC to promote wound healing.  -     Rehab Potential Good  -     Patient/caregiver participated in establishment of treatment plan and goals Yes  -     Patient would benefit from skilled therapy intervention Yes  -        PT Plan    PT Frequency 2x/week  -     Physical Therapy Interventions (Optional Details) patient/family education;wound care  -     PT Plan Comments debridement, ?trial home management next 1-2 txs.  -               User Key  (r) = Recorded By, (t) = Taken By, (c) = Cosigned By      Initials Name Provider Type     Maximo Palencia, PT Physical Therapist                    Goals   PT OP Goals       Row Name 03/06/25 0958          Time Calculation    PT Goal Re-Cert Due Date 03/18/25  -               User Key  (r) = Recorded By, (t) = Taken By, (c) = Cosigned By      Initials Name Provider Type     Maximo Palencia, PT Physical Therapist                    PT Goal Re-Cert Due Date: 03/18/25            Time Calculation: Start Time: 0930  Untimed  Charges  65336-Tuhfgzzhp debridement: 15  11850-UUHX Mist: 10  Total Minutes  Untimed Charges Total Minutes: 25   Total Minutes: 25  Therapy Charges for Today       Code Description Service Date Service Provider Modifiers Qty    82244599965 HC BROOKE DEBRIDE OPEN WOUND UP TO 20CM 3/6/2025 Maximo Palencia, PT GP 1    43872086613 HC PT NLFU MIST 3/6/2025 Maximo Palencia, PT GP 1                    Maximo Palencia PT  3/6/2025

## 2025-03-10 ENCOUNTER — HOSPITAL ENCOUNTER (OUTPATIENT)
Dept: PHYSICAL THERAPY | Facility: HOSPITAL | Age: 65
Setting detail: THERAPIES SERIES
Discharge: HOME | End: 2025-03-10
Payer: MEDICARE

## 2025-03-10 DIAGNOSIS — R60.0 EDEMA OF LEFT LOWER LEG: ICD-10-CM

## 2025-03-10 DIAGNOSIS — S91.002D OPEN WOUND OF LEFT ANKLE WITH COMPLICATION, SUBSEQUENT ENCOUNTER: ICD-10-CM

## 2025-03-10 DIAGNOSIS — T81.89XD NONHEALING SURGICAL WOUND, SUBSEQUENT ENCOUNTER: Primary | ICD-10-CM

## 2025-03-10 PROCEDURE — 97610 LOW FREQUENCY NON-THERMAL US: CPT

## 2025-03-10 PROCEDURE — 97597 DBRDMT OPN WND 1ST 20 CM/<: CPT

## 2025-03-10 RX ORDER — SERTRALINE HYDROCHLORIDE 100 MG/1
200 TABLET, FILM COATED ORAL DAILY
Qty: 180 TABLET | Refills: 0 | Status: SHIPPED | OUTPATIENT
Start: 2025-03-10

## 2025-03-10 NOTE — THERAPY WOUND CARE TREATMENT
Outpatient Rehabilitation - Wound/Debridement Treatment Note   [place]     Patient Name: Justice Kiser  : 1960  MRN: [id number]  Today's Date: 3/10/2025                 Admit Date: 3/10/2025    Visit Dx:    ICD-10-CM ICD-9-CM   1. Nonhealing surgical wound, subsequent encounter  T81.89XD V58.89     998.83   2. Edema of left lower leg  R60.0 782.3   3. Open wound of left ankle with complication, subsequent encounter  S91.002D V58.89     891.1     L lateral ankle      Patient Active Problem List   Diagnosis    Essential hypertension    Coronary artery disease involving native coronary artery of native heart with angina pectoris    Hyperlipidemia LDL goal <70    Depression    Anxiety    Hyperglycemia    Paresthesia of right foot    Tremor    GERD (gastroesophageal reflux disease)    Arthritis    Pierson's esophagus without dysplasia    MCI (mild cognitive impairment)    Mitral valve insufficiency    Alcohol induced fatty liver    Alcohol abuse, in remission    Cerebral microvascular disease    Chronic bilateral low back pain with right-sided sciatica    Spinal stenosis, lumbar region, with neurogenic claudication    DDD (degenerative disc disease), lumbar    Psychophysiological insomnia    Left leg cellulitis    Severe protein-calorie malnutrition        Past Medical History:   Diagnosis Date    Arthritis     CHF (congestive heart failure)     Chronic pain disorder     Colon polyp     Depression     Difficulty walking     Elevated LFTs     GERD (gastroesophageal reflux disease)     History of colonic polyps     History of methicillin resistant Staphylococcus aureus     History of myocardial infarction     Hyperlipidemia     Hypertension         Past Surgical History:   Procedure Laterality Date    BACK SURGERY      CAROTID STENT      CORONARY ANGIOPLASTY WITH STENT PLACEMENT  2012    Circumflex Xscience V 3.5 MM x 23 mm    CORONARY STENT PLACEMENT      ENDOMETRIAL ABLATION      EPIDURAL BLOCK       GALLBLADDER SURGERY  2017    INCISION AND DRAINAGE LEG Left 11/20/2024    Procedure: INCISION AND DRAINAGE OF LEFT ANKLE;  Surgeon: Ravin Sidhu MD;  Location: [facility];  Service: Orthopedics;  Laterality: Left;    LUMBAR DISCECTOMY  1992    2 level - [facility]    LYMPH NODE BIOPSY      SHOULDER SURGERY  12/20/2018    Dr. Hoyt Rotator cuff repair         EVALUATION   PT Ortho       Row Name 03/10/25 1000       Subjective    Subjective Comments Continuing to report occasional pulling/discomfort at wound site along with some pain when lying on his L side  -       Subjective Pain    Able to rate subjective pain? yes  -    Pre-Treatment Pain Level 0  -LH    Post-Treatment Pain Level 0  -       Transfers    Sit-Stand [place] (Transfers) independent  -LH    Stand-Sit [place] (Transfers) independent  -    Comment, (Transfers) long sitting for tx  -       Gait/Stairs (Locomotion)    [place] Level (Gait) independent  -              User Key  (r) = Recorded By, (t) = Taken By, (c) = Cosigned By      Initials Name Provider Type     Maximo Palencia, PT Physical Therapist                     [facility] Wound       Row Name 03/10/25 1000             Wound 12/18/24 1030 Left lateral ankle    Wound - Properties Group Placement Date: 12/18/24  -MF Placement Time: 1030  -MF Side: Left  - Orientation: lateral  - Location: ankle  - Primary Wound Type: Incision  -MF    Wound Image Images linked: 1  -LH      Dressing Appearance intact;dry  -      Base moist;granulating;red;pink;epithelialization  Pinpoint open area remaining, thick adherent hypertrophic crusting  -      Periwound intact;swelling;warm;dry  -      Periwound Temperature warm  -      Periwound Skin Turgor soft  -      Edges irregular  -      Wound Length (cm) 0.2 cm  -      Wound Width (cm) 0.1 cm  -      Wound Depth (cm) 0.1 cm  -LH      Wound Surface Area (cm^2) 0.02 cm^2  -LH      Wound Volume (cm^3) 0.001  "cm^3  -      Drainage Characteristics/Odor serosanguineous  -      Drainage Amount scant  -      Care, Wound cleansed with;soap and water;irrigated with;wound cleanser;debrided;honey applied;ultrasound therapy, non contact low frequency  MIST with vashe x5min  -      Dressing Care dressing applied;petroleum-based;gauze;low-adherent;foam;silicone border foam  Therahoney, xeroform, 4\" optifoam  -      Periwound Care cleansed with pH balanced cleanser;dry periwound area maintained  nosting  -      Retired Wound - Properties Group Placement Date: 12/18/24  - Placement Time: 1030  -MF Side: Left  - Orientation: lateral  - Location: ankle  - Primary Wound Type: Incision  -    Retired Wound - Properties Group Placement Date: 12/18/24  - Placement Time: 1030  -MF Side: Left  - Orientation: lateral  - Location: ankle  - Primary Wound Type: Incision  -    Retired Wound - Properties Group Date first assessed: 12/18/24  - Time first assessed: 1030  -MF Side: Left  - Location: ankle  -MF Primary Wound Type: Incision  -MF              User Key  (r) = Recorded By, (t) = Taken By, (c) = Cosigned By      Initials Name Provider Type     Trace Max, PT Physical Therapist     Maximo Palencia, PT Physical Therapist                      WOUND DEBRIDEMENT  Total area of Debridement: 1cm2  Debridement Site 1  Location- Site 1: L lateral ankle wound  Selective Debridement- Site 1: Wound Surface <20cmsq  Instruments- Site 1: tweezers, scissors  Excised Tissue Description- Site 1: scant, slough, minimum, other (comment) (hypertrophic crusts)  Bleeding- Site 1: none              Therapy Education       Row Name 03/10/25 1000             Therapy Education    Education Details Reviewed benefits of using therahoney and xeroform to thick, dry crusts. Likely discontinue use of MIST d/t minimal remaining wound. Potential decrease to 1x/week tx next session.  -      Given Bandaging/dressing change  " -      Program Reinforced;Progressed  -      How Provided Verbal;Demonstration  -      Provided to Patient  -      Level of Understanding Verbalized  -                User Key  (r) = Recorded By, (t) = Taken By, (c) = Cosigned By      Initials Name Provider Type    Maximo Epstein, PT Physical Therapist                    Recommendation and Plan   PT Assessment/Plan       Row Name 03/10/25 1000          PT Assessment    Functional Limitations Other (comment);Performance in self-care ADL;Performance in leisure activities  wound management\  -     Impairments Integumentary integrity;Pain  -     Assessment Comments Pt presenting this date with only pinpoint open area remaining, however continuing with firmly adhered, thick, dry crusts covering remaining wound area. PT trialed transition to therahoney and xeroform this date to help improve moisture management of area and help ease debridement of remaining crusts. Pt likely appropriate for discontinuation of MIST and transition to 1x/week treatment next session.  -     Rehab Potential Good  -     Patient/caregiver participated in establishment of treatment plan and goals Yes  -     Patient would benefit from skilled therapy intervention Yes  -        PT Plan    PT Frequency 1x/week;2x/week  -     Physical Therapy Interventions (Optional Details) wound care;patient/family education  -     PT Plan Comments debridement, ?decrease frequency  -               User Key  (r) = Recorded By, (t) = Taken By, (c) = Cosigned By      Initials Name Provider Type    Maximo Epstein, PT Physical Therapist                    Goals   PT OP Goals       Row Name 03/10/25 1056          Time Calculation    PT Goal Re-Cert Due Date 03/18/25  -               User Key  (r) = Recorded By, (t) = Taken By, (c) = Cosigned By      Initials Name Provider Type    Maximo Epstein, PT Physical Therapist                    PT Goal Re-Cert Due Date: 03/18/25             Time Calculation: Start Time: 0945  Untimed Charges  46415-Gzwumwdnu debridement: 10  50151-QYBM Mist: 10  Total Minutes  Untimed Charges Total Minutes: 20   Total Minutes: 20  Therapy Charges for Today       Code Description Service Date Service Provider Modifiers Qty    47728540144 HC BROOKE DEBRIDE OPEN WOUND UP TO 20CM 3/10/2025 Maximo Palencia, PT GP 1    17810272913 HC PT NLFU MIST 3/10/2025 Maximo Palencia, PT GP 1                    Maximo Palencia PT  3/10/2025

## 2025-03-13 ENCOUNTER — HOSPITAL ENCOUNTER (OUTPATIENT)
Dept: PHYSICAL THERAPY | Facility: HOSPITAL | Age: 65
Setting detail: THERAPIES SERIES
Discharge: HOME | End: 2025-03-13
Payer: MEDICARE

## 2025-03-13 DIAGNOSIS — T81.89XD NONHEALING SURGICAL WOUND, SUBSEQUENT ENCOUNTER: Primary | ICD-10-CM

## 2025-03-13 DIAGNOSIS — R60.0 EDEMA OF LEFT LOWER LEG: ICD-10-CM

## 2025-03-13 DIAGNOSIS — S91.002D OPEN WOUND OF LEFT ANKLE WITH COMPLICATION, SUBSEQUENT ENCOUNTER: ICD-10-CM

## 2025-03-13 PROCEDURE — 97597 DBRDMT OPN WND 1ST 20 CM/<: CPT

## 2025-03-13 NOTE — THERAPY PROGRESS REPORT/RE-CERT
Outpatient Rehabilitation - Wound/Debridement Progress Note   [place]     Patient Name: Justice Kiser  : 1960  MRN: [id number]  Today's Date: 3/13/2025                 Admit Date: 3/13/2025    Visit Dx:    ICD-10-CM ICD-9-CM   1. Nonhealing surgical wound, subsequent encounter  T81.89XD V58.89     998.83   2. Open wound of left ankle with complication, subsequent encounter  S91.002D V58.89     891.1   3. Edema of left lower leg  R60.0 782.3       Patient Active Problem List   Diagnosis    Essential hypertension    Coronary artery disease involving native coronary artery of native heart with angina pectoris    Hyperlipidemia LDL goal <70    Depression    Anxiety    Hyperglycemia    Paresthesia of right foot    Tremor    GERD (gastroesophageal reflux disease)    Arthritis    Pierson's esophagus without dysplasia    MCI (mild cognitive impairment)    Mitral valve insufficiency    Alcohol induced fatty liver    Alcohol abuse, in remission    Cerebral microvascular disease    Chronic bilateral low back pain with right-sided sciatica    Spinal stenosis, lumbar region, with neurogenic claudication    DDD (degenerative disc disease), lumbar    Psychophysiological insomnia    Left leg cellulitis    Severe protein-calorie malnutrition        Past Medical History:   Diagnosis Date    Arthritis     CHF (congestive heart failure)     Chronic pain disorder     Colon polyp     Depression     Difficulty walking     Elevated LFTs     GERD (gastroesophageal reflux disease)     History of colonic polyps     History of methicillin resistant Staphylococcus aureus     History of myocardial infarction     Hyperlipidemia     Hypertension         Past Surgical History:   Procedure Laterality Date    BACK SURGERY      CAROTID STENT      CORONARY ANGIOPLASTY WITH STENT PLACEMENT  2012    Circumflex Xscience V 3.5 MM x 23 mm    CORONARY STENT PLACEMENT      ENDOMETRIAL ABLATION      EPIDURAL BLOCK      GALLBLADDER SURGERY   "2017    INCISION AND DRAINAGE LEG Left 11/20/2024    Procedure: INCISION AND DRAINAGE OF LEFT ANKLE;  Surgeon: Ravin Sidhu MD;  Location: [facility], 1928–1983;  Service: Orthopedics;  Laterality: Left;    LUMBAR DISCECTOMY  1992    2 level - [facility]    LYMPH NODE BIOPSY      SHOULDER SURGERY  12/20/2018    Dr. Hoyt Rotator cuff repair         EVALUATION   PT Ortho       Row Name 03/13/25 0930       Subjective    Subjective Comments Reports less discomfort and \"pulling\" sensation with use of therahoney/xeroform last tx.  Reports less swelling in LLE.  -       Subjective Pain    Able to rate subjective pain? yes  -    Pre-Treatment Pain Level 0  -    Post-Treatment Pain Level 0  -JM       Transfers    Sit-Stand [place] (Transfers) independent  -JM    Stand-Sit [place] (Transfers) independent  -    Comment, (Transfers) long sitting for tx  -JM       Gait/Stairs (Locomotion)    [place] Level (Gait) independent  -              User Key  (r) = Recorded By, (t) = Taken By, (c) = Cosigned By      Initials Name Provider Type    Kristin Zamora PT Physical Therapist                     [facility] Wound       Row Name 03/13/25 0915             Wound 12/18/24 1030 Left lateral ankle    Wound - Properties Group Placement Date: 12/18/24  -MF Placement Time: 1030  -MF Side: Left  - Orientation: lateral  - Location: ankle  -MF Primary Wound Type: Incision  -    Dressing Appearance intact;moist drainage  -      Base moist;granulating;red;pink;epithelialization  two pinpoint moist red areas under macerated hypertrophic crusts  -      Periwound intact;swelling;warm;moist;macerated  min maceration  -      Periwound Temperature warm  -      Periwound Skin Turgor soft  -      Edges irregular  -      Wound Length (cm) 0.2 cm  -      Wound Width (cm) 0.1 cm  -      Wound Depth (cm) 0.1 cm  -JM      Wound Surface Area (cm^2) 0.02 cm^2  -JM      Wound Volume (cm^3) 0.001 cm^3  -JM   " "   Drainage Characteristics/Odor serosanguineous  -      Drainage Amount scant  drainage mostly therahoney  -      Care, Wound cleansed with;wound cleanser;debrided  -      Dressing Care dressing applied;collagen;petroleum-based;gauze;silicone border foam  brad, xeroform, 4\" optifoam  -      Periwound Care cleansed with pH balanced cleanser;dry periwound area maintained  -      Retired Wound - Properties Group Placement Date: 12/18/24  - Placement Time: 1030  -MF Side: Left  - Orientation: lateral  - Location: ankle  -MF Primary Wound Type: Incision  -    Retired Wound - Properties Group Placement Date: 12/18/24  - Placement Time: 1030  -MF Side: Left  - Orientation: lateral  - Location: ankle  -MF Primary Wound Type: Incision  -MF    Retired Wound - Properties Group Date first assessed: 12/18/24  - Time first assessed: 1030  -MF Side: Left  - Location: ankle  -MF Primary Wound Type: Incision  -              User Key  (r) = Recorded By, (t) = Taken By, (c) = Cosigned By      Initials Name Provider Type    MF Trace Max, PT Physical Therapist    Kristin Zamora, PT Physical Therapist                      WOUND DEBRIDEMENT  Total area of Debridement: 2cmsq  Debridement Site 1  Location- Site 1: L lateral ankle wound  Selective Debridement- Site 1: Wound Surface <20cmsq  Instruments- Site 1: tweezers, scissors  Excised Tissue Description- Site 1: scant, slough, maximum, other (comment) (hypertrophic crust)  Bleeding- Site 1: none              Therapy Education       Row Name 03/13/25 7732             Therapy Education    Education Details Change dressing every 2-3 days, soak wound with vashe-moistened gauze x5min, pat dry, apply xeroform and 4\" optifoam.  May try going without compression but resume if having swelling in LLE.  Plan to d/c MIST and reduce to once/week tx.  -CHANTEL      Given Bandaging/dressing change  -      Program Reinforced;Progressed  -CHANTEL      How Provided " Verbal;Demonstration  -      Provided to Patient  -      Level of Understanding Verbalized  -                User Key  (r) = Recorded By, (t) = Taken By, (c) = Cosigned By      Initials Name Provider Type    Kristin Zamora PT Physical Therapist                    Recommendation and Plan   PT Assessment/Plan       Row Name 25          PT Assessment    Functional Limitations Other (comment);Performance in self-care ADL;Performance in leisure activities  wound management\  -     Impairments Integumentary integrity;Pain  -     Assessment Comments Pt is progressing toward remaining LTG with decreased wound dimensions, now with only two pinpoint open areas that were underneath the macerated hypertrophic crusts that PT debrided today.  Pt no longer requiring MIST, will plan to reduce to once/week tx for debridement and dressings management.  PT instructed pt in home dressing changes to continue every 2-3 days.  Reduced LLE edema, so trialed d/c of compression with pt to resume as needed for swelling.  -     Rehab Potential Good  -     Patient/caregiver participated in establishment of treatment plan and goals Yes  -     Patient would benefit from skilled therapy intervention Yes  -        PT Plan    PT Frequency 1x/week  -     Predicted Duration of Therapy Intervention (PT) 4-6 visits  -     Planned CPT's? PT BROOKE DEBRIDE OPEN WOUND UP TO 20 CM: 27025;PT NONSELECT DEBRIDE 15 MIN: 16537;PT MULTI LAYER COMP SYS LE;PT SELF CARE/MGMT/TRAIN 15 MIN: 62415;PT NLFU MIST: 69719  -     Physical Therapy Interventions (Optional Details) patient/family education;wound care  -     PT Plan Comments debridement, compression PRN  -               User Key  (r) = Recorded By, (t) = Taken By, (c) = Cosigned By      Initials Name Provider Type    Kristin Zamora, PT Physical Therapist                    Goals   PT OP Goals       Row Name 25 0953 25       PT Short Term  Goals    STG 1 -- Decrease L ankle wound size by 25% as evidence of wound healing.  -    STG 1 Progress -- Met  -    STG 2 -- Increase granulation to L ankle wound to > 50% to improve healing potential.  -    STG 2 Progress -- Met  -    STG 3 -- Pt able to verb / demonstrate home dressing management to progress towards independent management.  -    STG 3 Progress -- Met  -       Long Term Goals    LTG Date to Achieve -- 06/11/25  -    LTG 1 -- Decrease L ankle wound size by 75% as evidence of wound healing.  -    LTG 1 Progress -- Met  -    LTG 2 -- Increase granulation to L ankle wound to > 85% to improve healing potential.  -    LTG 2 Progress -- Met  -    LTG 3 -- Patient's left ankle wound to be closed/resurfaced to allow for return to prior independent activities.  -    LTG 3 Progress -- Progressing  -       Time Calculation    PT Goal Re-Cert Due Date 06/11/25  - 06/11/25  -              User Key  (r) = Recorded By, (t) = Taken By, (c) = Cosigned By      Initials Name Provider Type    Kristin Zamora, PT Physical Therapist                    PT Goal Re-Cert Due Date: 06/11/25  PT Short Term Goals  STG 1: Decrease L ankle wound size by 25% as evidence of wound healing.  STG 1 Progress: Met  STG 2: Increase granulation to L ankle wound to > 50% to improve healing potential.  STG 2 Progress: Met  STG 3: Pt able to verb / demonstrate home dressing management to progress towards independent management.  STG 3 Progress: Met  Long Term Goals  LTG Date to Achieve: 06/11/25  LTG 1: Decrease L ankle wound size by 75% as evidence of wound healing.  LTG 1 Progress: Met  LTG 2: Increase granulation to L ankle wound to > 85% to improve healing potential.  LTG 2 Progress: Met  LTG 3: Patient's left ankle wound to be closed/resurfaced to allow for return to prior independent activities.  LTG 3 Progress: Progressing      Time Calculation: Start Time: 0930  Untimed Charges  [id number]  debridement: 20  Total Minutes  Untimed Charges Total Minutes: 20   Total Minutes: 20  Therapy Charges for Today       Code Description Service Date Service Provider Modifiers Qty    [id number]  BROOKE DEBRIDE OPEN WOUND UP TO 20CM 3/13/2025 Kristin Davila, PT GP 1                    Kristin Davila, PT  3/13/2025

## 2025-03-20 ENCOUNTER — HOSPITAL ENCOUNTER (OUTPATIENT)
Dept: PHYSICAL THERAPY | Facility: HOSPITAL | Age: 65
Setting detail: THERAPIES SERIES
Discharge: HOME | End: 2025-03-20
Payer: MEDICARE

## 2025-03-20 DIAGNOSIS — T81.89XD NONHEALING SURGICAL WOUND, SUBSEQUENT ENCOUNTER: Primary | ICD-10-CM

## 2025-03-20 DIAGNOSIS — R60.0 EDEMA OF LEFT LOWER LEG: ICD-10-CM

## 2025-03-20 DIAGNOSIS — S91.002D OPEN WOUND OF LEFT ANKLE WITH COMPLICATION, SUBSEQUENT ENCOUNTER: ICD-10-CM

## 2025-03-20 PROCEDURE — 97597 DBRDMT OPN WND 1ST 20 CM/<: CPT

## 2025-03-20 NOTE — THERAPY WOUND CARE TREATMENT
Outpatient Rehabilitation - Wound/Debridement Treatment Note   [place]     Patient Name: Justice Kiser  : 1960  MRN: [id number]  Today's Date: 3/20/2025                 Admit Date: 3/20/2025    Visit Dx:    ICD-10-CM ICD-9-CM   1. Nonhealing surgical wound, subsequent encounter  T81.89XD V58.89     998.83   2. Open wound of left ankle with complication, subsequent encounter  S91.002D V58.89     891.1   3. Edema of left lower leg  R60.0 782.3       Patient Active Problem List   Diagnosis    Essential hypertension    Coronary artery disease involving native coronary artery of native heart with angina pectoris    Hyperlipidemia LDL goal <70    Depression    Anxiety    Hyperglycemia    Paresthesia of right foot    Tremor    GERD (gastroesophageal reflux disease)    Arthritis    Pierson's esophagus without dysplasia    MCI (mild cognitive impairment)    Mitral valve insufficiency    Alcohol induced fatty liver    Alcohol abuse, in remission    Cerebral microvascular disease    Chronic bilateral low back pain with right-sided sciatica    Spinal stenosis, lumbar region, with neurogenic claudication    DDD (degenerative disc disease), lumbar    Psychophysiological insomnia    Left leg cellulitis    Severe protein-calorie malnutrition        Past Medical History:   Diagnosis Date    Arthritis     CHF (congestive heart failure)     Chronic pain disorder     Colon polyp     Depression     Difficulty walking     Elevated LFTs     GERD (gastroesophageal reflux disease)     History of colonic polyps     History of methicillin resistant Staphylococcus aureus     History of myocardial infarction     Hyperlipidemia     Hypertension         Past Surgical History:   Procedure Laterality Date    BACK SURGERY      CAROTID STENT      CORONARY ANGIOPLASTY WITH STENT PLACEMENT  2012    Circumflex Xscience V 3.5 MM x 23 mm    CORONARY STENT PLACEMENT      ENDOMETRIAL ABLATION      EPIDURAL BLOCK      GALLBLADDER SURGERY   2017    INCISION AND DRAINAGE LEG Left 11/20/2024    Procedure: INCISION AND DRAINAGE OF LEFT ANKLE;  Surgeon: Ravin Sidhu MD;  Location: [facility];  Service: Orthopedics;  Laterality: Left;    LUMBAR DISCECTOMY  1992    2 level - [facility]    LYMPH NODE BIOPSY      SHOULDER SURGERY  12/20/2018    Dr. Hoyt Rotator cuff repair         EVALUATION   PT Ortho       Row Name 03/20/25 1000       Subjective    Subjective Comments Pt reports no new issues with the wound area. Reports he trialed without use of the MLW, however he thinks his edema quickly returned so he continued with compression use.  -       Subjective Pain    Able to rate subjective pain? yes  -    Pre-Treatment Pain Level 0  -    Post-Treatment Pain Level 0  -       Transfers    Sit-Stand [place] (Transfers) independent  -    Stand-Sit [place] (Transfers) independent  -    Comment, (Transfers) Seated for tx.  -       Gait/Stairs (Locomotion)    [place] Level (Gait) independent  -              User Key  (r) = Recorded By, (t) = Taken By, (c) = Cosigned By      Initials Name Provider Type     Maximo Palencia, PT Physical Therapist                     [facility] Wound       Row Name 03/20/25 1000             Wound 12/18/24 1030 Left lateral ankle    Wound - Properties Group Placement Date: 12/18/24  -MF Placement Time: 1030  -MF Side: Left  - Orientation: lateral  - Location: ankle  -MF Primary Wound Type: Incision  -MF    Wound Image Images linked: 1  -      Dressing Appearance intact;moist drainage  -      Base moist;granulating;red;pink;epithelialization  small pinpoint moist red areas under sott hypertrophic crusts  -      Periwound intact;swelling;warm;moist;macerated  scant maceration  -      Periwound Temperature warm  -      Periwound Skin Turgor soft  -      Edges irregular  -      Drainage Characteristics/Odor serosanguineous  -      Drainage Amount scant  -      Care, Wound  "cleansed with;soap and water;wound cleanser;debrided  -      Dressing Care dressing applied;silver impregnated;collagen;petroleum-based;gauze;multi-layer wrap  Miri Ag, xeroform, 4\" optifoam, MLW  -      Periwound Care cleansed with pH balanced cleanser;dry periwound area maintained  -      Retired Wound - Properties Group Placement Date: 12/18/24  - Placement Time: 1030  -MF Side: Left  -MF Orientation: lateral  -MF Location: ankle  -MF Primary Wound Type: Incision  -MF    Retired Wound - Properties Group Placement Date: 12/18/24  - Placement Time: 1030  -MF Side: Left  -MF Orientation: lateral  -MF Location: ankle  -MF Primary Wound Type: Incision  -MF    Retired Wound - Properties Group Date first assessed: 12/18/24  - Time first assessed: 1030  -MF Side: Left  -MF Location: ankle  -MF Primary Wound Type: Incision  -MF              User Key  (r) = Recorded By, (t) = Taken By, (c) = Cosigned By      Initials Name Provider Type     Trace Max, PT Physical Therapist     Maximo Palencia, PT Physical Therapist                      WOUND DEBRIDEMENT  Total area of Debridement: 1cm2  Debridement Site 1  Location- Site 1: L lateral ankle wound  Selective Debridement- Site 1: Wound Surface <20cmsq  Instruments- Site 1: tweezers, other (comment) (crusts)  Excised Tissue Description- Site 1: scant, slough  Bleeding- Site 1: none              Therapy Education       Row Name 03/20/25 1000             Therapy Education    Education Details Continue with current dressings and compression PRN. May discuss long term compression options once wound is fully resurfaced.  -      Given Bandaging/dressing change  -      Program Reinforced;Progressed  -      How Provided Verbal;Demonstration  -      Provided to Patient  -      Level of Understanding Verbalized  -                User Key  (r) = Recorded By, (t) = Taken By, (c) = Cosigned By      Initials Name Provider Type     Maximo Palencia, " PT Physical Therapist                    Recommendation and Plan   PT Assessment/Plan       Row Name 03/20/25 1000          PT Assessment    Functional Limitations Other (comment);Performance in self-care ADL;Performance in leisure activities  wound management\  -     Impairments Integumentary integrity;Pain  -     Assessment Comments Pt continuing to demonstrate slow, steady improvements in re-epithelialization of wound edges and improvements in periwound skin integrity. Pt continuing with mild edema requiring compressogrip use. Pt may benefit from fitting of compression garment for long-term independent use once wound is fully resurfaced. Pt would continue to benefit from current POC to promote wound healing.  -     Rehab Potential Good  -     Patient/caregiver participated in establishment of treatment plan and goals Yes  -     Patient would benefit from skilled therapy intervention Yes  -        PT Plan    PT Frequency 1x/week  -     Physical Therapy Interventions (Optional Details) wound care;patient/family education  -     PT Plan Comments debridement, compression PRN  -               User Key  (r) = Recorded By, (t) = Taken By, (c) = Cosigned By      Initials Name Provider Type     Maximo Palencia, PT Physical Therapist                    Goals   PT OP Goals       Row Name 03/20/25 1044 03/20/25 1000       PT Short Term Goals    STG 1 -- Decrease L ankle wound size by 25% as evidence of wound healing.  -    STG 1 Progress -- Met  -    STG 2 -- Increase granulation to L ankle wound to > 50% to improve healing potential.  -    STG 2 Progress -- Met  -    STG 3 -- Pt able to verb / demonstrate home dressing management to progress towards independent management.  -    STG 3 Progress -- Met  -       Long Term Goals    LTG Date to Achieve -- 06/11/25  -    LTG 1 -- Decrease L ankle wound size by 75% as evidence of wound healing.  -    LTG 1 Progress -- Met  -    LTG 2 --  Increase granulation to L ankle wound to > 85% to improve healing potential.  -    LTG 2 Progress -- Met  -    LTG 3 -- Patient's left ankle wound to be closed/resurfaced to allow for return to prior independent activities.  -    LTG 3 Progress -- Progressing  -       Time Calculation    PT Goal Re-Cert Due Date 06/11/25  - 06/11/25  -              User Key  (r) = Recorded By, (t) = Taken By, (c) = Cosigned By      Initials Name Provider Type     Maximo Palencia, PT Physical Therapist                    PT Goal Re-Cert Due Date: 06/11/25  PT Short Term Goals  STG 1: Decrease L ankle wound size by 25% as evidence of wound healing.  STG 1 Progress: Met  STG 2: Increase granulation to L ankle wound to > 50% to improve healing potential.  STG 2 Progress: Met  STG 3: Pt able to verb / demonstrate home dressing management to progress towards independent management.  STG 3 Progress: Met  Long Term Goals  LTG Date to Achieve: 06/11/25  LTG 1: Decrease L ankle wound size by 75% as evidence of wound healing.  LTG 1 Progress: Met  LTG 2: Increase granulation to L ankle wound to > 85% to improve healing potential.  LTG 2 Progress: Met  LTG 3: Patient's left ankle wound to be closed/resurfaced to allow for return to prior independent activities.  LTG 3 Progress: Progressing      Time Calculation: Start Time: 1015  Untimed Charges  [id number] debridement: 20  Total Minutes  Untimed Charges Total Minutes: 20   Total Minutes: 20  Therapy Charges for Today       Code Description Service Date Service Provider Modifiers Qty    [id number] [facility] DEBRIDE OPEN WOUND UP TO 20CM 3/20/2025 Maximo Palencia, PT GP 1                    Maximo Palencia PT  3/20/2025      The patient is a 23y Male complaining of lacerations.

## 2025-03-27 ENCOUNTER — HOSPITAL ENCOUNTER (OUTPATIENT)
Dept: PHYSICAL THERAPY | Facility: HOSPITAL | Age: 65
Setting detail: THERAPIES SERIES
Discharge: HOME | End: 2025-03-27
Payer: MEDICARE

## 2025-03-27 DIAGNOSIS — R60.0 EDEMA OF LEFT LOWER LEG: ICD-10-CM

## 2025-03-27 DIAGNOSIS — T81.89XD NONHEALING SURGICAL WOUND, SUBSEQUENT ENCOUNTER: Primary | ICD-10-CM

## 2025-03-27 DIAGNOSIS — S91.002D OPEN WOUND OF LEFT ANKLE WITH COMPLICATION, SUBSEQUENT ENCOUNTER: ICD-10-CM

## 2025-03-27 PROCEDURE — 97597 DBRDMT OPN WND 1ST 20 CM/<: CPT

## 2025-03-27 NOTE — THERAPY WOUND CARE TREATMENT
Outpatient Rehabilitation - Wound/Debridement Treatment Note   [place]     Patient Name: Justice Kiser  : 1960  MRN: [id number]  Today's Date: 3/27/2025                 Admit Date: 3/27/2025    Visit Dx:    ICD-10-CM ICD-9-CM   1. Nonhealing surgical wound, subsequent encounter  T81.89XD V58.89     998.83   2. Open wound of left ankle with complication, subsequent encounter  S91.002D V58.89     891.1   3. Edema of left lower leg  R60.0 782.3       Patient Active Problem List   Diagnosis    Essential hypertension    Coronary artery disease involving native coronary artery of native heart with angina pectoris    Hyperlipidemia LDL goal <70    Depression    Anxiety    Hyperglycemia    Paresthesia of right foot    Tremor    GERD (gastroesophageal reflux disease)    Arthritis    Pierson's esophagus without dysplasia    MCI (mild cognitive impairment)    Mitral valve insufficiency    Alcohol induced fatty liver    Alcohol abuse, in remission    Cerebral microvascular disease    Chronic bilateral low back pain with right-sided sciatica    Spinal stenosis, lumbar region, with neurogenic claudication    DDD (degenerative disc disease), lumbar    Psychophysiological insomnia    Left leg cellulitis    Severe protein-calorie malnutrition        Past Medical History:   Diagnosis Date    Arthritis     CHF (congestive heart failure)     Chronic pain disorder     Colon polyp     Depression     Difficulty walking     Elevated LFTs     GERD (gastroesophageal reflux disease)     History of colonic polyps     History of methicillin resistant Staphylococcus aureus     History of myocardial infarction     Hyperlipidemia     Hypertension         Past Surgical History:   Procedure Laterality Date    BACK SURGERY      CAROTID STENT      CORONARY ANGIOPLASTY WITH STENT PLACEMENT  2012    Circumflex Xscience V 3.5 MM x 23 mm    CORONARY STENT PLACEMENT      ENDOMETRIAL ABLATION      EPIDURAL BLOCK      GALLBLADDER SURGERY  " 2017    INCISION AND DRAINAGE LEG Left 11/20/2024    Procedure: INCISION AND DRAINAGE OF LEFT ANKLE;  Surgeon: Ravin Sidhu MD;  Location: [facility];  Service: Orthopedics;  Laterality: Left;    LUMBAR DISCECTOMY  1992    2 level - [facility]    LYMPH NODE BIOPSY      SHOULDER SURGERY  12/20/2018    Dr. Hoyt Rotator cuff repair         EVALUATION   PT Ortho       Row Name 03/27/25 1400       Subjective    Subjective Comments No new complaints or changes. Has been wearing compression prn.  -       Subjective Pain    Able to rate subjective pain? yes  -MC    Pre-Treatment Pain Level 0  -MC    Post-Treatment Pain Level 0  -       Transfers    Sit-Stand [place] (Transfers) independent  -    Stand-Sit [place] (Transfers) independent  -    Comment, (Transfers) seated for tx  -       Gait/Stairs (Locomotion)    [place] Level (Gait) independent  -              User Key  (r) = Recorded By, (t) = Taken By, (c) = Cosigned By      Initials Name Provider Type    Holly Sosa PT Physical Therapist                     LDA Wound       Row Name 03/27/25 1400             Wound 12/18/24 1030 Left lateral ankle    Wound - Properties Group Placement Date: 12/18/24  - Placement Time: 1030  -MF Side: Left  -MF Orientation: lateral  -MF Location: ankle  -MF Primary Wound Type: Incision  -    Wound Image Images linked: 1  -      Dressing Appearance intact;dry  -      Base clean;closed/resurfaced;dry;pink;moist  fragile but intact skin  -      Periwound intact;swelling;warm  -      Periwound Temperature warm  -      Periwound Skin Turgor soft  -      Drainage Amount none  -      Care, Wound cleansed with;wound cleanser;debrided  -      Dressing Care dressing applied;low-adherent;foam;silicone border foam  4\" optifoam  -      Periwound Care cleansed with pH balanced cleanser;dry periwound area maintained  -      Retired Wound - Properties Group Placement Date: " 12/18/24  - Placement Time: 1030  -MF Side: Left  -MF Orientation: lateral  -MF Location: ankle  -MF Primary Wound Type: Incision  -MF    Retired Wound - Properties Group Placement Date: 12/18/24  - Placement Time: 1030  -MF Side: Left  -MF Orientation: lateral  -MF Location: ankle  -MF Primary Wound Type: Incision  -MF    Retired Wound - Properties Group Date first assessed: 12/18/24  - Time first assessed: 1030  -MF Side: Left  -MF Location: ankle  -MF Primary Wound Type: Incision  -MF              User Key  (r) = Recorded By, (t) = Taken By, (c) = Cosigned By      Initials Name Provider Type    Trace Cox, PT Physical Therapist    Holly Sosa, PT Physical Therapist                      WOUND DEBRIDEMENT  Total area of Debridement: 3 cm2  Debridement Site 1  Location- Site 1: L lateral ankle wound  Selective Debridement- Site 1: Wound Surface <20cmsq  Instruments- Site 1: tweezers, #15, scapel  Excised Tissue Description- Site 1: moderate, other (comment) (crusts)  Bleeding- Site 1: none              Therapy Education       Row Name 03/27/25 1400             Therapy Education    Education Details Cover area with optifoam for 1-2 additional weeks to allow skin to continue to dry and mature. Call for additional follow up. Use compression prn for swelling.  -MC      Given Bandaging/dressing change  -MC      Program Progressed  -MC      How Provided Verbal;Demonstration  -MC      Provided to Patient  -MC      Level of Understanding Verbalized  -MC                User Key  (r) = Recorded By, (t) = Taken By, (c) = Cosigned By      Initials Name Provider Type    Holly Sosa, PT Physical Therapist                    Recommendation and Plan   PT Assessment/Plan       Row Name 03/27/25 1400          PT Assessment    Functional Limitations Other (comment);Performance in self-care ADL;Performance in leisure activities  wound management  -MC     Impairments Integumentary integrity;Pain  -MC      Assessment Comments Pt with closure of L ankle wound noted today. PT debrided thick crusts to the area, noting closed but slightly fragile skin underneath. Pt is appropriate for tentative transition to independent self care in these final stages of healing.  -     Rehab Potential Good  -     Patient/caregiver participated in establishment of treatment plan and goals Yes  -     Patient would benefit from skilled therapy intervention Yes  -        PT Plan    PT Frequency 1x/week  -     Physical Therapy Interventions (Optional Details) wound care;patient/family education  -     PT Plan Comments tentative d/c  -               User Key  (r) = Recorded By, (t) = Taken By, (c) = Cosigned By      Initials Name Provider Type    Holly Sosa, PT Physical Therapist                    Goals   PT OP Goals       Row Name 03/27/25 1427          Time Calculation    PT Goal Re-Cert Due Date 06/11/25  -               User Key  (r) = Recorded By, (t) = Taken By, (c) = Cosigned By      Initials Name Provider Type     Holyl Bryant, PT Physical Therapist                    PT Goal Re-Cert Due Date: 06/11/25            Time Calculation: Start Time: 0930  Untimed Charges  09140-Cckuggrxk debridement: 25  Total Minutes  Untimed Charges Total Minutes: 25   Total Minutes: 25              Holly Bryant PT  3/27/2025

## 2025-04-20 DIAGNOSIS — R10.13 EPIGASTRIC ABDOMINAL PAIN: ICD-10-CM

## 2025-04-20 DIAGNOSIS — K21.9 GASTROESOPHAGEAL REFLUX DISEASE: ICD-10-CM

## 2025-04-21 RX ORDER — OMEPRAZOLE 40 MG/1
CAPSULE, DELAYED RELEASE ORAL
Qty: 90 CAPSULE | Refills: 1 | Status: SHIPPED | OUTPATIENT
Start: 2025-04-21

## 2025-05-10 DIAGNOSIS — I10 ESSENTIAL HYPERTENSION: ICD-10-CM

## 2025-05-12 RX ORDER — LISINOPRIL AND HYDROCHLOROTHIAZIDE 12.5; 2 MG/1; MG/1
1 TABLET ORAL DAILY
Qty: 90 TABLET | Refills: 0 | Status: SHIPPED | OUTPATIENT
Start: 2025-05-12

## 2025-06-06 ENCOUNTER — OFFICE VISIT (OUTPATIENT)
Dept: FAMILY MEDICINE CLINIC | Facility: CLINIC | Age: 65
End: 2025-06-06
Payer: MEDICARE

## 2025-06-06 VITALS
HEIGHT: 65 IN | BODY MASS INDEX: 28.99 KG/M2 | WEIGHT: 174 LBS | HEART RATE: 68 BPM | SYSTOLIC BLOOD PRESSURE: 120 MMHG | RESPIRATION RATE: 18 BRPM | DIASTOLIC BLOOD PRESSURE: 52 MMHG | TEMPERATURE: 97.4 F

## 2025-06-06 DIAGNOSIS — R10.11 RUQ ABDOMINAL PAIN: Primary | ICD-10-CM

## 2025-06-06 DIAGNOSIS — F33.0 MILD EPISODE OF RECURRENT MAJOR DEPRESSIVE DISORDER: ICD-10-CM

## 2025-06-06 DIAGNOSIS — I10 ESSENTIAL HYPERTENSION: ICD-10-CM

## 2025-06-06 DIAGNOSIS — R11.0 NAUSEA: ICD-10-CM

## 2025-06-06 DIAGNOSIS — E78.5 HYPERLIPIDEMIA LDL GOAL <70: ICD-10-CM

## 2025-06-06 DIAGNOSIS — K70.0 ALCOHOL INDUCED FATTY LIVER: ICD-10-CM

## 2025-06-06 DIAGNOSIS — G31.84 MCI (MILD COGNITIVE IMPAIRMENT): ICD-10-CM

## 2025-06-06 RX ORDER — SERTRALINE HYDROCHLORIDE 100 MG/1
200 TABLET, FILM COATED ORAL DAILY
Qty: 180 TABLET | Refills: 1 | Status: SHIPPED | OUTPATIENT
Start: 2025-06-06

## 2025-06-06 NOTE — PROGRESS NOTES
"Chief Complaint  Hypertension (6 month f/u )    Subjective          Justice Kiser presents to [facility]    HPI         65-year-old male presents for follow-up of right upper abdominal pain, blood pressure, cholesterol, memory, and mood issues.    Reports intermittent right upper quadrant discomfort, described as a drawing sensation, occasionally sore. History of hepatic complications, no recent liver ultrasound, gallbladder removed. Leans towards diarrhea, under care of hepatologist Dr. Paul. Recent nausea and lower abdominal pain relieved by bowel movements. Weight stable. Pain across lower abdomen and right upper quadrant. No respiratory distress or chest pain. Symptoms primarily gastrointestinal.    Continues metoprolol for blood pressure, readings generally normal, slightly low. Takes lisinopril-hydrochlorothiazide and atorvastatin for cholesterol, under cardiologist care.    Reports memory fluctuations, takes Aricept and Namenda under neurologist supervision.    Stable mood, frustration at diminished activity ability, feelings of sadness, no suicidal ideation. Needs sertraline refill.    History of left ankle infection, treated in ER, improved but still discomfort. Difficulty with prolonged standing due to back pain, requires frequent sitting breaks, declined surgery.    PAST SURGICAL HISTORY:  Gallbladder removal  Colonoscopy    SOCIAL HISTORY  - Former smoker, quit long ago  - No alcohol consumption       OTHER NOTES:          Review of Systems   Constitutional: Negative.    HENT: Negative.     Respiratory: Negative.     Cardiovascular: Negative.    Gastrointestinal:  Positive for abdominal pain.   All other systems reviewed and are negative.       Objective       Vital Signs:   /52   Pulse 68   Temp 97.4 °F (36.3 °C)   Resp 18   Ht 165.1 cm (65\")   Wt 78.9 kg (174 lb)   BMI 28.96 kg/m²     Physical Exam  Vitals and nursing note reviewed.   Constitutional:       " General: He is not in acute distress.     Appearance: Normal appearance. He is not ill-appearing or toxic-appearing.   HENT:      Head: Normocephalic.   Abdominal:      General: Bowel sounds are normal. There is no distension.      Palpations: Abdomen is soft.      Tenderness: There is no guarding or rebound.   Neurological:      Mental Status: He is alert.   Psychiatric:         Mood and Affect: Mood normal.         Behavior: Behavior normal.             Respiratory: Clear to auscultation, no wheezing, rales, or rhonchi  Cardiovascular: Regular rate and rhythm, no murmurs, rubs, or gallops  Gastrointestinal: Right upper quadrant tenderness       Result Review :            Other Results    Results  - Labs:    - Liver tests (01/2025): Elevated             Assessment and Plan    Diagnoses and all orders for this visit:    1. RUQ abdominal pain (Primary)  -     US Liver; Future  -     CBC & Differential  -     Comprehensive Metabolic Panel    2. Alcohol induced fatty liver  -     US Liver; Future    3. Nausea    4. Essential hypertension  -     Lipid Panel With / Chol / HDL Ratio    5. Hyperlipidemia LDL goal <70  -     Lipid Panel With / Chol / HDL Ratio    6. MCI (mild cognitive impairment)    7. Mild episode of recurrent major depressive disorder  -     sertraline (ZOLOFT) 100 MG tablet; Take 2 tablets by mouth Daily.  Dispense: 180 tablet; Refill: 1               DISCUSSION       Right upper quadrant pain.  History of fatty liver  Intermittent soreness is currently present. The exam revealed right upper quadrant tenderness. A comprehensive blood panel has been ordered, including liver function, kidney function, and cholesterol tests. A liver ultrasound has been scheduled.    Blood pressure management.  The patient's blood pressure is well-controlled at 120/52 mmHg. The patient is on metoprolol and lisinopril-hydrochlorothiazide, and no medication changes are needed.    Cholesterol management.  The patient is on  atorvastatin. A cholesterol test is included in the blood work, and no medication changes are needed.    Memory issues.  The patient reports fluctuating memory and is on Aricept and Namenda. The patient continues to be managed by a neurologist.    Mood issues.  The patient experiences sadness due to activity limitations but has no self-harm thoughts. The patient is on sertraline and needs a refill. No medication changes are needed.    Follow-up.  A follow-up is scheduled in 4 months.         Follow Up   Return in about 4 months (around 10/6/2025).    Patient was given instructions and counseling regarding his condition or for health maintenance advice. Please see specific information pulled into the AVS if appropriate.       Anshul Martinez MD    Patient or patient representative verbalized consent for the use of Ambient Listening during the visit with  Anshul Martinez MD for chart documentation. 6/6/2025  17:57 EDT

## 2025-06-07 LAB
ALBUMIN SERPL-MCNC: 4 G/DL (ref 3.9–4.9)
ALP SERPL-CCNC: 264 IU/L (ref 44–121)
ALT SERPL-CCNC: 30 IU/L (ref 0–44)
AST SERPL-CCNC: 66 IU/L (ref 0–40)
BASOPHILS # BLD AUTO: 0.1 X10E3/UL (ref 0–0.2)
BASOPHILS NFR BLD AUTO: 1 %
BILIRUB SERPL-MCNC: 0.7 MG/DL (ref 0–1.2)
BUN SERPL-MCNC: 14 MG/DL (ref 8–27)
BUN/CREAT SERPL: 15 (ref 10–24)
CALCIUM SERPL-MCNC: 8.9 MG/DL (ref 8.6–10.2)
CHLORIDE SERPL-SCNC: 102 MMOL/L (ref 96–106)
CHOLEST SERPL-MCNC: 118 MG/DL (ref 100–199)
CHOLEST/HDLC SERPL: 1.8 RATIO (ref 0–5)
CO2 SERPL-SCNC: 23 MMOL/L (ref 20–29)
CREAT SERPL-MCNC: 0.95 MG/DL (ref 0.76–1.27)
EGFRCR SERPLBLD CKD-EPI 2021: 88 ML/MIN/1.73
EOSINOPHIL # BLD AUTO: 0.3 X10E3/UL (ref 0–0.4)
EOSINOPHIL NFR BLD AUTO: 4 %
ERYTHROCYTE [DISTWIDTH] IN BLOOD BY AUTOMATED COUNT: 13.7 % (ref 11.6–15.4)
GLOBULIN SER CALC-MCNC: 2.9 G/DL (ref 1.5–4.5)
GLUCOSE SERPL-MCNC: 99 MG/DL (ref 70–99)
HCT VFR BLD AUTO: 32.5 % (ref 37.5–51)
HDLC SERPL-MCNC: 67 MG/DL
HGB BLD-MCNC: 9.5 G/DL (ref 13–17.7)
IMM GRANULOCYTES # BLD AUTO: 0 X10E3/UL (ref 0–0.1)
IMM GRANULOCYTES NFR BLD AUTO: 0 %
LDLC SERPL CALC-MCNC: 37 MG/DL (ref 0–99)
LYMPHOCYTES # BLD AUTO: 3.2 X10E3/UL (ref 0.7–3.1)
LYMPHOCYTES NFR BLD AUTO: 40 %
MCH RBC QN AUTO: 27.1 PG (ref 26.6–33)
MCHC RBC AUTO-ENTMCNC: 29.2 G/DL (ref 31.5–35.7)
MCV RBC AUTO: 93 FL (ref 79–97)
MONOCYTES # BLD AUTO: 0.8 X10E3/UL (ref 0.1–0.9)
MONOCYTES NFR BLD AUTO: 10 %
NEUTROPHILS # BLD AUTO: 3.6 X10E3/UL (ref 1.4–7)
NEUTROPHILS NFR BLD AUTO: 45 %
PLATELET # BLD AUTO: 142 X10E3/UL (ref 150–450)
POTASSIUM SERPL-SCNC: 4.1 MMOL/L (ref 3.5–5.2)
PROT SERPL-MCNC: 6.9 G/DL (ref 6–8.5)
RBC # BLD AUTO: 3.51 X10E6/UL (ref 4.14–5.8)
SODIUM SERPL-SCNC: 139 MMOL/L (ref 134–144)
TRIGL SERPL-MCNC: 67 MG/DL (ref 0–149)
VLDLC SERPL CALC-MCNC: 14 MG/DL (ref 5–40)
WBC # BLD AUTO: 7.9 X10E3/UL (ref 3.4–10.8)

## 2025-07-11 ENCOUNTER — HOSPITAL ENCOUNTER (OUTPATIENT)
Dept: ULTRASOUND IMAGING | Facility: HOSPITAL | Age: 65
Discharge: HOME | End: 2025-07-11
Payer: MEDICARE

## 2025-07-11 DIAGNOSIS — R10.11 RUQ ABDOMINAL PAIN: ICD-10-CM

## 2025-07-11 DIAGNOSIS — K70.0 ALCOHOL INDUCED FATTY LIVER: ICD-10-CM

## 2025-07-11 PROCEDURE — 76705 ECHO EXAM OF ABDOMEN: CPT

## 2025-08-05 DIAGNOSIS — I10 ESSENTIAL HYPERTENSION: ICD-10-CM

## 2025-08-05 RX ORDER — LISINOPRIL AND HYDROCHLOROTHIAZIDE 12.5; 2 MG/1; MG/1
1 TABLET ORAL DAILY
Qty: 90 TABLET | Refills: 0 | Status: SHIPPED | OUTPATIENT
Start: 2025-08-05

## (undated) DEVICE — PATIENT RETURN ELECTRODE, SINGLE-USE, CONTACT QUALITY MONITORING, ADULT, WITH 9FT CORD, FOR PATIENTS WEIGING OVER 33LBS. (15KG): Brand: MEGADYNE

## (undated) DEVICE — SHEET, DRAPE, SPLIT, STERILE: Brand: MEDLINE

## (undated) DEVICE — BNDG ELAS CO-FLEX SLF ADHR 6IN 5YD LF STRL

## (undated) DEVICE — PAD,ARMBOARD,CONV,FOAM,2X8X20",12PR/CS: Brand: MEDLINE

## (undated) DEVICE — GLV SURG SENSICARE PI ORTHO SZ8.5 LF STRL

## (undated) DEVICE — GOWN,NON-REINFORCED,SIRUS,SET IN SLV,XL: Brand: MEDLINE

## (undated) DEVICE — SHEET,DRAPE,53X77,STERILE: Brand: MEDLINE

## (undated) DEVICE — UNDERCAST PADDING: Brand: DEROYAL

## (undated) DEVICE — PREMIUM DRY TRAY LF: Brand: MEDLINE INDUSTRIES, INC.

## (undated) DEVICE — GLV SURG SENSICARE PI MIC PF SZ9 LF STRL

## (undated) DEVICE — STRIP PACKING W IODOFORM 1/4

## (undated) DEVICE — SPNG GZ WOVN 4X4IN 12PLY 10/BX STRL

## (undated) DEVICE — SUT PDS 2 0 CT1 27IN CLR Z259H

## (undated) DEVICE — DRAPE,U/ SHT,SPLIT,PLAS,STERIL: Brand: MEDLINE

## (undated) DEVICE — CONTAINER,SPECIMEN,OR STERILE,4OZ: Brand: MEDLINE

## (undated) DEVICE — SOL ISO/ALC RUB 70PCT 4OZ

## (undated) DEVICE — Device

## (undated) DEVICE — DRESSING,GAUZE,XEROFORM,CURAD,1"X8",ST: Brand: CURAD

## (undated) DEVICE — STRAP STIRUP WO/RNG 19X3.5IN DISP

## (undated) DEVICE — PK EXTREM LOWR 10

## (undated) DEVICE — SKN PREP SPNG STKS PVP PNT STR: Brand: MEDLINE INDUSTRIES, INC.

## (undated) DEVICE — SUT PDS MONO 0 36 CT1 VIL PDP346H

## (undated) DEVICE — SCRB SURG BACTOSHIELD CHG 4PCT 4OZ

## (undated) DEVICE — INTENDED FOR TISSUE SEPARATION, AND OTHER PROCEDURES THAT REQUIRE A SHARP SURGICAL BLADE TO PUNCTURE OR CUT.: Brand: BARD-PARKER ® STAINLESS STEEL BLADES

## (undated) DEVICE — GOWN SURG ORBIS LVL3 2XL STRL

## (undated) DEVICE — TRAP FLD MINIVAC MEGADYNE 100ML

## (undated) DEVICE — BNDG ELAS W/CLIP 6IN 10YD LF STRL

## (undated) DEVICE — BLANKT WARM UPPR/BDY ARM/OUT 57X196CM

## (undated) DEVICE — PENCL SMOKE/EVAC MEGADYNE TELESCP 10FT

## (undated) DEVICE — UNDERGLV SURG BIOGEL INDICAT PI SZ8.5 BLU

## (undated) DEVICE — DRSNG PAD ABD 8X10IN STRL